# Patient Record
Sex: FEMALE | Race: WHITE | NOT HISPANIC OR LATINO | Employment: OTHER | URBAN - METROPOLITAN AREA
[De-identification: names, ages, dates, MRNs, and addresses within clinical notes are randomized per-mention and may not be internally consistent; named-entity substitution may affect disease eponyms.]

---

## 2017-01-06 ENCOUNTER — COMMUNICATION - HEALTHEAST (OUTPATIENT)
Dept: RESPIRATORY THERAPY | Facility: HOSPITAL | Age: 62
End: 2017-01-06

## 2017-01-27 ENCOUNTER — ONCOLOGY VISIT (OUTPATIENT)
Dept: TRANSPLANT | Facility: CLINIC | Age: 62
End: 2017-01-27
Attending: INTERNAL MEDICINE
Payer: MEDICARE

## 2017-01-27 ENCOUNTER — RECORDS - HEALTHEAST (OUTPATIENT)
Dept: ADMINISTRATIVE | Facility: OTHER | Age: 62
End: 2017-01-27

## 2017-01-27 ENCOUNTER — APPOINTMENT (OUTPATIENT)
Dept: LAB | Facility: CLINIC | Age: 62
End: 2017-01-27
Attending: INTERNAL MEDICINE
Payer: MEDICARE

## 2017-01-27 ENCOUNTER — SOCIAL WORK (OUTPATIENT)
Dept: TRANSPLANT | Facility: CLINIC | Age: 62
End: 2017-01-27

## 2017-01-27 VITALS
WEIGHT: 109.1 LBS | TEMPERATURE: 97.9 F | DIASTOLIC BLOOD PRESSURE: 75 MMHG | HEART RATE: 89 BPM | OXYGEN SATURATION: 99 % | BODY MASS INDEX: 16.1 KG/M2 | SYSTOLIC BLOOD PRESSURE: 128 MMHG

## 2017-01-27 DIAGNOSIS — C92.01 ACUTE MYELOID LEUKEMIA IN REMISSION (H): ICD-10-CM

## 2017-01-27 LAB
ALBUMIN SERPL-MCNC: 3.9 G/DL (ref 3.4–5)
ALP SERPL-CCNC: 84 U/L (ref 40–150)
ALT SERPL W P-5'-P-CCNC: 31 U/L (ref 0–50)
ANION GAP SERPL CALCULATED.3IONS-SCNC: 5 MMOL/L (ref 3–14)
AST SERPL W P-5'-P-CCNC: 26 U/L (ref 0–45)
BASOPHILS # BLD AUTO: 0.1 10E9/L (ref 0–0.2)
BASOPHILS NFR BLD AUTO: 0.8 %
BILIRUB SERPL-MCNC: 0.5 MG/DL (ref 0.2–1.3)
BUN SERPL-MCNC: 11 MG/DL (ref 7–30)
CALCIUM SERPL-MCNC: 8.6 MG/DL (ref 8.5–10.1)
CHLORIDE SERPL-SCNC: 100 MMOL/L (ref 94–109)
CO2 SERPL-SCNC: 31 MMOL/L (ref 20–32)
CREAT SERPL-MCNC: 0.75 MG/DL (ref 0.52–1.04)
DIFFERENTIAL METHOD BLD: NORMAL
EOSINOPHIL # BLD AUTO: 0.2 10E9/L (ref 0–0.7)
EOSINOPHIL NFR BLD AUTO: 1.9 %
ERYTHROCYTE [DISTWIDTH] IN BLOOD BY AUTOMATED COUNT: 13.1 % (ref 10–15)
GFR SERPL CREATININE-BSD FRML MDRD: 79 ML/MIN/1.7M2
GLUCOSE SERPL-MCNC: 141 MG/DL (ref 70–99)
HCT VFR BLD AUTO: 44.1 % (ref 35–47)
HGB BLD-MCNC: 14.7 G/DL (ref 11.7–15.7)
IGG SERPL-MCNC: 867 MG/DL (ref 695–1620)
IMM GRANULOCYTES # BLD: 0 10E9/L (ref 0–0.4)
IMM GRANULOCYTES NFR BLD: 0.4 %
LYMPHOCYTES # BLD AUTO: 3 10E9/L (ref 0.8–5.3)
LYMPHOCYTES NFR BLD AUTO: 35.6 %
MCH RBC QN AUTO: 30.8 PG (ref 26.5–33)
MCHC RBC AUTO-ENTMCNC: 33.3 G/DL (ref 31.5–36.5)
MCV RBC AUTO: 92 FL (ref 78–100)
MONOCYTES # BLD AUTO: 1.1 10E9/L (ref 0–1.3)
MONOCYTES NFR BLD AUTO: 13 %
NEUTROPHILS # BLD AUTO: 4 10E9/L (ref 1.6–8.3)
NEUTROPHILS NFR BLD AUTO: 48.3 %
NRBC # BLD AUTO: 0 10*3/UL
NRBC BLD AUTO-RTO: 0 /100
PLATELET # BLD AUTO: 243 10E9/L (ref 150–450)
POTASSIUM SERPL-SCNC: 4.2 MMOL/L (ref 3.4–5.3)
PROT SERPL-MCNC: 7.2 G/DL (ref 6.8–8.8)
RBC # BLD AUTO: 4.78 10E12/L (ref 3.8–5.2)
SODIUM SERPL-SCNC: 136 MMOL/L (ref 133–144)
WBC # BLD AUTO: 8.3 10E9/L (ref 4–11)

## 2017-01-27 PROCEDURE — 36415 COLL VENOUS BLD VENIPUNCTURE: CPT

## 2017-01-27 PROCEDURE — 85025 COMPLETE CBC W/AUTO DIFF WBC: CPT | Performed by: INTERNAL MEDICINE

## 2017-01-27 PROCEDURE — 99212 OFFICE O/P EST SF 10 MIN: CPT | Mod: ZF

## 2017-01-27 PROCEDURE — 82784 ASSAY IGA/IGD/IGG/IGM EACH: CPT | Performed by: INTERNAL MEDICINE

## 2017-01-27 PROCEDURE — 80053 COMPREHEN METABOLIC PANEL: CPT | Performed by: INTERNAL MEDICINE

## 2017-01-27 NOTE — NURSING NOTE
Chief Complaint   Patient presents with     Blood Draw     vs/labs by cma   See doc flowsheets for details.  BELL Rios, QUE

## 2017-01-27 NOTE — MR AVS SNAPSHOT
After Visit Summary   1/27/2017    Jenn Barclay    MRN: [id number]           Patient Information     Date Of Birth          1955        Visit Information        Provider Department      1/27/2017 11:30 AM Dmitriy Agee MD [facility] Blood and Marrow Transplant        Today's Diagnoses     Acute myeloid leukemia in remission ()               [facility] ([facility])  [address] [place] [address]  Phone: [phone]  Clinic Hours:   Monday-Friday:    8am to 4:30pm   Weekends and holidays:    8am to noon (in general)  If your fever is 100.5  or greater,   call the clinic.  After hours call the   hospital at [phone] or   [phone]. Ask for the BMT   fellow for pediatric or adult patients           Care Instructions    4/12/17 @ 11:30a        Follow-ups after your visit        Your next 10 appointments already scheduled     Apr 12, 2017 11:30 AM   [facility] with  [facility]   [facility] ([facility])    [address] [place] [id number] [phone]            Apr 12, 2017 12:00 PM   Return with Dmitriy Agee MD   [facility] Blood and Marrow Transplant ([facility])    [address] [place] [id number] [phone]              Future tests that were ordered for you today     Open Future Orders        Priority Expected Expires Ordered    Comprehensive metabolic panel Routine 4/12/2017 6/27/2017 1/27/2017    CBC with platelets differential Routine 4/12/2017 6/27/2017 1/27/2017            Who to contact     If you have questions or need follow up information about today's clinic visit or your schedule please contact [facility]ANSPL[facility] directly at [phone].  Normal or non-critical lab and imaging results will be communicated to you by MyChart, letter or phone within 4 business days after the  "clinic has received the results. If you do not hear from us within 7 days, please contact the clinic through [employer] or phone. If you have a critical or abnormal lab result, we will notify you by phone as soon as possible.  Submit refill requests through [employer] or call your pharmacy and they will forward the refill request to us. Please allow 3 business days for your refill to be completed.          Additional Information About Your Visit        [employer] Information     [employer] lets you send messages to your doctor, view your test results, renew your prescriptions, schedule appointments and more. To sign up, go to [email or web address] . Click on \"Log in\" on the left side of the screen, which will take you to the Welcome page. Then click on \"Sign up Now\" on the right side of the page.     You will be asked to enter the access code listed below, as well as some personal information. Please follow the directions to create your username and password.     Your access code is: [email or web address]  Expires: 2017  6:30 AM     Your access code will  in 90 days. If you need help or a new code, please call your [place] clinic or [phone].        Care EveryWhere ID     This is your Care EveryWhere ID. This could be used by other organizations to access your [place] medical records  [id number]        Your Vitals Were     Pulse Temperature Pulse Oximetry             89 97.9  F (36.6  C) (Oral) 99%          Blood Pressure from Last 3 Encounters:   17 128/75   16 114/74   16 132/75    Weight from Last 3 Encounters:   17 49.487 kg (109 lb 1.6 oz)   16 47.718 kg (105 lb 3.2 oz)   16 47.764 kg (105 lb 4.8 oz)              We Performed the Following     CBC with platelets differential     Comprehensive metabolic panel     IgG        Recent Review Flowsheet Data     BMT Recent Results Latest Ref Rng 2010 3/10/2011 6/3/2011 2011 2012 2016 2017    WBC 4.0 - 11.0 " 10e9/L 7.8 8.0 7.3 8.6 8.8 10.0 8.3    Hemoglobin 11.7 - 15.7 g/dL 13.3 14.4 14.0 14.1 14.0 14.1 14.7    Platelet Count 150 - 450 10e9/L 178 218 193 220 166 266 243    Neutrophils (Absolute) 1.6 - 8.3 10e9/L 4.1 3.5 3.5 3.7 4.5 3.7 4.0    Sodium 133 - 144 mmol/L 139 139 138 144 137 139 136    Potassium 3.4 - 5.3 mmol/L 3.9 4.2 3.9 4.0 3.9 3.6 4.2    Chloride 94 - 109 mmol/L 104 99 104 104 104 102 100    Glucose 70 - 99 mg/dL 81 112(H) 111(H) 101(H) 102(H) 93 141(H)    Urea Nitrogen 7 - 30 mg/dL 18 19 14 16 13 10 11    Creatinine 0.52 - 1.04 mg/dL 0.67 0.92 0.75 0.85 0.73 0.67 0.75    Calcium (Total) 8.5 - 10.1 mg/dL 8.7 9.8 9.0 9.3 9.3 8.5 8.6    Protein (Total) 6.8 - 8.8 g/dL - 7.3 7.0 7.1 6.9 7.3 7.2    Albumin 3.4 - 5.0 g/dL - 4.2 4.1 3.9 4.2 4.0 3.9    Alkaline Phosphatase 40 - 150 U/L - 88 68 69 72 78 84    AST 0 - 45 U/L - 35 41 41 36 29 26    ALT 0 - 50 U/L - 48 58(H) 46 37 46 31    MCV 78 - 100 fl 95 93 93 92 90 92 92               Primary Care Provider Office Phone # Fax #    Yovani Sherwood [phone]       [address] [address]        Thank you!     Thank you for choosing [facility]  for your care. Our goal is always to provide you with excellent care. Hearing back from our patients is one way we can continue to improve our services. Please take a few minutes to complete the written survey that you may receive in the mail after your visit with us. Thank you!             Your Updated Medication List - Protect others around you: Learn how to safely use, store and throw away your medicines at www.disposemymeds.org.          This list is accurate as of: 1/27/17 12:23 PM.  Always use your most recent med list.                   Brand Name Dispense Instructions for use    acyclovir 400 MG tablet    ZOVIRAX    28 tablet    Take 2 tablets (800 mg) by mouth 2 times daily for 7 days       ALEVE PO      Take by mouth daily        ascorbic acid 500 MG tablet    VITAMIN C     Take 500 mg by mouth daily.       cholecalciferol 1000 UNITS capsule    vitamin  -D     Take 1 capsule by mouth daily.       IMODIUM OR      Take by mouth 2 times daily       MULTIVITAL PO      Take  by mouth.       oxyCODONE-acetaminophen 5-325 MG per tablet    PERCOCET    20 tablet    Take 1 tablet by mouth every 4 hours as needed for pain.       VENTOLIN  (90 BASE) MCG/ACT Inhaler   Generic drug:  albuterol      Inhale  into the lungs.       VITAMIN B-2 PO      Take by mouth daily

## 2017-01-27 NOTE — PROGRESS NOTES
BMT Clinic Progress Note     CC: follow-up diarrhea    HPI: Today I had the pleasure to see and examine Ms. Jenn Barclay.  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  She has not yet had her diarrhea. Her prep for her colonoscopy planned for last month was inadequate and it was rescheduled for feb 2017. Still has diarrhea but she put on 4 lbs. We tried to contact her to help her get creon but were unable to reach her. We checked with her today and she states that her phones is working and we have correct number.  Still unable to afford creon. She has not had any significant change in her energy and has not had any other symptoms.  She is scheduled for her GI procedure on 12/19. Still smoking.    She was seen in the ED for evaluation of a swollen knee and was found to have an acute gout flare.  She was put on naproxen by her primary care physician for this and her symptoms have since resolved.    The remaining complete review of systems is otherwise negative.      PAST MEDICAL HISTORY:   1.  Significant for the acute myelogenous leukemia.   2.  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   3.  COPD.   4.  Hysterectomy and bilateral oophorectomy at the age of 20.     5.  She had a compression fracture of her thoracic spine that was treated with a vertebroplasty about 6 months ago.   6.  She has a history of smoking for many years.  She denies alcohol or drug abuse.  She is not on any thyroid replacement therapy, and denies thyroid disease.        PHYSICAL EXAMINATION:  Ms. Barclay is a very pleasant 61-year-old. /75 mmHg  Pulse 89  Temp(Src) 97.9  F (36.6  C) (Oral)  Wt 49.487 kg (109 lb 1.6 oz)  SpO2 99%.   General: frail, thin appearing woman in no distress  HEENT: perrl, eomi, no icterus, no oral lesions, no teeth  Resp: poor aeration, no wheezes  CV: rrr no m/r/g  Abd: s, nt, nd, thin appearing, normal BS  Ext: no edema      ASSESSMENT AND PLAN:  Ms. 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  She is here for continued evaluation of her ongoing GI symptoms.    1.  AML.  The patient seems to be in clinical remission.    2.  Nzqzn-xkhqhl-flgn disease.  We gave her form to obtain no for creon that she will start 24,000 unti with meals and snacks. It is possible the patient's symptoms are related to ylpex-irhsbr-xfrk disease. 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  It is possible that malabsorption is also related to that.  Pending upper and lower endoscopies this on FEB 2017. We will gather biopsy report information once available.    3.  GI.  The patient has significant GI complaints of diarrhea with what seems to be malabsorption. Start creon as above.   4.  Osteoporosis. Her vitamin D level was 31.  She should continue routine vitamin D supplementation.    5.  Chronic obstructive pulmonary disease.  The patient has significant COPD and continues to smoke.  Advised to stop smoking, again.     I will see the patient back in late April.  Sooner if needed.

## 2017-01-27 NOTE — PROGRESS NOTES
CECILIA met with the pt in clinic today to discuss concerns for the pt's medication coverage. The pt has Medicare insurance, but does not have Part D stating that it is too expensive

## 2017-01-27 NOTE — PROGRESS NOTES
CECILIA met with the pt in clinic today to discuss concerns for the pt's medication coverage. The pt has Medicare insurance, but does not have Part D stating that it is too expensive to select this benefit. The pt is on disability and states that she has a hard time making ends meet. She expressed that Dr. Agee wants her to start taking Creon, but she can not afford the medication. SW and the pt did start a no application from [employer] for Creon, application is complete, but will need the pt's proof of income. Pt agrees to fax this in so that her no can be complete. Pt expressed understanding of this and will call if she has any other questions.     MASOUD Ramirez, DYLONSW  Phone [phone]  Pager [phone]

## 2017-01-27 NOTE — Clinical Note
1/27/2017      RE: Jenn Barclay  [address] [place] [address]       BMT Clinic Progress Note     CC: follow-up diarrhea    HPI: Today I had the pleasure to see and examine Ms. Jenn Barclay.  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  She has not yet had her diarrhea. Her prep for her colonoscopy planned for last month was inadequate and it was rescheduled for feb 2017. Still has diarrhea but she put on 4 lbs. We tried to contact her to help her get creon but were unable to reach her. We checked with her today and she states that her phones is working and we have correct number.  Still unable to afford creon. She has not had any significant change in her energy and has not had any other symptoms.  She is scheduled for her GI procedure on 12/19. Still smoking.    She was seen in the ED for evaluation of a swollen knee and was found to have an acute gout flare.  She was put on naproxen by her primary care physician for this and her symptoms have since resolved.    The remaining complete review of systems is otherwise negative.      PAST MEDICAL HISTORY:   1.  Significant for the acute myelogenous leukemia.   2.  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   3.  COPD.   4.  Hysterectomy and bilateral oophorectomy at the age of 20.     5.  She had a compression fracture of her thoracic spine that was treated with a vertebroplasty about 6 months ago.   6.  She has a history of smoking for many years.  She denies alcohol or drug abuse.  She is not on any thyroid replacement therapy, and denies thyroid disease.        PHYSICAL EXAMINATION:  Ms. Barclay is a very pleasant 61-year-old. /75 mmHg  Pulse 89  Temp(Src) 97.9  F (36.6  C) (Oral)  Wt 49.487 kg (109 lb 1.6 oz)  SpO2 99%.   General: frail, thin appearing woman in no distress  HEENT: perrl, eomi, no icterus, no oral lesions, no teeth  Resp: poor aeration, no wheezes  CV: rrr no m/r/g  Abd: s, nt, nd, thin  appearing, normal BS  Ext: no edema      ASSESSMENT AND PLAN:  Ms. 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  She is here for continued evaluation of her ongoing GI symptoms.    1.  AML.  The patient seems to be in clinical remission.    2.  Orfjr-mevpvv-lmli disease.  We gave her form to obtain no for creon that she will start 24,000 unti with meals and snacks. It is possible the patient's symptoms are related to golfw-opoeku-ymoc disease. 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  It is possible that malabsorption is also related to that.  Pending upper and lower endoscopies this on FEB 2017. We will gather biopsy report information once available.    3.  GI.  The patient has significant GI complaints of diarrhea with what seems to be malabsorption. Start creon as above.   4.  Osteoporosis. Her vitamin D level was 31.  She should continue routine vitamin D supplementation.    5.  Chronic obstructive pulmonary disease.  The patient has significant COPD and continues to smoke.  Advised to stop smoking, again.     I will see the patient back in late April.  Sooner if needed.      Dmitriy Agee MD

## 2017-01-27 NOTE — NURSING NOTE
BMT Heme Malignancy Rooming Note    Jenn Barclay - 1/27/2017 11:36 AM     Chief Complaint   Patient presents with     Blood Draw     vs/labs by cma     RECHECK     Return: AML        /75 mmHg  Pulse 89  Temp(Src) 97.9  F (36.6  C) (Oral)  Wt 49.487 kg (109 lb 1.6 oz)  SpO2 99%     Medications reviewed: Yes    Labs drawn: No    Dressing changed: Not applicable     Medications given: No    Staff time:6    Additional information if applicable: n/a    CODY GARCIA CMA

## 2017-02-06 ENCOUNTER — COMMUNICATION - HEALTHEAST (OUTPATIENT)
Dept: RESPIRATORY THERAPY | Facility: HOSPITAL | Age: 62
End: 2017-02-06

## 2017-02-07 ENCOUNTER — TELEPHONE (OUTPATIENT)
Dept: TRANSPLANT | Facility: CLINIC | Age: 62
End: 2017-02-07

## 2017-02-08 NOTE — TELEPHONE ENCOUNTER
"Clinical   Blood and Marrow Transplant Service  Phone call made to patient to check on her financial information paperwork she was to send us for her application for assistance with her Creon medication from the pharmaceutical company. Jenn indicated that she had faxed the information to [phone] on 1.31.17. Writer indicated that it had not been received yet by writer. Investigated possible location of fax with admin staff but no one had seen the information. Writer explained to Jenn that we would need to have her send it again or writer could send her an envelope with the postage paid for her to send the paperwork to us via [employer]. Jenn became frustrated and angry - yelling into phone and not allowing writer to speak. Jenn stated that she has \"done everything you told me to do\" at a financial expense to herself. She told writer to tell Dr. Agee that she did everything she was asked to do and \"then I get blamed when the machine does not work\" and \"people say I am sabotaging myself.\" Writer explained that faxes sometimes do not come through - she replied \"I have the confirmation sheet\" from the hardware store where she faxed the information. Writer indicated that even with the confirmation sheet sometimes the fax is not received on the other end. She then proceeded to share her frustrations with the cost of medications and how expensive they are for her as a \"poor person.\" Writer reassured her that we are trying to help her with the medication and are not trying to make this more difficult. She was open to writer sending her a postage paid envelope for her to send the income verification to writer. She replied \"I am going to include the confirmation sheet so you know that I did it.\"    Envelope mailed to patient today for 1700  by [employer].    Updated Dr. Agee per patient's request.     Arabella Grande MSW LICSW  2/7/2017       "

## 2017-02-09 ENCOUNTER — RECORDS - HEALTHEAST (OUTPATIENT)
Dept: ADMINISTRATIVE | Facility: OTHER | Age: 62
End: 2017-02-09

## 2017-02-10 ENCOUNTER — TELEPHONE (OUTPATIENT)
Dept: TRANSPLANT | Facility: CLINIC | Age: 62
End: 2017-02-10

## 2017-02-10 ENCOUNTER — RECORDS - HEALTHEAST (OUTPATIENT)
Dept: ADMINISTRATIVE | Facility: OTHER | Age: 62
End: 2017-02-10

## 2017-02-10 NOTE — TELEPHONE ENCOUNTER
"Clinical   Blood and Marrow Transplant Service    Phone call to Jenn to see if she received the envelope to send back her financial information for the Creon application. She indicated she has not been able to get to the mailbox today. She will check today and send them in when she receives the envelope. Jenn talked about the expense of the transportation - writer asked if we should be looking into [employer] and Jenn indicated that she is enrolled and uses the service when she can afford it. She mentioned that her income is too high to receive [place] and then shared her observation that \"blacks and asians\" get more services from the state than she does. She shared an interaction at the food shelf where an immigrant was demanding more food, was driving a nice care and Jenn stated that was not fair. Writer indicated that we do not always know the situations that other people are facing. Jenn continued to share her concerns about her perception of lack of fairness. Writer concluded our conversation by encouraging her to send in the information so we are able to process her application.     Arabella MEREDITH LICSW  2/10/2017       "

## 2017-02-17 ENCOUNTER — TELEPHONE (OUTPATIENT)
Dept: TRANSPLANT | Facility: CLINIC | Age: 62
End: 2017-02-17

## 2017-02-17 DIAGNOSIS — Z94.81 S/P ALLOGENEIC BONE MARROW TRANSPLANT (H): Primary | ICD-10-CM

## 2017-02-17 DIAGNOSIS — Z94.81 S/P ALLOGENEIC BONE MARROW TRANSPLANT (H): ICD-10-CM

## 2017-02-17 DIAGNOSIS — C92.01 ACUTE MYELOID LEUKEMIA IN REMISSION (H): ICD-10-CM

## 2017-02-17 NOTE — TELEPHONE ENCOUNTER
"Clinical   Blood and Marrow Transplant Service    Spoke with Jenn by phone and indicated that we received the envelope with her financial information and writer has faxed the information to [employer] for her Creon. Jenn indicated that she had also included the confirmation sheet from her fax in the envelope to prove that she had followed through on what we asked her to do. Writer indicated that \"no one is blaming\" her for the fax not coming through. She noted that she is not responsible if the \"machine did not do what it was supposed to.\" Writer indicated that [facility].    Arabella MEREDITH LICSW  2/17/2017       "

## 2017-03-01 ENCOUNTER — MEDICAL CORRESPONDENCE (OUTPATIENT)
Dept: TRANSPLANT | Facility: CLINIC | Age: 62
End: 2017-03-01

## 2017-03-07 ENCOUNTER — COMMUNICATION - HEALTHEAST (OUTPATIENT)
Dept: RESPIRATORY THERAPY | Facility: HOSPITAL | Age: 62
End: 2017-03-07

## 2017-03-07 ENCOUNTER — TELEPHONE (OUTPATIENT)
Dept: TRANSPLANT | Facility: CLINIC | Age: 62
End: 2017-03-07

## 2017-03-07 NOTE — TELEPHONE ENCOUNTER
SW received call from pt (P: [phone]) checking on her application to the [employer] (Application for Creon). Pt's application was faxed in on 2/17/2017. SW called [employer] (P: [phone]) to check on application status. Per [employer], pt's application has been approved and pt should be receiving medication in 2-3 days at her home address; home address confirmed. SW attempted to call pt back at [phone] multiple times to provide update; however, pt did not answer and SW was not able to leave voice message as voicemail box has not been set up. SW will continue to try and call pt.     MASOUD Spivey, YUMIKO  Phone: [phone]  Pager: [phone]

## 2017-03-09 ENCOUNTER — AMBULATORY - HEALTHEAST (OUTPATIENT)
Dept: INTERNAL MEDICINE | Facility: CLINIC | Age: 62
End: 2017-03-09

## 2017-03-10 ENCOUNTER — OFFICE VISIT - HEALTHEAST (OUTPATIENT)
Dept: INTERNAL MEDICINE | Facility: CLINIC | Age: 62
End: 2017-03-10

## 2017-03-10 DIAGNOSIS — K52.9 CHRONIC DIARRHEA: ICD-10-CM

## 2017-03-10 DIAGNOSIS — C92.00: ICD-10-CM

## 2017-03-10 DIAGNOSIS — D89.813 GVHD (GRAFT VERSUS HOST DISEASE) (H): ICD-10-CM

## 2017-03-10 DIAGNOSIS — Z09 HOSPITAL DISCHARGE FOLLOW-UP: ICD-10-CM

## 2017-03-10 DIAGNOSIS — J44.9 COPD (CHRONIC OBSTRUCTIVE PULMONARY DISEASE) (H): ICD-10-CM

## 2017-03-10 DIAGNOSIS — Z72.0 TOBACCO ABUSE: ICD-10-CM

## 2017-04-06 ENCOUNTER — COMMUNICATION - HEALTHEAST (OUTPATIENT)
Dept: RESPIRATORY THERAPY | Facility: HOSPITAL | Age: 62
End: 2017-04-06

## 2017-04-12 ENCOUNTER — APPOINTMENT (OUTPATIENT)
Dept: LAB | Facility: CLINIC | Age: 62
End: 2017-04-12
Attending: INTERNAL MEDICINE
Payer: MEDICARE

## 2017-04-12 ENCOUNTER — ONCOLOGY VISIT (OUTPATIENT)
Dept: TRANSPLANT | Facility: CLINIC | Age: 62
End: 2017-04-12
Attending: INTERNAL MEDICINE
Payer: MEDICARE

## 2017-04-12 VITALS
SYSTOLIC BLOOD PRESSURE: 117 MMHG | BODY MASS INDEX: 16.52 KG/M2 | WEIGHT: 111.9 LBS | OXYGEN SATURATION: 100 % | RESPIRATION RATE: 16 BRPM | HEART RATE: 70 BPM | DIASTOLIC BLOOD PRESSURE: 70 MMHG | TEMPERATURE: 97.9 F

## 2017-04-12 DIAGNOSIS — Z94.81 S/P ALLOGENEIC BONE MARROW TRANSPLANT (H): ICD-10-CM

## 2017-04-12 DIAGNOSIS — C92.01 ACUTE MYELOID LEUKEMIA IN REMISSION (H): ICD-10-CM

## 2017-04-12 LAB
ALBUMIN SERPL-MCNC: 3.4 G/DL (ref 3.4–5)
ALP SERPL-CCNC: 71 U/L (ref 40–150)
ALT SERPL W P-5'-P-CCNC: 40 U/L (ref 0–50)
ANION GAP SERPL CALCULATED.3IONS-SCNC: 5 MMOL/L (ref 3–14)
AST SERPL W P-5'-P-CCNC: 27 U/L (ref 0–45)
BASOPHILS # BLD AUTO: 0.1 10E9/L (ref 0–0.2)
BASOPHILS NFR BLD AUTO: 1.6 %
BILIRUB SERPL-MCNC: 0.4 MG/DL (ref 0.2–1.3)
BUN SERPL-MCNC: 13 MG/DL (ref 7–30)
CALCIUM SERPL-MCNC: 8.2 MG/DL (ref 8.5–10.1)
CHLORIDE SERPL-SCNC: 106 MMOL/L (ref 94–109)
CO2 SERPL-SCNC: 29 MMOL/L (ref 20–32)
CREAT SERPL-MCNC: 0.71 MG/DL (ref 0.52–1.04)
DIFFERENTIAL METHOD BLD: NORMAL
EOSINOPHIL # BLD AUTO: 0.2 10E9/L (ref 0–0.7)
EOSINOPHIL NFR BLD AUTO: 2.4 %
ERYTHROCYTE [DISTWIDTH] IN BLOOD BY AUTOMATED COUNT: 12.8 % (ref 10–15)
GFR SERPL CREATININE-BSD FRML MDRD: 83 ML/MIN/1.7M2
GLUCOSE SERPL-MCNC: 146 MG/DL (ref 70–99)
HCT VFR BLD AUTO: 42.1 % (ref 35–47)
HGB BLD-MCNC: 13.6 G/DL (ref 11.7–15.7)
IMM GRANULOCYTES # BLD: 0 10E9/L (ref 0–0.4)
IMM GRANULOCYTES NFR BLD: 0.3 %
LYMPHOCYTES # BLD AUTO: 3.8 10E9/L (ref 0.8–5.3)
LYMPHOCYTES NFR BLD AUTO: 50.8 %
MCH RBC QN AUTO: 30.1 PG (ref 26.5–33)
MCHC RBC AUTO-ENTMCNC: 32.3 G/DL (ref 31.5–36.5)
MCV RBC AUTO: 93 FL (ref 78–100)
MONOCYTES # BLD AUTO: 0.8 10E9/L (ref 0–1.3)
MONOCYTES NFR BLD AUTO: 9.9 %
NEUTROPHILS # BLD AUTO: 2.7 10E9/L (ref 1.6–8.3)
NEUTROPHILS NFR BLD AUTO: 35 %
NRBC # BLD AUTO: 0 10*3/UL
NRBC BLD AUTO-RTO: 0 /100
PLATELET # BLD AUTO: 209 10E9/L (ref 150–450)
POTASSIUM SERPL-SCNC: 3.4 MMOL/L (ref 3.4–5.3)
PROT SERPL-MCNC: 6.5 G/DL (ref 6.8–8.8)
RBC # BLD AUTO: 4.52 10E12/L (ref 3.8–5.2)
SODIUM SERPL-SCNC: 140 MMOL/L (ref 133–144)
WBC # BLD AUTO: 7.6 10E9/L (ref 4–11)

## 2017-04-12 PROCEDURE — 36415 COLL VENOUS BLD VENIPUNCTURE: CPT | Performed by: INTERNAL MEDICINE

## 2017-04-12 PROCEDURE — 85025 COMPLETE CBC W/AUTO DIFF WBC: CPT | Performed by: INTERNAL MEDICINE

## 2017-04-12 PROCEDURE — 80053 COMPREHEN METABOLIC PANEL: CPT | Performed by: INTERNAL MEDICINE

## 2017-04-12 NOTE — MR AVS SNAPSHOT
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  After hours call the   hospital at [phone] or   [phone]. Ask for the BMT   fellow for pediatric or adult patients           Care Instructions    RTC Cyndie on 7/12/17 with labs        Follow-ups after your visit        Your next 10 appointments already scheduled     Jul 12, 2017 11:30 AM CDT   [facility] Lab Draw with  [facility] LAB DRAW   [facility] Mason[facility] Lab Draw ([facility])    [address] [place] [address] [id number] [phone]            Jul 12, 2017 12:00 PM CDT   Return with Dmitriy Agee MD   [facility] Blood and Marrow Transplant ([facility])    [address] [place] [id number] [phone]              Future tests that were ordered for you today     Open Future Orders        Priority Expected Expires Ordered    Comprehensive metabolic panel Routine 7/12/2017 7/12/2017 4/12/2017    CBC with platelets differential Routine 7/12/2017 7/12/2017 4/12/2017    Amylase Routine 7/12/2017 7/12/2017 4/12/2017    Lipase Routine 7/12/2017 7/12/2017 4/12/2017            Who to contact     If you have questions or need follow up information about today's clinic visit or your schedule please contact [facility] BLOOD AND MARROW  "TRANSPLANT directly at [phone].  Normal or non-critical lab and imaging results will be communicated to you by D[employer]hart, letter or phone within 4 business days after the clinic has received the results. If you do not hear from us within 7 days, please contact the clinic through D[employer]Approvedhart or phone. If you have a critical or abnormal lab result, we will notify you by phone as soon as possible.  Submit refill requests through [facility] or call your pharmacy and they will forward the refill request to us. Please allow 3 business days for your refill to be completed.          Additional Information About Your Visit        DermApprovedharaitainment Information     [facility] lets you send messages to your doctor, view your test results, renew your prescriptions, schedule appointments and more. To sign up, go to [email or web address] . Click on \"Log in\" on the left side of the screen, which will take you to the Welcome page. Then click on \"Sign up Now\" on the right side of the page.     You will be asked to enter the access code listed below, as well as some personal information. Please follow the directions to create your username and password.     Your access code is: [email or web address]  Expires: 2017  6:30 AM     Your access code will  in 90 days. If you need help or a new code, please call your [place] clinic or [phone].        Care EveryWhere ID     This is your Care EveryWhere ID. This could be used by other organizations to access your [place] medical records  [id number]        Your Vitals Were     Pulse Temperature Respirations Pulse Oximetry BMI (Body Mass Index)       70 97.9  F (36.6  C) (Oral) 16 100% 16.52 kg/m2        Blood Pressure from Last 3 Encounters:   17 117/70   17 128/75   16 114/74    Weight from Last 3 Encounters:   17 50.8 kg (111 lb 14.4 oz)   17 49.5 kg (109 lb 1.6 oz)   16 47.7 kg (105 lb 3.2 oz)              We Performed the Following     CBC with platelets " differential     Comprehensive metabolic panel          Today's Medication Changes          These changes are accurate as of: 4/12/17 12:39 PM.  If you have any questions, ask your nurse or doctor.               Start taking these medicines.        Dose/Directions    amylase-lipase-protease 74493 UNITS Cpep per EC capsule   Commonly known as:  CREON   Used for:  S/P allogeneic bone marrow transplant (H), Acute myeloid leukemia in remission (H)   Started by:  Dmitriy Agee MD        Dose:  2 capsule   Take 2 capsules (48,000 Units) by mouth 3 times daily (with meals) And snacks   Quantity:  180 capsule   Refills:  11         Stop taking these medicines if you haven't already. Please contact your care team if you have questions.     acyclovir 400 MG tablet   Commonly known as:  ZOVIRAX   Stopped by:  Dmitriy Agee MD           VITAMIN B-2 PO   Stopped by:  Dmitriy Agee MD                Where to get your medicines      These medications were sent to [facility] [employer] [address] [address]     Phone:  [phone]     amylase-lipase-protease 25782 UNITS Cpep per EC capsule                Recent Review Flowsheet Data     BMT Recent Results Latest Ref Rng & Units 3/10/2011 6/3/2011 12/1/2011 6/1/2012 11/16/2016 1/27/2017 4/12/2017    WBC 4.0 - 11.0 10e9/L 8.0 7.3 8.6 8.8 10.0 8.3 7.6    Hemoglobin 11.7 - 15.7 g/dL 14.4 14.0 14.1 14.0 14.1 14.7 13.6    Platelet Count 150 - 450 10e9/L 218 193 220 166 266 243 209    Neutrophils (Absolute) 1.6 - 8.3 10e9/L 3.5 3.5 3.7 4.5 3.7 4.0 2.7    INR 0.86 - 1.14 - - - - - - -    Sodium 133 - 144 mmol/L 139 138 144 137 139 136 140    Potassium 3.4 - 5.3 mmol/L 4.2 3.9 4.0 3.9 3.6 4.2 3.4    Chloride 94 - 109 mmol/L 99 104 104 104 102 100 106    Glucose 70 - 99 mg/dL 112(H) 111(H) 101(H) 102(H) 93 141(H) 146(H)    Urea Nitrogen 7 - 30 mg/dL 19 14 16 13 10 11 13     Creatinine 0.52 - 1.04 mg/dL 0.92 0.75 0.85 0.73 0.67 0.75 0.71    Calcium (Total) 8.5 - 10.1 mg/dL 9.8 9.0 9.3 9.3 8.5 8.6 8.2(L)    Protein (Total) 6.8 - 8.8 g/dL 7.3 7.0 7.1 6.9 7.3 7.2 6.5(L)    Albumin 3.4 - 5.0 g/dL 4.2 4.1 3.9 4.2 4.0 3.9 3.4    Alkaline Phosphatase 40 - 150 U/L 88 68 69 72 78 84 71    AST 0 - 45 U/L 35 41 41 36 29 26 27    ALT 0 - 50 U/L 48 58(H) 46 37 46 31 40    MCV 78 - 100 fl 93 93 92 90 92 92 93               Primary Care Provider Office Phone # Fax #    Yovani Sidney Sherwood [phone]       [facility] [address] [facility] [address]        Thank you!     Thank you for choosing [facility] BLOOD AND MARROW TRANSPLANT  for your care. Our goal is always to provide you with excellent care. Hearing back from our patients is one way we can continue to improve our services. Please take a few minutes to complete the written survey that you may receive in the mail after your visit with us. Thank you!             Your Updated Medication List - Protect others around you: Learn how to safely use, store and throw away your medicines at www.disposemymeds.org.          This list is accurate as of: 4/12/17 12:39 PM.  Always use your most recent med list.                   Brand Name Dispense Instructions for use    ALEVE PO      Take by mouth daily       amylase-lipase-protease 43495 UNITS Cpep per EC capsule    CREON    180 capsule    Take 2 capsules (48,000 Units) by mouth 3 times daily (with meals) And snacks       ascorbic acid 500 MG tablet    VITAMIN C     Take 500 mg by mouth daily.       cholecalciferol 1000 UNITS capsule    vitamin  -D     Take 1 capsule by mouth daily.       IMODIUM OR      Take by mouth 2 times daily       MULTIVITAL PO      Take  by mouth.       oxyCODONE-acetaminophen 5-325 MG per tablet    PERCOCET    20 tablet    Take 1 tablet by mouth every 4 hours as needed for pain.       VENTOLIN  (90 BASE) MCG/ACT Inhaler   Generic drug:   albuterol      Inhale  into the lungs.       VITAMIN B 12 PO

## 2017-04-12 NOTE — PROGRESS NOTES
BMT Clinic Progress Note     CC: follow-up diarrhea    HPI: Today I had the pleasure to see and examine Ms. Jenn Barclay.  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  Diarrhea uis much improed since she started creon 4weeks ago. Able to leave the house and less frequent and more formed stools. Getting creon from company. Had GI procedure found 3 polys that were benign. Still smoking.    The remaining complete review of systems is otherwise negative.      PAST MEDICAL HISTORY:   1.  Significant for the acute myelogenous leukemia.   2.  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   3.  COPD.   4.  Hysterectomy and bilateral oophorectomy at the age of 20.     5.  She had a compression fracture of her thoracic spine that was treated with a vertebroplasty about 6 months ago.   6.  She has a history of smoking for many years.  She denies alcohol or drug abuse.  She is not on any thyroid replacement therapy, and denies thyroid disease.        PHYSICAL EXAMINATION:  Ms. Barclay is a very pleasant 61-year-old. /70 (BP Location: Right arm, Patient Position: Chair, Cuff Size: Adult Regular)  Pulse 70  Temp 97.9  F (36.6  C) (Oral)  Resp 16  Wt 50.8 kg (111 lb 14.4 oz)  SpO2 100%  BMI 16.52 kg/m2.   General: frail, thin appearing woman in no distress  HEENT: perrl, eomi, no icterus, no oral lesions, no teeth  Resp: poor aeration, no wheezes  CV: rrr no m/r/g  Abd: s, nt, nd, thin appearing, normal BS  Ext: no edema      ASSESSMENT AND PLAN:  Ms. 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  She is here for continued evaluation of her ongoing GI symptoms.    1.  AML.  The patient in clinical remission.    2.  Hvddx-nrimua-sdjq disease.  We will increase Creon to 48,000 units with meals and snacks. 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  It is possible that  malabsorption is also related to that.  Pending upper and lower endoscopies this on FEB 2017. We will gather biopsy report information once available.    3.  GI.  Benign polyps removed. Repeat colonoscopy in spring on 2020. Improved diarrhea with creon as above.   4.  Osteoporosis. Her vitamin D level was 31.  She should continue routine vitamin D supplementation.    5.  Chronic obstructive pulmonary disease.  The patient has significant COPD and continues to smoke.  Advised to stop smoking, again.     I will see the patient back in July 2017.  Sooner if needed.

## 2017-04-12 NOTE — NURSING NOTE
Chief Complaint   Patient presents with     Blood Draw     right arm, vitals taken      Yarelis Coleman CMA

## 2017-05-05 ENCOUNTER — COMMUNICATION - HEALTHEAST (OUTPATIENT)
Dept: RESPIRATORY THERAPY | Facility: HOSPITAL | Age: 62
End: 2017-05-05

## 2017-05-10 ENCOUNTER — COMMUNICATION - HEALTHEAST (OUTPATIENT)
Dept: ADMINISTRATIVE | Facility: HOSPITAL | Age: 62
End: 2017-05-10

## 2017-05-11 ENCOUNTER — COMMUNICATION - HEALTHEAST (OUTPATIENT)
Dept: SCHEDULING | Facility: CLINIC | Age: 62
End: 2017-05-11

## 2017-05-12 ENCOUNTER — OFFICE VISIT - HEALTHEAST (OUTPATIENT)
Dept: ONCOLOGY | Facility: HOSPITAL | Age: 62
End: 2017-05-12

## 2017-05-12 ENCOUNTER — AMBULATORY - HEALTHEAST (OUTPATIENT)
Dept: INFUSION THERAPY | Facility: HOSPITAL | Age: 62
End: 2017-05-12

## 2017-05-12 DIAGNOSIS — C92.00: ICD-10-CM

## 2017-05-12 DIAGNOSIS — K52.9 CHRONIC DIARRHEA: ICD-10-CM

## 2017-05-12 DIAGNOSIS — R21 PAPULAR RASH, GENERALIZED: ICD-10-CM

## 2017-05-18 ENCOUNTER — RECORDS - HEALTHEAST (OUTPATIENT)
Dept: ADMINISTRATIVE | Facility: OTHER | Age: 62
End: 2017-05-18

## 2017-05-18 ENCOUNTER — COMMUNICATION - HEALTHEAST (OUTPATIENT)
Dept: INTERNAL MEDICINE | Facility: CLINIC | Age: 62
End: 2017-05-18

## 2017-05-19 ENCOUNTER — COMMUNICATION - HEALTHEAST (OUTPATIENT)
Dept: INTERNAL MEDICINE | Facility: CLINIC | Age: 62
End: 2017-05-19

## 2017-05-19 DIAGNOSIS — J44.9 COPD (CHRONIC OBSTRUCTIVE PULMONARY DISEASE) (H): ICD-10-CM

## 2017-05-27 ENCOUNTER — HEALTH MAINTENANCE LETTER (OUTPATIENT)
Age: 62
End: 2017-05-27

## 2017-06-05 ENCOUNTER — COMMUNICATION - HEALTHEAST (OUTPATIENT)
Dept: INTERNAL MEDICINE | Facility: CLINIC | Age: 62
End: 2017-06-05

## 2017-06-05 ENCOUNTER — RECORDS - HEALTHEAST (OUTPATIENT)
Dept: ADMINISTRATIVE | Facility: OTHER | Age: 62
End: 2017-06-05

## 2017-06-05 DIAGNOSIS — J44.9 COPD (CHRONIC OBSTRUCTIVE PULMONARY DISEASE) (H): ICD-10-CM

## 2017-06-06 ENCOUNTER — COMMUNICATION - HEALTHEAST (OUTPATIENT)
Dept: RESPIRATORY THERAPY | Facility: HOSPITAL | Age: 62
End: 2017-06-06

## 2017-06-15 ENCOUNTER — COMMUNICATION - HEALTHEAST (OUTPATIENT)
Dept: INTERNAL MEDICINE | Facility: CLINIC | Age: 62
End: 2017-06-15

## 2017-06-16 DIAGNOSIS — C92.01 ACUTE MYELOID LEUKEMIA IN REMISSION (H): ICD-10-CM

## 2017-06-16 DIAGNOSIS — Z94.81 S/P ALLOGENEIC BONE MARROW TRANSPLANT (H): ICD-10-CM

## 2017-06-18 ENCOUNTER — RECORDS - HEALTHEAST (OUTPATIENT)
Dept: ADMINISTRATIVE | Facility: OTHER | Age: 62
End: 2017-06-18

## 2017-06-21 ENCOUNTER — COMMUNICATION - HEALTHEAST (OUTPATIENT)
Dept: ADMINISTRATIVE | Facility: CLINIC | Age: 62
End: 2017-06-21

## 2017-06-22 DIAGNOSIS — Z94.81 S/P ALLOGENEIC BONE MARROW TRANSPLANT (H): ICD-10-CM

## 2017-06-22 DIAGNOSIS — C92.01 ACUTE MYELOID LEUKEMIA IN REMISSION (H): ICD-10-CM

## 2017-06-23 ENCOUNTER — TELEPHONE (OUTPATIENT)
Dept: TRANSPLANT | Facility: CLINIC | Age: 62
End: 2017-06-23

## 2017-06-23 DIAGNOSIS — Z94.81 S/P ALLOGENEIC BONE MARROW TRANSPLANT (H): ICD-10-CM

## 2017-06-23 DIAGNOSIS — C92.01 ACUTE MYELOID LEUKEMIA IN REMISSION (H): ICD-10-CM

## 2017-06-23 NOTE — TELEPHONE ENCOUNTER
Clinical   Blood and Marrow Transplant Service    Received call from Jenn stating that she had not heard back from us about her Creon dose. Spoke with NC and she had attempted to reach patient but Jenn did not answer her phone and NC was unable to leave a message - voicemail not set up. Writer spoke with [facility] and they will require an updated prescription with her new dose and then they can send out medication. Spoke with Jenn and told her we would fax in the information to [facility] and then medication would be sent to her. Also instructed Jenn to call [facility] every 3 weeks to order a refill. Provided the phone number for [facility] [phone] and Jenn wrote it down. We reviewed her need to call every 3 weeks for a refill - she verbalized her understanding of this plan. NC to fax in the prescription to [facility].    No other questions or concerns identified at this time. Will continue to follow for supportive counseling and assist with resources.     Arabella MEREDITH LICSW  6/23/2017

## 2017-06-28 ENCOUNTER — COMMUNICATION - HEALTHEAST (OUTPATIENT)
Dept: INTERNAL MEDICINE | Facility: CLINIC | Age: 62
End: 2017-06-28

## 2017-06-28 ENCOUNTER — TELEPHONE (OUTPATIENT)
Dept: TRANSPLANT | Facility: CLINIC | Age: 62
End: 2017-06-28

## 2017-06-28 DIAGNOSIS — J44.9 COPD (CHRONIC OBSTRUCTIVE PULMONARY DISEASE) (H): ICD-10-CM

## 2017-06-28 NOTE — TELEPHONE ENCOUNTER
"Clinical   Blood and Marrow Transplant Service      Received a call from pt regarding her medication, Creon.    Pt stated that she went to the [facility] pharmacy to  the medication and was told that with a 15% discount the cost for the medication would be $1700.    Pt said, \"I am on social security, I don't have $17 and sure don't have $1700!\"  Pt further stated that she thought her medication would be free and said, \"Arabella told me to call her if I had any problems and this is a big problem!\"  Writer contacted [facility] ([phone]) and spoke with Topaz.  Topaz stated that they received the prescription with pt's updated Creon dose on 6/23/17, the request was processed this morning and pt should receive her medications in 2 to 3 business days.    Called pt to relay the above, reviewed that she needs to call every three weeks to request a refill.  Pt confirmed her understanding and has the phone number to [facility].      No other questions or concerns identified at this time. Will review the above with pt's BMT , Arabella Grande.      Kathie Mooney MS LICSW  Pager) [phone]     "

## 2017-07-06 ENCOUNTER — COMMUNICATION - HEALTHEAST (OUTPATIENT)
Dept: RESPIRATORY THERAPY | Facility: HOSPITAL | Age: 62
End: 2017-07-06

## 2017-07-12 ENCOUNTER — RECORDS - HEALTHEAST (OUTPATIENT)
Dept: ADMINISTRATIVE | Facility: OTHER | Age: 62
End: 2017-07-12

## 2017-07-12 ENCOUNTER — APPOINTMENT (OUTPATIENT)
Dept: LAB | Facility: CLINIC | Age: 62
End: 2017-07-12
Attending: INTERNAL MEDICINE
Payer: MEDICARE

## 2017-07-12 ENCOUNTER — ONCOLOGY VISIT (OUTPATIENT)
Dept: TRANSPLANT | Facility: CLINIC | Age: 62
End: 2017-07-12
Attending: INTERNAL MEDICINE
Payer: MEDICARE

## 2017-07-12 VITALS
HEIGHT: 69 IN | DIASTOLIC BLOOD PRESSURE: 67 MMHG | HEART RATE: 69 BPM | SYSTOLIC BLOOD PRESSURE: 107 MMHG | BODY MASS INDEX: 16.4 KG/M2 | OXYGEN SATURATION: 98 % | RESPIRATION RATE: 16 BRPM | WEIGHT: 110.7 LBS | TEMPERATURE: 98.4 F

## 2017-07-12 DIAGNOSIS — Z94.81 S/P ALLOGENEIC BONE MARROW TRANSPLANT (H): ICD-10-CM

## 2017-07-12 DIAGNOSIS — Z13.29 SCREENING FOR THYROID DISORDER: ICD-10-CM

## 2017-07-12 DIAGNOSIS — E55.9 VITAMIN D DEFICIENCY: ICD-10-CM

## 2017-07-12 DIAGNOSIS — C92.01 ACUTE MYELOID LEUKEMIA IN REMISSION (H): Primary | ICD-10-CM

## 2017-07-12 LAB
ALBUMIN SERPL-MCNC: 3.8 G/DL (ref 3.4–5)
ALP SERPL-CCNC: 62 U/L (ref 40–150)
ALT SERPL W P-5'-P-CCNC: 44 U/L (ref 0–50)
AMYLASE SERPL-CCNC: 38 U/L (ref 30–110)
ANION GAP SERPL CALCULATED.3IONS-SCNC: 7 MMOL/L (ref 3–14)
AST SERPL W P-5'-P-CCNC: 29 U/L (ref 0–45)
BASOPHILS # BLD AUTO: 0.1 10E9/L (ref 0–0.2)
BASOPHILS NFR BLD AUTO: 1 %
BILIRUB SERPL-MCNC: 0.4 MG/DL (ref 0.2–1.3)
BUN SERPL-MCNC: 13 MG/DL (ref 7–30)
CALCIUM SERPL-MCNC: 8.5 MG/DL (ref 8.5–10.1)
CHLORIDE SERPL-SCNC: 105 MMOL/L (ref 94–109)
CO2 SERPL-SCNC: 28 MMOL/L (ref 20–32)
CREAT SERPL-MCNC: 0.67 MG/DL (ref 0.52–1.04)
DIFFERENTIAL METHOD BLD: NORMAL
EOSINOPHIL # BLD AUTO: 0.3 10E9/L (ref 0–0.7)
EOSINOPHIL NFR BLD AUTO: 3.2 %
ERYTHROCYTE [DISTWIDTH] IN BLOOD BY AUTOMATED COUNT: 13.2 % (ref 10–15)
GFR SERPL CREATININE-BSD FRML MDRD: 90 ML/MIN/1.7M2
GLUCOSE SERPL-MCNC: 67 MG/DL (ref 70–99)
HCT VFR BLD AUTO: 39.2 % (ref 35–47)
HGB BLD-MCNC: 13.2 G/DL (ref 11.7–15.7)
IMM GRANULOCYTES # BLD: 0 10E9/L (ref 0–0.4)
IMM GRANULOCYTES NFR BLD: 0.2 %
LIPASE SERPL-CCNC: 37 U/L (ref 73–393)
LYMPHOCYTES # BLD AUTO: 4.4 10E9/L (ref 0.8–5.3)
LYMPHOCYTES NFR BLD AUTO: 51.2 %
MCH RBC QN AUTO: 30.4 PG (ref 26.5–33)
MCHC RBC AUTO-ENTMCNC: 33.7 G/DL (ref 31.5–36.5)
MCV RBC AUTO: 90 FL (ref 78–100)
MONOCYTES # BLD AUTO: 0.8 10E9/L (ref 0–1.3)
MONOCYTES NFR BLD AUTO: 9.1 %
NEUTROPHILS # BLD AUTO: 3.1 10E9/L (ref 1.6–8.3)
NEUTROPHILS NFR BLD AUTO: 35.3 %
NRBC # BLD AUTO: 0 10*3/UL
NRBC BLD AUTO-RTO: 0 /100
PLATELET # BLD AUTO: 232 10E9/L (ref 150–450)
POTASSIUM SERPL-SCNC: 3.6 MMOL/L (ref 3.4–5.3)
PROT SERPL-MCNC: 6.3 G/DL (ref 6.8–8.8)
RBC # BLD AUTO: 4.34 10E12/L (ref 3.8–5.2)
SODIUM SERPL-SCNC: 140 MMOL/L (ref 133–144)
WBC # BLD AUTO: 8.7 10E9/L (ref 4–11)

## 2017-07-12 PROCEDURE — 83690 ASSAY OF LIPASE: CPT | Performed by: INTERNAL MEDICINE

## 2017-07-12 PROCEDURE — 36415 COLL VENOUS BLD VENIPUNCTURE: CPT | Performed by: INTERNAL MEDICINE

## 2017-07-12 PROCEDURE — 99212 OFFICE O/P EST SF 10 MIN: CPT

## 2017-07-12 PROCEDURE — 80053 COMPREHEN METABOLIC PANEL: CPT | Performed by: INTERNAL MEDICINE

## 2017-07-12 PROCEDURE — 85025 COMPLETE CBC W/AUTO DIFF WBC: CPT | Performed by: INTERNAL MEDICINE

## 2017-07-12 PROCEDURE — 82150 ASSAY OF AMYLASE: CPT | Performed by: INTERNAL MEDICINE

## 2017-07-12 PROCEDURE — 99211 OFF/OP EST MAY X REQ PHY/QHP: CPT | Mod: ZF

## 2017-07-12 RX ORDER — TRIAMCINOLONE ACETONIDE 1 MG/G
OINTMENT TOPICAL PRN
COMMUNITY
Start: 2016-11-07 | End: 2020-01-24

## 2017-07-12 ASSESSMENT — PAIN SCALES - GENERAL: PAINLEVEL: NO PAIN (0)

## 2017-07-12 NOTE — MR AVS SNAPSHOT
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  After hours call the   hospital at [phone] or   [phone]. Ask for the BMT   fellow on-call           Care Instructions    RTC Cyndie on 1/17/18 with labs  Must meet with Primary care between now and next visit  Continue Creon 3x/day  Call and come sooner if needed.          Follow-ups after your visit        Your next 10 appointments already scheduled     Jan 17, 2018 12:00 PM CST   [facility] Lab Draw with  [facility] LAB DRAW   [facility] Masonic Lab Draw ([facility])    [address] [place] [id number] [phone]            Jan 17, 2018 12:30 PM CST   Return with Dmitriy Agee MD   [facility] Blood and Marrow Transplant ([facility])    [address] [place] [id number] [phone]              Future tests that were ordered for you today     Open Future Orders        Priority Expected Expires Ordered    CBC with platelets differential Routine 1/17/2018 7/12/2018 7/12/2017    Comprehensive metabolic panel Routine 1/17/2018 7/12/2018 7/12/2017    TSH with free T4 reflex Routine 1/17/2018 7/12/2018 7/12/2017    25 OH Vit D therapy  "monitoring Routine 2018            Who to contact     If you have questions or need follow up information about today's clinic visit or your schedule please contact [facility] BLOOD AND MARROW TRANSPLANT directly at [phone].  Normal or non-critical lab and imaging results will be communicated to you by [employer] Ltdhart, letter or phone within 4 business days after the clinic has received the results. If you do not hear from us within 7 days, please contact the clinic through [employer] or phone. If you have a critical or abnormal lab result, we will notify you by phone as soon as possible.  Submit refill requests through [employer] or call your pharmacy and they will forward the refill request to us. Please allow 3 business days for your refill to be completed.          Additional Information About Your Visit        [employer] Ltdhart Information     [employer] lets you send messages to your doctor, view your test results, renew your prescriptions, schedule appointments and more. To sign up, go to [email or web address] . Click on \"Log in\" on the left side of the screen, which will take you to the Welcome page. Then click on \"Sign up Now\" on the right side of the page.     You will be asked to enter the access code listed below, as well as some personal information. Please follow the directions to create your username and password.     Your access code is: [email or web address]  Expires: 2017  6:30 AM     Your access code will  in 90 days. If you need help or a new code, please call your [place] clinic or [phone].        Care EveryWhere ID     This is your Care EveryWhere ID. This could be used by other organizations to access your [place] medical records  [id number]        Your Vitals Were     Pulse Temperature Respirations Height Pulse Oximetry BMI (Body Mass Index)    69 98.4  F (36.9  C) 16 1.753 m (5' 9.02\") 98% 16.34 kg/m2       Blood Pressure from Last 3 Encounters:   17 107/67   17 " 117/70   01/27/17 128/75    Weight from Last 3 Encounters:   07/12/17 50.2 kg (110 lb 11.2 oz)   04/12/17 50.8 kg (111 lb 14.4 oz)   01/27/17 49.5 kg (109 lb 1.6 oz)              We Performed the Following     Amylase     CBC with platelets differential     Comprehensive metabolic panel     Lipase        Recent Review Flowsheet Data     BMT Recent Results Latest Ref Rng & Units 6/3/2011 12/1/2011 6/1/2012 11/16/2016 1/27/2017 4/12/2017 7/12/2017    WBC 4.0 - 11.0 10e9/L 7.3 8.6 8.8 10.0 8.3 7.6 8.7    Hemoglobin 11.7 - 15.7 g/dL 14.0 14.1 14.0 14.1 14.7 13.6 13.2    Platelet Count 150 - 450 10e9/L 193 220 166 266 243 209 232    Neutrophils (Absolute) 1.6 - 8.3 10e9/L 3.5 3.7 4.5 3.7 4.0 2.7 3.1    INR 0.86 - 1.14 - - - - - - -    Sodium 133 - 144 mmol/L 138 144 137 139 136 140 140    Potassium 3.4 - 5.3 mmol/L 3.9 4.0 3.9 3.6 4.2 3.4 3.6    Chloride 94 - 109 mmol/L 104 104 104 102 100 106 105    Glucose 70 - 99 mg/dL 111(H) 101(H) 102(H) 93 141(H) 146(H) 67(L)    Urea Nitrogen 7 - 30 mg/dL 14 16 13 10 11 13 13    Creatinine 0.52 - 1.04 mg/dL 0.75 0.85 0.73 0.67 0.75 0.71 0.67    Calcium (Total) 8.5 - 10.1 mg/dL 9.0 9.3 9.3 8.5 8.6 8.2(L) 8.5    Protein (Total) 6.8 - 8.8 g/dL 7.0 7.1 6.9 7.3 7.2 6.5(L) 6.3(L)    Albumin 3.4 - 5.0 g/dL 4.1 3.9 4.2 4.0 3.9 3.4 3.8    Alkaline Phosphatase 40 - 150 U/L 68 69 72 78 84 71 62    AST 0 - 45 U/L 41 41 36 29 26 27 29    ALT 0 - 50 U/L 58(H) 46 37 46 31 40 44    MCV 78 - 100 fl 93 92 90 92 92 93 90               Primary Care Provider Office Phone # Fax #    Yovani Sidney Sherwood [phone]       [facility] [address] [facility] [address]        Equal Access to Services     [facility]: Hadii aad omaira flynn Soанна, waaxda luqadaha, qaybta kaalmada adevi, palomo cid . Formerly [facility] [phone].    ATENCIÓN: Si habla español, tiene a stark disposición servicios gratuitos de asistencia lingüística. Llame al  [phone].    We comply with applicable federal civil rights laws and [place] laws. We do not discriminate on the basis of race, color, national origin, age, disability sex, sexual orientation or gender identity.            Thank you!     Thank you for choosing [facility] BLOOD AND MARROW TRANSPLANT  for your care. Our goal is always to provide you with excellent care. Hearing back from our patients is one way we can continue to improve our services. Please take a few minutes to complete the written survey that you may receive in the mail after your visit with us. Thank you!             Your Updated Medication List - Protect others around you: Learn how to safely use, store and throw away your medicines at www.disposemymeds.org.          This list is accurate as of: 7/12/17  1:09 PM.  Always use your most recent med list.                   Brand Name Dispense Instructions for use Diagnosis    ALEVE PO      Take by mouth daily        amylase-lipase-protease 95732 UNITS Cpep per EC capsule    CREON    300 capsule    Take 2 capsules (48,000 Units) by mouth 3 times daily (with meals) And snacks    S/P allogeneic bone marrow transplant (H), Acute myeloid leukemia in remission (H)       ascorbic acid 500 MG tablet    VITAMIN C     Take 500 mg by mouth daily.        cholecalciferol 1000 UNITS capsule    vitamin  -D     Take 1 capsule by mouth daily.        IMODIUM OR      Take by mouth 2 times daily        MULTIVITAL PO      Take  by mouth.        oxyCODONE-acetaminophen 5-325 MG per tablet    PERCOCET    20 tablet    Take 1 tablet by mouth every 4 hours as needed for pain.    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        VITAMIN B 12 PO       Acute myeloid  leukemia in remission (H), S/P allogeneic bone marrow transplant (H)

## 2017-07-12 NOTE — PATIENT INSTRUCTIONS
ABREN Agee on 1/17/18 with labs  Must meet with Primary care between now and next visit  Continue Creon 3x/day  Call and come sooner if needed.

## 2017-07-12 NOTE — PROGRESS NOTES
"BMT Clinic Progress Note     CC: follow-up     HPI: Today I had the pleasure to see and examine Ms. Jenn Barclay.  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  Diarrhea is now mostly in AM. She is now able to leave the house for prolonged periods of time. Happy that her weight is up 1 kg today. Once got coverage for creon she has been taking it as recommended. Also on Vit D. Had GI procedure found 3 polys that were benign. Still smoking.    The remaining complete review of systems is otherwise negative.      PAST MEDICAL HISTORY:   1.  Significant for the acute myelogenous leukemia.   2.  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   3.  COPD.   4.  Hysterectomy and bilateral oophorectomy at the age of 20.     5.  She had a compression fracture of her thoracic spine that was treated with a vertebroplasty about 6 months ago.   6.  She has a history of smoking for many years.  She denies alcohol or drug abuse.  She is not on any thyroid replacement therapy, and denies thyroid disease.        PHYSICAL EXAMINATION:  Ms. Barclay is a very pleasant 61-year-old. /67  Pulse 69  Temp 98.4  F (36.9  C)  Resp 16  Ht 1.753 m (5' 9.02\")  Wt 50.2 kg (110 lb 11.2 oz)  SpO2 98%  BMI 16.34 kg/m2.   General: frail, thin appearing woman in no distress  HEENT: perrl, eomi, no icterus, no oral lesions, no teeth  Resp: poor aeration, no wheezes  CV: rrr no m/r/g  Abd: s, nt, nd, thin appearing, normal BS  Ext: no edema     Lab Results   Component Value Date    WBC 8.7 07/12/2017    ANEU 3.1 07/12/2017    HGB 13.2 07/12/2017    HCT 39.2 07/12/2017     07/12/2017     07/12/2017    POTASSIUM 3.6 07/12/2017    CHLORIDE 105 07/12/2017    CO2 28 07/12/2017    GLC 67 (L) 07/12/2017    BUN 13 07/12/2017    CR 0.67 07/12/2017    MAG 2.0 03/10/2011    INR 0.97 09/15/2010     Lab Results   Component Value Date    AST 29 07/12/2017     Lab Results   Component Value Date    ALT 44 " 07/12/2017     Lab Results   Component Value Date    BILICONJ 0.2 06/14/2010      Lab Results   Component Value Date    BILITOTAL 0.4 07/12/2017     Lab Results   Component Value Date    ALBUMIN 3.8 07/12/2017     Lab Results   Component Value Date    PROTTOTAL 6.3 07/12/2017      Lab Results   Component Value Date    ALKPHOS 62 07/12/2017        ASSESSMENT AND PLAN:  Ms. 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  She is here for continued evaluation of her ongoing GI symptoms.    1.  AML.  The patient in clinical remission.  2.  Vfssq-gjuqfe-vycj disease.  We will continue Creon to 48,000 units with meals and snacks. 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    3.  GI.  Benign polyps removed. Repeat colonoscopy in spring on 2020. Improved diarrhea with creon as above.   4.  Osteoporosis. Her vitamin D level was 31.  She should continue routine vitamin D supplementation.    5.  Chronic obstructive pulmonary disease.  The patient has significant COPD and continues to smoke.  Advised to stop smoking, again today.  6. Primary care: would recommend primary care to perform yearly physical with appropriate cancer screen, cholesterol, thyroid function, vitamin D level. Would also recommend screening for skin and mouth cancer. Again recommneded to stop smoking.     ARBEN Agee on 1/17/18 with labs  Must meet with Primary care between now and next visit  Continue Creon 3x/day  Call and come sooner if needed.

## 2017-07-12 NOTE — NURSING NOTE
"Oncology Rooming Note    July 12, 2017 12:48 PM   Jenn Barclay is a 61 year old female who presents for:    Chief Complaint   Patient presents with     Blood Draw     labs drawn     RECHECK     AML (acute myelogenous leukemia)      Initial Vitals: /67  Pulse 69  Temp 98.4  F (36.9  C)  Resp 16  Ht 1.753 m (5' 9.02\")  Wt 50.2 kg (110 lb 11.2 oz)  SpO2 98%  BMI 16.34 kg/m2 Estimated body mass index is 16.34 kg/(m^2) as calculated from the following:    Height as of this encounter: 1.753 m (5' 9.02\").    Weight as of this encounter: 50.2 kg (110 lb 11.2 oz). Body surface area is 1.56 meters squared.  No Pain (0) Comment: Data Unavailable   No LMP recorded.  Allergies reviewed: Yes  Medications reviewed: Yes    Medications: Medication refills not needed today.  Pharmacy name entered into [employer]:    [facility] [address] [place] [address] E  [facility] [employer] [facility] - [place], [place] - [address] [place] [address]    Clinical concerns: No new concerns  Provider was notified.    5 minutes for nursing intake (face to face time)     Henny Michaud MA              "

## 2017-07-12 NOTE — LETTER
"7/12/2017      RE: Jenn Barclay  [address] [place] [address]       BMT Clinic Progress Note     CC: follow-up     HPI: Today I had the pleasure to see and examine Ms. Jenn Barclay.  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  Diarrhea is now mostly in AM. She is now able to leave the house for prolonged periods of time. Happy that her weight is up 1 kg today. Once got coverage for creon she has been taking it as recommended. Also on Vit D. Had GI procedure found 3 polys that were benign. Still smoking.    The remaining complete review of systems is otherwise negative.      PAST MEDICAL HISTORY:   1.  Significant for the acute myelogenous leukemia.   2.  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   3.  COPD.   4.  Hysterectomy and bilateral oophorectomy at the age of 20.     5.  She had a compression fracture of her thoracic spine that was treated with a vertebroplasty about 6 months ago.   6.  She has a history of smoking for many years.  She denies alcohol or drug abuse.  She is not on any thyroid replacement therapy, and denies thyroid disease.        PHYSICAL EXAMINATION:  Ms. Barclay is a very pleasant 61-year-old. /67  Pulse 69  Temp 98.4  F (36.9  C)  Resp 16  Ht 1.753 m (5' 9.02\")  Wt 50.2 kg (110 lb 11.2 oz)  SpO2 98%  BMI 16.34 kg/m2.   General: frail, thin appearing woman in no distress  HEENT: perrl, eomi, no icterus, no oral lesions, no teeth  Resp: poor aeration, no wheezes  CV: rrr no m/r/g  Abd: s, nt, nd, thin appearing, normal BS  Ext: no edema     Lab Results   Component Value Date    WBC 8.7 07/12/2017    ANEU 3.1 07/12/2017    HGB 13.2 07/12/2017    HCT 39.2 07/12/2017     07/12/2017     07/12/2017    POTASSIUM 3.6 07/12/2017    CHLORIDE 105 07/12/2017    CO2 28 07/12/2017    GLC 67 (L) 07/12/2017    BUN 13 07/12/2017    CR 0.67 07/12/2017    MAG 2.0 03/10/2011    INR 0.97 09/15/2010     Lab Results   Component Value " Date    AST 29 07/12/2017     Lab Results   Component Value Date    ALT 44 07/12/2017     Lab Results   Component Value Date    BILICONJ 0.2 06/14/2010      Lab Results   Component Value Date    BILITOTAL 0.4 07/12/2017     Lab Results   Component Value Date    ALBUMIN 3.8 07/12/2017     Lab Results   Component Value Date    PROTTOTAL 6.3 07/12/2017      Lab Results   Component Value Date    ALKPHOS 62 07/12/2017        ASSESSMENT AND PLAN:  Ms. 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  She is here for continued evaluation of her ongoing GI symptoms.    1.  AML.  The patient in clinical remission.  2.  Gacvb-rsqbvc-knve disease.  We will continue Creon to 48,000 units with meals and snacks. 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    3.  GI.  Benign polyps removed. Repeat colonoscopy in spring on 2020. Improved diarrhea with creon as above.   4.  Osteoporosis. Her vitamin D level was 31.  She should continue routine vitamin D supplementation.    5.  Chronic obstructive pulmonary disease.  The patient has significant COPD and continues to smoke.  Advised to stop smoking, again today.  6. Primary care: would recommend primary care to perform yearly physical with appropriate cancer screen, cholesterol, thyroid function, vitamin D level. Would also recommend screening for skin and mouth cancer. Again recommneded to stop smoking.     ARBEN Agee on 1/17/18 with labs  Must meet with Primary care between now and next visit  Continue Creon 3x/day  Call and come sooner if needed.    Dmitriy Agee MD

## 2017-07-12 NOTE — NURSING NOTE
Chief Complaint   Patient presents with     Blood Draw     labs drawn     Labs drawn peripherally. Patient tolerated well.   KENNEDI Blood RN, BSN

## 2017-08-02 ENCOUNTER — RECORDS - HEALTHEAST (OUTPATIENT)
Dept: ADMINISTRATIVE | Facility: OTHER | Age: 62
End: 2017-08-02

## 2017-08-03 ENCOUNTER — OFFICE VISIT - HEALTHEAST (OUTPATIENT)
Dept: INTERNAL MEDICINE | Facility: CLINIC | Age: 62
End: 2017-08-03

## 2017-08-03 DIAGNOSIS — G89.29 CHRONIC BACK PAIN: ICD-10-CM

## 2017-08-03 DIAGNOSIS — Z72.0 TOBACCO ABUSE: ICD-10-CM

## 2017-08-03 DIAGNOSIS — D89.813 GVHD (GRAFT VERSUS HOST DISEASE) (H): ICD-10-CM

## 2017-08-03 DIAGNOSIS — J44.9 COPD (CHRONIC OBSTRUCTIVE PULMONARY DISEASE) (H): ICD-10-CM

## 2017-08-03 DIAGNOSIS — Z53.20 MAMMOGRAM DECLINED: ICD-10-CM

## 2017-08-03 DIAGNOSIS — M54.9 CHRONIC BACK PAIN: ICD-10-CM

## 2017-08-03 DIAGNOSIS — R53.83 FATIGUE: ICD-10-CM

## 2017-08-03 DIAGNOSIS — Z79.899 CONTROLLED SUBSTANCE AGREEMENT SIGNED: ICD-10-CM

## 2017-08-03 DIAGNOSIS — J44.1 COPD EXACERBATION (H): ICD-10-CM

## 2017-08-08 ENCOUNTER — COMMUNICATION - HEALTHEAST (OUTPATIENT)
Dept: RESPIRATORY THERAPY | Facility: HOSPITAL | Age: 62
End: 2017-08-08

## 2017-08-11 ENCOUNTER — TELEPHONE (OUTPATIENT)
Dept: TRANSPLANT | Facility: CLINIC | Age: 62
End: 2017-08-11

## 2017-08-11 NOTE — TELEPHONE ENCOUNTER
Clinical   Blood and Marrow Transplant Service    Phone call to patient to check on her supply of Creon free drug through [employer] - no answer and no ability to leave message.     Arabella MEREDITH LICSW  8/11/2017

## 2017-08-31 ENCOUNTER — COMMUNICATION - HEALTHEAST (OUTPATIENT)
Dept: INTERNAL MEDICINE | Facility: CLINIC | Age: 62
End: 2017-08-31

## 2017-09-06 ENCOUNTER — RECORDS - HEALTHEAST (OUTPATIENT)
Dept: ADMINISTRATIVE | Facility: OTHER | Age: 62
End: 2017-09-06

## 2017-09-07 ENCOUNTER — COMMUNICATION - HEALTHEAST (OUTPATIENT)
Dept: RESPIRATORY THERAPY | Facility: HOSPITAL | Age: 62
End: 2017-09-07

## 2017-09-08 ENCOUNTER — OFFICE VISIT - HEALTHEAST (OUTPATIENT)
Dept: INTERNAL MEDICINE | Facility: CLINIC | Age: 62
End: 2017-09-08

## 2017-09-08 DIAGNOSIS — Z78.9 FULL CODE STATUS: ICD-10-CM

## 2017-09-08 DIAGNOSIS — S22.060A TRAUMATIC COMPRESSION FRACTURE OF T8 THORACIC VERTEBRA, CLOSED, INITIAL ENCOUNTER (H): ICD-10-CM

## 2017-09-08 DIAGNOSIS — J44.9 COPD (CHRONIC OBSTRUCTIVE PULMONARY DISEASE) (H): ICD-10-CM

## 2017-09-08 DIAGNOSIS — G89.29 CHRONIC BACK PAIN: ICD-10-CM

## 2017-09-08 DIAGNOSIS — R60.0 BILATERAL LOWER EXTREMITY EDEMA: ICD-10-CM

## 2017-09-08 DIAGNOSIS — Z53.20 MAMMOGRAM DECLINED: ICD-10-CM

## 2017-09-08 DIAGNOSIS — M81.0 OP (OSTEOPOROSIS): ICD-10-CM

## 2017-09-08 DIAGNOSIS — M54.9 CHRONIC BACK PAIN: ICD-10-CM

## 2017-10-06 ENCOUNTER — COMMUNICATION - HEALTHEAST (OUTPATIENT)
Dept: RESPIRATORY THERAPY | Facility: HOSPITAL | Age: 62
End: 2017-10-06

## 2017-10-10 ENCOUNTER — RECORDS - HEALTHEAST (OUTPATIENT)
Dept: ADMINISTRATIVE | Facility: OTHER | Age: 62
End: 2017-10-10

## 2017-11-07 ENCOUNTER — RECORDS - HEALTHEAST (OUTPATIENT)
Dept: ADMINISTRATIVE | Facility: OTHER | Age: 62
End: 2017-11-07

## 2017-11-08 ENCOUNTER — COMMUNICATION - HEALTHEAST (OUTPATIENT)
Dept: INTERNAL MEDICINE | Facility: CLINIC | Age: 62
End: 2017-11-08

## 2017-11-08 DIAGNOSIS — M54.9 CHRONIC BACK PAIN: ICD-10-CM

## 2017-11-08 DIAGNOSIS — G89.29 CHRONIC BACK PAIN: ICD-10-CM

## 2017-11-08 DIAGNOSIS — J44.9 COPD (CHRONIC OBSTRUCTIVE PULMONARY DISEASE) (H): ICD-10-CM

## 2017-11-13 ENCOUNTER — COMMUNICATION - HEALTHEAST (OUTPATIENT)
Dept: INTERNAL MEDICINE | Facility: CLINIC | Age: 62
End: 2017-11-13

## 2017-11-29 ENCOUNTER — RECORDS - HEALTHEAST (OUTPATIENT)
Dept: ADMINISTRATIVE | Facility: OTHER | Age: 62
End: 2017-11-29

## 2017-11-30 ENCOUNTER — OFFICE VISIT - HEALTHEAST (OUTPATIENT)
Dept: INTERNAL MEDICINE | Facility: CLINIC | Age: 62
End: 2017-11-30

## 2017-11-30 DIAGNOSIS — M54.9 CHRONIC BACK PAIN: ICD-10-CM

## 2017-11-30 DIAGNOSIS — J44.9 COPD (CHRONIC OBSTRUCTIVE PULMONARY DISEASE) (H): ICD-10-CM

## 2017-11-30 DIAGNOSIS — R21 PAPULAR RASH, GENERALIZED: ICD-10-CM

## 2017-11-30 DIAGNOSIS — D89.813 GVHD (GRAFT VERSUS HOST DISEASE) (H): ICD-10-CM

## 2017-11-30 DIAGNOSIS — S92.309A METATARSAL FRACTURE: ICD-10-CM

## 2017-11-30 DIAGNOSIS — G89.29 CHRONIC BACK PAIN: ICD-10-CM

## 2017-11-30 DIAGNOSIS — R53.83 FATIGUE: ICD-10-CM

## 2017-11-30 DIAGNOSIS — Z02.89 ENCOUNTER FOR COMPLETION OF FORM WITH PATIENT: ICD-10-CM

## 2017-12-18 ENCOUNTER — RECORDS - HEALTHEAST (OUTPATIENT)
Dept: ADMINISTRATIVE | Facility: OTHER | Age: 62
End: 2017-12-18

## 2018-01-08 ENCOUNTER — COMMUNICATION - HEALTHEAST (OUTPATIENT)
Dept: INTERNAL MEDICINE | Facility: CLINIC | Age: 63
End: 2018-01-08

## 2018-01-08 DIAGNOSIS — G89.29 CHRONIC BACK PAIN: ICD-10-CM

## 2018-01-08 DIAGNOSIS — M54.9 CHRONIC BACK PAIN: ICD-10-CM

## 2018-01-08 DIAGNOSIS — J44.9 COPD (CHRONIC OBSTRUCTIVE PULMONARY DISEASE) (H): ICD-10-CM

## 2018-01-17 ENCOUNTER — APPOINTMENT (OUTPATIENT)
Dept: LAB | Facility: CLINIC | Age: 63
End: 2018-01-17
Attending: INTERNAL MEDICINE
Payer: MEDICARE

## 2018-01-17 ENCOUNTER — ONCOLOGY VISIT (OUTPATIENT)
Dept: TRANSPLANT | Facility: CLINIC | Age: 63
End: 2018-01-17
Attending: INTERNAL MEDICINE
Payer: MEDICARE

## 2018-01-17 VITALS
WEIGHT: 114.2 LBS | DIASTOLIC BLOOD PRESSURE: 82 MMHG | HEIGHT: 69 IN | TEMPERATURE: 98.1 F | SYSTOLIC BLOOD PRESSURE: 121 MMHG | OXYGEN SATURATION: 96 % | BODY MASS INDEX: 16.91 KG/M2 | HEART RATE: 77 BPM | RESPIRATION RATE: 16 BRPM

## 2018-01-17 DIAGNOSIS — Z94.81 S/P ALLOGENEIC BONE MARROW TRANSPLANT (H): ICD-10-CM

## 2018-01-17 DIAGNOSIS — Z13.29 SCREENING FOR THYROID DISORDER: ICD-10-CM

## 2018-01-17 DIAGNOSIS — C92.01 ACUTE MYELOID LEUKEMIA IN REMISSION (H): ICD-10-CM

## 2018-01-17 DIAGNOSIS — E55.9 VITAMIN D DEFICIENCY: ICD-10-CM

## 2018-01-17 LAB
BASOPHILS # BLD AUTO: 0.1 10E9/L (ref 0–0.2)
BASOPHILS NFR BLD AUTO: 1.4 %
DIFFERENTIAL METHOD BLD: NORMAL
EOSINOPHIL # BLD AUTO: 0.2 10E9/L (ref 0–0.7)
EOSINOPHIL NFR BLD AUTO: 2 %
ERYTHROCYTE [DISTWIDTH] IN BLOOD BY AUTOMATED COUNT: 13.1 % (ref 10–15)
HCT VFR BLD AUTO: 45.9 % (ref 35–47)
HGB BLD-MCNC: 14.6 G/DL (ref 11.7–15.7)
IMM GRANULOCYTES # BLD: 0 10E9/L (ref 0–0.4)
IMM GRANULOCYTES NFR BLD: 0.2 %
LYMPHOCYTES # BLD AUTO: 3.7 10E9/L (ref 0.8–5.3)
LYMPHOCYTES NFR BLD AUTO: 43.6 %
MCH RBC QN AUTO: 30.4 PG (ref 26.5–33)
MCHC RBC AUTO-ENTMCNC: 31.8 G/DL (ref 31.5–36.5)
MCV RBC AUTO: 95 FL (ref 78–100)
MONOCYTES # BLD AUTO: 0.6 10E9/L (ref 0–1.3)
MONOCYTES NFR BLD AUTO: 7.5 %
NEUTROPHILS # BLD AUTO: 3.8 10E9/L (ref 1.6–8.3)
NEUTROPHILS NFR BLD AUTO: 45.3 %
NRBC # BLD AUTO: 0 10*3/UL
NRBC BLD AUTO-RTO: 0 /100
PLATELET # BLD AUTO: 244 10E9/L (ref 150–450)
RBC # BLD AUTO: 4.81 10E12/L (ref 3.8–5.2)
TSH SERPL DL<=0.005 MIU/L-ACNC: 2.82 MU/L (ref 0.4–4)
WBC # BLD AUTO: 8.4 10E9/L (ref 4–11)

## 2018-01-17 PROCEDURE — 36415 COLL VENOUS BLD VENIPUNCTURE: CPT

## 2018-01-17 PROCEDURE — 82306 VITAMIN D 25 HYDROXY: CPT | Performed by: INTERNAL MEDICINE

## 2018-01-17 PROCEDURE — 85025 COMPLETE CBC W/AUTO DIFF WBC: CPT | Performed by: INTERNAL MEDICINE

## 2018-01-17 PROCEDURE — G0463 HOSPITAL OUTPT CLINIC VISIT: HCPCS | Mod: ZF

## 2018-01-17 PROCEDURE — 84443 ASSAY THYROID STIM HORMONE: CPT | Performed by: INTERNAL MEDICINE

## 2018-01-17 PROCEDURE — 80053 COMPREHEN METABOLIC PANEL: CPT | Performed by: INTERNAL MEDICINE

## 2018-01-17 RX ORDER — ACYCLOVIR 400 MG/1
800 TABLET ORAL 4 TIMES DAILY
Qty: 30 TABLET | Refills: 0 | Status: SHIPPED | OUTPATIENT
Start: 2018-01-17 | End: 2018-01-29

## 2018-01-17 ASSESSMENT — PAIN SCALES - GENERAL: PAINLEVEL: NO PAIN (0)

## 2018-01-17 NOTE — PATIENT INSTRUCTIONS
ARBEN Agee on 7/18/18 with labs  Must follow with Primary care between now and next visit  Continue Creon 3x/day and Vit amin D replacement  Start acyclovir for 5 days; avoid close contact with babies until all lesions healed/well crusted and should still have all covered well.   Stop smoking  Dermatology consult referral

## 2018-01-17 NOTE — MR AVS SNAPSHOT
After Visit Summary   1/17/2018    Jenn Barclay    MRN: [id number]           Patient Information     Date Of Birth          1955        Visit Information        Provider Department      1/17/2018 12:30 PM Dmitriy Agee MD [facility] Blood and Marrow Transplant        Today's Diagnoses     Acute myeloid leukemia in remission (H)        S/P allogeneic bone marrow transplant (H)        Screening for thyroid disorder        Vitamin D deficiency              [facility] ([facility])  [address] [place] [address]  Phone: [phone]  Clinic Hours:   Monday-Thursday:7am to 7pm   Friday: 7am to 5pm   Weekends and holidays:    8am to noon (in general)  If your fever is 100.5  or greater,   call the clinic.  After hours call the   hospital at [phone] or   [phone]. Ask for the BMT   fellow on-call           Care Instructions    RTC Cyndie on 7/18/18 with labs            Follow-ups after your visit        Additional Services     Dermatology Referral       Post BMT possible zoster and skin chronic GVHD                  Your next 10 appointments already scheduled     Jul 18, 2018 12:00 PM CDT   [facility] with  [facility]   [facility] ([facility])    [address] [place] [address]  [address]  [facility] [address] [phone]            Jul 18, 2018 12:30 PM CDT   Return with Dmitriy Agee MD   [facility] Blood and Marrow Transplant ([facility])    [address] [place] [address]  [address] [place] [id number] [phone]              Future tests that were ordered for you today     Open Future Orders        Priority Expected Expires Ordered    Comprehensive metabolic panel Routine 7/18/2018 8/17/2018 1/17/2018    CBC with platelets differential Routine 7/18/2018 8/17/2018 1/17/2018            Who to contact     If you have questions or need follow up information about today's  "clinic visit or your schedule please contact [facility] BLOOD AND MARROW TRANSPLANT directly at [phone].  Normal or non-critical lab and imaging results will be communicated to you by MyChart, letter or phone within 4 business days after the clinic has received the results. If you do not hear from us within 7 days, please contact the clinic through [employer] or phone. If you have a critical or abnormal lab result, we will notify you by phone as soon as possible.  Submit refill requests through [employer] or call your pharmacy and they will forward the refill request to us. Please allow 3 business days for your refill to be completed.          Additional Information About Your Visit        MyChart Information     [employer] lets you send messages to your doctor, view your test results, renew your prescriptions, schedule appointments and more. To sign up, go to [email or web address] . Click on \"Log in\" on the left side of the screen, which will take you to the Welcome page. Then click on \"Sign up Now\" on the right side of the page.     You will be asked to enter the access code listed below, as well as some personal information. Please follow the directions to create your username and password.     Your access code is: [email or web address]  Expires: 4/3/2018  6:30 AM     Your access code will  in 90 days. If you need help or a new code, please call your [facility] clinic or [phone].        Care EveryWhere ID     This is your Care EveryWhere ID. This could be used by other organizations to access your [facility] medical records  [id number]        Your Vitals Were     Pulse Temperature Respirations Height Pulse Oximetry BMI (Body Mass Index)    77 98.1  F (36.7  C) (Oral) 16 1.753 m (5' 9.02\") 96% 16.86 kg/m2       Blood Pressure from Last 3 Encounters:   18 121/82   17 107/67   17 117/70    Weight from Last 3 Encounters:   18 51.8 kg (114 lb 3.2 oz)   17 50.2 kg (110 lb 11.2 oz)   17 " 50.8 kg (111 lb 14.4 oz)              We Performed the Following     25 OH Vit D therapy monitoring     CBC with platelets differential     Comprehensive metabolic panel     Dermatology Referral     TSH with free T4 reflex          Today's Medication Changes          These changes are accurate as of: 1/17/18  1:03 PM.  If you have any questions, ask your nurse or doctor.               Start taking these medicines.        Dose/Directions    acyclovir 400 MG tablet   Commonly known as:  ZOVIRAX   Used for:  Acute myeloid leukemia in remission (H), S/P allogeneic bone marrow transplant (H)   Started by:  Dmitriy Agee MD        Dose:  800 mg   Take 2 tablets (800 mg) by mouth 4 times daily for 5 days   Quantity:  30 tablet   Refills:  0            Where to get your medicines      These medications were sent to [facility] [address] [place], [place] - [address]  [address] [facility] [address], [facility] [address]     Phone:  [phone]     acyclovir 400 MG tablet                Recent Review Flowsheet Data     BMT Recent Results Latest Ref Rng & Units 12/1/2011 6/1/2012 11/16/2016 1/27/2017 4/12/2017 7/12/2017 1/17/2018    WBC 4.0 - 11.0 10e9/L 8.6 8.8 10.0 8.3 7.6 8.7 8.4    Hemoglobin 11.7 - 15.7 g/dL 14.1 14.0 14.1 14.7 13.6 13.2 14.6    Platelet Count 150 - 450 10e9/L 220 166 266 243 209 232 244    Neutrophils (Absolute) 1.6 - 8.3 10e9/L 3.7 4.5 3.7 4.0 2.7 3.1 3.8    INR 0.86 - 1.14 - - - - - - -    Sodium 133 - 144 mmol/L 144 137 139 136 140 140 138    Potassium 3.4 - 5.3 mmol/L 4.0 3.9 3.6 4.2 3.4 3.6 3.9    Chloride 94 - 109 mmol/L 104 104 102 100 106 105 104    Glucose 70 - 99 mg/dL 101(H) 102(H) 93 141(H) 146(H) 67(L) 143(H)    Urea Nitrogen 7 - 30 mg/dL 16 13 10 11 13 13 15    Creatinine 0.52 - 1.04 mg/dL 0.85 0.73 0.67 0.75 0.71 0.67 0.74    Calcium (Total) 8.5 - 10.1 mg/dL 9.3 9.3 8.5 8.6 8.2(L) 8.5 8.6    Protein (Total) 6.8 - 8.8 g/dL 7.1 6.9 7.3 7.2 6.5(L) 6.3(L) 6.8    Albumin  3.4 - 5.0 g/dL 3.9 4.2 4.0 3.9 3.4 3.8 3.7    Alkaline Phosphatase 40 - 150 U/L 69 72 78 84 71 62 61    AST 0 - 45 U/L 41 36 29 26 27 29 28    ALT 0 - 50 U/L 46 37 46 31 40 44 41    MCV 78 - 100 fl 92 90 92 92 93 90 95               Primary Care Provider Office Phone # Fax #    Yovani Sidney Sherwood [phone]       [facility] [address] [facility] [address]        Equal Access to Services     : Hadii aad ku hadasho Soomaali, waaxda luqadaha, qaybta kaalmada adeegyada, waxjade becerril haytiton adenisa cid . So [facility] [phone].    ATENCIÓN: Si habla español, tiene a stark disposición servicios gratuitos de asistencia lingüística. [facility] [phone].    We comply with applicable federal civil rights laws and [place] laws. We do not discriminate on the basis of race, color, national origin, age, disability, sex, sexual orientation, or gender identity.            Thank you!     Thank you for choosing [facility] BLOOD AND MARROW TRANSPLANT  for your care. Our goal is always to provide you with excellent care. Hearing back from our patients is one way we can continue to improve our services. Please take a few minutes to complete the written survey that you may receive in the mail after your visit with us. Thank you!             Your Updated Medication List - Protect others around you: Learn how to safely use, store and throw away your medicines at www.disposemymeds.org.          This list is accurate as of: 1/17/18  1:03 PM.  Always use your most recent med list.                   Brand Name Dispense Instructions for use Diagnosis    acyclovir 400 MG tablet    ZOVIRAX    30 tablet    Take 2 tablets (800 mg) by mouth 4 times daily for 5 days    Acute myeloid leukemia in remission (H), S/P allogeneic bone marrow transplant (H)       ALEVE PO      Take by mouth daily        amylase-lipase-protease 98260-30434 UNITS Cpep per EC capsule    CREON    300 capsule    Take 2  capsules (48,000 Units) by mouth 3 times daily (with meals) And snacks    S/P allogeneic bone marrow transplant (H), Acute myeloid leukemia in remission (H)       ascorbic acid 500 MG tablet    VITAMIN C     Take 500 mg by mouth daily.        cholecalciferol 1000 UNITS capsule    vitamin  -D     Take 1 capsule by mouth daily.        IMODIUM OR      Take by mouth 2 times daily        MULTIVITAL PO      Take  by mouth.        oxyCODONE-acetaminophen 5-325 MG per tablet    PERCOCET    20 tablet    Take 1 tablet by mouth every 4 hours as needed for pain.    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

## 2018-01-17 NOTE — NURSING NOTE
Chief Complaint   Patient presents with     RECHECK     AML (acute myelogenous leukemia)      Blood Draw     VPT labs and vitals collected in lab by SEBASTIAN     Pt tolerated procedure well. See flowsheet.    Edith Diop LPN

## 2018-01-17 NOTE — NURSING NOTE
"Oncology Rooming Note    January 17, 2018 12:36 PM   Jenn Barclay is a 62 year old female who presents for:    Chief Complaint   Patient presents with     RECHECK     AML (acute myelogenous leukemia)      Blood Draw     VPT labs and vitals collected in lab by LPN     Initial Vitals: /82  Pulse 77  Temp 98.1  F (36.7  C) (Oral)  Resp 16  Ht 1.753 m (5' 9.02\")  Wt 51.8 kg (114 lb 3.2 oz)  SpO2 96%  BMI 16.86 kg/m2 Estimated body mass index is 16.86 kg/(m^2) as calculated from the following:    Height as of this encounter: 1.753 m (5' 9.02\").    Weight as of this encounter: 51.8 kg (114 lb 3.2 oz). Body surface area is 1.59 meters squared.  No Pain (0) Comment: rash on forearms, back, butt, legs, and face    No LMP recorded.  Allergies reviewed: Yes  Medications reviewed: Yes    Medications: Medication refills not needed today.  Pharmacy name entered into [facility]:    [facility] [address] [place] [address] RD E  [facility] [employer] [place] - [address]    Clinical concerns:  No new concerns  Provider was notified.    5 minutes for nursing intake (face to face time)     Henny Michaud MA              "

## 2018-01-17 NOTE — PROGRESS NOTES
"BMT Clinic Progress Note     CC: follow-up     HPI: Today I had the pleasure to see and examine Ms. Jenn Barclay.  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  Diarrhea is now most improved. She broke her foot 12 weeks ago but already healed. New rash on R buttock 1 week ago.   Small areas of dry skin on arms and legs. Taking Vit D abd creon as directed. Still smoking.    The remaining complete review of systems is otherwise negative.      PAST MEDICAL HISTORY:   1.  Significant for the acute myelogenous leukemia.   2.  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   3.  COPD.   4.  Hysterectomy and bilateral oophorectomy at the age of 20.     5.  She had a compression fracture of her thoracic spine that was treated with a vertebroplasty about 6 months ago.   6.  She has a history of smoking for many years.  She denies alcohol or drug abuse.  She is not on any thyroid replacement therapy, and denies thyroid disease.   7. R Foot fracture Nov 2017  8. Zoster Jan 2018       PHYSICAL EXAMINATION:  Ms. Barclay is a very pleasant 61-year-old. /82  Pulse 77  Temp 98.1  F (36.7  C) (Oral)  Resp 16  Ht 1.753 m (5' 9.02\")  Wt 51.8 kg (114 lb 3.2 oz)  SpO2 96%  BMI 16.86 kg/m2.   General: frail, thin appearing woman in no distress  HEENT: perrl, eomi, no icterus, no oral lesions, no teeth  Resp: poor aeration, no wheezes  CV: rrr no m/r/g  Abd: s, nt, nd, thin appearing, normal BS  Ext: no edema   Skin. Several small dry patches on skin on arms  And legs. Acne on face more erythema. On R buttock macular rash with central crusts on nerve root distribution (zoster)      Lab Results   Component Value Date    WBC 8.4 01/17/2018    ANEU 3.8 01/17/2018    HGB 14.6 01/17/2018    HCT 45.9 01/17/2018     01/17/2018     01/17/2018    POTASSIUM 3.9 01/17/2018    CHLORIDE 104 01/17/2018    CO2 26 01/17/2018     (H) 01/17/2018    BUN 15 01/17/2018    CR 0.74 01/17/2018 "    MAG 2.0 03/10/2011    INR 0.97 09/15/2010    BILITOTAL 1.1 01/17/2018    AST 28 01/17/2018    ALT 41 01/17/2018    ALKPHOS 61 01/17/2018    PROTTOTAL 6.8 01/17/2018    ALBUMIN 3.7 01/17/2018            ASSESSMENT AND PLAN:  Ms. 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  She is here for continued evaluation of her ongoing GI symptoms.    1.  AML.  The patient in clinical remission.  2.  Lttsb-gwijbn-ppng disease.  We will continue Creon to 48,000 units with meals and snacks. 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    3.  GI.  Benign polyps removed. Repeat colonoscopy in spring on 2020. Improved diarrhea with creon as above.   4.  Osteoporosis. She should continue routine vitamin D supplementation.    5.  Chronic obstructive pulmonary disease.  The patient has significant COPD and continues to smoke.  Advised to stop smoking, again today.  6. Primary care: would recommend primary care to perform yearly physical with appropriate cancer screen, cholesterol, thyroid function, vitamin D level. Would also recommend screening for skin and mouth cancer. Again recommneded to stop smoking.   7. ID: zoster R buttock. Acyclovir 800 mg 4x/day for 5 days  8. Skin: some of her rash resemble limited mild chronic GVHD. Face seems like acne flare. Referred to Dermatology.  9. Orthopedic: healed foot fracture.    ARBEN Agee on 7/18/18 with labs  Must follow with Primary care between now and next visit  Continue Creon 3x/day and Vit amin D replacement  Start acyclovir for 5 days; avoid close contact with babies until all lesions healed/well crusted and should still have all covered well.   Stop smoking  Dermatology consult referral

## 2018-01-19 LAB
DEPRECATED CALCIDIOL+CALCIFEROL SERPL-MC: <36 UG/L (ref 20–75)
VITAMIN D2 SERPL-MCNC: <5 UG/L
VITAMIN D3 SERPL-MCNC: 31 UG/L

## 2018-01-23 LAB
ALBUMIN SERPL-MCNC: 3.7 G/DL (ref 3.4–5)
ALP SERPL-CCNC: 61 U/L (ref 40–150)
ALT SERPL W P-5'-P-CCNC: 41 U/L (ref 0–50)
ANION GAP SERPL CALCULATED.3IONS-SCNC: 8 MMOL/L (ref 3–14)
AST SERPL W P-5'-P-CCNC: 28 U/L (ref 0–45)
BILIRUB SERPL-MCNC: 0.3 MG/DL (ref 0.2–1.3)
BUN SERPL-MCNC: 15 MG/DL (ref 7–30)
CALCIUM SERPL-MCNC: 8.6 MG/DL (ref 8.5–10.1)
CHLORIDE SERPL-SCNC: 104 MMOL/L (ref 94–109)
CO2 SERPL-SCNC: 26 MMOL/L (ref 20–32)
CREAT SERPL-MCNC: 0.74 MG/DL (ref 0.52–1.04)
GFR SERPL CREATININE-BSD FRML MDRD: 79 ML/MIN/1.7M2
GLUCOSE SERPL-MCNC: 143 MG/DL (ref 70–99)
POTASSIUM SERPL-SCNC: 3.9 MMOL/L (ref 3.4–5.3)
PROT SERPL-MCNC: 6.8 G/DL (ref 6.8–8.8)
SODIUM SERPL-SCNC: 138 MMOL/L (ref 133–144)

## 2018-01-29 ENCOUNTER — TELEPHONE (OUTPATIENT)
Dept: TRANSPLANT | Facility: CLINIC | Age: 63
End: 2018-01-29

## 2018-01-29 ENCOUNTER — RECORDS - HEALTHEAST (OUTPATIENT)
Dept: ADMINISTRATIVE | Facility: OTHER | Age: 63
End: 2018-01-29

## 2018-01-29 ENCOUNTER — ONCOLOGY VISIT (OUTPATIENT)
Dept: TRANSPLANT | Facility: CLINIC | Age: 63
End: 2018-01-29
Attending: STUDENT IN AN ORGANIZED HEALTH CARE EDUCATION/TRAINING PROGRAM
Payer: MEDICARE

## 2018-01-29 VITALS
RESPIRATION RATE: 16 BRPM | BODY MASS INDEX: 17.12 KG/M2 | HEART RATE: 69 BPM | OXYGEN SATURATION: 96 % | HEIGHT: 69 IN | TEMPERATURE: 97.5 F | DIASTOLIC BLOOD PRESSURE: 70 MMHG | WEIGHT: 115.6 LBS | SYSTOLIC BLOOD PRESSURE: 137 MMHG

## 2018-01-29 DIAGNOSIS — B02.9 VZV (VARICELLA-ZOSTER VIRUS) INFECTION: ICD-10-CM

## 2018-01-29 DIAGNOSIS — C92.01 ACUTE MYELOID LEUKEMIA IN REMISSION (H): Primary | ICD-10-CM

## 2018-01-29 DIAGNOSIS — Z94.81 S/P ALLOGENEIC BONE MARROW TRANSPLANT (H): ICD-10-CM

## 2018-01-29 LAB
ALBUMIN SERPL-MCNC: 3.8 G/DL (ref 3.4–5)
ALP SERPL-CCNC: 55 U/L (ref 40–150)
ALT SERPL W P-5'-P-CCNC: 36 U/L (ref 0–50)
ANION GAP SERPL CALCULATED.3IONS-SCNC: 5 MMOL/L (ref 3–14)
AST SERPL W P-5'-P-CCNC: 25 U/L (ref 0–45)
BASOPHILS # BLD AUTO: 0.1 10E9/L (ref 0–0.2)
BASOPHILS NFR BLD AUTO: 1.2 %
BILIRUB SERPL-MCNC: 0.2 MG/DL (ref 0.2–1.3)
BUN SERPL-MCNC: 16 MG/DL (ref 7–30)
CALCIUM SERPL-MCNC: 8.6 MG/DL (ref 8.5–10.1)
CHLORIDE SERPL-SCNC: 106 MMOL/L (ref 94–109)
CO2 SERPL-SCNC: 27 MMOL/L (ref 20–32)
CREAT SERPL-MCNC: 0.69 MG/DL (ref 0.52–1.04)
DIFFERENTIAL METHOD BLD: NORMAL
EOSINOPHIL # BLD AUTO: 0.3 10E9/L (ref 0–0.7)
EOSINOPHIL NFR BLD AUTO: 3.7 %
ERYTHROCYTE [DISTWIDTH] IN BLOOD BY AUTOMATED COUNT: 13.5 % (ref 10–15)
GFR SERPL CREATININE-BSD FRML MDRD: 85 ML/MIN/1.7M2
GLUCOSE SERPL-MCNC: 114 MG/DL (ref 70–99)
HCT VFR BLD AUTO: 42.4 % (ref 35–47)
HGB BLD-MCNC: 13.9 G/DL (ref 11.7–15.7)
IMM GRANULOCYTES # BLD: 0 10E9/L (ref 0–0.4)
IMM GRANULOCYTES NFR BLD: 0.3 %
LYMPHOCYTES # BLD AUTO: 3.8 10E9/L (ref 0.8–5.3)
LYMPHOCYTES NFR BLD AUTO: 50.7 %
MCH RBC QN AUTO: 29.8 PG (ref 26.5–33)
MCHC RBC AUTO-ENTMCNC: 32.8 G/DL (ref 31.5–36.5)
MCV RBC AUTO: 91 FL (ref 78–100)
MONOCYTES # BLD AUTO: 0.8 10E9/L (ref 0–1.3)
MONOCYTES NFR BLD AUTO: 10.3 %
NEUTROPHILS # BLD AUTO: 2.6 10E9/L (ref 1.6–8.3)
NEUTROPHILS NFR BLD AUTO: 33.8 %
NRBC # BLD AUTO: 0 10*3/UL
NRBC BLD AUTO-RTO: 0 /100
PLATELET # BLD AUTO: 238 10E9/L (ref 150–450)
POTASSIUM SERPL-SCNC: 3.6 MMOL/L (ref 3.4–5.3)
PROT SERPL-MCNC: 6.8 G/DL (ref 6.8–8.8)
RBC # BLD AUTO: 4.67 10E12/L (ref 3.8–5.2)
SODIUM SERPL-SCNC: 138 MMOL/L (ref 133–144)
WBC # BLD AUTO: 7.6 10E9/L (ref 4–11)

## 2018-01-29 PROCEDURE — 80053 COMPREHEN METABOLIC PANEL: CPT | Performed by: INTERNAL MEDICINE

## 2018-01-29 PROCEDURE — 85025 COMPLETE CBC W/AUTO DIFF WBC: CPT | Performed by: INTERNAL MEDICINE

## 2018-01-29 PROCEDURE — 36415 COLL VENOUS BLD VENIPUNCTURE: CPT

## 2018-01-29 PROCEDURE — G0463 HOSPITAL OUTPT CLINIC VISIT: HCPCS | Mod: ZF

## 2018-01-29 RX ORDER — GABAPENTIN 300 MG/1
300 CAPSULE ORAL 3 TIMES DAILY
Qty: 90 CAPSULE | Refills: 0 | Status: SHIPPED | OUTPATIENT
Start: 2018-01-29 | End: 2018-07-18

## 2018-01-29 RX ORDER — ACYCLOVIR 400 MG/1
800 TABLET ORAL
Qty: 100 TABLET | Refills: 0 | Status: SHIPPED | OUTPATIENT
Start: 2018-01-29 | End: 2019-01-11

## 2018-01-29 NOTE — NURSING NOTE
"Oncology Rooming Note    January 29, 2018 3:43 PM   Jenn Barclay is a 62 year old female who presents for:    Chief Complaint   Patient presents with     RECHECK     AML (acute myelogenous leukemia)      Initial Vitals: /70 (BP Location: Right arm, Patient Position: Chair, Cuff Size: Adult Regular)  Pulse 69  Temp 97.5  F (36.4  C) (Oral)  Resp 16  Ht 1.753 m (5' 9.02\")  Wt 52.4 kg (115 lb 9.6 oz)  SpO2 96%  BMI 17.06 kg/m2 Estimated body mass index is 17.06 kg/(m^2) as calculated from the following:    Height as of this encounter: 1.753 m (5' 9.02\").    Weight as of this encounter: 52.4 kg (115 lb 9.6 oz). Body surface area is 1.6 meters squared.  Data Unavailable Comment: rash - moving upwards   No LMP recorded.  Allergies reviewed: Yes  Medications reviewed: Yes    Medications: Medication refills not needed today.  Pharmacy name entered into [facility]:    [facility] - [address]  [facility] - [place], [place] - [address] [place] [address]    Clinical concerns:  No new concerns  Provider was notified.    5 minutes for nursing intake (face to face time)     Henny Michaud MA                  "

## 2018-01-29 NOTE — PROGRESS NOTES
"BMT Clinic Progress Note     CC: follow-up complaining of having more lesions and itching/pain in affected area    HPI: Ms. 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 Agee 1 week ago with herpes zoster infection on her R buttock area. She was given ACV to take for 5 days and she believes while taking the medication she still had new lesions some also going above the line or prior involvement of the rash and some on the left gluteal area. No fevers or chills, no headaches and no visual changes. She has pain and itching in affected area.     The remaining 12 point complete review of systems is otherwise negative.      PAST MEDICAL HISTORY:   1.  Significant for the acute myelogenous leukemia.   2.  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   3.  COPD.   4.  Hysterectomy and bilateral oophorectomy at the age of 20.     5.  She had a compression fracture of her thoracic spine that was treated with a vertebroplasty about 6 months ago.   6.  She has a history of smoking for many years.  She denies alcohol or drug abuse.  She is not on any thyroid replacement therapy, and denies thyroid disease.   7. R Foot fracture Nov 2017  8. Zoster Jan 2018       PHYSICAL EXAMINATION:  Ms. Barclay is a very pleasant 61-year-old. /70 (BP Location: Right arm, Patient Position: Chair, Cuff Size: Adult Regular)  Pulse 69  Temp 97.5  F (36.4  C) (Oral)  Resp 16  Ht 1.753 m (5' 9.02\")  Wt 52.4 kg (115 lb 9.6 oz)  SpO2 96%  BMI 17.06 kg/m2.   General: frail, thin appearing woman in no distress  HEENT: perrl, eomi, no icterus, no oral lesions, no teeth  Resp: poor aeration, no wheezes  CV: rrr no m/r/g  Abd: s, nt, nd, thin appearing, normal BS  Ext: no edema   Skin. Several small dry patches on skin on arms and legs are stable. Acne on face with some erythema.   R buttock crused rash, but also in a same dermatom distribution she has few crusted " lesions on the left side as well. No active vesicular rash and none outside of the same dermatom.      Lab Results   Component Value Date    WBC 7.6 01/29/2018    ANEU 2.6 01/29/2018    HGB 13.9 01/29/2018    HCT 42.4 01/29/2018     01/29/2018     01/17/2018    POTASSIUM 3.9 01/17/2018    CHLORIDE 104 01/17/2018    CO2 26 01/17/2018     (H) 01/17/2018    BUN 15 01/17/2018    CR 0.74 01/17/2018    MAG 2.0 03/10/2011    INR 0.97 09/15/2010    BILITOTAL 0.3 01/17/2018    AST 28 01/17/2018    ALT 41 01/17/2018    ALKPHOS 61 01/17/2018    PROTTOTAL 6.8 01/17/2018    ALBUMIN 3.7 01/17/2018         ASSESSMENT AND PLAN:  Ms. 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     1.  AML.  The patient in clinical remission.  2.  Known chronic Drcel-vzflun-nzss disease. Continue Creon to 48,000 units with meals and snacks. 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  Also drinking 2 glasses of milk daily and I recommended to stop for few days to a week an see if some symptoms are related to lactose intolerance or not.   3.  GI.  Benign polyps removed. Repeat colonoscopy in spring on 2020. Improved diarrhea with creon as above.   4.  Osteoporosis. She should continue routine vitamin D supplementation.    5.  Chronic obstructive pulmonary disease.  The patient has significant COPD and continues to smoke. She is trying to stop smoking.   6. Primary care: Last week Dr. Agee recommend primary care to perform yearly physical with appropriate cancer screen, cholesterol, thyroid function, vitamin D level. Also recommend screening for skin and mouth cancer and advised to stop smoking.   7. ID: Herpes zoster infection in gluteal area. Given full treatment dose at this time to take Acyclovir 800 mg 5x/day for 10 days. Also Rxed Gabapentin 300 mg TID for post-herpetic  neuralgia symptoms.   She also has referral to Dermatology for facial rash evaluation and concerns for chronic GVHD per Dr. Agee.      RTC Cyndie on 7/18/18 with labs or earlier as needed

## 2018-01-29 NOTE — LETTER
"  1/29/2018      RE: Jenn Barclay  [address] [place] [address]     Dear Colleague,    Thank you for referring your patient, Jenn Barclay, to the [facility] BLOOD [facility] MARROW TRANSPLANT. Please see a copy of my visit note below.    BMT Clinic Progress Note     CC: follow-up complaining of having more lesions and itching/pain in affected area    HPI: Ms. 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 Agee 1 week ago with herpes zoster infection on her R buttock area. She was given ACV to take for 5 days and she believes while taking the medication she still had new lesions some also going above the line or prior involvement of the rash and some on the left gluteal area. No fevers or chills, no headaches and no visual changes. She has pain and itching in affected area.     The remaining 12 point complete review of systems is otherwise negative.      PAST MEDICAL HISTORY:   1.  Significant for the acute myelogenous leukemia.   2.  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   3.  COPD.   4.  Hysterectomy and bilateral oophorectomy at the age of 20.     5.  She had a compression fracture of her thoracic spine that was treated with a vertebroplasty about 6 months ago.   6.  She has a history of smoking for many years.  She denies alcohol or drug abuse.  She is not on any thyroid replacement therapy, and denies thyroid disease.   7. R Foot fracture Nov 2017  8. Zoster Jan 2018       PHYSICAL EXAMINATION:  Ms. Barclay is a very pleasant 61-year-old. /70 (BP Location: Right arm, Patient Position: Chair, Cuff Size: Adult Regular)  Pulse 69  Temp 97.5  F (36.4  C) (Oral)  Resp 16  Ht 1.753 m (5' 9.02\")  Wt 52.4 kg (115 lb 9.6 oz)  SpO2 96%  BMI 17.06 kg/m2.   General: frail, thin appearing woman in no distress  HEENT: perrl, eomi, no icterus, no oral lesions, no teeth  Resp: poor aeration, no wheezes  CV: rrr no m/r/g  Abd: " s, nt, nd, thin appearing, normal BS  Ext: no edema   Skin. Several small dry patches on skin on arms and legs are stable. Acne on face with some erythema.   R buttock crused rash, but also in a same dermatom distribution she has few crusted lesions on the left side as well. No active vesicular rash and none outside of the same dermatom.      Lab Results   Component Value Date    WBC 7.6 01/29/2018    ANEU 2.6 01/29/2018    HGB 13.9 01/29/2018    HCT 42.4 01/29/2018     01/29/2018     01/17/2018    POTASSIUM 3.9 01/17/2018    CHLORIDE 104 01/17/2018    CO2 26 01/17/2018     (H) 01/17/2018    BUN 15 01/17/2018    CR 0.74 01/17/2018    MAG 2.0 03/10/2011    INR 0.97 09/15/2010    BILITOTAL 0.3 01/17/2018    AST 28 01/17/2018    ALT 41 01/17/2018    ALKPHOS 61 01/17/2018    PROTTOTAL 6.8 01/17/2018    ALBUMIN 3.7 01/17/2018         ASSESSMENT AND PLAN:  Ms. 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     1.  AML.  The patient in clinical remission.  2.  Known chronic Zhxce-sapffv-mzjs disease. Continue Creon to 48,000 units with meals and snacks. 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  Also drinking 2 glasses of milk daily and I recommended to stop for few days to a week an see if some symptoms are related to lactose intolerance or not.   3.  GI.  Benign polyps removed. Repeat colonoscopy in spring on 2020. Improved diarrhea with creon as above.   4.  Osteoporosis. She should continue routine vitamin D supplementation.    5.  Chronic obstructive pulmonary disease.  The patient has significant COPD and continues to smoke. She is trying to stop smoking.   6. Primary care: Last week Dr. Agee recommend primary care to perform yearly physical with appropriate cancer screen, cholesterol, thyroid function, vitamin D level. Also recommend screening for  skin and mouth cancer and advised to stop smoking.   7. ID: Herpes zoster infection in gluteal area. Given full treatment dose at this time to take Acyclovir 800 mg 5x/day for 10 days. Also Rxed Gabapentin 300 mg TID for post-herpetic neuralgia symptoms.   She also has referral to Dermatology for facial rash evaluation and concerns for chronic GVHD per Dr. Agee.      RTC Cyndie on 7/18/18 with labs or earlier as needed      Again, thank you for allowing me to participate in the care of your patient.      Sincerely,    BMT DOM

## 2018-01-29 NOTE — MR AVS SNAPSHOT
After Visit Summary   1/29/2018    Jenn Barclay    MRN: [id number]           Patient Information     Date Of Birth          1955        Visit Information        Provider Department      1/29/2018 3:30 PM  BMT DOM [facility] Blood and Marrow Transplant        Today's Diagnoses     Acute myeloid leukemia in remission (H)    -  1    S/P allogeneic bone marrow transplant (H)        VZV (varicella-zoster virus) infection              [facility] ([facility]  [address] [place] [address]  Phone: [phone]  Clinic Hours:   Monday-Thursday:7am to 7pm   Friday: 7am to 5pm   Weekends and holidays:    8am to noon (in general)  If your fever is 100.5  or greater,   call the clinic.  After hours call the   hospital at [phone] or   [phone]. Ask for the BMT   fellow on-call            Follow-ups after your visit        Your next 10 appointments already scheduled     Jul 18, 2018 12:00 PM CDT   [facility] with  [facility]   [facility] ([facility])    [address] [place] [address]  [address] [place] [id number] [phone]            Jul 18, 2018 12:30 PM CDT   Return with Dmitriy Agee MD   [facility] Blood and Marrow Transplant ([facility])    [address] [place] [address]  [address] [place] [id number] [phone]              Who to contact     If you have questions or need follow up information about today's clinic visit or your schedule please contact [facility] BLOOD AND MARROW TRANSPL[facility] directly at [phone].  Normal or non-critical lab and imaging results will be communicated to you by MyChart, letter or phone within 4 business days after the clinic has received the results. If you do not hear from us within 7 days, please contact the clinic through MyChart or phone. If you have a critical or abnormal lab result, we will notify you by phone as soon as possible.  Submit  "refill requests through [employer] or call your pharmacy and they will forward the refill request to us. Please allow 3 business days for your refill to be completed.          Additional Information About Your Visit        [employer] Information     [employer] lets you send messages to your doctor, view your test results, renew your prescriptions, schedule appointments and more. To sign up, go to [email or web address] . Click on \"Log in\" on the left side of the screen, which will take you to the Welcome page. Then click on \"Sign up Now\" on the right side of the page.     You will be asked to enter the access code listed below, as well as some personal information. Please follow the directions to create your username and password.     Your access code is: [email or web address]  Expires: 4/3/2018  6:30 AM     Your access code will  in 90 days. If you need help or a new code, please call your [place] clinic or [phone].        Care EveryWhere ID     This is your Care EveryWhere ID. This could be used by other organizations to access your [place] medical records  [id number]        Your Vitals Were     Pulse Temperature Respirations Height Pulse Oximetry BMI (Body Mass Index)    69 97.5  F (36.4  C) (Oral) 16 1.753 m (5' 9.02\") 96% 17.06 kg/m2       Blood Pressure from Last 3 Encounters:   18 137/70   18 121/82   17 107/67    Weight from Last 3 Encounters:   18 52.4 kg (115 lb 9.6 oz)   18 51.8 kg (114 lb 3.2 oz)   17 50.2 kg (110 lb 11.2 oz)              We Performed the Following     CBC with platelets differential     Comprehensive metabolic panel          Today's Medication Changes          These changes are accurate as of 18  4:13 PM.  If you have any questions, ask your nurse or doctor.               Start taking these medicines.        Dose/Directions    gabapentin 300 MG capsule   Commonly known as:  NEURONTIN   Used for:  VZV (varicella-zoster virus) infection        Dose:  " 300 mg   Take 1 capsule (300 mg) by mouth 3 times daily   Quantity:  90 capsule   Refills:  0         These medicines have changed or have updated prescriptions.        Dose/Directions    acyclovir 400 MG tablet   Commonly known as:  ZOVIRAX   This may have changed:  when to take this   Used for:  Acute myeloid leukemia in remission (H), S/P allogeneic bone marrow transplant (H)        Dose:  800 mg   Take 2 tablets (800 mg) by mouth 5 times daily   Quantity:  100 tablet   Refills:  0            Where to get your medicines      These medications were sent to [id number] - [place], [place] - [address]  [address], [facility] [address]     Phone:  [phone]     acyclovir 400 MG tablet    gabapentin 300 MG capsule                Recent Review Flowsheet Data     BMT Recent Results Latest Ref Rng & Units 6/1/2012 11/16/2016 1/27/2017 4/12/2017 7/12/2017 1/17/2018 1/29/2018    WBC 4.0 - 11.0 10e9/L 8.8 10.0 8.3 7.6 8.7 8.4 7.6    Hemoglobin 11.7 - 15.7 g/dL 14.0 14.1 14.7 13.6 13.2 14.6 13.9    Platelet Count 150 - 450 10e9/L 166 266 243 209 232 244 238    Neutrophils (Absolute) 1.6 - 8.3 10e9/L 4.5 3.7 4.0 2.7 3.1 3.8 2.6    INR 0.86 - 1.14 - - - - - - -    Sodium 133 - 144 mmol/L 137 139 136 140 140 138 -    Potassium 3.4 - 5.3 mmol/L 3.9 3.6 4.2 3.4 3.6 3.9 -    Chloride 94 - 109 mmol/L 104 102 100 106 105 104 -    Glucose 70 - 99 mg/dL 102(H) 93 141(H) 146(H) 67(L) 143(H) -    Urea Nitrogen 7 - 30 mg/dL 13 10 11 13 13 15 -    Creatinine 0.52 - 1.04 mg/dL 0.73 0.67 0.75 0.71 0.67 0.74 -    Calcium (Total) 8.5 - 10.1 mg/dL 9.3 8.5 8.6 8.2(L) 8.5 8.6 -    Protein (Total) 6.8 - 8.8 g/dL 6.9 7.3 7.2 6.5(L) 6.3(L) 6.8 -    Albumin 3.4 - 5.0 g/dL 4.2 4.0 3.9 3.4 3.8 3.7 -    Alkaline Phosphatase 40 - 150 U/L 72 78 84 71 62 61 -    AST 0 - 45 U/L 36 29 26 27 29 28 -    ALT 0 - 50 U/L 37 46 31 40 44 41 -    MCV 78 - 100 fl 90 92 92 93 90 95 91               Primary Care Provider Office  Phone # Fax #    Dmitriy Pradeep Agee -[phone]       [address] [place] [address]        Equal Access to Services     LATONYA URRUTIA : Hadii aad ku hadfaizasavita Stringer, isaíascastillo currykerenha, renea karosy janiyavi, palomo stephanin hayaajob densonnisa morenokyragrayson montenegro. So [facility] [phone].    ATENCIÓN: Si habla español, tiene a stark disposición servicios gratuitos de asistencia lingüística. Llame al [phone].    We comply with applicable federal civil rights laws and [place] laws. We do not discriminate on the basis of race, color, national origin, age, disability, sex, sexual orientation, or gender identity.            Thank you!     Thank you for choosing [facility] BLOOD AND MARROW TRANSPLANT  for your care. Our goal is always to provide you with excellent care. Hearing back from our patients is one way we can continue to improve our services. Please take a few minutes to complete the written survey that you may receive in the mail after your visit with us. Thank you!             Your Updated Medication List - Protect others around you: Learn how to safely use, store and throw away your medicines at www.disposemymeds.org.          This list is accurate as of 1/29/18  4:13 PM.  Always use your most recent med list.                   Brand Name Dispense Instructions for use Diagnosis    acyclovir 400 MG tablet    ZOVIRAX    100 tablet    Take 2 tablets (800 mg) by mouth 5 times daily    Acute myeloid leukemia in remission (H), S/P allogeneic bone marrow transplant (H)       ALEVE PO      Take by mouth daily        amylase-lipase-protease [id number] UNITS Cpep per EC capsule    CREON    300 capsule    Take 2 capsules (48,000 Units) by mouth 3 times daily (with meals) And snacks    S/P allogeneic bone marrow transplant (H), Acute myeloid leukemia in remission (H)       ascorbic acid 500 MG tablet    VITAMIN C     Take 500 mg by mouth daily.        cholecalciferol 1000 UNITS capsule     vitamin  -D     Take 1 capsule by mouth daily.        gabapentin 300 MG capsule    NEURONTIN    90 capsule    Take 1 capsule (300 mg) by mouth 3 times daily    VZV (varicella-zoster virus) infection       IMODIUM OR      Take by mouth 2 times daily        MULTIVITAL PO      Take  by mouth.        oxyCODONE-acetaminophen 5-325 MG per tablet    PERCOCET    20 tablet    Take 1 tablet by mouth every 4 hours as needed for pain.    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

## 2018-01-29 NOTE — TELEPHONE ENCOUNTER
BMT CLINICAL SOCIAL WORK NOTE:    Focus: Trasportation    Data: Pt is a 62 year old female currently 7 years out from her allo BMT     Interventions: Clinical  (CSW) spoke with the pt and informed her that shew as scheduled for a clinic visit today 1/29/18 at 15:30 to follow-up on her concern regarding her rash. Pt shared that she has no ride so taxi ride was arranged for the pt via [employer], [phone]. Pick-up at 14:30 and will call ride arranged for her return home. Pt confirmed plan and she was given the phone number to call the Taxi when she is ready to leave the clinic.    Assessment: Pt presented as friendly, but frustrated that her rash is not getting better.    Plan: CSW will continue to work with Pt and family to provide supportive counseling and assist with resources as needed. CSW will continue to collaborate with multidisciplinary team regarding Pt's plan of care.     MASOUD Ramirez, [facility]  Pager: [phone]  Phone: [phone]

## 2018-02-15 ENCOUNTER — COMMUNICATION - HEALTHEAST (OUTPATIENT)
Dept: INTERNAL MEDICINE | Facility: CLINIC | Age: 63
End: 2018-02-15

## 2018-02-15 DIAGNOSIS — J44.9 COPD (CHRONIC OBSTRUCTIVE PULMONARY DISEASE) (H): ICD-10-CM

## 2018-03-21 ENCOUNTER — COMMUNICATION - HEALTHEAST (OUTPATIENT)
Dept: INTERNAL MEDICINE | Facility: CLINIC | Age: 63
End: 2018-03-21

## 2018-03-21 DIAGNOSIS — J44.9 COPD (CHRONIC OBSTRUCTIVE PULMONARY DISEASE) (H): ICD-10-CM

## 2018-04-30 ENCOUNTER — COMMUNICATION - HEALTHEAST (OUTPATIENT)
Dept: INTERNAL MEDICINE | Facility: CLINIC | Age: 63
End: 2018-04-30

## 2018-04-30 DIAGNOSIS — J44.9 COPD (CHRONIC OBSTRUCTIVE PULMONARY DISEASE) (H): ICD-10-CM

## 2018-05-08 ENCOUNTER — TELEPHONE (OUTPATIENT)
Dept: TRANSPLANT | Facility: CLINIC | Age: 63
End: 2018-05-08

## 2018-05-08 NOTE — TELEPHONE ENCOUNTER
Clinical   Blood and Marrow Transplant Service    Reason for Intervention: Per JAZMYN Tapia, pt has a clinic appointment tomorrow and needs to renew her [employer] Patient Assistance Form for her Creon medication. JAZMYN Tapia has the form and is completing the medical portion and will print a new script for Creon.     Data: Jenn is a 62 year old female 7 years post allogeneic transplant for AML.     Intervention: SW spoke with pt via phone (P: [phone]). SW explained that pt need to bring her insurance card and bank statement which states the amount of SSDI deposited into her account each month in order to send in the [employer] Patient Assistance Form. Pt does not have her SSDI award letter as she applied 7 years ago. Pt said she will bring both documents tomorrow to her clinic appointment. Pt has already called [employer] (P: [phone]) and set up a ride for tomorrow. Pt said she will be at clinic at 12:30pm.    Education Provided: [employer] Patient Assistance Program (P: [phone] F: [phone])    Follow-up Required: CECILIA and JAZMYN Tapia will follow-up with pt on 5/9/2018 in clinic.    Encouraged patient/family to reach out as questions or concerns arise.     MASOUD Wolfe, [facility]UM[facility]KO  Phone: [phone]  Pager: [phone]

## 2018-05-09 ENCOUNTER — ONCOLOGY VISIT (OUTPATIENT)
Dept: TRANSPLANT | Facility: CLINIC | Age: 63
End: 2018-05-09
Attending: INTERNAL MEDICINE
Payer: MEDICARE

## 2018-05-09 ENCOUNTER — OFFICE VISIT (OUTPATIENT)
Dept: DERMATOLOGY | Facility: CLINIC | Age: 63
End: 2018-05-09
Payer: MEDICARE

## 2018-05-09 ENCOUNTER — RECORDS - HEALTHEAST (OUTPATIENT)
Dept: ADMINISTRATIVE | Facility: OTHER | Age: 63
End: 2018-05-09

## 2018-05-09 VITALS
OXYGEN SATURATION: 96 % | HEIGHT: 69 IN | BODY MASS INDEX: 16.68 KG/M2 | RESPIRATION RATE: 18 BRPM | WEIGHT: 112.6 LBS | TEMPERATURE: 97.1 F | SYSTOLIC BLOOD PRESSURE: 139 MMHG | DIASTOLIC BLOOD PRESSURE: 91 MMHG | HEART RATE: 75 BPM

## 2018-05-09 DIAGNOSIS — D22.9 MULTIPLE BENIGN NEVI: ICD-10-CM

## 2018-05-09 DIAGNOSIS — K86.81 EXOCRINE PANCREATIC INSUFFICIENCY: Primary | ICD-10-CM

## 2018-05-09 DIAGNOSIS — L70.8 OTHER ACNE: Primary | ICD-10-CM

## 2018-05-09 DIAGNOSIS — Z94.81 S/P ALLOGENEIC BONE MARROW TRANSPLANT (H): ICD-10-CM

## 2018-05-09 DIAGNOSIS — Z94.89 TRANSPLANT RECIPIENT: ICD-10-CM

## 2018-05-09 DIAGNOSIS — C92.01 ACUTE MYELOID LEUKEMIA IN REMISSION (H): ICD-10-CM

## 2018-05-09 DIAGNOSIS — L72.0 KERATIN CYST: ICD-10-CM

## 2018-05-09 LAB
ALBUMIN SERPL-MCNC: 4.1 G/DL (ref 3.4–5)
ALP SERPL-CCNC: 59 U/L (ref 40–150)
ALT SERPL W P-5'-P-CCNC: 42 U/L (ref 0–50)
ANION GAP SERPL CALCULATED.3IONS-SCNC: 9 MMOL/L (ref 3–14)
AST SERPL W P-5'-P-CCNC: 31 U/L (ref 0–45)
BASOPHILS # BLD AUTO: 0.1 10E9/L (ref 0–0.2)
BASOPHILS NFR BLD AUTO: 1.2 %
BILIRUB SERPL-MCNC: 0.3 MG/DL (ref 0.2–1.3)
BUN SERPL-MCNC: 18 MG/DL (ref 7–30)
CALCIUM SERPL-MCNC: 9.3 MG/DL (ref 8.5–10.1)
CHLORIDE SERPL-SCNC: 105 MMOL/L (ref 94–109)
CO2 SERPL-SCNC: 30 MMOL/L (ref 20–32)
CREAT SERPL-MCNC: 0.81 MG/DL (ref 0.52–1.04)
DIFFERENTIAL METHOD BLD: NORMAL
EOSINOPHIL # BLD AUTO: 0.2 10E9/L (ref 0–0.7)
EOSINOPHIL NFR BLD AUTO: 2.8 %
ERYTHROCYTE [DISTWIDTH] IN BLOOD BY AUTOMATED COUNT: 12.6 % (ref 10–15)
GFR SERPL CREATININE-BSD FRML MDRD: 71 ML/MIN/1.7M2
GLUCOSE SERPL-MCNC: 103 MG/DL (ref 70–99)
HCT VFR BLD AUTO: 45 % (ref 35–47)
HGB BLD-MCNC: 14.8 G/DL (ref 11.7–15.7)
IMM GRANULOCYTES # BLD: 0 10E9/L (ref 0–0.4)
IMM GRANULOCYTES NFR BLD: 0.1 %
LYMPHOCYTES # BLD AUTO: 4.1 10E9/L (ref 0.8–5.3)
LYMPHOCYTES NFR BLD AUTO: 50 %
MCH RBC QN AUTO: 31.1 PG (ref 26.5–33)
MCHC RBC AUTO-ENTMCNC: 32.9 G/DL (ref 31.5–36.5)
MCV RBC AUTO: 95 FL (ref 78–100)
MONOCYTES # BLD AUTO: 0.6 10E9/L (ref 0–1.3)
MONOCYTES NFR BLD AUTO: 7.7 %
NEUTROPHILS # BLD AUTO: 3.2 10E9/L (ref 1.6–8.3)
NEUTROPHILS NFR BLD AUTO: 38.2 %
NRBC # BLD AUTO: 0 10*3/UL
NRBC BLD AUTO-RTO: 0 /100
PLATELET # BLD AUTO: 251 10E9/L (ref 150–450)
POTASSIUM SERPL-SCNC: 4.5 MMOL/L (ref 3.4–5.3)
PROT SERPL-MCNC: 7.3 G/DL (ref 6.8–8.8)
RBC # BLD AUTO: 4.76 10E12/L (ref 3.8–5.2)
SODIUM SERPL-SCNC: 143 MMOL/L (ref 133–144)
WBC # BLD AUTO: 8.3 10E9/L (ref 4–11)

## 2018-05-09 PROCEDURE — G0463 HOSPITAL OUTPT CLINIC VISIT: HCPCS

## 2018-05-09 PROCEDURE — 36415 COLL VENOUS BLD VENIPUNCTURE: CPT

## 2018-05-09 PROCEDURE — 85025 COMPLETE CBC W/AUTO DIFF WBC: CPT | Performed by: INTERNAL MEDICINE

## 2018-05-09 PROCEDURE — 80053 COMPREHEN METABOLIC PANEL: CPT | Performed by: INTERNAL MEDICINE

## 2018-05-09 RX ORDER — CLINDAMYCIN PHOSPHATE 11.9 MG/ML
SOLUTION TOPICAL
Qty: 60 ML | Refills: 1 | Status: SHIPPED | OUTPATIENT
Start: 2018-05-09 | End: 2019-01-11

## 2018-05-09 ASSESSMENT — PAIN SCALES - GENERAL
PAINLEVEL: NO PAIN (0)
PAINLEVEL: NO PAIN (0)

## 2018-05-09 NOTE — NURSING NOTE
"Oncology Rooming Note    May 9, 2018 1:34 PM   Jenn Barclay is a 62 year old female who presents for:    Chief Complaint   Patient presents with     RECHECK     Return: AML (acute myelogenous leukemia)      Initial Vitals: BP (!) 139/91  Pulse 75  Temp 97.1  F (36.2  C) (Oral)  Resp 18  Ht 1.753 m (5' 9.02\")  Wt 51.1 kg (112 lb 9.6 oz)  SpO2 96%  BMI 16.62 kg/m2 Estimated body mass index is 16.62 kg/(m^2) as calculated from the following:    Height as of this encounter: 1.753 m (5' 9.02\").    Weight as of this encounter: 51.1 kg (112 lb 9.6 oz). Body surface area is 1.58 meters squared.  No Pain (0) Comment: Data Unavailable   No LMP recorded.  Allergies reviewed: Yes  Medications reviewed: Yes    Medications: Medication refills not needed today.  Pharmacy name entered into [facility]:    [facility] [address] [place] [address] E  [facility] - [address]    Clinical concerns: N/A     5 minutes for nursing intake (face to face time)     Josie Cervantes CMA              "

## 2018-05-09 NOTE — NURSING NOTE
Dermatology Rooming Note    Jenn Barclay's goals for this visit include:   Chief Complaint   Patient presents with     Skin Check     Skin check, Jenn notes a rash and is concerned about GVHD.     Ghazala Arzola LPN

## 2018-05-09 NOTE — LETTER
"5/9/2018       RE: Jenn Barclay  [address] [place] [address]     Dear Colleague,    Thank you for referring your patient, Jenn Barclay, to the [facility] DERMATOLOGY at [facility]. Please see a copy of my visit note below.    SUBJECTIVE:  Jenn is a bone marrow transplant recipient ( for leukemia) who comes in today for a skin check.  She has had no recent problems with her skin except that she does have many facial lesions that she considers cystic acne.  She has seen dermatologists for this, and some have been lanced, but they have not been resolved, and that is a frustration for her.  She has not noticed anything bleeding, crusting or of any concern on the body.        OBJECTIVE:  A thin but healthy lady, very pleasant personality, in no distress today.  We checked her scalp, face, neck, chest, back, arms, breasts, buttocks, all but genitals.  She showed several large papular intradermal nevi on her cheeks.  On her cheeks  were numerous true keratinaceous cysts that have formed likely following years of cystic acne as a young adult.  She has a few active papules of acne, but mostly these hard, firm lesions that she feels are \"cystic acne\", are  actually  formed keratin cysts.        ASSESSMENT:  No worrisome lesions from the standpoint of skin cancer or precancer.  Scattered nevi and a facial condition represented by a few lesions of active acne and mostly several dozen truly keratinaceous cysts.     PLAN:  I advised that the facial lesions that she does not like can only be treated by incision/expression or excision.  Nonetheless, she does have some mild acne in my opinion today, so I recommended a trial of clindamycin topical morning and night to the face to see if it could reduce some of the redness of her face.      I did not feel that oral antibiotics would be appropriate for her facial condition.      I reassured her that I found no worrisome lesions on her " skin examination today, and that we would be most happy to see her again in the future.  I again told her that, unfortunately, there is no medical therapy for the formed cysts on her face, but that she may someday consider a few sessions of incision and expression of the lesions.      Return in 1-2 years for a skin check.      MEDICATIONS:  Reviewed.        ALLERGIES:  Reviewed.           Again, thank you for allowing me to participate in the care of your patient.      Sincerely,    MICHAEL Norton MD

## 2018-05-09 NOTE — PATIENT INSTRUCTIONS
Preventive Care:    Breast Cancer Screening: During our visit today, we discussed that it is recommended you receive breast cancer screening. Please call or make an appointment with your primary care provider to discuss this with them. You may also call the  [facility] scheduling line ([phone]) to set up a mammography appointment at the [facility] within the [facility].  Preventive Care:    Colorectal Cancer Screening: During our visit today, we discussed that it is recommended you receive colorectal cancer screening. Please call or make an appointment with your primary care provider to discuss this. You may also call the  [facility] scheduling line ([phone]) to set up a colonoscopy appointment.

## 2018-05-09 NOTE — PROGRESS NOTES
"BMT Clinic Progress Note     HPI: Ms. 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 Since running out of Creon, she has had recurrence of steatorrhea/diarrhea (despite 8 tabs of imodium daily) as well as significant flautus that comes without warning. No blood in her stool. Did have a few episodes of vomiting yesterday that resolved by evening and was able to eat 2 slices of pizza. No nausea or vomiting today. Endorses very good oral hydration with diarrhea. No fevers, chills, or infectious symptoms. No bleeding or rashes.     The remaining 12 point complete review of systems is otherwise negative.      PAST MEDICAL HISTORY:   1.  Significant for the acute myelogenous leukemia.   2.  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   3.  COPD.   4.  Hysterectomy and bilateral oophorectomy at the age of 20.     5.  She had a compression fracture of her thoracic spine that was treated with a vertebroplasty about 6 months ago.   6.  She has a history of smoking for many years.  She denies alcohol or drug abuse.  She is not on any thyroid replacement therapy, and denies thyroid disease.   7. R Foot fracture Nov 2017  8. Zoster Jan 2018       PHYSICAL EXAMINATION:  Ms. Barclay is a very pleasant 61-year-old. BP (!) 139/91  Pulse 75  Temp 97.1  F (36.2  C) (Oral)  Resp 18  Ht 1.753 m (5' 9.02\")  Wt 51.1 kg (112 lb 9.6 oz)  SpO2 96%  BMI 16.62 kg/m2.   General: frail, thin appearing woman in no distress  HEENT: perrl, eomi, no icterus, no oral lesions, no teeth  Resp: CTA without crackles or wheezes  CV: rrr no m/r/g  Abd: s, nt, nd, thin appearing, normal BS  Ext: no edema   Skin. Several small dry patches on skin on arms and legs are stable. Acne on face with some erythema.     Lab Results   Component Value Date    WBC 8.3 " 05/09/2018    ANEU 3.2 05/09/2018    HGB 14.8 05/09/2018    HCT 45.0 05/09/2018     05/09/2018     05/09/2018    POTASSIUM 4.5 05/09/2018    CHLORIDE 105 05/09/2018    CO2 30 05/09/2018     (H) 05/09/2018    BUN 18 05/09/2018    CR 0.81 05/09/2018    MAG 2.0 03/10/2011    INR 0.97 09/15/2010    BILITOTAL 0.3 05/09/2018    AST 31 05/09/2018    ALT 42 05/09/2018    ALKPHOS 59 05/09/2018    PROTTOTAL 7.3 05/09/2018    ALBUMIN 4.1 05/09/2018         ASSESSMENT AND PLAN:  Ms. 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     1.  AML/HEME.  The patient in clinical remission. Counts stable.     2. Chronic Cjlpg-dywgzv-iple disease.   - Ran out of creon since 03/2018. Paperwork completed to have renewed 5/9. Gets from [facility].   - Recurrence of diarrhea/steatorrhea and flautus likely secondary to pancreatic insufficiency as the timing is consistent with coming off creon. Due to timing of symptoms along with lack of abdominal pain or fevers and being off immunosuppression, do not favor infectious etiology. Await replacement of Creon, if symptoms do not improve within one week of restarting or develops fevers or hematochezia, pt to call BMT clinic.     3.  GI.  Benign polyps removed. Repeat colonoscopy in spring on 2020.   - Pancreatic insufficiency as outlined above.  - Nausea/vomiting: few episodes 5/8, now resolved 5/9- offered anti-emetics but patient declined.     4.  Osteoporosis. She should continue routine vitamin D supplementation.      5.  Chronic obstructive pulmonary disease.  The patient has significant COPD and continues to smoke. She is trying to stop smoking.     6. ID: Hx of herpes zoster infection in gluteal area.  - Continue Acyclovir 800 mg BID prophy (pt reported as not currently taking 5/9)    7. Renal:   Electrolytes WNL despite diarrhea.   Creatinine slightly elevated from  baseline: Discussed pushing oral hydration with diarrhea.     Plan: Paperwork completed for refills of Creon through [employer] with the help of social work.     ARBEN Agee on 7/18/18 with labs or earlier as needed    Martin Garg PA-C  [phone]

## 2018-05-09 NOTE — MR AVS SNAPSHOT
After Visit Summary   5/9/2018    Jenn Barclay    MRN: [id number]           Patient Information     Date Of Birth          1955        Visit Information        Provider Department      5/9/2018 2:00 PM  BMT KEVIN #4 [facility] Blood and Marrow Transplant        Today's Diagnoses     Exocrine pancreatic insufficiency    -  1          [facility] ([facility])  [address] [place] [address]  Phone: [phone]  Clinic Hours:   Monday-Thursday:7am to 7pm   Friday: 7am to 5pm   Weekends and holidays:    8am to noon (in general)  If your fever is 100.5  or greater,   call the clinic.  After hours call the   hospital at [phone] or   [phone]. Ask for the BMT   fellow on-call            Follow-ups after your visit        Your next 10 appointments already scheduled     Jul 18, 2018 12:00 PM CDT   [facility] with  [facility]   [facility] ([facility])    [address] [place] [address]  [address] [place] [id number] [phone]            Jul 18, 2018 12:30 PM CDT   Return with Dmitriy Agee MD   [facility] Blood and Marrow Transplant ([facility])    [address] [place] [address]  [address] [place] [id number] [phone]              Who to contact     If you have questions or need follow up information about today's clinic visit or your schedule please contact [facility] BLOOD AND MARROW TRANSPLANT directly at [phone].  Normal or non-critical lab and imaging results will be communicated to you by [employer], letter or phone within 4 business days after the clinic has received the results. If you do not hear from us within 7 days, please contact the clinic through [employer] or phone. If you have a critical or abnormal lab result, we will notify you by phone as soon as possible.  Submit refill requests through [employer] or call your pharmacy and they will forward the refill request to us.  "Please allow 3 business days for your refill to be completed.          Additional Information About Your Visit        [employer] Information     [employer] lets you send messages to your doctor, view your test results, renew your prescriptions, schedule appointments and more. To sign up, go to [email or web address] . Click on \"Log in\" on the left side of the screen, which will take you to the Welcome page. Then click on \"Sign up Now\" on the right side of the page.     You will be asked to enter the access code listed below, as well as some personal information. Please follow the directions to create your username and password.     Your access code is: [email or web address]  Expires: 2018  6:30 AM     Your access code will  in 90 days. If you need help or a new code, please call your [facility] clinic or [phone].        Care EveryWhere ID     This is your Care EveryWhere ID. This could be used by other organizations to access your [facility] medical records  [id number]        Your Vitals Were     Pulse Temperature Respirations Height Pulse Oximetry BMI (Body Mass Index)    75 97.1  F (36.2  C) (Oral) 18 1.753 m (5' 9.02\") 96% 16.62 kg/m2       Blood Pressure from Last 3 Encounters:   18 (!) 139/91   18 137/70   18 121/82    Weight from Last 3 Encounters:   18 51.1 kg (112 lb 9.6 oz)   18 52.4 kg (115 lb 9.6 oz)   18 51.8 kg (114 lb 3.2 oz)              Today, you had the following     No orders found for display         Today's Medication Changes          These changes are accurate as of 18  4:36 PM.  If you have any questions, ask your nurse or doctor.               Start taking these medicines.        Dose/Directions    clindamycin 1 % solution   Commonly known as:  CLEOCIN T   Used for:  Other acne   Started by:  MICHAEL Norton MD        Apply to the face h.s. And a.m.   Quantity:  60 mL   Refills:  1            Where to get your medicines      These medications were " sent to [id number] - [facility], [place] - [address]  [address], [facility] [address]     Phone:  [phone]     clindamycin 1 % solution         Some of these will need a paper prescription and others can be bought over the counter.  Ask your nurse if you have questions.     Bring a paper prescription for each of these medications     amylase-lipase-protease 15969-83297 units Cpep per EC capsule                Recent Review Flowsheet Data     BMT Recent Results Latest Ref Rng & Units 11/16/2016 1/27/2017 4/12/2017 7/12/2017 1/17/2018 1/29/2018 5/9/2018    WBC 4.0 - 11.0 10e9/L 10.0 8.3 7.6 8.7 8.4 7.6 8.3    Hemoglobin 11.7 - 15.7 g/dL 14.1 14.7 13.6 13.2 14.6 13.9 14.8    Platelet Count 150 - 450 10e9/L 266 243 209 232 244 238 251    Neutrophils (Absolute) 1.6 - 8.3 10e9/L 3.7 4.0 2.7 3.1 3.8 2.6 3.2    INR 0.86 - 1.14 - - - - - - -    Sodium 133 - 144 mmol/L 139 136 140 140 138 138 143    Potassium 3.4 - 5.3 mmol/L 3.6 4.2 3.4 3.6 3.9 3.6 4.5    Chloride 94 - 109 mmol/L 102 100 106 105 104 106 105    Glucose 70 - 99 mg/dL 93 141(H) 146(H) 67(L) 143(H) 114(H) 103(H)    Urea Nitrogen 7 - 30 mg/dL 10 11 13 13 15 16 18    Creatinine 0.52 - 1.04 mg/dL 0.67 0.75 0.71 0.67 0.74 0.69 0.81    Calcium (Total) 8.5 - 10.1 mg/dL 8.5 8.6 8.2(L) 8.5 8.6 8.6 9.3    Protein (Total) 6.8 - 8.8 g/dL 7.3 7.2 6.5(L) 6.3(L) 6.8 6.8 7.3    Albumin 3.4 - 5.0 g/dL 4.0 3.9 3.4 3.8 3.7 3.8 4.1    Alkaline Phosphatase 40 - 150 U/L 78 84 71 62 61 55 59    AST 0 - 45 U/L 29 26 27 29 28 25 31    ALT 0 - 50 U/L 46 31 40 44 41 36 42    MCV 78 - 100 fl 92 92 93 90 95 91 95               Primary Care Provider Office Phone # Fax #    Dmitriy Pradeep Agee -[phone]       [address] [place] [address]        Equal Access to Services     JASMYN URRUTIA AH: Lawson Stringer, charisse knowles, palomo goyal. So  [facility] [phone].    ATENCIÓN: Si carl chavira, tiene a stark disposición servicios gratuitos de asistencia lingüística. Jolanta junior [phone].    We comply with applicable federal civil rights laws and [place] laws. We do not discriminate on the basis of race, color, national origin, age, disability, sex, sexual orientation, or gender identity.            Thank you!     Thank you for choosing [facility] BLOOD AND MARROW TRANSPLANT  for your care. Our goal is always to provide you with excellent care. Hearing back from our patients is one way we can continue to improve our services. Please take a few minutes to complete the written survey that you may receive in the mail after your visit with us. Thank you!             Your Updated Medication List - Protect others around you: Learn how to safely use, store and throw away your medicines at www.disposemymeds.org.          This list is accurate as of 5/9/18  4:36 PM.  Always use your most recent med list.                   Brand Name Dispense Instructions for use Diagnosis    acyclovir 400 MG tablet    ZOVIRAX    100 tablet    Take 2 tablets (800 mg) by mouth 5 times daily    Acute myeloid leukemia in remission (H), S/P allogeneic bone marrow transplant (H)       ALEVE PO      Take by mouth daily        amylase-lipase-protease 53461-82316 units Cpep per EC capsule    CREON    300 capsule    Take 2 capsules (48,000 Units) by mouth 3 times daily (with meals) And snacks    S/P allogeneic bone marrow transplant (H), Acute myeloid leukemia in remission (H), Exocrine pancreatic insufficiency       ascorbic acid 500 MG tablet    VITAMIN C     Take 500 mg by mouth daily.        cholecalciferol 1000 units capsule    vitamin  -D     Take 1 capsule by mouth daily.        clindamycin 1 % solution    CLEOCIN T    60 mL    Apply to the face h.s. And a.m.    Other acne       gabapentin 300 MG capsule    NEURONTIN    90 capsule    Take 1 capsule (300 mg) by mouth 3 times daily    VZV  (varicella-zoster virus) infection       IMODIUM OR      Take by mouth 2 times daily        MULTIVITAL PO      Take  by mouth.        oxyCODONE-acetaminophen 5-325 MG per tablet    PERCOCET    20 tablet    Take 1 tablet by mouth every 4 hours as needed for pain.    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

## 2018-05-09 NOTE — PROGRESS NOTES
"SUBJECTIVE:  Jenn is a bone marrow transplant recipient ( for leukemia) who comes in today for a skin check.  She has had no recent problems with her skin except that she does have many facial lesions that she considers cystic acne.  She has seen dermatologists for this, and some have been lanced, but they have not been resolved, and that is a frustration for her.  She has not noticed anything bleeding, crusting or of any concern on the body.        OBJECTIVE:  A thin but healthy lady, very pleasant personality, in no distress today.  We checked her scalp, face, neck, chest, back, arms, breasts, buttocks, all but genitals.  She showed several large papular intradermal nevi on her cheeks.  On her cheeks  were numerous true keratinaceous cysts that have formed likely following years of cystic acne as a young adult.  She has a few active papules of acne, but mostly these hard, firm lesions that she feels are \"cystic acne\", are  actually  formed keratin cysts.        ASSESSMENT:  No worrisome lesions from the standpoint of skin cancer or precancer.  Scattered nevi and a facial condition represented by a few lesions of active acne and mostly several dozen truly keratinaceous cysts.     PLAN:  I advised that the facial lesions that she does not like can only be treated by incision/expression or excision.  Nonetheless, she does have some mild acne in my opinion today, so I recommended a trial of clindamycin topical morning and night to the face to see if it could reduce some of the redness of her face.      I did not feel that oral antibiotics would be appropriate for her facial condition.      I reassured her that I found no worrisome lesions on her skin examination today, and that we would be most happy to see her again in the future.  I again told her that, unfortunately, there is no medical therapy for the formed cysts on her face, but that she may someday consider a few sessions of incision and expression of the " lesions.      Return in 1-2 years for a skin check.      MEDICATIONS:  Reviewed.        ALLERGIES:  Reviewed.

## 2018-05-09 NOTE — NURSING NOTE
Chief Complaint   Patient presents with     Blood Draw     labs only     Vitals done, labs drawn by venipuncture.  See doc flow sheets for details.  Rosa Espinal CMA

## 2018-05-10 ENCOUNTER — COMMUNICATION - HEALTHEAST (OUTPATIENT)
Dept: ADMINISTRATIVE | Facility: HOSPITAL | Age: 63
End: 2018-05-10

## 2018-05-14 ENCOUNTER — OFFICE VISIT - HEALTHEAST (OUTPATIENT)
Dept: ONCOLOGY | Facility: HOSPITAL | Age: 63
End: 2018-05-14

## 2018-05-14 ENCOUNTER — AMBULATORY - HEALTHEAST (OUTPATIENT)
Dept: INFUSION THERAPY | Facility: HOSPITAL | Age: 63
End: 2018-05-14

## 2018-05-14 ENCOUNTER — AMBULATORY - HEALTHEAST (OUTPATIENT)
Dept: ONCOLOGY | Facility: HOSPITAL | Age: 63
End: 2018-05-14

## 2018-05-14 DIAGNOSIS — C92.00: ICD-10-CM

## 2018-05-14 DIAGNOSIS — Z94.81 STATUS POST ALLOGENEIC BONE MARROW TRANSPLANT (H): ICD-10-CM

## 2018-05-14 LAB
BASOPHILS # BLD AUTO: 0.1 THOU/UL (ref 0–0.2)
BASOPHILS NFR BLD AUTO: 1 % (ref 0–2)
EOSINOPHIL # BLD AUTO: 0.1 THOU/UL (ref 0–0.4)
EOSINOPHIL NFR BLD AUTO: 1 % (ref 0–6)
ERYTHROCYTE [DISTWIDTH] IN BLOOD BY AUTOMATED COUNT: 12.8 % (ref 11–14.5)
HCT VFR BLD AUTO: 39 % (ref 35–47)
HGB BLD-MCNC: 13.1 G/DL (ref 12–16)
LYMPHOCYTES # BLD AUTO: 3.3 THOU/UL (ref 0.8–4.4)
LYMPHOCYTES NFR BLD AUTO: 36 % (ref 20–40)
MCH RBC QN AUTO: 30.7 PG (ref 27–34)
MCHC RBC AUTO-ENTMCNC: 33.6 G/DL (ref 32–36)
MCV RBC AUTO: 91 FL (ref 80–100)
MONOCYTES # BLD AUTO: 0.6 THOU/UL (ref 0–0.9)
MONOCYTES NFR BLD AUTO: 7 % (ref 2–10)
NEUTROPHILS # BLD AUTO: 4.9 THOU/UL (ref 2–7.7)
NEUTROPHILS NFR BLD AUTO: 54 % (ref 50–70)
PLATELET # BLD AUTO: 222 THOU/UL (ref 140–440)
PMV BLD AUTO: 8.3 FL (ref 8.5–12.5)
RBC # BLD AUTO: 4.27 MILL/UL (ref 3.8–5.4)
WBC: 9 THOU/UL (ref 4–11)

## 2018-06-01 ENCOUNTER — COMMUNICATION - HEALTHEAST (OUTPATIENT)
Dept: INTERNAL MEDICINE | Facility: CLINIC | Age: 63
End: 2018-06-01

## 2018-06-01 DIAGNOSIS — J44.9 COPD (CHRONIC OBSTRUCTIVE PULMONARY DISEASE) (H): ICD-10-CM

## 2018-06-18 ENCOUNTER — OFFICE VISIT - HEALTHEAST (OUTPATIENT)
Dept: INTERNAL MEDICINE | Facility: CLINIC | Age: 63
End: 2018-06-18

## 2018-06-18 ENCOUNTER — COMMUNICATION - HEALTHEAST (OUTPATIENT)
Dept: TELEHEALTH | Facility: CLINIC | Age: 63
End: 2018-06-18

## 2018-06-18 ENCOUNTER — RECORDS - HEALTHEAST (OUTPATIENT)
Dept: ADMINISTRATIVE | Facility: OTHER | Age: 63
End: 2018-06-18

## 2018-06-18 DIAGNOSIS — Z59.9 FINANCIAL DIFFICULTIES: ICD-10-CM

## 2018-06-18 DIAGNOSIS — Z53.20 MAMMOGRAM DECLINED: ICD-10-CM

## 2018-06-18 DIAGNOSIS — R19.7 DIARRHEA: ICD-10-CM

## 2018-06-18 DIAGNOSIS — G89.29 CHRONIC BACK PAIN: ICD-10-CM

## 2018-06-18 DIAGNOSIS — J44.9 COPD (CHRONIC OBSTRUCTIVE PULMONARY DISEASE) (H): ICD-10-CM

## 2018-06-18 DIAGNOSIS — M19.042 OSTEOARTHRITIS OF FINGER OF LEFT HAND: ICD-10-CM

## 2018-06-18 DIAGNOSIS — Z79.899 CONTROLLED SUBSTANCE AGREEMENT SIGNED: ICD-10-CM

## 2018-06-18 DIAGNOSIS — M81.0 OP (OSTEOPOROSIS): ICD-10-CM

## 2018-06-18 DIAGNOSIS — M54.9 CHRONIC BACK PAIN: ICD-10-CM

## 2018-06-18 SDOH — ECONOMIC STABILITY - INCOME SECURITY: PROBLEM RELATED TO HOUSING AND ECONOMIC CIRCUMSTANCES, UNSPECIFIED: Z59.9

## 2018-07-16 ENCOUNTER — TELEPHONE (OUTPATIENT)
Dept: ONCOLOGY | Facility: CLINIC | Age: 63
End: 2018-07-16

## 2018-07-16 NOTE — TELEPHONE ENCOUNTER
Called and left message for Ms. Barclay on 7/16/2018 about the tobacco cessation program.We will call patient at another time to discuss tobacco cessation program.

## 2018-07-18 ENCOUNTER — APPOINTMENT (OUTPATIENT)
Dept: LAB | Facility: CLINIC | Age: 63
End: 2018-07-18
Attending: INTERNAL MEDICINE
Payer: MEDICARE

## 2018-07-18 ENCOUNTER — ONCOLOGY VISIT (OUTPATIENT)
Dept: TRANSPLANT | Facility: CLINIC | Age: 63
End: 2018-07-18
Attending: INTERNAL MEDICINE
Payer: MEDICARE

## 2018-07-18 VITALS
SYSTOLIC BLOOD PRESSURE: 113 MMHG | BODY MASS INDEX: 16.6 KG/M2 | HEART RATE: 60 BPM | DIASTOLIC BLOOD PRESSURE: 64 MMHG | RESPIRATION RATE: 16 BRPM | HEIGHT: 69 IN | WEIGHT: 112.1 LBS | TEMPERATURE: 98.2 F | OXYGEN SATURATION: 96 %

## 2018-07-18 DIAGNOSIS — Z94.81 S/P ALLOGENEIC BONE MARROW TRANSPLANT (H): ICD-10-CM

## 2018-07-18 DIAGNOSIS — C92.01 ACUTE MYELOID LEUKEMIA IN REMISSION (H): Primary | ICD-10-CM

## 2018-07-18 DIAGNOSIS — K86.81 EXOCRINE PANCREATIC INSUFFICIENCY: ICD-10-CM

## 2018-07-18 LAB
ALBUMIN SERPL-MCNC: 3.7 G/DL (ref 3.4–5)
ALP SERPL-CCNC: 53 U/L (ref 40–150)
ALT SERPL W P-5'-P-CCNC: 33 U/L (ref 0–50)
ANION GAP SERPL CALCULATED.3IONS-SCNC: 6 MMOL/L (ref 3–14)
AST SERPL W P-5'-P-CCNC: 25 U/L (ref 0–45)
BASOPHILS # BLD AUTO: 0.1 10E9/L (ref 0–0.2)
BASOPHILS NFR BLD AUTO: 1.3 %
BILIRUB SERPL-MCNC: 0.4 MG/DL (ref 0.2–1.3)
BUN SERPL-MCNC: 14 MG/DL (ref 7–30)
CALCIUM SERPL-MCNC: 8.4 MG/DL (ref 8.5–10.1)
CHLORIDE SERPL-SCNC: 104 MMOL/L (ref 94–109)
CO2 SERPL-SCNC: 28 MMOL/L (ref 20–32)
CREAT SERPL-MCNC: 0.76 MG/DL (ref 0.52–1.04)
DIFFERENTIAL METHOD BLD: NORMAL
EOSINOPHIL # BLD AUTO: 0.2 10E9/L (ref 0–0.7)
EOSINOPHIL NFR BLD AUTO: 2.7 %
ERYTHROCYTE [DISTWIDTH] IN BLOOD BY AUTOMATED COUNT: 12.9 % (ref 10–15)
GFR SERPL CREATININE-BSD FRML MDRD: 77 ML/MIN/1.7M2
GLUCOSE SERPL-MCNC: 102 MG/DL (ref 70–99)
HCT VFR BLD AUTO: 40.7 % (ref 35–47)
HGB BLD-MCNC: 13.3 G/DL (ref 11.7–15.7)
IMM GRANULOCYTES # BLD: 0 10E9/L (ref 0–0.4)
IMM GRANULOCYTES NFR BLD: 0.3 %
LYMPHOCYTES # BLD AUTO: 4 10E9/L (ref 0.8–5.3)
LYMPHOCYTES NFR BLD AUTO: 51.5 %
MCH RBC QN AUTO: 30.2 PG (ref 26.5–33)
MCHC RBC AUTO-ENTMCNC: 32.7 G/DL (ref 31.5–36.5)
MCV RBC AUTO: 93 FL (ref 78–100)
MONOCYTES # BLD AUTO: 0.6 10E9/L (ref 0–1.3)
MONOCYTES NFR BLD AUTO: 7.7 %
NEUTROPHILS # BLD AUTO: 2.9 10E9/L (ref 1.6–8.3)
NEUTROPHILS NFR BLD AUTO: 36.5 %
NRBC # BLD AUTO: 0 10*3/UL
NRBC BLD AUTO-RTO: 0 /100
PLATELET # BLD AUTO: 246 10E9/L (ref 150–450)
POTASSIUM SERPL-SCNC: 3.7 MMOL/L (ref 3.4–5.3)
PROT SERPL-MCNC: 6.6 G/DL (ref 6.8–8.8)
RBC # BLD AUTO: 4.4 10E12/L (ref 3.8–5.2)
SODIUM SERPL-SCNC: 138 MMOL/L (ref 133–144)
WBC # BLD AUTO: 7.8 10E9/L (ref 4–11)

## 2018-07-18 PROCEDURE — G0463 HOSPITAL OUTPT CLINIC VISIT: HCPCS

## 2018-07-18 PROCEDURE — 85025 COMPLETE CBC W/AUTO DIFF WBC: CPT | Performed by: INTERNAL MEDICINE

## 2018-07-18 PROCEDURE — 36415 COLL VENOUS BLD VENIPUNCTURE: CPT

## 2018-07-18 PROCEDURE — 80053 COMPREHEN METABOLIC PANEL: CPT | Performed by: INTERNAL MEDICINE

## 2018-07-18 ASSESSMENT — PAIN SCALES - GENERAL: PAINLEVEL: NO PAIN (0)

## 2018-07-18 NOTE — NURSING NOTE
"Oncology Rooming Note    July 18, 2018 12:19 PM   Jenn Barclay is a 62 year old female who presents for:    Chief Complaint   Patient presents with     Blood Draw     venipuncture     RECHECK     Return: AML (acute myelogenous leukemia)     Initial Vitals: /64  Pulse 60  Temp 98.2  F (36.8  C) (Oral)  Resp 16  Ht 1.753 m (5' 9.02\")  Wt 50.8 kg (112 lb 1.6 oz)  SpO2 96%  BMI 16.54 kg/m2 Estimated body mass index is 16.54 kg/(m^2) as calculated from the following:    Height as of this encounter: 1.753 m (5' 9.02\").    Weight as of this encounter: 50.8 kg (112 lb 1.6 oz). Body surface area is 1.57 meters squared.  No Pain (0) Comment: Data Unavailable   No LMP recorded.  Allergies reviewed: Yes  Medications reviewed: Yes    Medications: Medication refills not needed today.  Pharmacy name entered into [employer]:    [facility] - [address]  [facility] - [place], [place] - [address] [place] [address]    Clinical concerns: Pt complains of hole in left leg.  Dr. Agee  was notified.    5 minutes for nursing intake (face to face time)     Josie Cervantes CMA              "

## 2018-07-18 NOTE — MR AVS SNAPSHOT
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  After hours call the   hospital at [phone] or   [phone]. Ask for the BMT   fellow on-call           Care Instructions    RTC Cyndie on 1/9/19 with labs or earlier as needed            Follow-ups after your visit        Your next 10 appointments already scheduled     Jan 09, 2019 10:00 AM CST   Masonic Lab Draw with  MASONIC LAB DRAW   [facility] Masonic Lab Draw ([facility])    [address] [place] [address]  [address] [place] [address] [phone]            Jan 09, 2019 10:30 AM CST   Return with Dmitriy Agee MD   [facility] Blood and Marrow Transplant ([facility])    [address] [place] [id number] [phone]              Future tests that were ordered for you today     Open Future Orders        Priority Expected Expires Ordered    Comprehensive metabolic panel Routine 1/9/2019 7/18/2019 7/18/2018    CBC with platelets differential Routine 1/9/2019 7/18/2019 7/18/2018            Who to contact     If you have questions or need follow up information about today's clinic visit or your schedule please contact [facility] BLOOD AND MARROW TRANSPLANT directly at [phone].  Normal or non-critical lab and imaging results  "will be communicated to you by [employer], letter or phone within 4 business days after the clinic has received the results. If you do not hear from us within 7 days, please contact the clinic through Red-rabbit or phone. If you have a critical or abnormal lab result, we will notify you by phone as soon as possible.  Submit refill requests through Red-rabbit or call your pharmacy and they will forward the refill request to us. Please allow 3 business days for your refill to be completed.          Additional Information About Your Visit        Red-rabbit Information     Red-rabbit lets you send messages to your doctor, view your test results, renew your prescriptions, schedule appointments and more. To sign up, go to [email or web address] . Click on \"Log in\" on the left side of the screen, which will take you to the Welcome page. Then click on \"Sign up Now\" on the right side of the page.     You will be asked to enter the access code listed below, as well as some personal information. Please follow the directions to create your username and password.     Your access code is: [email or web address]  Expires: 2018  6:30 AM     Your access code will  in 90 days. If you need help or a new code, please call your [facility] clinic or [phone].        Care EveryWhere ID     This is your Care EveryWhere ID. This could be used by other organizations to access your [facility] medical records  [id number]        Your Vitals Were     Pulse Temperature Respirations Height Pulse Oximetry BMI (Body Mass Index)    60 98.2  F (36.8  C) (Oral) 16 1.753 m (5' 9.02\") 96% 16.54 kg/m2       Blood Pressure from Last 3 Encounters:   18 113/64   18 (!) 139/91   18 137/70    Weight from Last 3 Encounters:   18 50.8 kg (112 lb 1.6 oz)   18 51.1 kg (112 lb 9.6 oz)   18 52.4 kg (115 lb 9.6 oz)              We Performed the Following     CBC with platelets differential     Comprehensive metabolic panel          Today's " Medication Changes          These changes are accurate as of 7/18/18 12:55 PM.  If you have any questions, ask your nurse or doctor.               Stop taking these medicines if you haven't already. Please contact your care team if you have questions.     gabapentin 300 MG capsule   Commonly known as:  NEURONTIN   Stopped by:  Dmitriy Agee MD                    Recent Review Flowsheet Data     BMT Recent Results Latest Ref Rng & Units 1/27/2017 4/12/2017 7/12/2017 1/17/2018 1/29/2018 5/9/2018 7/18/2018    WBC 4.0 - 11.0 10e9/L 8.3 7.6 8.7 8.4 7.6 8.3 7.8    Hemoglobin 11.7 - 15.7 g/dL 14.7 13.6 13.2 14.6 13.9 14.8 13.3    Platelet Count 150 - 450 10e9/L 243 209 232 244 238 251 246    Neutrophils (Absolute) 1.6 - 8.3 10e9/L 4.0 2.7 3.1 3.8 2.6 3.2 2.9    INR 0.86 - 1.14 - - - - - - -    Sodium 133 - 144 mmol/L 136 140 140 138 138 143 138    Potassium 3.4 - 5.3 mmol/L 4.2 3.4 3.6 3.9 3.6 4.5 3.7    Chloride 94 - 109 mmol/L 100 106 105 104 106 105 104    Glucose 70 - 99 mg/dL 141(H) 146(H) 67(L) 143(H) 114(H) 103(H) 102(H)    Urea Nitrogen 7 - 30 mg/dL 11 13 13 15 16 18 14    Creatinine 0.52 - 1.04 mg/dL 0.75 0.71 0.67 0.74 0.69 0.81 0.76    Calcium (Total) 8.5 - 10.1 mg/dL 8.6 8.2(L) 8.5 8.6 8.6 9.3 8.4(L)    Protein (Total) 6.8 - 8.8 g/dL 7.2 6.5(L) 6.3(L) 6.8 6.8 7.3 6.6(L)    Albumin 3.4 - 5.0 g/dL 3.9 3.4 3.8 3.7 3.8 4.1 3.7    Alkaline Phosphatase 40 - 150 U/L 84 71 62 61 55 59 53    AST 0 - 45 U/L 26 27 29 28 25 31 25    ALT 0 - 50 U/L 31 40 44 41 36 42 33    MCV 78 - 100 fl 92 93 90 95 91 95 93               Primary Care Provider Office Phone # Fax #    Dmitriy Pradeep Agee -[phone]       [address] [address]        Equal Access to Services     [facility] GHADA : Lawson Stringer, charisse knowles, palomo goyal. [facility] [phone].    ATENCIÓN: Si kraig harry stark  disposición servicios gratuitos de asistencia lingüística. Jolanta junior [phone].    We comply with applicable federal civil rights laws and [place] laws. We do not discriminate on the basis of race, color, national origin, age, disability, sex, sexual orientation, or gender identity.            Thank you!     Thank you for choosing [facility] BLOOD AND MARROW TRANSPLANT  for your care. Our goal is always to provide you with excellent care. Hearing back from our patients is one way we can continue to improve our services. Please take a few minutes to complete the written survey that you may receive in the mail after your visit with us. Thank you!             Your Updated Medication List - Protect others around you: Learn how to safely use, store and throw away your medicines at www.disposemymeds.org.          This list is accurate as of 7/18/18 12:55 PM.  Always use your most recent med list.                   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        cholecalciferol 1000 units capsule    vitamin  -D     Take 1 capsule by mouth daily.        clindamycin 1 % solution    CLEOCIN T    60 mL    Apply to the face h.s. And a.m.    Other acne       IMODIUM OR      Take by mouth 2 times daily        MULTIVITAL PO      Take  by mouth.        oxyCODONE-acetaminophen 5-325 MG per tablet    PERCOCET    20 tablet    Take 1  tablet by mouth every 4 hours as needed for pain.    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

## 2018-07-18 NOTE — PROGRESS NOTES
"BMT Clinic Progress Note     HPI: Ms. Jenn Barclay is a 61 yo patient who had an allogeneic sibling transplant with us 7 years ago in CR, off immunosuppression    Interval Hx: She ran out of all her medications and has not received her Creon.  She does not remember seeing any forms and nobody called her.  She acknowledges that she is a little passive about it.  In addition she continues to smoke a third to half of a pack of cigarettes every day.  Her appetite is unchanged.  Her diarrhea is back.  Her cystic acne is persistent and she is treating it occasionally.  She has a little bump on her shin that is been slow to heal.  She is keeping it covered.  She has had no fevers.  There are no other rashes.  Her breathing is fine.  The remaining 12 point complete review of systems is otherwise negative.      PAST MEDICAL HISTORY:   1.  Significant for the acute myelogenous leukemia.   2.  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   3.  COPD.   4.  Hysterectomy and bilateral oophorectomy at the age of 20.     5.  She had a compression fracture of her thoracic spine that was treated with a vertebroplasty about 6 months ago.   6.  She has a history of smoking for many years.  She denies alcohol or drug abuse.  She is not on any thyroid replacement therapy, and denies thyroid disease.   7. R Foot fracture Nov 2017  8. Zoster Jan 2018       PHYSICAL EXAMINATION:  Ms. Barclay is a very pleasant 61-year-old. /64  Pulse 60  Temp 98.2  F (36.8  C) (Oral)  Resp 16  Ht 1.753 m (5' 9.02\")  Wt 50.8 kg (112 lb 1.6 oz)  SpO2 96%  BMI 16.54 kg/m2.   General: frail, thin appearing woman in no distress  HEENT: perrl, eomi, no icterus, no oral lesions, no teeth  Resp: Distant breathing sounds without crackles or wheezes  CV: rrr no m/r/g  Abd: s, nt, nd, thin appearing, normal BS  Ext: no edema   Skin. Several small dry patches on skin on arms and legs are stable. Acne on face with some erythema.     Lab Results "   Component Value Date    WBC 7.8 07/18/2018    ANEU 2.9 07/18/2018    HGB 13.3 07/18/2018    HCT 40.7 07/18/2018     07/18/2018     07/18/2018    POTASSIUM 3.7 07/18/2018    CHLORIDE 104 07/18/2018    CO2 28 07/18/2018     (H) 07/18/2018    BUN 14 07/18/2018    CR 0.76 07/18/2018    MAG 2.0 03/10/2011    INR 0.97 09/15/2010    BILITOTAL 0.4 07/18/2018    AST 25 07/18/2018    ALT 33 07/18/2018    ALKPHOS 53 07/18/2018    PROTTOTAL 6.6 (L) 07/18/2018    ALBUMIN 3.7 07/18/2018         ASSESSMENT AND PLAN:  Ms. 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     1.  AML/HEME.  The patient in clinical remission. Counts stable.     2. Chronic Tocuw-uqhxse-nzas disease. Put her incointact with SW to try to get creon again  - Recurrence of diarrhea/steatorrhea and flautus likely secondary to pancreatic insufficiency as the timing is consistent with coming off creon. Due to timing of symptoms along with lack of abdominal pain or fevers and being off immunosuppression, do not favor infectious etiology. Await replacement of Creon, if symptoms do not improve within one week of restarting or develops fevers or hematochezia, pt to call BMT clinic.     3.  GI.  Benign polyps removed. Repeat colonoscopy in spring on 2020.   - Pancreatic insufficiency as outlined above.  - Nausea/vomiting: few episodes 5/8, now resolved 5/9- offered anti-emetics but patient declined.     4.  Osteoporosis.  Encouraged to restart her vitamin D     5.  Chronic obstructive pulmonary disease.  Again encouraged to stop smoking.      6. ID: Hx of herpes zoster infection in gluteal area.  - Continue Acyclovir 800 mg BID prophy (pt reported as not currently taking 5/9)    7. Renal:   Electrolytes WNL despite diarrhea.   Creatinine slightly elevated from baseline:     8. Dermatology: cysts on face skin.  If lesion on the shin gets  worse she should call and come in.      Plan: Paperwork completed for refills of Creon through [employer] with the help of social work.   ARBEN Agee on 1/9/19 with labs or earlier as needed

## 2018-07-18 NOTE — NURSING NOTE
Chief Complaint   Patient presents with     Blood Draw     venipuncture     Vitals done and labs drawn, see flow sheets.  CODY GARCIA, CMA

## 2018-07-27 ENCOUNTER — VIRTUAL VISIT (OUTPATIENT)
Dept: TRANSPLANT | Facility: CLINIC | Age: 63
End: 2018-07-27
Payer: MEDICARE

## 2018-07-27 DIAGNOSIS — Z00.6 CLINICAL TRIAL PARTICIPANT: Primary | ICD-10-CM

## 2018-07-27 NOTE — PROGRESS NOTES
[id number]: Informed Consent Note   IRB Study Number: [id number]  The patient was provided with a copy of the IRB-approved consent form. The form was reviewed, and all questions were answered over the telephone. A copy of the signed form was provided to the patient. A second consent form will be signed by the patient when the patient provides the clinical sample from their home, and will be sent in with the sample. No procedures specific to this study were performed prior to the patient signing the consent form.    Consent Version Date: 07/27/18  Consent obtained by: Lori Bhakta MD, PhD    Date: 07/27/18  HIPAA authorization signed?: Will be signed by patient in their home and returned with clinical sample collected by the patient   HIPAA authorization version date: 11/30/2017    Lori Bhakta    Form 503.03.01 (Version 1)     Effective date: 0

## 2018-07-27 NOTE — MR AVS SNAPSHOT
After Visit Summary   7/27/2018    Jenn Barclay    MRN: [id number]           Patient Information     Date Of Birth          1955        Visit Information        Provider Department      7/27/2018 2:05 PM Lori Bhakta MD [facility] Blood and Marrow Transplant        Today's Diagnoses     Clinical trial participant    -  1          [facility]  [address] [place] [address]  Phone: [phone]  Clinic Hours:   Monday-Thursday:7am to 7pm   Friday: 7am to 5pm   Weekends and holidays:    8am to noon (in general)  If your fever is 100.5  or greater,   call the clinic.  After hours call the   hospital at [phone] or   [phone]. Ask for the BMT   fellow on-call            Follow-ups after your visit        Your next 10 appointments already scheduled     Jan 09, 2019 10:00 AM CST   Masonic Lab Draw with  MASONIC LAB DRAW   [facility] Masonic Lab Draw ([facility])    [address] [place] [address]  [address] [place] [id number] [phone]            Jan 09, 2019 10:30 AM CST   Return with Dmitriy Agee MD   [facility] Blood and Marrow Transplant ([facility])    [address] [place] [address] [phone]              Who to contact     If you have questions or need follow up information about today's clinic visit or your schedule please contact [facility] BLOOD AND MARROW TRANSPLANT directly at [phone].  Normal or non-critical lab and imaging results will be communicated to you by MyChart, letter or phone within 4 business days after the clinic has received the results. If you do not hear from us within 7 days, please contact the clinic through Dick or Bro[place] or phone. If you have a critical or abnormal lab result, we will notify you by phone as soon as possible.  Submit refill requests through [place] or call your pharmacy and they will forward the refill request  "to us. Please allow 3 business days for your refill to be completed.          Additional Information About Your Visit        MyChart Information     [email or web address] lets you send messages to your doctor, view your test results, renew your prescriptions, schedule appointments and more. To sign up, go to [email or web address] . Click on \"Log in\" on the left side of the screen, which will take you to the Welcome page. Then click on \"Sign up Now\" on the right side of the page.     You will be asked to enter the access code listed below, as well as some personal information. Please follow the directions to create your username and password.     Your access code is: [email or web address]  Expires: 2018  7:29 PM     Your access code will  in 90 days. If you need help or a new code, please call your [facility] clinic or [phone].        Care EveryWhere ID     This is your Care EveryWhere ID. This could be used by other organizations to access your [facility] medical records  [id number]         Blood Pressure from Last 3 Encounters:   18 113/64   18 (!) 139/91   18 137/70    Weight from Last 3 Encounters:   18 50.8 kg (112 lb 1.6 oz)   18 51.1 kg (112 lb 9.6 oz)   18 52.4 kg (115 lb 9.6 oz)              Today, you had the following     No orders found for display       Recent Review Flowsheet Data     BMT Recent Results Latest Ref Rng & Units 2017    WBC 4.0 - 11.0 10e9/L 8.3 7.6 8.7 8.4 7.6 8.3 7.8    Hemoglobin 11.7 - 15.7 g/dL 14.7 13.6 13.2 14.6 13.9 14.8 13.3    Platelet Count 150 - 450 10e9/L 243 209 232 244 238 251 246    Neutrophils (Absolute) 1.6 - 8.3 10e9/L 4.0 2.7 3.1 3.8 2.6 3.2 2.9    INR 0.86 - 1.14 - - - - - - -    Sodium 133 - 144 mmol/L 136 140 140 138 138 143 138    Potassium 3.4 - 5.3 mmol/L 4.2 3.4 3.6 3.9 3.6 4.5 3.7    Chloride 94 - 109 mmol/L 100 106 105 104 106 105 104    Glucose 70 - 99 mg/dL 141(H) " 146(H) 67(L) 143(H) 114(H) 103(H) 102(H)    Urea Nitrogen 7 - 30 mg/dL 11 13 13 15 16 18 14    Creatinine 0.52 - 1.04 mg/dL 0.75 0.71 0.67 0.74 0.69 0.81 0.76    Calcium (Total) 8.5 - 10.1 mg/dL 8.6 8.2(L) 8.5 8.6 8.6 9.3 8.4(L)    Protein (Total) 6.8 - 8.8 g/dL 7.2 6.5(L) 6.3(L) 6.8 6.8 7.3 6.6(L)    Albumin 3.4 - 5.0 g/dL 3.9 3.4 3.8 3.7 3.8 4.1 3.7    Alkaline Phosphatase 40 - 150 U/L 84 71 62 61 55 59 53    AST 0 - 45 U/L 26 27 29 28 25 31 25    ALT 0 - 50 U/L 31 40 44 41 36 42 33    MCV 78 - 100 fl 92 93 90 95 91 95 93               Primary Care Provider Office Phone # Fax #    Dmitriy Agee -[phone]       [address] [place] [address]        Equal Access to Services     LATONYA URRUTIA AH: Hadii bogdan flemingo Soанна, waaxda luqadaha, qaybta kaalmada adeegyada, palomo montenegro. So [facility] [phone].    ATENCIÓN: Si habla español, tiene a stark disposición servicios gratuitos de asistencia lingüística. Llame al [phone].    We comply with applicable federal civil rights laws and [place] laws. We do not discriminate on the basis of race, color, national origin, age, disability, sex, sexual orientation, or gender identity.            Thank you!     Thank you for choosing [facility] BLOOD AND MARROW TRANSPLANT  for your care. Our goal is always to provide you with excellent care. Hearing back from our patients is one way we can continue to improve our services. Please take a few minutes to complete the written survey that you may receive in the mail after your visit with us. Thank you!             Your Updated Medication List - Protect others around you: Learn how to safely use, store and throw away your medicines at www.disposemymeds.org.          This list is accurate as of 7/27/18 11:59 PM.  Always use your most recent med list.                   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        cholecalciferol 1000 units capsule    vitamin  -D     Take 1 capsule by mouth daily.        clindamycin 1 % solution    CLEOCIN T    60 mL    Apply to the face h.s. And a.m.    Other acne       IMODIUM OR      Take by mouth 2 times daily        MULTIVITAL PO      Take  by mouth.        oxyCODONE-acetaminophen 5-325 MG per tablet    PERCOCET    20 tablet    Take 1 tablet by mouth every 4 hours as needed for pain.    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

## 2018-08-15 ENCOUNTER — TELEPHONE (OUTPATIENT)
Dept: ONCOLOGY | Facility: CLINIC | Age: 63
End: 2018-08-15

## 2018-08-15 NOTE — TELEPHONE ENCOUNTER
TOBACCO TREATMENT INITIAL CONSULT    Subjective:        Patient is a 62 year old White female and was contacted to participate in Tobacco Treatment Program for Nicotine Dependence 8/15/2018 by the Tobacco Treatment Team. Patient  reports that she has been smoking Cigarettes.  She has a 45.00 pack-year smoking history. She has never used smokeless tobacco.     Patient reports not having prescription coverage and has to pay for all medications out of pocket. Would like Chantix and has checked and she would have to pay $300+. Is currently rolling her own cigarettes to reduce cost. Has gone from 20 to 15CPD. Would like to breathe better and live longer to play with grandkids. Currently is raising her 6 year old granddaughter.        Information:      Agreed to Participate in Program - Yes    Proactive Outreach- Yes    Year of cancer diagnosis: 2011    Smoking Status:  Every Day Smoker    Has attempted to quit in the last 30 days:  No    Previous Cessation Medication Used - Chantix (Varenicline)    Nicotine Product Used - Cigarettes    Amount of Use (CPD) - 15-19    Time to First Use -  < 30mins    Time of Last Cigarette: Smoked cigarette today (at least one puff)    Readiness (0-10) - 7    Barriers to quit- cost of cessation medication       Summary of visit:      Jenn Barclay is still smoking/using tobacco: Yes  Discussed habit change regarding smoking  These MI interventions were used: Open-ended questions, Reflections: simple and complex and Change talk (evoked)  We discussed: Benefits of quitting  Patient is ready to quit smoking or continue to stay 100% smoke-free.        Plan:      Patient plans to continue to reduce tobacco by tapering down. TTS will follow up every 2wks to assess progress.    MTM Consult Referral: declined

## 2018-08-29 ENCOUNTER — ONCOLOGY VISIT (OUTPATIENT)
Dept: ONCOLOGY | Facility: CLINIC | Age: 63
End: 2018-08-29

## 2018-08-29 DIAGNOSIS — Z72.0 TOBACCO ABUSE DISORDER: Primary | ICD-10-CM

## 2018-08-29 NOTE — MR AVS SNAPSHOT
"              After Visit Summary   8/29/2018    Jenn Barclay    MRN: [id number]           Patient Information     Date Of Birth          1955        Visit Information        Provider Department      8/29/2018 4:16 PM Specialist, Ed[facility] Tobacco Treatment [facility]        Today's Diagnoses     Tobacco abuse disorder    -  1       Follow-ups after your visit        Your next 10 appointments already scheduled     Jan 09, 2019 10:00 AM CST   [facility] Draw with [facility] DRAW   [facility] Lab Draw ([place] [facility])    [address] [place] [address]  [address]  [facility] [address] [phone]            Jan 09, 2019 10:30 AM CST   Return with Dmitriy Agee MD   [facility] Blood and Marrow Transplant ([place] [facility])    [address] [place] [address]  [address]  [facility] [address] [phone]              Who to contact     If you have questions or need follow up information about today's clinic visit or your schedule please contact [facility] directly at [phone].  Normal or non-critical lab and imaging results will be communicated to you by [email or web address], letter or phone within 4 business days after the clinic has received the results. If you do not hear from us within 7 days, please contact the clinic through Geeksphonet or phone. If you have a critical or abnormal lab result, we will notify you by phone as soon as possible.  Submit refill requests through [employer] or call your pharmacy and they will forward the refill request to us. Please allow 3 business days for your refill to be completed.          Additional Information About Your Visit        [email or web address] Information     [employer] lets you send messages to your doctor, view your test results, renew your prescriptions, schedule appointments and more. To sign up, go to [email or web address] . Click on \"Log in\" on the left side of the screen, which will take you to the " "Welcome page. Then click on \"Sign up Now\" on the right side of the page.     You will be asked to enter the access code listed below, as well as some personal information. Please follow the directions to create your username and password.     Your access code is: [email or web address]  Expires: 2018  7:29 PM     Your access code will  in 90 days. If you need help or a new code, please call your [place] clinic or [phone].        Care EveryWhere ID     This is your Care EveryWhere ID. This could be used by other organizations to access your [place] medical records  [id number]         Blood Pressure from Last 3 Encounters:   18 113/64   18 (!) 139/91   18 137/70    Weight from Last 3 Encounters:   18 50.8 kg (112 lb 1.6 oz)   18 51.1 kg (112 lb 9.6 oz)   18 52.4 kg (115 lb 9.6 oz)              Today, you had the following     No orders found for display       Primary Care Provider Office Phone # Fax #    Dmitriy Agee [phone]       [address] [place] [address]        Equal Access to Services     LATONYA URRUTIA : Hadii bogdan flemingo Somarcelinaali, waaxda luqadaha, qaybta kaalmada adeegyada, palomo montenegro. So [facility] [phone].    ATENCIÓN: Si habla español, tiene a stark disposición servicios gratuitos de asistencia lingüística. Llame al [phone].    We comply with applicable federal civil rights laws and [place] laws. We do not discriminate on the basis of race, color, national origin, age, disability, sex, sexual orientation, or gender identity.            Thank you!     Thank you for choosing [facility]  for your care. Our goal is always to provide you with excellent care. Hearing back from our patients is one way we can continue to improve our services. Please take a few minutes to complete the written survey that you may receive in the mail after your visit with us. Thank " you!             Your Updated Medication List - Protect others around you: Learn how to safely use, store and throw away your medicines at www.disposemymeds.org.          This list is accurate as of 8/29/18  4:27 PM.  Always use your most recent med list.                   Brand Name Dispense Instructions for use Diagnosis    acyclovir 400 MG tablet    ZOVIRAX    100 tablet    Take 2 tablets (800 mg) by mouth 5 times daily    Acute myeloid leukemia in remission (H), S/P allogeneic bone marrow transplant (H)       ALEVE PO      Take by mouth daily        amylase-lipase-protease 17180-81105 units Cpep per EC capsule    CREON    300 capsule    Take 2 capsules (48,000 Units) by mouth 3 times daily (with meals) And snacks    S/P allogeneic bone marrow transplant (H), Acute myeloid leukemia in remission (H), Exocrine pancreatic insufficiency       ascorbic acid 500 MG tablet    VITAMIN C     Take 500 mg by mouth daily.        cholecalciferol 1000 units capsule    vitamin  -D     Take 1 capsule by mouth daily.        clindamycin 1 % solution    CLEOCIN T    60 mL    Apply to the face h.s. And a.m.    Other acne       IMODIUM OR      Take by mouth 2 times daily        MULTIVITAL PO      Take  by mouth.        oxyCODONE-acetaminophen 5-325 MG per tablet    PERCOCET    20 tablet    Take 1 tablet by mouth every 4 hours as needed for pain.    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

## 2018-08-29 NOTE — PROGRESS NOTES
TOBACCO TREATMENT FOLLOW UP CONSULT    Subjective:      Patient is a 62 year old White female and was contacted to participate in Tobacco Treatment Program for Nicotine Dependence 8/29/2018 by the Tobacco Treatment Team   reports that she has been smoking Cigarettes.  She has a 45.00 pack-year smoking history. She has never used smokeless tobacco.    Patient reports reducing her tobacco use by staying busy crocheting and keeping her hands busy. Did increase her use today when she found out her cable bill was due sooner than she thought. Plans to taper since she can't afford to buy Chantix. Gave a stranger 8 cigarettes she had on her one day.        Information:      Agreed to Participate in Program - Yes    Session Number: 2            Proactive Outreach- Yes    Smoking Status:  Every Day Smoker    Has attempted to quit in the last 30 days:  Yes    Current Cessation Medication Being Used - Declined Medication    Withdrawal Symptoms: Desire or craving to smoke    Nicotine Product Used - Cigarettes    Amount of Use (CPD) - 15    Time to First Use -  > 30mins    Time of Last Cigarette: Smoked cigarette today (at least one puff)    Readiness (0-10) - 7    Barriers to quit- limited stress coping tools and cost of cessation medication       Summary of visit:      Jenn Barclay is still smoking/using tobacco: Yes  These MI interventions were used: Expressed Empathy/Understanding, Open-ended questions, Reflections: simple and complex and Change talk (evoked)  We discussed: Ways to cope with cravings and manage triggers  Ways to reduce stress to prevent relapse  Patient is ready to quit smoking or continue to stay 100% smoke-free.        Plan:      Patient will continue to taper down by delaying smoking, keeping her hands busy with crafting and using hard candies.     MTM Consult Completed: No, declined.

## 2018-09-07 ENCOUNTER — COMMUNICATION - HEALTHEAST (OUTPATIENT)
Dept: INTERNAL MEDICINE | Facility: CLINIC | Age: 63
End: 2018-09-07

## 2018-09-07 DIAGNOSIS — G89.29 CHRONIC BACK PAIN: ICD-10-CM

## 2018-09-07 DIAGNOSIS — M54.9 CHRONIC BACK PAIN: ICD-10-CM

## 2018-09-07 DIAGNOSIS — J44.9 COPD (CHRONIC OBSTRUCTIVE PULMONARY DISEASE) (H): ICD-10-CM

## 2018-09-12 NOTE — TELEPHONE ENCOUNTER
Tobacco Treatment Team at the [facility] attempted to reach Ms. Barclay on 9/12/2018 to follow up with the tobacco cessation program. We will attempt to reach Ms. Barclay another time.

## 2018-10-09 ENCOUNTER — VIRTUAL VISIT (OUTPATIENT)
Dept: ONCOLOGY | Facility: CLINIC | Age: 63
End: 2018-10-09

## 2018-10-09 DIAGNOSIS — Z72.0 TOBACCO ABUSE DISORDER: Primary | ICD-10-CM

## 2018-10-09 NOTE — MR AVS SNAPSHOT
"              After Visit Summary   10/9/2018    Jenn Barclay    MRN: [id number]           Patient Information     Date Of Birth          1955        Visit Information        Provider Department      10/9/2018 9:06 AM Specialist, Edwige Tobacco Treatment [facility]        Today's Diagnoses     Tobacco abuse disorder    -  1       Follow-ups after your visit        Your next 10 appointments already scheduled     Jan 09, 2019 10:00 AM CST   [facility] Lab Draw with [facility] DRAW   [facility] Lab Draw ([facility])    [address] [place] [address]  [address]  [facility] [address] [phone]            Jan 09, 2019 10:30 AM CST   Return with Dmitriy Agee MD   [facility] Blood and Marrow Transplant ([facility])    [address] [place] [address]  [address]  [facility] [address] [phone]              Who to contact     If you have questions or need follow up information about today's clinic visit or your schedule please contact [facility] directly at [phone].  Normal or non-critical lab and imaging results will be communicated to you by Solidmationhart, letter or phone within 4 business days after the clinic has received the results. If you do not hear from us within 7 days, please contact the clinic through Luminatet or phone. If you have a critical or abnormal lab result, we will notify you by phone as soon as possible.  Submit refill requests through [employer] or call your pharmacy and they will forward the refill request to us. Please allow 3 business days for your refill to be completed.          Additional Information About Your Visit        Solidmationhart Information     [employer] lets you send messages to your doctor, view your test results, renew your prescriptions, schedule appointments and more. To sign up, go to [email or web address] . Click on \"Log in\" on the left side of the screen, which will take you to the " "Welcome page. Then click on \"Sign up Now\" on the right side of the page.     You will be asked to enter the access code listed below, as well as some personal information. Please follow the directions to create your username and password.     Your access code is: [email or web address]  Expires: 2018  7:29 PM     Your access code will  in 90 days. If you need help or a new code, please call your [place] clinic or [phone].        Care EveryWhere ID     This is your Care EveryWhere ID. This could be used by other organizations to access your [place] medical records  [id number]         Blood Pressure from Last 3 Encounters:   18 113/64   18 (!) 139/91   18 137/70    Weight from Last 3 Encounters:   18 50.8 kg (112 lb 1.6 oz)   18 51.1 kg (112 lb 9.6 oz)   18 52.4 kg (115 lb 9.6 oz)              Today, you had the following     No orders found for display       Primary Care Provider Office Phone # Fax #    Dmitriy Agee [phone]       [address] [place] [address]        Equal Access to Services     LATONYA URRUTIA : Hadii bogdan flemingo Somarcelinaali, waaxda luqadaha, qaybta kaalmada adeegyada, palomo montenegro. So [facility] [phone].    ATENCIÓN: Si habla español, tiene a stark disposición servicios gratuitos de asistencia lingüística. Llame al [phone].    We comply with applicable federal civil rights laws and [place] laws. We do not discriminate on the basis of race, color, national origin, age, disability, sex, sexual orientation, or gender identity.            Thank you!     Thank you for choosing [facility]  for your care. Our goal is always to provide you with excellent care. Hearing back from our patients is one way we can continue to improve our services. Please take a few minutes to complete the written survey that you may receive in the mail after your visit with us. Thank " you!             Your Updated Medication List - Protect others around you: Learn how to safely use, store and throw away your medicines at www.disposemymeds.org.          This list is accurate as of 10/9/18  9:11 AM.  Always use your most recent med list.                   Brand Name Dispense Instructions for use Diagnosis    acyclovir 400 MG tablet    ZOVIRAX    100 tablet    Take 2 tablets (800 mg) by mouth 5 times daily    Acute myeloid leukemia in remission (H), S/P allogeneic bone marrow transplant (H)       ALEVE PO      Take by mouth daily        amylase-lipase-protease 88460-62455 units Cpep per EC capsule    CREON    300 capsule    Take 2 capsules (48,000 Units) by mouth 3 times daily (with meals) And snacks    S/P allogeneic bone marrow transplant (H), Acute myeloid leukemia in remission (H), Exocrine pancreatic insufficiency       ascorbic acid 500 MG tablet    VITAMIN C     Take 500 mg by mouth daily.        cholecalciferol 1000 units capsule    vitamin  -D     Take 1 capsule by mouth daily.        clindamycin 1 % solution    CLEOCIN T    60 mL    Apply to the face h.s. And a.m.    Other acne       IMODIUM OR      Take by mouth 2 times daily        MULTIVITAL PO      Take  by mouth.        oxyCODONE-acetaminophen 5-325 MG per tablet    PERCOCET    20 tablet    Take 1 tablet by mouth every 4 hours as needed for pain.    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

## 2018-10-09 NOTE — PROGRESS NOTES
TOBACCO TREATMENT FOLLOW UP VISIT    Subjective:      Patient is a 63 year old White female and was contacted to participate in Tobacco Treatment Program for Nicotine Dependence 10/9/2018 by the Tobacco Treatment Team. Patient  reports that she has been smoking Cigarettes.  She has a 45.00 pack-year smoking history. She has never used smokeless tobacco.     Patient has increased smoking due to the rainy weather. Finds herself without things to do keep herself distracted. Reports really trying to stay busy even inside the house. Plans to buy more yarn when she gets paid next to keep her hands busy by crocheting. No barriers beyond weather. Still committed to quitting.        Information:      Agreed to Participate in Program - Yes    Session Number: 3            Proactive Outreach- Yes    Smoking Status:  Every Day Smoker    Has attempted to quit in the last 30 days:  Yes    Current Cessation Medication Being Used - Declined Medication    Withdrawal Symptoms: No withdrawal symptoms reported    Nicotine Product Used - Cigarettes    Amount of Use (CPD) - 15    Time to First Use -  < 30mins    Time of Last Cigarette: Smoked cigarette today (at least one puff)    Readiness (0-10) - 7    Barriers to quit- Boredom       Summary of visit:      Jenn AVA Celesteer is still smoking/using tobacco: Yes  These MI interventions were used: Open-ended questions, Reflections: simple and complex and Change talk (evoked)  We discussed: Ways to prepare for a quit date  Patient is ready to quit smoking or continue to stay 100% smoke-free.        Plan:      Patient will continue to taper use and focus on the 4 D's of quitting smoking (delay, distract, drink water, discuss).     MTM Consult Completed: Accepted- patient is interested in cost savings opportunities. Reports not being able to afford OTC NRT. Is currently tapering use to quit. Would try NRT if available for her.

## 2018-10-11 ENCOUNTER — TELEPHONE (OUTPATIENT)
Dept: PHARMACY | Facility: OTHER | Age: 63
End: 2018-10-11

## 2018-10-11 NOTE — TELEPHONE ENCOUNTER
MTM referral from: [facility] visit (referral by provider)    MTM referral outreach attempt #2 on October 11, 2018 at 2:02 PM      Outcome: Patient not reachable after several attempts, will route to MTM Pharmacist/Provider as an FYI. Thank you for the referral. *Pt does not have MTM coverage*    Perla Morillo, MTM Coordinator

## 2018-10-15 ENCOUNTER — COMMUNICATION - HEALTHEAST (OUTPATIENT)
Dept: INTERNAL MEDICINE | Facility: CLINIC | Age: 63
End: 2018-10-15

## 2018-10-15 DIAGNOSIS — J44.1 COPD EXACERBATION (H): ICD-10-CM

## 2018-10-23 ENCOUNTER — AMBULATORY - HEALTHEAST (OUTPATIENT)
Dept: NURSING | Facility: CLINIC | Age: 63
End: 2018-10-23

## 2018-10-23 NOTE — TELEPHONE ENCOUNTER
Tobacco Treatment Team at the [facility] attempted to reach Ms. Barclay on 10/23/2018 for scheduled tobacco cessation visit to help Ms. Barclay to quit smoking. We will attempt to reach Ms. Barclay another time.

## 2018-11-09 ENCOUNTER — COMMUNICATION - HEALTHEAST (OUTPATIENT)
Dept: INTERNAL MEDICINE | Facility: CLINIC | Age: 63
End: 2018-11-09

## 2018-11-09 DIAGNOSIS — M54.9 CHRONIC BACK PAIN: ICD-10-CM

## 2018-11-09 DIAGNOSIS — G89.29 CHRONIC BACK PAIN: ICD-10-CM

## 2018-11-09 DIAGNOSIS — J44.9 COPD (CHRONIC OBSTRUCTIVE PULMONARY DISEASE) (H): ICD-10-CM

## 2018-11-15 ENCOUNTER — TELEPHONE (OUTPATIENT)
Dept: ONCOLOGY | Facility: CLINIC | Age: 63
End: 2018-11-15

## 2018-11-15 NOTE — TELEPHONE ENCOUNTER
Tobacco Treatment Team at the [facility] attempted to reach Ms. Barclay on 11/15/2018 regarding the tobacco cessation program to help Ms. Barclay to quit smoking. We will attempt to reach Ms. Barclay another time.

## 2018-12-18 ENCOUNTER — OFFICE VISIT - HEALTHEAST (OUTPATIENT)
Dept: INTERNAL MEDICINE | Facility: CLINIC | Age: 63
End: 2018-12-18

## 2018-12-18 DIAGNOSIS — M54.6 CHRONIC MIDLINE THORACIC BACK PAIN: ICD-10-CM

## 2018-12-18 DIAGNOSIS — M54.9 CHRONIC BACK PAIN: ICD-10-CM

## 2018-12-18 DIAGNOSIS — Z51.81 ENCOUNTER FOR THERAPEUTIC DRUG MONITORING: ICD-10-CM

## 2018-12-18 DIAGNOSIS — J44.1 COPD EXACERBATION (H): ICD-10-CM

## 2018-12-18 DIAGNOSIS — S22.060A TRAUMATIC COMPRESSION FRACTURE OF T8 THORACIC VERTEBRA, CLOSED, INITIAL ENCOUNTER (H): ICD-10-CM

## 2018-12-18 DIAGNOSIS — J44.9 COPD (CHRONIC OBSTRUCTIVE PULMONARY DISEASE) (H): ICD-10-CM

## 2018-12-18 DIAGNOSIS — Z79.899 CONTROLLED SUBSTANCE AGREEMENT SIGNED: ICD-10-CM

## 2018-12-18 DIAGNOSIS — G89.29 CHRONIC BACK PAIN: ICD-10-CM

## 2018-12-18 DIAGNOSIS — G89.29 CHRONIC MIDLINE THORACIC BACK PAIN: ICD-10-CM

## 2018-12-18 DIAGNOSIS — D89.813 GVHD (GRAFT VERSUS HOST DISEASE) (H): ICD-10-CM

## 2018-12-18 DIAGNOSIS — M81.0 OSTEOPOROSIS WITHOUT CURRENT PATHOLOGICAL FRACTURE, UNSPECIFIED OSTEOPOROSIS TYPE: ICD-10-CM

## 2018-12-18 DIAGNOSIS — C92.00: ICD-10-CM

## 2018-12-18 LAB
AMPHETAMINES UR QL SCN: ABNORMAL
BARBITURATES UR QL: ABNORMAL
BENZODIAZ UR QL: ABNORMAL
CANNABINOIDS UR QL SCN: ABNORMAL
COCAINE UR QL: ABNORMAL
CREAT UR-MCNC: 105.6 MG/DL
METHADONE UR QL SCN: ABNORMAL
OPIATES UR QL SCN: ABNORMAL
OXYCODONE UR QL: ABNORMAL
PCP UR QL SCN: ABNORMAL

## 2019-01-11 ENCOUNTER — APPOINTMENT (OUTPATIENT)
Dept: LAB | Facility: CLINIC | Age: 64
End: 2019-01-11
Attending: INTERNAL MEDICINE
Payer: MEDICARE

## 2019-01-11 ENCOUNTER — ONCOLOGY VISIT (OUTPATIENT)
Dept: TRANSPLANT | Facility: CLINIC | Age: 64
End: 2019-01-11
Attending: INTERNAL MEDICINE
Payer: MEDICARE

## 2019-01-11 VITALS
HEART RATE: 87 BPM | BODY MASS INDEX: 16.99 KG/M2 | TEMPERATURE: 98 F | WEIGHT: 115.1 LBS | SYSTOLIC BLOOD PRESSURE: 116 MMHG | OXYGEN SATURATION: 98 % | RESPIRATION RATE: 16 BRPM | DIASTOLIC BLOOD PRESSURE: 73 MMHG

## 2019-01-11 DIAGNOSIS — R21 MACULAR RASH: Primary | ICD-10-CM

## 2019-01-11 DIAGNOSIS — Z94.81 S/P ALLOGENEIC BONE MARROW TRANSPLANT (H): ICD-10-CM

## 2019-01-11 DIAGNOSIS — C92.01 ACUTE MYELOID LEUKEMIA IN REMISSION (H): ICD-10-CM

## 2019-01-11 DIAGNOSIS — K86.81 EXOCRINE PANCREATIC INSUFFICIENCY: ICD-10-CM

## 2019-01-11 DIAGNOSIS — Z13.29 SCREENING FOR THYROID DISORDER: ICD-10-CM

## 2019-01-11 LAB
ALBUMIN SERPL-MCNC: 3.5 G/DL (ref 3.4–5)
ALP SERPL-CCNC: 70 U/L (ref 40–150)
ALT SERPL W P-5'-P-CCNC: 57 U/L (ref 0–50)
ANION GAP SERPL CALCULATED.3IONS-SCNC: 5 MMOL/L (ref 3–14)
AST SERPL W P-5'-P-CCNC: 39 U/L (ref 0–45)
BASOPHILS # BLD AUTO: 0.1 10E9/L (ref 0–0.2)
BASOPHILS NFR BLD AUTO: 1.6 %
BILIRUB SERPL-MCNC: 0.3 MG/DL (ref 0.2–1.3)
BUN SERPL-MCNC: 16 MG/DL (ref 7–30)
CALCIUM SERPL-MCNC: 8.5 MG/DL (ref 8.5–10.1)
CHLORIDE SERPL-SCNC: 102 MMOL/L (ref 94–109)
CO2 SERPL-SCNC: 30 MMOL/L (ref 20–32)
CREAT SERPL-MCNC: 0.72 MG/DL (ref 0.52–1.04)
DIFFERENTIAL METHOD BLD: NORMAL
EOSINOPHIL # BLD AUTO: 0.3 10E9/L (ref 0–0.7)
EOSINOPHIL NFR BLD AUTO: 3.8 %
ERYTHROCYTE [DISTWIDTH] IN BLOOD BY AUTOMATED COUNT: 13.1 % (ref 10–15)
GFR SERPL CREATININE-BSD FRML MDRD: 89 ML/MIN/{1.73_M2}
GLUCOSE SERPL-MCNC: 72 MG/DL (ref 70–99)
HCT VFR BLD AUTO: 43.2 % (ref 35–47)
HGB BLD-MCNC: 13.7 G/DL (ref 11.7–15.7)
IMM GRANULOCYTES # BLD: 0 10E9/L (ref 0–0.4)
IMM GRANULOCYTES NFR BLD: 0.3 %
LYMPHOCYTES # BLD AUTO: 4.1 10E9/L (ref 0.8–5.3)
LYMPHOCYTES NFR BLD AUTO: 48.2 %
MCH RBC QN AUTO: 29.1 PG (ref 26.5–33)
MCHC RBC AUTO-ENTMCNC: 31.7 G/DL (ref 31.5–36.5)
MCV RBC AUTO: 92 FL (ref 78–100)
MONOCYTES # BLD AUTO: 0.8 10E9/L (ref 0–1.3)
MONOCYTES NFR BLD AUTO: 9.8 %
NEUTROPHILS # BLD AUTO: 3.1 10E9/L (ref 1.6–8.3)
NEUTROPHILS NFR BLD AUTO: 36.3 %
NRBC # BLD AUTO: 0 10*3/UL
NRBC BLD AUTO-RTO: 0 /100
PLATELET # BLD AUTO: 277 10E9/L (ref 150–450)
POTASSIUM SERPL-SCNC: 4 MMOL/L (ref 3.4–5.3)
PROT SERPL-MCNC: 6.7 G/DL (ref 6.8–8.8)
RBC # BLD AUTO: 4.7 10E12/L (ref 3.8–5.2)
SODIUM SERPL-SCNC: 136 MMOL/L (ref 133–144)
WBC # BLD AUTO: 8.6 10E9/L (ref 4–11)

## 2019-01-11 PROCEDURE — 36415 COLL VENOUS BLD VENIPUNCTURE: CPT

## 2019-01-11 PROCEDURE — 85025 COMPLETE CBC W/AUTO DIFF WBC: CPT | Performed by: INTERNAL MEDICINE

## 2019-01-11 PROCEDURE — 80053 COMPREHEN METABOLIC PANEL: CPT | Performed by: INTERNAL MEDICINE

## 2019-01-11 PROCEDURE — G0463 HOSPITAL OUTPT CLINIC VISIT: HCPCS | Mod: ZF

## 2019-01-11 NOTE — PROGRESS NOTES
BMT Clinic Progress Note     HPI: Ms. Jenn Barclay is a 63 yo patient who had an allogeneic sibling transplant with us 7 years ago in CR, off immunosuppression    Interval Hx: She has been doing very well.  She has no new complaints other than her rash on her legs is back.  She is noticed pruriginous lesions on her buttocks legs and some on her forearms.  Some of them are crusted.  Otherwise her appetite is stable and so is her weight.  She has been taking Creon and has not noticed fatty stools anymore.  She continues to smoke.  She is very engaged in physically active.     The remaining 12 point complete review of systems is otherwise negative.      PAST MEDICAL HISTORY:   1.  Significant for the acute myelogenous leukemia.   2.  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   3.  COPD.   4.  Hysterectomy and bilateral oophorectomy at the age of 20.     5.  She had a compression fracture of her thoracic spine that was treated with a vertebroplasty about 6 months ago.   6.  She has a history of smoking for many years.  She denies alcohol or drug abuse.  She is not on any thyroid replacement therapy, and denies thyroid disease.   7. R Foot fracture Nov 2017  8. Zoster Jan 2018       PHYSICAL EXAMINATION:  Ms. Barclay is a very pleasant 61-year-old. /73   Pulse 87   Temp 98  F (36.7  C)   Resp 16   Wt 52.2 kg (115 lb 1.6 oz)   SpO2 98%   BMI 16.99 kg/m  .   General: frail, thin appearing woman in no distress  HEENT: perrl, eomi, no icterus, no oral lesions, no teeth  Resp: Distant breathing sounds without crackles or wheezes  CV: rrr no m/r/g  Abd: s, nt, nd, thin appearing, normal BS  Ext: no edema   Skin. Several small dry patches on skin on arms and legs are stable. Acne on face with some erythema.  Scattered 3-4 mm crusted lesions on her buttocks legs and forearms.  There are some early stages of those lesions on her legs that almost look like a telangectasia.    Lab Results   Component Value Date     WBC 8.6 01/11/2019    ANEU 3.1 01/11/2019    HGB 13.7 01/11/2019    HCT 43.2 01/11/2019     01/11/2019     01/11/2019    POTASSIUM 4.0 01/11/2019    CHLORIDE 102 01/11/2019    CO2 30 01/11/2019    GLC 72 01/11/2019    BUN 16 01/11/2019    CR 0.72 01/11/2019    MAG 2.0 03/10/2011    INR 0.97 09/15/2010    BILITOTAL 0.3 01/11/2019    AST 39 01/11/2019    ALT 57 (H) 01/11/2019    ALKPHOS 70 01/11/2019    PROTTOTAL 6.7 (L) 01/11/2019    ALBUMIN 3.5 01/11/2019         ASSESSMENT AND PLAN:  Ms. 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     1.  AML/HEME.  The patient in clinical remission. Counts stable.     2. Chronic Chdbm-sqrhbr-jjrk disease.  She is on Creon and doing much better from her GI symptoms.  I encouraged her to not run out of medication and to call us at least with 2 or 3 months prior her prescription runs out to get it refilled.      3.  GI.  Benign polyps removed. Repeat colonoscopy in spring on 2020.   - Pancreatic insufficiency as outlined above.  - Nausea/vomiting: few episodes 5/8, now resolved 5/9- offered anti-emetics but patient declined.     4.  Osteoporosis.  Encouraged to restart her vitamin D     5.  Chronic obstructive pulmonary disease.  Again encouraged to stop smoking.      6. ID: no active infection  - Continue Acyclovir 800 mg BID prophy (pt reported as not currently taking 5/9)    7. Renal:   Electrolytes WNL despite diarrhea.   Creatinine slightly elevated from baseline:     8. Dermatology: She has this new rash that is not consistent with herpes or GVHD.  I am referring her back to dermatology for reassessment.  The cyst on her face are improved.        Plan:   Continue Creon through [employer] with the help of social work.   RTC Cyndie on 7/19/19 with labs or earlier as needed

## 2019-01-11 NOTE — NURSING NOTE
"Oncology Rooming Note    January 11, 2019 11:10 AM   Jenn Barclay is a 63 year old female who presents for:    Chief Complaint   Patient presents with     Blood Draw     Pt is here for labs and vitals for her provider appt.      RECHECK     Pt here for routine visit with MD and Labs. Pt is s/p BMT for AML.      Initial Vitals: /73   Pulse 87   Temp 98  F (36.7  C)   Resp 16   Wt 52.2 kg (115 lb 1.6 oz)   SpO2 98%   BMI 16.99 kg/m   Estimated body mass index is 16.99 kg/m  as calculated from the following:    Height as of 7/18/18: 1.753 m (5' 9.02\").    Weight as of this encounter: 52.2 kg (115 lb 1.6 oz). Body surface area is 1.59 meters squared.  Data Unavailable Comment: Data Unavailable   No LMP recorded.  Allergies reviewed: Yes  Medications reviewed: Yes    Medications: Medication refills not needed today.  Pharmacy name entered into [employer]:    [facility] [address] [place] [address] E  PHARMACY [facility] [place] [facility] - [address]    Clinical concerns: Pt states her Rash is back. Pt states the Rash is very itchy. Using Triamcinolone Cream as prescribed without any improvement.  Dr. Agee was NOT notified. Pt will discuss with MD.     16 minutes for nursing intake (face to face time)     Madison Dietz RN              "

## 2019-01-14 ENCOUNTER — PATIENT OUTREACH (OUTPATIENT)
Dept: CARE COORDINATION | Facility: CLINIC | Age: 64
End: 2019-01-14

## 2019-01-22 ENCOUNTER — DOCUMENTATION ONLY (OUTPATIENT)
Dept: CARE COORDINATION | Facility: CLINIC | Age: 64
End: 2019-01-22

## 2019-01-25 ENCOUNTER — TELEPHONE (OUTPATIENT)
Dept: ONCOLOGY | Facility: CLINIC | Age: 64
End: 2019-01-25

## 2019-02-14 ENCOUNTER — OFFICE VISIT (OUTPATIENT)
Dept: DERMATOLOGY | Facility: CLINIC | Age: 64
End: 2019-02-14
Payer: MEDICARE

## 2019-02-14 DIAGNOSIS — Z94.89 TRANSPLANT RECIPIENT: ICD-10-CM

## 2019-02-14 DIAGNOSIS — L30.9 DERMATITIS: Primary | ICD-10-CM

## 2019-02-14 DIAGNOSIS — D22.9 MULTIPLE BENIGN NEVI: ICD-10-CM

## 2019-02-14 RX ORDER — TRIAMCINOLONE ACETONIDE 1 MG/G
CREAM TOPICAL
Qty: 80 G | Refills: 3 | Status: SHIPPED | OUTPATIENT
Start: 2019-02-14 | End: 2021-12-13

## 2019-02-14 ASSESSMENT — PAIN SCALES - GENERAL: PAINLEVEL: MODERATE PAIN (5)

## 2019-02-14 NOTE — NURSING NOTE
Dermatology Rooming Note    Jenn Barclay's goals for this visit include:   Chief Complaint   Patient presents with     Rash     Jenn is here today for a rash that she has had for about 2-3 years      JORGE Mora

## 2019-02-14 NOTE — PATIENT INSTRUCTIONS
1. We'll prescribe a steroid cream to use on spots that are itchy or painful (can use on face)    2. Look for Sarna lotion (over the counter) which will also help the itch.    3. You can also take a Claritin which can help calm down the itching.

## 2019-02-14 NOTE — PROGRESS NOTES
[facility] Dermatology Note      Dermatology Problem List:  1. Hx of AML s/p BMT in 2010    Encounter Date: Feb 14, 2019    CC:  Chief Complaint   Patient presents with     Rash     Jenn is here today for a rash that she has had for about 2-3 years          History of Present Illness:  Ms. Jenn Barclay is a 63 year old female who presents for evaluation of a rash. She has had this rash for 2-3 years but notes that it comes and goes. Her most recent flare started 1 month ago. She reports getting small red bumps that can show up anywhere but most often occur on her legs, hips and buttocks. The bumps are really itchy and painful and when she rubs them, she reports they break open and leave a sore. She denies any drainage, oozing, or bleeding from the spots. She has tried an antibiotic ointment and a steroid ointment which have mildly improved the symptoms but the rash never fully goes away. She has tried different soaps, shampoos, detergents, clothes, and sheets with no resolution of the rash. She denies any mucosal involvement.    Past Medical History:   Patient Active Problem List   Diagnosis     AML (acute myelogenous leukemia) (H)     Encounter for long-term current use of medication     S/P allogeneic bone marrow transplant (H)     Exocrine pancreatic insufficiency     No past medical history on file.  No past surgical history on file.    Social History:  Patient reports that she has been smoking cigarettes.  She has a 45.00 pack-year smoking history. she has never used smokeless tobacco.    Family History:  No family history on file.    Medications:  Current Outpatient Medications   Medication Sig Dispense Refill     Albuterol Sulfate (VENTOLIN HFA) 108 (90 BASE) MCG/ACT AERS Inhale  into the lungs.       amylase-lipase-protease (CREON) 03509-30331 units CPEP per EC capsule Take 2 capsules (48,000 Units) by mouth 3 times daily (with meals) And snacks 300 capsule 11     ascorbic acid  (VITAMIN C) 500 MG tablet Take 500 mg by mouth daily.       cholecalciferol (VITAMIN D3) 1000 UNIT capsule Take 1 capsule by mouth daily.       Loperamide HCl (IMODIUM OR) Take by mouth 2 times daily       Multiple Vitamins-Minerals (MULTIVITAL PO) Take  by mouth.       Naproxen Sodium (ALEVE PO) Take by mouth daily       oxyCODONE-acetaminophen (PERCOCET) 5-325 MG per tablet Take 1 tablet by mouth every 4 hours as needed for pain. 20 tablet 0     triamcinolone (KENALOG) 0.1 % external cream Apply tid to itchy or tender skin lesions 80 g 3     triamcinolone (KENALOG) 0.1 % ointment Apply topically as needed       Allergies   Allergen Reactions     Mirtazapine      Other reaction(s): Other (See Comments)  Hallucination      Tape [Adhesive Tape]      Allergy Hx     Liquid Adhesive Rash     Other reaction(s): Other (See Comments)  Allergy Hx - Rash from paper tape         Review of Systems:  -Constitutional: Otherwise feeling well today, in usual state of health.  -HEENT: Patient denies nonhealing oral sores.  -Skin: As above in HPI. No additional skin concerns.    Physical exam:  Vitals: There were no vitals taken for this visit.  GEN: This is a well developed, well-nourished female in no acute distress, in a pleasant mood.    SKIN: Focused examination of the legs, buttocks, and hips was performed.  -Scattered erythematous scabbed over sores throughout legs, buttocks, and hips, mildly tender to palpation.  -No other lesions of concern on areas examined.     Impression/Plan:  1. Dermatitis. Patient has tried multiple over the counter and prescribed products to relieve itching but nothing has worked. She has no new lesions so unclear what the primary lesion looks like. Recommend she come back to clinic within 24 hours of a new lesion.    Triamcinolone 0.1% cream, apply topically as needed     Return to clinic within 24 hours of new lesion appearance    Follow-up prn for new or changing lesions.    DAVONTE Fernández  MD Cyndie  [address] [facility] [address]  [place] [address] on close of this encounter.      Staff Involved:  I, Amanda Torres MS4, saw and examined the patient in the presence of Dr. Norton.

## 2019-02-14 NOTE — LETTER
2/14/2019       RE: Jenn Barclay  [address] [place] [address]     Dear Colleague,    Thank you for referring your patient, Jenn Barclay, to the [facility] DERMATOLOGY at [facility]. Please see a copy of my visit note below.    [facility] Dermatology Note      Dermatology Problem List:  1. Hx of AML s/p BMT in 2010    Encounter Date: Feb 14, 2019    CC:  Chief Complaint   Patient presents with     Rash     Jenn is here today for a rash that she has had for about 2-3 years          History of Present Illness:  Ms. Jenn Barclay is a 63 year old female who presents for evaluation of a rash. She has had this rash for 2-3 years but notes that it comes and goes. Her most recent flare started 1 month ago. She reports getting small red bumps that can show up anywhere but most often occur on her legs, hips and buttocks. The bumps are really itchy and painful and when she rubs them, she reports they break open and leave a sore. She denies any drainage, oozing, or bleeding from the spots. She has tried an antibiotic ointment and a steroid ointment which have mildly improved the symptoms but the rash never fully goes away. She has tried different soaps, shampoos, detergents, clothes, and sheets with no resolution of the rash. She denies any mucosal involvement.    Past Medical History:   Patient Active Problem List   Diagnosis     AML (acute myelogenous leukemia) (H)     Encounter for long-term current use of medication     S/P allogeneic bone marrow transplant (H)     Exocrine pancreatic insufficiency     No past medical history on file.  No past surgical history on file.    Social History:  Patient reports that she has been smoking cigarettes.  She has a 45.00 pack-year smoking history. she has never used smokeless tobacco.    Family History:  No family history on file.    Medications:  Current Outpatient Medications   Medication Sig Dispense  Refill     Albuterol Sulfate (VENTOLIN HFA) 108 (90 BASE) MCG/ACT AERS Inhale  into the lungs.       amylase-lipase-protease (CREON) 56038-63153 units CPEP per EC capsule Take 2 capsules (48,000 Units) by mouth 3 times daily (with meals) And snacks 300 capsule 11     ascorbic acid (VITAMIN C) 500 MG tablet Take 500 mg by mouth daily.       cholecalciferol (VITAMIN D3) 1000 UNIT capsule Take 1 capsule by mouth daily.       Loperamide HCl (IMODIUM OR) Take by mouth 2 times daily       Multiple Vitamins-Minerals (MULTIVITAL PO) Take  by mouth.       Naproxen Sodium (ALEVE PO) Take by mouth daily       oxyCODONE-acetaminophen (PERCOCET) 5-325 MG per tablet Take 1 tablet by mouth every 4 hours as needed for pain. 20 tablet 0     triamcinolone (KENALOG) 0.1 % external cream Apply tid to itchy or tender skin lesions 80 g 3     triamcinolone (KENALOG) 0.1 % ointment Apply topically as needed       Allergies   Allergen Reactions     Mirtazapine      Other reaction(s): Other (See Comments)  Hallucination      Tape [Adhesive Tape]      Allergy Hx     Liquid Adhesive Rash     Other reaction(s): Other (See Comments)  Allergy Hx - Rash from paper tape         Review of Systems:  -Constitutional: Otherwise feeling well today, in usual state of health.  -HEENT: Patient denies nonhealing oral sores.  -Skin: As above in HPI. No additional skin concerns.    Physical exam:  Vitals: There were no vitals taken for this visit.  GEN: This is a well developed, well-nourished female in no acute distress, in a pleasant mood.    SKIN: Focused examination of the legs, buttocks, and hips was performed.  -Scattered erythematous scabbed over sores throughout legs, buttocks, and hips, mildly tender to palpation.  -No other lesions of concern on areas examined.     Impression/Plan:  1. Dermatitis. Patient has tried multiple over the counter and prescribed products to relieve itching but nothing has worked. She has no new lesions so unclear what the  primary lesion looks like. Recommend she come back to clinic within 24 hours of a new lesion.    Triamcinolone 0.1% cream, apply topically as needed     Return to clinic within 24 hours of new lesion appearance    Follow-up prn for new or changing lesions.    CC Dmitriy Agee MD  [address] [facility] [address]  [place] [address] on close of this encounter.      Staff Involved:  I, Amanad Torres MS4, saw and examined the patient in the presence of Dr. Norton.           Jenn Barclay was seen and evaluated by myself with the medical student recording the encounter acting as scribe.  I have reviewed the scribed note for completeness and accuracy and made appropriate corrections and amendments.    Suzie BENITEZ

## 2019-02-14 NOTE — PROGRESS NOTES
Jenn Barclay was seen and evaluated by myself with the medical student recording the encounter acting as scribe.  I have reviewed the scribed note for completeness and accuracy and made appropriate corrections and amendments.    Suzie BENITEZ

## 2019-03-11 ENCOUNTER — VIRTUAL VISIT (OUTPATIENT)
Dept: ONCOLOGY | Facility: CLINIC | Age: 64
End: 2019-03-11

## 2019-03-11 DIAGNOSIS — Z72.0 TOBACCO ABUSE DISORDER: Primary | ICD-10-CM

## 2019-03-11 NOTE — PROGRESS NOTES
TOBACCO TREATMENT FOLLOW UP VISIT    Subjective:      Patient is a 63 year old White female and was contacted to assess progress in Tobacco Treatment Program for Nicotine Dependence 3/11/2019 by the Tobacco Treatment Team. Patient  reports that she has been smoking cigarettes.  She has a 45.00 pack-year smoking history. she has never used smokeless tobacco.     Patient has reduced since last visit with TTS. She is currently smoking 10 CPD and continues to work on tapering down her tobacco use by keeping her hands busy. She has finished knitting blankets. She continues to use crafts to distract herself from smoking.     Her biggest barrier remains limited coping skills. She tries to walk when stressed, plays with her dogs and will find things to make herself laugh. Reports daughter is still homeless and this has been a worry for her. She is very committed to quitting smoking        Information:      Agreed to Participate in Program - Yes  Session Number: 4  Proactive Outreach- Yes  Smoking Status:  Every Day Smoker  Has attempted to quit in the last 30 days:  Yes  Current Cessation Medication Being Used - Not currently using cessation medication  Withdrawal Symptoms: Anger, Irritability, Frustration and Desire or craving to smoke  Nicotine Product Used - Cigarettes  Amount of Use (CPD) - 10  Time to First Use -  < 30mins  Time of Last Cigarette: Smoked cigarette today (at least one puff)  Readiness (0-10) - 8  Barriers to quit- limited stress coping tools, cost of cessation medication and lack of support       Summary of visit:      Jenn AVA Barclay is still smoking/using tobacco: Yes  These MI interventions were used: Expressed Empathy/Understanding, Supported Autonomy, Collaboration, Evocation, Open-ended questions, Reflections: simple and complex and Change talk (evoked)  We discussed: Ways to cope with cravings and manage triggers  Hints for managing nicotine withdrawal  Motivation Level Ask questions and  Cooperative  Patient is ready to quit smoking or continue to stay 100% smoke-free.  Advice to quit and impact of smoking provided to patient:   Stress management skills building, reframing thoughts.         Plan:      Patient will continue with using crafts to avoid smoking. Will start walking more with nicer weather to manage stress.     MTM Consult Completed: No    Follow up arranged: yes    Amount of time spent counselin minutes

## 2019-03-13 ENCOUNTER — COMMUNICATION - HEALTHEAST (OUTPATIENT)
Dept: INTERNAL MEDICINE | Facility: CLINIC | Age: 64
End: 2019-03-13

## 2019-03-13 DIAGNOSIS — M54.6 CHRONIC MIDLINE THORACIC BACK PAIN: ICD-10-CM

## 2019-03-13 DIAGNOSIS — G89.29 CHRONIC MIDLINE THORACIC BACK PAIN: ICD-10-CM

## 2019-04-01 NOTE — TELEPHONE ENCOUNTER
Tobacco Treatment Team at the [facility] attempted to reach Ms. Barclay on 4/1/2019 for scheduled tobacco cessation visit to help Ms. Barclay to quit smoking. We will attempt to reach Ms. Barclay another time.

## 2019-04-14 ENCOUNTER — COMMUNICATION - HEALTHEAST (OUTPATIENT)
Dept: INTERNAL MEDICINE | Facility: CLINIC | Age: 64
End: 2019-04-14

## 2019-04-14 DIAGNOSIS — M54.6 CHRONIC MIDLINE THORACIC BACK PAIN: ICD-10-CM

## 2019-04-14 DIAGNOSIS — G89.29 CHRONIC MIDLINE THORACIC BACK PAIN: ICD-10-CM

## 2019-04-22 NOTE — TELEPHONE ENCOUNTER
Tobacco Treatment Team at the [facility] attempted to reach Ms. Barclay on 4/22/2019 regarding the tobacco cessation program to help Ms. Barclay to quit smoking. We will attempt to reach Ms. Barclay another time.

## 2019-05-14 ENCOUNTER — COMMUNICATION - HEALTHEAST (OUTPATIENT)
Dept: INTERNAL MEDICINE | Facility: CLINIC | Age: 64
End: 2019-05-14

## 2019-05-14 DIAGNOSIS — G89.29 CHRONIC MIDLINE THORACIC BACK PAIN: ICD-10-CM

## 2019-05-14 DIAGNOSIS — M54.6 CHRONIC MIDLINE THORACIC BACK PAIN: ICD-10-CM

## 2019-06-17 ENCOUNTER — TELEPHONE (OUTPATIENT)
Dept: TRANSPLANT | Facility: CLINIC | Age: 64
End: 2019-06-17

## 2019-06-17 NOTE — TELEPHONE ENCOUNTER
Clinical   Blood and Marrow Transplant Service    Received fax from [place] stating that patient's free drug program with [employer] for Creon will  sometime in the next 60 days. They have mailed the renewal application to patient and BMT MD will need to complete a portion of the document as well.    Phone call to patient today to discuss further. Will await return call.     Her next appointment at BMT Clinic is on 19.    Will continue to follow for supportive counseling and assist with resources.     Arabella MEREDITH LICSW  2019

## 2019-06-18 ENCOUNTER — OFFICE VISIT - HEALTHEAST (OUTPATIENT)
Dept: INTERNAL MEDICINE | Facility: CLINIC | Age: 64
End: 2019-06-18

## 2019-06-18 DIAGNOSIS — K52.9 CHRONIC DIARRHEA: ICD-10-CM

## 2019-06-18 DIAGNOSIS — Z72.0 TOBACCO ABUSE: ICD-10-CM

## 2019-06-18 DIAGNOSIS — C92.00: ICD-10-CM

## 2019-06-18 DIAGNOSIS — M54.6 CHRONIC MIDLINE THORACIC BACK PAIN: ICD-10-CM

## 2019-06-18 DIAGNOSIS — R21 RASH AND NONSPECIFIC SKIN ERUPTION: ICD-10-CM

## 2019-06-18 DIAGNOSIS — J44.1 COPD EXACERBATION (H): ICD-10-CM

## 2019-06-18 DIAGNOSIS — Z53.20 MAMMOGRAM DECLINED: ICD-10-CM

## 2019-06-18 DIAGNOSIS — K86.81 EXOCRINE PANCREATIC INSUFFICIENCY: ICD-10-CM

## 2019-06-18 DIAGNOSIS — J44.9 CHRONIC OBSTRUCTIVE PULMONARY DISEASE, UNSPECIFIED COPD TYPE (H): ICD-10-CM

## 2019-06-18 DIAGNOSIS — D89.813 GVHD (GRAFT VERSUS HOST DISEASE) (H): ICD-10-CM

## 2019-06-18 DIAGNOSIS — G89.29 CHRONIC MIDLINE THORACIC BACK PAIN: ICD-10-CM

## 2019-06-18 ASSESSMENT — MIFFLIN-ST. JEOR: SCORE: 1143.7

## 2019-06-19 ENCOUNTER — COMMUNICATION - HEALTHEAST (OUTPATIENT)
Dept: INTERNAL MEDICINE | Facility: CLINIC | Age: 64
End: 2019-06-19

## 2019-06-19 DIAGNOSIS — G89.29 CHRONIC MIDLINE THORACIC BACK PAIN: ICD-10-CM

## 2019-06-19 DIAGNOSIS — M54.6 CHRONIC MIDLINE THORACIC BACK PAIN: ICD-10-CM

## 2019-07-15 ENCOUNTER — COMMUNICATION - HEALTHEAST (OUTPATIENT)
Dept: INTERNAL MEDICINE | Facility: CLINIC | Age: 64
End: 2019-07-15

## 2019-07-15 DIAGNOSIS — G89.29 CHRONIC MIDLINE THORACIC BACK PAIN: ICD-10-CM

## 2019-07-15 DIAGNOSIS — M54.6 CHRONIC MIDLINE THORACIC BACK PAIN: ICD-10-CM

## 2019-07-19 ENCOUNTER — APPOINTMENT (OUTPATIENT)
Dept: LAB | Facility: CLINIC | Age: 64
End: 2019-07-19
Attending: INTERNAL MEDICINE
Payer: MEDICARE

## 2019-07-19 ENCOUNTER — ONCOLOGY VISIT (OUTPATIENT)
Dept: TRANSPLANT | Facility: CLINIC | Age: 64
End: 2019-07-19
Attending: INTERNAL MEDICINE
Payer: MEDICARE

## 2019-07-19 VITALS
DIASTOLIC BLOOD PRESSURE: 68 MMHG | SYSTOLIC BLOOD PRESSURE: 105 MMHG | TEMPERATURE: 97.9 F | OXYGEN SATURATION: 97 % | WEIGHT: 115.6 LBS | RESPIRATION RATE: 18 BRPM | BODY MASS INDEX: 17.06 KG/M2 | HEART RATE: 68 BPM

## 2019-07-19 DIAGNOSIS — C92.01 ACUTE MYELOID LEUKEMIA IN REMISSION (H): ICD-10-CM

## 2019-07-19 DIAGNOSIS — Z13.29 SCREENING FOR THYROID DISORDER: ICD-10-CM

## 2019-07-19 DIAGNOSIS — Z94.81 S/P ALLOGENEIC BONE MARROW TRANSPLANT (H): Primary | ICD-10-CM

## 2019-07-19 DIAGNOSIS — K86.81 EXOCRINE PANCREATIC INSUFFICIENCY: ICD-10-CM

## 2019-07-19 LAB
ALBUMIN SERPL-MCNC: 3.6 G/DL (ref 3.4–5)
ALP SERPL-CCNC: 53 U/L (ref 40–150)
ALT SERPL W P-5'-P-CCNC: 30 U/L (ref 0–50)
ANION GAP SERPL CALCULATED.3IONS-SCNC: 7 MMOL/L (ref 3–14)
AST SERPL W P-5'-P-CCNC: 27 U/L (ref 0–45)
BASOPHILS # BLD AUTO: 0.1 10E9/L (ref 0–0.2)
BASOPHILS NFR BLD AUTO: 1.5 %
BILIRUB SERPL-MCNC: 0.3 MG/DL (ref 0.2–1.3)
BUN SERPL-MCNC: 13 MG/DL (ref 7–30)
CALCIUM SERPL-MCNC: 8.4 MG/DL (ref 8.5–10.1)
CHLORIDE SERPL-SCNC: 106 MMOL/L (ref 94–109)
CO2 SERPL-SCNC: 27 MMOL/L (ref 20–32)
CREAT SERPL-MCNC: 0.75 MG/DL (ref 0.52–1.04)
DIFFERENTIAL METHOD BLD: NORMAL
EOSINOPHIL # BLD AUTO: 0.2 10E9/L (ref 0–0.7)
EOSINOPHIL NFR BLD AUTO: 2.4 %
ERYTHROCYTE [DISTWIDTH] IN BLOOD BY AUTOMATED COUNT: 13.1 % (ref 10–15)
GFR SERPL CREATININE-BSD FRML MDRD: 84 ML/MIN/{1.73_M2}
GLUCOSE SERPL-MCNC: 111 MG/DL (ref 70–99)
HCT VFR BLD AUTO: 40.6 % (ref 35–47)
HGB BLD-MCNC: 13.2 G/DL (ref 11.7–15.7)
IMM GRANULOCYTES # BLD: 0 10E9/L (ref 0–0.4)
IMM GRANULOCYTES NFR BLD: 0.1 %
LYMPHOCYTES # BLD AUTO: 3.4 10E9/L (ref 0.8–5.3)
LYMPHOCYTES NFR BLD AUTO: 48 %
MCH RBC QN AUTO: 30.3 PG (ref 26.5–33)
MCHC RBC AUTO-ENTMCNC: 32.5 G/DL (ref 31.5–36.5)
MCV RBC AUTO: 93 FL (ref 78–100)
MONOCYTES # BLD AUTO: 0.6 10E9/L (ref 0–1.3)
MONOCYTES NFR BLD AUTO: 8.7 %
NEUTROPHILS # BLD AUTO: 2.8 10E9/L (ref 1.6–8.3)
NEUTROPHILS NFR BLD AUTO: 39.3 %
NRBC # BLD AUTO: 0 10*3/UL
NRBC BLD AUTO-RTO: 0 /100
PLATELET # BLD AUTO: 239 10E9/L (ref 150–450)
POTASSIUM SERPL-SCNC: 3.9 MMOL/L (ref 3.4–5.3)
PROT SERPL-MCNC: 6.6 G/DL (ref 6.8–8.8)
RBC # BLD AUTO: 4.36 10E12/L (ref 3.8–5.2)
SODIUM SERPL-SCNC: 139 MMOL/L (ref 133–144)
TSH SERPL DL<=0.005 MIU/L-ACNC: 1.79 MU/L (ref 0.4–4)
WBC # BLD AUTO: 7.1 10E9/L (ref 4–11)

## 2019-07-19 PROCEDURE — 85025 COMPLETE CBC W/AUTO DIFF WBC: CPT | Performed by: INTERNAL MEDICINE

## 2019-07-19 PROCEDURE — 36415 COLL VENOUS BLD VENIPUNCTURE: CPT

## 2019-07-19 PROCEDURE — 84443 ASSAY THYROID STIM HORMONE: CPT | Performed by: INTERNAL MEDICINE

## 2019-07-19 PROCEDURE — G0463 HOSPITAL OUTPT CLINIC VISIT: HCPCS | Mod: ZF

## 2019-07-19 PROCEDURE — 80053 COMPREHEN METABOLIC PANEL: CPT | Performed by: INTERNAL MEDICINE

## 2019-07-19 ASSESSMENT — PAIN SCALES - GENERAL: PAINLEVEL: NO PAIN (0)

## 2019-07-19 NOTE — NURSING NOTE
Chief Complaint   Patient presents with     Blood Draw     Labs drawn via  in lab by RN.  TOMI purple tube sent. VS taken.     Obdulia Chavez RN

## 2019-07-19 NOTE — NURSING NOTE
"Oncology Rooming Note    July 19, 2019 12:16 PM   Jenn Barclay is a 63 year old female who presents for:    Chief Complaint   Patient presents with     Blood Draw     Labs drawn via  in lab by BRANDEN KRISHNA purple tube sent. VS taken.     RECHECK     RTN- AML     Initial Vitals: /68 (BP Location: Right arm, Patient Position: Sitting, Cuff Size: Adult Regular)   Pulse 68   Temp 97.9  F (36.6  C) (Oral)   Resp 18   Wt 52.4 kg (115 lb 9.6 oz)   SpO2 97%   BMI 17.06 kg/m   Estimated body mass index is 17.06 kg/m  as calculated from the following:    Height as of 7/18/18: 1.753 m (5' 9.02\").    Weight as of this encounter: 52.4 kg (115 lb 9.6 oz). Body surface area is 1.6 meters squared.  No Pain (0) Comment: Data Unavailable   No LMP recorded.  Allergies reviewed: Yes  Medications reviewed: Yes    Medications: MEDICATION REFILLS NEEDED TODAY. Provider was notified.  Pharmacy name entered into [facility]:    [facility] - [address]  [facility] - [place], [place] - [address] [place] [address]    Clinical concerns: Needs a refill on Michele Cowan CMA              "

## 2019-07-19 NOTE — PATIENT INSTRUCTIONS
Continue Creon through [facility] with the help of social work.   ARBEN Agee on 1/22/19 with labs   Came back sooner as needed

## 2019-07-19 NOTE — PROGRESS NOTES
BMT Clinic Progress Note     HPI: Ms. Jenn Barclay is a 63 yo patient who had an allogeneic sibling transplant with us 9 years ago in CR, off immunosuppression    Interval Hx: She has been doing very well.  The patient has no new complaints.  In fact, she has been doing pretty good.  She put on a little weight.  She has been going out.  Unfortunately, she continues to smoke.  Continues to take care of her granddaughter on and off.  Has been getting the Creon pills regularly.  She thinks her prescription is about to  because it is about a year old.  Really trying to stop smoking but it is very hard.  She has no new gastrointestinal or genitourinary complaints.  She is not short of breath.  She has had no fevers.  There is no bruising or bleeding.      The remaining 12 point complete review of systems is otherwise negative.      PAST MEDICAL HISTORY:   1.  Significant for the acute myelogenous leukemia.   2.  Allogeneic sibling transplant in on 2010.   3.  COPD.   4.  Hysterectomy and bilateral oophorectomy at the age of 20.     5.  She had a compression fracture of her thoracic spine that was treated with a vertebroplasty about 6 months ago.   6.  She has a history of smoking for many years.  She denies alcohol or drug abuse.  She is not on any thyroid replacement therapy, and denies thyroid disease.   7. R Foot fracture 2017  8. Zoster 2018       PHYSICAL EXAMINATION:  Ms. Barclay is a very pleasant 61-year-old. /68 (BP Location: Right arm, Patient Position: Sitting, Cuff Size: Adult Regular)   Pulse 68   Temp 97.9  F (36.6  C) (Oral)   Resp 18   Wt 52.4 kg (115 lb 9.6 oz)   SpO2 97%   BMI 17.06 kg/m  .   General: frail, thin appearing woman in no distress  HEENT: perrl, eomi, no icterus, no oral lesions, no teeth  Resp: Distant breathing sounds without crackles or wheezes  CV: rrr no m/r/g  Abd: s, nt, nd, thin appearing, normal BS  Ext: no edema   Skin. Some small dry patches on skin  on arms and legs are stable. Acne on face improved.       Lab Results   Component Value Date    WBC 7.1 07/19/2019    ANEU 2.8 07/19/2019    HGB 13.2 07/19/2019    HCT 40.6 07/19/2019     07/19/2019     07/19/2019    POTASSIUM 3.9 07/19/2019    CHLORIDE 106 07/19/2019    CO2 27 07/19/2019     (H) 07/19/2019    BUN 13 07/19/2019    CR 0.75 07/19/2019    MAG 2.0 03/10/2011    INR 0.97 09/15/2010    BILITOTAL 0.3 07/19/2019    AST 27 07/19/2019    ALT 30 07/19/2019    ALKPHOS 53 07/19/2019    PROTTOTAL 6.6 (L) 07/19/2019    ALBUMIN 3.6 07/19/2019     TSH: 1.79      ASSESSMENT AND PLAN:  Ms. 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     1.  AML/HEME.  The patient in clinical remission. Counts stable.     2. Chronic Vmrwh-euenif-ltjj disease.  She has significant what I thought is on Creon and doing much better from her GI symptoms.  I encouraged her to not run out of medication and to call us at least with 2 or 3 months prior her prescription runs out to get it refilled.      3.  GI.  Benign polyps removed. Repeat colonoscopy in spring on 2020.   - Pancreatic insufficiency as outlined above.  - Nausea/vomiting: few episodes 5/8, now resolved 5/9- offered anti-emetics but patient declined.     4.  Osteoporosis.  Encouraged to restart her vitamin D     5.  Chronic obstructive pulmonary disease.  Again encouraged to stop smoking.      6. ID: no active infection  - Continue Acyclovir 800 mg BID prophy (pt reported as not currently taking 5/9)    7. Renal:   Electrolytes WNL despite diarrhea.   Creatinine slightly elevated from baseline:     8. Dermatology: She has this new rash that is not consistent with herpes or GVHD.  I am referring her back to dermatology for reassessment.  The cyst on her face are improved.        Plan:   Continue Creon through [facility] with the help of social work.   RTC  Cyndie on 1/22/19 with labs   Came back sooner as needed

## 2019-07-26 ENCOUNTER — TELEPHONE (OUTPATIENT)
Dept: TRANSPLANT | Facility: CLINIC | Age: 64
End: 2019-07-26

## 2019-07-26 NOTE — TELEPHONE ENCOUNTER
Clinical   Blood and Marrow Transplant Service    Reason for Intervention: Creon Kristopher Application Renewal    Data: Pt is a 63 year old female 9 years s/p allo transplant for AML.    Intervention: SW received a vm from pt stating her Creon kristopher application needs to be renewed. Pt only has a few days left of the medication. SW spoke with [employer] (P: [phone]) to see if a renewal can be done over the phone and it cannot; pt needs to re-sign the patient consent every year. CECILIA and RN CC completed the clinics portion of Creon kristopher application. SW spoke with pt and explained that she needs to complete the highlighted sections of the application and sign/date on page 3.     Also pt needs to include the following:    Copy of your medical insurance card(s)    Copy of your bank statement which states your Social Security income.    Once pt has completed the highlighted areas of the application and signed, SW asked pt to mail the application, along with a copy of insurance card(s) and bank statement back to BMT Social Work. SW will enclose an envelope to return to [facility] Social Work.     Pt stated she can go to her bank and get a bank statement that states her direct deposit of her social security. Pt also said she will make a copy of her insurance cards at the hardware store. Pt said she will complete the application and send all the information back. SW also provided a letter along with the application explaining what needs to be sent. Pt did not have any other questions or concerns. Encouraged patient/family to reach out as questions or concerns arise.     Education Provided: Creon Kristopher Application    Follow-up Required: SW will need to send in Creon Kristopher Application once pt returns it.    MASOUD Wolfe, [facility]  Phone: [phone]  Pager: [phone]

## 2019-08-13 ENCOUNTER — COMMUNICATION - HEALTHEAST (OUTPATIENT)
Dept: INTERNAL MEDICINE | Facility: CLINIC | Age: 64
End: 2019-08-13

## 2019-08-13 ENCOUNTER — RECORDS - HEALTHEAST (OUTPATIENT)
Dept: ADMINISTRATIVE | Facility: OTHER | Age: 64
End: 2019-08-13

## 2019-08-13 DIAGNOSIS — G89.29 CHRONIC MIDLINE THORACIC BACK PAIN: ICD-10-CM

## 2019-08-13 DIAGNOSIS — M54.6 CHRONIC MIDLINE THORACIC BACK PAIN: ICD-10-CM

## 2019-08-15 ENCOUNTER — TELEPHONE (OUTPATIENT)
Dept: TRANSPLANT | Facility: CLINIC | Age: 64
End: 2019-08-15

## 2019-08-15 NOTE — TELEPHONE ENCOUNTER
"Clinical   Blood and Marrow Transplant Service     Reason for Intervention: Creon Received      Data: Pt is a 63 year old female 9 years s/p allo transplant for AML.     Intervention: SW received a call from Clinic RN stating that pt received her Creon medication in the mail and pt wanted a call back from SW to confirm SW knew she received it. SW spoke with Jenn today and pt stated she received 3 bottles of Creon in the mail. Pt said \"I have been taking it religiously\". Pt said she just wanted to let everyone know that she did receive the Creon and will continue to take it daily. Pt did not have any other questions or concerns. Encouraged patient/family to reach out as questions or concerns arise.      Education Provided: Support Provided     Follow-up Required: None     MASOUD Wolfe, [facility]  Phone: [phone]  Pager: [phone]  "

## 2019-08-29 ENCOUNTER — COMMUNICATION - HEALTHEAST (OUTPATIENT)
Dept: INTERNAL MEDICINE | Facility: CLINIC | Age: 64
End: 2019-08-29

## 2019-08-29 DIAGNOSIS — R21 RASH: ICD-10-CM

## 2019-09-16 ENCOUNTER — COMMUNICATION - HEALTHEAST (OUTPATIENT)
Dept: INTERNAL MEDICINE | Facility: CLINIC | Age: 64
End: 2019-09-16

## 2019-09-16 DIAGNOSIS — G89.29 CHRONIC MIDLINE THORACIC BACK PAIN: ICD-10-CM

## 2019-09-16 DIAGNOSIS — M54.6 CHRONIC MIDLINE THORACIC BACK PAIN: ICD-10-CM

## 2019-10-01 ENCOUNTER — TRANSFERRED RECORDS (OUTPATIENT)
Dept: HEALTH INFORMATION MANAGEMENT | Facility: CLINIC | Age: 64
End: 2019-10-01

## 2019-10-01 ENCOUNTER — RECORDS - HEALTHEAST (OUTPATIENT)
Dept: ADMINISTRATIVE | Facility: OTHER | Age: 64
End: 2019-10-01

## 2019-10-02 ENCOUNTER — RECORDS - HEALTHEAST (OUTPATIENT)
Dept: ADMINISTRATIVE | Facility: OTHER | Age: 64
End: 2019-10-02

## 2019-10-14 ENCOUNTER — TRANSFERRED RECORDS (OUTPATIENT)
Dept: HEALTH INFORMATION MANAGEMENT | Facility: CLINIC | Age: 64
End: 2019-10-14

## 2019-10-22 ENCOUNTER — COMMUNICATION - HEALTHEAST (OUTPATIENT)
Dept: INTERNAL MEDICINE | Facility: CLINIC | Age: 64
End: 2019-10-22

## 2019-10-22 DIAGNOSIS — M54.6 CHRONIC MIDLINE THORACIC BACK PAIN: ICD-10-CM

## 2019-10-22 DIAGNOSIS — G89.29 CHRONIC MIDLINE THORACIC BACK PAIN: ICD-10-CM

## 2019-10-22 DIAGNOSIS — R10.13 ABDOMINAL PAIN, EPIGASTRIC: ICD-10-CM

## 2019-10-28 ENCOUNTER — TRANSFERRED RECORDS (OUTPATIENT)
Dept: HEALTH INFORMATION MANAGEMENT | Facility: CLINIC | Age: 64
End: 2019-10-28

## 2019-10-29 ENCOUNTER — COMMUNICATION - HEALTHEAST (OUTPATIENT)
Dept: ADMINISTRATIVE | Facility: HOSPITAL | Age: 64
End: 2019-10-29

## 2019-10-30 ENCOUNTER — COMMUNICATION - HEALTHEAST (OUTPATIENT)
Dept: ADMINISTRATIVE | Facility: HOSPITAL | Age: 64
End: 2019-10-30

## 2019-11-04 ENCOUNTER — MEDICAL CORRESPONDENCE (OUTPATIENT)
Dept: HEALTH INFORMATION MANAGEMENT | Facility: CLINIC | Age: 64
End: 2019-11-04

## 2019-11-05 ENCOUNTER — DOCUMENTATION ONLY (OUTPATIENT)
Dept: CARE COORDINATION | Facility: CLINIC | Age: 64
End: 2019-11-05

## 2019-11-22 ENCOUNTER — COMMUNICATION - HEALTHEAST (OUTPATIENT)
Dept: INTERNAL MEDICINE | Facility: CLINIC | Age: 64
End: 2019-11-22

## 2019-11-22 DIAGNOSIS — M54.6 CHRONIC MIDLINE THORACIC BACK PAIN: ICD-10-CM

## 2019-11-22 DIAGNOSIS — R10.13 ABDOMINAL PAIN, EPIGASTRIC: ICD-10-CM

## 2019-11-22 DIAGNOSIS — G89.29 CHRONIC MIDLINE THORACIC BACK PAIN: ICD-10-CM

## 2019-12-17 ENCOUNTER — OFFICE VISIT - HEALTHEAST (OUTPATIENT)
Dept: INTERNAL MEDICINE | Facility: CLINIC | Age: 64
End: 2019-12-17

## 2019-12-17 DIAGNOSIS — S22.060A TRAUMATIC COMPRESSION FRACTURE OF T8 THORACIC VERTEBRA, CLOSED, INITIAL ENCOUNTER (H): ICD-10-CM

## 2019-12-17 DIAGNOSIS — M54.6 CHRONIC MIDLINE THORACIC BACK PAIN: ICD-10-CM

## 2019-12-17 DIAGNOSIS — K86.81 EXOCRINE PANCREATIC INSUFFICIENCY: ICD-10-CM

## 2019-12-17 DIAGNOSIS — R91.8 PULMONARY NODULES: ICD-10-CM

## 2019-12-17 DIAGNOSIS — J44.9 CHRONIC OBSTRUCTIVE PULMONARY DISEASE, UNSPECIFIED COPD TYPE (H): ICD-10-CM

## 2019-12-17 DIAGNOSIS — Z79.899 CONTROLLED SUBSTANCE AGREEMENT SIGNED: ICD-10-CM

## 2019-12-17 DIAGNOSIS — D89.813 GVHD (GRAFT VERSUS HOST DISEASE) (H): ICD-10-CM

## 2019-12-17 DIAGNOSIS — Z78.0 ASYMPTOMATIC POSTMENOPAUSAL STATE: ICD-10-CM

## 2019-12-17 DIAGNOSIS — Z72.0 TOBACCO ABUSE: ICD-10-CM

## 2019-12-17 DIAGNOSIS — G89.29 CHRONIC MIDLINE THORACIC BACK PAIN: ICD-10-CM

## 2019-12-17 DIAGNOSIS — D69.2 SENILE PURPURA (H): ICD-10-CM

## 2019-12-17 DIAGNOSIS — Z51.81 ENCOUNTER FOR THERAPEUTIC DRUG MONITORING: ICD-10-CM

## 2019-12-17 DIAGNOSIS — Z53.20 MAMMOGRAM DECLINED: ICD-10-CM

## 2019-12-17 LAB
AMPHETAMINES UR QL SCN: ABNORMAL
BARBITURATES UR QL: ABNORMAL
BENZODIAZ UR QL: ABNORMAL
CANNABINOIDS UR QL SCN: ABNORMAL
COCAINE UR QL: ABNORMAL
CREAT UR-MCNC: 208.9 MG/DL
METHADONE UR QL SCN: ABNORMAL
OPIATES UR QL SCN: ABNORMAL
OXYCODONE UR QL: ABNORMAL
PCP UR QL SCN: ABNORMAL

## 2020-01-04 ENCOUNTER — HOSPITAL ENCOUNTER (OUTPATIENT)
Dept: CT IMAGING | Facility: HOSPITAL | Age: 65
Discharge: HOME | End: 2020-01-04
Attending: INTERNAL MEDICINE

## 2020-01-04 DIAGNOSIS — Z72.0 TOBACCO ABUSE: ICD-10-CM

## 2020-01-04 DIAGNOSIS — R91.8 PULMONARY NODULES: ICD-10-CM

## 2020-01-05 ENCOUNTER — COMMUNICATION - HEALTHEAST (OUTPATIENT)
Dept: INTERNAL MEDICINE | Facility: CLINIC | Age: 65
End: 2020-01-05

## 2020-01-14 ENCOUNTER — COMMUNICATION - HEALTHEAST (OUTPATIENT)
Dept: INTERNAL MEDICINE | Facility: CLINIC | Age: 65
End: 2020-01-14

## 2020-01-14 DIAGNOSIS — M81.0 AGE-RELATED OSTEOPOROSIS WITHOUT CURRENT PATHOLOGICAL FRACTURE: ICD-10-CM

## 2020-01-23 ENCOUNTER — COMMUNICATION - HEALTHEAST (OUTPATIENT)
Dept: INTERNAL MEDICINE | Facility: CLINIC | Age: 65
End: 2020-01-23

## 2020-01-23 DIAGNOSIS — G89.29 CHRONIC MIDLINE THORACIC BACK PAIN: ICD-10-CM

## 2020-01-23 DIAGNOSIS — M54.6 CHRONIC MIDLINE THORACIC BACK PAIN: ICD-10-CM

## 2020-01-24 ENCOUNTER — APPOINTMENT (OUTPATIENT)
Dept: LAB | Facility: CLINIC | Age: 65
End: 2020-01-24
Attending: INTERNAL MEDICINE
Payer: COMMERCIAL

## 2020-01-24 ENCOUNTER — ONCOLOGY VISIT (OUTPATIENT)
Dept: TRANSPLANT | Facility: CLINIC | Age: 65
End: 2020-01-24
Attending: INTERNAL MEDICINE
Payer: COMMERCIAL

## 2020-01-24 ENCOUNTER — TELEPHONE (OUTPATIENT)
Dept: GASTROENTEROLOGY | Facility: CLINIC | Age: 65
End: 2020-01-24

## 2020-01-24 ENCOUNTER — TELEPHONE (OUTPATIENT)
Dept: ENDOCRINOLOGY | Facility: CLINIC | Age: 65
End: 2020-01-24

## 2020-01-24 VITALS
HEART RATE: 72 BPM | DIASTOLIC BLOOD PRESSURE: 65 MMHG | TEMPERATURE: 98 F | WEIGHT: 112.43 LBS | RESPIRATION RATE: 16 BRPM | SYSTOLIC BLOOD PRESSURE: 121 MMHG | BODY MASS INDEX: 16.59 KG/M2 | OXYGEN SATURATION: 94 %

## 2020-01-24 DIAGNOSIS — Z12.11 SPECIAL SCREENING FOR MALIGNANT NEOPLASMS, COLON: Primary | ICD-10-CM

## 2020-01-24 DIAGNOSIS — Z94.81 S/P ALLOGENEIC BONE MARROW TRANSPLANT (H): ICD-10-CM

## 2020-01-24 DIAGNOSIS — Z13.29 SCREENING FOR THYROID DISORDER: ICD-10-CM

## 2020-01-24 DIAGNOSIS — E03.9 ACQUIRED HYPOTHYROIDISM: ICD-10-CM

## 2020-01-24 LAB
ALBUMIN SERPL-MCNC: 3.7 G/DL (ref 3.4–5)
ALP SERPL-CCNC: 57 U/L (ref 40–150)
ALT SERPL W P-5'-P-CCNC: 34 U/L (ref 0–50)
ANION GAP SERPL CALCULATED.3IONS-SCNC: 4 MMOL/L (ref 3–14)
AST SERPL W P-5'-P-CCNC: 27 U/L (ref 0–45)
BASOPHILS # BLD AUTO: 0.1 10E9/L (ref 0–0.2)
BASOPHILS NFR BLD AUTO: 1.8 %
BILIRUB SERPL-MCNC: 0.3 MG/DL (ref 0.2–1.3)
BUN SERPL-MCNC: 15 MG/DL (ref 7–30)
CALCIUM SERPL-MCNC: 8.6 MG/DL (ref 8.5–10.1)
CHLORIDE SERPL-SCNC: 105 MMOL/L (ref 94–109)
CO2 SERPL-SCNC: 30 MMOL/L (ref 20–32)
CREAT SERPL-MCNC: 0.65 MG/DL (ref 0.52–1.04)
DIFFERENTIAL METHOD BLD: NORMAL
EOSINOPHIL # BLD AUTO: 0.2 10E9/L (ref 0–0.7)
EOSINOPHIL NFR BLD AUTO: 2.6 %
ERYTHROCYTE [DISTWIDTH] IN BLOOD BY AUTOMATED COUNT: 13.8 % (ref 10–15)
GFR SERPL CREATININE-BSD FRML MDRD: >90 ML/MIN/{1.73_M2}
GLUCOSE SERPL-MCNC: 118 MG/DL (ref 70–99)
HCT VFR BLD AUTO: 41.3 % (ref 35–47)
HGB BLD-MCNC: 13.3 G/DL (ref 11.7–15.7)
IMM GRANULOCYTES # BLD: 0 10E9/L (ref 0–0.4)
IMM GRANULOCYTES NFR BLD: 0.3 %
LYMPHOCYTES # BLD AUTO: 2.7 10E9/L (ref 0.8–5.3)
LYMPHOCYTES NFR BLD AUTO: 36.1 %
MCH RBC QN AUTO: 29.4 PG (ref 26.5–33)
MCHC RBC AUTO-ENTMCNC: 32.2 G/DL (ref 31.5–36.5)
MCV RBC AUTO: 91 FL (ref 78–100)
MONOCYTES # BLD AUTO: 0.8 10E9/L (ref 0–1.3)
MONOCYTES NFR BLD AUTO: 10.8 %
NEUTROPHILS # BLD AUTO: 3.7 10E9/L (ref 1.6–8.3)
NEUTROPHILS NFR BLD AUTO: 48.4 %
NRBC # BLD AUTO: 0 10*3/UL
NRBC BLD AUTO-RTO: 0 /100
PLATELET # BLD AUTO: 221 10E9/L (ref 150–450)
POTASSIUM SERPL-SCNC: 4.2 MMOL/L (ref 3.4–5.3)
PROT SERPL-MCNC: 6.6 G/DL (ref 6.8–8.8)
RBC # BLD AUTO: 4.52 10E12/L (ref 3.8–5.2)
SODIUM SERPL-SCNC: 139 MMOL/L (ref 133–144)
T4 FREE SERPL-MCNC: 1.07 NG/DL (ref 0.76–1.46)
TSH SERPL DL<=0.005 MIU/L-ACNC: 11.91 MU/L (ref 0.4–4)
WBC # BLD AUTO: 7.6 10E9/L (ref 4–11)

## 2020-01-24 PROCEDURE — 80053 COMPREHEN METABOLIC PANEL: CPT | Performed by: INTERNAL MEDICINE

## 2020-01-24 PROCEDURE — 85025 COMPLETE CBC W/AUTO DIFF WBC: CPT | Performed by: INTERNAL MEDICINE

## 2020-01-24 PROCEDURE — 84443 ASSAY THYROID STIM HORMONE: CPT | Performed by: INTERNAL MEDICINE

## 2020-01-24 PROCEDURE — G0463 HOSPITAL OUTPT CLINIC VISIT: HCPCS

## 2020-01-24 PROCEDURE — 84439 ASSAY OF FREE THYROXINE: CPT | Performed by: INTERNAL MEDICINE

## 2020-01-24 PROCEDURE — 36415 COLL VENOUS BLD VENIPUNCTURE: CPT

## 2020-01-24 RX ORDER — ALENDRONATE SODIUM 70 MG/1
70 TABLET ORAL
COMMUNITY
End: 2020-06-24

## 2020-01-24 ASSESSMENT — PAIN SCALES - GENERAL: PAINLEVEL: NO PAIN (0)
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
## 2020-01-24 NOTE — NURSING NOTE
"Oncology Rooming Note    January 24, 2020 11:10 AM   Jenn Barclay is a 64 year old female who presents for:    Chief Complaint   Patient presents with     Blood Draw     Labs drawn by RN in Lab via Right Arm VPT.      RECHECK     Return: AML     Initial Vitals: /65   Pulse 72   Temp 98  F (36.7  C) (Oral)   Resp 16   Wt 51 kg (112 lb 7 oz)   SpO2 94%   BMI 16.59 kg/m   Estimated body mass index is 16.59 kg/m  as calculated from the following:    Height as of 7/18/18: 1.753 m (5' 9.02\").    Weight as of this encounter: 51 kg (112 lb 7 oz). Body surface area is 1.58 meters squared.  No Pain (0) Comment: Data Unavailable   No LMP recorded.  Allergies reviewed: Yes  Medications reviewed: Yes    Medications: Medication refills not needed today.  Pharmacy name entered into [employer]:    [facility] [address] [place] [address] E  [facility] [employer] - [address]    Clinical concerns: None       Yarelis Cowan CMA              "

## 2020-01-24 NOTE — NURSING NOTE
Chief Complaint   Patient presents with     Blood Draw     Labs drawn by RN in Lab via Right Arm VPT.      Madison Dietz RN

## 2020-01-24 NOTE — TELEPHONE ENCOUNTER
To schedulers: Please schedule  for lst available endocrine.  Preferred provider Quinton Ross Tro[place] A Burmeister, MD  Endocrine triage

## 2020-01-24 NOTE — PATIENT INSTRUCTIONS
ARBEN Agee on 7/17/2020 with labs   Ordered colonoscopy in [facility]  Dermatology referral in [facility] for possible skin cancer on her face  Continue Creon through [facility] with the help of social work.   Came back sooner as needed  Must stop smoking; she will work with primary care on that

## 2020-01-27 ENCOUNTER — TELEPHONE (OUTPATIENT)
Dept: GASTROENTEROLOGY | Facility: CLINIC | Age: 65
End: 2020-01-27

## 2020-01-29 ENCOUNTER — HOSPITAL ENCOUNTER (OUTPATIENT)
Facility: AMBULATORY SURGERY CENTER | Age: 65
End: 2020-01-29
Attending: INTERNAL MEDICINE
Payer: COMMERCIAL

## 2020-01-29 NOTE — TELEPHONE ENCOUNTER
RECORDS RECEIVED FROM: internal/CE   DATE RECEIVED: 2/8/20   NOTES (FOR ALL VISITS) STATUS DETAILS   OFFICE NOTES from referring provider Internal 1.24.20  Dmitriy Agee   OFFICE NOTES from other specialist N/A    ED NOTES N/A    OPERATIVE REPORT  (thyroid, pituitary, adrenal, parathyroid) N/A    MEDICATION LIST Internal    IMAGING      DEXASCAN Care Everywhere 1/14/20 [facility]   MRI (BRAIN) N/A    XR (Chest) Care Everywhere 12/25/19, 11/17/19, 11/6/18, 10/26/18, [facility]   CT (HEAD/NECK/CHEST/ABDOMEN) Care Everywhere Chest- 1/4/20, 4/21/19 [facility]  abd- 10/17/19, 4/21/19 [facility]   NUCLEAR  N/A    ULTRASOUND (HEAD/NECK) N/A    LABS     DIABETES: HBGA1C, CREATININE, FASTING LIPIDS, MICROALBUMIN URINE, POTASSIUM, TSH, T4    THYROID: TSH, T4, CBC, THYRODLONULIN, TOTAL T3, FREE T4, CALCITONIN, CEA Internal   TSH- 1/24/20  T4-1/24/20   CBC 1/24/20          Action 1.29.20 sv   Action Taken Records request sent to [facility] for   CXR 12/25/19, 11/17/19, 11/6/18, 10/26/18  CT chest/abd- 1/4/20, 4/21/19, 10/17/19, 4/21/19  DEXA- 1/14/20        Action 2.5.20 sv   Action Taken 2nd request sent        Action 2.7.20 sv    Action Taken 3rd request sent to [facility]  Received images from [facility]

## 2020-02-03 ENCOUNTER — TELEPHONE (OUTPATIENT)
Dept: ENDOCRINOLOGY | Facility: CLINIC | Age: 65
End: 2020-02-03

## 2020-02-03 NOTE — TELEPHONE ENCOUNTER
[facility] Call Center    Phone Message    May a detailed message be left on voicemail: yes    Reason for Call: Other: Pt requests a call back to answer multiple questions regarding her appt.  Pt wants to know what will be done and how long it will take so she can schedule her rides.  She also wants to know if any prep on her part is required.  Please call     Action Taken: Message routed to:  [facility] endocrine

## 2020-02-04 NOTE — TELEPHONE ENCOUNTER
[facility] Call Center    Phone Message    May a detailed message be left on voicemail: yes     Reason for Call: Other: Pt called to get an update on message sent over yesterday regarding how long her appt will last on 2/8 with Dr. Ross. Pt needing to schedule her metro mobility ride today     Action Taken: Message routed to:  [facility]): endo    Travel Screening: Not Applicable

## 2020-02-07 NOTE — PROGRESS NOTES
Endocrine Consult note    Attending Assessment/Plan :     Osteoporosis with vertebral compression fratures. She recently started on alendronate.  This might be OK but she has a lot of GI symptoms.   Labs to include phos, PTH, iCa.      High TSH on recent TFTS. Labs had been normal very recently in July.  She is clinically euthyroid.  Repeat with antiobodies  Results following the appt are more normal than 1/24/2020, with negative antibodies.     I see Dr Agee has TSH reflex ordered for July 2020.  I agree with this plan.  No treatment for now.      Kidney stones seen on CT 1/4/2020. ;She has never passed a kidney stone by history. This history may be relevant to # 1.     Post menopausal; history of surgical menopause in her 20s  This is a risk factor for the bone.      Nocturia x 4/night - longstaniding. Labs to include HgbA1c.      Underweight, on creon. She is edentulous which might add to the difficulty in getting kcal.      GI symptoms - not addressed.  This is also likely contributing to the weight loss issue.  As pre #1 I don't know if we can really get oral bisphosphonate in her and keep it in her.     Sarah Ross MD      Chief complaint:  Jenn is a 64 year old female seen in consultation at the request of Dr Dmitriy Agee for hypothyroidism .  I have reviewed Care Everywhere including [facility] lab reports, imaging reports and provider notes as indicated.      HISTORY OF PRESENT ILLNESS    I have reviewed all available TFTS on the system. They show that Jenn has had slightly high TSH a few times in 2010, 2011 AND 2012 and then again on  1/24/2020.  Interim TSH levels were repeatedly normal in 2015 , 2016, most recently 7/19/19 TSH 1.79.  On 1/24/2020 she had TSH 11.9 , free T4 1.07.    She has a once/week pill x 3 months- alendronate 70 mg. Dr Peña gave it to her about one month ago. She as been taking on Thursday and tolerating it  D3 1000 units/ on this longstanding.    Calcium - she doesn't know the dose -she takes it once/ay  MVI- on this longstanding  She has been drinking Ensure premium to help her gain weight.     She has not been sick lately.     5/11/10: TSH 6.61, free T4 0.86  6/3/11 TSH 5.56, free T4 1.13  5/11/12 TSH 3.02  6/1/12 TSH 5.32, free T4 1.10  5/7/15 TSH 2.72  1/29/16 TSH 2.48,   7/15/16 TSH 2.13  10/16/19: Ca 10.1  11/16/16 TSH 2.88,   1/17/18: TSH 2.82  7/19/19 TSH 1.79,   12/24/19 Ca 8.9  1/24/2020 TSH 11.91, free T4 1.07    Weights   Today 112  1/11/19: 115  1/29/18 115  1/27/17 109    7/9/2010 chest CT [facility] - as reviewed by me: thyroid appears normal  4/26/16 CT T and L spine: acute or subacute fx T8; chronic appearing Fx T5, T6, T7 ; exaggerated thoracic kyphosis  6/9/16: MRI L spine: known chronic superior endplate compression fracture L4 and L5  9/26/17: foot xray: minimally displaced fracture neck of 2nd metatarsal  4/21/19 CT abdomen: [facility]: right renal calculus  1/4/2020 chest CT [facility]: right renal calculus; thyroid appears normal per my review of the images.   1/14/2020 DXA [facility]: lowest T-score -3.6 at the left total hip.      REVIEW OF SYSTEMS  Lost teeth-- can't find anybody that can make teeth that don't hurt after 2 hours.   Sleep at night- constantly waking up to go to the bathroom  Leukemia is dormant   ? GVH - gets rash on hips/ legs and back     Answers for HPI/ROS submitted by the patient on 2/8/2020   General Symptoms: Yes  Skin Symptoms: Yes  HENT Symptoms: Yes-- tinnitus is killing me - constant x days, last night very loudp; present x years.   EYE SYMPTOMS: Yes  HEART SYMPTOMS: No  LUNG SYMPTOMS: Yes  INTESTINAL SYMPTOMS: Yes-- feels like food stops in epigastrium whenever I eat, causes pian in abdomen; sometimes vomits.   URINARY SYMPTOMS: Yes  GYNECOLOGIC SYMPTOMS: No  BREAST SYMPTOMS: No  SKELETAL SYMPTOMS: Yes  BLOOD SYMPTOMS: No  NERVOUS SYSTEM SYMPTOMS: No  MENTAL HEALTH SYMPTOMS: No  Fever: No  Loss of  appetite: No  Weight loss: Yes-- was 147 before the AML.  Now can't get to > 112.  Favorite hobby is eating. Eats everything.  Eating all day long.  Snacks could be toast, chips and salsa, she likes crispy, greasy. She used to be size 3-4 and is now size 1 on pants.  She notes she was able to eat steak with no teeth - gummed it to death    Weight gain: No  Fatigue: Yes  Night sweats: No  Chills: No  Increased stress: Yes  Excessive hunger: No  Excessive thirst: No  Feeling hot or cold when others believe the temperature is normal: No  Loss of height: No  Post-operative complications: No  Surgical site pain: No  Hallucinations: No  Change in or Loss of Energy: No  Hyperactivity: Yes  Confusion: No  Changes in hair: No  Changes in moles/birth marks: No  Itching: No  Rashes: Yes  Changes in nails: No  Ear pain: No  Ear discharge: No  Hearing loss: No  Tinnitus: Yes  Nosebleeds: No  Tooth pain: No  Gum tenderness: No  Bleeding gums: No  Change in taste: No  Change in sense of smell: No  Dry mouth: No  Hearing aid used: No  Neck lump: No  Eye pain: Yes  Vision loss: No  Dry eyes: No  Watery eyes: No  Eye bulging: No  Double vision: No  Flashing of lights: No  Spots: Yes  Floaters: Yes  Redness: No  Crossed eyes: No  Tunnel Vision: No  Yellowing of eyes: No  Eye irritation: No  Cough: Yes  Sputum or phlegm: Yes  Coughing up blood: No  Difficulty breating or shortness of breath: Yes- attributed to asthma.  Currently in a good place.  Hurts to breathe walking outside when it is so cold out  Snoring: No  Wheezing: Yes  Difficulty breathing on exertion: No  Nighttime Cough: No  Difficulty breathing when lying flat: Yes  Heart burn or indigestion: Yes-- lately this has been in waves. She treats with tums.  She doesn't take PPI due to the fac tthis si a new symptoms in the last week.    Nausea: Yes  Vomiting: Yes-- lately 4 times since 1/31/2020; vomited Mon and Thursday - it has awakened her from sleep.  The emesis does not  look like the food she has eaten.    Abdominal pain: No  Bloating: No  Constipation: No  Diarrhea: No  Blood in stool: No  Black stools: No  Rectal or Anal pain: No  Fecal incontinence: No  Yellowing of skin or eyes: No  Vomit with blood: No  Change in stools: No  Trouble holding urine or incontinence: No  Pain or burning: No  Trouble starting or stopping: No  Increased frequency of urination: Yes  Blood in urine: No  Decreased frequency of urination: No  Frequent nighttime urination: Yes  Flank pain: No  Difficulty emptying bladder: No  Back pain: Yes  Muscle aches: No  Neck pain: No  Swollen joints: No  Joint pain: No  Bone pain: No  Muscle cramps: Yes  Muscle weakness: No  Joint stiffness: No  Bone fracture: No    10 system ROS otherwise as per the HPI or negative    Past Medical History  Past Medical History:   Diagnosis Date     AML (acute myeloid leukemia) (H) 12/2009     COPD (chronic obstructive pulmonary disease) (H)      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       alendronate (FOSAMAX) 70 MG tablet Take 70 mg by mouth every 7 days       amylase-lipase-protease (CREON) 18608-61211 units CPEP per EC capsule Take 2 capsules (48,000 Units) by mouth 3 times daily (with meals) And snacks 300 capsule 11     ascorbic acid (VITAMIN C) 500 MG tablet Take 500 mg by mouth daily.       cholecalciferol (VITAMIN D3) 1000 UNIT capsule Take 1 capsule by mouth daily.       Loperamide HCl (IMODIUM OR) Take by mouth 2 times daily       Multiple Vitamins-Minerals (MULTIVITAL PO)  Take  by mouth.       Naproxen Sodium (ALEVE PO) Take by mouth daily       triamcinolone (KENALOG) 0.1 % external cream Apply tid to itchy or tender skin lesions 80 g 3       Allergies  Allergies   Allergen Reactions     Mirtazapine      Other reaction(s): Other (See Comments)  Hallucination      Tape [Adhesive Tape]      Allergy Hx     Liquid Adhesive Rash     Other reaction(s): Other (See Comments)  Allergy Hx - Rash from paper tape     Family History  family history includes Diabetes in her maternal grandmother, maternal uncle, and mother; Pancreatic Cancer in her father.    Social History  Social History     Tobacco Use     Smoking status: Current Every Day Smoker     Packs/day: 1.00     Years: 45.00     Pack years: 45.00     Types: Cigarettes     Smokeless tobacco: Never Used   Substance Use Topics     Alcohol use: None     Drug use: None     ; lives with grandson and 2 dogs including Rottwieler puppy that belongs to Grandson's girlfriend' 18 year old favorite dog  recently- photo still wallpaper on her phone.    Walks 5000 steps to the store and back daily  when the weather isn't so cold    Physical Exam  /85   Pulse 72   Wt 50.8 kg (112 lb)   BMI 16.53 kg/m    Body mass index is 16.53 kg/m .  GENERAL :  Pleasant thin woman In no apparent distress  SKIN: Normal color, normal temperature, texture.  No hirsutism, alopecia or purple striae.     EYES: PERRL, EOMI, No scleral icterus,  No proptosis, conjunctival redness, stare, retraction  MOUTH: Moist, pink; pharynx clear; edentulous  NECK: No visible masses. No palpable adenopathy, or masses. No carotid bruits.   THYROID:  Palpable, not enlarged.    RESP: Lungs clear to auscultation bilaterally  CARDIAC: Regular rate and rhythm, normal S1 S2, without murmurs, rubs or gallops    ABDOMEN: Normal bowel sounds; soft, nontender, no HSM or masses       NEURO: awake, alert, responds appropriately to questions.  Cranial nerves intact.  Moves all  extremities; Gait normal.  No tremor of the outstretched hand.  DTRs  1 /4 ,   EXTREMITIES: No clubbing, cyanosis or edema.    DATA REVIEW     Ref. Range 2/8/2020 10:36   Parathyroid Hormone Intact Latest Ref Range: 18 - 80 pg/mL 72   Thyroid Peroxidase Antibody Latest Ref Range: <35 IU/mL 22      Ref. Range 1/24/2020 11:05 2/8/2020 10:36   Sodium Latest Ref Range: 133 - 144 mmol/L 139    Potassium Latest Ref Range: 3.4 - 5.3 mmol/L 4.2    Chloride Latest Ref Range: 94 - 109 mmol/L 105    Carbon Dioxide Latest Ref Range: 20 - 32 mmol/L 30    Urea Nitrogen Latest Ref Range: 7 - 30 mg/dL 15    Creatinine Latest Ref Range: 0.52 - 1.04 mg/dL 0.65    GFR Estimate Latest Ref Range: >60 mL/min/1.73_m2 >90    GFR Estimate If Black Latest Ref Range: >60 mL/min/1.73_m2 >90    Calcium Latest Ref Range: 8.5 - 10.1 mg/dL 8.6    Anion Gap Latest Ref Range: 3 - 14 mmol/L 4    Phosphorus Latest Ref Range: 2.5 - 4.5 mg/dL  2.8   Albumin Latest Ref Range: 3.4 - 5.0 g/dL 3.7    Protein Total Latest Ref Range: 6.8 - 8.8 g/dL 6.6 (L)    Bilirubin Total Latest Ref Range: 0.2 - 1.3 mg/dL 0.3    Alkaline Phosphatase Latest Ref Range: 40 - 150 U/L 57    ALT Latest Ref Range: 0 - 50 U/L 34    AST Latest Ref Range: 0 - 45 U/L 27    Hemoglobin A1C Latest Ref Range: 0 - 5.6 %  5.6   Calcium Ionized Latest Ref Range: 4.4 - 5.2 mg/dL  4.7   T4 Free Latest Ref Range: 0.76 - 1.46 ng/dL 1.07 1.00   Thyroglobulin Antibody Latest Ref Range: <40 IU/mL  <20   TSH Latest Ref Range: 0.40 - 4.00 mU/L 11.91 (H) 6.42 (H)   Glucose Latest Ref Range: 70 - 99 mg/dL 118 (H)        1/10/2020  RE: Jenn Barclay  YOB: 1955  Dear Jorge Dunn,  Patient Profile:  64 y.o. female, postmenopausal, is here for the first bone density test.   History of fractures - Yes;  Thoracic vertebrae and Hip. Family history of   osteoporosis - None.  Family history of hip fracture: None. Smoking   history - Current. Osteoporosis treatment past -  Yes;  HRT.  Osteoporosis   treatment current - No.  Chronic medical problems - Chronic low back   problems, Hysterectomy, Oophorectomy, Radiation treatment and Spine   surgery. High risk medications -  Chemotherapy;  Yes, in the Past.  Assessment:  1. The spine bone density L1-L4 with T-score -3.0.  2. Femoral bone density shows left total hip T- score -3.6.  3. Trabecular bone score indicates poor trabecular bone architecture.    64 y.o. female with SEVERE OSTEOPOROSIS and HIGH fracture risk, based on   both the bone density and history of thoracic vertebral compression   fracture, along with hip fracture.   .  Recommendations:  Appropriate evaluation and treatment recommended with follow up bone   density scan in 1 to 2 years.      Bone densitometry was performed on your patient using our katena   densitometer. The results are summarized and a copy of the actual scans   are included for your review. In conformity with the International Society   of Clinical Densitometry's most recent position statement for DXA   interpretation (2015), the diagnosis will be made on the lowest measured   T-score of the lumbar spine, femoral neck, total proximal femur or 33%   radius. Note the change in terminology for diagnostic classification from   OSTEOPENIA to LOW BONE MASS. All trending for sequential exams will be   done using multiple vertebrae or the total proximal femur. Fracture risk   is based on the WHO Fracture Risk Assessment Tool (FRAX). If additional   information is needed or if you would like to discuss the results, please   do not hesitate to call me.     CT Chest Without Contrast1/4/2020  [facility]  Result Impression     1.  Resolution of prior left lower lobe 7 mm nodule, most consistent with a focus of atelectasis or infection / inflammation.    2.  Few other stable tiny pulmonary nodules.    3.  Stable emphysema. Borderline airway thickening and minimal retained airway secretions. Minimal  bronchiectasis.    4.  Nonobstructing right renal calculus.    5.  Other findings in the report.   Result Narrative   EXAM: CT CHEST WO CONTRAST  LOCATION: [facility]  DATE/TIME: 1/4/2020 10:48 AM    INDICATION: Tobacco abuse, follow-up lung nodules.  COMPARISON: 12/24/2019 radiograph. Chest CT 04/21/2019.  TECHNIQUE: CT chest without IV contrast. Multiplanar reformats were obtained. Dose reduction techniques were used.  CONTRAST: None.    FINDINGS:   LUNGS AND PLEURA: Stable scarring and emphysema. Prior left lower lobe superior segment subpleural nodular density has resolved. Few other stable small pulmonary nodules, including 2-3 mm right lower lobe (series 4, image 157). Few small incidental   calcified granulomas. Borderline airway thickening. Minimal bronchiectasis. No pleural effusion.    MEDIASTINUM/AXILLAE: No adenopathy. Remote granulomatous audelia calcifications. Trace pericardial effusion inferiorly. Atherosclerosis.    UPPER ABDOMEN: 3 x 5 mm nonobstructing right calculus.    MUSCULOSKELETAL: Bony demineralization and degenerative changes. No significant change of T5 and T8 compression fractures postvertebral plasty. No significant change of T6, T7 and T10 compression fractures.

## 2020-02-08 ENCOUNTER — OFFICE VISIT (OUTPATIENT)
Dept: ENDOCRINOLOGY | Facility: CLINIC | Age: 65
End: 2020-02-08
Payer: COMMERCIAL

## 2020-02-08 ENCOUNTER — COMMUNICATION - HEALTHEAST (OUTPATIENT)
Dept: INTERNAL MEDICINE | Facility: CLINIC | Age: 65
End: 2020-02-08

## 2020-02-08 ENCOUNTER — PRE VISIT (OUTPATIENT)
Dept: ENDOCRINOLOGY | Facility: CLINIC | Age: 65
End: 2020-02-08

## 2020-02-08 VITALS
DIASTOLIC BLOOD PRESSURE: 85 MMHG | HEART RATE: 72 BPM | BODY MASS INDEX: 16.53 KG/M2 | SYSTOLIC BLOOD PRESSURE: 128 MMHG | WEIGHT: 112 LBS

## 2020-02-08 DIAGNOSIS — R35.1 NOCTURIA: ICD-10-CM

## 2020-02-08 DIAGNOSIS — M80.00XD AGE-RELATED OSTEOPOROSIS WITH CURRENT PATHOLOGICAL FRACTURE WITH ROUTINE HEALING, SUBSEQUENT ENCOUNTER: ICD-10-CM

## 2020-02-08 DIAGNOSIS — Z78.0 POST-MENOPAUSAL: ICD-10-CM

## 2020-02-08 DIAGNOSIS — R79.89 HIGH SERUM THYROID STIMULATING HORMONE (TSH): ICD-10-CM

## 2020-02-08 DIAGNOSIS — R79.89 HIGH SERUM THYROID STIMULATING HORMONE (TSH): Primary | ICD-10-CM

## 2020-02-08 DIAGNOSIS — N20.0 NEPHROLITHIASIS: ICD-10-CM

## 2020-02-08 DIAGNOSIS — J44.1 COPD EXACERBATION (H): ICD-10-CM

## 2020-02-08 DIAGNOSIS — R63.6 UNDERWEIGHT: ICD-10-CM

## 2020-02-08 LAB
CA-I SERPL ISE-MCNC: 4.7 MG/DL (ref 4.4–5.2)
HBA1C MFR BLD: 5.6 % (ref 0–5.6)
PHOSPHATE SERPL-MCNC: 2.8 MG/DL (ref 2.5–4.5)
PTH-INTACT SERPL-MCNC: 72 PG/ML (ref 18–80)
T4 FREE SERPL-MCNC: 1 NG/DL (ref 0.76–1.46)
TSH SERPL DL<=0.005 MIU/L-ACNC: 6.42 MU/L (ref 0.4–4)

## 2020-02-08 ASSESSMENT — ENCOUNTER SYMPTOMS
WEIGHT LOSS: 1
HALLUCINATIONS: 0
FEVER: 0
HEMOPTYSIS: 0
CONSTIPATION: 0
HEARTBURN: 1
ARTHRALGIAS: 0
SPUTUM PRODUCTION: 1
TASTE DISTURBANCE: 0
WHEEZING: 1
SNORES LOUDLY: 0
EYE IRRITATION: 0
COUGH: 1
SKIN CHANGES: 0
DOUBLE VISION: 0
HEMATURIA: 0
COUGH DISTURBING SLEEP: 0
BLOATING: 0
POLYDIPSIA: 0
ABDOMINAL PAIN: 0
FLANK PAIN: 0
EYE PAIN: 1
POLYPHAGIA: 0
EYE REDNESS: 0
BACK PAIN: 1
INCREASED ENERGY: 0
STIFFNESS: 0
EYE WATERING: 0
RECTAL PAIN: 0
ALTERED TEMPERATURE REGULATION: 0
MUSCLE WEAKNESS: 0
MUSCLE CRAMPS: 1
POSTURAL DYSPNEA: 1
FATIGUE: 1
BOWEL INCONTINENCE: 0
DYSPNEA ON EXERTION: 0
JAUNDICE: 0
NECK PAIN: 0
NAIL CHANGES: 0
SHORTNESS OF BREATH: 1
VOMITING: 1
JOINT SWELLING: 0
BLOOD IN STOOL: 0
CHILLS: 0
DIARRHEA: 0
DYSURIA: 0
MYALGIAS: 0
SMELL DISTURBANCE: 0
NECK MASS: 0
NAUSEA: 1
DECREASED APPETITE: 0
WEIGHT GAIN: 0
NIGHT SWEATS: 0
DIFFICULTY URINATING: 0

## 2020-02-08 ASSESSMENT — PAIN SCALES - GENERAL: PAINLEVEL: NO PAIN (0)

## 2020-02-08 NOTE — LETTER
2/8/2020       RE: Jenn Barclay  [address] [place] [address]     Dear Colleague,    Thank you for referring your patient, Jenn Barclay, to the [facility] ENDOCRINOLOGY at [facility]. Please see a copy of my visit note below.    Endocrine Consult note    Attending Assessment/Plan :     Osteoporosis with vertebral compression fratures. She recently started on alendronate.  This might be OK but she has a lot of GI symptoms.   Labs to include phos, PTH, iCa.      High TSH on recent TFTS. Labs had been normal very recently in July.  She is clinically euthyroid.  Repeat with antiobodies    Kidney stones seen on CT 1/4/2020. ;She has never passed a kidney stone by history. This history may be relevant to # 1.     Post menopausal; history of surgical menopause in her 20s  This is a risk factor for the bone.      Nocturia x 4/night - longstaniding. Labs to include HgbA1c.      Underweight, on creon. She is edentulous which might add to the difficulty in getting kcal.      GI symptoms - not addressed.  This is also likely contributing to the weight loss issue.  As pre #1 I don't know if we can really get oral bisphosphonate in her and keep it in her.     Sarah Ross MD      Chief complaint:  Jenn is a 64 year old female seen in consultation at the request of Dr Dmitriy Agee for hypothyroidism .  I have reviewed Care Everywhere including [facility] lab reports, imaging reports and provider notes as indicated.      HISTORY OF PRESENT ILLNESS    I have reviewed all available TFTS on the system. They show that Jenn has had slightly high TSH a few times in 2010, 2011 AND 2012 and then again on  1/24/2020.  Inbterim TSH levels were repeatedly normal in 2015 , 2016, most recently 7/19/19 TSH 1.79.  On 1/24/2020 she had TSH 11.9 , free T4 1.07.    She has a once/week pill x 3 months- alendronate 70 mg. Dr Peña gave it to her about one month ago. She as  been taking on Thursday and tolerating it  D3 1000 units/ on this longstanding.   Calcium - she doesn't know the dose -she takes it once/ay  MVI- on this longstanding  She has been drinking Ensure premium to help her gain weight.     She has not been sick lately.     5/11/10: TSH 6.61, free T4 0.86  6/3/11 TSH 5.56, free T4 1.13  5/11/12 TSH 3.02  6/1/12 TSH 5.32, free T4 1.10  5/7/15 TSH 2.72  1/29/16 TSH 2.48,   7/15/16 TSH 2.13  10/16/19: Ca 10.1  11/16/16 TSH 2.88,   1/17/18: TSH 2.82  7/19/19 TSH 1.79,   12/24/19 Ca 8.9  1/24/2020 TSH 11.91, free T4 1.07    Weights   Today 112  1/11/19: 115  1/29/18 115  1/27/17 109    7/9/2010 chest CT [facility] - as reviewed by me: thyroid appears normal  4/26/16 CT T and L spine: acute or subacute fx T8; chronic appearing Fx T5, T6, T7 ; exaggerated thoracic kyphosis  6/9/16: MRI L spine: known chronic superior endplate compression fracture L4 and L5  9/26/17: foot xray: minimally displaced fracture neck of 2nd metatarsal  4/21/19 CT abdomen: [facility]: right renal calculus  1/4/2020 chest CT [facility]: right renal calculus; thyroid appears normal per my review of the images.   1/14/2020 DXA [facility]: lowest T-score -3.6 at the left total hip.      REVIEW OF SYSTEMS  Lost teeth-- can't find anybody that can make teeth that don't hurt after 2 hours.   Sleep at night- constantly waking up to go to the bathroom  Leukemia is dormant   ? GVH - gets rash on hips/ legs and back     Answers for HPI/ROS submitted by the patient on 2/8/2020   General Symptoms: Yes  Skin Symptoms: Yes  HENT Symptoms: Yes-- tinnitus is killing me - constant x days, last night very loudp; present x years.   EYE SYMPTOMS: Yes  HEART SYMPTOMS: No  LUNG SYMPTOMS: Yes  INTESTINAL SYMPTOMS: Yes-- feels like food stuops in epigastrium whenever I eat, causes pian in abdomen; sometimes vomits.   URINARY SYMPTOMS: Yes  GYNECOLOGIC SYMPTOMS: No  BREAST SYMPTOMS: No  SKELETAL SYMPTOMS: Yes  BLOOD SYMPTOMS:  No  NERVOUS SYSTEM SYMPTOMS: No  MENTAL HEALTH SYMPTOMS: No  Fever: No  Loss of appetite: No  Weight loss: Yes-- was 147 before the AML.  Now can't get to > 112.  Favorite hobby is eating. Eats everything.  Eating all day long.  Snacks could be toast, chips and salsa, she likes crispy, greasy. She used to be size 3-4 and is now size 1 on pants.  She notes she was able to eat steak with no teeth - gummed it to death    Weight gain: No  Fatigue: Yes  Night sweats: No  Chills: No  Increased stress: Yes  Excessive hunger: No  Excessive thirst: No  Feeling hot or cold when others believe the temperature is normal: No  Loss of height: No  Post-operative complications: No  Surgical site pain: No  Hallucinations: No  Change in or Loss of Energy: No  Hyperactivity: Yes  Confusion: No  Changes in hair: No  Changes in moles/birth marks: No  Itching: No  Rashes: Yes  Changes in nails: No  Ear pain: No  Ear discharge: No  Hearing loss: No  Tinnitus: Yes  Nosebleeds: No  Tooth pain: No  Gum tenderness: No  Bleeding gums: No  Change in taste: No  Change in sense of smell: No  Dry mouth: No  Hearing aid used: No  Neck lump: No  Eye pain: Yes  Vision loss: No  Dry eyes: No  Watery eyes: No  Eye bulging: No  Double vision: No  Flashing of lights: No  Spots: Yes  Floaters: Yes  Redness: No  Crossed eyes: No  Tunnel Vision: No  Yellowing of eyes: No  Eye irritation: No  Cough: Yes  Sputum or phlegm: Yes  Coughing up blood: No  Difficulty breating or shortness of breath: Yes- attributed to asthma.  Currently in a good place.  Hurts to breathe walking outside when it is so cold out  Snoring: No  Wheezing: Yes  Difficulty breathing on exertion: No  Nighttime Cough: No  Difficulty breathing when lying flat: Yes  Heart burn or indigestion: Yes-- lately this has been in waves. She treats with tums.  She doesn't take PPI due to the fac tthis si a new symptoms in the last week.    Nausea: Yes  Vomiting: Yes-- lately 4 times since 1/31/2020;  vomited Mon and Thursday - it has awakened her from sleep.  The emesis does not look like the food she has eaten.    Abdominal pain: No  Bloating: No  Constipation: No  Diarrhea: No  Blood in stool: No  Black stools: No  Rectal or Anal pain: No  Fecal incontinence: No  Yellowing of skin or eyes: No  Vomit with blood: No  Change in stools: No  Trouble holding urine or incontinence: No  Pain or burning: No  Trouble starting or stopping: No  Increased frequency of urination: Yes  Blood in urine: No  Decreased frequency of urination: No  Frequent nighttime urination: Yes  Flank pain: No  Difficulty emptying bladder: No  Back pain: Yes  Muscle aches: No  Neck pain: No  Swollen joints: No  Joint pain: No  Bone pain: No  Muscle cramps: Yes  Muscle weakness: No  Joint stiffness: No  Bone fracture: No    10 system ROS otherwise as per the HPI or negative    Past Medical History  Past Medical History:   Diagnosis Date     AML (acute myeloid leukemia) (H) 12/2009     COPD (chronic obstructive pulmonary disease) (H)      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       alendronate (FOSAMAX) 70 MG tablet Take 70 mg by mouth every 7 days       amylase-lipase-protease (CREON) 60032-28591 units CPEP per EC capsule Take 2 capsules (48,000 Units) by mouth 3 times daily (with meals) And snacks 300 capsule 11     ascorbic acid (VITAMIN C) 500 MG tablet Take 500 mg by mouth daily.       cholecalciferol (VITAMIN D3) 1000 UNIT capsule Take 1 capsule by mouth daily.       Loperamide HCl (IMODIUM OR)  Take by mouth 2 times daily       Multiple Vitamins-Minerals (MULTIVITAL PO) Take  by mouth.       Naproxen Sodium (ALEVE PO) Take by mouth daily       triamcinolone (KENALOG) 0.1 % external cream Apply tid to itchy or tender skin lesions 80 g 3       Allergies  Allergies   Allergen Reactions     Mirtazapine      Other reaction(s): Other (See Comments)  Hallucination      Tape [Adhesive Tape]      Allergy Hx     Liquid Adhesive Rash     Other reaction(s): Other (See Comments)  Allergy Hx - Rash from paper tape     Family History  family history includes Diabetes in her maternal grandmother, maternal uncle, and mother; Pancreatic Cancer in her father.    Social History  Social History     Tobacco Use     Smoking status: Current Every Day Smoker     Packs/day: 1.00     Years: 45.00     Pack years: 45.00     Types: Cigarettes     Smokeless tobacco: Never Used   Substance Use Topics     Alcohol use: None     Drug use: None     ; lives with grandson and 2 dogs including Rottwieler puppy that belongs to Grandson's girlfriend' 18 year old favorite dog  recently- photo still wallpaper on her phone.    Walks 5000 steps to the store and back daily  when the weather isn't so cold    Physical Exam  /85   Pulse 72   Wt 50.8 kg (112 lb)   BMI 16.53 kg/m     Body mass index is 16.53 kg/m .  GENERAL :  Pleasant thin woman In no apparent distress  SKIN: Normal color, normal temperature, texture.  No hirsutism, alopecia or purple striae.     EYES: PERRL, EOMI, No scleral icterus,  No proptosis, conjunctival redness, stare, retraction  MOUTH: Moist, pink; pharynx clear; edentulous  NECK: No visible masses. No palpable adenopathy, or masses. No carotid bruits.   THYROID:  Palpable, not enlarged.    RESP: Lungs clear to auscultation bilaterally  CARDIAC: Regular rate and rhythm, normal S1 S2, without murmurs, rubs or gallops    ABDOMEN: Normal bowel sounds; soft, nontender, no HSM or masses       NEURO: awake, alert,  responds appropriately to questions.  Cranial nerves intact.  Moves all extremities; Gait normal.  No tremor of the outstretched hand.  DTRs  1 /4 ,   EXTREMITIES: No clubbing, cyanosis or edema.    DATA REVIEW      1/10/2020  RE: Jenn Barclay  YOB: 1955  Dear Jorge Dunn,  Patient Profile:  64 y.o. female, postmenopausal, is here for the first bone density test.   History of fractures - Yes;  Thoracic vertebrae and Hip. Family history of   osteoporosis - None.  Family history of hip fracture: None. Smoking   history - Current. Osteoporosis treatment past -  Yes;  HRT. Osteoporosis   treatment current - No.  Chronic medical problems - Chronic low back   problems, Hysterectomy, Oophorectomy, Radiation treatment and Spine   surgery. High risk medications -  Chemotherapy;  Yes, in the Past.  Assessment:  1. The spine bone density L1-L4 with T-score -3.0.  2. Femoral bone density shows left total hip T- score -3.6.  3. Trabecular bone score indicates poor trabecular bone architecture.    64 y.o. female with SEVERE OSTEOPOROSIS and HIGH fracture risk, based on   both the bone density and history of thoracic vertebral compression   fracture, along with hip fracture.   .  Recommendations:  Appropriate evaluation and treatment recommended with follow up bone   density scan in 1 to 2 years.      Bone densitometry was performed on your patient using our [employer]   densitometer. The results are summarized and a copy of the actual scans   are included for your review. In conformity with the International Society   of Clinical Densitometry's most recent position statement for DXA   interpretation (2015), the diagnosis will be made on the lowest measured   T-score of the lumbar spine, femoral neck, total proximal femur or 33%   radius. Note the change in terminology for diagnostic classification from   OSTEOPENIA to LOW BONE MASS. All trending for sequential exams will be   done using multiple vertebrae  or the total proximal femur. Fracture risk   is based on the WHO Fracture Risk Assessment Tool (FRAX). If additional   information is needed or if you would like to discuss the results, please   do not hesitate to call me.     CT Chest Without Contrast1/4/2020  [facility] [place]  Result Impression     1.  Resolution of prior left lower lobe 7 mm nodule, most consistent with a focus of atelectasis or infection / inflammation.    2.  Few other stable tiny pulmonary nodules.    3.  Stable emphysema. Borderline airway thickening and minimal retained airway secretions. Minimal bronchiectasis.    4.  Nonobstructing right renal calculus.    5.  Other findings in the report.   Result Narrative   EXAM: CT CHEST WO CONTRAST  LOCATION: [facility]  DATE/TIME: 1/4/2020 10:48 AM    INDICATION: Tobacco abuse, follow-up lung nodules.  COMPARISON: 12/24/2019 radiograph. Chest CT 04/21/2019.  TECHNIQUE: CT chest without IV contrast. Multiplanar reformats were obtained. Dose reduction techniques were used.  CONTRAST: None.    FINDINGS:   LUNGS AND PLEURA: Stable scarring and emphysema. Prior left lower lobe superior segment subpleural nodular density has resolved. Few other stable small pulmonary nodules, including 2-3 mm right lower lobe (series 4, image 157). Few small incidental   calcified granulomas. Borderline airway thickening. Minimal bronchiectasis. No pleural effusion.    MEDIASTINUM/AXILLAE: No adenopathy. Remote granulomatous audelia calcifications. Trace pericardial effusion inferiorly. Atherosclerosis.    UPPER ABDOMEN: 3 x 5 mm nonobstructing right calculus.    MUSCULOSKELETAL: Bony demineralization and degenerative changes. No significant change of T5 and T8 compression fractures postvertebral plasty. No significant change of T6, T7 and T10 compression fractures.       Sarah Ross MD

## 2020-02-10 ENCOUNTER — OFFICE VISIT (OUTPATIENT)
Dept: DERMATOLOGY | Facility: CLINIC | Age: 65
End: 2020-02-10
Attending: INTERNAL MEDICINE
Payer: COMMERCIAL

## 2020-02-10 VITALS — DIASTOLIC BLOOD PRESSURE: 86 MMHG | SYSTOLIC BLOOD PRESSURE: 136 MMHG | HEART RATE: 79 BPM

## 2020-02-10 DIAGNOSIS — L30.9 DERMATITIS: Primary | ICD-10-CM

## 2020-02-10 LAB
THYROGLOB AB SERPL IA-ACNC: <20 IU/ML (ref 0–40)
THYROPEROXIDASE AB SERPL-ACNC: 22 IU/ML

## 2020-02-10 PROCEDURE — 88312 SPECIAL STAINS GROUP 1: CPT | Mod: TC | Performed by: DERMATOLOGY

## 2020-02-10 PROCEDURE — 88305 TISSUE EXAM BY PATHOLOGIST: CPT | Mod: TC | Performed by: DERMATOLOGY

## 2020-02-10 NOTE — PROGRESS NOTES
"[facility] Dermatology Note      Dermatology Problem List:  1. Granulomatous appearing eruption, left cheek, biopsy 02/10/2020  2. Hx of AML s/p BMT in 2010, not on immunosuppression    Encounter Date: Feb 10, 2020    CC:  Chief Complaint   Patient presents with     Derm Problem     Jenn is here today because she is concerned about a spot on her left cheek       History of Present Illness:  Ms. Jenn Barclay is a 64 year old female who presents for a skin check. The patient was last seen on 2/14/19 by Dr. Norton when she started on triamcinolone 0.1% cream for dermatitis. Today, the patient reports that she has two lesions of concern on her left cheek that have been draining recently with associated pain. She notes that she has had these evaluated by her oncologist who was concerned for skin cancer and advised that she have them evaluated by dermatology. She states that the lesions tend to drain pus or clear fluid, and they have been present for about 4-5 months. She sleeps on her right side, and she denies any prolonged sun exposure. She does not drive, but she typically walks to a store near her home every day. In addition to these lesions, she also complains of a persistent red rash on her buttocks and hips. It is currently \"scabbed over\". She had a biopsy taken in November of 2019 which showed superficial perivascular lymphocytic dermatosis with numerous neutrophils. After the biopsy was taken, the rash flared with associated itching and burning to the point that she could not sit down. She was given topical triamcinolone which she states helped clear up the rash for the most part, but it has been recently coming back with associated itching. She wash applying hydrogen peroxide and rubbing alcohol to the area on her buttocks, but she has since stopped doing this. Of late, she has also been applying a previously prescribed triamcinolone to the lesion on her face without much of any relief. " She denies any recent fevers, illness, weight loss, cough, and she is not taking any immunosuppressive medications.      Past Medical History:   Patient Active Problem List   Diagnosis     AML (acute myelogenous leukemia) (H)     Encounter for long-term current use of medication     S/P allogeneic bone marrow transplant (H)     Exocrine pancreatic insufficiency     Underweight     High serum thyroid stimulating hormone (TSH)     Nocturia     Post-menopausal     Age-related osteoporosis with current pathological fracture with routine healing, subsequent encounter     Nephrolithiasis     Past Medical History:   Diagnosis Date     AML (acute myeloid leukemia) (H) 12/2009     COPD (chronic obstructive pulmonary disease) (H)      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 She has a 45.00 pack-year smoking history. She has never used smokeless tobacco.    Family History:  Family History   Problem Relation Age of Onset     Diabetes Mother         borderline     Pancreatic Cancer Father      Diabetes Maternal Grandmother      Diabetes Maternal Uncle      Thyroid Disease No family hx of      Melanoma No family hx of      Skin Cancer No family hx of        Medications:  Current Outpatient Medications   Medication Sig Dispense Refill     Albuterol Sulfate (VENTOLIN HFA) 108 (90 BASE) MCG/ACT AERS Inhale  into the lungs.       alendronate (FOSAMAX) 70 MG tablet Take 70 mg by mouth every 7 days       amylase-lipase-protease (CREON) 48465-80928 units CPEP per EC capsule Take 2 capsules (48,000 Units) by mouth 3 times daily (with meals) And snacks 300 capsule 11     ascorbic acid  (VITAMIN C) 500 MG tablet Take 500 mg by mouth daily.       cholecalciferol (VITAMIN D3) 1000 UNIT capsule Take 1 capsule by mouth daily.       Loperamide HCl (IMODIUM OR) Take by mouth 2 times daily       Multiple Vitamins-Minerals (MULTIVITAL PO) Take  by mouth.       Naproxen Sodium (ALEVE PO) Take by mouth daily       triamcinolone (KENALOG) 0.1 % external cream Apply tid to itchy or tender skin lesions 80 g 3       Allergies   Allergen Reactions     Mirtazapine      Other reaction(s): Other (See Comments)  Hallucination      Tape [Adhesive Tape]      Allergy Hx     Liquid Adhesive Rash     Other reaction(s): Other (See Comments)  Allergy Hx - Rash from paper tape       Review of Systems:  -Constitutional: Otherwise feeling well today, in usual state of health.  -HEENT: Patient denies nonhealing oral sores.  -Skin: As above in HPI. No additional skin concerns.    Physical exam:  Vitals: /86 (BP Location: Right arm, Patient Position: Sitting, Cuff Size: Adult Regular)   Pulse 79   GEN: This is a well developed, well-nourished female in no acute distress, in a pleasant mood.    SKIN: Focused examination of the face, neck, buttocks, hips, and thighs was performed.  -Brooks skin type: II  -There are linear excoriated patches on the trunk without any evidence of primary dermatosis  -pt supplied photos demonstrate multiple monomorphic excoriated papules scattered on the buttocks  -there are hyperpigmented macules at sites of prior nodules on the trunk   -Pink papules and nodules with scale on the left cheek  -No other lesions of concern on areas examined.       Impression/Plan:  1. Rash on the face. Ddx PNGD vs other neutrophilic dermatosis vs granulomatous rosacea vs cystic acne/epidermoid cysts. S/p bx of the buttock in 11/2019 showing superficial perivascular infiltrate with many neutrophils, although that eruption is essentially resolved today. Persistent eruption on the face may or may not be related  to prior buttock eruption. This appears to be granulomatous clinically.  - We discussed the risks and benefits of a biopsy for diagnostic purposes. The patient is agreeable to this plan  - Punch biopsy:  After discussion of benefits and risks including but not limited to bleeding/bruising, pain/swelling, infection, scar, incomplete removal, nerve damage/numbness, recurrence, and non-diagnostic biopsy, written consent, verbal consent and photographs were obtained. Time-out was performed. The area was cleaned with isopropyl alcohol. 0.5mL of 1% lidocaine with 1:100,000 epinephrine was injected to obtain adequate anesthesia of the lesion on the left cheek.  A 4 mm punch biopsy was performed. 4-0 prolene sutures were utilized to approximate the epidermal edges. White petroleum jelly/Vaseline and a bandage was applied to the wound. Explicit verbal and written wound care instructions were provided. The patient left the Dermatology Clinic in good condition. The patient was counseled to follow up for suture removal in approximately 12-14 days.   -recommended the use of gentle soaps in the mean time pending the results of the biopsy, and we will plan to pursue a treatment plan when we are more confident about the diagnosis    2. Post-inflammatory changes on the buttocks: no active disease on exam  - We discussed the potential etiologies as well as the risks, benefits, and efficacy of continued treatment with triamcinolone. The patient is agreeable to this plan.   - continue triamcinolone 0.1% cream as previously prescribed    CC Dmitriy Agee MD  [address] [facility] [address]  [place] [address] on close of this encounter.  Follow-up pending biopsy results.    Staff Involved:  Scribe/Staff/Resident    Luis Hillman MD  Dermatology Resident    Scribe Disclosure  I, Dominic Najjar, catina serving as a scribe to document services personally performed by Dr. Jhon Herrmann MD, based on data collection and the provider's  statements to me.    Staff Physician Comments:   I saw and evaluated the patient with the resident and I edited the assessment and plan as documented in the note. I was present for the key portions of the above major procedure and examination.    Provider Disclosure:   The documentation recorded by the scribe accurately reflects the services I personally performed and the decisions made by me.    Jhon Herrmann MD   of Dermatology  Department of Dermatology  [facility]

## 2020-02-10 NOTE — LETTER
"2/10/2020       RE: Jenn Barclay  [address] [place] [address]     Dear Colleague,    Thank you for referring your patient, Jenn Barclay, to the [facility] DERMATOLOGY at [facility]. Please see a copy of my visit note below.    [facility] Dermatology Note      Dermatology Problem List:  1. Granulomatous appearing eruption, left cheek, biopsy 02/10/2020  2. Hx of AML s/p BMT in 2010, not on immunosuppression    Encounter Date: Feb 10, 2020    CC:  Chief Complaint   Patient presents with     Derm Problem     Jenn is here today because she is concerned about a spot on her left cheek       History of Present Illness:  Ms. Jenn Barclay is a 64 year old female who presents for a skin check. The patient was last seen on 2/14/19 by Dr. Norton when she started on triamcinolone 0.1% cream for dermatitis. Today, the patient reports that she has two lesions of concern on her left cheek that have been draining recently with associated pain. She notes that she has had these evaluated by her oncologist who was concerned for skin cancer and advised that she have them evaluated by dermatology. She states that the lesions tend to drain pus or clear fluid, and they have been present for about 4-5 months. She sleeps on her right side, and she denies any prolonged sun exposure. She does not drive, but she typically walks to a store near her home every day. In addition to these lesions, she also complains of a persistent red rash on her buttocks and hips. It is currently \"scabbed over\". She had a biopsy taken in November of 2019 which showed superficial perivascular lymphocytic dermatosis with numerous neutrophils. After the biopsy was taken, the rash flared with associated itching and burning to the point that she could not sit down. She was given topical triamcinolone which she states helped clear up the rash for the most part, but it has been recently " coming back with associated itching. She wash applying hydrogen peroxide and rubbing alcohol to the area on her buttocks, but she has since stopped doing this. Of late, she has also been applying a previously prescribed triamcinolone to the lesion on her face without much of any relief. She denies any recent fevers, illness, weight loss, cough, and she is not taking any immunosuppressive medications.      Past Medical History:   Patient Active Problem List   Diagnosis     AML (acute myelogenous leukemia) (H)     Encounter for long-term current use of medication     S/P allogeneic bone marrow transplant (H)     Exocrine pancreatic insufficiency     Underweight     High serum thyroid stimulating hormone (TSH)     Nocturia     Post-menopausal     Age-related osteoporosis with current pathological fracture with routine healing, subsequent encounter     Nephrolithiasis     Past Medical History:   Diagnosis Date     AML (acute myeloid leukemia) (H) 12/2009     COPD (chronic obstructive pulmonary disease) (H)      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 She has a 45.00 pack-year smoking history. She has never used smokeless tobacco.    Family History:  Family History   Problem Relation Age of Onset     Diabetes Mother         borderline     Pancreatic Cancer Father      Diabetes Maternal Grandmother      Diabetes Maternal Uncle      Thyroid Disease No family hx of      Melanoma No family hx of      Skin Cancer No family hx of        Medications:  Current Outpatient Medications   Medication Sig Dispense Refill     Albuterol Sulfate (VENTOLIN HFA) 108 (90 BASE) MCG/ACT AERS  Inhale  into the lungs.       alendronate (FOSAMAX) 70 MG tablet Take 70 mg by mouth every 7 days       amylase-lipase-protease (CREON) 08450-70550 units CPEP per EC capsule Take 2 capsules (48,000 Units) by mouth 3 times daily (with meals) And snacks 300 capsule 11     ascorbic acid (VITAMIN C) 500 MG tablet Take 500 mg by mouth daily.       cholecalciferol (VITAMIN D3) 1000 UNIT capsule Take 1 capsule by mouth daily.       Loperamide HCl (IMODIUM OR) Take by mouth 2 times daily       Multiple Vitamins-Minerals (MULTIVITAL PO) Take  by mouth.       Naproxen Sodium (ALEVE PO) Take by mouth daily       triamcinolone (KENALOG) 0.1 % external cream Apply tid to itchy or tender skin lesions 80 g 3       Allergies   Allergen Reactions     Mirtazapine      Other reaction(s): Other (See Comments)  Hallucination      Tape [Adhesive Tape]      Allergy Hx     Liquid Adhesive Rash     Other reaction(s): Other (See Comments)  Allergy Hx - Rash from paper tape       Review of Systems:  -Constitutional: Otherwise feeling well today, in usual state of health.  -HEENT: Patient denies nonhealing oral sores.  -Skin: As above in HPI. No additional skin concerns.    Physical exam:  Vitals: /86 (BP Location: Right arm, Patient Position: Sitting, Cuff Size: Adult Regular)   Pulse 79   GEN: This is a well developed, well-nourished female in no acute distress, in a pleasant mood.    SKIN: Focused examination of the face, neck, buttocks, hips, and thighs was performed.  -Brooks skin type: II  -There are linear excoriated patches on the trunk without any evidence of primary dermatosis  -pt supplied photos demonstrate multiple monomorphic excoriated papules scattered on the buttocks  -there are hyperpigmented macules at sites of prior nodules on the trunk   -Pink papules and nodules with scale on the left cheek  -No other lesions of concern on areas examined.       Impression/Plan:  1. Rash on the face. Ddx PNGD vs other  neutrophilic dermatosis vs granulomatous rosacea vs cystic acne/epidermoid cysts. S/p bx of the buttock in 11/2019 showing superficial perivascular infiltrate with many neutrophils, although that eruption is essentially resolved today. Persistent eruption on the face may or may not be related to prior buttock eruption. This appears to be granulomatous clinically.  - We discussed the risks and benefits of a biopsy for diagnostic purposes. The patient is agreeable to this plan  - Punch biopsy:  After discussion of benefits and risks including but not limited to bleeding/bruising, pain/swelling, infection, scar, incomplete removal, nerve damage/numbness, recurrence, and non-diagnostic biopsy, written consent, verbal consent and photographs were obtained. Time-out was performed. The area was cleaned with isopropyl alcohol. 0.5mL of 1% lidocaine with 1:100,000 epinephrine was injected to obtain adequate anesthesia of the lesion on the left cheek.  A 4 mm punch biopsy was performed. 4-0 prolene sutures were utilized to approximate the epidermal edges. White petroleum jelly/Vaseline and a bandage was applied to the wound. Explicit verbal and written wound care instructions were provided. The patient left the Dermatology Clinic in good condition. The patient was counseled to follow up for suture removal in approximately 12-14 days.   -recommended the use of gentle soaps in the mean time pending the results of the biopsy, and we will plan to pursue a treatment plan when we are more confident about the diagnosis    2. Post-inflammatory changes on the buttocks: no active disease on exam  - We discussed the potential etiologies as well as the risks, benefits, and efficacy of continued treatment with triamcinolone. The patient is agreeable to this plan.   - continue triamcinolone 0.1% cream as previously prescribed    CC Dmitriy Agee MD  [address]  [place] [address] on close of this  encounter.  Follow-up pending biopsy results.    Staff Involved:  Scribe/Staff/Resident    Luis Hillman MD  Dermatology Resident    Scribe Disclosure  I, Dominic Najjar, am serving as a scribe to document services personally performed by Dr. Jhon Herrmann MD, based on data collection and the provider's statements to me.    Staff Physician Comments:   I saw and evaluated the patient with the resident and I edited the assessment and plan as documented in the note. I was present for the key portions of the above major procedure and examination.    Provider Disclosure:   The documentation recorded by the scribe accurately reflects the services I personally performed and the decisions made by me.    Jhon Herrmann MD   of Dermatology  Department of Dermatology  [facility]

## 2020-02-10 NOTE — NURSING NOTE
Dermatology Rooming Note    Jenn Barclay's goals for this visit include:   Chief Complaint   Patient presents with     Derm Problem     Jenn is here today because she is concerned about a spot on her left cheek     Sean Moeller, CMA

## 2020-02-10 NOTE — PATIENT INSTRUCTIONS

## 2020-02-14 ENCOUNTER — TELEPHONE (OUTPATIENT)
Dept: NURSING | Facility: CLINIC | Age: 65
End: 2020-02-14

## 2020-02-14 DIAGNOSIS — L08.9 LOCAL INFECTION OF SKIN AND SUBCUTANEOUS TISSUE: Primary | ICD-10-CM

## 2020-02-14 RX ORDER — MUPIROCIN 20 MG/G
OINTMENT TOPICAL
Qty: 22 G | Refills: 0 | Status: SHIPPED | OUTPATIENT
Start: 2020-02-14 | End: 2024-02-04

## 2020-02-14 RX ORDER — DOXYCYCLINE 100 MG/1
100 CAPSULE ORAL 2 TIMES DAILY
Qty: 14 CAPSULE | Refills: 0 | Status: SHIPPED | OUTPATIENT
Start: 2020-02-14 | End: 2020-06-24

## 2020-02-14 NOTE — TELEPHONE ENCOUNTER
Informed patient that the medication will be sent to her pharmacy and scheduled her Monday 02/17 with Dr. Herrmann.    Sean Moeller CMA

## 2020-02-14 NOTE — TELEPHONE ENCOUNTER
I called Ms. Barclay who states that after her biopsy on Monday she did not change her bandage until Wednesday.  When she pulled the bandage off there was an orange appearing drainage.  It was not particularly symptomatic at that time.  Due to the patient's concern for infection she self initiated generic Neosporin which she denies ever having reactions to.  She has been applying Neosporin since Wednesday.  Last night she started to have increased pain to the point where it was difficult for her to sleep on her cheek.  She notes increased swelling of the area increased drainage and pain which she rates as 7 out of 10.     I asked the patient to consider uploading a photo to my chart however the patient states that she does not have a computer and does not have my chart abilities on her phone and so was not able to submit a photo in a HIPAA secure way.    She was next offered a nursing appointment or an overbook in Dr. Herrmann's clinic today for more urgent evaluation.  The patient states that she uses [employer] and the service must be requested several days in advance.  She does not drive or have a 's license or cannot drive herself.  She does not have anyone that can drive her until after 530 tonight.  She states that the cost of a cab roughly $20 would be too much.  She states that she is not willing to come into clinic today.    I staffed the above conversation with Dr. Herrmann who recommended that she be empirically started on doxycycline 100 mg twice daily x7 days and mupirocin ointment twice daily.  We will plan to have her scheduled with nursing assistants on Monday 2/17.  Patient was called and informed of the above plan.     Thalia Umana MD - PGY-4  Dermatology Resident on Call  [facility]at[facility]

## 2020-02-14 NOTE — TELEPHONE ENCOUNTER
Jhon Herrmann MD  You; Thalia Umana MD Just now (12:08 PM)      Recommend doxycycline 100 mg BID x7 days, mupirocin ointment application, and overbook in my Monday clinic on 2/17/2020.     Thanks!  Juliocesar    Routing comment        Thalia Umana MD Pearson, David R, MD 52 minutes ago (11:16 AM)      Would like help with recs. Pt cannot sent photo as no MyChart set up.No appts available today. Have called pt. Bx on Monday. No bandage change unitl weds. Purulent drainage at that time. Applied neosporin starting weds. Washing with water only. Increased swelling, pain 7/10 last night. Neutrophilic d/o considered in ddx.     Thanks,     Thalia          Routing comment        Bernarda Zelaya [facility] routed conversation to Thalia Umana MD 3 hours ago (9:01 AM)       Lawanda Corado RN  [facility] Dermatology Adult [facility] 3 hours ago (8:54 AM)      Jenn Barclay is a 64 year old female s/p left cheek, biopsy 02/10/2020. Patient reports having increased pain, swelling, redness and drainage since yesterday. Small to medium amount of purulent drainage noted.   Washing site and applying antibacterial cream 1x day and covering with bandage.   This nurse reviewed instructions to clean site 2x/daily and apply vaseline and cover with bandage.   Patient reported that she is caring for several people and a dog and kept area covered with band aid.   Patient stated that she has not been able to lie on her left side due to increased intermittent pain, rates 7/10. Afebrile 97.3.   Routed to Dermatology as High Priority to review and follow up with call.

## 2020-02-14 NOTE — TELEPHONE ENCOUNTER
[facility]: Nurse Triage Note  SITUATION/BACKGROUND                                                      Jenn Barclay is a 64 year old female s/p left cheek, biopsy 02/10/2020. Patient reports having increased pain, swelling, redness and drainage since yesterday. Small to medium amount of purulent drainage noted.   Washing site and applying antibacterial cream 1x day and covering with bandage.   This nurse reviewed instructions to clean site 2x/daily and apply vaseline and cover with bandage.   Patient reported that she is caring for several people and a dog and kept area covered with band aid.   Patient stated that she has not been able to lie on her left side due to increased intermittent pain, rates 7/10. Afebrile 97.3.  Routed to Dermatology as High Priority to review and follow up with call.

## 2020-02-17 ENCOUNTER — OFFICE VISIT (OUTPATIENT)
Dept: DERMATOLOGY | Facility: CLINIC | Age: 65
End: 2020-02-17
Payer: COMMERCIAL

## 2020-02-17 VITALS — SYSTOLIC BLOOD PRESSURE: 111 MMHG | HEART RATE: 64 BPM | DIASTOLIC BLOOD PRESSURE: 73 MMHG

## 2020-02-17 DIAGNOSIS — T81.41XA INFECTION OF SUPERFICIAL INCISIONAL SURGICAL SITE AFTER PROCEDURE, INITIAL ENCOUNTER: Primary | ICD-10-CM

## 2020-02-17 PROCEDURE — 87186 SC STD MICRODIL/AGAR DIL: CPT | Performed by: DERMATOLOGY

## 2020-02-17 PROCEDURE — 87070 CULTURE OTHR SPECIMN AEROBIC: CPT | Performed by: DERMATOLOGY

## 2020-02-17 PROCEDURE — 87077 CULTURE AEROBIC IDENTIFY: CPT | Performed by: DERMATOLOGY

## 2020-02-17 ASSESSMENT — PAIN SCALES - GENERAL: PAINLEVEL: MODERATE PAIN (5)

## 2020-02-17 NOTE — PROGRESS NOTES
[facility] Dermatology Note      Dermatology Problem List:  1. Granulomatous appearing eruption, left cheek, biopsy 02/10/2020\   - biopsy c/b possible wound infection s/p doxycycline x7 days and mupirocin ointment  2. Hx of AML s/p BMT in 2010, not on immunosuppression    Encounter Date: Feb 17, 2020    CC:  Chief Complaint   Patient presents with     Derm Problem     Jenn is here today due to a concern about an infection on her biopsy site of the left cheek         History of Present Illness:  Ms. Jenn Barclay is a 64 year old female who presents as a follow-up for evaluation of a biopsy site. The patient was last seen 2/10/19 when a biopsy of a granulomatous appearing eruption was taken from her left cheek. At this point, the biopsy results are still pending. Today, the patient reports that she began developing orange-colored and bloody drainage from the biopsy site. She has been taking doxycycline since 2/14, and she has also been applying topical mupirocin to the site under occlusion of a bandage. Today, she reports that the site has been improving, though there is some residual tenderness and redness. The patient voices no other concerns.    Past Medical History:   Patient Active Problem List   Diagnosis     AML (acute myelogenous leukemia) (H)     Encounter for long-term current use of medication     S/P allogeneic bone marrow transplant (H)     Exocrine pancreatic insufficiency     Underweight     High serum thyroid stimulating hormone (TSH)     Nocturia     Post-menopausal     Age-related osteoporosis with current pathological fracture with routine healing, subsequent encounter     Nephrolithiasis     Past Medical History:   Diagnosis Date     AML (acute myeloid leukemia) (H) 12/2009     COPD (chronic obstructive pulmonary disease) (H)      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     Metatarsal fracture 2017    2nd      Osteoporosis 2020    DXA     Surgical menopause 1976     oophorectomy     Vertebral compression fracture (H) 2014     Zoster 2018     Past Surgical History:   Procedure Laterality Date     hysterectomy and oophorectomy  1976       Social History:  Patient reports that she has been smoking cigarettes. She has a 45.00 pack-year smoking history. She has never used smokeless tobacco.    Family History:  Family History   Problem Relation Age of Onset     Diabetes Mother         borderline     Pancreatic Cancer Father      Diabetes Maternal Grandmother      Diabetes Maternal Uncle      Thyroid Disease No family hx of      Melanoma No family hx of      Skin Cancer No family hx of        Medications:  Current Outpatient Medications   Medication Sig Dispense Refill     Albuterol Sulfate (VENTOLIN HFA) 108 (90 BASE) MCG/ACT AERS Inhale  into the lungs.       alendronate (FOSAMAX) 70 MG tablet Take 70 mg by mouth every 7 days       amylase-lipase-protease (CREON) 09646-82147 units CPEP per EC capsule Take 2 capsules (48,000 Units) by mouth 3 times daily (with meals) And snacks 300 capsule 11     ascorbic acid (VITAMIN C) 500 MG tablet Take 500 mg by mouth daily.       cholecalciferol (VITAMIN D3) 1000 UNIT capsule Take 1 capsule by mouth daily.       doxycycline monohydrate (MONODOX) 100 MG capsule Take 1 capsule (100 mg) by mouth 2 times daily 14 capsule 0     Loperamide HCl (IMODIUM OR) Take by mouth 2 times daily       Multiple Vitamins-Minerals (MULTIVITAL PO) Take  by mouth.       mupirocin (BACTROBAN) 2 % external ointment Use 2 times a day to affected area on cheek 22 g 0     Naproxen Sodium (ALEVE PO) Take by mouth daily       triamcinolone (KENALOG) 0.1 % external cream Apply tid to itchy or tender skin lesions 80 g 3       Allergies   Allergen Reactions     Mirtazapine      Other reaction(s): Other (See Comments)  Hallucination      Tape [Adhesive Tape]      Allergy Hx     Liquid Adhesive Rash     Other reaction(s): Other (See Comments)  Allergy Hx - Rash from paper  tape       Review of Systems:  -Constitutional: Otherwise feeling well today, in usual state of health.  -HEENT: Patient denies nonhealing oral sores.  -Skin: As above in HPI. No additional skin concerns.    Physical exam:  Vitals: /73 (BP Location: Left arm, Patient Position: Sitting, Cuff Size: Adult Regular)   Pulse 64   GEN: This is a well developed, well-nourished female in no acute distress, in a pleasant mood.    SKIN: Focused examination of the left cheek was performed.  -Brooks skin type: II  -L cheek bx site with faint circumferential erythema and scant keratinaceous/purulent expression   -No other lesions of concern on areas examined.       Impression/Plan:  1. Biopsy site on the left cheek with signs of infection vs inflammatory reaction from ruptured cyst on exam today. Pt started doxycycline and mupirocin on 2/14  -swab collected today for bacterial culture  - continue doxycycline 100 mg BID x 7 days as previously prescribed (initiated on 2/14/2020)  - Suture removal and drainage: The area was cleaned with isopropyl alcohol. 0.5ml of 1% lidocaine with 1:100,000 epinephrine was injected to obtain adequate anesthesia. The sutures were removed, and the site expressed small amounts of purulent/keratinaceous debris. The patient tolerated the procedure and no complications were noted.   -continue: mupirocin 2% ointment BID to the site  -she will follow up in 1 week for a wound check        Follow-up in 7 days, earlier for new or changing lesions.       Staff Involved:  Scribe/Staff    Scribe Disclosure  I, Dominic Najjar, am serving as a scribe to document services personally performed by Dr. Jhon Herrmann MD, based on data collection and the provider's statements to me.    Provider Disclosure:   The documentation recorded by the scribe accurately reflects the services I personally performed and the decisions made by me.    Jhon Herrmann MD   of Dermatology  Department of  Dermatology  [facility] [employer]

## 2020-02-17 NOTE — LETTER
2/17/2020       RE: Jenn Barclay  [address] [place] [address]     Dear Colleague,    Thank you for referring your patient, Jenn Barclay, to the [facility] DERMATOLOGY at [facility]. Please see a copy of my visit note below.    [facility] Dermatology Note      Dermatology Problem List:  1. Granulomatous appearing eruption, left cheek, biopsy 02/10/2020\   - biopsy c/b possible wound infection s/p doxycycline x7 days and mupirocin ointment  2. Hx of AML s/p BMT in 2010, not on immunosuppression    Encounter Date: Feb 17, 2020    CC:  Chief Complaint   Patient presents with     Derm Problem     Jenn is here today due to a concern about an infection on her biopsy site of the left cheek         History of Present Illness:  Ms. Jenn Barclay is a 64 year old female who presents as a follow-up for evaluation of a biopsy site. The patient was last seen 2/10/19 when a biopsy of a granulomatous appearing eruption was taken from her left cheek. At this point, the biopsy results are still pending. Today, the patient reports that she began developing orange-colored and bloody drainage from the biopsy site. She has been taking doxycycline since 2/14, and she has also been applying topical mupirocin to the site under occlusion of a bandage. Today, she reports that the site has been improving, though there is some residual tenderness and redness. The patient voices no other concerns.    Past Medical History:   Patient Active Problem List   Diagnosis     AML (acute myelogenous leukemia) (H)     Encounter for long-term current use of medication     S/P allogeneic bone marrow transplant (H)     Exocrine pancreatic insufficiency     Underweight     High serum thyroid stimulating hormone (TSH)     Nocturia     Post-menopausal     Age-related osteoporosis with current pathological fracture with routine healing, subsequent encounter     Nephrolithiasis     Past  Medical History:   Diagnosis Date     AML (acute myeloid leukemia) (H) 12/2009     COPD (chronic obstructive pulmonary disease) (H)      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 She has a 45.00 pack-year smoking history. She has never used smokeless tobacco.    Family History:  Family History   Problem Relation Age of Onset     Diabetes Mother         borderline     Pancreatic Cancer Father      Diabetes Maternal Grandmother      Diabetes Maternal Uncle      Thyroid Disease No family hx of      Melanoma No family hx of      Skin Cancer No family hx of        Medications:  Current Outpatient Medications   Medication Sig Dispense Refill     Albuterol Sulfate (VENTOLIN HFA) 108 (90 BASE) MCG/ACT AERS Inhale  into the lungs.       alendronate (FOSAMAX) 70 MG tablet Take 70 mg by mouth every 7 days       amylase-lipase-protease (CREON) 28790-27289 units CPEP per EC capsule Take 2 capsules (48,000 Units) by mouth 3 times daily (with meals) And snacks 300 capsule 11     ascorbic acid (VITAMIN C) 500 MG tablet Take 500 mg by mouth daily.       cholecalciferol (VITAMIN D3) 1000 UNIT capsule Take 1 capsule by mouth daily.       doxycycline monohydrate (MONODOX) 100 MG capsule Take 1 capsule (100 mg) by mouth 2 times daily 14 capsule 0     Loperamide HCl (IMODIUM OR) Take by mouth 2 times daily       Multiple Vitamins-Minerals (MULTIVITAL PO) Take  by mouth.       mupirocin (BACTROBAN) 2 % external ointment Use 2 times a day to affected area on cheek 22 g 0     Naproxen Sodium (ALEVE PO) Take by mouth daily       triamcinolone (KENALOG) 0.1 % external cream Apply tid to itchy or  tender skin lesions 80 g 3       Allergies   Allergen Reactions     Mirtazapine      Other reaction(s): Other (See Comments)  Hallucination      Tape [Adhesive Tape]      Allergy Hx     Liquid Adhesive Rash     Other reaction(s): Other (See Comments)  Allergy Hx - Rash from paper tape       Review of Systems:  -Constitutional: Otherwise feeling well today, in usual state of health.  -HEENT: Patient denies nonhealing oral sores.  -Skin: As above in HPI. No additional skin concerns.    Physical exam:  Vitals: /73 (BP Location: Left arm, Patient Position: Sitting, Cuff Size: Adult Regular)   Pulse 64   GEN: This is a well developed, well-nourished female in no acute distress, in a pleasant mood.    SKIN: Focused examination of the left cheek was performed.  -Brooks skin type: II  -L cheek bx site with faint circumferential erythema and scant keratinaceous/purulent expression   -No other lesions of concern on areas examined.       Impression/Plan:  1. Biopsy site on the left cheek with signs of infection vs inflammatory reaction from ruptured cyst on exam today. Pt started doxycycline and mupirocin on 2/14  -swab collected today for bacterial culture  - continue doxycycline 100 mg BID x 7 days as previously prescribed (initiated on 2/14/2020)  - Suture removal and drainage: The area was cleaned with isopropyl alcohol. 0.5ml of 1% lidocaine with 1:100,000 epinephrine was injected to obtain adequate anesthesia. The sutures were removed, and the site expressed small amounts of purulent/keratinaceous debris. The patient tolerated the procedure and no complications were noted.   -continue: mupirocin 2% ointment BID to the site  -she will follow up in 1 week for a wound check        Follow-up in 7 days, earlier for new or changing lesions.       Staff Involved:  Scribe/Staff    Scribe Disclosure  I, Dominic Najjar, am serving as a scribe to document services personally performed by Dr. Jhon Herrmann MD, based on  data collection and the provider's statements to me.    Provider Disclosure:   The documentation recorded by the scribe accurately reflects the services I personally performed and the decisions made by me.    Jhon Herrmann MD   of Dermatology  Department of Dermatology  [facility]

## 2020-02-17 NOTE — NURSING NOTE
Dermatology Rooming Note    Jenn Barclay's goals for this visit include:   Chief Complaint   Patient presents with     Derm Problem     Jenn is here today due to a concern about an infection on her biopsy site of the left cheek     Sean Moeller, QUE

## 2020-02-19 DIAGNOSIS — T81.49XA POSTOPERATIVE WOUND INFECTION: Primary | ICD-10-CM

## 2020-02-19 LAB
BACTERIA SPEC CULT: ABNORMAL
Lab: ABNORMAL
SPECIMEN SOURCE: ABNORMAL

## 2020-02-19 RX ORDER — GENTAMICIN SULFATE 1 MG/G
OINTMENT TOPICAL 2 TIMES DAILY
Qty: 30 G | Refills: 3 | Status: SHIPPED | OUTPATIENT
Start: 2020-02-19 | End: 2024-02-04

## 2020-02-20 LAB — COPATH REPORT: NORMAL

## 2020-02-21 DIAGNOSIS — L70.0 ACNE VULGARIS: ICD-10-CM

## 2020-02-21 DIAGNOSIS — L73.9 FOLLICULITIS: Primary | ICD-10-CM

## 2020-02-21 RX ORDER — CLINDAMYCIN PHOSPHATE 10 UG/ML
LOTION TOPICAL 2 TIMES DAILY
Qty: 60 ML | Refills: 3 | Status: SHIPPED | OUTPATIENT
Start: 2020-02-21 | End: 2024-02-04

## 2020-02-24 ENCOUNTER — TELEPHONE (OUTPATIENT)
Dept: GASTROENTEROLOGY | Facility: CLINIC | Age: 65
End: 2020-02-24

## 2020-02-24 ENCOUNTER — COMMUNICATION - HEALTHEAST (OUTPATIENT)
Dept: INTERNAL MEDICINE | Facility: CLINIC | Age: 65
End: 2020-02-24

## 2020-02-24 DIAGNOSIS — M54.6 CHRONIC MIDLINE THORACIC BACK PAIN: ICD-10-CM

## 2020-02-24 DIAGNOSIS — G89.29 CHRONIC MIDLINE THORACIC BACK PAIN: ICD-10-CM

## 2020-02-24 NOTE — TELEPHONE ENCOUNTER
Patient Name: Jenn Barclay   : 1955  MRN: [id number]       : N/A    Isac Pending    VM with information needed to complete pre-assessment call.  Request pt contact Endoscopy Pre-assessment RN to complete upcoming procedure information.  Telephone call-back number provided.    Jelena Harley RN  [facility] Endoscopy    Additional Information regarding appointment:  _____________________________________________________      Patient scheduled for:  Colonoscopy    Indication for procedure. Screening    Sedation Type: MAC    Procedure Provider:  Amilcar      Referring Provider. Cyndie    Arrival time verified: Mon / 3.2.2020 / 1130    Facility location verified:   [facility]  [address] [place] [id number]      History & Physical: N/A    MVI  __________________________________________________      Prep Type:   [x]Golytely  Pharmacy : (not sent)      Patient taking any blood thinners ? No      Electronic implanted medical devices ? No

## 2020-02-26 NOTE — TELEPHONE ENCOUNTER
"Patient Name: Jenn Barclay   : 1955  MRN: [id number]        : N/A    Isac Pending     See note below regarding instruction for procedure.      Additional Information regarding appointment:  _____________________________________________________       Patient scheduled for:  Colonoscopy    Indication for procedure. Screening    Sedation Type: MAC    Procedure Provider:  Amilcar                            Referring Provider. Cyndie    Arrival time verified: Mon / 3.2.2020    Facility location verified:   [facility] [place] [address] [facility]  [address] [place] [address]       History & Physical: N/A    MVI    AML S/P allogenetic BMT (2010)  __________________________________________________       Prep Type:   [x]?Golytely  Pharmacy : (not sent)       Patient taking any blood thinners ? Unable to verify with pt       Electronic implanted medical devices ? Unable to verify with pt      GI / Hepatology History: Yes: Exocrine pancreatic insuff      Heart disease ? Unable to verify with pt      Lung disease ? Unable to verify with pt      Sleep apnea ? Unable to verify with pt      Diabetic ? Unable to verify with pt      Kidney disease ? Unable to verify with pt      Instructions given: Unknown - At the beginning of the conversation patient stated that she \"have been taking Dulcolax every evening and have been taking MiraLax every evening for my prep.  I looked it up on [employer] and your paperwork says I can use Dulcolax and I talked to a pharmacist and now (began to scream) you're calling me, disrespecting me, when I'm doing what your paperwork says and it says that I can use Dulcolax as prep and that's what I'm using.  I talked with a pharmacist and Googled it.\"     I tried asking what the title of the instructions she received from us said for clarification.  Her screaming became more intense \"you are calling me a child? I can read you know. I " "know how to read.  I'm not [age] years old.  I'm only doing what the papers you sent me say that I can do and I talked to a pharmacist who told me that it was OK to use Dulcolax and now you call me and call me a child, and disrespect me and talk down to me.\"  For this entire time the only thing I ask of the patient, in a calm voice was a request that she stop screaming at me and allow me to ask her one question. She continued to scream and said \"I won't, not until you all start treating people better than you do.\"        I ended the call because she refused to speak with me regarding her appointment, would not stop screaming and would not answer any questions regarding her appointment.         Pre procedure teaching completed: [] No      IV Sedation: [] Cosme       : Transportation from procedure & responsible adult to be with patient following procedure for a minimum of Overnight (MAC): [x] Ukn - confirmed will have post-procedure companionship as required      Pt completed assessment via:   [x] Return/Follow-up telephone call    _______________________________________________    Jelena Harley RN  [facility] Endoscopy   "

## 2020-03-23 ENCOUNTER — COMMUNICATION - HEALTHEAST (OUTPATIENT)
Dept: INTERNAL MEDICINE | Facility: CLINIC | Age: 65
End: 2020-03-23

## 2020-03-23 DIAGNOSIS — G89.29 CHRONIC MIDLINE THORACIC BACK PAIN: ICD-10-CM

## 2020-03-23 DIAGNOSIS — M54.6 CHRONIC MIDLINE THORACIC BACK PAIN: ICD-10-CM

## 2020-04-22 ENCOUNTER — COMMUNICATION - HEALTHEAST (OUTPATIENT)
Dept: INTERNAL MEDICINE | Facility: CLINIC | Age: 65
End: 2020-04-22

## 2020-04-22 DIAGNOSIS — G89.29 CHRONIC MIDLINE THORACIC BACK PAIN: ICD-10-CM

## 2020-04-22 DIAGNOSIS — M54.6 CHRONIC MIDLINE THORACIC BACK PAIN: ICD-10-CM

## 2020-05-05 ENCOUNTER — COMMUNICATION - HEALTHEAST (OUTPATIENT)
Dept: INTERNAL MEDICINE | Facility: CLINIC | Age: 65
End: 2020-05-05

## 2020-05-05 DIAGNOSIS — J44.9 CHRONIC OBSTRUCTIVE PULMONARY DISEASE, UNSPECIFIED COPD TYPE (H): ICD-10-CM

## 2020-05-20 ENCOUNTER — COMMUNICATION - HEALTHEAST (OUTPATIENT)
Dept: SCHEDULING | Facility: CLINIC | Age: 65
End: 2020-05-20

## 2020-05-20 DIAGNOSIS — G89.29 CHRONIC MIDLINE THORACIC BACK PAIN: ICD-10-CM

## 2020-05-20 DIAGNOSIS — M54.6 CHRONIC MIDLINE THORACIC BACK PAIN: ICD-10-CM

## 2020-06-24 ENCOUNTER — APPOINTMENT (OUTPATIENT)
Dept: LAB | Facility: CLINIC | Age: 65
End: 2020-06-24
Attending: INTERNAL MEDICINE
Payer: COMMERCIAL

## 2020-06-24 ENCOUNTER — ONCOLOGY VISIT (OUTPATIENT)
Dept: TRANSPLANT | Facility: CLINIC | Age: 65
End: 2020-06-24
Attending: INTERNAL MEDICINE
Payer: COMMERCIAL

## 2020-06-24 ENCOUNTER — COMMUNICATION - HEALTHEAST (OUTPATIENT)
Dept: INTERNAL MEDICINE | Facility: CLINIC | Age: 65
End: 2020-06-24

## 2020-06-24 VITALS
TEMPERATURE: 98.4 F | DIASTOLIC BLOOD PRESSURE: 56 MMHG | RESPIRATION RATE: 16 BRPM | OXYGEN SATURATION: 96 % | SYSTOLIC BLOOD PRESSURE: 110 MMHG | HEART RATE: 72 BPM | WEIGHT: 112.3 LBS | BODY MASS INDEX: 16.57 KG/M2

## 2020-06-24 DIAGNOSIS — Z94.81 S/P ALLOGENEIC BONE MARROW TRANSPLANT (H): ICD-10-CM

## 2020-06-24 DIAGNOSIS — M54.6 CHRONIC MIDLINE THORACIC BACK PAIN: ICD-10-CM

## 2020-06-24 DIAGNOSIS — G89.29 CHRONIC MIDLINE THORACIC BACK PAIN: ICD-10-CM

## 2020-06-24 DIAGNOSIS — Z13.29 SCREENING FOR THYROID DISORDER: ICD-10-CM

## 2020-06-24 LAB
ALBUMIN SERPL-MCNC: 3.6 G/DL (ref 3.4–5)
ALP SERPL-CCNC: 49 U/L (ref 40–150)
ALT SERPL W P-5'-P-CCNC: 38 U/L (ref 0–50)
ANION GAP SERPL CALCULATED.3IONS-SCNC: 2 MMOL/L (ref 3–14)
AST SERPL W P-5'-P-CCNC: 29 U/L (ref 0–45)
BASOPHILS # BLD AUTO: 0.1 10E9/L (ref 0–0.2)
BASOPHILS NFR BLD AUTO: 1 %
BILIRUB SERPL-MCNC: 0.3 MG/DL (ref 0.2–1.3)
BUN SERPL-MCNC: 13 MG/DL (ref 7–30)
CALCIUM SERPL-MCNC: 8.4 MG/DL (ref 8.5–10.1)
CHLORIDE SERPL-SCNC: 105 MMOL/L (ref 94–109)
CO2 SERPL-SCNC: 32 MMOL/L (ref 20–32)
CREAT SERPL-MCNC: 0.61 MG/DL (ref 0.52–1.04)
DIFFERENTIAL METHOD BLD: NORMAL
EOSINOPHIL # BLD AUTO: 0.1 10E9/L (ref 0–0.7)
EOSINOPHIL NFR BLD AUTO: 1.6 %
ERYTHROCYTE [DISTWIDTH] IN BLOOD BY AUTOMATED COUNT: 13.2 % (ref 10–15)
GFR SERPL CREATININE-BSD FRML MDRD: >90 ML/MIN/{1.73_M2}
GLUCOSE SERPL-MCNC: 118 MG/DL (ref 70–99)
HCT VFR BLD AUTO: 38.6 % (ref 35–47)
HGB BLD-MCNC: 12.5 G/DL (ref 11.7–15.7)
IMM GRANULOCYTES # BLD: 0 10E9/L (ref 0–0.4)
IMM GRANULOCYTES NFR BLD: 0.4 %
LYMPHOCYTES # BLD AUTO: 3.3 10E9/L (ref 0.8–5.3)
LYMPHOCYTES NFR BLD AUTO: 36.6 %
MCH RBC QN AUTO: 30 PG (ref 26.5–33)
MCHC RBC AUTO-ENTMCNC: 32.4 G/DL (ref 31.5–36.5)
MCV RBC AUTO: 93 FL (ref 78–100)
MONOCYTES # BLD AUTO: 0.7 10E9/L (ref 0–1.3)
MONOCYTES NFR BLD AUTO: 8.2 %
NEUTROPHILS # BLD AUTO: 4.7 10E9/L (ref 1.6–8.3)
NEUTROPHILS NFR BLD AUTO: 52.2 %
NRBC # BLD AUTO: 0 10*3/UL
NRBC BLD AUTO-RTO: 0 /100
PLATELET # BLD AUTO: 227 10E9/L (ref 150–450)
POTASSIUM SERPL-SCNC: 3.9 MMOL/L (ref 3.4–5.3)
PROT SERPL-MCNC: 6.6 G/DL (ref 6.8–8.8)
RBC # BLD AUTO: 4.16 10E12/L (ref 3.8–5.2)
SODIUM SERPL-SCNC: 140 MMOL/L (ref 133–144)
TSH SERPL DL<=0.005 MIU/L-ACNC: 3.62 MU/L (ref 0.4–4)
WBC # BLD AUTO: 8.9 10E9/L (ref 4–11)

## 2020-06-24 PROCEDURE — G0463 HOSPITAL OUTPT CLINIC VISIT: HCPCS

## 2020-06-24 PROCEDURE — 82784 ASSAY IGA/IGD/IGG/IGM EACH: CPT | Performed by: INTERNAL MEDICINE

## 2020-06-24 PROCEDURE — 85025 COMPLETE CBC W/AUTO DIFF WBC: CPT | Performed by: INTERNAL MEDICINE

## 2020-06-24 PROCEDURE — 84443 ASSAY THYROID STIM HORMONE: CPT | Performed by: INTERNAL MEDICINE

## 2020-06-24 PROCEDURE — 80053 COMPREHEN METABOLIC PANEL: CPT | Performed by: INTERNAL MEDICINE

## 2020-06-24 PROCEDURE — 36415 COLL VENOUS BLD VENIPUNCTURE: CPT

## 2020-06-24 RX ORDER — ALENDRONATE SODIUM 70 MG/1
70 TABLET ORAL
Qty: 4 TABLET | Refills: 11 | Status: SHIPPED | OUTPATIENT
Start: 2020-06-24 | End: 2021-07-13

## 2020-06-24 ASSESSMENT — PAIN SCALES - GENERAL: PAINLEVEL: NO PAIN (0)

## 2020-06-24 NOTE — PROGRESS NOTES
BMT Clinic Progress Note     HPI: Ms. Jenn Barclay is a 63 yo patient who had an allogeneic sibling transplant with us 9 years and 7 months in the past, in CR, off immunosuppression    Interval Hx: She has been doing okay. Lesion on her face was cystic acne and was drained, but got infected. New lesion growing on her chin to the L. Intermittent diarrhea, not watery. Eating well. And taking creon and ensure as recommended. Still smoking. Cannot get dentures. No new mouth sores. No urinary issues. Seeing Primary Care  MD regularly. Stopped alendronate because ran out of it. Still taking vitamin D and calcium.     The remaining 12 point complete review of systems is otherwise negative.      PAST MEDICAL HISTORY:   1.  Significant for the acute myelogenous leukemia.   2.  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   3.  COPD.   4.  Hysterectomy and bilateral oophorectomy at the age of 20.     5.  She had a compression fracture of her thoracic spine that was treated with a vertebroplasty about 6 months ago.   6.  She has a history of smoking for many years.  She denies alcohol or drug abuse.  She is not on any thyroid replacement therapy, and denies thyroid disease.   7. R Foot fracture Nov 2017  8. Zoster Jan 2018       PHYSICAL EXAMINATION:  Ms. Barclay is a very pleasant 61-year-old. /56   Pulse 72   Temp 98.4  F (36.9  C) (Oral)   Resp 16   Wt 50.9 kg (112 lb 4.8 oz)   SpO2 96%   BMI 16.57 kg/m  .   General: frail, thin appearing woman in no distress  HEENT: perrl, eomi, no icterus, no oral lesions, no teeth  Resp: Distant breathing sounds without crackles or wheezes  CV: rrr no m/r/g  Abd: s, nt, nd, thin appearing, normal BS  Ext: no edema   Skin. Cystic lesion on the L chin.       Lab Results   Component Value Date    WBC 8.9 06/24/2020    ANEU 4.7 06/24/2020    HGB 12.5 06/24/2020    HCT 38.6 06/24/2020     06/24/2020     06/24/2020    POTASSIUM 3.9 06/24/2020    CHLORIDE 105  06/24/2020    CO2 32 06/24/2020     (H) 06/24/2020    BUN 13 06/24/2020    CR 0.61 06/24/2020    MAG 2.0 03/10/2011    INR 0.97 09/15/2010    BILITOTAL 0.3 06/24/2020    AST 29 06/24/2020    ALT 38 06/24/2020    ALKPHOS 49 06/24/2020    PROTTOTAL 6.6 (L) 06/24/2020    ALBUMIN 3.6 06/24/2020     TSH 6/24/20: 3.62 (normal)    Results for WESLEY BARCLAY (MRN [id number]) as of 6/24/2020 11:46   Ref. Range 1/17/2018 12:14 7/19/2019 12:08 1/24/2020 11:05 2/8/2020 10:36 6/24/2020 10:46   TSH Latest Ref Range: 0.40 - 4.00 mU/L 2.82 1.79 11.91 (H) 6.42 (H) 3.62     ASSESSMENT AND PLAN:  Ms. 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     1.  AML/HEME.  The patient in clinical remission. Counts stable.     2. Chronic Qfjpb-foxebr-dund disease.  She has no evidence of active chronic GVHD.  Continues on pancreatic enzyme replacement.  I encouraged her to not run out of medication and to call us at least with 2 or 3 months prior her prescription runs out to get it refilled.      3.  GI.  intermittent diarrhea. Needs repeat colonoscopy. I put new order again today.   - Pancreatic insufficiency as outlined above.  - Nausea/vomiting: no complain today    4.  Osteoporosis.  Had dexa scan that showed osteoporosis; I refilled her alendronate, must continue vit D3 and vitamins with Calcium.  I encouraged her to take these medications regularly.    5.  Chronic obstructive pulmonary disease.  Once again encouraged to stop smoking.      6. ID: no active infection, but lung nodule on CT had antibiotics; most recent imaging reportedly resolved;     7. Renal: Normal kidney function.  Electrolytes WNL  Creatinine slightly elevated from baseline:     8. Dermatology: New lesions on her chin. Needs to go back to Dermatology for possible surgical drainage of cystic acne.     9. General: history of colon polyps in 2017; referred  for follow-up colonoscopy in Epic.  She needs to continue working with her primary care to stop smoking.  She needs regular health care and screening with primary care physician.    10.  Endocrine: now has normal TSH. We will recheck once yearly.     Plan:   RTC Cyndie in 6 months with labs   Ordered colonoscopy in Epic; needs to be schedule  Needs to return to Dermatology for cystic acne; please schedule     Continue Creon through [facility] with the help of social work.   Must stop smoking; she will work with primary care on that  Came back sooner as needed.

## 2020-06-24 NOTE — LETTER
6/24/2020         RE: Jenn Barclay  [address] [place] [address]        Dear Colleague,    Thank you for referring your patient, Jenn Barclay, to the [facility] BLOOD AND MARROW TRANSPLANT. Please see a copy of my visit note below.    BMT Clinic Progress Note     HPI: Ms. Jenn Barclay is a 63 yo patient who had an allogeneic sibling transplant with us 9 years and 7 months in the past, in CR, off immunosuppression    Interval Hx: She has been doing okay. Lesion on her face was cystic acne and was drained, but got infected. New lesion growing on her chin to the L. Intermittent diarrhea, not watery. Eating well. And taking creon and ensure as recommended. Still smoking. Cannot get dentures. No new mouth sores. No urinary issues. Seeing Primary Care  MD regularly. Stopped alendronate because ran out of it. Still taking vitamin D and calcium.     The remaining 12 point complete review of systems is otherwise negative.      PAST MEDICAL HISTORY:   1.  Significant for the acute myelogenous leukemia.   2.  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   3.  COPD.   4.  Hysterectomy and bilateral oophorectomy at the age of 20.     5.  She had a compression fracture of her thoracic spine that was treated with a vertebroplasty about 6 months ago.   6.  She has a history of smoking for many years.  She denies alcohol or drug abuse.  She is not on any thyroid replacement therapy, and denies thyroid disease.   7. R Foot fracture Nov 2017  8. Zoster Jan 2018       PHYSICAL EXAMINATION:  Ms. Barclay is a very pleasant 61-year-old. /56   Pulse 72   Temp 98.4  F (36.9  C) (Oral)   Resp 16   Wt 50.9 kg (112 lb 4.8 oz)   SpO2 96%   BMI 16.57 kg/m  .   General: frail, thin appearing woman in no distress  HEENT: perrl, eomi, no icterus, no oral lesions, no teeth  Resp: Distant breathing sounds without crackles or wheezes  CV: rrr no m/r/g  Abd: s, nt, nd, thin appearing, normal BS  Ext: no edema    Skin. Cystic lesion on the L chin.       Lab Results   Component Value Date    WBC 8.9 06/24/2020    ANEU 4.7 06/24/2020    HGB 12.5 06/24/2020    HCT 38.6 06/24/2020     06/24/2020     06/24/2020    POTASSIUM 3.9 06/24/2020    CHLORIDE 105 06/24/2020    CO2 32 06/24/2020     (H) 06/24/2020    BUN 13 06/24/2020    CR 0.61 06/24/2020    MAG 2.0 03/10/2011    INR 0.97 09/15/2010    BILITOTAL 0.3 06/24/2020    AST 29 06/24/2020    ALT 38 06/24/2020    ALKPHOS 49 06/24/2020    PROTTOTAL 6.6 (L) 06/24/2020    ALBUMIN 3.6 06/24/2020     TSH 6/24/20: 3.62 (normal)    Results for WESLEY BARCLAY (MRN [id number]) as of 6/24/2020 11:46   Ref. Range 1/17/2018 12:14 7/19/2019 12:08 1/24/2020 11:05 2/8/2020 10:36 6/24/2020 10:46   TSH Latest Ref Range: 0.40 - 4.00 mU/L 2.82 1.79 11.91 (H) 6.42 (H) 3.62     ASSESSMENT AND PLAN:  Ms. 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     1.  AML/HEME.  The patient in clinical remission. Counts stable.     2. Chronic Chdxl-xqmyjx-fuen disease.  She has no evidence of active chronic GVHD.  Continues on pancreatic enzyme replacement.  I encouraged her to not run out of medication and to call us at least with 2 or 3 months prior her prescription runs out to get it refilled.      3.  GI.  intermittent diarrhea. Needs repeat colonoscopy. I put new order again today.   - Pancreatic insufficiency as outlined above.  - Nausea/vomiting: no complain today    4.  Osteoporosis.  Had dexa scan that showed osteoporosis; I refilled her alendronate, must continue vit D3 and vitamins with Calcium.  I encouraged her to take these medications regularly.    5.  Chronic obstructive pulmonary disease.  Once again encouraged to stop smoking.      6. ID: no active infection, but lung nodule on CT had antibiotics; most recent imaging reportedly resolved;     7. Renal: Normal  kidney function.  Electrolytes WNL  Creatinine slightly elevated from baseline:     8. Dermatology: New lesions on her chin. Needs to go back to Dermatology for possible surgical drainage of cystic acne.     9. General: history of colon polyps in 2017; referred for follow-up colonoscopy in Epic.  She needs to continue working with her primary care to stop smoking.  She needs regular health care and screening with primary care physician.    10.  Endocrine: now has normal TSH. We will recheck once yearly.     Plan:   RTC Cyndie in 6 months with labs   Ordered colonoscopy in Epic; needs to be schedule  Needs to return to Dermatology for cystic acne; please schedule     Continue Creon through [employer] with the help of social work.   Must stop smoking; she will work with primary care on that  Came back sooner as needed.        Again, thank you for allowing me to participate in the care of your patient.        Sincerely,        Dmitriy Agee MD

## 2020-06-24 NOTE — NURSING NOTE
"Oncology Rooming Note    June 24, 2020 10:54 AM   Jenn Barclay is a 64 year old female who presents for:    Chief Complaint   Patient presents with     Lab Only     labs drawn via , vitals completed     RECHECK     S/P allogeneic bone marrow transplant (H)     Initial Vitals: /56   Pulse 72   Temp 98.4  F (36.9  C) (Oral)   Resp 16   Wt 50.9 kg (112 lb 4.8 oz)   SpO2 96%   BMI 16.57 kg/m   Estimated body mass index is 16.57 kg/m  as calculated from the following:    Height as of 7/18/18: 1.753 m (5' 9.02\").    Weight as of this encounter: 50.9 kg (112 lb 4.8 oz). Body surface area is 1.57 meters squared.  No Pain (0) Comment: Data Unavailable   No LMP recorded.  Allergies reviewed: Yes  Medications reviewed: Yes    Medications: Medication refills not needed today.  Pharmacy name entered into [employer]:    [facility] [address] [place] [address] E  [facility] [place] [facility] [place] - [address]    Clinical concerns:  Pt reports that she has been belching and horrible gas that smells like sulfur.    Mireille Naranjo, QUE          "

## 2020-06-25 LAB — IGG SERPL-MCNC: 700 MG/DL (ref 610–1616)

## 2020-06-26 ENCOUNTER — HOSPITAL ENCOUNTER (OUTPATIENT)
Facility: AMBULATORY SURGERY CENTER | Age: 65
End: 2020-06-26
Attending: INTERNAL MEDICINE
Payer: COMMERCIAL

## 2020-06-26 DIAGNOSIS — Z11.59 ENCOUNTER FOR SCREENING FOR OTHER VIRAL DISEASES: Primary | ICD-10-CM

## 2020-07-14 ENCOUNTER — NURSE TRIAGE (OUTPATIENT)
Dept: NURSING | Facility: CLINIC | Age: 65
End: 2020-07-14

## 2020-07-14 DIAGNOSIS — Z94.81 S/P ALLOGENEIC BONE MARROW TRANSPLANT (H): Primary | ICD-10-CM

## 2020-07-14 RX ORDER — BISACODYL 5 MG/1
10 TABLET, DELAYED RELEASE ORAL DAILY
Qty: 4 TABLET | Refills: 0 | Status: SHIPPED | OUTPATIENT
Start: 2020-07-14 | End: 2020-07-16

## 2020-07-14 NOTE — TELEPHONE ENCOUNTER
"Patient calling regarding her upcoming colonoscopy on 7/17. I have no order for a covid test yet. I do not have any direction s on the prep and I got a call from my pharmacy saying they wouldn't even have the stuff I needed to drink until Thursday.\" Advised caller to call GI clinic in am 7/15 to discuss.  No triage was needed.  Vi Delacruz RN [facility]    COVID 19 Nurse Triage Plan/Patient Instructions    Please be aware that novel coronavirus (COVID-19) may be circulating in the community. If you develop symptoms such as fever, cough, or SOB or if you have concerns about the presence of another infection including coronavirus (COVID-19), please contact your health care provider or visit [email or web address].     Disposition/Instructions    Additional COVID19 information to add for patients.   How can I protect others?  If you have symptoms (fever, cough, body aches or trouble breathing): Stay home and away from others (self-isolate) until:    At least 10 days have passed since your symptoms started. And     You ve had no fever--and no medicine that reduces fever--for 3 full days (72 hours). And      Your other symptoms have resolved (gotten better).     If you don t have symptoms, but a test showed that you have COVID-19 (you tested positive):    Stay home and away from others (self-isolate) until at least 10 days have passed since the date of your first positive COVID-19 test.    During this time:    Stay in your own room, even for meals. Use your own bathroom if you can.     Stay away from others in your home. No hugging, kissing or shaking hands. No visitors.    Don t go to work, school or anywhere else.     Clean  high touch  surfaces often (doorknobs, counters, handles, etc.). Use a household cleaning spray or wipes. You ll find a full list on the EPA website:  www.epa.gov/pesticide-registration/list-n-disinfectants-use-against-sars-cov-2.    Cover your mouth and nose with a mask, tissue or " washcloth to avoid spreading germs.    Wash your hands and face often. Use soap and water.    Caregivers in these groups are at risk for severe illness due to COVID-19:  o People 65 years and older  o People who live in a nursing home or long-term care facility  o People with chronic disease (lung, heart, cancer, diabetes, kidney, liver, immunologic)  o People who have a weakened immune system, including those who:  - Are in cancer treatment  - Take medicine that weakens the immune system, such as corticosteroids  - Had a bone marrow or organ transplant  - Have an immune deficiency  - Have poorly controlled HIV or AIDS  - Are obese (body mass index of 40 or higher)  - Smoke regularly    Caregivers should wear gloves while washing dishes, handling laundry and cleaning bedrooms and bathrooms.    Use caution when washing and drying laundry: Don t shake dirty laundry, and use the warmest water setting that you can.    For more tips, go to www.cdc.gov/coronavirus/2019-ncov/downloads/10Things.pdf.    How can I take care of myself?  1. Get lots of rest. Drink extra fluids (unless a doctor has told you not to).     2. Take Tylenol (acetaminophen) for fever or pain. If you have liver or kidney problems, ask your family doctor if it s okay to take Tylenol.     Adults can take either:     650 mg (two 325 mg pills) every 4 to 6 hours, or     1,000 mg (two 500 mg pills) every 8 hours as needed.     Note: Don t take more than 3,000 mg in one day.   Acetaminophen is found in many medicines (both prescribed and over-the-counter medicines). Read all labels to be sure you don t take too much.     For children, check the Tylenol bottle for the right dose. The dose is based on the child s age or weight.    3. If you have other health problems (like cancer, heart failure, an organ transplant or severe kidney disease): Call your specialty clinic if you don t feel better in the next 2 days.    4. Know when to call 911: Emergency warning  signs include:    Trouble breathing or shortness of breath    Pain or pressure in the chest that doesn t go away    Feeling confused like you haven t felt before, or not being able to wake up    Bluish-colored lips or face    What are the symptoms of COVID-19?     The most common symptoms are cough, fever and trouble breathing.     Less common symptoms include body aches, chills, diarrhea (loose, watery poops), fatigue (feeling very tired), headache, runny nose, sore throat and loss of smell.    COVID-19 can cause severe coughing (bronchitis) and lung infection (pneumonia).    How does it spread?     The virus may spread when a person coughs or sneezes into the air. The virus can travel about 6 feet this way, and it can live on surfaces.      Common  (household disinfectants) will kill the virus.    Who is at risk?  Anyone can catch COVID-19 if they re around someone who has the virus.    How can others protect themselves?     Stay away from people who have COVID-19 (or symptoms of COVID-19).    Wash hands often with soap and water. Or, use hand  with at least 60% alcohol.    Avoid touching the eyes, nose or mouth.     Wear a face mask when you go out in public, when sick or when caring for a sick person.    Where can I get more information?    [facility] [place]: About COVID-19: [email or web address]    CDC: What to Do If You re Sick: www.cdc.gov/coronavirus/2019-ncov/about/steps-when-sick.html    CDC: Ending Home Isolation: www.cdc.gov/coronavirus/2019-ncov/hcp/disposition-in-home-patients.html     CDC: Caring for Someone: www.cdc.gov/coronavirus/2019-ncov/if-you-are-sick/care-for-someone.html     [facility]: Interim Guidance for Hospital Discharge to Home: www.health.[facility].mn.us/diseases/coronavirus/hcp/hospdischarge.pdf    [facility] clinical trials (COVID-19 research studies): clinicalaffairs.[facility]/umn-clinical-trials     Below are the COVID-19 hotlines at [facility]  Replaced by [facility] ([facility]). Interpreters are available.   o For health questions: Call 607-097-2217 or 1-708.322.7080 (7 a.m. to 7 p.m.)  o For questions about schools and childcare: Call 449-939-4224 or 1-454.840.5852 (7 a.m. to 7 p.m.)          Thank you for taking steps to prevent the spread of this virus.  o Limit your contact with others.  o Wear a simple mask to cover your cough.  o Wash your hands well and often.    Resources    [facility]: About COVID-19: [email or web address]    CDC: What to Do If You're Sick: www.cdc.gov/coronavirus/2019-ncov/about/steps-when-sick.html    CDC: Ending Home Isolation: www.cdc.gov/coronavirus/2019-ncov/hcp/disposition-in-home-patients.html     CDC: Caring for Someone: www.cdc.gov/coronavirus/2019-ncov/if-you-are-sick/care-for-someone.html     [facility]: Interim Guidance for Hospital Discharge to Home: www.health.[facility].mn./diseases/coronavirus/hcp/hospdischarge.pdf    [facility] clinical trials (COVID-19 research studies): clinicalaffairs.[facility]-clinical-trials     Below are the COVID-19 hotlines at the [place] [facility] ([facility]). Interpreters are available.   o For health questions: Call 940-501-6145 or 1-862.137.2512 (7 a.m. to 7 p.m.)  o For questions about schools and childcare: Call 345-727-0070 or 1-776.690.4863 (7 a.m. to 7 p.m.)                     Reason for Disposition    [1] Caller requesting NON-URGENT health information AND [2] PCP's office is the best resource    Protocols used: INFORMATION ONLY CALL-A-AH

## 2020-07-14 NOTE — NURSING NOTE
eRx Golytely et Dulcolax: [id number] - [place], [place] - [address] [place] [address] [address]     Jelena Harley, RN  [facility]

## 2020-07-16 ENCOUNTER — OFFICE VISIT - HEALTHEAST (OUTPATIENT)
Dept: INTERNAL MEDICINE | Facility: CLINIC | Age: 65
End: 2020-07-16

## 2020-07-16 DIAGNOSIS — D12.6 SERRATED ADENOMA OF COLON: ICD-10-CM

## 2020-07-16 DIAGNOSIS — D12.6 TUBULAR ADENOMA OF COLON: ICD-10-CM

## 2020-07-16 DIAGNOSIS — M54.6 CHRONIC MIDLINE THORACIC BACK PAIN: ICD-10-CM

## 2020-07-16 DIAGNOSIS — S22.060A TRAUMATIC COMPRESSION FRACTURE OF T8 THORACIC VERTEBRA, CLOSED, INITIAL ENCOUNTER (H): ICD-10-CM

## 2020-07-16 DIAGNOSIS — Z11.59 SCREENING FOR VIRAL DISEASE: ICD-10-CM

## 2020-07-16 DIAGNOSIS — Z72.0 TOBACCO ABUSE: ICD-10-CM

## 2020-07-16 DIAGNOSIS — R21 RASH: ICD-10-CM

## 2020-07-16 DIAGNOSIS — L98.9 MULTIPLE LESIONS OF FACE: ICD-10-CM

## 2020-07-16 DIAGNOSIS — L72.3 SEBACEOUS CYST: ICD-10-CM

## 2020-07-16 DIAGNOSIS — D89.813 GVHD (GRAFT VERSUS HOST DISEASE) (H): ICD-10-CM

## 2020-07-16 DIAGNOSIS — D69.2 SENILE PURPURA (H): ICD-10-CM

## 2020-07-16 DIAGNOSIS — L70.0 CYSTIC ACNE: ICD-10-CM

## 2020-07-16 DIAGNOSIS — C92.00: ICD-10-CM

## 2020-07-16 DIAGNOSIS — G89.29 CHRONIC MIDLINE THORACIC BACK PAIN: ICD-10-CM

## 2020-07-16 DIAGNOSIS — M79.675 GREAT TOE PAIN, LEFT: ICD-10-CM

## 2020-07-16 DIAGNOSIS — J44.9 CHRONIC OBSTRUCTIVE PULMONARY DISEASE, UNSPECIFIED COPD TYPE (H): ICD-10-CM

## 2020-07-16 DIAGNOSIS — K86.81 EXOCRINE PANCREATIC INSUFFICIENCY: ICD-10-CM

## 2020-07-24 ENCOUNTER — COMMUNICATION - HEALTHEAST (OUTPATIENT)
Dept: INTERNAL MEDICINE | Facility: CLINIC | Age: 65
End: 2020-07-24

## 2020-07-24 DIAGNOSIS — G89.29 CHRONIC MIDLINE THORACIC BACK PAIN: ICD-10-CM

## 2020-07-24 DIAGNOSIS — M54.6 CHRONIC MIDLINE THORACIC BACK PAIN: ICD-10-CM

## 2020-07-25 ENCOUNTER — AMBULATORY - HEALTHEAST (OUTPATIENT)
Dept: FAMILY MEDICINE | Facility: CLINIC | Age: 65
End: 2020-07-25

## 2020-07-25 DIAGNOSIS — Z11.59 SCREENING FOR VIRAL DISEASE: ICD-10-CM

## 2020-07-27 ENCOUNTER — COMMUNICATION - HEALTHEAST (OUTPATIENT)
Dept: INTERNAL MEDICINE | Facility: CLINIC | Age: 65
End: 2020-07-27

## 2020-07-28 ENCOUNTER — ANESTHESIA (OUTPATIENT)
Dept: SURGERY | Facility: AMBULATORY SURGERY CENTER | Age: 65
End: 2020-07-28

## 2020-07-28 ENCOUNTER — HOSPITAL ENCOUNTER (OUTPATIENT)
Facility: AMBULATORY SURGERY CENTER | Age: 65
End: 2020-07-28
Attending: INTERNAL MEDICINE
Payer: COMMERCIAL

## 2020-07-28 ENCOUNTER — ANESTHESIA EVENT (OUTPATIENT)
Dept: SURGERY | Facility: AMBULATORY SURGERY CENTER | Age: 65
End: 2020-07-28

## 2020-07-28 VITALS — HEART RATE: 59 BPM

## 2020-07-28 VITALS
TEMPERATURE: 98.5 F | HEART RATE: 61 BPM | DIASTOLIC BLOOD PRESSURE: 70 MMHG | OXYGEN SATURATION: 99 % | RESPIRATION RATE: 16 BRPM | SYSTOLIC BLOOD PRESSURE: 107 MMHG

## 2020-07-28 LAB — COLONOSCOPY: NORMAL

## 2020-07-28 PROCEDURE — 88305 TISSUE EXAM BY PATHOLOGIST: CPT | Performed by: INTERNAL MEDICINE

## 2020-07-28 RX ORDER — LIDOCAINE 40 MG/G
CREAM TOPICAL
Status: DISCONTINUED | OUTPATIENT
Start: 2020-07-28 | End: 2020-07-29 | Stop reason: HOSPADM

## 2020-07-28 RX ORDER — DOXYCYCLINE HYCLATE 100 MG
100 TABLET ORAL
COMMUNITY
Start: 2020-07-16 | End: 2021-07-13

## 2020-07-28 RX ORDER — OXYCODONE HYDROCHLORIDE 5 MG/1
5 TABLET ORAL EVERY 6 HOURS PRN
COMMUNITY
End: 2021-12-31

## 2020-07-28 RX ORDER — PROPOFOL 10 MG/ML
INJECTION, EMULSION INTRAVENOUS PRN
Status: DISCONTINUED | OUTPATIENT
Start: 2020-07-28 | End: 2020-07-28

## 2020-07-28 RX ORDER — PROPOFOL 10 MG/ML
INJECTION, EMULSION INTRAVENOUS CONTINUOUS PRN
Status: DISCONTINUED | OUTPATIENT
Start: 2020-07-28 | End: 2020-07-28

## 2020-07-28 RX ORDER — ONDANSETRON 2 MG/ML
4 INJECTION INTRAMUSCULAR; INTRAVENOUS
Status: DISCONTINUED | OUTPATIENT
Start: 2020-07-28 | End: 2020-07-29 | Stop reason: HOSPADM

## 2020-07-28 RX ORDER — SODIUM CHLORIDE, SODIUM LACTATE, POTASSIUM CHLORIDE, CALCIUM CHLORIDE 600; 310; 30; 20 MG/100ML; MG/100ML; MG/100ML; MG/100ML
500 INJECTION, SOLUTION INTRAVENOUS CONTINUOUS
Status: DISCONTINUED | OUTPATIENT
Start: 2020-07-28 | End: 2020-07-29 | Stop reason: HOSPADM

## 2020-07-28 RX ORDER — LIDOCAINE HYDROCHLORIDE 20 MG/ML
INJECTION, SOLUTION INFILTRATION; PERINEURAL PRN
Status: DISCONTINUED | OUTPATIENT
Start: 2020-07-28 | End: 2020-07-28

## 2020-07-28 RX ADMIN — LIDOCAINE HYDROCHLORIDE 40 MG: 20 INJECTION, SOLUTION INFILTRATION; PERINEURAL at 12:44

## 2020-07-28 RX ADMIN — PROPOFOL 50 MG: 10 INJECTION, EMULSION INTRAVENOUS at 12:45

## 2020-07-28 RX ADMIN — PROPOFOL 150 MCG/KG/MIN: 10 INJECTION, EMULSION INTRAVENOUS at 12:45

## 2020-07-28 RX ADMIN — SODIUM CHLORIDE, SODIUM LACTATE, POTASSIUM CHLORIDE, CALCIUM CHLORIDE 500 ML: 600; 310; 30; 20 INJECTION, SOLUTION INTRAVENOUS at 12:29

## 2020-07-28 RX ADMIN — PROPOFOL 20 MG: 10 INJECTION, EMULSION INTRAVENOUS at 12:47

## 2020-07-28 ASSESSMENT — LIFESTYLE VARIABLES: TOBACCO_USE: 1

## 2020-07-28 ASSESSMENT — COPD QUESTIONNAIRES
CAT_SEVERITY: MILD
COPD: 1

## 2020-07-28 NOTE — ANESTHESIA PREPROCEDURE EVALUATION
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.81]   Procedure(s):  COLONOSCOPY     LABS:  CBC:   Lab Results   Component Value Date    WBC 8.9 2020    WBC 7.6 2020    HGB 12.5 2020    HGB 13.3 2020    HCT 38.6 2020    HCT 41.3 2020     2020     2020     BMP:   Lab Results   Component Value Date     2020     2020    POTASSIUM 3.9 2020    POTASSIUM 4.2 2020    CHLORIDE 105 2020    CHLORIDE 105 2020    CO2 32 2020    CO2 30 2020    BUN 13 2020    BUN 15 2020    CR 0.61 2020    CR 0.65 2020     (H) 2020     (H) 2020     COAGS:   Lab Results   Component Value Date    PTT 33 2010    INR 0.97 09/15/2010     POC:   Lab Results   Component Value Date    HCGS Negative 2010     OTHER:   Lab Results   Component Value Date    PH 7.41 2010    A1C 5.6 2020    WILMER 8.4 (L) 2020    PHOS 2.8 2020    MAG 2.0 03/10/2011    ALBUMIN 3.6 2020    PROTTOTAL 6.6 (L) 2020    ALT 38 2020    AST 29 2020    ALKPHOS 49 2020    BILITOTAL 0.3 2020    LIPASE 37 (L) 2017    AMYLASE 38 2017    TSH 3.62 2020    T4 1.00 2020        Preop Vitals    BP Readings from Last 3 Encounters:   20 127/77   20 110/56   20 111/73    Pulse Readings from Last 3 Encounters:   20 72   20 64   02/10/20 79      Resp Readings from Last 3 Encounters:   20 18   20 16   20 16    SpO2 Readings from Last 3 Encounters:   20 97%   20 96%   20 94%      Temp Readings from Last 1 Encounters:   20 36.8  C (98.3  F) (Oral)    Ht Readings from Last 1 Encounters:   18 1.753  "m (5' 9.02\")      Wt Readings from Last 1 Encounters:   06/24/20 50.9 kg (112 lb 4.8 oz)    Estimated body mass index is 16.57 kg/m  as calculated from the following:    Height as of 7/18/18: 1.753 m (5' 9.02\").    Weight as of 6/24/20: 50.9 kg (112 lb 4.8 oz).     LDA:  Peripheral IV 07/28/20 Right Hand (Active)   Site Assessment WDL 07/28/20 1200   Line Status Infusing 07/28/20 1200   Phlebitis Scale 0-->no symptoms 07/28/20 1200   Infiltration Scale 0 07/28/20 1200   Number of days: 0        Past Medical History:   Diagnosis Date     AML (acute myeloid leukemia) (H) 12/2009     COPD (chronic obstructive pulmonary disease) (H)      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     Metatarsal fracture 2017    2nd      Osteoporosis 2020    DXA     Surgical menopause 1976    oophorectomy     Vertebral compression fracture (H) 2014     Zoster 2018      Past Surgical History:   Procedure Laterality Date     hysterectomy and oophorectomy  1976      Allergies   Allergen Reactions     Mirtazapine      Other reaction(s): Other (See Comments)  Hallucination      Tape [Adhesive Tape]      Allergy Hx     Liquid Adhesive Rash     Other reaction(s): Other (See Comments)  Allergy Hx - Rash from paper tape        Anesthesia Evaluation     . Pt has had prior anesthetic. Type: General    No history of anesthetic complications          ROS/MED HX    ENT/Pulmonary:     (+)tobacco use, Current use mild COPD, , . .    Neurologic:  - neg neurologic ROS     Cardiovascular:  - neg cardiovascular ROS       METS/Exercise Tolerance:  >4 METS   Hematologic:  - neg hematologic  ROS       Musculoskeletal:  - neg musculoskeletal ROS       GI/Hepatic:  - neg GI/hepatic ROS       Renal/Genitourinary:  - ROS Renal section negative       Endo:  - neg endo ROS       Psychiatric:  - neg psychiatric ROS       Infectious Disease:  - neg infectious disease ROS       Malignancy:   (+) Malignancy           Other:                         PHYSICAL " EXAM:   Mental Status/Neuro: A/A/O   Airway: Facies: Feasible  Mallampati: I  Mouth/Opening: Full  TM distance: > 6 cm  Neck ROM: Full   Respiratory: Auscultation: CTAB     Resp. Rate: Normal     Resp. Effort: Normal      CV: Rhythm: Regular  Rate: Age appropriate  Heart: Normal Sounds  Edema: None   Comments:      Dental: Normal Dentition                Assessment:   ASA SCORE: 3    H&P: History and physical reviewed and following examination; no interval change.   Smoking Status:  Non-Smoker/Unknown   NPO Status: NPO Appropriate     Plan:   Anes. Type:  MAC   Pre-Medication: None   Induction:  N/a   Airway: Native Airway   Access/Monitoring: PIV   Maintenance: N/a     Postop Plan:   Postop Pain: None  Postop Sedation/Airway: Not planned  Disposition: Outpatient     PONV Management:   Adult Risk Factors: Female, Non-Smoker   Prevention: Ondansetron, Dexamethasone     CONSENT: Direct conversation   Plan and risks discussed with: Patient                      Ryan Sears MD, MD

## 2020-07-28 NOTE — LETTER
August 9, 2020      Wesley Barclay  [address] [place] [facility] [address]        Dear ,    We are writing to inform you of your test results.    I am happy to report that the random biopsies of your colon all returned normal.   We found no inflammation or reason for not gaining weight.    I did remove a polyp.  The polyp was examined and found  to be a tubular adenoma by our pathologists.  This is a benign (not cancerous) growth that can be easily removed at the time of colonoscopy.  There is a small risk of adenomas progressing to cancer over years if they are left in the colon unattended.  This polyp was removed completely.  This is very small and progression risk is mostly determined by size.  You adenoma was 4 mm.      Given these results I recommend you have another colonoscopy in 5 years.      Kasey Fox MD  Associate Professor of Medicine  Division of Gastroenterology, Hepatology, and Nutrition  [facility]          Resulted Orders   Surgical pathology exam   Result Value Ref Range    Copath Report       Patient Name: WESLEY BARCLAY  MR#: [id number]  Specimen #: [id number]  Collected: 7/28/2020  Received: 7/28/2020  Reported: 7/30/2020 10:58  Ordering Phy(s): KASEY FOX    For improved result formatting, select 'View Enhanced Report Format' under   Linked Documents section.    SPECIMEN(S):  A: Colon biopsy, random  B: Colon biopsy, ascending    FINAL DIAGNOSIS:  A. Colon, Random Biopsy:  Multiple fragments of colonic mucosa without pathological abnormality; no   evidence of  vczjs-zhnygs-xyjv-disease, or microscopic (collagenous or lymphocytic)   colitis    B. Ascending Colon, Polyp:  Sessile serrated adenoma; negative for cytologic dysplasia    I have personally reviewed all specimens and/or slides, including the   listed special stains, and used them  with my medical judgement to determine or confirm the final diagnosis.    Electronically signed out by:    Moises Davis  "M.D., [facility], [facility]    CLINICAL HISTORY:  Fat malabsorption, weight loss. r/o microscopic colitis.    GROSS :  A:  The specimen is received in formalin with proper patient   identification, labeled \"random colon\".  The  specimen consists of three irregular tan pieces of soft tissue averaging   0.2 cm in greatest dimension which  are entirely submitted in cassette A1.    B:  The specimen is received in formalin with proper patient   identification, labeled \"ascending colon polyp\".  The specimen consists of three irregular tan pieces of soft tissue ranging   from 0.1-0.2 cm in greatest  dimension which are entirely submitted in cassette B1. (Dictated by: Austin Bush 7/28/2020 03:45 PM)    MICROSCOPIC:  Microscopic examination was performed.    The technical component of this testing was completed at the [facility], with the professional component performed   at the [facility], [address] [place] [phone] ([phone])    CPT Codes:  A: 48600-TQ8  B: 03864-A M5    [facility]  [facility]  [facility]    Resident  [id number]         If you have any questions or concerns, please call the clinic at the number listed above.       Sincerely,        No name on file.                "

## 2020-07-28 NOTE — ANESTHESIA CARE TRANSFER NOTE
Patient: Jenn Barclay    Procedure(s):  COLONOSCOPY, WITH POLYPECTOMY AND BIOPSY    Diagnosis: S/P allogeneic bone marrow transplant (H) [Z94.81]  Diagnosis Additional Information: No value filed.    Anesthesia Type:   MAC     Note:  Airway :Room Air  Patient transferred to:Phase II  Comments: Candelario Report: Identifed the Patient, Identified the Reponsible Provider, Reviewed the pertinent medical history, Discussed the surgical course, Reviewed Intra-OP anesthesia mangement and issues during anesthesia, Set expectations for post-procedure period and Allowed opportunity for questions and acknowledgement of understanding      Vitals: (Last set prior to Anesthesia Care Transfer)    CRNA VITALS  7/28/2020 1240 - 7/28/2020 1310      7/28/2020             Pulse:  61    SpO2:  99 %                Electronically Signed By: LYSSA Kern CRNA  July 28, 2020  1:10 PM

## 2020-07-28 NOTE — ANESTHESIA POSTPROCEDURE EVALUATION
Anesthesia POST Procedure Evaluation    Patient: Jenn Barclay   MRN:     [id number] Gender:   female   Age:    64 year old :      1955        Preoperative Diagnosis: S/P allogeneic bone marrow transplant (H) [Z94.81]   Procedure(s):  COLONOSCOPY, WITH POLYPECTOMY AND BIOPSY   Postop Comments: No value filed.     Anesthesia Type: MAC       Disposition: Outpatient   Postop Pain Control: Uneventful            Sign Out: Well controlled pain   PONV: No   Neuro/Psych: Uneventful            Sign Out: Acceptable/Baseline neuro status   Airway/Respiratory: Uneventful            Sign Out: Acceptable/Baseline resp. status   CV/Hemodynamics: Uneventful            Sign Out: Acceptable CV status   Other NRE: NONE   DID A NON-ROUTINE EVENT OCCUR? No         Last Anesthesia Record Vitals:  CRNA VITALS  2020 1240 - 2020 1340      2020             Pulse:  61    SpO2:  99 %          Last PACU Vitals:  Vitals Value Taken Time   /67 2020  1:15 PM   Temp 36.9  C (98.5  F) 2020  1:15 PM   Pulse 62 2020  1:15 PM   Resp 16 2020  1:15 PM   SpO2 99 % 2020  1:15 PM   Temp src Available 2020  1:00 PM   NIBP 92/60 2020  1:05 PM   Pulse 61 2020  1:08 PM   SpO2 99 % 2020  1:08 PM   Resp 25 2020  1:07 PM   Temp     Ht Rate 59 2020  1:07 PM   Temp 2           Electronically Signed By: Diamante Bean MD, 2020, 2:13 PM

## 2020-07-29 NOTE — H&P
Jenn Barclay  [id number]  female  64 year old      Reason for procedure/surgery: Colonoscopy    Patient Active Problem List   Diagnosis     AML (acute myelogenous leukemia) (H)     Encounter for long-term current use of medication     S/P allogeneic bone marrow transplant (H)     Exocrine pancreatic insufficiency     Underweight     High serum thyroid stimulating hormone (TSH)     Nocturia     Post-menopausal     Age-related osteoporosis with current pathological fracture with routine healing, subsequent encounter     Nephrolithiasis       Past Surgical History:    Past Surgical History:   Procedure Laterality Date     COLONOSCOPY N/A 7/28/2020    Procedure: COLONOSCOPY, WITH POLYPECTOMY AND BIOPSY;  Surgeon: Gianni Valerio MD;  [facility]     hysterectomy and oophorectomy  1976       Past Medical History:   Past Medical History:   Diagnosis Date     AML (acute myeloid leukemia) (H) 12/2009     COPD (chronic obstructive pulmonary disease) (H)      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     Metatarsal fracture 2017    2nd      Osteoporosis 2020    DXA     Surgical menopause 1976    oophorectomy     Vertebral compression fracture (H) 2014     Zoster 2018       Social History:   Social History     Tobacco Use     Smoking status: Current Every Day Smoker     Packs/day: 1.00     Years: 45.00     Pack years: 45.00     Types: Cigarettes     Smokeless tobacco: Never Used   Substance Use Topics     Alcohol use: Not on file       Family History:   Family History   Problem Relation Age of Onset     Diabetes Mother         borderline     Pancreatic Cancer Father      Diabetes Maternal Grandmother      Diabetes Maternal Uncle      Thyroid Disease No family hx of      Melanoma No family hx of      Skin Cancer No family hx of        Allergies:   Allergies   Allergen Reactions     Mirtazapine      Other reaction(s): Other (See Comments)  Hallucination      Tape [Adhesive Tape]      Allergy Hx     Liquid  Adhesive Rash     Other reaction(s): Other (See Comments)  Allergy Hx - Rash from paper tape       Active Medications:   Current Outpatient Medications   Medication Sig Dispense Refill     Albuterol Sulfate (VENTOLIN HFA) 108 (90 BASE) MCG/ACT AERS Inhale  into the lungs.       ascorbic acid (VITAMIN C) 500 MG tablet Take 500 mg by mouth daily.       cholecalciferol (VITAMIN D3) 1000 UNIT capsule Take 1 capsule by mouth daily.       doxycycline hyclate (VIBRA-TABS) 100 MG tablet Take 100 mg by mouth       Loperamide HCl (IMODIUM OR) Take by mouth 2 times daily       Multiple Vitamins-Minerals (MULTIVITAL PO) Take  by mouth.       oxyCODONE (ROXICODONE) 5 MG tablet Take 5 mg by mouth every 6 hours as needed for severe pain       alendronate (FOSAMAX) 70 MG tablet Take 1 tablet (70 mg) by mouth every 7 days 4 tablet 11     amylase-lipase-protease (CREON) 78750-79888 units CPEP per EC capsule Take 2 capsules (48,000 Units) by mouth 3 times daily (with meals) And snacks 300 capsule 11     clindamycin 1 % EX external lotion Apply topically 2 times daily 60 mL 3     gentamicin 0.1 % EX external ointment Apply topically 2 times daily 30 g 3     mupirocin (BACTROBAN) 2 % external ointment Use 2 times a day to affected area on cheek 22 g 0     Naproxen Sodium (ALEVE PO) Take by mouth daily       triamcinolone (KENALOG) 0.1 % external cream Apply tid to itchy or tender skin lesions 80 g 3       Systemic Review:   CONSTITUTIONAL: NEGATIVE for fever, chills, change in weight  ENT/MOUTH: NEGATIVE for ear, mouth and throat problems  RESP: NEGATIVE for significant cough or SOB  CV: NEGATIVE for chest pain, palpitations or peripheral edema    Physical Examination:   Vital Signs: /70   Pulse 61   Temp 98.5  F (36.9  C) (Temporal)   Resp 16   SpO2 99%   GENERAL: healthy, alert and no distress  NECK: no adenopathy, no asymmetry, masses, or scars  RESP: lungs clear to auscultation - no rales, rhonchi or wheezes  CV: regular  rate and rhythm, normal S1 S2, no S3 or S4, no murmur, click or rub, no peripheral edema and peripheral pulses strong  ABDOMEN: soft, nontender, no hepatosplenomegaly, no masses and bowel sounds normal  MS: no gross musculoskeletal defects noted, no edema    Plan: Appropriate to proceed as scheduled.      Gianni Valerio MD  7/29/2020    PCP:  Dmitriy Agee

## 2020-07-30 LAB — COPATH REPORT: NORMAL

## 2020-08-03 ENCOUNTER — COMMUNICATION - HEALTHEAST (OUTPATIENT)
Dept: INTERNAL MEDICINE | Facility: CLINIC | Age: 65
End: 2020-08-03

## 2020-08-03 DIAGNOSIS — H93.13 TINNITUS, BILATERAL: ICD-10-CM

## 2020-08-07 ENCOUNTER — OFFICE VISIT - HEALTHEAST (OUTPATIENT)
Dept: OTOLARYNGOLOGY | Facility: CLINIC | Age: 65
End: 2020-08-07

## 2020-08-07 ENCOUNTER — OFFICE VISIT - HEALTHEAST (OUTPATIENT)
Dept: AUDIOLOGY | Facility: CLINIC | Age: 65
End: 2020-08-07

## 2020-08-07 DIAGNOSIS — H93.13 TINNITUS OF BOTH EARS: ICD-10-CM

## 2020-08-07 DIAGNOSIS — H90.3 SENSORINEURAL HEARING LOSS, BILATERAL: ICD-10-CM

## 2020-08-07 DIAGNOSIS — H90.3 SENSORINEURAL HEARING LOSS (SNHL) OF BOTH EARS: ICD-10-CM

## 2020-08-21 ENCOUNTER — COMMUNICATION - HEALTHEAST (OUTPATIENT)
Dept: INTERNAL MEDICINE | Facility: CLINIC | Age: 65
End: 2020-08-21

## 2020-08-21 DIAGNOSIS — M54.6 CHRONIC MIDLINE THORACIC BACK PAIN: ICD-10-CM

## 2020-08-21 DIAGNOSIS — G89.29 CHRONIC MIDLINE THORACIC BACK PAIN: ICD-10-CM

## 2020-08-26 ENCOUNTER — RECORDS - HEALTHEAST (OUTPATIENT)
Dept: ADMINISTRATIVE | Facility: OTHER | Age: 65
End: 2020-08-26

## 2020-09-22 ENCOUNTER — COMMUNICATION - HEALTHEAST (OUTPATIENT)
Dept: INTERNAL MEDICINE | Facility: CLINIC | Age: 65
End: 2020-09-22

## 2020-09-22 DIAGNOSIS — G89.29 CHRONIC MIDLINE THORACIC BACK PAIN: ICD-10-CM

## 2020-09-22 DIAGNOSIS — M54.6 CHRONIC MIDLINE THORACIC BACK PAIN: ICD-10-CM

## 2020-10-21 ENCOUNTER — COMMUNICATION - HEALTHEAST (OUTPATIENT)
Dept: INTERNAL MEDICINE | Facility: CLINIC | Age: 65
End: 2020-10-21

## 2020-10-21 DIAGNOSIS — G89.29 CHRONIC MIDLINE THORACIC BACK PAIN: ICD-10-CM

## 2020-10-21 DIAGNOSIS — M54.6 CHRONIC MIDLINE THORACIC BACK PAIN: ICD-10-CM

## 2020-11-05 ENCOUNTER — COMMUNICATION - HEALTHEAST (OUTPATIENT)
Dept: INTERNAL MEDICINE | Facility: CLINIC | Age: 65
End: 2020-11-05

## 2020-11-05 DIAGNOSIS — J44.1 COPD EXACERBATION (H): ICD-10-CM

## 2020-11-20 ENCOUNTER — COMMUNICATION - HEALTHEAST (OUTPATIENT)
Dept: INTERNAL MEDICINE | Facility: CLINIC | Age: 65
End: 2020-11-20

## 2020-11-20 DIAGNOSIS — M54.6 CHRONIC MIDLINE THORACIC BACK PAIN: ICD-10-CM

## 2020-11-20 DIAGNOSIS — G89.29 CHRONIC MIDLINE THORACIC BACK PAIN: ICD-10-CM

## 2020-12-04 ENCOUNTER — RECORDS - HEALTHEAST (OUTPATIENT)
Dept: ADMINISTRATIVE | Facility: OTHER | Age: 65
End: 2020-12-04

## 2020-12-16 ENCOUNTER — ONCOLOGY VISIT (OUTPATIENT)
Dept: TRANSPLANT | Facility: CLINIC | Age: 65
End: 2020-12-16
Attending: INTERNAL MEDICINE
Payer: COMMERCIAL

## 2020-12-16 VITALS
OXYGEN SATURATION: 96 % | RESPIRATION RATE: 18 BRPM | BODY MASS INDEX: 16.12 KG/M2 | TEMPERATURE: 98 F | SYSTOLIC BLOOD PRESSURE: 126 MMHG | HEART RATE: 74 BPM | WEIGHT: 109.2 LBS | DIASTOLIC BLOOD PRESSURE: 69 MMHG

## 2020-12-16 DIAGNOSIS — Z13.29 SCREENING FOR THYROID DISORDER: ICD-10-CM

## 2020-12-16 DIAGNOSIS — Z94.81 S/P ALLOGENEIC BONE MARROW TRANSPLANT (H): ICD-10-CM

## 2020-12-16 DIAGNOSIS — Z94.81 S/P ALLOGENEIC BONE MARROW TRANSPLANT (H): Primary | ICD-10-CM

## 2020-12-16 LAB
ALBUMIN SERPL-MCNC: 3.6 G/DL (ref 3.4–5)
ALP SERPL-CCNC: 52 U/L (ref 40–150)
ALT SERPL W P-5'-P-CCNC: 29 U/L (ref 0–50)
ANION GAP SERPL CALCULATED.3IONS-SCNC: 5 MMOL/L (ref 3–14)
AST SERPL W P-5'-P-CCNC: 23 U/L (ref 0–45)
BASOPHILS # BLD AUTO: 0.1 10E9/L (ref 0–0.2)
BASOPHILS NFR BLD AUTO: 1.5 %
BILIRUB SERPL-MCNC: 0.2 MG/DL (ref 0.2–1.3)
BUN SERPL-MCNC: 11 MG/DL (ref 7–30)
CALCIUM SERPL-MCNC: 8.8 MG/DL (ref 8.5–10.1)
CHLORIDE SERPL-SCNC: 107 MMOL/L (ref 94–109)
CO2 SERPL-SCNC: 29 MMOL/L (ref 20–32)
CREAT SERPL-MCNC: 0.6 MG/DL (ref 0.52–1.04)
DIFFERENTIAL METHOD BLD: NORMAL
EOSINOPHIL # BLD AUTO: 0.2 10E9/L (ref 0–0.7)
EOSINOPHIL NFR BLD AUTO: 2.8 %
ERYTHROCYTE [DISTWIDTH] IN BLOOD BY AUTOMATED COUNT: 12.6 % (ref 10–15)
GFR SERPL CREATININE-BSD FRML MDRD: >90 ML/MIN/{1.73_M2}
GLUCOSE SERPL-MCNC: 107 MG/DL (ref 70–99)
HCT VFR BLD AUTO: 41.1 % (ref 35–47)
HGB BLD-MCNC: 13.2 G/DL (ref 11.7–15.7)
IMM GRANULOCYTES # BLD: 0 10E9/L (ref 0–0.4)
IMM GRANULOCYTES NFR BLD: 0.3 %
LYMPHOCYTES # BLD AUTO: 2.7 10E9/L (ref 0.8–5.3)
LYMPHOCYTES NFR BLD AUTO: 38.2 %
MCH RBC QN AUTO: 30 PG (ref 26.5–33)
MCHC RBC AUTO-ENTMCNC: 32.1 G/DL (ref 31.5–36.5)
MCV RBC AUTO: 93 FL (ref 78–100)
MONOCYTES # BLD AUTO: 0.5 10E9/L (ref 0–1.3)
MONOCYTES NFR BLD AUTO: 7.4 %
NEUTROPHILS # BLD AUTO: 3.6 10E9/L (ref 1.6–8.3)
NEUTROPHILS NFR BLD AUTO: 49.8 %
NRBC # BLD AUTO: 0 10*3/UL
NRBC BLD AUTO-RTO: 0 /100
PLATELET # BLD AUTO: 255 10E9/L (ref 150–450)
POTASSIUM SERPL-SCNC: 3.9 MMOL/L (ref 3.4–5.3)
PROT SERPL-MCNC: 6.6 G/DL (ref 6.8–8.8)
RBC # BLD AUTO: 4.4 10E12/L (ref 3.8–5.2)
SODIUM SERPL-SCNC: 141 MMOL/L (ref 133–144)
TSH SERPL DL<=0.005 MIU/L-ACNC: 3.73 MU/L (ref 0.4–4)
WBC # BLD AUTO: 7.2 10E9/L (ref 4–11)

## 2020-12-16 PROCEDURE — 80053 COMPREHEN METABOLIC PANEL: CPT | Performed by: INTERNAL MEDICINE

## 2020-12-16 PROCEDURE — 99214 OFFICE O/P EST MOD 30 MIN: CPT | Performed by: INTERNAL MEDICINE

## 2020-12-16 PROCEDURE — 85025 COMPLETE CBC W/AUTO DIFF WBC: CPT | Performed by: INTERNAL MEDICINE

## 2020-12-16 PROCEDURE — 36415 COLL VENOUS BLD VENIPUNCTURE: CPT

## 2020-12-16 PROCEDURE — G0463 HOSPITAL OUTPT CLINIC VISIT: HCPCS

## 2020-12-16 PROCEDURE — 84443 ASSAY THYROID STIM HORMONE: CPT | Performed by: INTERNAL MEDICINE

## 2020-12-16 ASSESSMENT — PAIN SCALES - GENERAL: PAINLEVEL: NO PAIN (0)

## 2020-12-16 NOTE — NURSING NOTE
"Oncology Rooming Note    December 16, 2020 10:58 AM   Jenn Barclay is a 65 year old female who presents for:    Chief Complaint   Patient presents with     Blood Draw     Vitals, blood drawn via VPT by LPN.       RECHECK     Pt is here for a rtn for AML     Initial Vitals: Blood Pressure 126/69   Pulse 74   Temperature 98  F (36.7  C) (Oral)   Respiration 18   Weight 49.5 kg (109 lb 3.2 oz)   Oxygen Saturation 96%   Body Mass Index 16.12 kg/m   Estimated body mass index is 16.12 kg/m  as calculated from the following:    Height as of 7/18/18: 1.753 m (5' 9.02\").    Weight as of this encounter: 49.5 kg (109 lb 3.2 oz). Body surface area is 1.55 meters squared.  No Pain (0) Comment: Data Unavailable   No LMP recorded.  Allergies reviewed: Yes  Medications reviewed: Yes    Medications: Medication refills not needed today.  Pharmacy name entered into [employer]:    [facility] [address] [place] [address] E  PHARMACY [facility] - [place], [place] - [address] [place] [address]    Clinical concerns: none       Eileen Ross MA            "

## 2020-12-16 NOTE — PROGRESS NOTES
BMT Clinic Progress Note     HPI: Ms. 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  There has been some kind of miscommunication between her, pharmacy, the company and the .  Since she has not been taking Creon she has been having more loose stools and had some weight loss.  Continues to smoke.  Has been seen recently by dermatology.  Completed the colonoscopy that had one polyp back in July 2020.  She has no bleeding, cough, shortness of breath.  She has no urinary issues.  She has been taking vitamin D but he stopped taking the calcium.  She now has a primary care that seen her twice a year.    The remaining 10 point complete review of systems is otherwise negative.      PAST MEDICAL HISTORY:   1.  Significant for the acute myelogenous leukemia.   2.  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   3.  COPD.   4.  Hysterectomy and bilateral oophorectomy at the age of 20.     5.  She had a compression fracture of her thoracic spine that was treated with a vertebroplasty about 6 months ago.   6.  She has a history of smoking for many years.  She denies alcohol or drug abuse.  She is not on any thyroid replacement therapy, and denies thyroid disease.   7. R Foot fracture Nov 2017  8. Zoster Jan 2018  9.  Colonoscopy July 2020 with a single polyp that was benign       PHYSICAL EXAMINATION:  Ms. Barclay is a very pleasant 61-year-old. /69   Pulse 74   Temp 98  F (36.7  C) (Oral)   Resp 18   Wt 49.5 kg (109 lb 3.2 oz)   SpO2 96%   BMI 16.12 kg/m  .   General: frail, thin appearing woman in no distress  HEENT: perrl, eomi, no icterus, no oral lesions, no teeth  Resp: Distant breathing sounds without crackles or wheezes  CV: rrr no m/r/g  Abd: s, nt, nd, thin appearing, normal BS  Ext: no  edema   Skin. Cystic lesion on the L chin.       Lab Results   Component Value Date    WBC 7.2 12/16/2020    ANEU 3.6 12/16/2020    HGB 13.2 12/16/2020    HCT 41.1 12/16/2020     12/16/2020     12/16/2020    POTASSIUM 3.9 12/16/2020    CHLORIDE 107 12/16/2020    CO2 29 12/16/2020     (H) 12/16/2020    BUN 11 12/16/2020    CR 0.60 12/16/2020    MAG 2.0 03/10/2011    INR 0.97 09/15/2010    BILITOTAL 0.2 12/16/2020    AST 23 12/16/2020    ALT 29 12/16/2020    ALKPHOS 52 12/16/2020    PROTTOTAL 6.6 (L) 12/16/2020    ALBUMIN 3.6 12/16/2020     TSH 6/24/20: 3.73 (normal)      ASSESSMENT AND PLAN:  Ms. 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     1.  AML/HEME.  The patient in clinical remission. Counts stable.     2. Chronic Wpenb-rgcxri-fmjc disease.  She now has recurrent diarrhea.  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      3.  GI.  Frequent diarrhea.  Colonoscopy was done in July 2020 and had a single polyp.  She will need a repeat colonoscopy in 3 to 5 years.  We will try to reconnect her with the  to see if we can restart the Creon and help her with the pancreatic insufficiency and the pancreatic enzyme replacement..    - Pancreatic insufficiency as outlined above.  - Nausea/vomiting: no complain today    4.  Osteoporosis.  Had dexa scan that showed osteoporosis; I refilled her alendronate, must continue vit D3 and vitamins with Calcium.  I encouraged her to take these medications regularly.    5.  Chronic obstructive pulmonary disease.  I once again recommended that she needs to stop smoking     6. ID: no active infection    7. Renal: Normal kidney function.  Electrolytes WNL  Creatinine slightly elevated from baseline:     8. Dermatology: No new acne lesions.  She has this 1 open lesion in the  left lower chin.  She is following with dermatology.  She says she ran out of some ointment that they had given her.  It is unclear which medication that was and I encouraged her to reach out to them for advice.      9. General: colonoscopy 7/2020, repeat in 3-5 years; there was 1 polyp. She needs to continue working with her primary care to stop smoking.  She needs regular health care and screening with primary care physician.    10.  Endocrine: repeat TSH 12/16/20 was normal    Plan:   RTC Cyndie in 6 months with labs   Colonoscopy 7/2020; repeat in 3-5 years  Following with Dermatology outside for cystic acne  Need to work to restart Creon through [employer] with the help of social work.   Must stop smoking; she will work with primary care on that  Had Flu shot in the fall 2020  Would recommend COVID vaccine when available  Restart calcium with vitamin D  Came back sooner as needed.

## 2020-12-16 NOTE — PATIENT INSTRUCTIONS
RTC Cyndie in 6 months with labs   Colonoscopy 7/2020; repeat in 3-5 years  Following with Dermatology outside for cystic acne  Need to work to restart Creon through [employer] with the help of social work.   Must stop smoking; she will work with primary care on that  Had Flu shot in the fall 2020  Would recommend COVID vaccine when available  Restart calcium with vitamin D  Came back sooner as needed.

## 2020-12-16 NOTE — LETTER
12/16/2020         RE: Jenn Barclay  [address] [place] [address]        Dear Colleague,    Thank you for referring your patient, Jenn Barclay, to the [facility] BLOOD AND MARROW TRANSPLANT PROGRAM [place]. Please see a copy of my visit note below.    Chief Complaint   Patient presents with     Blood Draw     Vitals, blood drawn via VPT by LPN.       REBEKAH Bee LPN    BMT Clinic Progress Note     HPI: Ms. 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  There has been some kind of miscommunication between her, pharmacy, the company and the .  Since she has not been taking Creon she has been having more loose stools and had some weight loss.  Continues to smoke.  Has been seen recently by dermatology.  Completed the colonoscopy that had one polyp back in July 2020.  She has no bleeding, cough, shortness of breath.  She has no urinary issues.  She has been taking vitamin D but he stopped taking the calcium.  She now has a primary care that seen her twice a year.    The remaining 10 point complete review of systems is otherwise negative.      PAST MEDICAL HISTORY:   1.  Significant for the acute myelogenous leukemia.   2.  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   3.  COPD.   4.  Hysterectomy and bilateral oophorectomy at the age of 20.     5.  She had a compression fracture of her thoracic spine that was treated with a vertebroplasty about 6 months ago.   6.  She has a history of smoking for many years.  She denies alcohol or drug abuse.  She is not on any thyroid replacement therapy, and denies thyroid disease.   7. R Foot fracture Nov 2017  8. Zoster Jan 2018  9.  Colonoscopy July 2020 with a single polyp that was benign       PHYSICAL EXAMINATION:  Ms.  Deny is a very pleasant 61-year-old. /69   Pulse 74   Temp 98  F (36.7  C) (Oral)   Resp 18   Wt 49.5 kg (109 lb 3.2 oz)   SpO2 96%   BMI 16.12 kg/m  .   General: frail, thin appearing woman in no distress  HEENT: perrl, eomi, no icterus, no oral lesions, no teeth  Resp: Distant breathing sounds without crackles or wheezes  CV: rrr no m/r/g  Abd: s, nt, nd, thin appearing, normal BS  Ext: no edema   Skin. Cystic lesion on the L chin.       Lab Results   Component Value Date    WBC 7.2 12/16/2020    ANEU 3.6 12/16/2020    HGB 13.2 12/16/2020    HCT 41.1 12/16/2020     12/16/2020     12/16/2020    POTASSIUM 3.9 12/16/2020    CHLORIDE 107 12/16/2020    CO2 29 12/16/2020     (H) 12/16/2020    BUN 11 12/16/2020    CR 0.60 12/16/2020    MAG 2.0 03/10/2011    INR 0.97 09/15/2010    BILITOTAL 0.2 12/16/2020    AST 23 12/16/2020    ALT 29 12/16/2020    ALKPHOS 52 12/16/2020    PROTTOTAL 6.6 (L) 12/16/2020    ALBUMIN 3.6 12/16/2020     TSH 6/24/20: 3.73 (normal)      ASSESSMENT AND PLAN:  Ms. 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     1.  AML/HEME.  The patient in clinical remission. Counts stable.     2. Chronic Buwty-awbbzn-zvmz disease.  She now has recurrent diarrhea.  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      3.  GI.  Frequent diarrhea.  Colonoscopy was done in July 2020 and had a single polyp.  She will need a repeat colonoscopy in 3 to 5 years.  We will try to reconnect her with the  to see if we can restart the Creon and help her with the pancreatic insufficiency and the pancreatic enzyme replacement..    - Pancreatic insufficiency as outlined above.  - Nausea/vomiting: no complain today    4.  Osteoporosis.  Had dexa scan that showed osteoporosis; I refilled her alendronate, must  continue vit D3 and vitamins with Calcium.  I encouraged her to take these medications regularly.    5.  Chronic obstructive pulmonary disease.  I once again recommended that she needs to stop smoking     6. ID: no active infection    7. Renal: Normal kidney function.  Electrolytes WNL  Creatinine slightly elevated from baseline:     8. Dermatology: No new acne lesions.  She has this 1 open lesion in the left lower chin.  She is following with dermatology.  She says she ran out of some ointment that they had given her.  It is unclear which medication that was and I encouraged her to reach out to them for advice.      9. General: colonoscopy 7/2020, repeat in 3-5 years; there was 1 polyp. She needs to continue working with her primary care to stop smoking.  She needs regular health care and screening with primary care physician.    10.  Endocrine: repeat TSH 12/16/20 was normal    Plan:   RTMELINA Agee in 6 months with labs   Colonoscopy 7/2020; repeat in 3-5 years  Following with Dermatology outside for cystic acne  Need to work to restart Creon through [employer] with the help of social work.   Must stop smoking; she will work with primary care on that  Had Flu shot in the fall 2020  Would recommend COVID vaccine when available  Restart calcium with vitamin D  Came back sooner as needed.          Again, thank you for allowing me to participate in the care of your patient.        Sincerely,        Dmitriy Agee MD

## 2020-12-16 NOTE — PROGRESS NOTES
Chief Complaint   Patient presents with     Blood Draw     Vitals, blood drawn via VPT by SEBASTIAN.       REBEKAH Bee LPN

## 2020-12-17 ENCOUNTER — COMMUNICATION - HEALTHEAST (OUTPATIENT)
Dept: ADMINISTRATIVE | Facility: CLINIC | Age: 65
End: 2020-12-17

## 2020-12-17 ENCOUNTER — TELEPHONE (OUTPATIENT)
Dept: TRANSPLANT | Facility: CLINIC | Age: 65
End: 2020-12-17

## 2020-12-17 DIAGNOSIS — G89.29 CHRONIC MIDLINE THORACIC BACK PAIN: ICD-10-CM

## 2020-12-17 DIAGNOSIS — M54.6 CHRONIC MIDLINE THORACIC BACK PAIN: ICD-10-CM

## 2020-12-17 NOTE — TELEPHONE ENCOUNTER
"Clinical   Blood and Marrow Transplant Service    Reason for Intervention: patient was receiving free drug from [employer] for her Creon and has not been successful in securing another year of coverage. She has been out of the medication since 6.2020.    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 [employer] stated both times that they would mail out the form but she never received either of them. Writer spoke with [employer] today - [phone] and they emailed the form to writer. Completed sections for MD and emailed/provided paper copy.     Spoke with Jnen and let her know that writer would be mailing her section of the form to her for completion. She plans to send back to writer when complete and writer will fax in.     Provided assessment of coping, supportive counseling, validation of concerns, encouragement and resources.    Education Provided: Plan for completion and to be watching for envelope from writer.     Follow-up Required: Will await MD and patient signing/completing the forms and then send in to [employer].     Encouraged patient to reach out as questions or concerns arise.     Arabelal Grande [facility] MSW  Pager: [phone]  12/17/2020      "

## 2020-12-24 ENCOUNTER — TELEPHONE (OUTPATIENT)
Dept: TRANSPLANT | Facility: CLINIC | Age: 65
End: 2020-12-24

## 2020-12-24 NOTE — TELEPHONE ENCOUNTER
Clinical   Blood and Marrow Transplant Service    Phone call to patient to check if she received the CREON [employer] application for her to complete and add her accompanying financial documents. Message left.     Will continue to follow for supportive counseling and assist with resources.     Arabella MEREDITH [facility]    Clinical   12/24/2020  [facility]  Adult Blood and Marrow Transplant Program  [address] [place] [id number]  [email or web address]  [email or web address]  Office: [phone]   Pager: [phone]

## 2020-12-31 ENCOUNTER — TELEPHONE (OUTPATIENT)
Dept: TRANSPLANT | Facility: CLINIC | Age: 65
End: 2020-12-31

## 2020-12-31 NOTE — TELEPHONE ENCOUNTER
"Clinical   Blood and Marrow Transplant Service    Reason for Intervention: Received return mail from patient with her portion of the application for CREON but there is data missing - called patient to get the data.    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 Also explained that we would need to include copies of the front/back of her insurance cards.     Education Provided: Reason for call    Follow-up Required: Will await return call from patient.      Arabella Grande [facility] MSW  Pager: [phone]  12/31/2020        "

## 2021-01-14 ENCOUNTER — OFFICE VISIT - HEALTHEAST (OUTPATIENT)
Dept: INTERNAL MEDICINE | Facility: CLINIC | Age: 66
End: 2021-01-14

## 2021-01-14 DIAGNOSIS — Z72.0 TOBACCO ABUSE: ICD-10-CM

## 2021-01-14 DIAGNOSIS — G89.29 CHRONIC MIDLINE THORACIC BACK PAIN: ICD-10-CM

## 2021-01-14 DIAGNOSIS — Z51.81 ENCOUNTER FOR THERAPEUTIC DRUG LEVEL MONITORING: ICD-10-CM

## 2021-01-14 DIAGNOSIS — D69.2 SENILE PURPURA (H): ICD-10-CM

## 2021-01-14 DIAGNOSIS — K86.81 EXOCRINE PANCREATIC INSUFFICIENCY: ICD-10-CM

## 2021-01-14 DIAGNOSIS — Z79.899 CONTROLLED SUBSTANCE AGREEMENT SIGNED: ICD-10-CM

## 2021-01-14 DIAGNOSIS — C92.00: ICD-10-CM

## 2021-01-14 DIAGNOSIS — J44.9 CHRONIC OBSTRUCTIVE PULMONARY DISEASE, UNSPECIFIED COPD TYPE (H): ICD-10-CM

## 2021-01-14 DIAGNOSIS — D89.813 GVHD (GRAFT VERSUS HOST DISEASE) (H): ICD-10-CM

## 2021-01-14 DIAGNOSIS — M54.6 CHRONIC MIDLINE THORACIC BACK PAIN: ICD-10-CM

## 2021-01-17 LAB
6MAM UR QL: NOT DETECTED
7AMINOCLONAZEPAM UR QL: NOT DETECTED
A-OH ALPRAZ UR QL: NOT DETECTED
ALPRAZ UR QL: NOT DETECTED
AMPHET UR QL SCN: NOT DETECTED
BARBITURATES UR QL: NOT DETECTED
BUPRENORPHINE UR QL: NOT DETECTED
BZE UR QL: NOT DETECTED
CARBOXYTHC UR QL: NOT DETECTED
CARISOPRODOL UR QL: NOT DETECTED
CLONAZEPAM UR QL: NOT DETECTED
CODEINE UR QL: NOT DETECTED
CREAT UR-MCNC: 92.6 MG/DL (ref 20–400)
DIAZEPAM UR QL: NOT DETECTED
ETHYL GLUCURONIDE UR QL: NOT DETECTED
FENTANYL UR QL: NOT DETECTED
HYDROCODONE UR QL: NOT DETECTED
HYDROMORPHONE UR QL: NOT DETECTED
LORAZEPAM UR QL: NOT DETECTED
MDA UR QL: NOT DETECTED
MDEA UR QL: NOT DETECTED
MDMA UR QL: NOT DETECTED
ME-PHENIDATE UR QL: NOT DETECTED
MEPERIDINE UR QL: NOT DETECTED
METHADONE UR QL: NOT DETECTED
METHAMPHET UR QL: NOT DETECTED
MIDAZOLAM UR QL SCN: NOT DETECTED
MORPHINE UR QL: NOT DETECTED
NORBUPRENORPHINE UR QL CFM: NOT DETECTED
NORDIAZEPAM UR QL: NOT DETECTED
NORFENTANYL UR QL: NOT DETECTED
NORHYDROCODONE UR QL CFM: NOT DETECTED
NOROXYCODONE UR QL CFM: PRESENT
NOROXYMORPHONE UR QL SCN: PRESENT
OXAZEPAM UR QL: NOT DETECTED
OXYCODONE UR QL: PRESENT
OXYMORPHONE UR QL: PRESENT
PATHOLOGY STUDY: NORMAL
PCP UR QL: NOT DETECTED
PHENTERMINE UR QL: NOT DETECTED
PROPOXYPH UR QL: NOT DETECTED
SERVICE CMNT-IMP: NORMAL
TAPENTADOL UR QL SCN: NOT DETECTED
TAPENTADOL UR QL SCN: NOT DETECTED
TEMAZEPAM UR QL: NOT DETECTED
TRAMADOL UR QL: NOT DETECTED
ZOLPIDEM UR QL: NOT DETECTED

## 2021-01-20 ENCOUNTER — COMMUNICATION - HEALTHEAST (OUTPATIENT)
Dept: ADMINISTRATIVE | Facility: CLINIC | Age: 66
End: 2021-01-20

## 2021-01-20 DIAGNOSIS — M54.6 CHRONIC MIDLINE THORACIC BACK PAIN: ICD-10-CM

## 2021-01-20 DIAGNOSIS — G89.29 CHRONIC MIDLINE THORACIC BACK PAIN: ICD-10-CM

## 2021-02-17 ENCOUNTER — COMMUNICATION - HEALTHEAST (OUTPATIENT)
Dept: ADMINISTRATIVE | Facility: CLINIC | Age: 66
End: 2021-02-17

## 2021-02-17 DIAGNOSIS — G89.29 CHRONIC MIDLINE THORACIC BACK PAIN: ICD-10-CM

## 2021-02-17 DIAGNOSIS — M54.6 CHRONIC MIDLINE THORACIC BACK PAIN: ICD-10-CM

## 2021-03-18 ENCOUNTER — COMMUNICATION - HEALTHEAST (OUTPATIENT)
Dept: INTERNAL MEDICINE | Facility: CLINIC | Age: 66
End: 2021-03-18

## 2021-03-18 DIAGNOSIS — M54.6 CHRONIC MIDLINE THORACIC BACK PAIN: ICD-10-CM

## 2021-03-18 DIAGNOSIS — G89.29 CHRONIC MIDLINE THORACIC BACK PAIN: ICD-10-CM

## 2021-04-19 ENCOUNTER — COMMUNICATION - HEALTHEAST (OUTPATIENT)
Dept: ADMINISTRATIVE | Facility: CLINIC | Age: 66
End: 2021-04-19

## 2021-04-19 DIAGNOSIS — G89.29 CHRONIC MIDLINE THORACIC BACK PAIN: ICD-10-CM

## 2021-04-19 DIAGNOSIS — M54.6 CHRONIC MIDLINE THORACIC BACK PAIN: ICD-10-CM

## 2021-05-12 ENCOUNTER — HOSPITAL ENCOUNTER (EMERGENCY)
Dept: EMERGENCY MEDICINE | Facility: HOSPITAL | Age: 66
Discharge: HOME | End: 2021-05-12
Attending: EMERGENCY MEDICINE
Payer: COMMERCIAL

## 2021-05-12 DIAGNOSIS — S90.121A CONTUSION OF FIFTH TOE OF RIGHT FOOT, INITIAL ENCOUNTER: ICD-10-CM

## 2021-05-12 ASSESSMENT — MIFFLIN-ST. JEOR: SCORE: 1112.41

## 2021-05-18 ENCOUNTER — COMMUNICATION - HEALTHEAST (OUTPATIENT)
Dept: INTERNAL MEDICINE | Facility: CLINIC | Age: 66
End: 2021-05-18

## 2021-05-18 DIAGNOSIS — G89.29 CHRONIC MIDLINE THORACIC BACK PAIN: ICD-10-CM

## 2021-05-18 DIAGNOSIS — M54.6 CHRONIC MIDLINE THORACIC BACK PAIN: ICD-10-CM

## 2021-05-25 ENCOUNTER — RECORDS - HEALTHEAST (OUTPATIENT)
Dept: ADMINISTRATIVE | Facility: CLINIC | Age: 66
End: 2021-05-25

## 2021-05-27 ENCOUNTER — RECORDS - HEALTHEAST (OUTPATIENT)
Dept: ADMINISTRATIVE | Facility: CLINIC | Age: 66
End: 2021-05-27

## 2021-05-27 VITALS — WEIGHT: 112 LBS | HEIGHT: 69 IN | BODY MASS INDEX: 16.59 KG/M2

## 2021-05-27 NOTE — TELEPHONE ENCOUNTER
Controlled Substance Refill Request  Medication Name:   Requested Prescriptions     Pending Prescriptions Disp Refills     oxyCODONE (ROXICODONE) 10 mg immediate release tablet 120 tablet 0     Sig: Take 1 tablet (10 mg total) by mouth 4 (four) times a day as needed for pain. For use from 4/13/19-5/12/19     Date Last Fill: 3/13/2019  Pharmacy: [facility]      Submit electronically to pharmacy  Controlled Substance Agreement Date Scanned:   Encounter-Level CSA Scan Date - 08/03/2017:    Scan on 8/8/2017  3:22 PM: [facility] (below)         Last office visit with prescriber/PCP: 12/18/2018 Jorge Dunn MD OR same dept: 12/18/2018 Jorge Dunn MD OR same specialty: 12/18/2018 Jorge Dunn MD  Last physical: Visit date not found Last MTM visit: Visit date not found

## 2021-05-28 ENCOUNTER — RECORDS - HEALTHEAST (OUTPATIENT)
Dept: ADMINISTRATIVE | Facility: CLINIC | Age: 66
End: 2021-05-28

## 2021-05-28 NOTE — TELEPHONE ENCOUNTER
Patient states she did not get instructions that controls need a three day in advance request.  She is now out of medication.  Please review.     Controlled Substance Refill Request  Medication Name:   Requested Prescriptions     Pending Prescriptions Disp Refills     oxyCODONE (ROXICODONE) 10 mg immediate release tablet 120 tablet 0     Sig: Take 1 tablet (10 mg total) by mouth 4 (four) times a day as needed for pain. For use from 4/15/19-5/15/19     Date Last Fill: 4/15/19  Pharmacy: [facility]      Submit electronically to pharmacy  Controlled Substance Agreement Date Scanned:   Encounter-Level CSA Scan Date - 08/03/2017:    Scan on 8/8/2017  3:22 PM: [facility] (below)         Last office visit with prescriber/PCP: 12/18/2018 Jorge Dunn MD OR same dept: 12/18/2018 Jorge Dunn MD OR same specialty: 12/18/2018 Jorge Dunn MD  Last physical: Visit date not found Last MTM visit: Visit date not found

## 2021-05-29 NOTE — PROGRESS NOTES
______________________________________________________________________    Review of HCC items was performed at this visit.    Encounter Diagnoses   Name Primary?     Acute myeloid leukemia (AML), M2 (H) In remission     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) Stable at this time and follows with [facility] ([facility])      COPD exacerbation (H) Currently good but at risk for worsening        ______________________________________________________________________

## 2021-05-29 NOTE — PATIENT INSTRUCTIONS - HE
To schedule an appointment with a dermatologist, I recommend calling [facility] at  [phone].    They have multiple offices in the following locations:    [address], [facility] [address]  [place] [address]    [place]  [address] [place] [address], [address]  [place] [address]    [place] [address] [place] [address]    [place]  [address], [address]  [place] [address]    Please have them send me copies of your reports from your visit.

## 2021-05-29 NOTE — TELEPHONE ENCOUNTER
Medication Question or Clarification  Who is calling: Patient  What medication are you calling about? (include dose and sig) oxycodone  Who prescribed the medication?: PCP  What is your question/concern?: Writer shared there is an order in place to fill on 6/15/19..  Thank you  call and call again next month.    [facility]: [facility]  Okay to leave a detailed message?: Yes [phone]  Site CMT - Please call the pharmacy to obtain any additional needed information.

## 2021-05-30 VITALS — WEIGHT: 112.8 LBS | BODY MASS INDEX: 16.19 KG/M2

## 2021-05-30 VITALS — WEIGHT: 108.9 LBS | BODY MASS INDEX: 15.63 KG/M2

## 2021-05-30 NOTE — TELEPHONE ENCOUNTER
Called to pt and pt states that she does not have Rx coverage on her Health insurance.  She says that [facility] is the cheapest place to get this medication .  Pt is wondering why only 47 was sent.  Would like to  #120 tomorrow.

## 2021-05-30 NOTE — TELEPHONE ENCOUNTER
The [facility] to [facility].  This was inadvertently sent as it was done last Month for this amount when [facility] did not have enough.    I did renew the prescription for the 120.  Please coordinate with Steven and patient.    Jorge Dunn MD  [facility]  7/16/2019, 5:16 PM

## 2021-05-30 NOTE — TELEPHONE ENCOUNTER
Left message RX was sent to the pharmacy.    Informed pharmasist of Dr. Dunn's message.  He states an understanding and thanked for the call.

## 2021-05-30 NOTE — TELEPHONE ENCOUNTER
Down to one tab.    Controlled Substance Refill Request  Medication Name:   Requested Prescriptions     Pending Prescriptions Disp Refills     oxyCODONE (ROXICODONE) 10 mg immediate release tablet 47 tablet 0     Sig: Take 1 tablet (10 mg total) by mouth 4 (four) times a day as needed for pain. For use from 6/15/19-7/15/19.  Rx 2/2     Date Last Fill: 6/20/19  [facility]acy: [facility]      Submit electronically to pharmacy  Controlled Substance Agreement Date Scanned:   Encounter-Level CSA Scan Date - 08/03/2017:    Scan on 8/8/2017  3:22 PM: [facility] (below)         Last office visit with prescriber/PCP: 6/18/2019 Jorge Dunn MD OR same dept: 6/18/2019 Jorge Dunn MD OR same specialty: 6/18/2019 Jorge Dunn MD  Last physical: Visit date not found Last MTM visit: Visit date not found

## 2021-05-30 NOTE — TELEPHONE ENCOUNTER
Who is calling:  The patient   Reason for Call:  The patient called to state that the script should be for 120 pills not 47 pills. Last month the patient reports that [facility] did not have enough pills so she only picked up a partial prescription. The patient states that she can not afford to  the pills at two different times this month and needs it all at once due to having limited funds.   Date of last appointment with primary care: 6/18/2019  Okay to leave a detailed message: Yes

## 2021-05-31 ENCOUNTER — RECORDS - HEALTHEAST (OUTPATIENT)
Dept: ADMINISTRATIVE | Facility: CLINIC | Age: 66
End: 2021-05-31

## 2021-05-31 VITALS — WEIGHT: 113.8 LBS | BODY MASS INDEX: 16.81 KG/M2

## 2021-05-31 VITALS — BODY MASS INDEX: 16.21 KG/M2 | WEIGHT: 113 LBS

## 2021-05-31 VITALS — WEIGHT: 111.2 LBS | BODY MASS INDEX: 16.41 KG/M2

## 2021-05-31 NOTE — TELEPHONE ENCOUNTER
Patient has enough medication for three days.          Controlled Substance Refill Request  Medication Name:   Requested Prescriptions     Pending Prescriptions Disp Refills     oxyCODONE (ROXICODONE) 10 mg immediate release tablet 120 tablet 0     Sig: Take 1 tablet (10 mg total) by mouth 4 (four) times a day as needed for pain. For use 7/15/19-8/14/19.  Please fill this instead.     Date Last Fill: 7/16/19  Ph[facility]acy: [id number]      Submit electronically to pharmacy  Controlled Substance Agreement Date Scanned:   Encounter-Level CSA Scan Date - 08/03/2017:    Scan on 8/8/2017  3:22 PM: [facility] (below)         Last office visit with prescriber/PCP: 6/18/2019 Jorge Dunn MD OR same dept: 6/18/2019 Jorge Dunn MD OR same specialty: 6/18/2019 Jorge Dunn MD  Last physical: Visit date not found Last MTM visit: Visit date not found

## 2021-05-31 NOTE — TELEPHONE ENCOUNTER
Medication Request  Medication name:   triamcinolone (KENALOG) 0.1 % ointment (Discontinued) 80 g 0 4/11/2016 11/7/2016 No   Sig - Route: Apply topically 2 (two) times a day. Rub into itchy/scratched areas twice daily - Topical   Patient taking differently: Apply 1 application topically 2 (two) times a day as needed. Rub into itchy/scratched areas            Pharmacy Name and Location: [facility] [address] [place] [address] E  Reason for request: for my chronic rash with unknown cause. I used the very last of my tube today.  When did you use medication last?:  today  Patient offered appointment:  patient declined  Okay to leave a detailed message: yes

## 2021-06-01 ENCOUNTER — RECORDS - HEALTHEAST (OUTPATIENT)
Dept: ADMINISTRATIVE | Facility: CLINIC | Age: 66
End: 2021-06-01

## 2021-06-01 VITALS — WEIGHT: 115.2 LBS | BODY MASS INDEX: 17 KG/M2

## 2021-06-01 VITALS — WEIGHT: 111.5 LBS | BODY MASS INDEX: 16.46 KG/M2

## 2021-06-01 NOTE — TELEPHONE ENCOUNTER
Controlled Substance Refill Request  Medication Name:   Requested Prescriptions     Pending Prescriptions Disp Refills     oxyCODONE (ROXICODONE) 10 mg immediate release tablet 120 tablet 0     Sig: Take 1 tablet (10 mg total) by mouth 4 (four) times a day as needed for pain. For use 8/14/19-9/13/19.     Date Last Fill: 8/13/2019  Pharmacy: [facility]      Submit electronically to pharmacy  Controlled Substance Agreement Date Scanned:   Encounter-Level CSA Scan Date - 08/03/2017:    Scan on 8/8/2017  3:22 PM: [facility] (below)         Last office visit with prescriber/PCP: 6/18/2019 Jorge Dunn MD OR same dept: 6/18/2019 Jorge Dunn MD OR same specialty: 6/18/2019 Jorge Dunn MD  Last physical: Visit date not found Last MTM visit: Visit date not found

## 2021-06-02 ENCOUNTER — RECORDS - HEALTHEAST (OUTPATIENT)
Dept: ADMINISTRATIVE | Facility: CLINIC | Age: 66
End: 2021-06-02

## 2021-06-02 VITALS — BODY MASS INDEX: 16.53 KG/M2 | WEIGHT: 112 LBS

## 2021-06-02 NOTE — TELEPHONE ENCOUNTER
A refill was sent.    Jorge Dunn MD  General Internal Medicine  [facility]  10/22/2019, 9:18 AM

## 2021-06-02 NOTE — TELEPHONE ENCOUNTER
Last Office Visit  6/18/2019 Jorge Dunn MD  Notes:  Comes in for follow up and multiple issues.     Mainly concerned about the rash on her buttocks and legs.  This has been there for quite awhile.  I believe it to be due to GVHD issues.  She has seen a dermatologist at the [facility] ([facility]) and he has been unable to biopsy a lesion which is active and she cannot get in to see him when there is one.  Would like the name of a local dermatologist that she could see for this.  It does itch a lot.     Her chronic diarrhea and pancreatic insufficiency is doing well on the CREON.     Her chronic back pain continues to bother her but she is considering tapering the medication in the future.  It is mainly in her thoracic back.     Continues to smoke.     I have recommended that this patient have a mammogram but she declines at this time. I have discussed the risks and benefits of this procedure with her. The patient verbalizes understanding.      Chronic obstructive pulmonary disease (COPD) is stable and did have an emergency room (ER) visit in the recent past in relationship to this.     We reviewed her other issues noted in the assessment but not specifically addressed in the HPI above.      Past medical, family and social history reviewed today.      Comprehensive review of systems was performed today with no major problems noted except as above.           Plan:     1. Refilled pain medications.  Consider reducing frequency or potency in the future.  2. Blood work next visit.  3. Follow up [facility] ([facility]  4. Referral to dermatology locally.  5. Consider having medications on hand for exacerbation but patient does not want this at this time.  6. Follow up 6 months.  ______________________________________________________________________    Last UDS -  12/18/18     Last Filled:   oxyCODONE (ROXICODONE) 5 MG immediate release tablet 12 tablet 0 10/17/2019  --   Sig - Route: Take  1 tablet (5 mg total) by mouth every 6 (six) hours as needed for pain. - Oral   Class: Print   Earliest Fill Date: 10/17/2019   oxyCODONE (ROXICODONE) 5 MG immediate release tablet [[id number]]     Electronically signed by: Jovani Cam MD on 10/17/19 0017       Next OV:  12/17/2019 Jorge Dunn MD        Medication teed up for provider signature

## 2021-06-02 NOTE — TELEPHONE ENCOUNTER
Medication Request  Medication name: oxycodone 10 mg - take 1 tab by mouth four times a day  Pharmacy Name and Location: [facility]  Reason for request: Patient stated this is a refill and that Jorge Dunn MD given this to her before.  When did you use medication last?:  Per chart, this was last filled on 7/15/19  Patient offered appointment:  n/a  Okay to leave a detailed message: no

## 2021-06-03 VITALS
HEART RATE: 74 BPM | DIASTOLIC BLOOD PRESSURE: 68 MMHG | SYSTOLIC BLOOD PRESSURE: 118 MMHG | BODY MASS INDEX: 16.21 KG/M2 | OXYGEN SATURATION: 95 % | WEIGHT: 113 LBS

## 2021-06-03 VITALS — HEIGHT: 70 IN | BODY MASS INDEX: 16.52 KG/M2 | WEIGHT: 115.4 LBS

## 2021-06-03 NOTE — TELEPHONE ENCOUNTER
Controlled Substance Refill Request  Medication Name:   Requested Prescriptions     Pending Prescriptions Disp Refills     oxyCODONE (ROXICODONE) 5 MG immediate release tablet 12 tablet 0     Sig: Take 1 tablet (5 mg total) by mouth every 6 (six) hours as needed for pain.   Date Last Fill: 10/1719  [id number]      Submit electronically to pharmacy  Controlled Substance Agreement Date Scanned:   Encounter-Level CSA Scan Date - 08/03/2017:    Scan on 8/8/2017  3:22 PM: [facility]       Last office visit with prescriber/PCP: 6/18/2019 Jorge Dunn MD OR same dept: 6/18/2019 Jorge Dunn MD OR same specialty: 6/18/2019 Jorge Dunn MD  Last physical: Visit date not found Last MTM visit: Visit date not found

## 2021-06-03 NOTE — TELEPHONE ENCOUNTER
Last Office Visit  6/18/2019 Jorge Dunn MD  Notes:  Chief Complaint   Patient presents with     Follow Up       rash on bottom- no improvement- spreading up back and down legs       Controlled substance agreement signed - 12/18 - Oxycodone - UDS 12/18      Chronic back pain, thoracic         Last Filled: 10/17/2019  Next OV:  12/17/2019 Jorge Dunn MD        Medication teed up for provider signature

## 2021-06-04 VITALS
SYSTOLIC BLOOD PRESSURE: 118 MMHG | OXYGEN SATURATION: 96 % | WEIGHT: 108 LBS | HEART RATE: 72 BPM | BODY MASS INDEX: 15.5 KG/M2 | DIASTOLIC BLOOD PRESSURE: 56 MMHG

## 2021-06-04 NOTE — TELEPHONE ENCOUNTER
Informed Jenn Barclay of Dr. Dunn's message.    She states an understanding and thanked for the call.

## 2021-06-04 NOTE — PROGRESS NOTES
Urine drug screen correlates well with reported and known medications taken.    Jorge Dunn MD  [facility]  12/17/2019, 10:08 PM

## 2021-06-04 NOTE — TELEPHONE ENCOUNTER
Please call patient -    ______________________________________________________________________     Home Phone:  [phone] (home)     Cell phone:   Telephone Information:   Mobile [phone]     ______________________________________________________________________     The CT scan was excellent - the nodule that was seen in the past CT scan has resolved and there are no signs of cancer.    This nodule could have shown up in the past due to infection.    Her chronic obstructive pulmonary disease (COPD) is confirmed on the CT scan as well.    There is no changes for the worse on the fractures she has had in the past in her back.    Jorge Dunn MD  [facility]  1/5/2020, 9:23 AM   ______________________________________________________________________    Pertinent radiology for this visit includes the following:    CT Chest Without Contrast  Narrative: EXAM: CT CHEST WO CONTRAST  LOCATION: [facility]  DATE/TIME: 1/4/2020 10:48 AM    INDICATION: Tobacco abuse, follow-up lung nodules.  COMPARISON: 12/24/2019 radiograph. Chest CT 04/21/2019.  TECHNIQUE: CT chest without IV contrast. Multiplanar reformats were obtained. Dose reduction techniques were used.  CONTRAST: None.    FINDINGS:   LUNGS AND PLEURA: Stable scarring and emphysema. Prior left lower lobe superior segment subpleural nodular density has resolved. Few other stable small pulmonary nodules, including 2-3 mm right lower lobe (series 4, image 157). Few small incidental   calcified granulomas. Borderline airway thickening. Minimal bronchiectasis. No pleural effusion.    MEDIASTINUM/AXILLAE: No adenopathy. Remote granulomatous audelia calcifications. Trace pericardial effusion inferiorly. Atherosclerosis.    UPPER ABDOMEN: 3 x 5 mm nonobstructing right calculus.    MUSCULOSKELETAL: Bony demineralization and degenerative changes. No significant change of T5 and T8 compression fractures postvertebral plasty. No significant change  of T6, T7 and T10 compression fractures.  Impression: 1.  Resolution of prior left lower lobe 7 mm nodule, most consistent with a focus of atelectasis or infection / inflammation.    2.  Few other stable tiny pulmonary nodules.    3.  Stable emphysema. Borderline airway thickening and minimal retained airway secretions. Minimal bronchiectasis.    4.  Nonobstructing right renal calculus.    5.  Other findings in the report.      ______________________________________________________________________

## 2021-06-05 VITALS — BODY MASS INDEX: 16.54 KG/M2 | OXYGEN SATURATION: 94 % | WEIGHT: 112 LBS

## 2021-06-05 NOTE — TELEPHONE ENCOUNTER
Her bone density does confirm osteoporosis (OP).    She is at a 31% risk for any fracture in the next 10 years and 17% for a hip fracture.    I would recommend starting alendronate (Fosamax) 70 mg once a week.  This would reduce her risk of fracture by about 1/3.    If she agrees, then we can send this into her pharmacy.    Please let me know.    Jorge Dunn MD  General Internal Medicine  [facility]  1/14/2020, 1:13 PM

## 2021-06-05 NOTE — TELEPHONE ENCOUNTER
Patient Returning Call  Reason for call:  Patient returning call to the clinic.  Information relayed to patient:  Yes as instructed and patient agrees with plan to try the fosamax as instructed. Please send to the [id number]  Patient has additional questions:  No  If YES, what are your questions/concerns:  none  Okay to leave a detailed message?: Yes

## 2021-06-05 NOTE — TELEPHONE ENCOUNTER
Controlled Substance Refill Request  Medication Name:   Requested Prescriptions     Pending Prescriptions Disp Refills     oxyCODONE (ROXICODONE) 10 mg immediate release tablet 120 tablet 0     Sig: Take 1 tablet (10 mg total) by mouth 4 (four) times a day as needed for pain. For use 12/22/19-1/21/2020.     Date Last Fill: 12/22/19  Is patient out of medication?:  Yes  Patient notified refills processed within 3 business days:  Yes  Requested Pharmacy: [id number]  Submit electronically to pharmacy  Controlled Substance Agreement on file:   Encounter-Level CSA Scan Date - 12/18/2018:    Scan on 12/27/2018 10:52 AM           Encounter-Level CSA Scan Date - 08/03/2017:    Scan on 8/8/2017  3:22 PM: [facility]        Last office visit:  12/17/19

## 2021-06-06 NOTE — TELEPHONE ENCOUNTER
Last Office Visit  12/17/2019 Jorge Dunn MD    Notes:  We reviewed her thoracic back pain.  We talked about possibly tapering her medications.  She is going to try to go towards this at some point.She denies any new back pain.  She does need urine drug screen.       Last Filled: 01/23/2020    Lab Results   Component Value Date    AMPHET Screen Negative 12/17/2019    HEBENZODIAZ Screen Negative 12/17/2019    OPIATES (!) 12/17/2019     Screen Positive (Confirmation available on request)    PCP Screen Negative 12/17/2019    THC Screen Negative 12/17/2019    BARBIT Screen Negative 12/17/2019    COCAINEMETAB Screen Negative 12/17/2019    METHADNE Screen Negative 12/17/2019    OXYCODONE (!) 12/17/2019     Screen Positive (Confirmation available on request)    CREAUR 208.9 12/17/2019         Next OV:  7/16/2020 Jorge Dunn MD      Encounter-Level CSA Scan Date - 12/18/2018:    Scan on 12/27/2018 10:52 AM           Encounter-Level CSA Scan Date - 08/03/2017:    Scan on 8/8/2017  3:22 PM: [facility]          reviewed and results are as follows:    On  01/23/2020 by PCP - No inappropriate prescriptions noted.    Medication teed up for provider signature - please adjust as appropriate.

## 2021-06-06 NOTE — TELEPHONE ENCOUNTER
Controlled Substance Refill Request  Medication Name:   Requested Prescriptions     Pending Prescriptions Disp Refills     oxyCODONE (ROXICODONE) 10 mg immediate release tablet 120 tablet 0     Sig: Take 1 tablet (10 mg total) by mouth 4 (four) times a day as needed for pain. For use 1/23/2020-2/22/2020.     Date Last Fill: 01/23/2020  Is patient out of medication?: No  Patient notified refills processed within 3 business days:  Yes  [facility]ed [facility]: [facility]  Submit electronically to pharmacy  Controlled Substance Agreement on file:   Encounter-Level CSA Scan Date - 12/18/2018:    Scan on 12/27/2018 10:52 AM           Encounter-Level CSA Scan Date - 08/03/2017:    Scan on 8/8/2017  3:22 PM: [facility]        Last office visit:  12/17/2019

## 2021-06-07 NOTE — TELEPHONE ENCOUNTER
Controlled Substance Refill Request  Medication Name:   Requested Prescriptions     Pending Prescriptions Disp Refills     oxyCODONE (ROXICODONE) 10 mg immediate release tablet 120 tablet 0     Sig: Take 1 tablet (10 mg total) by mouth 4 (four) times a day as needed for pain. For use 2/24/2020-3/25/2020.     Date Last Fill: 2/24/2020  Requested Pharmacy: [id number]  Submit electronically to pharmacy  Controlled Substance Agreement on file:   Encounter-Level CSA Scan Date - 12/18/2018:    Scan on 12/27/2018 10:52 AM           Encounter-Level CSA Scan Date - 08/03/2017:    Scan on 8/8/2017  3:22 PM: [facility]        Last office visit:  12/17/19    Patient has 2 tablets left of above medication.

## 2021-06-07 NOTE — TELEPHONE ENCOUNTER
Medication Request  Medication name: Albuterol inhaler  Requested Pharmacy: [facility]  Reason for request: Patient has a history of COPD.  She could not afford albuterol inhaler before but she now has insurance to cover it and would like Dr. Dunn to order it for her.  When did you use medication last?:  It has been over 5 years ago.   Patient offered appointment:  patient declined due to pandemic  Okay to leave a detailed message: yes

## 2021-06-07 NOTE — TELEPHONE ENCOUNTER
Last Office Visit  12/17/2019 Jorge Dunn MD  Notes:  Patient comes in today for follow-up of a number of issues.     We reviewed her vwmkf-fflhcs-aged disease.  She still has some issues with her bowels.  She has had a recent exacerbation of the rash on her back and buttocks.  She was given a cream by dermatology and will be seeing a dermatologist at the [facility] as well.  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     We reviewed her thoracic back pain.  We talked about possibly tapering her medications.  She is going to try to go towards this at some point.She denies any new back pain.  She does need urine drug screen.     We reviewed her COPD.  She has had recent issues with this again.  She is had no recent chest pain or chest pressure.  She had CT scan in April which was abnormal and we're going to follow-up on this.     She will be on new insurance in 2020 and her insurance cover her medications much better.      Last Filled:  albuterol (PROVENTIL HFA;VENTOLIN HFA) 90 mcg/actuation inhaler (Discontinued)  1 Inhaler  0  10/6/2016  10/13/2016  No    Sig - Route: Inhale 2 puffs every 4 (four) hours as needed for wheezing or shortness of breath. - Inhalation    E-Prescribing Status: Receipt confirmed by pharmacy (10/6/2016 10:44 AM CDT)        Next OV:  7/16/2020 Jorge Dunn MD      Patient has a history of COPD.  She could not afford albuterol inhaler before but she now has insurance to cover it and would like Dr. Dunn to order it for her    Medication teed up for provider signature

## 2021-06-07 NOTE — TELEPHONE ENCOUNTER
Controlled Substance Refill Request  Medication Name:   Requested Prescriptions     Pending Prescriptions Disp Refills     oxyCODONE (ROXICODONE) 10 mg immediate release tablet 120 tablet 0     Sig: Take 1 tablet (10 mg total) by mouth 4 (four) times a day as needed for pain. For use 3/25/2020-4/24/2020.     Date Last Fill: 3/25/2020   Is patient out of medication?: No, 1 day  days left  Patient notified refills processed within 3 business days:  Yes  [facility]ed [facility]: [facility] [place]   Submit electronically to pharmacy  Controlled Substance Agreement on file:   Encounter-Level CSA Scan Date - 12/18/2018:    Scan on 12/27/2018 10:52 AM           Encounter-Level CSA Scan Date - 08/03/2017:    Scan on 8/8/2017  3:22 PM: [facility]        Last office visit:  02/17/2020

## 2021-06-07 NOTE — TELEPHONE ENCOUNTER
This was done.    Jorge Dunn MD  General Internal Medicine  [facility]  5/5/2020, 1:23 PM

## 2021-06-08 NOTE — TELEPHONE ENCOUNTER
Controlled Substance Refill Request  Medication Name:   Requested Prescriptions     Pending Prescriptions Disp Refills     oxyCODONE (ROXICODONE) 10 mg immediate release tablet 120 tablet 0     Sig: Take 1 tablet (10 mg total) by mouth 4 (four) times a day as needed for pain. For use 4/24/2020-5/24/2020.     Date Last Fill: 4/24/20  Requested [facility]acy: [id number]  Submit electronically to pharmacy  Controlled Substance Agreement on file:   Encounter-Level CSA Scan Date - 12/18/2018:    Scan on 12/27/2018 10:52 AM           Encounter-Level CSA Scan Date - 08/03/2017:    Scan on 8/8/2017  3:22 PM: [facility]        Last office visit:  12/17/19

## 2021-06-08 NOTE — TELEPHONE ENCOUNTER
Last Office Visit  12/17/2019 Dr. Dunn     Notes:  Patient comes in today for follow-up of a number of issues.     We reviewed her mnabx-pkiogp-idwt disease.  She still has some issues with her bowels.  She has had a recent exacerbation of the rash on her back and buttocks.  She was given a cream by dermatology and will be seeing a dermatologist at the [facility] as well.  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     We reviewed her thoracic back pain.  We talked about possibly tapering her medications.  She is going to try to go towards this at some point.She denies any new back pain.  She does need urine drug screen.     We reviewed her COPD.  She has had recent issues with this again.  She is had no recent chest pain or chest pressure.  She had CT scan in April which was abnormal and we're going to follow-up on this.     She will be on new insurance in 2020 and her insurance cover her medications much better.         Last Filled:  oxyCODONE (ROXICODONE) 10 mg immediate release tablet  120 tablet  0  4/24/2020 5/24/2020  No    Sig - Route: Take 1 tablet (10 mg total) by mouth 4 (four) times a day as needed for pain. For use 4/24/2020-5/24/2020. - Oral    Sent to pharmacy as: oxyCODONE 10 mg tablet (ROXICODONE)    Earliest Fill Date: 4/24/2020    Notes to Pharmacy: Please put the dates of use or prescription limits on the patient's bottle to ensure appropriate use. Inform patient about new [place] law related to controlled substances.   reviewed 1/23/2020.    E-Prescribing Status: Receipt confirmed by pharmacy (4/22/2020 10:37 AM CDT)          Lab Results   Component Value Date    AMPHET Screen Negative 12/17/2019    HEBENZODIAZ Screen Negative 12/17/2019    OPIATES (!) 12/17/2019     Screen Positive (Confirmation available on request)    PCP Screen Negative 12/17/2019    THC Screen Negative 12/17/2019    BARBIT Screen Negative 12/17/2019    COCAINEMETAB Screen Negative 12/17/2019     METHADNE Screen Negative 12/17/2019    OXYCODONE (!) 12/17/2019     Screen Positive (Confirmation available on request)    CREAUR 208.9 12/17/2019         Next OV:  Visit date not found      Encounter-Level CSA Scan Date - 12/18/2018:    Scan on 12/27/2018 10:52 AM           Encounter-Level CSA Scan Date - 08/03/2017:    Scan on 8/8/2017  3:22 PM: [facility]  12/18/2019        reviewed and results are as follows:     reviewed 1/23/2020    Medication teed up for provider signature - please adjust as appropriate.

## 2021-06-09 NOTE — TELEPHONE ENCOUNTER
Controlled Substance Refill Request  Medication Name:   Requested Prescriptions     Pending Prescriptions Disp Refills     oxyCODONE (ROXICODONE) 10 mg immediate release tablet 120 tablet 0     Sig: Take 1 tablet (10 mg total) by mouth 4 (four) times a day as needed for pain. For use 5/24/2020-6/23/2020.     Date Last Fill: 5/20/20  [facility]ed [facility]: [facility]  Submit electronically to pharmacy  Controlled Substance Agreement on file:   Encounter-Level CSA Scan Date - 12/18/2018:    Scan on 12/27/2018 10:52 AM           Encounter-Level CSA Scan Date - 08/03/2017:    Scan on 8/8/2017  3:22 PM: [facility]        Last office visit:  12/17/19

## 2021-06-09 NOTE — TELEPHONE ENCOUNTER
Controlled Substance Refill Request  Medication Name:   Requested Prescriptions     Pending Prescriptions Disp Refills     oxyCODONE (ROXICODONE) 10 mg immediate release tablet 120 tablet 0     Sig: Take 1 tablet (10 mg total) by mouth 4 (four) times a day as needed for pain. For use 6/25/2020-7/25/2020.     Date Last Fill: 06/25/20  [facility]ed [facility]: [facility]  Submit electronically to pharmacy  Controlled Substance Agreement on file:   Encounter-Level CSA Scan Date - 12/18/2018:    Scan on 12/27/2018 10:52 AM           Encounter-Level CSA Scan Date - 08/03/2017:    [facility] 8[facility]8/2017  [facility]        Last office visit:

## 2021-06-09 NOTE — PATIENT INSTRUCTIONS - HE
DOXYCYCLINE 100 mg a day for acne    See the dermatologist for the chin    To schedule an appointment with a dermatologist, I recommend calling [facility] at  [phone].    [place] [address], [address]  [place] [address]

## 2021-06-10 NOTE — PROGRESS NOTES
HPI: This patient is a 65yo F who presents for evaluation of hearing at the request of Dr. Dunn. There has been  bilateral, non-pulsatile tinnitus, most noticeable when it is quiet over the past 5 months. Denies otalgia, otorrhea, vertigo, and other major medical issues. Has been around moderate noise and has no relevant family history. There is no concern about hearing loss. She does have a history of AML and a BMT in the past.    Past medical history, surgical history, social history, family history, medications, and allergies have been reviewed with the patient and are documented above.    Review of Systems: a 10-system review was performed. Pertinent positives are noted in the HPI and on a separate scanned document in the chart.    PHYSICAL EXAMINATION:  GEN: no acute distress, normocephalic. Very thin, strong cigarette odor.  EYES: extraocular movements are intact, pupils are equal and round. Sclera clear.   EARS: auricles are normally formed. The external auditory canals are clear with minimal to no cerumen. Tympanic membranes are intact bilaterally with no signs of infection, effusion, retractions, or perforations.  NOSE: anterior nares are patent.   OC/OP: edentulous. Clear. Moist mucus membranes, no masses or lesions. Tongue and palate fully mobile and symmetric.  NECK: supple.   NEURO: CN VII and XII symmetric. alert and oriented. No spontaneous nystagmus. Gait is normal.  PULM: breathing comfortably on room air, normal chest expansion with respiration  CARDS: no cyanosis or clubbing, normal carotid pulses    AUDIOGRAM: mild, symmetric HFSNHL. Type A tymps, 100% WRS.    MEDICAL DECISION-MAKING: This patient is a 65yo F with sensorineural hearing loss and resultant tinnitus. Discussed hearing protection, stress management, and masking techniques.

## 2021-06-10 NOTE — TELEPHONE ENCOUNTER
It seems she should see an otolaryngologist (ENT) and possibly an audiologist for this.    I would recommend she see Dr. Praveen Atkins or Dr. Moncada.    I placed a referral.    Please call [phone] to schedule your otolaryngology (ENT) appointment.    Jorge Dunn MD  General Internal Medicine  [facility]  8/3/2020, 12:38 PM

## 2021-06-10 NOTE — TELEPHONE ENCOUNTER
Please call patient -    ______________________________________________________________________     Home phone:  [phone] (home)     Cell phone:   Telephone Information:   Mobile [phone]       Other contacts:  Name Home Phone Work Phone Mobile Phone Relationship Lgl GrFRANCISCO JAVIER Munoz   [phone] Significant*    JEAN JUARES [phone]   Grandchild      ______________________________________________________________________     May leave message.    Coronavirus test negative.      Jorge Dunn MD  [facility]  7/27/2020, 10:41 PM     ______________________________________________________________________    Recent Results (from the past 240 hour(s))   COVID-19 Virus PCR MRF    Specimen: Respiratory   Result Value Ref Range    COVID-19 VIRUS SPECIMEN SOURCE Nasopharyngeal     2019-nCOV Not Detected        ______________________________________________________________________

## 2021-06-10 NOTE — TELEPHONE ENCOUNTER
"Symptom  Describe your symptoms: The patient states both of her ears have been ringing for 5 months and progressively worsening.  Any pain: no  New/Ongoing: Ongoing  How long have you been having symptoms: 5  month(s)  Have you been seen for this:  No  Appointment offered?:  Yes The patient declined  Triage offered?: Yes, declined  Home remedies tried: The patient states \"rinsed out my ears.\"  [facility]ed [facility]: [facility]  Okay to leave a detailed message? Yes      "

## 2021-06-10 NOTE — TELEPHONE ENCOUNTER
Controlled Substance Refill Request  Medication Name:   Requested Prescriptions     Pending Prescriptions Disp Refills     oxyCODONE (ROXICODONE) 10 mg immediate release tablet 120 tablet 0     Sig: Take 1 tablet (10 mg total) by mouth 4 (four) times a day as needed for pain. For use 7/25/2020-8/24/2020.     Date Last Fill: 07/25/2020  [facility]  Submit electronically to pharmacy  Controlled Substance Agreement on file:   Encounter-Level CSA Scan Date - 12/18/2018:    Scan on 12/27/2018 10:52 AM           Encounter-Level CSA Scan Date - 08/03/2017:    Scan on 8/8/2017  3:22 PM: [facility]        Last office visit:  07/16/2020

## 2021-06-10 NOTE — PROGRESS NOTES
[facility] Hematology and Oncology Progress Note    Patient: Jenn Barclay  MRN: [id number]  Date of Service:         Reason for Visit    Chief Complaint   Patient presents with     HE Cancer       Assessment and Plan      .AML, M2 subtype, status post allogenic transplant, June 2010  Significant smoking  Rash and diarrhea  Osteoporosis with compression fracture      CBC is normal and there is no other evidence of relapse of leukemia.  Patient is reassured.  She did have workup for diarrhea with colonoscopy which was negative.  Diarrhea symptoms have improved with Creon therapy.  Rash may be related to uitvl-clxths-sphq disease.  She has reestablished with physicians at the [facility] for her bone marrow transplant.  She does have follow-up there in July again.  She may be considered for treatment for dbdnx-ehuvoa-npfh disease depending on her rash and other symptoms.  I will defer follow-up here to 1 year.    Asked her to continue on Creon and calcium and vitamin D supplementation.    I discussed her smoking.  She is trying to cut back but is not interested in any other measures at this time.    Plan: As above  Follow-up in 1 year with repeat CBC  Smoking cessation discussed for 5 minutes      MEASURABLE DISEASE: CBC        CURRENT THERAPY: Observation        TREATMENT HISTORY: Patient received 2 cycles of induction, 7+3  She then received 2 cycles of high-dose araC  She then had a matched sibling donor transplant in June 2010       ECOG Performance   ECOG Performance Status: 1    Distress Assessment  Distress Assessment Score: No distress    Pain  Pain Score (Initial OR Reassessment): No/Denies Pain      Problem List    1. Acute myeloid leukemia (AML), M2  HM1(CBC and Differential)   2. Chronic diarrhea     3. Papular rash, generalized          CC: Jorge Dunn MD    ______________________________________________________________________________    History of Present Illness    Ms. Jenn Barclay  is here for follow-up.  She is reestablished with physicians of the [facility] who did her bone marrow transplant.  She was started on Creon with improvement in her diarrhea.  She did have colonoscopy which showed no other explanation for the diarrhea.  She is eating a lot but not able to gain any weight.  She does have intermittent rash.  Denies any other new symptoms or problems.    She does continue to smoke and is trying to cut back.  Not interested in any other intervention at this time for her smoking.  Understands risks for second malignancy.    Pain Status  Currently in Pain: No/denies    Review of Systems    Constitutional  Constitutional (WDL): Exceptions to WDL  Fatigue: Fatigue relieved by rest (Insomnia)  Weight Loss: to <10% from baseline, intervention not indicated (down 4 lbs since 3/10)  Neurosensory  Neurosensory (WDL): Exceptions to WDL (headache)  Ataxia: Asymptomatic, clinical or diagnostic observations only, intervention not indicated  Cardiovascular  Cardiovascular (WDL): All cardiovascular elements are within defined limits  Pulmonary  Respiratory (WDL): Exceptions to WDL  Cough: Mild symptoms, nonprescription intervention indicated  Dyspnea: Shortness of breath with moderate exertion  Gastrointestinal  Gastrointestinal (WDL): Exceptions to WDL  Diarrhea: Increase of <4 stools per day over baseline, mild increase in ostomy output compared to baseline (BM 3-4 times per day)  Genitourinary  Genitourinary (WDL): All genitourinary elements are within defined limits  Integumentary  Integumentary (WDL): Exceptions to WDL  Rash Maculo-Papular: Macules/papules covering <10% BSA with or without symptoms (e.g., pruritus, burning, tightness) (lower back and bottom)  Pruritus: Mild or localized, topical intervention indicated  Patient Coping  Patient Coping: Accepting  Distress Assessment  Distress Assessment Score: No distress  Accompanied by  Accompanied by: Alone    Past History  Past  Medical History:   Diagnosis Date     Acute myeloid leukemia (AML), M2      Baker's cyst      Chronic diarrhea      Compression fx, lumbar spine      COPD (chronic obstructive pulmonary disease)      Gastric ulcer     pt states she has not had any ulcers     GVHD (graft versus host disease)      History of PID      Migraine headache      Osteoporosis 5/3/2016     Papular rash, generalized      Pseudogout - crystal proven 2016 - knee 11/30/2016     Status post allogeneic bone marrow transplant - 2010 - [facility]      Tobacco abuse      Traumatic compression fracture of T8 thoracic vertebra, closed, initial encounter 4/26/2016         Past Surgical History:   Procedure Laterality Date     APPENDECTOMY       BONE MARROW TRANSPLANT  2010     ND EGD TRANSORAL BIOPSY SINGLE/MULTIPLE N/A 6/12/2014    Procedure: ESOPHAGOGASTRODUODENOSCOPY BIOPSY;  Surgeon: Eder Alvarado MD;  Location: [facility];  Service: Gastroenterology     TOTAL ABDOMINAL HYSTERECTOMY W/ BILATERAL SALPINGOOPHORECTOMY       VERTEBROPLASTY  2016    T5 and T8 vertebrae.       Physical Exam    Recent Vitals 5/12/2017   Weight 108 lbs 14 oz   /74   Pulse 66   Temp 98.3   Temp src 1   SpO2 97       GENERAL: Alert and oriented. Seated comfortably. In no distress.    HEAD: Atraumatic and normocephalic.  Has a full head of hair.    EYES: KEARA, EOMI.  No pallor.  No icterus.    Oral cavity: no mucosal lesion or tonsillar enlargement.    NECK: supple. JVP normal.  No thyroid enlargement.    LYMPH NODES: No palpable, cervical, axillary or inguinal lymphadenopathy.    CHEST: clear to auscultation bilaterally but with decreased breath sounds  Resonant to percussion throughout bilaterally.  Symmetrical breath movements bilaterally.    CVS: S1 and S2 are heard. Regular rate and rhythm.  No murmur or gallop or rub heard.    ABDOMEN: Soft. Not tender. Not distended.  No palpable hepatomegaly or splenomegaly.  No other mass palpable.  Bowel sounds  heard.    EXTREMITIES: Warm.  No peripheral edema.    SKIN: Arms and buttock areas reveal erythematous rash; no bruising or purpura.        Lab Results    Recent Results (from the past 168 hour(s))   HM1 (CBC with Diff)   Result Value Ref Range    WBC 10.8 4.0 - 11.0 thou/uL    RBC 4.66 3.80 - 5.40 mill/uL    Hemoglobin 14.2 12.0 - 16.0 g/dL    Hematocrit 42.0 35.0 - 47.0 %    MCV 90 80 - 100 fL    MCH 30.5 27.0 - 34.0 pg    MCHC 33.8 32.0 - 36.0 g/dL    RDW 13.0 11.0 - 14.5 %    Platelets 328 140 - 440 thou/uL    MPV 8.2 (L) 8.5 - 12.5 fL    Neutrophils % 45 (L) 50 - 70 %    Lymphocytes % 45 (H) 20 - 40 %    Monocytes % 8 2 - 10 %    Eosinophils % 2 0 - 6 %    Basophils % 1 0 - 2 %    Neutrophils Absolute 4.8 2.0 - 7.7 thou/uL    Lymphocytes Absolute 4.9 (H) 0.8 - 4.4 thou/uL    Monocytes Absolute 0.8 0.0 - 0.9 thou/uL    Eosinophils Absolute 0.2 0.0 - 0.4 thou/uL    Basophils Absolute 0.1 0.0 - 0.2 thou/uL       Imaging    No results found.      Signed by: Mohit Montano MD

## 2021-06-11 NOTE — TELEPHONE ENCOUNTER
Controlled Substance Refill Request  Medication Name:   Requested Prescriptions     Pending Prescriptions Disp Refills     oxyCODONE (ROXICODONE) 10 mg immediate release tablet 120 tablet 0     Sig: Take 1 tablet (10 mg total) by mouth 4 (four) times a day as needed for pain. For use 8/24/2020-9/23/2020.     Date Last Fill: 8/23/2020  Requested Pharmacy: [facility]  Submit electronically to pharmacy  Controlled Substance Agreement on file:   Encounter-Level CSA Scan Date - 12/18/2018:    Scan on 12/27/2018 10:52 AM           Encounter-Level CSA Scan Date - 08/03/2017:    Scan on 8/8/2017  3:22 PM: [facility]        Last office visit:  7/16/2020

## 2021-06-12 ENCOUNTER — HOSPITAL ENCOUNTER (EMERGENCY)
Dept: EMERGENCY MEDICINE | Facility: HOSPITAL | Age: 66
Discharge: HOME | End: 2021-06-12
Attending: EMERGENCY MEDICINE
Payer: COMMERCIAL

## 2021-06-12 DIAGNOSIS — S80.01XA CONTUSION OF RIGHT KNEE, INITIAL ENCOUNTER: ICD-10-CM

## 2021-06-12 DIAGNOSIS — M70.51 SUPRAPATELLAR BURSITIS OF RIGHT KNEE: ICD-10-CM

## 2021-06-12 ASSESSMENT — MIFFLIN-ST. JEOR: SCORE: 1112.41

## 2021-06-12 NOTE — TELEPHONE ENCOUNTER
Controlled Substance Refill Request  Medication Name:   Requested Prescriptions     Pending Prescriptions Disp Refills     oxyCODONE (ROXICODONE) 10 mg immediate release tablet 120 tablet 0     Sig: Take 1 tablet (10 mg total) by mouth 4 (four) times a day as needed for pain. For use 9/23/2020-10/23/2020.     Date Last Fill: 9/22/2020  Requested Pharmacy: [facility]  Submit electronically to pharmacy  Controlled Substance Agreement on file:   Encounter-Level CSA Scan Date - 12/18/2018:    Scan on 12/27/2018 10:52 AM           Encounter-Level CSA Scan Date - 08/03/2017:    Scan on 8/8/2017  3:22 PM: [facility]        Last office visit:  7/16/2020      Patient has one tablet left of above medication.

## 2021-06-12 NOTE — TELEPHONE ENCOUNTER
Last Office Visit  7/16/2020 Jorge Dunn MD    Notes:  Comes in today for multiple issues.     Chronic thoracic back pain is about the same.  No worse.  No new pains.     Reviewed the patient's tobacco abuse, but she is currently not interested in stopping.      Reviewed chronic obstructive lung disease and there are no new issues with this.  No recent exacerbation.     Will be getting follow up colonoscopy at [facility] ([facility]  Needs coronavirus testing locally if able.     Left great toe is paining her quite a bit.  On the bottom where there is a callus.  Wearing a bandaid can help.  No numbness in the toe or burning pain but pain can be sharp when on feet.     Has acne and a skin lesion on her chin that are bothering her.  It is way too long for her to see dermatology at the [facility] ([facility]) and would like to see someone locally for the cystic lesion on her face.     We reviewed her other issues noted in the assessment but not specifically addressed in the HPI above.      Past medical, family and social history reviewed today.     Last Filled:  oxyCODONE (ROXICODONE) 10 mg immediate release tablet 120 tablet 0 9/23/2020 10/23/2020 No   Sig - Route: Take 1 tablet (10 mg total) by mouth 4 (four) times a day as needed for pain. For use 9/23/2020-10/23/2020. - Oral   Sent to pharmacy as: oxyCODONE 10 mg tablet (ROXICODONE)   Earliest Fill Date: 9/23/2020   Notes to Pharmacy:  reviewed 9/22/2020.   E-Prescribing Status: Receipt confirmed by pharmacy (9/22/2020  9:01 AM CDT)       Lab Results   Component Value Date    AMPHET Screen Negative 12/17/2019    HEBENZODIAZ Screen Negative 12/17/2019    OPIATES (!) 12/17/2019     Screen Positive (Confirmation available on request)    PCP Screen Negative 12/17/2019    THC Screen Negative 12/17/2019    BARBIT Screen Negative 12/17/2019    COCAINEMETAB Screen Negative 12/17/2019    METHADNE Screen Negative 12/17/2019    OXYCODONE (!)  12/17/2019     Screen Positive (Confirmation available on request)    NAOMIER 208.9 12/17/2019         Next OV:  1/14/2021 Jorge Dunn MD      Encounter-Level CSA Scan Date - 12/18/2018:    Scan on 12/27/2018 10:52 AM     Scan 12/18/2019      Encounter-Level CSA Scan Date - 08/03/2017:    Scan on 8/8/2017  3:22 PM: [facility]          reviewed and results are as follows:    Unable to pull      Medication teed up for provider signature - please adjust as appropriate.

## 2021-06-12 NOTE — PROGRESS NOTES
Wt Readings from Last 20 Encounters:   09/08/17 113 lb 12.8 oz (51.6 kg)   08/11/17 110 lb (49.9 kg)   08/03/17 111 lb 3.2 oz (50.4 kg)   05/12/17 108 lb 14.4 oz (49.4 kg)   03/10/17 112 lb 12.8 oz (51.2 kg)   03/01/17 109 lb (49.4 kg)   11/29/16 105 lb (47.6 kg)   11/07/16 104 lb (47.2 kg)   11/01/16 104 lb (47.2 kg)   10/28/16 104 lb 6.4 oz (47.4 kg)   10/13/16 103 lb 9.6 oz (47 kg)   10/04/16 103 lb 3.2 oz (46.8 kg)   07/22/16 102 lb 9.6 oz (46.5 kg)   07/15/16 102 lb 9.6 oz (46.5 kg)   06/10/16 107 lb 9.6 oz (48.8 kg)   05/05/16 119 lb 12.8 oz (54.3 kg)   05/03/16 121 lb (54.9 kg)   04/27/16 118 lb 4.8 oz (53.7 kg)   04/26/16 116 lb (52.6 kg)   04/24/16 117 lb (53.1 kg)

## 2021-06-13 NOTE — TELEPHONE ENCOUNTER
Last Office Visit  7/16/2020 Jorge Dunn MD    Notes:  Comes in today for multiple issues.     Chronic thoracic back pain is about the same.  No worse.  No new pains.     Reviewed the patient's tobacco abuse, but she is currently not interested in stopping.      Reviewed chronic obstructive lung disease and there are no new issues with this.  No recent exacerbation.     Will be getting follow up colonoscopy at [facility] ([facility]).  Needs coronavirus testing locally if able.     Left great toe is paining her quite a bit.  On the bottom where there is a callus.  Wearing a bandaid can help.  No numbness in the toe or burning pain but pain can be sharp when on feet.     Has acne and a skin lesion on her chin that are bothering her.  It is way too long for her to see dermatology at the [facility] ([facility]) and would like to see someone locally for the cystic lesion on her face.     We reviewed her other issues noted in the assessment but not specifically addressed in the HPI above.      Past medical, family and social history reviewed today.     Last Filled:  oxyCODONE (ROXICODONE) 10 mg immediate release tablet 120 tablet 0 10/23/2020 11/22/2020 No   Sig - Route: Take 1 tablet (10 mg total) by mouth 4 (four) times a day as needed for pain. For use 10/23/2020-11/22/2020. - Oral   Sent to pharmacy as: oxyCODONE 10 mg tablet (ROXICODONE)   Earliest Fill Date: 10/23/2020   Notes to Pharmacy:  reviewed 9/22/2020.   E-Prescribing Status: Receipt confirmed by pharmacy (10/22/2020  8:11 AM CDT)         Lab Results   Component Value Date    AMPHET Screen Negative 12/17/2019    HEBENZODIAZ Screen Negative 12/17/2019    OPIATES (!) 12/17/2019     Screen Positive (Confirmation available on request)    PCP Screen Negative 12/17/2019    THC Screen Negative 12/17/2019    BARBIT Screen Negative 12/17/2019    COCAINEMETAB Screen Negative 12/17/2019    METHADNE Screen Negative 12/17/2019     OXYCODONE (!) 12/17/2019     Screen Positive (Confirmation available on request)    CREALEJOR 208.9 12/17/2019         Next OV:  1/14/2021 Jorge Dunn MD      Encounter-Level CSA Scan Date - 12/18/2018:    Scan on 12/27/2018 10:52 AM     Scan 12/18/2019      Encounter-Level CSA Scan Date - 08/03/2017:    Scan on 8/8/2017  3:22 PM: [facility]          reviewed and results are as follows:     reviewed 9/22/2020    Medication teed up for provider signature - please adjust as appropriate.

## 2021-06-13 NOTE — TELEPHONE ENCOUNTER
"Reason for Call:  Medication or medication refill:    Do you use a [facility]?  Name of the pharmacy and phone number for the current request: [facility] [place]    Name of the medication requested: Oxycodone    Other request: Patient states Dr Dunn wanted her on a \"bone medication\" as well.  She can't remember the name at this time, but she would like a refill on that as well.    Can we leave a detailed message on this number? Yes    Phone number patient can be reached at: Home number on file [phone] (home)    Best Time: Any time    Call taken on 12/17/2020 at 9:39 AM by Tremayne Moreno  "

## 2021-06-13 NOTE — TELEPHONE ENCOUNTER
Refill of oxycodone done.    Dr. Agee at the [facility] ([facility]) already did a refill of the alendronate (Fosamax).    Jorge Dunn MD  General Internal Medicine  [facility]  12/17/2020, 10:47 PM

## 2021-06-13 NOTE — TELEPHONE ENCOUNTER
Controlled Substance Refill Request  Medication Name:   Requested Prescriptions     Pending Prescriptions Disp Refills     oxyCODONE (ROXICODONE) 10 mg immediate release tablet 120 tablet 0     Sig: Take 1 tablet (10 mg total) by mouth 4 (four) times a day as needed for pain. For use 10/23/2020-11/22/2020.     Date Last Fill: 10/22/20  Requested Pharmacy: [facility]  Submit electronically to pharmacy  Controlled Substance Agreement on file:   Encounter-Level CSA Scan Date - 12/18/2018:    Scan on 12/27/2018 10:52 AM           Encounter-Level CSA Scan Date - 08/03/2017:    Scan on 8/8/2017  3:22 PM: [facility]        Last office visit:

## 2021-06-14 NOTE — PROGRESS NOTES
Urine drug screen correlates well with reported and known medications taken.    Jorge Dunn MD  [facility]  1/18/2021, 5:13 PM

## 2021-06-14 NOTE — TELEPHONE ENCOUNTER
Refill Request  Did you contact pharmacy: No - controlled  Medication name: ocyCODONE  Who prescribed the medication: PCP  Requested Pharmacy: [facility]  Is patient out of medication: Yes  Patient notified refills processed in 3 business days:  Yes  Okay to leave a detailed message: yes

## 2021-06-15 ENCOUNTER — COMMUNICATION - HEALTHEAST (OUTPATIENT)
Dept: INTERNAL MEDICINE | Facility: CLINIC | Age: 66
End: 2021-06-15

## 2021-06-15 DIAGNOSIS — M54.6 CHRONIC MIDLINE THORACIC BACK PAIN: ICD-10-CM

## 2021-06-15 DIAGNOSIS — G89.29 CHRONIC MIDLINE THORACIC BACK PAIN: ICD-10-CM

## 2021-06-15 NOTE — TELEPHONE ENCOUNTER
Last Office Visit  1/14/2021 Dr Jorge Dunn      Notes:     1. Acute myeloid leukemia (AML), M2 (H)  Remains in remission.  Follow at the [facility] ([facility]).     2. 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)  No worsening symptoms.     3. Chronic obstructive pulmonary disease, unspecified COPD type (H)  No recent exacerbations.     4. Senile purpura (H)  No worsening.     5. Encounter for therapeutic drug level monitoring     - Pain Management Drug Panel, Urine     6. Chronic midline thoracic back pain  Continue current pain management.     7. Controlled substance agreement signed - 1/21 - Oxycodone - UDS 1/21  Renewed urine drug screen (UDS) and controlled substance agreement (CSA) today.     8. Tobacco abuse  Work on stopping smoking.     9. Exocrine pancreatic insufficiency  Resume pancreatic enzymes when able.       Last Filled:  oxyCODONE (ROXICODONE) 10 mg immediate release tablet 120 tablet 0 1/21/2021 2/20/2021 No   Sig - Route: Take 1 tablet (10 mg total) by mouth 4 (four) times a day as needed for pain. For use 1/21/2021-2/20/2021 - Oral   Sent to pharmacy as: oxyCODONE 10 mg tablet (ROXICODONE)   Earliest Fill Date: 1/21/2021   Notes to Pharmacy:  reviewed 1/20/2021   E-Prescribing Status: Receipt confirmed by pharmacy (1/20/2021  2:07 PM CST)         Lab Results   Component Value Date    AMPHET Screen Negative 12/17/2019    HEBENZODIAZ Screen Negative 12/17/2019    OPIATES (!) 12/17/2019     Screen Positive (Confirmation available on request)    PCP Screen Negative 12/17/2019    THC Screen Negative 12/17/2019    BARBIT Screen Negative 12/17/2019    COCAINEMETAB Screen Negative 12/17/2019    METHADNE Screen Negative 12/17/2019    OXYCODONE (!) 12/17/2019     Screen Positive (Confirmation available on request)    CREALEJOR 208.9 12/17/2019         Next OV:  Visit date not found      Encounter-Level CSA Scan Date - 12/18/2018:    Scan on 12/27/2018 10:52 AM     12/18/2019      Encounter-Level  CSA Scan Date - 08/03/2017:    Scan on 8/8/2017  3:22 PM: [facility]          reviewed and results are as follows:     reviewed 1/20/2021    Medication teed up for provider signature - please adjust as appropriate.

## 2021-06-15 NOTE — TELEPHONE ENCOUNTER
Reason for Call:  Medication or medication refill:    Do you use a [facility]?  Name of the pharmacy and phone number for the current request: [facility]    Name of the medication requested:     Oxycodone 10 mg    Other request: Patient reports she has about 2 days left of medication.    Can we leave a detailed message on this number? Yes    Phone number patient can be reached at: Home number on file [phone] (home)    Best Time: Any time    Call taken on 2/17/2021 at 8:33 AM by Tremayne Moreno

## 2021-06-16 NOTE — TELEPHONE ENCOUNTER
Telephone Encounter by Jorge Dunn MD at 1/23/2020  9:22 AM     Author: Jorge Dunn MD Service: -- Author Type: Physician    Filed: 1/23/2020  9:23 AM Encounter Date: 1/23/2020 Status: Signed    : Jorge Dunn MD (Physician)       ______________________________________________________________________      reviewed and results are as follows:        No inappropriate prescriptions noted.    ______________________________________________________________________

## 2021-06-16 NOTE — TELEPHONE ENCOUNTER
Done.    Jorge Dunn MD  General Internal Medicine  [facility]  4/19/2021, 4:14 PM

## 2021-06-16 NOTE — TELEPHONE ENCOUNTER
Refill Request  Did you contact pharmacy: No  Medication name:   Oxycodone    Who prescribed the medication: PCP  Requested Pharmacy: [facility] - on file  Is patient out of medication: No.  2 days left  Patient notified refills processed in 3 business days:  yes  Okay to leave a detailed message: yes

## 2021-06-16 NOTE — TELEPHONE ENCOUNTER
Last OV 01/14/21    Reviewed her chronic pain.  She continues on her pain medications.  She feels this is managing it well at this time.  No side effects from the medication.    oxyCODONE (ROXICODONE) 10 mg immediate release tablet 120 tablet 0 3/22/2021 4/21/2021 No   Sig - Route: Take 1 tablet (10 mg total) by mouth 4 (four) times a day as needed for pain. For use 3/22/2021-4/21/2021 - Oral

## 2021-06-16 NOTE — TELEPHONE ENCOUNTER
Telephone Encounter by Jorge Dunn MD at 9/16/2019  1:32 PM     Author: Jorge Dunn MD Service: -- Author Type: Physician    Filed: 9/16/2019  1:33 PM Encounter Date: 9/16/2019 Status: Signed    : Jorge Dunn MD (Physician)       ______________________________________________________________________      reviewed and results are as follows:        No inappropriate prescriptions noted.    ______________________________________________________________________

## 2021-06-16 NOTE — TELEPHONE ENCOUNTER
Telephone Encounter by Jorge Dunn MD at 5/15/2019  8:01 PM     Author: Jorge Dunn MD Service: -- Author Type: Physician    Filed: 5/15/2019  8:04 PM Encounter Date: 5/14/2019 Status: Signed    : Jorge Dunn MD (Physician)       [facility] reviewed today and no inappropriate prescriptions identified.

## 2021-06-16 NOTE — TELEPHONE ENCOUNTER
Refill Request  Did you contact pharmacy: No  Medication name:   Requested Prescriptions     Pending Prescriptions Disp Refills     oxyCODONE (ROXICODONE) 10 mg immediate release tablet 120 tablet 0     Sig: Take 1 tablet (10 mg total) by mouth 4 (four) times a day as needed for pain. For use 2/20/2021-3/22/2021     Who prescribed the medication: Dr. Dunn  Requested Pharmacy: [facility]  Is patient out of medication: No.  1 tablet left days left  Patient notified refills processed in 3 business days:  yes  Okay to leave a detailed message: yes    Last fill was 2/20/21.

## 2021-06-17 NOTE — TELEPHONE ENCOUNTER
Last Office Visit  1/14/2021 Jorge Dunn MD    Notes:  1. Acute myeloid leukemia (AML), M2 (H)  Remains in remission.  Follow at the [facility] ([facility]).     2. 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)  No worsening symptoms.     3. Chronic obstructive pulmonary disease, unspecified COPD type (H)  No recent exacerbations.     4. Senile purpura (H)  No worsening.     5. Encounter for therapeutic drug level monitoring     - Pain Management Drug Panel, Urine     6. Chronic midline thoracic back pain  Continue current pain management.     7. Controlled substance agreement signed - 1/21 - Oxycodone - UDS 1/21  Renewed urine drug screen (UDS) and controlled substance agreement (CSA) today.     8. Tobacco abuse  Work on stopping smoking.     9. Exocrine pancreatic insufficiency  Resume pancreatic enzymes when able.       Last Filled:  oxyCODONE (ROXICODONE) 10 mg immediate release tablet 120 tablet 0 4/21/2021 5/21/2021 No   Sig - Route: Take 1 tablet (10 mg total) by mouth 4 (four) times a day as needed for pain. For use 4/21/2021-5/21/2021 - Oral   Sent to pharmacy as: oxyCODONE 10 mg tablet (ROXICODONE)   Earliest Fill Date: 4/21/2021   Notes to Pharmacy:  reviewed 4/19/2021   E-Prescribing Status: Receipt confirmed by pharmacy (4/19/2021  4:15 PM CDT)         Lab Results   Component Value Date    AMPHET Screen Negative 12/17/2019    HEBENZODIAZ Screen Negative 12/17/2019    OPIATES (!) 12/17/2019     Screen Positive (Confirmation available on request)    PCP Screen Negative 12/17/2019    THC Screen Negative 12/17/2019    BARBIT Screen Negative 12/17/2019    COCAINEMETAB Screen Negative 12/17/2019    METHADNE Screen Negative 12/17/2019    OXYCODONE (!) 12/17/2019     Screen Positive (Confirmation available on request)    CREAUR 208.9 12/17/2019         Next OV:  7/13/2021 Jorge Dunn MD      Encounter-Level CSA Scan Date - 12/18/2018:    Scan on 12/27/2018 10:52 AM     12/18/2019       Encounter-Level CSA Scan Date - 08/03/2017:    Scan on 8/8/2017  3:22 PM: [facility]          reviewed and results are as follows:     reviewed 4/19/2021    Medication teed up for provider signature - please adjust as appropriate.

## 2021-06-17 NOTE — TELEPHONE ENCOUNTER
Refill Request  Did you contact pharmacy: No  Medication name:   Oxycodone    Who prescribed the medication: PCP  Requested Pharmacy: [facility]  Is patient out of medication: No.  2 days left  Patient notified refills processed in 3 business days:  yes  Okay to leave a detailed message: yes    Last Office Visit with PCP = 01/14/2021  Next Office Visit with PCP Scheduled for 07/13/2021

## 2021-06-17 NOTE — ED TRIAGE NOTES
4 days ago, pt states a large board fell onto her right fifth toe. Yesterday, a dog jumped on her right foot. Pt states the pain has increased today, it throbbing, and is radiating down and across her right foot. Pt is able to ambulate and bear weight.

## 2021-06-17 NOTE — ED PROVIDER NOTES
EMERGENCY DEPARTMENT ENCOUNTER      NAME: Jenn Barclay  AGE: 65 y.o. female  YOB: 1955  MRN: [id number]  EVALUATION DATE & TIME: 2021 10:35 PM    PCP: Jorge Dunn MD    ED PROVIDER: Shara Alfonso M.D.      Chief Complaint   Patient presents with     Toe Pain         FINAL IMPRESSION:  1. Contusion of fifth toe of right foot, initial encounter          ED COURSE & MEDICAL DECISION MAKIN:18 PM I performed my initial history, physical exam, and discussed ED plan and course. I saw the patient wearing eye protection, N95, and gloves.    11:31 PM Patient to be discharged home    Pertinent Labs & Imaging studies reviewed. (See chart for details)  65 y.o. female presents to the Emergency Department for evaluation of injury to her fifth toe of the right foot.  She is ambulating.  Vital signs are normal.  Exam shows tenderness but no obvious deformity.  She will get medications for pain to be sent for x-ray.    X-rays negative for fracture or dislocation.  She was buddy taped and will plan to use her postop shoe for comfort until she is able to ambulate without difficulty.  At the conclusion of the encounter I discussed the results of all of the tests and the disposition. The questions were answered. The patient or family acknowledged understanding and was agreeable with the care plan.  She will follow-up with [facility] if not getting better as expected.    MEDICATIONS GIVEN IN THE EMERGENCY:  Medications   naproxen tablet 500 mg (NAPROSYN) (500 mg Oral Given 21 2300)       NEW PRESCRIPTIONS STARTED AT TODAY'S ER VISIT  Current Discharge Medication List      CONTINUE these medications which have NOT CHANGED    Details   albuterol (PROAIR HFA;PROVENTIL HFA;VENTOLIN HFA) 90 mcg/actuation inhaler Inhale 2 puffs every 6 (six) hours as needed for wheezing.  Qty: 1 each, Refills: 3    Comments: May substitute the equivalent medication per insurance preference.  Associated  Diagnoses: Chronic obstructive pulmonary disease, unspecified COPD type (H)      albuterol (PROVENTIL) 2.5 mg /3 mL (0.083 %) nebulizer solution USE 1 VIAL IN NEBULIZER EVERY 6 HOURS AS NEEDED FOR WHEEZING  Qty: 75 mL, Refills: 5    Associated Diagnoses: COPD exacerbation (H)      calcium citrate-vitamin D3 (CITRACAL + D MAXIMUM) 315-250 mg-unit per tablet Take 1 tablet by mouth daily. Generic Equate from [employer].       doxycycline (VIBRA-TABS) 100 MG tablet Take 1 tablet (100 mg total) by mouth daily. May substitute doxycyline monohydrate or capsules if cheaper.  Qty: 90 tablet, Refills: 3    Associated Diagnoses: Cystic acne      ibuprofen (ADVIL,MOTRIN) 800 MG tablet Take 1 tablet (800 mg total) by mouth 3 (three) times a day.  Qty: 21 tablet, Refills: 0      ipratropium-albuterol (DUO-NEB) 0.5-2.5 mg/3 mL nebulizer Take 3 mL by nebulization every 6 (six) hours for 7 days.  Qty: 84 mL, Refills: 0      lipase-protease-amylase (CREON) 24,000-76,000 -120,000 unit CpDR capsule Take 24,000 Units by mouth 3 (three) times a day before meals.      multivitamin (ONE A DAY) per tablet Take 1 tablet by mouth daily.      oxyCODONE (ROXICODONE) 10 mg immediate release tablet Take 1 tablet (10 mg total) by mouth 4 (four) times a day as needed for pain. For use 4/21/2021-5/21/2021  Qty: 120 tablet, Refills: 0    Comments:  reviewed 4/19/2021  Associated Diagnoses: Chronic midline thoracic back pain      pseudoephedrine-guaifenesin (MUCINEX D)  mg per tablet Take 1 tablet by mouth every 12 (twelve) hours.      triamcinolone (KENALOG) 0.1 % ointment Apply topically 2 (two) times a day. To rash.  Qty: 453.6 g, Refills: 11    Associated Diagnoses: Rash                =================================================================    HPI    Patient information was obtained from: Patient     Use of Intrepreter: N/A       Jenn ESCALANTE Deny is a 65 y.o. female with a pertinent history of AML, pseudogout who presents to the ED  via walk in for evaluation of toe pain.    Patient states a board fell onto her right foot 4 days ago, causing mild pain to the foot. Last night, an ~80 pound dog jumped on her right foot, causing significant increase in pain. Her pain is mostly located in at the 5th toe but radiates across her foot. She has been lisa taping her 4th and 5th toes as well as taking aspirin for pain. Patient notes history of multiple previous toe fractures, but not to her 5th toe. She has a surgical shoe at home. She does not take any daily prescription medications. Patient denies any other complaints at this time.     REVIEW OF SYSTEMS   Review of Systems   Musculoskeletal:        Positive for right 5th toe pain        PAST MEDICAL HISTORY:  Past Medical History:   Diagnosis Date     Acute myeloid leukemia (AML), M2 (H)      Baker's cyst      Chronic back pain, thoracic      Chronic diarrhea      Compression fx, lumbar spine (H)      Controlled substance agreement signed - 8/17 - Oxycodone 8/3/2017     COPD (chronic obstructive pulmonary disease) (H)      Full code status 9/10/2017     Gastric ulcer     pt states she has not had any ulcers     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)      History of PID      Migraine headache      Osteoporosis 5/3/2016     Papular rash, generalized      Pseudogout - crystal proven 2016 - knee 11/30/2016     Serrated adenoma of colon - 16-20 cm - 2017 7/17/2020     Status post allogeneic bone marrow transplant - 2010 - [facility]      Tobacco abuse      Traumatic compression fracture of T8 thoracic vertebra, closed, initial encounter (H) 4/26/2016     Tubular adenoma of colon - 2 diminuitive in 2017 7/17/2020       PAST SURGICAL HISTORY:  Past Surgical History:   Procedure Laterality Date     APPENDECTOMY       BONE MARROW TRANSPLANT  2010     [place] EGD TRANSORAL BIOPSY SINGLE/MULTIPLE N/A 6/12/2014    Procedure: ESOPHAGOGASTRODUODENOSCOPY BIOPSY;  Surgeon: Eder Alvarado MD;  Location: [facility];   Service: Gastroenterology     TOTAL ABDOMINAL HYSTERECTOMY W/ BILATERAL SALPINGOOPHORECTOMY       VERTEBROPLASTY  2016    T5 and T8 vertebrae.           CURRENT MEDICATIONS:    No current facility-administered medications on file prior to encounter.      Current Outpatient Medications on File Prior to Encounter   Medication Sig     albuterol (PROAIR HFA;PROVENTIL HFA;VENTOLIN HFA) 90 mcg/actuation inhaler Inhale 2 puffs every 6 (six) hours as needed for wheezing.     albuterol (PROVENTIL) 2.5 mg /3 mL (0.083 %) nebulizer solution USE 1 VIAL IN NEBULIZER EVERY 6 HOURS AS NEEDED FOR WHEEZING     calcium citrate-vitamin D3 (CITRACAL + D MAXIMUM) 315-250 mg-unit per tablet Take 1 tablet by mouth daily. Generic Equate from [employer].      doxycycline (VIBRA-TABS) 100 MG tablet Take 1 tablet (100 mg total) by mouth daily. May substitute doxycyline monohydrate or capsules if cheaper.     ibuprofen (ADVIL,MOTRIN) 800 MG tablet Take 1 tablet (800 mg total) by mouth 3 (three) times a day.     ipratropium-albuterol (DUO-NEB) 0.5-2.5 mg/3 mL nebulizer Take 3 mL by nebulization every 6 (six) hours for 7 days.     lipase-protease-amylase (CREON) 24,000-76,000 -120,000 unit CpDR capsule Take 24,000 Units by mouth 3 (three) times a day before meals.     multivitamin (ONE A DAY) per tablet Take 1 tablet by mouth daily.     oxyCODONE (ROXICODONE) 10 mg immediate release tablet Take 1 tablet (10 mg total) by mouth 4 (four) times a day as needed for pain. For use 4/21/2021-5/21/2021     pseudoephedrine-guaifenesin (MUCINEX D)  mg per tablet Take 1 tablet by mouth every 12 (twelve) hours.     triamcinolone (KENALOG) 0.1 % ointment Apply topically 2 (two) times a day. To rash.       ALLERGIES:  Allergies   Allergen Reactions     Other Environmental Allergy Other (See Comments)     Painful skin irritation from paper tape     Adhesive Other (See Comments)     Allergy Hx - Rash from paper tape     Mirtazapine Other (See Comments)      "Hallucination        FAMILY HISTORY:  Family History   Problem Relation Age of Onset     Lung cancer Mother      Pancreatic cancer Father        SOCIAL HISTORY:   Social History     Socioeconomic History     Marital status:      Spouse name: None     Number of children: None     Years of education: None     Highest education level: None   Occupational History     None   Social Needs     Financial resource strain: None     Food insecurity     Worry: None     Inability: None     Transportation needs     Medical: None     Non-medical: None   Tobacco Use     Smoking status: Current Every Day Smoker     Packs/day: 0.50     Years: 49.00     Pack years: 24.50     Types: Cigarettes     Smokeless tobacco: Former User     Tobacco comment: has used E-cigarettes in the past, info given   Substance and Sexual Activity     Alcohol use: No     Drug use: No     Sexual activity: Never   Lifestyle     Physical activity     Days per week: None     Minutes per session: None     Stress: None   Relationships     Social connections     Talks on phone: None     Gets together: None     Attends Baptism service: None     Active member of club or organization: None     Attends meetings of clubs or organizations: None     Relationship status: None     Intimate partner violence     Fear of current or ex partner: None     Emotionally abused: None     Physically abused: None     Forced sexual activity: None   Other Topics Concern     None   Social History Narrative    Patient of Dr. Dunn since 2016.        Single, lives in a trailer home.        VITALS:  Patient Vitals for the past 24 hrs:   BP Temp Temp src Pulse Resp SpO2 Height Weight   05/12/21 2230 158/74 97.9  F (36.6  C) Temporal 75 18 96 % 5' 9\" (1.753 m) 112 lb (50.8 kg)       PHYSICAL EXAM    Constitutional:  Well developed, well nourished, no acute distress   EYES: Conjunctivae clear  HENT:  Atraumatic, normocephalic Bilateral external ears normal, nose normal, oropharynx " moist. Neck- supple   Respiratory:  No respiratory distress, normal breath sounds, no rales, no wheezing, no cough, no stridor   Cardiovascular:  Normal rate, normal rhythm, no murmurs, capillary refill normal.  No chest wall tenderness  GI:  No apparent tenderness    Musculoskeletal:  Tenderness over the right pinky toe.  No obvious deformity.  Sensation intact.  Moving all other extremities normally.   Neurologic:  No focal deficits  Psych: Affect normal, Mood normal.     RADIOLOGY:  Reviewed all pertinent imaging. Please see official radiology report.  Xr Foot Right 3 Or More Vws    Result Date: 5/12/2021  EXAM: XR FOOT RIGHT 3 OR MORE VWS LOCATION: [facility] DATE/TIME: 5/12/2021 11:15 PM INDICATION: Trauma. Toe pain. COMPARISON: None.     No acute fracture or dislocation.        I, Marifer Sinclair, am serving as a scribe to document services personally performed by Shara Alfonso MD based on my observation and the provider's statements to me. I, Shara Alfonso MD attest that Marifer Sinclair is acting in a scribe capacity, has observed my performance of the services and has documented them in accordance with my direction.    Shara Alfonso M.D.  Emergency Medicine  [facility] EMERGENCY DEPARTMENT  [address] [facility] [place] [address]  Dept: [phone]  Loc: [phone]          Shara Alfonso MD  05/13/21 0035

## 2021-06-17 NOTE — TELEPHONE ENCOUNTER
Telephone Encounter by Agnieszka Peñaloza LPN at 6/24/2020  3:04 PM     Author: Agnieszka Peñaloza LPN Service: -- Author Type: Certified Medical Assistant    Filed: 6/24/2020  3:07 PM Encounter Date: 6/24/2020 Status: Signed    : Agnieszka Peñaloza LPN (Licensed Nurse)         Last Office Visit  12/17/2019 Jorge Dunn MD    Notes:  Patient comes in today for follow-up of a number of issues.     We reviewed her qolvg-xmhvfg-kqoe disease.  She still has some issues with her bowels.  She has had a recent exacerbation of the rash on her back and buttocks.  She was given a cream by dermatology and will be seeing a dermatologist at the [facility] as well.  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     We reviewed her thoracic back pain.  We talked about possibly tapering her medications.  She is going to try to go towards this at some point.She denies any new back pain.  She does need urine drug screen.     We reviewed her COPD.  She has had recent issues with this again.  She is had no recent chest pain or chest pressure.  She had CT scan in April which was abnormal and we're going to follow-up on this.     She will be on new insurance in 2020 and her insurance cover her medications much better.         Last Filled:  oxyCODONE (ROXICODONE) 10 mg immediate release tablet  120 tablet  0  5/24/2020 6/23/2020  No    Sig - Route: Take 1 tablet (10 mg total) by mouth 4 (four) times a day as needed for pain. For use 5/24/2020-6/23/2020. - Oral    Sent to pharmacy as: oxyCODONE 10 mg tablet (ROXICODONE)    Earliest Fill Date: 5/24/2020    Notes to Pharmacy: Please put the dates of use or prescription limits on the patient's bottle to ensure appropriate use. Inform patient about new [place] law related to controlled substances.   reviewed 1/23/2020.    E-Prescribing Status: Receipt confirmed by pharmacy (5/20/2020 10:51 PM CDT)          Lab Results   Component Value Date    AMPHET Screen  Negative 12/17/2019    HEBENZODIAZ Screen Negative 12/17/2019    OPIATES (!) 12/17/2019     Screen Positive (Confirmation available on request)    PCP Screen Negative 12/17/2019    THC Screen Negative 12/17/2019    BARBIT Screen Negative 12/17/2019    COCAINEMETAB Screen Negative 12/17/2019    METHADNE Screen Negative 12/17/2019    OXYCODONE (!) 12/17/2019     Screen Positive (Confirmation available on request)    CREAUR 208.9 12/17/2019         Next OV:  7/16/2020 Jorge Dunn MD      Encounter-Level CSA Scan Date - 12/18/2018:    Scan on 12/27/2018 10:52 AM     Scan on 12/18/2019      Encounter-Level CSA Scan Date - 08/03/2017:    Scan on 8/8/2017  3:22 PM: [facility]          reviewed and results are as follows:        Medication teed up for provider signature - please adjust as appropriate.

## 2021-06-17 NOTE — TELEPHONE ENCOUNTER
Telephone Encounter by Hussein Jonas CMA at 8/21/2020  4:16 PM     Author: Hussein Jonas CMA Service: -- Author Type: Certified Medical Assistant    Filed: 8/21/2020  4:17 PM Encounter Date: 8/21/2020 Status: Signed    : Hussein Jonas CMA (Certified Medical Assistant)       Pt last visit 7/16/20  Next appointment 1/14/21    Plan:      1. Continue current medications.  2. Colonoscopy at [facility] ([facility]) to follow up colon lesions.  3. Coronavirus screening her before procedure.  4. Doxycycline for acne.  5. Dermatology referral for facial sebaceous cyst   6. Stop tobacco.  7. Continue current pain management.  8. Consider update of CSA (controlled substance agreement) at the next visit.   9. Review UDS/DOA screen at next visit if appropriate.   10. Chronic obstructive pulmonary disease (COPD) currently doing well.  11. Try padding for great toe issue.  12. Follow up sooner if issues.           Jorge Dunn MD  General Internal Medicine  [facility]    Personal office fax - [phone]   Voice mail - [phone]  E-mail - [email or web address]      Return in about 6 months (around 1/16/2021), or if symptoms worsen or fail to improve, for visit and blood work.

## 2021-06-18 NOTE — PROGRESS NOTES
[facility] Hematology and Oncology Progress Note    Patient: Jenn Barclay  MRN: [id number]  Date of Service:         Reason for Visit    Chief Complaint   Patient presents with     Acute myeloid leukemia       Assessment and Plan      .AML, M2 subtype, status post allogenic transplant, June 2010  Significant smoking  Rash and diarrhea      CBC is normal and there is no evidence of relapse of the mL.  She is in remission.  Will continue observation.  We will see her again in 1 year.    Discussed smoking cessation and she is making attempts to try to cut back and is doing so very slowly.  Rash and diarrhea are under control.    Patient was found to have a tick embedded in the posterior aspect of the right neck.  This appears to be a dog tick.  It was not significantly engorged.  Therefore no indication for any prophylactic antibiotic at this time.  Asked patient to keep an eye on this area and if she develops any signs of inflammation she will need to follow-up with her primary doctor for possible antibiotic therapy.  She understands and is agreeable to the plan.    Plan: Follow-up in 1 year with CBC  Watch for any skin changes around the site of the tick bite  Smoking cessation discussed      MEASURABLE DISEASE: CBC        CURRENT THERAPY: Observation        TREATMENT HISTORY: Patient received 2 cycles of induction, 7+3  She then received 2 cycles of high-dose araC  She then had a matched sibling donor transplant in June 2010       ECOG Performance   ECOG Performance Status: 1    Distress Assessment  Distress Assessment Score: No distress    Pain         Problem List    1. Status post allogeneic bone marrow transplant - 2010 - [facility]  HM1(CBC and Differential)   2. Acute myeloid leukemia (AML), M2          CC: Jorge Dunn MD    ______________________________________________________________________________    History of Present Illness    Ms. Jenn Barclay is here for follow-up.  She was seen a year  ago.  She is following up at the [facility] in regards to her transplant and concern for fohzk-pyxmgs-tojh disease.  Overall she has been doing fairly well.  She reported a mole behind the right neck which has been painful and sore.  On inspection she was noted to have an embedded tick which was removed.  This appears to be a dog tick.  It was not significantly engorged.  She thinks it may have been there over the past day or so.  Mild erythema at the site.    She has been doing well otherwise.  Appetite and weight have improved.  Diarrhea is controlled with the use of Creon.  No significant rash.  ECOG status 0.    She continues to smoke but is cutting back.  I did discuss smoking cessation with the patient      Pain Status  Currently in Pain: No/denies    Review of Systems    Constitutional  Constitutional (WDL): Exceptions to WDL  Fatigue: Fatigue relieved by rest (Reports insomnia.)  Weight Gain: 5 - <10% from baseline (7 lbs up since last year. )  Neurosensory  Neurosensory (WDL): Exceptions to WDL  Peripheral Sensory Neuropathy: Asymptomatic, loss of deep tendon reflexes or paresthesia (Occassional to Rt arm.)  Cardiovascular  Cardiovascular (WDL): All cardiovascular elements are within defined limits  Pulmonary  Respiratory (WDL): Exceptions to WDL (Smoker.)  Cough: Mild symptoms, nonprescription intervention indicated  Dyspnea: Shortness of breath with moderate exertion  Gastrointestinal  Gastrointestinal (WDL): Exceptions to WDL  Diarrhea: Increase of <4 stools per day over baseline, mild increase in ostomy output compared to baseline  Genitourinary  Genitourinary (WDL): All genitourinary elements are within defined limits  Integumentary  Integumentary (WDL): All integumentary elements are within defined limits  Patient Coping  Patient Coping: Accepting  Distress Assessment  Distress Assessment Score: No distress  Accompanied by  Accompanied by: Alone    Past History  Past Medical History:    Diagnosis Date     Acute myeloid leukemia (AML), M2      Baker's cyst      Chronic back pain, thoracic      Chronic diarrhea      Compression fx, lumbar spine      Controlled substance agreement signed - 8/17 - Oxycodone 8/3/2017     COPD (chronic obstructive pulmonary disease)      Full code status 9/10/2017     Gastric ulcer     pt states she has not had any ulcers     GVHD (graft versus host disease)      History of PID      Migraine headache      Osteoporosis 5/3/2016     Papular rash, generalized      Pseudogout - crystal proven 2016 - knee 11/30/2016     Status post allogeneic bone marrow transplant - 2010 - [facility]      Tobacco abuse      Traumatic compression fracture of T8 thoracic vertebra, closed, initial encounter 4/26/2016         Past Surgical History:   Procedure Laterality Date     APPENDECTOMY       BONE MARROW TRANSPLANT  2010     [place] EGD TRANSORAL BIOPSY SINGLE/MULTIPLE N/A 6/12/2014    Procedure: ESOPHAGOGASTRODUODENOSCOPY BIOPSY;  Surgeon: Eder Alvarado MD;  Location: [facility];  Service: Gastroenterology     TOTAL ABDOMINAL HYSTERECTOMY W/ BILATERAL SALPINGOOPHORECTOMY       VERTEBROPLASTY  2016    T5 and T8 vertebrae.       Physical Exam    Recent Vitals 5/14/2018   Height -   Weight 115 lbs 3 oz   BSA (m2) -   /77   Pulse 84   Temp 98.6   Temp src 1   SpO2 96   Some recent data might be hidden       GENERAL: Alert and oriented. Seated comfortably. In no distress.    HEAD: Atraumatic and normocephalic.  Has a full head of hair.    EYES: KEARA, EOMI.  No pallor.  No icterus.    Oral cavity: no mucosal lesion or tonsillar enlargement.    NECK: supple. JVP normal.  No thyroid enlargement.    LYMPH NODES: No palpable, cervical, axillary or inguinal lymphadenopathy.    CHEST: clear to auscultation bilaterally but with decreased breath sounds  Resonant to percussion throughout bilaterally.  Symmetrical breath movements bilaterally.    CVS: S1 and S2 are heard. Regular rate and  rhythm.  No murmur or gallop or rub heard.    ABDOMEN: Soft. Not tender. Not distended.  No palpable hepatomegaly or splenomegaly.  No other mass palpable.  Bowel sounds heard.    EXTREMITIES: Warm.  No peripheral edema.    SKIN: Arms and buttock areas reveal erythematous rash; no bruising or purpura.        Lab Results    Recent Results (from the past 168 hour(s))   HM1 (CBC with Diff)   Result Value Ref Range    WBC 9.0 4.0 - 11.0 thou/uL    RBC 4.27 3.80 - 5.40 mill/uL    Hemoglobin 13.1 12.0 - 16.0 g/dL    Hematocrit 39.0 35.0 - 47.0 %    MCV 91 80 - 100 fL    MCH 30.7 27.0 - 34.0 pg    MCHC 33.6 32.0 - 36.0 g/dL    RDW 12.8 11.0 - 14.5 %    Platelets 222 140 - 440 thou/uL    MPV 8.3 (L) 8.5 - 12.5 fL    Neutrophils % 54 50 - 70 %    Lymphocytes % 36 20 - 40 %    Monocytes % 7 2 - 10 %    Eosinophils % 1 0 - 6 %    Basophils % 1 0 - 2 %    Neutrophils Absolute 4.9 2.0 - 7.7 thou/uL    Lymphocytes Absolute 3.3 0.8 - 4.4 thou/uL    Monocytes Absolute 0.6 0.0 - 0.9 thou/uL    Eosinophils Absolute 0.1 0.0 - 0.4 thou/uL    Basophils Absolute 0.1 0.0 - 0.2 thou/uL       Imaging    No results found.      Signed by: Mohit Montano MD

## 2021-06-19 NOTE — LETTER
Letter by Mohit Montano MD at      Author: Mohit Montano MD Service: -- Author Type: --    Filed:  Encounter Date: 10/29/2019 Status: Signed                   Office Hours: Monday - Friday 8:00 - 4:30PM    Jenn Barclay  [address] [place] [address]           October 29, 2019      Dear Jenn:    Our clinic records indicate that you are due for follow up with Dr Montano. Please phone our clinic to schedule, [phone].       Sincerely,      [facility]

## 2021-06-20 NOTE — LETTER
Letter by Jorge Dunn MD at      Author: Jorge Dunn MD Service: -- Author Type: --    Filed:  Encounter Date: 12/17/2019 Status: Signed         [facility]  12/17/19    Patient: Jenn Barclay  YOB: 1955  Medical Record Number: [id number]                                                                MEDICATION: OXYCODONE           Opioid / Opioid Plus Controlled Substance Agreement    I understand that my care provider has prescribed an opioid (narcotic) controlled substance to help manage my condition(s). I am taking this medicine to help me function or work. I know this is strong medicine, and that it can cause serious side effects. Opioid medicine can be sedating, addicting and may cause a dependency on the drug. They can affect my ability to drive or think, and cause depression. They need to be taken exactly as prescribed. Combining opioids with certain medicines or chemicals (such as cocaine, sedatives and tranquilizers, sleeping pills, meth) can be dangerous or even fatal. Also, if I stop opioids suddenly, I may have severe withdrawal symptoms. Last, I understand that opioids do not work for all types of pain nor for all patients. If not helpful, I may be asked to stop them.        The risks, benefits, and side effects of these medicine(s) were explained to me. I agree that:    1. I will take part in other treatments as advised by my care team. This may be psychiatry or counseling, physical therapy, behavioral therapy, group treatment or a referral to a pain clinic. I will reduce or stop my medicine when my care team tells me to do so.  2. I will take my medicines as prescribed. I will not change the dose or schedule unless my care team tells me to. There will be no refills if I run out early.  I may be contactedwithout warning and asked to complete a urine drug test or pill count at any time.   3. I will keep all my appointments, and understand this is part of the  monitoring of opioids. My care team may require an office visit for EVERY opioid/controlled substance refill. If I miss appointments or dont follow instructions, my care team may stop my medicine.  4. I will not ask other providers to prescribe controlled substances, and I will not accept controlled substances from other people. If I need another prescribed controlled substance for a new reason, I will tell my care team within 1 business day.  5. I will use one pharmacy to fill all of my controlled substance prescriptions, and it is up to me to make sure that I do not run out of my medicines on weekends or holidays. If my care team is willing to refill my opioid prescription without a visit, I must request refills only during office hours, refills may take up to 3 days to process, and it may take up to 5 to 7 days for my medicine to be mailed and ready at my pharmacy. Prescriptions will not be mailed anywhere except my pharmacy.        [id number]  Rev 12/18         Registration to scan to EHR                             Page 1 of 2               Controlled Substance Agreement Opioid        [place]  12/17/19  Patient: Jenn Barclay  YOB: 1955  Medical Record Number: [id number]                                                                  6. I am responsible for my prescriptions. If the medicine/prescription is lost or stolen, it will not be replaced. I also agree not to share controlled substance medicines with anyone.  7. I agree to not use ANY illegal or recreational drugs. This includes marijuana, cocaine, bath salts or other drugs. I agree not to use alcohol unless my care team says I may.          I agree to give urine samples whenever asked. If I dont give a urine sample, the care team may stop my medicine.    8. If I enroll in the [place] Medical Marijuana program, I will tell my care team. I will also sign an agreement to share my medical records with my care team.   9. I will  bring in my list of medicines (or my medicine bottles) each time I come to the clinic.   10. I will tell my care team right away if I become pregnant or have a new medical problem treated outside of my regular clinic.  11. I understand that this medicine can affect my thinking and judgment. It may be unsafe for me to drive, use machinery and do dangerous tasks. I will not do any of these things until I know how the medicine affects me. If my dose changes, I will wait to see how it affects me. I will contact my care team if I have concerns about medicine side effects.    I understand that if I do not follow any of the conditions above, my prescriptions or treatment may be stopped.      I agree that my provider, clinic care team, and pharmacy may work with any city, state or federal law enforcement agency that investigates the misuse, sale, or other diversion of my controlled medicine. I will allow my provider to discuss my care with or share a copy of this agreement with any other treating provider, pharmacy or emergency room where I receive care. I agree to give up (waive) any right of privacy or confidentiality with respect to these consents.     I have read this agreement and have asked questions about anything I did not understand.      ________________________________________________________________________  Patient signature - Date/Time -  Jenn Barclay                                      ________________________________________________________________________  Witness signature                                                            ________________________________________________________________________  Provider signature - Jorge Dunn MD      [id number]  Rev 12/18         Registration to scan to EHR                         Page 2 of 2                   Controlled Substance Agreement Opioid           Page 1 of 2  Opioid Pain Medicines (also known as Narcotics)  What You Need to Know    What are  opioids?   Opioids are pain medicines that must be prescribed by a doctor.  They are also known as narcotics.    Examples are:     morphine (MS Contin, Vonnie)    oxycodone (Oxycontin)    oxycodone and acetaminophen (Percocet)    hydrocodone and acetaminophen (Vicodin, Norco)     fentanyl patch (Duragesic)     hydromorphone (Dilaudid)     methadone     What do opioids do well?   Opioids are best for short-term pain after a surgery or injury. They also work well for cancer pain. Unlike other pain medicines, they do not cause liver or kidney failure or ulcers. They may help some people with long-lasting (chronic) pain.     What do opioids NOT do well?   Opioids never get rid of pain entirely, and they do not work well for most patients with chronic pain. Opioids do not reduce swelling, one of the causes of pain. They also dont work well for nerve pain.                           For informational purposes only.  Not to replace the advice of your care provider.  Copyright [facility]. All right reserved. [employer] 959187-Rka 02/18.      Page 2 of 2    Risks and side effects   Talk to your doctor before you start or decide to keep taking one of these medicines. Side effects include:    Lowering your breathing rate enough to cause death    Overdose, including death, especially if taking higher than prescribed doses    Long-term opioid use    Worse depression symptoms; less pleasure in things you usually enjoy    Feeling tired or sluggish    Slower thoughts or cloudy thinking    Being more sensitive to pain over time; pain is harder to control    Trouble sleeping or restless sleep    Changes in hormone levels (for example, less testosterone)    Changes in sex drive or ability to have sex    Constipation    Unsafe driving    Itching and sweating    Feeling dizzy    Nausea, vomiting and dry mouth    What else should I know about opioids?  When someone takes opioids for too long or too often, they become  dependent. This means that if you stop or reduce the medicine too quickly, you will have withdrawal symptoms.    Dependence is not the same as addiction. Addiction is when people keep using a substance that harms their body, their mind or their relations with others. If you have a history of drug or alcohol abuse, taking opioids can cause a relapse.    Over time, opioids dont work as well. Most people will need higher and higher doses. The higher the dose, the more serious the side effects. We dont know the long-term effects of opioids.      Prescribed opioids aren't the best way to manage chronic pain    Other ways to manage pain include:      Ibuprofen or acetaminophen.  You should always try this first.      Treat health problems that may be causing pain.      acupuncture or massage, deep breathing, meditation, visual imagery, aromatherapy.      Use heat or ice at the pain site      Physical therapy and exercise      Stop smoking      See a counselor or therapist                                                  People who have used opioids for a long time may have a lower quality of life, worse depression, higher levels of pain and more visits to doctors.    Never share your opioids with others. Be sure to store opioids in a secure place, locked if possible.Young children can easily swallow them and overdose.     You can overdose on opioids.  Signs of overdose include decrease or loss of consciousness, slowed breathing, trouble waking and blue lips.  If someone is worried about overdose, they should call 911.    If you are at risk for overdose, you may get naloxone (Narcan, a medicine that reverses the effects of opioids.  If you overdose, a friend or family member can give you Narcan while waiting for the ambulance.  They need to know the signs of overdose and how to give Narcan.    While you're taking opioids:    Don't use alcohol or street drugs. Taking them together can cause death.    Don't take any of these  medicines unless your doctor says its okay.  Taking these with opioids can cause death.    Benzodiazepines (such as lorazepam         or diazepam)    Muscle relaxers (such as cyclobenzaprine)    sleeping pills    other opioids    Safe disposal of opioids  Find your area drug take-back program, your pharmacy mail-back program, buy a special disposal bag (such as Deterra) from your pharmacy or flush them down the toilet.  Use the guidelines at:  www.fda.gov/drugs/resourcesforyou

## 2021-06-21 NOTE — LETTER
Letter by Jorge Dunn MD at      Author: Jorge Dunn MD Service: -- Author Type: --    Filed:  Encounter Date: 1/14/2021 Status: (Other)         [facility]  01/14/21    Patient: Jenn Barclay  YOB: 1955  Medical Record Number: [id number]                                                                MEDICATION: OXYCODONE           Opioid / Opioid Plus Controlled Substance Agreement    I understand that my care provider has prescribed an opioid (narcotic) controlled substance to help manage my condition(s). I am taking this medicine to help me function or work. I know this is strong medicine, and that it can cause serious side effects. Opioid medicine can be sedating, addicting and may cause a dependency on the drug. They can affect my ability to drive or think, and cause depression. They need to be taken exactly as prescribed. Combining opioids with certain medicines or chemicals (such as cocaine, sedatives and tranquilizers, sleeping pills, meth) can be dangerous or even fatal. Also, if I stop opioids suddenly, I may have severe withdrawal symptoms. Last, I understand that opioids do not work for all types of pain nor for all patients. If not helpful, I may be asked to stop them.        The risks, benefits, and side effects of these medicine(s) were explained to me. I agree that:    1. I will take part in other treatments as advised by my care team. This may be psychiatry or counseling, physical therapy, behavioral therapy, group treatment or a referral to a pain clinic. I will reduce or stop my medicine when my care team tells me to do so.  2. I will take my medicines as prescribed. I will not change the dose or schedule unless my care team tells me to. There will be no refills if I run out early.  I may be contactedwithout warning and asked to complete a urine drug test or pill count at any time.   3. I will keep all my appointments, and understand this is part of  the monitoring of opioids. My care team may require an office visit for EVERY opioid/controlled substance refill. If I miss appointments or dont follow instructions, my care team may stop my medicine.  4. I will not ask other providers to prescribe controlled substances, and I will not accept controlled substances from other people. If I need another prescribed controlled substance for a new reason, I will tell my care team within 1 business day.  5. I will use one pharmacy to fill all of my controlled substance prescriptions, and it is up to me to make sure that I do not run out of my medicines on weekends or holidays. If my care team is willing to refill my opioid prescription without a visit, I must request refills only during office hours, refills may take up to 3 days to process, and it may take up to 5 to 7 days for my medicine to be mailed and ready at my pharmacy. Prescriptions will not be mailed anywhere except my pharmacy.        [id number]  Rev 12/18         Registration to scan to EHR                             Page 1 of 2               Controlled Substance Agreement [facility]  01/14/21  Patient: Jenn Barclay  YOB: 1955  Medical Record Number: [id number]                                                                  6. I am responsible for my prescriptions. If the medicine/prescription is lost or stolen, it will not be replaced. I also agree not to share controlled substance medicines with anyone.  7. I agree to not use ANY illegal or recreational drugs. This includes marijuana, cocaine, bath salts or other drugs. I agree not to use alcohol unless my care team says I may.          I agree to give urine samples whenever asked. If I dont give a urine sample, the care team may stop my medicine.    8. If I enroll in the [place] Medical Marijuana program, I will tell my care team. I will also sign an agreement to share my medical records with my care team.    9. I will bring in my list of medicines (or my medicine bottles) each time I come to the clinic.   10. I will tell my care team right away if I become pregnant or have a new medical problem treated outside of my regular clinic.  11. I understand that this medicine can affect my thinking and judgment. It may be unsafe for me to drive, use machinery and do dangerous tasks. I will not do any of these things until I know how the medicine affects me. If my dose changes, I will wait to see how it affects me. I will contact my care team if I have concerns about medicine side effects.    I understand that if I do not follow any of the conditions above, my prescriptions or treatment may be stopped.      I agree that my provider, clinic care team, and pharmacy may work with any city, state or federal law enforcement agency that investigates the misuse, sale, or other diversion of my controlled medicine. I will allow my provider to discuss my care with or share a copy of this agreement with any other treating provider, pharmacy or emergency room where I receive care. I agree to give up (waive) any right of privacy or confidentiality with respect to these consents.     I have read this agreement and have asked questions about anything I did not understand.      ________________________________________________________________________  Patient signature - Date/Time -  Jenn Barclay                                      ________________________________________________________________________  Witness signature                                                            ________________________________________________________________________  Provider signature - Jorge Dunn MD      [id number]  Rev 12/18         Registration to scan to EHR                         Page 2 of 2                   Controlled Substance Agreement Opioid           Page 1 of 2  Opioid Pain Medicines (also known as Narcotics)  What You Need to Know    What  are opioids?   Opioids are pain medicines that must be prescribed by a doctor.  They are also known as narcotics.    Examples are:     morphine (MS Contin, Vonnie)    oxycodone (Oxycontin)    oxycodone and acetaminophen (Percocet)    hydrocodone and acetaminophen (Vicodin, Norco)     fentanyl patch (Duragesic)     hydromorphone (Dilaudid)     methadone     What do opioids do well?   Opioids are best for short-term pain after a surgery or injury. They also work well for cancer pain. Unlike other pain medicines, they do not cause liver or kidney failure or ulcers. They may help some people with long-lasting (chronic) pain.     What do opioids NOT do well?   Opioids never get rid of pain entirely, and they do not work well for most patients with chronic pain. Opioids do not reduce swelling, one of the causes of pain. They also dont work well for nerve pain.                           For informational purposes only.  Not to replace the advice of your care provider.  Copyright 201 [facility]. All right reserved. [employer] 183026-Jzj 02/18.      Page 2 of 2    Risks and side effects   Talk to your doctor before you start or decide to keep taking one of these medicines. Side effects include:    Lowering your breathing rate enough to cause death    Overdose, including death, especially if taking higher than prescribed doses    Long-term opioid use    Worse depression symptoms; less pleasure in things you usually enjoy    Feeling tired or sluggish    Slower thoughts or cloudy thinking    Being more sensitive to pain over time; pain is harder to control    Trouble sleeping or restless sleep    Changes in hormone levels (for example, less testosterone)    Changes in sex drive or ability to have sex    Constipation    Unsafe driving    Itching and sweating    Feeling dizzy    Nausea, vomiting and dry mouth    What else should I know about opioids?  When someone takes opioids for too long or too often, they become  dependent. This means that if you stop or reduce the medicine too quickly, you will have withdrawal symptoms.    Dependence is not the same as addiction. Addiction is when people keep using a substance that harms their body, their mind or their relations with others. If you have a history of drug or alcohol abuse, taking opioids can cause a relapse.    Over time, opioids dont work as well. Most people will need higher and higher doses. The higher the dose, the more serious the side effects. We dont know the long-term effects of opioids.      Prescribed opioids aren't the best way to manage chronic pain    Other ways to manage pain include:      Ibuprofen or acetaminophen.  You should always try this first.      Treat health problems that may be causing pain.      acupuncture or massage, deep breathing, meditation, visual imagery, aromatherapy.      Use heat or ice at the pain site      Physical therapy and exercise      Stop smoking      See a counselor or therapist                                                  People who have used opioids for a long time may have a lower quality of life, worse depression, higher levels of pain and more visits to doctors.    Never share your opioids with others. Be sure to store opioids in a secure place, locked if possible.Young children can easily swallow them and overdose.     You can overdose on opioids.  Signs of overdose include decrease or loss of consciousness, slowed breathing, trouble waking and blue lips.  If someone is worried about overdose, they should call 911.    If you are at risk for overdose, you may get naloxone (Narcan, a medicine that reverses the effects of opioids.  If you overdose, a friend or family member can give you Narcan while waiting for the ambulance.  They need to know the signs of overdose and how to give Narcan.    While you're taking opioids:    Don't use alcohol or street drugs. Taking them together can cause death.    Don't take any of these  medicines unless your doctor says its okay.  Taking these with opioids can cause death.    Benzodiazepines (such as lorazepam         or diazepam)    Muscle relaxers (such as cyclobenzaprine)    sleeping pills    other opioids    Safe disposal of opioids  Find your area drug take-back program, your pharmacy mail-back program, buy a special disposal bag (such as Deterra) from your pharmacy or flush them down the toilet.  Use the guidelines at:  www.fda.gov/drugs/resourcesforyou

## 2021-06-21 NOTE — LETTER
Letter by Jorge Dunn MD at      Author: Jorge Dunn MD Service: -- Author Type: --    Filed:  Encounter Date: 11/5/2020 Status: (Other)             Jenn Barclay   [address] [place] [address]         Dear Jenn,    I am sending you this letter to remind you that you are due for a follow up visit in January to review your medical conditions and your current treatment.    Please schedule an appointment at your nearest convenience as our schedule fills up quickly and it may take up to 3-4 weeks to get in.    If you have any questions regarding the above, feel free to contact me at [phone].     I look forward to seeing you.        Sincerely,    Jorge Dunn MD  General Internal Medicine  [facility]

## 2021-06-23 ENCOUNTER — ONCOLOGY VISIT (OUTPATIENT)
Dept: TRANSPLANT | Facility: CLINIC | Age: 66
End: 2021-06-23
Attending: INTERNAL MEDICINE
Payer: COMMERCIAL

## 2021-06-23 ENCOUNTER — TELEPHONE (OUTPATIENT)
Dept: TRANSPLANT | Facility: CLINIC | Age: 66
End: 2021-06-23

## 2021-06-23 ENCOUNTER — RECORDS - HEALTHEAST (OUTPATIENT)
Dept: ADMINISTRATIVE | Facility: OTHER | Age: 66
End: 2021-06-23

## 2021-06-23 VITALS
OXYGEN SATURATION: 98 % | HEART RATE: 60 BPM | WEIGHT: 111.2 LBS | DIASTOLIC BLOOD PRESSURE: 68 MMHG | BODY MASS INDEX: 16.42 KG/M2 | TEMPERATURE: 98.7 F | RESPIRATION RATE: 18 BRPM | SYSTOLIC BLOOD PRESSURE: 125 MMHG

## 2021-06-23 DIAGNOSIS — Z94.81 S/P ALLOGENEIC BONE MARROW TRANSPLANT (H): ICD-10-CM

## 2021-06-23 DIAGNOSIS — Z94.81 S/P ALLOGENEIC BONE MARROW TRANSPLANT (H): Primary | ICD-10-CM

## 2021-06-23 LAB
ALBUMIN SERPL-MCNC: 3.6 G/DL (ref 3.4–5)
ALP SERPL-CCNC: 52 U/L (ref 40–150)
ALT SERPL W P-5'-P-CCNC: 24 U/L (ref 0–50)
ANION GAP SERPL CALCULATED.3IONS-SCNC: 5 MMOL/L (ref 3–14)
AST SERPL W P-5'-P-CCNC: 23 U/L (ref 0–45)
BASOPHILS # BLD AUTO: 0.1 10E9/L (ref 0–0.2)
BASOPHILS NFR BLD AUTO: 1.1 %
BILIRUB SERPL-MCNC: 0.3 MG/DL (ref 0.2–1.3)
BUN SERPL-MCNC: 10 MG/DL (ref 7–30)
CALCIUM SERPL-MCNC: 8.4 MG/DL (ref 8.5–10.1)
CHLORIDE SERPL-SCNC: 102 MMOL/L (ref 94–109)
CO2 SERPL-SCNC: 33 MMOL/L (ref 20–32)
CREAT SERPL-MCNC: 0.71 MG/DL (ref 0.52–1.04)
DIFFERENTIAL METHOD BLD: NORMAL
EOSINOPHIL # BLD AUTO: 0.2 10E9/L (ref 0–0.7)
EOSINOPHIL NFR BLD AUTO: 2.6 %
ERYTHROCYTE [DISTWIDTH] IN BLOOD BY AUTOMATED COUNT: 13 % (ref 10–15)
GFR SERPL CREATININE-BSD FRML MDRD: 89 ML/MIN/{1.73_M2}
GLUCOSE SERPL-MCNC: 82 MG/DL (ref 70–99)
HCT VFR BLD AUTO: 37.8 % (ref 35–47)
HGB BLD-MCNC: 12.5 G/DL (ref 11.7–15.7)
IMM GRANULOCYTES # BLD: 0 10E9/L (ref 0–0.4)
IMM GRANULOCYTES NFR BLD: 0.1 %
LYMPHOCYTES # BLD AUTO: 4.2 10E9/L (ref 0.8–5.3)
LYMPHOCYTES NFR BLD AUTO: 52.3 %
MCH RBC QN AUTO: 29.8 PG (ref 26.5–33)
MCHC RBC AUTO-ENTMCNC: 33.1 G/DL (ref 31.5–36.5)
MCV RBC AUTO: 90 FL (ref 78–100)
MONOCYTES # BLD AUTO: 0.7 10E9/L (ref 0–1.3)
MONOCYTES NFR BLD AUTO: 8.5 %
NEUTROPHILS # BLD AUTO: 2.9 10E9/L (ref 1.6–8.3)
NEUTROPHILS NFR BLD AUTO: 35.4 %
NRBC # BLD AUTO: 0 10*3/UL
NRBC BLD AUTO-RTO: 0 /100
PLATELET # BLD AUTO: 240 10E9/L (ref 150–450)
POTASSIUM SERPL-SCNC: 3 MMOL/L (ref 3.4–5.3)
PROT SERPL-MCNC: 6.1 G/DL (ref 6.8–8.8)
RBC # BLD AUTO: 4.19 10E12/L (ref 3.8–5.2)
SODIUM SERPL-SCNC: 140 MMOL/L (ref 133–144)
WBC # BLD AUTO: 8.1 10E9/L (ref 4–11)

## 2021-06-23 PROCEDURE — 36415 COLL VENOUS BLD VENIPUNCTURE: CPT

## 2021-06-23 PROCEDURE — G0463 HOSPITAL OUTPT CLINIC VISIT: HCPCS

## 2021-06-23 PROCEDURE — 80053 COMPREHEN METABOLIC PANEL: CPT | Performed by: INTERNAL MEDICINE

## 2021-06-23 PROCEDURE — 99215 OFFICE O/P EST HI 40 MIN: CPT | Performed by: INTERNAL MEDICINE

## 2021-06-23 PROCEDURE — 85025 COMPLETE CBC W/AUTO DIFF WBC: CPT | Performed by: INTERNAL MEDICINE

## 2021-06-23 ASSESSMENT — PAIN SCALES - GENERAL: PAINLEVEL: NO PAIN (0)

## 2021-06-23 NOTE — LETTER
6/23/2021         RE: Jenn Barclay  [address] [place] [address]        Dear Colleague,    Thank you for referring your patient, Jenn Barclay, to the [facility] [place]. Please see a copy of my visit note below.    BMT Clinic Progress Note     HPI: Ms. 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  In the last month she had a little more gas and intermittent diarrhea.  Other than that her weight is relatively stable.  She had a little incident at home and went to the [facility] ED for some x-rays.  They were fine.  She has no shortness of breath.  Her mouth is not dry.  She has no eye problems.  She has not been able to restart her Creon since our last visit.  She reports having done all the paperwork but never heard back from the company or I pharmacy with the medication.  She is seeing her primary care twice year that is following her closely.      The remaining 10 point complete review of systems is otherwise negative.      PAST MEDICAL HISTORY:   1.  Significant for the acute myelogenous leukemia.   2.  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   3.  COPD.   4.  Hysterectomy and bilateral oophorectomy at the age of 20.     5.  She had a compression fracture of her thoracic spine that was treated with a vertebroplasty about 6 months ago.   6.  She has a history of smoking for many years.  She denies alcohol or drug abuse.  She is not on any thyroid replacement therapy, and denies thyroid disease.   7. R Foot fracture Nov 2017  8. Zoster Jan 2018  9.  Colonoscopy July 2020 with a single polyp that was benign       PHYSICAL EXAMINATION:  Ms. Barclay is a very pleasant 61-year-old. /68   Pulse 60   Temp 98.7  F (37.1  C) (Oral)   Resp 18   Wt 50.4 kg (111 lb 3.2 oz)   SpO2 98%   BMI 16.42 kg/m  .    General: frail, thin appearing woman in no distress  HEENT: perrl, eomi, no icterus, no oral lesions, no teeth  Resp: Distant breathing sounds without crackles or wheezes  CV: rrr no m/r/g  Abd: s, nt, nd, thin appearing, normal BS  Ext: no edema   Skin. Cystic lesion on the L chin.     Lab Results   Component Value Date    WBC 8.1 06/23/2021    ANEU 2.9 06/23/2021    HGB 12.5 06/23/2021    HCT 37.8 06/23/2021     06/23/2021     12/16/2020    POTASSIUM 3.9 12/16/2020    CHLORIDE 107 12/16/2020    CO2 29 12/16/2020     (H) 12/16/2020    BUN 11 12/16/2020    CR 0.60 12/16/2020    MAG 2.0 03/10/2011    INR 0.97 09/15/2010    BILITOTAL 0.2 12/16/2020    AST 23 12/16/2020    ALT 29 12/16/2020    ALKPHOS 52 12/16/2020    PROTTOTAL 6.6 (L) 12/16/2020    ALBUMIN 3.6 12/16/2020     TSH 6/24/20: 3.73 (normal)      ASSESSMENT AND PLAN:  Ms. 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     1.  AML/HEME.  The patient in clinical continued complete remission.  Excellent, stable counts,   transfusion independent.      2. Chronic Pnxvs-caahal-grdb disease.  She now has recurrent diarrhea.  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      3.  GI.  Patient continues to have intermittent diarrhea and flatulence.  She does have exocrine pancreatic insufficiency.  She was much improved when she was taking pancreatic enzymes supplements.  That has fallen off because of her supply being provided by the company.  I reached out again to our  did work with her 6 months ago to see if there is anything we can do.  My recommendation is that this patient stays on pancreatic enzymes long-term as the best way to control her GI symptoms of malabsorption, diarrhea and flatulence.      4.  Osteoporosis.  Had dexa scan that showed  osteoporosis; I would recommend she stays on a bisphosphonate on vitamin D and calcium.     5.  Chronic obstructive pulmonary disease.  I once again I strongly recommended that she needs to stop smoking     6. ID: no active infection    7. Renal: Normal kidney function. Electrolytes WNL    8. Dermatology: No new acne lesions.  She needs lifelong follow-up with dermatology at least once a year for skin cancer    9. General: colonoscopy 7/2020, repeat in 3-5 years; there was 1 polyp. She needs to continue working with her primary care to stop smoking.  She needs regular health care and screening with primary care physician.    10.  Endocrine: repeat TSH 12/16/20 was normal    Note to primary care: I had a discussion with the patient about whether or not she needed to continue to follow in the BMT clinic.  She now has been off transplant for 11 years, she has no obvious active chronic GVHD for several years, and a lot of what we are doing is healthcare maintenance.  Since she is now following with primary care twice a year we agreed that would be in the patient's best interest to continue to follow only with primary care and that would be available in the case of need.  She has also been followed by [facility] oncology department that could also be available for a follow-up in the future if not necessarily related to a BMT issue.    Plan:   ARBEN Agee as needed in the future.  Continue to follow with primary care for regular screening and healthcare  Colonoscopy 7/2020; repeat in 3-5 years  Following with Dermatology outside for cystic acne  Would strongly recommend to stay on Creon through [employer]  Must stop smoking; she will work with primary care on that  Recommend Flu shot in the fall 2020  Completed COVID vaccination   Came back sooner as needed.      Dmitriy Agee MD on 6/23/2021 at 11:48 AM    40 minutes spent on the date of the encounter doing chart review, history and exam,  documentation and further activities per the note          Again, thank you for allowing me to participate in the care of your patient.        Sincerely,        Dmitriy Agee MD

## 2021-06-23 NOTE — NURSING NOTE
"Oncology Rooming Note    June 23, 2021 11:13 AM   Jenn Barclay is a 65 year old female who presents for:    Chief Complaint   Patient presents with     Oncology Clinic Visit     AML     Blood Draw     Vitals, blood drawn via VPT by LPN. Pt checked into appt.      Initial Vitals: /68   Pulse 60   Temp 98.7  F (37.1  C) (Oral)   Resp 18   Wt 50.4 kg (111 lb 3.2 oz)   SpO2 98%   BMI 16.42 kg/m   Estimated body mass index is 16.42 kg/m  as calculated from the following:    Height as of 5/12/21: 1.753 m (5' 9\").    Weight as of this encounter: 50.4 kg (111 lb 3.2 oz). Body surface area is 1.57 meters squared.  No Pain (0) Comment: Data Unavailable   No LMP recorded.  Allergies reviewed: Yes  Medications reviewed: Yes    Medications: Medication refills not needed today.  Pharmacy name entered into [employer]:    [facility] [address] [place] [address] E  [facility] [place] [employer] [place] - [address]    Clinical concerns: Flatus. No other concerns.        Tejal Kendrick LPN               "

## 2021-06-23 NOTE — PATIENT INSTRUCTIONS
ARBEN Agee as needed in the future.  Continue to follow with primary care for regular screening and healthcare  Colonoscopy 7/2020; repeat in 3-5 years  Following with Dermatology outside for cystic acne  Would strongly recommend to stay on Creon through [employer]  Must stop smoking; she will work with primary care on that  Recommend Flu shot in the fall 2020  Completed COVID vaccination   Came back sooner as needed.

## 2021-06-23 NOTE — PROGRESS NOTES
BMT Clinic Progress Note     HPI: Ms. 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  In the last month she had a little more gas and intermittent diarrhea.  Other than that her weight is relatively stable.  She had a little incident at home and went to the [facility] ED for some x-rays.  They were fine.  She has no shortness of breath.  Her mouth is not dry.  She has no eye problems.  She has not been able to restart her Creon since our last visit.  She reports having done all the paperwork but never heard back from the company or I pharmacy with the medication.  She is seeing her primary care twice year that is following her closely.      The remaining 10 point complete review of systems is otherwise negative.      PAST MEDICAL HISTORY:   1.  Significant for the acute myelogenous leukemia.   2.  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   3.  COPD.   4.  Hysterectomy and bilateral oophorectomy at the age of 20.     5.  She had a compression fracture of her thoracic spine that was treated with a vertebroplasty about 6 months ago.   6.  She has a history of smoking for many years.  She denies alcohol or drug abuse.  She is not on any thyroid replacement therapy, and denies thyroid disease.   7. R Foot fracture Nov 2017  8. Zoster Jan 2018  9.  Colonoscopy July 2020 with a single polyp that was benign       PHYSICAL EXAMINATION:  Ms. Barclay is a very pleasant 61-year-old. /68   Pulse 60   Temp 98.7  F (37.1  C) (Oral)   Resp 18   Wt 50.4 kg (111 lb 3.2 oz)   SpO2 98%   BMI 16.42 kg/m  .   General: frail, thin appearing woman in no distress  HEENT: perrl, eomi, no icterus, no oral lesions, no teeth  Resp: Distant breathing sounds without crackles or wheezes  CV: rrr no m/r/g  Abd: s, nt, nd, thin appearing, normal BS  Ext: no edema   Skin. Cystic lesion on the L  chin.     Lab Results   Component Value Date    WBC 8.1 06/23/2021    ANEU 2.9 06/23/2021    HGB 12.5 06/23/2021    HCT 37.8 06/23/2021     06/23/2021     12/16/2020    POTASSIUM 3.9 12/16/2020    CHLORIDE 107 12/16/2020    CO2 29 12/16/2020     (H) 12/16/2020    BUN 11 12/16/2020    CR 0.60 12/16/2020    MAG 2.0 03/10/2011    INR 0.97 09/15/2010    BILITOTAL 0.2 12/16/2020    AST 23 12/16/2020    ALT 29 12/16/2020    ALKPHOS 52 12/16/2020    PROTTOTAL 6.6 (L) 12/16/2020    ALBUMIN 3.6 12/16/2020     TSH 6/24/20: 3.73 (normal)      ASSESSMENT AND PLAN:  Ms. 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     1.  AML/HEME.  The patient in clinical continued complete remission.  Excellent, stable counts,   transfusion independent.      2. Chronic Awhpd-syteev-soen disease.  She now has recurrent diarrhea.  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      3.  GI.  Patient continues to have intermittent diarrhea and flatulence.  She does have exocrine pancreatic insufficiency.  She was much improved when she was taking pancreatic enzymes supplements.  That has fallen off because of her supply being provided by the company.  I reached out again to our  did work with her 6 months ago to see if there is anything we can do.  My recommendation is that this patient stays on pancreatic enzymes long-term as the best way to control her GI symptoms of malabsorption, diarrhea and flatulence.      4.  Osteoporosis.  Had dexa scan that showed osteoporosis; I would recommend she stays on a bisphosphonate on vitamin D and calcium.     5.  Chronic obstructive pulmonary disease.  I once again I strongly recommended that she needs to stop smoking     6. ID: no active infection    7. Renal: Normal kidney function. Electrolytes  WNL    8. Dermatology: No new acne lesions.  She needs lifelong follow-up with dermatology at least once a year for skin cancer    9. General: colonoscopy 7/2020, repeat in 3-5 years; there was 1 polyp. She needs to continue working with her primary care to stop smoking.  She needs regular health care and screening with primary care physician.    10.  Endocrine: repeat TSH 12/16/20 was normal    Note to primary care: I had a discussion with the patient about whether or not she needed to continue to follow in the BMT clinic.  She now has been off transplant for 11 years, she has no obvious active chronic GVHD for several years, and a lot of what we are doing is healthcare maintenance.  Since she is now following with primary care twice a year we agreed that would be in the patient's best interest to continue to follow only with primary care and that would be available in the case of need.  She has also been followed by [facility] oncology department that could also be available for a follow-up in the future if not necessarily related to a BMT issue.    Plan:   ARBEN Agee as needed in the future.  Continue to follow with primary care for regular screening and healthcare  Colonoscopy 7/2020; repeat in 3-5 years  Following with Dermatology outside for cystic acne  Would strongly recommend to stay on Creon through [employer]  Must stop smoking; she will work with primary care on that  Recommend Flu shot in the fall 2020  Completed COVID vaccination   Came back sooner as needed.      Dmitriy Agee MD on 6/23/2021 at 11:48 AM    40 minutes spent on the date of the encounter doing chart review, history and exam, documentation and further activities per the note

## 2021-06-23 NOTE — NURSING NOTE
Chief Complaint   Patient presents with     Oncology Clinic Visit     AML     Blood Draw     Vitals, blood drawn via VPT by LPN. Pt checked into appt.      REBEKAH Bee LPN

## 2021-06-23 NOTE — TELEPHONE ENCOUNTER
Clinical   Blood and Marrow Transplant Service    Received in basket from BMT MD indicating that patient never received the free drug (Creon) that we sent in the application for in early 2021. Spoke with [employer] [facility] [employer] @ [phone] and they indicated that their notes state that the prescription quantities did not match - but they reviewed the prescription on file while we were on the phone and the quantities did match. They also indicated that they never received proof of income. They communicated these findings to Urszula via [facility] mail - writer asked if they could also update us and they indicated they only update the patient.     Writer left message for patient asking for proof of income and writer will send message to MD asking for prescription.     Will await patient's return call. Will continue to follow for supportive counseling and assist with resources.     Arabella MEREDITH [facility]    Clinical   6/23/2021  [facility]  Adult Blood and Marrow Transplant Program  [address] [place] [id number]  [email or web address]  [email or web address]  Office: [phone]   Pager: [phone]

## 2021-06-24 DIAGNOSIS — Z94.81 S/P ALLOGENEIC BONE MARROW TRANSPLANT (H): ICD-10-CM

## 2021-06-24 DIAGNOSIS — C92.01 ACUTE MYELOID LEUKEMIA IN REMISSION (H): ICD-10-CM

## 2021-06-24 DIAGNOSIS — K86.81 EXOCRINE PANCREATIC INSUFFICIENCY: ICD-10-CM

## 2021-06-24 NOTE — TELEPHONE ENCOUNTER
Last OV 12/18/19    Reviewed her chronic thoracic back pain and she continues on oxycodone for this.  She is at the higher end that I can provide for her.  I would not feel comfortable with increasing this further with out for her going to a pain clinic.    Plan:     1. Controlled substance agreement renewed today.  2. Urine drug screen (UDS) done today.  3. Refilled pain medications.  4. Continue to follow with the [facility].  5. Follow-up sooner if issues.  Return in about 6 months (around 6/18/2019) for follow up visit.      appt scheduled for 06/18/19

## 2021-06-24 NOTE — TELEPHONE ENCOUNTER
Controlled Substance Refill Request  Medication Name:   Requested Prescriptions     Pending Prescriptions Disp Refills     oxyCODONE (ROXICODONE) 10 mg immediate release tablet 120 tablet 0     Sig: Take 1 tablet (10 mg total) by mouth 4 (four) times a day as needed for pain. For use from 2/11/2019 to 3/13/2019.  Rx 2 of 2.     Date Last Fill: 2/11/2019  Pharmacy: [id number]      Submit electronically to pharmacy  Controlled Substance Agreement Date Scanned:   Encounter-Level CSA Scan Date - 08/03/2017:    Scan on 8/8/2017  3:22 PM: [facility] (below)         Last office visit with prescriber/PCP: 12/18/2018 Jorge Dunn MD OR same dept: 12/18/2018 Joreg Dunn MD OR same specialty: 12/18/2018 Jorge Dunn MD  Last physical: Visit date not found Last MTM visit: Visit date not found

## 2021-06-25 ENCOUNTER — TELEPHONE (OUTPATIENT)
Dept: TRANSPLANT | Facility: CLINIC | Age: 66
End: 2021-06-25

## 2021-06-25 NOTE — ED NOTES
Patient seen and discharged by provider prior to RN assessment.  Please see ED provider's notes for assessment.

## 2021-06-25 NOTE — TELEPHONE ENCOUNTER
"Clinical   Blood and Marrow Transplant Service    Reason for Intervention: Creon prescription    Data: We had applied for the free drug program through [employer] for patient's prescription of Creon back in December 2020. Apparently [employer] never received the patient's income information - they stated they sent a letter to patient about this but patient stated she never received it.    Intervention: Spoke with Ashley and shared that we need to get her income information to [employer] - she will fax her SSDI award letter to writer this weekend. Writer is also waiting on the signed prescription from Dr. Agee. Ashley shared that Dr. Agee \"broke up with me\" this week - he told her that she only needs to come to the BMT Clinic 1x/year. She talked about how thankful she was for the 10 years of care that he has provided and that she will miss coming to the BMT Clinic.     Provided assessment of coping, supportive counseling, validation of concerns, encouragement and resources.    Education Provided: See above    Follow-up Required: Will await signed script and income information - will fax to [employer] and then follow-up with a phone call.     Encouraged patient to reach out as questions or concerns arise.     Arabella [facility] MSW  Pager: [phone]  6/25/2021      "

## 2021-06-25 NOTE — PROGRESS NOTES
Progress Notes by Jorge Dunn MD at 11/30/2017  1:40 PM     Author: Jorge Dunn MD Service: -- Author Type: Physician    Filed: 11/30/2017 10:06 PM Encounter Date: 11/30/2017 Status: Signed    : Jorge Dunn MD (Physician)              [facility]    Comprehensive and complex medical care - Chronic disease management - Shared decision making - Care coordination - Compassionate care    Patient advocacy - Rational deprescribing - Minimally disruptive medicine - Ethical focus - Customized care    ______________________________________________________________________     Date of Service: 11/30/2017  Primary Provider: Jorge Dunn MD    Patient Care Team:  Jorge Dunn MD as PCP - General (Internal Medicine)  Mohit Montano MD as Physician (Hematology and Oncology)  Dmitriy Agee MD as Physician (Internal Medicine)     ______________________________________________________________________     Patient's Pharmacy:    [id number] - [facility], [address]  [address]  [facility] [address]  Phone: [phone] Fax: [phone]     Patient's Contacts:  Name Home Phone Work Phone Mobile Phone Relationship Lg Joey   JUARESJEAN [phone]   Grandchild      Patient's Insurance:    Payor: MEDICARE / Plan: MEDICARE A AND B / Product Type: Medicare /     ______________________________________________________________________     Jenn AVA Barclay is 62 y.o. female who comes in today for:    Chief Complaint   Patient presents with   ? Form     HANDICAP, FOR ASTHMA, COPD   ? Rash     ON BUTT       Patient Active Problem List   Diagnosis   ? Migraine headache   ? Gastric ulcer   ? Acute myeloid leukemia (AML), M2   ? Chronic diarrhea   ? Papular rash, generalized   ? Tobacco abuse   ? Status post allogeneic bone marrow transplant - 2010 - [facility]   ? COPD (chronic obstructive  pulmonary disease)   ? GVHD (graft versus host disease)   ? Anxiety   ? Traumatic compression fracture of T8 thoracic vertebra, closed, initial encounter   ? OP (osteoporosis) - Started alendronate in 9/17.   ? Financial difficulties - limiting ability to afford medications   ? Pseudogout - crystal proven 2016 - knee   ? Chronic back pain, thoracic   ? Controlled substance agreement signed - 8/17 - Oxycodone - UDS 8/17   ? Full code status     Current Outpatient Prescriptions   Medication Sig Note   ? albuterol (PROVENTIL HFA) 90 mcg/actuation inhaler Inhale 2 puffs every 6 (six) hours as needed for wheezing or shortness of breath.    ? albuterol (PROVENTIL) 2.5 mg /3 mL (0.083 %) nebulizer solution Take 3 mL (2.5 mg total) by nebulization every 6 (six) hours as needed for wheezing.    ? alendronate (FOSAMAX) 70 MG tablet Take 1 tablet (70 mg total) by mouth every 7 days. Take in the morning on an empty stomach with a full glass of water 30 minutes before food    ? ascorbic acid, vitamin C, (VITAMIN C) 500 MG tablet Take 500 mg by mouth daily.    ? azithromycin (ZITHROMAX Z-ANITRA) 250 MG tablet 2 tablets today and then 1 pill a day for 4 days.    ? calcium citrate-vitamin D3 (CITRACAL + D MAXIMUM) 315-250 mg-unit per tablet Take 1 tablet by mouth daily. Generic Equate from [employer].     ? ibuprofen (ADVIL,MOTRIN) 800 MG tablet Take 1 tablet (800 mg total) by mouth 3 (three) times a day.    ? lipase-protease-amylase (CREON) 24,000-76,000 -120,000 unit CpDR capsule Take 24,000 Units by mouth 3 (three) times a day before meals. 3/9/2017: Received from: [facility] Received Sig: Take 1 capsule (24,000 Units) by mouth 3 times daily (with meals) And snacks   ? multivitamin (ONE A DAY) per tablet Take 1 tablet by mouth daily.    ? oxyCODONE (ROXICODONE) 10 mg immediate release tablet Take 1 tablet (10 mg total) by mouth 4 (four) times a day as needed for pain. For use from 11/8/17 to 12/8/17.  Rx 1 of 2.    ? [START ON  12/8/2017] oxyCODONE (ROXICODONE) 10 mg immediate release tablet Take 1 tablet (10 mg total) by mouth 4 (four) times a day as needed for pain. For use from 12/8/17 to 1/7/2018.  RX 2 of 2.    ? predniSONE (DELTASONE) 20 MG tablet Take 2 pills a day for 5 days and then 1 pill a day for 5 days    ? pseudoephedrine-guaifenesin (MUCINEX D)  mg per tablet Take 1 tablet by mouth every 12 (twelve) hours.    ? riboflavin, vitamin B2, (VITAMIN B-2) 25 mg Tab Take 1 tablet by mouth daily. 3/9/2017: Received from: [facility] Received Sig: Take by mouth daily   ? triamcinolone (KENALOG) 0.1 % ointment APPLY TOPICALLY  RUB INTO ITCHY SCRATCHED AREAS TWICE DAILY (Patient taking differently: APPLY TOPICALLY  RUB INTO ITCHY SCRATCHED AREAS TWICE DAILY AS NEEDED)      Social History     Social History Narrative    Patient of Dr. Dunn since 2016.        Single, lives in a trailer home.      ______________________________________________________________________     History of present illness:    Patient comes in today for follow-up of a number of issues.    She first wants a form filled out for her disability parking.  She has not had this previously.  This is mainly for her COPD.  She recently had a right second metatarsal fracture which is also affecting her mobility.  We did fill this out today.  She still drives.    Reviewed her COPD and she was in the hospital for exacerbation.  Her breathing at this time is doing well.    Reviewed the metatarsal fracture.  She has been following with [facility].  This has been doing well for her.  She has no major concerns related to that.    Reviewed her rash.  This is been thought due to jzmec-dfnlsa-cvgk disease.  She is doing better in relationship to this but she has sometimes where it flares up on her.  It can burn.  This was reviewed with her.    We reviewed her bzwot-hjydiq-dmwr disease and she continues to follow with the [facility] in relationship to this.   Next    She does feel fatigued more in the last 2-4 weeks and just feels wiped out overall.  She has had no major changes and denies any new symptoms.    On review of systems, the patient denies any chest pain or new shortness of breath.    ______________________________________________________________________    Exam:    Wt Readings from Last 3 Encounters:   11/30/17 113 lb (51.3 kg)   09/25/17 103 lb (46.7 kg)   09/23/17 103 lb (46.7 kg)     BP Readings from Last 3 Encounters:   11/30/17 110/68   09/25/17 151/84   09/23/17 95/57     /68  Pulse 85  Wt 113 lb (51.3 kg)  SpO2 97%  BMI 16.21 kg/m2   The patient is comfortable, no acute distress.  Mood good.  Insight good.  Eyes are nonicteric.  Neck is supple without mass.  No cervical adenopathy.  No thyromegaly. Heart regular rate and rhythm.  Lungs clear to auscultation bilaterally.  Respiratory effort is good.  Abdomen soft and nontender.  No hepatosplenomegaly.  Extremities no edema.  She has a boot for her right foot.  Her rash is papular along her flanks and buttocks.      ______________________________________________________________________    Diagnostics:    Results for orders placed or performed during the hospital encounter of 08/11/17   Comprehensive Metabolic Panel   Result Value Ref Range    Sodium 145 136 - 145 mmol/L    Potassium 3.2 (L) 3.5 - 5.0 mmol/L    Chloride 103 98 - 107 mmol/L    CO2 33 (H) 22 - 31 mmol/L    Anion Gap, Calculation 9 5 - 18 mmol/L    Glucose 108 70 - 125 mg/dL    BUN 13 8 - 22 mg/dL    Creatinine 0.73 0.60 - 1.10 mg/dL    GFR MDRD Af Amer >60 >60 mL/min/1.73m2    GFR MDRD Non Af Amer >60 >60 mL/min/1.73m2    Bilirubin, Total 0.3 0.0 - 1.0 mg/dL    Calcium 8.9 8.5 - 10.5 mg/dL    Protein, Total 6.1 6.0 - 8.0 g/dL    Albumin 3.5 3.5 - 5.0 g/dL    Alkaline Phosphatase 49 45 - 120 U/L    AST 26 0 - 40 U/L    ALT 39 0 - 45 U/L   HM1 (CBC with Diff)   Result Value Ref Range    WBC 14.2 (H) 4.0 - 11.0 thou/uL    RBC 4.24  3.80 - 5.40 mill/uL    Hemoglobin 13.2 12.0 - 16.0 g/dL    Hematocrit 38.8 35.0 - 47.0 %    MCV 92 80 - 100 fL    MCH 31.1 27.0 - 34.0 pg    MCHC 34.0 32.0 - 36.0 g/dL    RDW 14.1 11.0 - 14.5 %    Platelets 305 140 - 440 thou/uL    MPV 8.6 8.5 - 12.5 fL    Neutrophils % 46 (L) 50 - 70 %    Lymphocytes % 44 (H) 20 - 40 %    Monocytes % 8 2 - 10 %    Eosinophils % 2 0 - 6 %    Basophils % 0 0 - 2 %    Neutrophils Absolute 6.5 2.0 - 7.7 thou/uL    Lymphocytes Absolute 6.2 (H) 0.8 - 4.4 thou/uL    Monocytes Absolute 1.1 (H) 0.0 - 0.9 thou/uL    Eosinophils Absolute 0.3 0.0 - 0.4 thou/uL    Basophils Absolute 0.0 0.0 - 0.2 thou/uL      ______________________________________________________________________    Assessment:    1. COPD (chronic obstructive pulmonary disease)    2. GVHD (graft versus host disease)    3. Metatarsal fracture    4. Papular rash, generalized    5. Fatigue    6. Chronic back pain    7. Encounter for completion of form with patient       ______________________________________________________________________      PHQ-2 Total Score: 0 (8/3/2017  9:00 PM)  No Data Recorded  ______________________________________________________________________    Plan:    1. Filled out paperwork for disability parking.  2. She is looking into insurance options for the future for her medications.  3. Follow-up with [facility] related to her metatarsal.  4. Follow-up with the [facility] related to her GVHD.  5. Consider tacrolimus ointment in the future for rash if she has insurance.  6. No obvious cause of fatigue and we could consider blood work in the future if this persists or worsens.  This is likely due to the preponderance of her diseases.    Jorge Dunn MD  General Internal Medicine  [facility]     Return in about 2 months (around 1/30/2018) for follow up visit.     Future Appointments  Date Time Provider Department Center   2/8/2018 1:15 PM Jorge Dunn MD [facility]  [facility]   5/14/2018 12:30 PM JN CHEMO LAB DRAW 1 JN ONC INF [facility]   5/14/2018 12:45 PM [facility]

## 2021-06-25 NOTE — PROGRESS NOTES
Progress Notes by Jorge Dunn MD at 8/3/2017 12:50 PM     Author: Jorge Dunn MD Service: -- Author Type: Physician    Filed: 8/3/2017  9:33 PM Encounter Date: 8/3/2017 Status: Signed    : Jorge Dunn MD (Physician)              [facility]    Comprehensive and complex medical care - Chronic disease management - Shared decision making - Care coordination - Compassionate care    Patient advocacy - Rational deprescribing - Minimally disruptive medicine - Ethical focus - Customized care    Date of Service: 8/3/2017  Primary Provider: Jorge Dunn MD    Patient Care Team:  Jorge Dunn MD as PCP - General (Internal Medicine)  Mohit Montano MD as Physician (Hematology and Oncology)  Dmitriy Agee MD as Physician (Internal Medicine)     ______________________________________________________________________     Patient[facility] Pharmacy:    [id number] - [place], [place] - [address]  [address] [address]  Phone: [phone] Fax: [phone]     Patient's Insurance:    Payor: MEDICARE / Plan: MEDICARE A AND B / Product Type: Medicare /     ______________________________________________________________________     Jenn Barclay is 61 y.o. female who comes in today for:     Chief Complaint   Patient presents with   ? Follow-up     med check and UDS       Patient Active Problem List   Diagnosis   ? Migraine headache   ? Gastric ulcer   ? Acute myeloid leukemia (AML), M2   ? Chronic diarrhea   ? Papular rash, generalized   ? Tobacco abuse   ? Status post allogeneic bone marrow transplant - 2010 - [facility]   ? COPD (chronic obstructive pulmonary disease)   ? GVHD (graft versus host disease)   ? Anxiety   ? Traumatic compression fracture of T8 thoracic vertebra, closed, initial encounter   ? Osteoporosis   ? Financial difficulties - limiting ability to afford medications   ?  Pseudogout - crystal proven 2016 - knee   ? Chronic back pain, thoracic   ? Controlled substance agreement signed - 8/17 - Oxycodone     Past Medical History:   Diagnosis Date   ? Acute myeloid leukemia (AML), M2    ? Baker's cyst    ? Chronic back pain, thoracic    ? Chronic diarrhea    ? Compression fx, lumbar spine    ? Controlled substance agreement signed - 8/17 - Oxycodone 8/3/2017   ? COPD (chronic obstructive pulmonary disease)    ? Gastric ulcer     pt states she has not had any ulcers   ? GVHD (graft versus host disease)    ? History of PID    ? Migraine headache    ? Osteoporosis 5/3/2016   ? Papular rash, generalized    ? Pseudogout - crystal proven 2016 - knee 11/30/2016   ? Status post allogeneic bone marrow transplant - 2010 - [facility]    ? Tobacco abuse    ? Traumatic compression fracture of T8 thoracic vertebra, closed, initial encounter 4/26/2016      Current Outpatient Prescriptions   Medication Sig Note   ? ascorbic acid, vitamin C, (VITAMIN C) 500 MG tablet Take 500 mg by mouth daily.    ? azithromycin (ZITHROMAX Z-ANITRA) 250 MG tablet 2 tablets today and then 1 pill a day for 4 days.    ? calcium citrate-vitamin D3 (CITRACAL + D MAXIMUM) 315-250 mg-unit per tablet Take 1 tablet by mouth daily. Generic Equate from [employer].     ? lipase-protease-amylase (CREON) 24,000-76,000 -120,000 unit CpDR capsule Take 24,000 Units by mouth 3 (three) times a day before meals. 3/9/2017: Received from: [facility] Received Sig: Take 1 capsule (24,000 Units) by mouth 3 times daily (with meals) And snacks   ? multivitamin (ONE A DAY) per tablet Take 1 tablet by mouth daily.    ? [START ON 8/4/2017] oxyCODONE (ROXICODONE) 10 mg immediate release tablet Take 1 tablet (10 mg total) by mouth 4 (four) times a day as needed for pain. For use from 8/4/17 to 9/2/17.    ? riboflavin, vitamin B2, (VITAMIN B-2) 25 mg Tab Take 1 tablet by mouth daily. 3/9/2017: Received from: [facility] Received Sig: Take by mouth daily   ?  triamcinolone (KENALOG) 0.1 % ointment APPLY TOPICALLY  RUB INTO ITCHY SCRATCHED AREAS TWICE DAILY (Patient taking differently: APPLY TOPICALLY  RUB INTO ITCHY SCRATCHED AREAS TWICE DAILY AS NEEDED)    ? albuterol (PROVENTIL HFA) 90 mcg/actuation inhaler Inhale 2 puffs every 6 (six) hours as needed for wheezing or shortness of breath.    ? albuterol (PROVENTIL) 2.5 mg /3 mL (0.083 %) nebulizer solution Take 3 mL (2.5 mg total) by nebulization every 6 (six) hours as needed for wheezing.    ? doxycycline (MONODOX) 100 MG capsule Take 1 capsule (100 mg total) by mouth 2 (two) times a day for 10 days.    ? predniSONE (DELTASONE) 20 MG tablet Take 2 pills a day for 5 days and then 1 pill a day for 5 days      Social History     Social History   ? Marital status:      Spouse name: N/A   ? Number of children: N/A   ? Years of education: N/A     Social History Main Topics   ? Smoking status: Current Every Day Smoker     Packs/day: 0.50     Years: 49.00     Types: Cigarettes   ? Smokeless tobacco: Former User      Comment: has used E-cigarettes in the past, info given   ? Alcohol use No   ? Drug use: No   ? Sexual activity: No     Other Topics Concern   ? None     Social History Narrative    Patient of Dr. Dunn since 2016.        Single, lives in a trailer home.      Family History   Problem Relation Age of Onset   ? Lung cancer Mother    ? Pancreatic cancer Father       Family history is otherwise noncontributory.    ______________________________________________________________________     History of present illness:    The patient comes in today with her 5-year-old granddaughter.    We reviewed her COPD first.  This is recently worse in the last week.  She had a sore throat last Wednesday.  She is been coughing quite a bit and wheezing.  She has had shortness of breath with this.  She has used up her Zithromax and prednisone and it still has not helped her.  She feels like if she gets worse she will end up going  to the emergency room.  Her oxygen level is 90% today.  She denies any fevers or chills or chest pain at this point.  She does have a card which appears to reduce her co-pays for prescriptions and she wants to try to get a regular albuterol inhaler.  She's not on any chronic steroid medication due to cost.  We reviewed this with her today.    We reviewed her fatigue.  She's been feeling more tired as of late.  This is mainly related to her illness currently.  She just feels like she is dragging more.    We reviewed her graft versus host disease.  She is following up with the [facility] in relationship to this.  She has no concerns about it.  Her diarrhea is doing relatively well and she's had no recent issues with skin rash.    We reviewed her chronic pain.  She's had some chronic pain related to thoracic compression fracture.  She is on oxycodone 10 mg 3 times a day.  She's recently had worsening of her symptoms and wonders if she can go up to 4 times a day.  We talked about that I would not feel comfortable with prescribing more than this.  She denies any sudden injury.  She thinks is because of her cough and wheezing.  We did a controlled substance agreement with her today and she will do a urine drug screen.  She does not use illicit drugs.    Reviewed mammogram with her and she declines mammogram at this time.    We reviewed her other issues noted in the assessment but not specifically addressed in the HPI above.     The 14-system review of systems form for [facility] was completed by patient, reviewed by me, and pertinent problems are reviewed above.  ______________________________________________________________________     Exam:    Wt Readings from Last 3 Encounters:   08/03/17 111 lb 3.2 oz (50.4 kg)   05/12/17 108 lb 14.4 oz (49.4 kg)   03/10/17 112 lb 12.8 oz (51.2 kg)     BP Readings from Last 3 Encounters:   08/03/17 106/58   05/12/17 118/74   03/10/17  102/64      /58  Pulse 61  Temp 97.4  F (36.3  C) (Oral)   Wt 111 lb 3.2 oz (50.4 kg)  SpO2 90%  BMI 15.96 kg/m2   The patient is comfortable, no acute distress.  Mood good.  Insight is good.  No skin lesions or nodules of concern.  Ears clear.  Eyes are nonicteric. Nose does not show any rhinitis.  Ears are clear. Pupils equal and reactive.  Throat is clear.  Neck is supple without mass, no thyromegaly.  No cervical or epitrochlear adenopathy.  Heart regular rate and rhythm.  Lungs show wheezing on auscultation bilaterally.  Respiratory effort good.  Abdomen soft and nontender.  No hepatosplenomegaly.  Extremities show trace edema.      ______________________________________________________________________    Diagnostics:    Results for orders placed or performed in visit on 05/12/17   [facility] (CBC with Diff)   Result Value Ref Range    WBC 10.8 4.0 - 11.0 thou/uL    RBC 4.66 3.80 - 5.40 mill/uL    Hemoglobin 14.2 12.0 - 16.0 g/dL    Hematocrit 42.0 35.0 - 47.0 %    MCV 90 80 - 100 fL    MCH 30.5 27.0 - 34.0 pg    MCHC 33.8 32.0 - 36.0 g/dL    RDW 13.0 11.0 - 14.5 %    Platelets 328 140 - 440 thou/uL    MPV 8.2 (L) 8.5 - 12.5 fL    Neutrophils % 45 (L) 50 - 70 %    Lymphocytes % 45 (H) 20 - 40 %    Monocytes % 8 2 - 10 %    Eosinophils % 2 0 - 6 %    Basophils % 1 0 - 2 %    Neutrophils Absolute 4.8 2.0 - 7.7 thou/uL    Lymphocytes Absolute 4.9 (H) 0.8 - 4.4 thou/uL    Monocytes Absolute 0.8 0.0 - 0.9 thou/uL    Eosinophils Absolute 0.2 0.0 - 0.4 thou/uL    Basophils Absolute 0.1 0.0 - 0.2 thou/uL      ______________________________________________________________________     Assessment:    1. Chronic back pain    2. COPD exacerbation    3. COPD (chronic obstructive pulmonary disease)    4. Controlled substance agreement signed - 8/17 - Oxycodone    5. GVHD (graft versus host disease)    6. Fatigue    7. Mammogram declined    8. Tobacco abuse        ______________________________________________________________________     Lab Results   Component Value Date    LDLCALC 144.0 (H) 05/11/2012       PHQ-2 Total Score: 0 (8/3/2017  9:00 PM)  Depression Follow-up Plan: patient follow-up to return when and if necessary (10/14/2016  2:00 PM)  No Data Recorded      Plan:    1. Declines mammogram.  2. Doxycycline monohydrate 100 mg twice a day for 10 days.  Refill given.  Longer prednisone taper 40 mg a day for 5 days and then 20 mg a day for 5 days given.  3. Follow-up in September as scheduled.  4. Review code status with the patient in the future.   5. Reviewed the patient's care everywhere records today including pertinent notes, laboratory, radiology and other medical tests.   6. Follow up sooner or in the emergency room if things worsen.  7. Controlled substance agreement and urine drug screen done today.  8. Increase to oxycodone to 4 times a day at this time.  Likely will stay at this level.    Jorge Dunn MD  General Internal Medicine  [facility]    Return in about 6 weeks (around 9/14/2017) for follow up visit.

## 2021-06-25 NOTE — ED TRIAGE NOTES
Pt reports that she stepped on the back of an  pierced earring and as she lifted her foot up to look at it she fell over hitting R knee on her BR vanity. C/o pain R knee with pain radiating up to R thigh.

## 2021-06-25 NOTE — PROGRESS NOTES
Progress Notes by Jorge Dunn MD at 9/8/2017  1:00 PM     Author: Jorge Dunn MD Service: -- Author Type: Physician    Filed: 9/10/2017  5:34 PM Encounter Date: 9/8/2017 Status: Signed    : Jorge Dunn MD (Physician)              [facility]    Comprehensive and complex medical care - Chronic disease management - Shared decision making - Care coordination - Compassionate care    Patient advocacy - Rational deprescribing - Minimally disruptive medicine - Ethical focus - Customized care    Date of Service: 9/8/2017  Primary Provider: Jorge Dunn MD    Patient Care Team:  Jorge Dunn MD as PCP - General (Internal Medicine)  Mohit Montano MD as Physician (Hematology and Oncology)  Dmitriy Agee MD as Physician (Internal Medicine)     ______________________________________________________________________     Patient[facility]:    [facility] - [place], [place] - [address]  [address] [address]  Phone: [phone] Fax: [phone]     Patient's Insurance:    Payor: MEDICARE / Plan: MEDICARE A AND B / Product Type: Medicare /     ______________________________________________________________________     Jenn Barclay is 61 y.o. female who comes in today for:    Chief Complaint   Patient presents with   ? Follow-up     back pain       Patient Active Problem List   Diagnosis   ? Migraine headache   ? Gastric ulcer   ? Acute myeloid leukemia (AML), M2   ? Chronic diarrhea   ? Papular rash, generalized   ? Tobacco abuse   ? Status post allogeneic bone marrow transplant - 2010 - [facility]   ? COPD (chronic obstructive pulmonary disease)   ? GVHD (graft versus host disease)   ? Anxiety   ? Traumatic compression fracture of T8 thoracic vertebra, closed, initial encounter   ? OP (osteoporosis) - Started alendronate in 9/17.   ? Financial difficulties - limiting ability  to afford medications   ? Pseudogout - crystal proven 2016 - knee   ? Chronic back pain, thoracic   ? Controlled substance agreement signed - 8/17 - Oxycodone   ? Full code status     Current Outpatient Prescriptions   Medication Sig Note   ? albuterol (PROVENTIL HFA) 90 mcg/actuation inhaler Inhale 2 puffs every 6 (six) hours as needed for wheezing or shortness of breath.    ? albuterol (PROVENTIL) 2.5 mg /3 mL (0.083 %) nebulizer solution Take 3 mL (2.5 mg total) by nebulization every 6 (six) hours as needed for wheezing.    ? ascorbic acid, vitamin C, (VITAMIN C) 500 MG tablet Take 500 mg by mouth daily.    ? azithromycin (ZITHROMAX Z-ANITRA) 250 MG tablet 2 tablets today and then 1 pill a day for 4 days.    ? calcium citrate-vitamin D3 (CITRACAL + D MAXIMUM) 315-250 mg-unit per tablet Take 1 tablet by mouth daily. Generic Equate from [employer].     ? ibuprofen (ADVIL,MOTRIN) 800 MG tablet Take 1 tablet (800 mg total) by mouth 3 (three) times a day.    ? lipase-protease-amylase (CREON) 24,000-76,000 -120,000 unit CpDR capsule Take 24,000 Units by mouth 3 (three) times a day before meals. 3/9/2017: Received from: [facility] Received Sig: Take 1 capsule (24,000 Units) by mouth 3 times daily (with meals) And snacks   ? multivitamin (ONE A DAY) per tablet Take 1 tablet by mouth daily.    ? predniSONE (DELTASONE) 20 MG tablet Take 2 pills a day for 5 days and then 1 pill a day for 5 days    ? riboflavin, vitamin B2, (VITAMIN B-2) 25 mg Tab Take 1 tablet by mouth daily. 3/9/2017: Received from: [facility] Received Sig: Take by mouth daily   ? triamcinolone (KENALOG) 0.1 % ointment APPLY TOPICALLY  RUB INTO ITCHY SCRATCHED AREAS TWICE DAILY (Patient taking differently: APPLY TOPICALLY  RUB INTO ITCHY SCRATCHED AREAS TWICE DAILY AS NEEDED)    ? alendronate (FOSAMAX) 70 MG tablet Take 1 tablet (70 mg total) by mouth every 7 days. Take in the morning on an empty stomach with a full glass of water 30 minutes before food    ?  oxyCODONE (ROXICODONE) 10 mg immediate release tablet Take 1 tablet (10 mg total) by mouth 4 (four) times a day as needed for pain. For use from 9/8/17 to 10/8/17.      Social History     Social History Narrative    Patient of Dr. Dunn since 2016.        Single, lives in a trailer home.      ______________________________________________________________________     History of present illness:    Patient comes in today to review a number of issues.    We reviewed her emergency room visit.  She went in there for increased swellling in both ankles.  She had no SOB with this and no chest pain.  No pain with the swelling, just tightness.  She did not increase her salt intake.  There were no changes in her medications, lately.  Her breathing has been okay.    We reviewed her chronic back pain.  She continues to have thoracic back pain related to her compression fractures.  She uses 3-4 oxycodone a day.  This seems to help her.  She occasionally gets some pain in her right hip.  She does need refills of her medication.    We reviewed her osteoporosis.  We have not given her anything to date related to this.  She is interested in trying alendronate for this.    Reviewed chronic obstructive lung disease and there are no new issues with this.      Reviewed her CODE STATUS and she wishes to be full CODE STATUS at this time.    On review of systems, the patient denies any chest pain or shortness of breath.    ______________________________________________________________________    Exam:    Wt Readings from Last 3 Encounters:   09/08/17 113 lb 12.8 oz (51.6 kg)   08/11/17 110 lb (49.9 kg)   08/03/17 111 lb 3.2 oz (50.4 kg)     BP Readings from Last 3 Encounters:   09/08/17 110/68   08/12/17 134/80   08/03/17 106/58     /68  Pulse 68  Wt 113 lb 12.8 oz (51.6 kg)  SpO2 96%  BMI 16.81 kg/m2   The patient is comfortable, no acute distress.  Mood good.  Insight good.  Eyes are nonicteric.  Neck is supple without mass.   No cervical adenopathy.  No thyromegaly. Heart regular rate and rhythm.  Lungs clear to auscultation bilaterally.  Respiratory effort is good.  Abdomen soft and nontender.  No hepatosplenomegaly.  Extremities no edema at this time and no JVD.      ______________________________________________________________________    Diagnostics:    Results for orders placed or performed during the hospital encounter of 08/11/17   Comprehensive Metabolic Panel   Result Value Ref Range    Sodium 145 136 - 145 mmol/L    Potassium 3.2 (L) 3.5 - 5.0 mmol/L    Chloride 103 98 - 107 mmol/L    CO2 33 (H) 22 - 31 mmol/L    Anion Gap, Calculation 9 5 - 18 mmol/L    Glucose 108 70 - 125 mg/dL    BUN 13 8 - 22 mg/dL    Creatinine 0.73 0.60 - 1.10 mg/dL    GFR MDRD Af Amer >60 >60 mL/min/1.73m2    GFR MDRD Non Af Amer >60 >60 mL/min/1.73m2    Bilirubin, Total 0.3 0.0 - 1.0 mg/dL    Calcium 8.9 8.5 - 10.5 mg/dL    Protein, Total 6.1 6.0 - 8.0 g/dL    Albumin 3.5 3.5 - 5.0 g/dL    Alkaline Phosphatase 49 45 - 120 U/L    AST 26 0 - 40 U/L    ALT 39 0 - 45 U/L   [facility] (CBC with Diff)   Result Value Ref Range    WBC 14.2 (H) 4.0 - 11.0 thou/uL    RBC 4.24 3.80 - 5.40 mill/uL    Hemoglobin 13.2 12.0 - 16.0 g/dL    Hematocrit 38.8 35.0 - 47.0 %    MCV 92 80 - 100 fL    MCH 31.1 27.0 - 34.0 pg    MCHC 34.0 32.0 - 36.0 g/dL    RDW 14.1 11.0 - 14.5 %    Platelets 305 140 - 440 thou/uL    MPV 8.6 8.5 - 12.5 fL    Neutrophils % 46 (L) 50 - 70 %    Lymphocytes % 44 (H) 20 - 40 %    Monocytes % 8 2 - 10 %    Eosinophils % 2 0 - 6 %    Basophils % 0 0 - 2 %    Neutrophils Absolute 6.5 2.0 - 7.7 thou/uL    Lymphocytes Absolute 6.2 (H) 0.8 - 4.4 thou/uL    Monocytes Absolute 1.1 (H) 0.0 - 0.9 thou/uL    Eosinophils Absolute 0.3 0.0 - 0.4 thou/uL    Basophils Absolute 0.0 0.0 - 0.2 thou/uL      ______________________________________________________________________    Assessment:    1. Bilateral lower extremity edema    2. COPD (chronic obstructive pulmonary  disease)    3. Chronic back pain    4. OP (osteoporosis)    5. Full code status    6. Traumatic compression fracture of T8 thoracic vertebra, closed, initial encounter    7. Mammogram declined       ______________________________________________________________________      PHQ-2 Total Score: 0 (8/3/2017  9:00 PM)  Depression Follow-up Plan: patient follow-up to return when and if necessary (10/14/2016  2:00 PM)  No Data Recorded    Plan:    1. FULL CODE STATUS.   2. Declines mammo.  3. Start alendronate 70 mg p.o. weekly.  4. Refill her pain medication for 2 months at this time.  5. Follow-up sooner if issues.    Jorge Dunn MD  General Internal Medicine  [facility]     Return in about 6 months (around 3/8/2018).

## 2021-06-25 NOTE — TELEPHONE ENCOUNTER
Refill Request  Did you contact pharmacy: No  Medication name:   Requested Prescriptions     Pending Prescriptions Disp Refills     oxyCODONE (ROXICODONE) 10 mg immediate release tablet 120 tablet 0     Sig: Take 1 tablet (10 mg total) by mouth 4 (four) times a day as needed for pain. For use 5/21/2021-6/20/2021     Who prescribed the medication: Dr. Dunn  Requested Pharmacy: [facility]  Is patient out of medication: No.  3 days left  Patient notified refills processed in 3 business days:  yes  Okay to leave a detailed message: yes

## 2021-06-25 NOTE — PROGRESS NOTES
Progress Notes by Jorge Dunn MD at 3/10/2017 11:20 AM     Author: Jorge Dunn MD Service: -- Author Type: Physician    Filed: 3/13/2017  4:42 PM Encounter Date: 3/10/2017 Status: Signed    : Jorge Dunn MD (Physician)              [facility]    Comprehensive and complex medical care - Chronic disease management - Shared decision making - Care coordination - Compassionate care    Patient advocacy - Rational deprescribing - Minimally disruptive medicine - Ethical focus - Customized care    Date of Service: 3/10/2017  Primary Provider: Jorge Dunn MD    Patient Care Team:  Jorge Dunn MD as PCP - General (Internal Medicine)  Mohit Montano MD as Physician (Hematology and Oncology)  Dmitriy Agee MD as Physician (Internal Medicine)     ______________________________________________________________________     [facility]    [id number] - [place], [place] - [address]  [address] [address]  Phone: [phone] Fax: [phone]     Patient's Insurance:    Payor: MEDICARE / Plan: MEDICARE A AND B / Product Type: Medicare /     ______________________________________________________________________     Jenn Barclay is 61 y.o. female who comes in today for:    Chief Complaint   Patient presents with   ? Hospital Visit Follow Up       Patient Active Problem List   Diagnosis   ? Migraine headache   ? Gastric ulcer   ? Acute myeloid leukemia (AML), M2   ? Chronic diarrhea   ? Papular rash, generalized   ? Tobacco abuse   ? Status post allogeneic bone marrow transplant - 2010 - [facility]   ? COPD (chronic obstructive pulmonary disease)   ? GVHD (graft versus host disease)   ? Anxiety   ? Traumatic compression fracture of T8 thoracic vertebra, closed, initial encounter   ? Osteoporosis   ? Financial difficulties - limiting ability to afford medications   ?  Pseudogout - crystal proven 2016 - knee     Current Outpatient Prescriptions   Medication Sig Note   ? albuterol (PROVENTIL) 2.5 mg /3 mL (0.083 %) nebulizer solution Take 3 mL (2.5 mg total) by nebulization every 6 (six) hours as needed for wheezing.    ? ascorbic acid, vitamin C, (VITAMIN C) 500 MG tablet Take 500 mg by mouth daily.    ? calcium citrate-vitamin D3 (CITRACAL + D MAXIMUM) 315-250 mg-unit per tablet Take 1 tablet by mouth daily. Generic Equate from [employer].     ? lipase-protease-amylase (CREON) 24,000-76,000 -120,000 unit CpDR capsule Take 24,000 Units by mouth 3 (three) times a day before meals. 3/9/2017: Received from: [facility] Received Sig: Take 1 capsule (24,000 Units) by mouth 3 times daily (with meals) And snacks   ? multivitamin (ONE A DAY) per tablet Take 1 tablet by mouth daily.    ? riboflavin, vitamin B2, (VITAMIN B-2) 25 mg Tab Take 1 tablet by mouth daily. 3/9/2017: Received from: [facility] Received Sig: Take by mouth daily   ? triamcinolone (KENALOG) 0.1 % ointment APPLY TOPICALLY  RUB INTO ITCHY SCRATCHED AREAS TWICE DAILY (Patient taking differently: APPLY TOPICALLY  RUB INTO ITCHY SCRATCHED AREAS TWICE DAILY AS NEEDED)    ? azithromycin (ZITHROMAX Z-ANITRA) 250 MG tablet 2 tablets today and then 1 pill a day for 4 days.    ? Oxycodone HCl 10 mg Tab Take 10 mg by mouth 3 (three) times a day as needed.    ? predniSONE (DELTASONE) 20 MG tablet Take 2 tablets (40 mg total) by mouth daily for 5 days.      Social History     Social History Narrative    Patient of Dr. Dunn since 2016.        Single, lives in a trailer home.      ______________________________________________________________________     History of present illness:    The patient comes in today for follow-up of ER visit.  She was seen there for a chronic obstructive pulmonary disease (COPD) exacerbation.  Next    Previously we had given her some prednisone and the Zithromax to use as needed.  She did not have any additional  prescriptions available.  She would like these refilled if possible.  She continues to smoke.  He has recommended that she stop smoking, but she has not done so to this point.    We reviewed her chronic diarrhea.  She has continued to have issues with this.  Biopsy through [place] [facility] showed no major issues.  She did have polyps, and it was recommended to follow up in one year.  She would rather have this done again in 2-3 years.    We reviewed vsiee-eqpeto-aigh disease.  She has skin rash and diarrhea.  We reviewed this in relationship to her AML and treatment for this.  [facility]    She does need a refill on her oxycodone.  She uses this for back pain related to previous back fractures.    On review of systems, the patient denies any chest pain or new shortness of breath.    ______________________________________________________________________    Exam:    Wt Readings from Last 3 Encounters:   03/10/17 112 lb 12.8 oz (51.2 kg)   03/01/17 109 lb (49.4 kg)   11/29/16 105 lb (47.6 kg)     BP Readings from Last 3 Encounters:   03/10/17 102/64   03/02/17 92/55   11/30/16 112/69     Visit Vitals   ? /64 (Patient Site: Left Arm, Patient Position: Sitting, Cuff Size: Adult Regular)   ? Pulse 68   ? Wt 112 lb 12.8 oz (51.2 kg)   ? SpO2 95%   ? BMI 16.19 kg/m2      The patient is comfortable, no acute distress.  Mood good.  Insight good.  Eyes are nonicteric.  Neck is supple without mass.  No cervical adenopathy.  No thyromegaly. Heart regular rate and rhythm.  Lungs clear to auscultation bilaterally.  Respiratory effort is good.  Abdomen soft and nontender.  No hepatosplenomegaly.  Extremities no edema.      ______________________________________________________________________    Diagnostics:    Results for orders placed or performed during the hospital encounter of 03/01/17   Basic Metabolic Panel   Result Value Ref Range    Sodium 142 136 - 145 mmol/L    Potassium  3.7 3.5 - 5.0 mmol/L    Chloride 107 98 - 107 mmol/L    CO2 28 22 - 31 mmol/L    Anion Gap, Calculation 7 5 - 18 mmol/L    Glucose 102 70 - 125 mg/dL    Calcium 8.8 8.5 - 10.5 mg/dL    BUN 15 8 - 22 mg/dL    Creatinine 0.68 0.60 - 1.10 mg/dL    GFR MDRD Af Amer >60 >60 mL/min/1.73m2    GFR MDRD Non Af Amer >60 >60 mL/min/1.73m2   HM1 (CBC with Diff)   Result Value Ref Range    WBC 9.4 4.0 - 11.0 thou/uL    RBC 4.57 3.80 - 5.40 mill/uL    Hemoglobin 13.6 12.0 - 16.0 g/dL    Hematocrit 41.3 35.0 - 47.0 %    MCV 90 80 - 100 fL    MCH 29.7 27.0 - 34.0 pg    MCHC 32.9 32.0 - 36.0 g/dL    RDW 13.8 11.0 - 14.5 %    Platelets 223 140 - 440 thou/uL    MPV 7.6 7.0 - 10.0 fL    Neutrophils % 39 (L) 50 - 70 %    Lymphocytes % 49 (H) 20 - 40 %    Monocytes % 9 2 - 10 %    Eosinophils % 2 0 - 6 %    Basophils % 0 0 - 2 %    Neutrophils Absolute 3.7 2.0 - 7.7 thou/uL    Lymphocytes Absolute 4.6 (H) 0.8 - 4.4 thou/uL    Monocytes Absolute 0.9 0.0 - 0.9 thou/uL    Eosinophils Absolute 0.2 0.0 - 0.4 thou/uL    Basophils Absolute 0.0 0.0 - 0.2 thou/uL      ______________________________________________________________________    Pertinent radiology for this visit includes the following:    Xr Chest Pa And Lateral    Result Date: 3/1/2017  XR CHEST PA AND LATERAL 3/1/2017 11:29 PM INDICATION: cough COMPARISON: 5/9/2016 FINDINGS: Stable cardiomediastinal silhouette and pulmonary vasculature. No focal consolidation or pleural effusion. Stable sclerosis right humeral neck presumably and chondroma. Multiple thoracic compression deformities and prior vertebroplasty. COPD.      ______________________________________________________________________    ______________________________________________________________________    Assessment:    1. COPD (chronic obstructive pulmonary disease)    2. Hospital discharge follow-up    3. Chronic diarrhea    4. GVHD (graft versus host disease)    5. Acute myeloid leukemia (AML), M2    6. Tobacco abuse        ______________________________________________________________________      PHQ-2 Total Score: 6 (7/15/2016  2:00 PM)  Depression Follow-up Plan: patient follow-up to return when and if necessary (10/14/2016  2:00 PM)  PHQ-9 Total Score: 15 (7/15/2016  2:00 PM)    Plan:    1. Given prescription for prednisone and Zithromax to have on hand.  2. Follow-up with the [facility] and with hematologist.  3. Refill oxycodone.  4. She will continue to try the Creon for her diarrhea.  She just started this.  5. Consider colestipol for diarrhea in the future if needed.  6. Patient declines Zostavax.    Jorge Dunn MD  General Internal Medicine  [facility]     Return if symptoms worsen or fail to improve.

## 2021-06-26 NOTE — ED PROVIDER NOTES
EMERGENCY DEPARTMENT ENCOUNTER    NAME: Jenn Barclay  AGE: 65 y.o. female  YOB: 1955  MRN: [id number]  EVALUATION DATE & TIME: 2021  9:01 PM    PCP: Jorge Dunn MD Christopher Wall, M.D.    Chief Complaint   Patient presents with     Knee Injury     FINAL IMPRESSION:  1. Contusion of right knee, initial encounter    2. Suprapatellar bursitis of right knee      ED COURSE & MEDICAL DECISION MAKIN:12 PM I met with the patient, obtained history, performed an initial exam, and discussed options and plan for diagnostics and treatment here in the ED.   PPE worn during patient evaluation:  N 95 Mask, head cover, eye protection, and gloves.   9:23 PM We discussed the plan for discharge and the patient is agreeable. Reviewed supportive cares, symptomatic treatment, outpatient follow up, and reasons to return to the Emergency Department. Patient to be discharged by ED RN.      Pertinent Labs & Imaging studies reviewed. (See chart for details)  Jenn Barclay is a 65 y.o. female who presents to the ER for knee pain. Initial vitals reviewed. Exam notable for tenderness over the patella with small abrasion, tender over the suprapatellar bursa.       Differential includes but is not limited to knee contusion, patella fracture, suprapatellar bursitis, patellar tendinitis.  Patient with trauma to her right knee and struck the kneecap on a corner of a bureau.  Patient ambulated in however complaining of pain mainly at the patella and the area just above it.  Slight swelling and ecchymosis developing but no erythema, no crepitus felt.  X-ray was negative for any significant fracture or effusion.  Patient likely with contusion to the patella area and possibly traumatic suprapatellar bursitis.  Patient I discussed options for treatment and rather knee immobilizer will place Ace wrap around for compression with recommendations for rest, ice, elevation and will prescribe pain medication to go home on.   Patient comfortable with plan and will be discharged.  Prior to disposition, all patient concerns were addressed and opportunity to ask additional questions was given.  Patient was comfortable with this treatment plan and expressed understanding of all instructions.  At the conclusion of the encounter I discussed the results of all of the tests and the disposition. The questions were answered. The patient or family acknowledged understanding and was agreeable with the care plan.     0 minutes of critical care time excluding procedures.     MEDICATIONS GIVEN IN THE EMERGENCY:  Medications   HYDROcodone-acetaminophen 5-325 mg per tablet 2 tablet (2 tablets Oral Given 6/12/21 2142)       NEW PRESCRIPTIONS STARTED AT TODAY'S ER VISIT  Discharge Medication List as of 6/12/2021  9:48 PM      START taking these medications    Details   HYDROcodone-acetaminophen 5-325 mg per tablet Take 1 tablet by mouth every 4 (four) hours as needed for pain., Starting Sat 6/12/2021, Print         CONTINUE these medications which have NOT CHANGED    Details   albuterol (PROAIR HFA;PROVENTIL HFA;VENTOLIN HFA) 90 mcg/actuation inhaler Inhale 2 puffs every 6 (six) hours as needed for wheezing., Starting Tue 5/5/2020, Normal      albuterol (PROVENTIL) 2.5 mg /3 mL (0.083 %) nebulizer solution USE 1 VIAL IN NEBULIZER EVERY 6 HOURS AS NEEDED FOR WHEEZING, Normal      calcium citrate-vitamin D3 (CITRACAL + D MAXIMUM) 315-250 mg-unit per tablet Take 1 tablet by mouth daily. Generic Equate from [employer]. , Until Discontinued, Historical Med      doxycycline (VIBRA-TABS) 100 MG tablet Take 1 tablet (100 mg total) by mouth daily. May substitute doxycyline monohydrate or capsules if cheaper., Starting Thu 7/16/2020, Until Fri 7/16/2021, Normal      ibuprofen (ADVIL,MOTRIN) 800 MG tablet Take 1 tablet (800 mg total) by mouth 3 (three) times a day., Starting 8/12/2017, Until Discontinued, Print      ipratropium-albuterol (DUO-NEB) 0.5-2.5 mg/3 mL  nebulizer Take 3 mL by nebulization every 6 (six) hours for 7 days., Starting 10/26/2018, Until Fri 11/2/18, Print      lipase-protease-amylase (CREON) 24,000-76,000 -120,000 unit CpDR capsule Take 24,000 Units by mouth 3 (three) times a day before meals., Starting 2/17/2017, Until Discontinued, Historical Med      multivitamin (ONE A DAY) per tablet Take 1 tablet by mouth daily., Until Discontinued, Historical Med      oxyCODONE (ROXICODONE) 10 mg immediate release tablet Take 1 tablet (10 mg total) by mouth 4 (four) times a day as needed for pain. For use 5/21/2021-6/20/2021, Starting Fri 5/21/2021, Until Sun 6/20/2021, Normal      pseudoephedrine-guaifenesin (MUCINEX D)  mg per tablet Take 1 tablet by mouth every 12 (twelve) hours., Until Discontinued, Historical Med      triamcinolone (KENALOG) 0.1 % ointment Apply topically 2 (two) times a day. To rash., Starting Thu 7/16/2020, Normal              =================================================================    HPI    Patient information was obtained from: Patient     Use of Intrepreter: N/A     Jenn Barclay is a 65 y.o. female with a history of leukemia and COPD, who presents for evaluation of a knee injury.      The patient reports she accidentally banged her right knee on her wooden vanity earlier today.  She reports sudden onset of right knee pain radiating into her right thigh as well as right knee swelling.  She reports it has been painful to do any activity or ambulate.  She reports taking an Aleve earlier today without any relief.  No other complaints at this time.  No left leg injury.     REVIEW OF SYSTEMS   Review of Systems   Musculoskeletal:        Positive for pain to right knee radiating to the right thigh  Positive for right knee swelling  Negative for left leg injury    All other systems reviewed and are negative.     PAST MEDICAL HISTORY:  Past Medical History:   Diagnosis Date     Acute myeloid leukemia (AML), M2 (H)      Baker's  cyst      Chronic back pain, thoracic      Chronic diarrhea      Compression fx, lumbar spine (H)      Controlled substance agreement signed - 8/17 - Oxycodone 8/3/2017     COPD (chronic obstructive pulmonary disease) (H)      Full code status 9/10/2017     Gastric ulcer     pt states she has not had any ulcers     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)      History of PID      Migraine headache      Osteoporosis 5/3/2016     Papular rash, generalized      Pseudogout - crystal proven 2016 - knee 11/30/2016     Serrated adenoma of colon - 16-20 cm - 2017 7/17/2020     Status post allogeneic bone marrow transplant - 2010 - [facility]      Tobacco abuse      Traumatic compression fracture of T8 thoracic vertebra, closed, initial encounter (H) 4/26/2016     Tubular adenoma of colon - 2 diminuitive in 2017 7/17/2020       PAST SURGICAL HISTORY:  Past Surgical History:   Procedure Laterality Date     APPENDECTOMY       BONE MARROW TRANSPLANT  2010     ME EGD TRANSORAL BIOPSY SINGLE/MULTIPLE N/A 6/12/2014    Procedure: ESOPHAGOGASTRODUODENOSCOPY BIOPSY;  Surgeon: Eder Alvarado MD;  Location: [facility];  Service: Gastroenterology     TOTAL ABDOMINAL HYSTERECTOMY W/ BILATERAL SALPINGOOPHORECTOMY       VERTEBROPLASTY  2016    T5 and T8 vertebrae.       CURRENT MEDICATIONS:    No current facility-administered medications on file prior to encounter.      Current Outpatient Medications on File Prior to Encounter   Medication Sig     albuterol (PROAIR HFA;PROVENTIL HFA;VENTOLIN HFA) 90 mcg/actuation inhaler Inhale 2 puffs every 6 (six) hours as needed for wheezing.     albuterol (PROVENTIL) 2.5 mg /3 mL (0.083 %) nebulizer solution USE 1 VIAL IN NEBULIZER EVERY 6 HOURS AS NEEDED FOR WHEEZING     calcium citrate-vitamin D3 (CITRACAL + D MAXIMUM) 315-250 mg-unit per tablet Take 1 tablet by mouth daily. Generic Equate from [employer].      doxycycline (VIBRA-TABS) 100 MG tablet Take 1 tablet (100 mg total) by mouth daily. May  substitute doxycyline monohydrate or capsules if cheaper.     ibuprofen (ADVIL,MOTRIN) 800 MG tablet Take 1 tablet (800 mg total) by mouth 3 (three) times a day.     ipratropium-albuterol (DUO-NEB) 0.5-2.5 mg/3 mL nebulizer Take 3 mL by nebulization every 6 (six) hours for 7 days.     lipase-protease-amylase (CREON) 24,000-76,000 -120,000 unit CpDR capsule Take 24,000 Units by mouth 3 (three) times a day before meals.     multivitamin (ONE A DAY) per tablet Take 1 tablet by mouth daily.     oxyCODONE (ROXICODONE) 10 mg immediate release tablet Take 1 tablet (10 mg total) by mouth 4 (four) times a day as needed for pain. For use 5/21/2021-6/20/2021     pseudoephedrine-guaifenesin (MUCINEX D)  mg per tablet Take 1 tablet by mouth every 12 (twelve) hours.     triamcinolone (KENALOG) 0.1 % ointment Apply topically 2 (two) times a day. To rash.       ALLERGIES:  Allergies   Allergen Reactions     Other Environmental Allergy Other (See Comments)     Painful skin irritation from paper tape     Adhesive Other (See Comments)     Allergy Hx - Rash from paper tape     Mirtazapine Other (See Comments)     Hallucination        FAMILY HISTORY:  Family History   Problem Relation Age of Onset     Lung cancer Mother      Pancreatic cancer Father        SOCIAL HISTORY:   Social History     Socioeconomic History     Marital status:      Spouse name: Not on file     Number of children: Not on file     Years of education: Not on file     Highest education level: Not on file   Occupational History     Not on file   Social Needs     Financial resource strain: Not on file     Food insecurity     Worry: Not on file     Inability: Not on file     Transportation needs     Medical: Not on file     Non-medical: Not on file   Tobacco Use     Smoking status: Current Every Day Smoker     Packs/day: 0.50     Years: 49.00     Pack years: 24.50     Types: Cigarettes     Smokeless tobacco: Former User     Tobacco comment: has used  "E-cigarettes in the past, info given   Substance and Sexual Activity     Alcohol use: No     Drug use: No     Sexual activity: Never   Lifestyle     Physical activity     Days per week: Not on file     Minutes per session: Not on file     Stress: Not on file   Relationships     Social connections     Talks on phone: Not on file     Gets together: Not on file     Attends Yarsanism service: Not on file     Active member of club or organization: Not on file     Attends meetings of clubs or organizations: Not on file     Relationship status: Not on file     Intimate partner violence     Fear of current or ex partner: Not on file     Emotionally abused: Not on file     Physically abused: Not on file     Forced sexual activity: Not on file   Other Topics Concern     Not on file   Social History Narrative    Patient of Dr. Dunn since 2016.        Single, lives in a trailer home.        VITALS:  Patient Vitals for the past 24 hrs:   BP Temp Pulse Resp SpO2 Height Weight   06/12/21 2200 126/78 -- -- -- -- -- --   06/12/21 2003 -- 98.5  F (36.9  C) 66 16 95 % 5' 9\" (1.753 m) 112 lb (50.8 kg)       PHYSICAL EXAM    Physical Exam   Constitutional: She appears well-developed and well-nourished. No distress.   HENT:   Head: Normocephalic and atraumatic.   Cardiovascular: Intact distal pulses.   Pulmonary/Chest: No respiratory distress.   Musculoskeletal:         General: Tenderness present. No deformity or edema.      Right knee: She exhibits swelling and ecchymosis. She exhibits no effusion, no deformity, no erythema, normal alignment, no LCL laxity, normal patellar mobility, no bony tenderness and no MCL laxity. Tenderness found. Patellar tendon (Over the suprapatellar bursa.) tenderness noted.   Neurological: She is alert. She exhibits normal muscle tone.   Skin: Skin is warm and dry. No rash noted. She is not diaphoretic. No erythema. No pallor.   Psychiatric: Her behavior is normal.   Nursing note and vitals reviewed.     "   LAB:  All pertinent labs reviewed and interpreted.  No results found for this visit on 06/12/21.    RADIOLOGY:  Reviewed all pertinent imaging. Please see official radiology report.  Xr Knee Right Plus Sunrise Vw    Result Date: 6/12/2021  EXAM: XR KNEE RIGHT PLUS SUNRISE VW [facility] DATE/TIME: 6/12/2021 8:31 PM INDICATION: fall COMPARISON: None.     Anterior soft tissue swelling. No fracture or dislocation. No definite joint effusion. Mild joint space narrowing and chondrocalcinosis noted.    I, Tremayne Ojeda, am serving as a scribe to document services personally performed by Dr. Wally Nagel MD based on my observation and the provider's statements to me. I, Wally Nagel MD attest that Tremayne Ojeda is acting in a scribe capacity, has observed my performance of the services and has documented them in accordance with my direction.    Wally Nagel M.D.  Emergency Medicine  [facility] EMERGENCY DEPARTMENT  [address] [place] [address]  Dept: [phone]  Loc: [phone]       Wally Nagel MD  06/14/21 0206

## 2021-06-26 NOTE — PROGRESS NOTES
Progress Notes by Jorge Dunn MD at 6/18/2018 11:20 AM     Author: Jorge Dunn MD Service: -- Author Type: Physician    Filed: 6/20/2018 10:08 PM Encounter Date: 6/18/2018 Status: Signed    : Jorge Dunn MD (Physician)              [facility]    Comprehensive and complex medical care - Chronic disease management - Shared decision making - Care coordination - Compassionate care    Patient advocacy - Rational deprescribing - Minimally disruptive medicine - Ethical focus - Customized care    ______________________________________________________________________     Date of Service: 6/18/2018  Primary Provider: Jorge Dunn MD    Patient Care Team:  Jorge Dunn MD as PCP - General (Internal Medicine)  Mohit Montano MD as Physician (Hematology and Oncology)  Dmitriy Agee MD as Physician (Internal Medicine)     ______________________________________________________________________     Patient's Pharmacy:    [id number] - [address]  [address]  [facility] [address]  Phone: [phone] Fax: [phone]     Patient's Contacts:  Name Home Phone Work Phone Mobile Phone Relationship Lg EJAN Hernández [phone]   Grandchild      Patient's Insurance:    Payor: MEDICARE / Plan: MEDICARE A AND B / Product Type: Medicare /     ______________________________________________________________________     Jenn Barclay is 62 y.o. female who comes in today for:    Chief Complaint   Patient presents with   ? Follow-up   ? Hand Pain     finger pain in the left pointer finger       Patient Active Problem List   Diagnosis   ? Migraine headache   ? Gastric ulcer   ? Acute myeloid leukemia (AML), M2 (H)   ? Chronic diarrhea   ? Papular rash, generalized   ? Tobacco abuse   ? Status post allogeneic bone marrow transplant - 2010 - [facility]   ? COPD (chronic obstructive  pulmonary disease) (H)   ? 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)   ? Anxiety   ? Traumatic compression fracture of T8 thoracic vertebra, closed, initial encounter (H)   ? OP (osteoporosis) - Started alendronate in 9/17.   ? Financial difficulties - limiting ability to afford medications   ? Pseudogout - crystal proven 2016 - knee   ? Chronic back pain, thoracic   ? Controlled substance agreement signed - 8/17 - Oxycodone - UDS 8/17   ? Full code status   ? Exocrine pancreatic insufficiency      Current Outpatient Prescriptions   Medication Sig Note   ? albuterol (PROVENTIL HFA) 90 mcg/actuation inhaler Inhale 2 puffs every 6 (six) hours as needed for wheezing or shortness of breath.    ? albuterol (PROVENTIL) 2.5 mg /3 mL (0.083 %) nebulizer solution Take 3 mL (2.5 mg total) by nebulization every 6 (six) hours as needed for wheezing.    ? ascorbic acid, vitamin C, (VITAMIN C) 500 MG tablet Take 500 mg by mouth daily.    ? calcium citrate-vitamin D3 (CITRACAL + D MAXIMUM) 315-250 mg-unit per tablet Take 1 tablet by mouth daily. Generic Equate from [employer].     ? ibuprofen (ADVIL,MOTRIN) 800 MG tablet Take 1 tablet (800 mg total) by mouth 3 (three) times a day.    ? lipase-protease-amylase (CREON) 24,000-76,000 -120,000 unit CpDR capsule Take 24,000 Units by mouth 3 (three) times a day before meals. 3/9/2017: Received from: [facility] El[facility] Sig: Take 1 capsule (24,000 Units) by mouth 3 times daily (with meals) And snacks   ? multivitamin (ONE A DAY) per tablet Take 1 tablet by mouth daily.    ? [START ON 8/3/2018] oxyCODONE (ROXICODONE) 10 mg immediate release tablet Take 1 tablet (10 mg total) by mouth 4 (four) times a day as needed for pain. For use from 8/3/2018 to 9/2/2018.  Rx 2/2.    ? pseudoephedrine-guaifenesin (MUCINEX D)  mg per tablet Take 1 tablet by mouth every 12 (twelve) hours.    ? riboflavin, vitamin B2, (VITAMIN B-2) 25 mg Tab Take 1 tablet by mouth daily. 3/9/2017: Received from: Lorenzo  Received Sig: Take by mouth daily   ? triamcinolone (KENALOG) 0.1 % ointment APPLY TOPICALLY  RUB INTO ITCHY SCRATCHED AREAS TWICE DAILY (Patient taking differently: APPLY TOPICALLY  RUB INTO ITCHY SCRATCHED AREAS TWICE DAILY AS NEEDED)    ? alendronate (FOSAMAX) 70 MG tablet Take 1 tablet (70 mg total) by mouth every 7 days. Take in the morning on an empty stomach with a full glass of water 30 minutes before food    ? azithromycin (ZITHROMAX Z-ANITRA) 250 MG tablet 2 tablets today and then 1 pill a day for 4 days.    ? meloxicam (MOBIC) 15 MG tablet Take 1 tablet (15 mg total) by mouth daily. For arthritis as needed.    ? [START ON 7/4/2018] oxyCODONE (ROXICODONE) 10 mg immediate release tablet Take 1 tablet (10 mg total) by mouth 4 (four) times a day as needed for pain. For use from 7/4/2018 to 8/3/2018.  Rx 1 of 2.    ? predniSONE (DELTASONE) 20 MG tablet Take 2 pills a day for 5 days and then 1 pill a day for 5 days      Social History     Social History Narrative    Patient of Dr. Dunn since 2016.        Single, lives in a trailer home.      ______________________________________________________________________     History of present illness:    Patient comes in today for follow-up of a number of issues.    We first reviewed her chronic back pain.  This was due to compression fractures.  She does need to continue with pain medications as is at this time.  They are not improving.  They are not worsening.  The current pain is about the same.  She is reluctant to reduce them further as her pain is under good control with this medication.    We reviewed her index finger pain.  This is on the left index finger.  She has had swelling at this DIP joint.  This has been more acute for her and sensitive over the last couple weeks.  It is read as well for her.    We reviewed her chronic diarrhea thought in part due to exocrine pancreatic insufficiency.  She has been having more diarrhea as she cannot afford the Creon.  Her  specialist at the [facility] is looking into this further.    Reviewed her ppxad-jyamcb-jjij disease symptoms and this is stable at this point.    We reviewed her COPD and this continues to give her issues.  Breathing is particularly difficult with humid weather for her.    We reviewed her other medical issues.  She cannot take the alendronate at this time due to cost issues.    On review of systems, the patient denies any chest pain or shortness of breath.    ______________________________________________________________________    Exam:    Wt Readings from Last 3 Encounters:   06/18/18 111 lb 8 oz (50.6 kg)   05/14/18 115 lb 3.2 oz (52.3 kg)   11/30/17 113 lb (51.3 kg)     BP Readings from Last 3 Encounters:   06/18/18 112/60   05/14/18 118/77   11/30/17 110/68     /60  Pulse 74  Wt 111 lb 8 oz (50.6 kg)  SpO2 96%  BMI 16 kg/m2    The patient is comfortable, no acute distress.  Mood good.  Insight good.  Eyes are nonicteric.  Neck is supple without mass.  No cervical adenopathy.  No thyromegaly. Heart regular rate and rhythm.  Lungs show poor airway movement on auscultation bilaterally.  Respiratory effort is good.  Abdomen soft and nontender.  No hepatosplenomegaly.  Extremities no edema.  She has swelling of the first DIP joint on the left.  There is tenderness associated with this.    ______________________________________________________________________    Diagnostics:    Results for orders placed or performed in visit on 05/14/18   HM1 (CBC with Diff)   Result Value Ref Range    WBC 9.0 4.0 - 11.0 thou/uL    RBC 4.27 3.80 - 5.40 mill/uL    Hemoglobin 13.1 12.0 - 16.0 g/dL    Hematocrit 39.0 35.0 - 47.0 %    MCV 91 80 - 100 fL    MCH 30.7 27.0 - 34.0 pg    MCHC 33.6 32.0 - 36.0 g/dL    RDW 12.8 11.0 - 14.5 %    Platelets 222 140 - 440 thou/uL    MPV 8.3 (L) 8.5 - 12.5 fL    Neutrophils % 54 50 - 70 %    Lymphocytes % 36 20 - 40 %    Monocytes % 7 2 - 10 %    Eosinophils % 1 0 - 6 %     Basophils % 1 0 - 2 %    Neutrophils Absolute 4.9 2.0 - 7.7 thou/uL    Lymphocytes Absolute 3.3 0.8 - 4.4 thou/uL    Monocytes Absolute 0.6 0.0 - 0.9 thou/uL    Eosinophils Absolute 0.1 0.0 - 0.4 thou/uL    Basophils Absolute 0.1 0.0 - 0.2 thou/uL      ______________________________________________________________________    Assessment:    1. Controlled substance agreement signed - 8/17 - Oxycodone - UDS 8/17    2. Osteoarthritis of finger of left hand    3. Diarrhea    4. Financial difficulties - limiting ability to afford medications    5. OP (osteoporosis)    6. Chronic back pain    7. COPD (chronic obstructive pulmonary disease) (H)    8. Mammogram declined       ______________________________________________________________________     PHQ-2 Total Score: 0 (6/18/2018 11:51 AM)  No Data Recorded  ______________________________________________________________________       Plan:    1. Look into drug assistance in the future in relationship to her medications.  She could look into getting assistance for the Creon and her alendronate.  Her specialist at the [facility] is looking into the Creon at this point.  2. Refilled oxycodone.  Continue at this time.  3. Try meloxicam for the DIP arthritis and see if this helps.  Consider x-ray if not improving.  4. Continue to work on COPD as needed.  Consider drug assistance for inhalers as well.  5. We reviewed mammogram with her again for breast cancer screening and she continues to decline this at this time.  She has not noticed any breast symptoms.       Jorge Dunn MD  General Internal Medicine  [facility]     Personal office fax - [phone]   Voice mail - [phone]  E-mail - [email or web address]      Return in about 6 months (around 12/18/2018) for follow up visit.     Future Appointments  Date Time Provider Department Center   12/18/2018 11:20 AM [facility]         ______________________________________________________________________    Relevant ICD-10 codes and order associations:      ICD-10-CM    1. Controlled substance agreement signed - 8/17 - Oxycodone - UDS 8/17 Z79.899    2. Osteoarthritis of finger of left hand M19.042 meloxicam (MOBIC) 15 MG tablet   3. Diarrhea R19.7    4. Financial difficulties - limiting ability to afford medications Z59.8    5. OP (osteoporosis) M81.0    6. Chronic back pain M54.9 oxyCODONE (ROXICODONE) 10 mg immediate release tablet    G89.29    7. COPD (chronic obstructive pulmonary disease) (H) J44.9 oxyCODONE (ROXICODONE) 10 mg immediate release tablet   8. Mammogram declined Z53.20

## 2021-06-27 NOTE — PROGRESS NOTES
Progress Notes by Jorge Dunn MD at 6/18/2019 11:20 AM     Author: Jorge Dunn MD Service: -- Author Type: Physician    Filed: 6/23/2019 10:38 PM Encounter Date: 6/18/2019 Status: Signed    : Jorge Dunn MD (Physician)       [facility] reviewed today and no inappropriate prescriptions identified.

## 2021-06-27 NOTE — PROGRESS NOTES
Progress Notes by Jorge Dunn MD at 6/18/2019 11:20 AM     Author: Jorge Dunn MD Service: -- Author Type: Physician    Filed: 6/23/2019 10:38 PM Encounter Date: 6/18/2019 Status: Signed    : Jorge Dunn MD (Physician)              [facility]    Comprehensive and complex medical care - Chronic disease management - Shared decision making - Care coordination - Compassionate care    Patient advocacy - Rational deprescribing - Minimally disruptive medicine - Ethical focus - Customized care    ______________________________________________________________________     Date of Service: 6/18/2019  Primary Provider: Jorge Dunn MD    Patient Care Team:  Jorge Dunn MD as PCP - General (Internal Medicine)  Mohit Montano MD as Physician (Hematology and Oncology)  Dmitriy Agee MD as Physician (Internal Medicine)     ______________________________________________________________________     Patient's Pharmacy:    [facility] - [place], [place] - [address]  [address]  [facility] [address]  Phone: [phone] Fax: [phone]     Patient's Contacts:  Name Home Phone Work Phone Mobile Phone Relationship Lgl Joey   JEAN JUARES [phone]   Grandchild    NONE, PER PT    Declined      Patient's Insurance:    Payor: MEDICARE / Plan: MEDICARE A AND B / Product Type: Medicare /   ______________________________________________________________________   ______________________________________________________________________     Jenn Barclay is 63 y.o. female who comes in today for:    Chief Complaint   Patient presents with   ? Follow Up     rash on bottom- no improvement- spreading up back and down legs        Active Problem List:  Problem List as of 6/18/2019 Reviewed: 6/18/2019 11:13 PM by Jorge Dunn MD       High    Full code status    Tobacco abuse    Acute myeloid leukemia  (AML), M2 (H)    COPD (chronic obstructive pulmonary disease) (H)    Status post allogeneic bone marrow transplant - 2010 - [facility]       Medium    Controlled substance agreement signed - 12/18 - Oxycodone - UDS 12/18    Chronic back pain, thoracic    Chronic diarrhea    Exocrine pancreatic insufficiency    Financial difficulties - limiting ability to afford medications    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)    Pseudogout - crystal proven 2016 - knee    Traumatic compression fracture of T8 thoracic vertebra, closed, initial encounter (H)       Low    Anxiety    Gastric ulcer    Migraine headache    OP (osteoporosis) - Started alendronate in 9/17.    Papular rash, generalized       Unprioritized    COPD exacerbation (H)           Current Outpatient Medications   Medication Sig Note   ? albuterol (PROVENTIL) 2.5 mg /3 mL (0.083 %) nebulizer solution USE ONE VIAL IN NEBULIZER EVERY 6 HOURS AS NEEDED FOR WHEEZING.    ? calcium citrate-vitamin D3 (CITRACAL + D MAXIMUM) 315-250 mg-unit per tablet Take 1 tablet by mouth daily. Generic Equate from [employer].     ? ibuprofen (ADVIL,MOTRIN) 800 MG tablet Take 1 tablet (800 mg total) by mouth 3 (three) times a day.    ? lipase-protease-amylase (CREON) 24,000-76,000 -120,000 unit CpDR capsule Take 24,000 Units by mouth 3 (three) times a day before meals. 3/9/2017: Received from: [facility] El[facility] Sig: Take 1 capsule (24,000 Units) by mouth 3 times daily (with meals) And snacks   ? multivitamin (ONE A DAY) per tablet Take 1 tablet by mouth daily.    ? pseudoephedrine-guaifenesin (MUCINEX D)  mg per tablet Take 1 tablet by mouth every 12 (twelve) hours.    ? triamcinolone (KENALOG) 0.1 % ointment APPLY TOPICALLY  RUB INTO ITCHY SCRATCHED AREAS TWICE DAILY (Patient taking differently: APPLY TOPICALLY  RUB INTO ITCHY SCRATCHED AREAS TWICE DAILY AS NEEDED)    ? albuterol (PROVENTIL HFA) 90 mcg/actuation inhaler Inhale 2 puffs every 6 (six) hours as needed for wheezing  or shortness of breath.    ? ipratropium-albuterol (DUO-NEB) 0.5-2.5 mg/3 mL nebulizer Take 3 mL by nebulization every 6 (six) hours for 7 days.    ? oxyCODONE (ROXICODONE) 10 mg immediate release tablet Take 1 tablet (10 mg total) by mouth 4 (four) times a day as needed for pain. For use from 5/16/2019 to 6/15/2019.  Rx 1/2.    ? oxyCODONE (ROXICODONE) 10 mg immediate release tablet Take 1 tablet (10 mg total) by mouth 4 (four) times a day as needed for pain. For use from 6/15/19-7/15/19.  Rx 2/2      Social History     Social History Narrative    Patient of Dr. Dunn since 2016.        Single, lives in a trailer home.      ______________________________________________________________________     History of present illness:    Roomed by: Cal        Comes in for follow up and multiple issues.    Mainly concerned about the rash on her buttocks and legs.  This has been there for quite awhile.  I believe it to be due to GVHD issues.  She has seen a dermatologist at the [facility] ([facility]) and he has been unable to biopsy a lesion which is active and she cannot get in to see him when there is one.  Would like the name of a local dermatologist that she could see for this.  It does itch a lot.    Her chronic diarrhea and pancreatic insufficiency is doing well on the CREON.    Her chronic back pain continues to bother her but she is considering tapering the medication in the future.  It is mainly in her thoracic back.    Continues to smoke.    I have recommended that this patient have a mammogram but she declines at this time. I have discussed the risks and benefits of this procedure with her. The patient verbalizes understanding.     Chronic obstructive pulmonary disease (COPD) is stable and did have an emergency room (ER) visit in the recent past in relationship to this.    We reviewed her other issues noted in the assessment but not specifically addressed in the HPI above.     Past medical, family  "and social history reviewed today.     Comprehensive review of systems was performed today with no major problems noted except as above.     ______________________________________________________________________    Exam:    Wt Readings from Last 3 Encounters:   06/18/19 115 lb 6.4 oz (52.3 kg)   04/21/19 112 lb (50.8 kg)   12/18/18 112 lb (50.8 kg)     BP Readings from Last 3 Encounters:   06/18/19 104/62   04/21/19 91/50   12/18/18 112/58     /62 (Patient Site: Right Arm, Patient Position: Sitting, Cuff Size: Adult Small)   Pulse 62   Temp 98.3  F (36.8  C) (Oral)   Ht 5' 10\" (1.778 m)   Wt 115 lb 6.4 oz (52.3 kg)   SpO2 96%   BMI 16.56 kg/m      The patient is comfortable, no acute distress.  Mood good.  Insight good.  Eyes are nonicteric.  Neck is supple without mass.  No cervical adenopathy.  No thyromegaly. Heart regular rate and rhythm.  Lungs clear to auscultation bilaterally.  Respiratory effort is good.  Abdomen soft and nontender.  No hepatosplenomegaly.  Extremities no edema.      ______________________________________________________________________    Diagnostics:    Results for orders placed or performed during the hospital encounter of 04/21/19   Basic Metabolic Panel   Result Value Ref Range    Sodium 137 136 - 145 mmol/L    Potassium 3.8 3.5 - 5.0 mmol/L    Chloride 102 98 - 107 mmol/L    CO2 27 22 - 31 mmol/L    Anion Gap, Calculation 8 5 - 18 mmol/L    Glucose 121 70 - 125 mg/dL    Calcium 9.1 8.5 - 10.5 mg/dL    BUN 12 8 - 22 mg/dL    Creatinine 0.81 0.60 - 1.10 mg/dL    GFR MDRD Af Amer >60 >60 mL/min/1.73m2    GFR MDRD Non Af Amer >60 >60 mL/min/1.73m2   Hepatic Profile   Result Value Ref Range    Bilirubin, Total 0.2 0.0 - 1.0 mg/dL    Bilirubin, Direct <0.1 <=0.5 mg/dL    Protein, Total 6.5 6.0 - 8.0 g/dL    Albumin 3.8 3.5 - 5.0 g/dL    Alkaline Phosphatase 78 45 - 120 U/L    AST 33 0 - 40 U/L    ALT 29 0 - 45 U/L   Lipase   Result Value Ref Range    Lipase <9 0 - 52 U/L   Lactic " Acid   Result Value Ref Range    Lactic Acid 1.0 0.5 - 2.2 mmol/L   Urinalysis-UC if Indicated   Result Value Ref Range    Color, UA Yellow Colorless, Yellow, Straw, Light Yellow    Clarity, UA Clear Clear    Glucose, UA Negative Negative    Bilirubin, UA Negative Negative    Ketones, UA Negative Negative, 60 mg/dL    Specific Gravity, UA 1.017 1.001 - 1.030    Blood, UA Small (!) Negative    pH, UA 5.0 4.5 - 8.0    Protein, UA Negative Negative mg/dL    Urobilinogen, UA <2.0 E.U./dL <2.0 E.U./dL, 2.0 E.U./dL    Nitrite, UA Negative Negative    Leukocytes, UA Negative Negative    Bacteria, UA None Seen None Seen hpf    RBC, UA 0-2 None Seen, 0-2 hpf    WBC, UA 0-5 None Seen, 0-5 hpf    Squam Epithel, UA 0-5 None Seen, 0-5 lpf    Mucus, UA Few (!) None Seen lpf   HM1 (CBC with Diff)   Result Value Ref Range    WBC 8.7 4.0 - 11.0 thou/uL    RBC 4.44 3.80 - 5.40 mill/uL    Hemoglobin 13.4 12.0 - 16.0 g/dL    Hematocrit 39.4 35.0 - 47.0 %    MCV 89 80 - 100 fL    MCH 30.2 27.0 - 34.0 pg    MCHC 34.0 32.0 - 36.0 g/dL    RDW 13.2 11.0 - 14.5 %    Platelets 254 140 - 440 thou/uL    MPV 8.3 (L) 8.5 - 12.5 fL    Neutrophils % 64 50 - 70 %    Lymphocytes % 20 20 - 40 %    Monocytes % 13 (H) 2 - 10 %    Eosinophils % 1 0 - 6 %    Basophils % 2 0 - 2 %    Neutrophils Absolute 5.5 2.0 - 7.7 thou/uL    Lymphocytes Absolute 1.8 0.8 - 4.4 thou/uL    Monocytes Absolute 1.1 (H) 0.0 - 0.9 thou/uL    Eosinophils Absolute 0.1 0.0 - 0.4 thou/uL    Basophils Absolute 0.2 0.0 - 0.2 thou/uL     ______________________________________________________________________    Assessment:    1. Chronic obstructive pulmonary disease, unspecified COPD type (H)    2. Acute myeloid leukemia (AML), M2 (H)    3. 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)    4. COPD exacerbation (H)    5. Mammogram declined    6. Tobacco abuse    7. Chronic midline thoracic back pain    8. Chronic diarrhea    9. Exocrine pancreatic insufficiency    10. Rash and nonspecific skin  eruption       ______________________________________________________________________     PHQ-2 Total Score: 0 (6/18/2019 12:00 PM)  Depression Follow-up Plan: patient follow-up to return when and if necessary (6/18/2019 12:00 PM)    PHQ-9 Total Score: 4 (6/18/2019 12:00 PM)    ______________________________________________________________________     BMI Readings from Last 1 Encounters:   06/18/19 16.56 kg/m      ______________________________________________________________________       Plan:    1. Refilled pain medications.  Consider reducing frequency or potency in the future.  2. Blood work next visit.  3. Follow up [facility]  4. Referral to dermatology locally.  5. Consider having medications on hand for exacerbation but patient does not want this at this time.  6. Follow up 6 months.         Jorge Dunn MD  General Internal Medicine  [facility]     Personal office fax - [phone]   Voice mail - [phone]  E-mail - [email or web address]      Return in about 6 months (around 12/18/2019) for follow up visit.     Future Appointments   Date Time Provider Department Center   12/17/2019  1:00 PM Jorge Dunn MD [facility]        ______________________________________________________________________    Relevant ICD-10 codes and order associations:      ICD-10-CM    1. Chronic obstructive pulmonary disease, unspecified COPD type (H) J44.9    2. Acute myeloid leukemia (AML), M2 (H) C92.00 Ambulatory referral to Dermatology   3. 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) D89.813 Ambulatory referral to Dermatology   4. COPD exacerbation (H) J44.1    5. Mammogram declined Z53.20    6. Tobacco abuse Z72.0    7. Chronic midline thoracic back pain M54.6     G89.29    8. Chronic diarrhea K52.9    9. Exocrine pancreatic insufficiency K86.81    10. Rash and nonspecific skin eruption R21 Ambulatory referral to Dermatology

## 2021-06-27 NOTE — PROGRESS NOTES
Progress Notes by Jorge Dunn MD at 12/18/2018 11:20 AM     Author: Jorge Dunn MD Service: -- Author Type: Physician    Filed: 12/18/2018 11:36 PM Encounter Date: 12/18/2018 Status: Signed    : Jorge Dunn MD (Physician)              [facility]    Comprehensive and complex medical care - Chronic disease management - Shared decision making - Care coordination - Compassionate care    Patient advocacy - Rational deprescribing - Minimally disruptive medicine - Ethical focus - Customized care    ______________________________________________________________________     Date of Service: 12/18/2018  Primary Provider: Jorge Dunn MD    Patient Care Team:  Jorge Dunn MD as PCP - General (Internal Medicine)  Mohit Montano MD as Physician (Hematology and Oncology)  Dmitriy Agee MD as Physician (Internal Medicine)     ______________________________________________________________________     Patient's Pharmacy:    [id number] - [place], [place] - [address]  [address]  [place] [address]  Phone: [phone] Fax: [phone]     Patient's Contacts:  Name Home Phone Work Phone Mobile Phone Relationship Lgl JEAN Hernández [phone]   Grandchild      Patient's Insurance:    Payor: MEDICARE / Plan: MEDICARE A AND B / Product Type: Medicare /     ______________________________________________________________________     Jenn Barclay is 63 y.o. female who comes in today for:    Chief Complaint   Patient presents with   ? Follow-up     BACK PAIN IS GETTING WORSE, LEFT INDEX FINGER PAIN IS A LOT BETTER, CHRINIC DIARRHEA       Active Problem List:  Problem List as of 12/18/2018 Reviewed: 12/18/2018 11:30 PM by Jorge Dunn MD       High    Full code status    Tobacco abuse    Acute myeloid leukemia (AML), M2 (H)    COPD (chronic obstructive pulmonary disease) (H)    Status  post allogeneic bone marrow transplant - 2010 - [facility]       Medium    Controlled substance agreement signed - 12/18 - Oxycodone - UDS 12/18    Chronic back pain, thoracic    Chronic diarrhea    Exocrine pancreatic insufficiency    Financial difficulties - limiting ability to afford medications    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)    Pseudogout - crystal proven 2016 - knee    Traumatic compression fracture of T8 thoracic vertebra, closed, initial encounter (H)       Low    Anxiety    Gastric ulcer    Migraine headache    OP (osteoporosis) - Started alendronate in 9/17.    Papular rash, generalized       Unprioritized    COPD exacerbation (H)           Current Outpatient Medications   Medication Sig Note   ? albuterol (PROVENTIL) 2.5 mg /3 mL (0.083 %) nebulizer solution USE ONE VIAL IN NEBULIZER EVERY 6 HOURS AS NEEDED FOR WHEEZING.    ? alendronate (FOSAMAX) 70 MG tablet Take 1 tablet (70 mg total) by mouth every 7 days. Take in the morning on an empty stomach with a full glass of water 30 minutes before food    ? ascorbic acid, vitamin C, (VITAMIN C) 500 MG tablet Take 500 mg by mouth daily.    ? azithromycin (ZITHROMAX Z-ANITRA) 250 MG tablet 2 tablets today and then 1 pill a day for 4 days.    ? calcium citrate-vitamin D3 (CITRACAL + D MAXIMUM) 315-250 mg-unit per tablet Take 1 tablet by mouth daily. Generic Equate from [employer].     ? ibuprofen (ADVIL,MOTRIN) 800 MG tablet Take 1 tablet (800 mg total) by mouth 3 (three) times a day.    ? lipase-protease-amylase (CREON) 24,000-76,000 -120,000 unit CpDR capsule Take 24,000 Units by mouth 3 (three) times a day before meals. 3/9/2017: Received from: [facility] Received Sig: Take 1 capsule (24,000 Units) by mouth 3 times daily (with meals) And snacks   ? meloxicam (MOBIC) 15 MG tablet Take 1 tablet (15 mg total) by mouth daily. For arthritis as needed.    ? multivitamin (ONE A DAY) per tablet Take 1 tablet by mouth daily.    ? oxyCODONE (ROXICODONE) 10 mg  immediate release tablet Take 1 tablet (10 mg total) by mouth 4 (four) times a day as needed for pain For use from 12/13/2018 to 1/12/2019.  Rx 2 of 2..    ? predniSONE (DELTASONE) 10 mg tablet Take 1 tablet (10 mg total) by mouth daily.    ? pseudoephedrine-guaifenesin (MUCINEX D)  mg per tablet Take 1 tablet by mouth every 12 (twelve) hours.    ? riboflavin, vitamin B2, (VITAMIN B-2) 25 mg Tab Take 1 tablet by mouth daily. 3/9/2017: Received from: [facility] Received Sig: Take by mouth daily   ? triamcinolone (KENALOG) 0.1 % ointment APPLY TOPICALLY  RUB INTO ITCHY SCRATCHED AREAS TWICE DAILY (Patient taking differently: APPLY TOPICALLY  RUB INTO ITCHY SCRATCHED AREAS TWICE DAILY AS NEEDED)    ? albuterol (PROVENTIL HFA) 90 mcg/actuation inhaler Inhale 2 puffs every 6 (six) hours as needed for wheezing or shortness of breath.    ? ipratropium-albuterol (DUO-NEB) 0.5-2.5 mg/3 mL nebulizer Take 3 mL by nebulization every 6 (six) hours for 7 days.    ? oxyCODONE (ROXICODONE) 10 mg immediate release tablet Take 1 tablet (10 mg total) by mouth 4 (four) times a day as needed for pain For use from 11/13/2018 to 12/13/2018.  Rx 1/2..      Social History     Social History Narrative    Patient of Dr. Dunn since 2016.        Single, lives in a trailer home.      ______________________________________________________________________     History of present illness:    Patient comes in today for follow-up.  She comes alone.    Reviewed her COPD.  She was in the hospital/ER in November for an exacerbation.  This took some time to get better.  Her x-ray was reviewed with her today.  She did get better with antibiotics and steroids.  She feels like she is back to baseline at this point.    Reviewed her thoracic compression fractures.  She does feel a little bit of lower thoracic back pain may be worse.  This still was not like what it was in the past.  She had previous vertebroplasty at [facility].  This is not severe  at this time and she really does not want to do more with it.    We reviewed her tdeht-lwfvnu-hodn disease.  She continues to follow with the [facility] related to this.  She still has problems with diarrhea and skin rash related to this.    Reviewed her chronic thoracic back pain and she continues on oxycodone for this.  She is at the higher end that I can provide for her.  I would not feel comfortable with increasing this further with out for her going to a pain clinic.    Reviewed her AML and there is really been no signs of this since her treatment quite a while ago now.    We reviewed her other issues noted in the assessment but not specifically addressed in the HPI above.     On review of systems, the patient denies any chest pain or shortness of breath.    ______________________________________________________________________    Exam:    Wt Readings from Last 3 Encounters:   12/18/18 112 lb (50.8 kg)   10/26/18 112 lb (50.8 kg)   06/18/18 111 lb 8 oz (50.6 kg)     BP Readings from Last 3 Encounters:   12/18/18 112/58   11/06/18 151/83   10/26/18 111/67     /58 (Patient Site: Right Arm, Patient Position: Sitting, Cuff Size: Adult Regular)   Pulse (!) 102   Wt 112 lb (50.8 kg)   SpO2 98%   BMI 16.07 kg/m     The patient is comfortable, no acute distress.  Mood good.  Insight good.  Eyes are nonicteric.  Neck is supple without mass.  No cervical adenopathy.  No thyromegaly. Heart regular rate and rhythm.  Lungs clear to auscultation bilaterally.  Respiratory effort is good.  Abdomen soft and nontender.  No hepatosplenomegaly.  Extremities no edema.  She is kyphotic.  There is no focal severe thoracic back pain at this time.    ______________________________________________________________________    Diagnostics:    Results for orders placed or performed in visit on 12/18/18   Drug Abuse 1+, Urine   Result Value Ref Range    Amphetamines Screen Negative Screen Negative    Benzodiazepines  Screen Negative Screen Negative    Opiates Screen Negative Screen Negative    Phencyclidine Screen Negative Screen Negative    THC Screen Negative Screen Negative    Barbiturates Screen Negative Screen Negative    Cocaine Metabolite Screen Negative Screen Negative    Methadone Screen Negative Screen Negative    Oxycodone (!) Screen Negative     Screen Positive (Confirmation available on request)    Creatinine, Urine 105.6 mg/dL     ______________________________________________________________________    Assessment:    1. COPD exacerbation (H)    2. Encounter for therapeutic drug monitoring    3. 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)    4. Osteoporosis without current pathological fracture, unspecified osteoporosis type    5. Controlled substance agreement signed - 12/18 - Oxycodone - UDS 12/18    6. Chronic midline thoracic back pain    7. Traumatic compression fracture of T8 thoracic vertebra, closed, initial encounter (H)    8. Acute myeloid leukemia (AML), M2 (H)      ______________________________________________________________________     PHQ-2 Total Score: 0 (12/18/2018 11:00 AM)    No Data Recorded  ______________________________________________________________________    Plan:    1. Controlled substance agreement renewed today.  2. Urine drug screen (UDS) done today.  3. Refilled pain medications.  4. Continue to follow with the [facility].  5. Follow-up sooner if issues.    Jorge Dunn MD  General Internal Medicine  [facility]     Return in about 6 months (around 6/18/2019) for follow up visit.     Future Appointments   Date Time Provider Department Center   6/18/2019 11:20 AM Jorge Dunn MD [facility]         ______________________________________________________________________     Relevant ICD-10 codes and order associations:      ICD-10-CM    1. COPD exacerbation (H) J44.1 albuterol (PROVENTIL) 2.5 mg /3 mL (0.083 %) nebulizer solution   2.  Encounter for therapeutic drug monitoring Z51.81 Drug Abuse 1+, Urine     Drug Abuse 1+, Urine   3. 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) D89.813    4. Osteoporosis without current pathological fracture, unspecified osteoporosis type M81.0    5. Controlled substance agreement signed - 12/18 - Oxycodone - UDS 12/18 Z79.899    6. Chronic midline thoracic back pain M54.6     G89.29    7. Traumatic compression fracture of T8 thoracic vertebra, closed, initial encounter (H) S22.060A    8. Acute myeloid leukemia (AML), M2 (H) C92.00

## 2021-06-28 NOTE — PROGRESS NOTES
Progress Notes by Jorge Dunn MD at 12/17/2019  1:00 PM     Author: Jorge Dunn MD Service: -- Author Type: Physician    Filed: 12/19/2019  7:22 AM Encounter Date: 12/17/2019 Status: Signed    : Jorge Dunn MD (Physician)              [facility]    Comprehensive and complex medical care - Chronic disease management - Shared decision making - Care coordination - Compassionate care    Patient advocacy - Rational deprescribing - Minimally disruptive medicine - Ethical focus - Customized care          Date of Service: 12/17/2019  Primary Provider: Jorge Dunn MD    Patient Care Team:  Jorge Dunn MD as PCP - General (Internal Medicine)  Mohit Montano MD as Physician (Hematology and Oncology)  Dmitriy Agee MD as Physician (Internal Medicine)  Jorge Dunn MD as Assigned PCP  Joellen Pierre MD     ______________________________________________________________________     Patient's Pharmacy:    [facility] - [address]  [address]  [place] [address]  Phone: [phone] Fax: [phone]     Patient's Contacts:  Name Home Phone Work Phone Mobile Phone Relationship Lgl Joey   FRANCISCO JAVIER KESSLER   [phone] Significant*    JEAN JUARES [phone]   Grandchild      Patient's Insurance:    Payor: MEDICARE / Plan: MEDICARE A AND B / Product Type: Medicare /           Jenn Barclay is a 64 y.o. female who comes in today for:    Chief Complaint   Patient presents with   ? Follow-up     DIARRHEA IS GONE, CHRONIC BACK PAIN AND COPD   ? Chest Pain     UNDER LEFT BREAST AROUND SIDE X 2 MONTHS   ? Medication Refill     PAIN       Active Problem List:  Problem List as of 12/17/2019 Reviewed: 11/17/2019  8:13 PM by Jurchisin, Irais J, PA-C       High    Acute myeloid leukemia (AML), M2 (H)    COPD (chronic obstructive pulmonary disease) (H)    Full code status     Status post allogeneic bone marrow transplant - 2010 - [facility]    Tobacco abuse       Medium    Chronic back pain, thoracic    Chronic diarrhea    Controlled substance agreement signed - 12/18 - Oxycodone - UDS 12/18    Exocrine pancreatic insufficiency    Financial difficulties - limiting ability to afford medications    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)    Pseudogout - crystal proven 2016 - knee    Traumatic compression fracture of T8 thoracic vertebra, closed, initial encounter (H)       Low    Anxiety    Gastric ulcer    Migraine headache    OP (osteoporosis) - Started alendronate in 9/17.    Papular rash, generalized       Unprioritized    COPD exacerbation (H)           Current Outpatient Medications   Medication Sig Note   ? albuterol (PROVENTIL) 2.5 mg /3 mL (0.083 %) nebulizer solution USE ONE VIAL IN NEBULIZER EVERY 6 HOURS AS NEEDED FOR WHEEZING.    ? calcium citrate-vitamin D3 (CITRACAL + D MAXIMUM) 315-250 mg-unit per tablet Take 1 tablet by mouth daily. Generic Equate from [employer].     ? ibuprofen (ADVIL,MOTRIN) 800 MG tablet Take 1 tablet (800 mg total) by mouth 3 (three) times a day.    ? lipase-protease-amylase (CREON) 24,000-76,000 -120,000 unit CpDR capsule Take 24,000 Units by mouth 3 (three) times a day before meals. 3/9/2017: Received from: [facility] El[facility] Sig: Take 1 capsule (24,000 Units) by mouth 3 times daily (with meals) And snacks   ? multivitamin (ONE A DAY) per tablet Take 1 tablet by mouth daily.    ? [START ON 12/22/2019] oxyCODONE (ROXICODONE) 10 mg immediate release tablet Take 1 tablet (10 mg total) by mouth 4 (four) times a day as needed for pain. For use 12/22/19-1/21/2020.    ? pseudoephedrine-guaifenesin (MUCINEX D)  mg per tablet Take 1 tablet by mouth every 12 (twelve) hours.    ? triamcinolone (KENALOG) 0.1 % ointment Apply topically 2 (two) times a day. To rash.    ? ipratropium-albuterol (DUO-NEB) 0.5-2.5 mg/3 mL nebulizer Take 3 mL by nebulization  every 6 (six) hours for 7 days.      Social History     Social History Narrative    Patient of Dr. Dunn since 2016.        Single, lives in a trailer home.        Subjective:     Patient comes in today for follow-up of a number of issues.    We reviewed her bhhiy-byfbch-hnif disease.  She still has some issues with her bowels.  She has had a recent exacerbation of the rash on her back and buttocks.  She was given a cream by dermatology and will be seeing a dermatologist at the [facility] as well.  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    We reviewed her thoracic back pain.  We talked about possibly tapering her medications.  She is going to try to go towards this at some point.She denies any new back pain.  She does need urine drug screen.    We reviewed her COPD.  She has had recent issues with this again.  She is had no recent chest pain or chest pressure.  She had CT scan in April which was abnormal and we're going to follow-up on this.    She will be on new insurance in 2020 and her insurance cover her medications much better.      We  reviewed her other issues noted in the assessment but not specifically addressed in the HPI above.     On review of systems, the patient denies any chest pain or shortness of breath.    Objective:     Wt Readings from Last 3 Encounters:   12/17/19 113 lb (51.3 kg)   11/17/19 115 lb (52.2 kg)   10/16/19 115 lb (52.2 kg)     BP Readings from Last 3 Encounters:   12/17/19 118/68   11/17/19 120/67   10/17/19 113/57     /68   Pulse 74   Wt 113 lb (51.3 kg)   SpO2 95%   BMI 16.21 kg/m     The patient is comfortable, no acute distress.  Mood good.  Insight good.  Eyes are nonicteric.  Neck is supple without mass.  No cervical adenopathy.  No thyromegaly. Heart regular rate and rhythm.  Lungs clear to auscultation bilaterally.  Respiratory effort is good.  Abdomen soft and nontender.  No hepatosplenomegaly.  Extremities no  edema.      Diagnostics:     Results for orders placed or performed in visit on 12/17/19   Drug Abuse 1+, Urine   Result Value Ref Range    Amphetamines Screen Negative Screen Negative    Benzodiazepines Screen Negative Screen Negative    Opiates (!) Screen Negative     Screen Positive (Confirmation available on request)    Phencyclidine Screen Negative Screen Negative    THC Screen Negative Screen Negative    Barbiturates Screen Negative Screen Negative    Cocaine Metabolite Screen Negative Screen Negative    Methadone Screen Negative Screen Negative    Oxycodone (!) Screen Negative     Screen Positive (Confirmation available on request)    Creatinine, Urine 208.9 mg/dL       Assessment:     1. Chronic obstructive pulmonary disease, unspecified COPD type (H)    2. Encounter for therapeutic drug monitoring    3. 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)    4. Tobacco abuse    5. Pulmonary nodules    6. Asymptomatic postmenopausal state    7. Controlled substance agreement signed - 12/19 - Oxycodone - UDS 12/19    8. Exocrine pancreatic insufficiency    9. Traumatic compression fracture of T8 thoracic vertebra, closed, initial encounter (H)    10. Senile purpura (H) - discussed lack on management options in relationship to this.   11. Mammogram declined    12. Chronic midline thoracic back pain        Quality review:     PHQ-2 Total Score: 0 (12/17/2019  1:00 PM)  Depression Follow-up Plan: patient follow-up to return when and if necessary (6/18/2019 12:00 PM)    PHQ-9 Total Score: 4 (6/18/2019 12:00 PM)    ______________________________________________________________________     BMI Readings from Last 1 Encounters:   12/17/19 16.21 kg/m        Plan:     1. Patient will continue current medications.  2. We refilled her pain medication.  3. We did a urine drug screen today.  4. DEXA scan was ordered for her bones.  5. A CT scan was ordered to follow-up on her lung nodules noted and April.  6. She declines  mammogram.  7. She has flu and Pneumovax given today.         Jorge Dunn MD  General Internal Medicine  [facility]    Personal office fax - [phone]   Voice mail - [phone]  E-mail - [email or web address]     Return in about 6 months (around 6/17/2020) for visit and blood work.     No future appointments.      ______________________________________________________________________     Relevant ICD-10 codes and order associations:      ICD-10-CM    1. Chronic obstructive pulmonary disease, unspecified COPD type (H) J44.9 Pneumococcal polysaccharide vaccine 23-valent greater than or equal to 1yo subcutaneous/IM   2. Encounter for therapeutic drug monitoring Z51.81 Drug Abuse 1+, Urine     Drug Abuse 1+, Urine   3. 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) D89.813    4. Tobacco abuse Z72.0 CT Chest Without Contrast   5. Pulmonary nodules R91.8 CT Chest Without Contrast   6. Asymptomatic postmenopausal state Z78.0 DXA Bone Density Scan     DXA Bone Density Scan   7. Controlled substance agreement signed - 12/19 - Oxycodone - UDS 12/19 Z79.899    8. Exocrine pancreatic insufficiency K86.81    9. Traumatic compression fracture of T8 thoracic vertebra, closed, initial encounter (H) S22.060A    10. Senile purpura (H) D69.2    11. Mammogram declined Z53.20    12. Chronic midline thoracic back pain M54.6 oxyCODONE (ROXICODONE) 10 mg immediate release tablet    G89.29

## 2021-06-29 NOTE — PROGRESS NOTES
"Progress Notes by Alejandrina Harman AuD at 8/7/2020  9:00 AM     Author: Alejandrina Harman AuD Service: -- Author Type: Audiologist    Filed: 8/7/2020  9:19 AM Encounter Date: 8/7/2020 Status: Signed    : Alejandrina Harman AuD (Audiologist)       Audiology Report    Summary: Audiology visit completed. Please see audiogram below or in \"media\" tab for case history and results.     Plan:  The patient is returned to ENT for follow up. Return for retesting with ENT recommendation.     Deondre Cannon, [facility]  Clinical Audiologist  [place] #[id number]           "

## 2021-06-29 NOTE — PROGRESS NOTES
Progress Notes by Jorge Dunn MD at 2020  1:15 PM     Author: Jorge Dunn MD Service: -- Author Type: Physician    Filed: 2020 10:53 PM Encounter Date: 2020 Status: Signed    : Jorge Dunn MD (Physician)              [facility]    Comprehensive and complex medical care - Chronic disease management - Shared decision making - Care coordination - Compassionate care    Patient advocacy - Rational deprescribing - Minimally disruptive medicine - Ethical focus - Customized care          Date of Service: 2020  Primary Provider: Jorge Dunn MD    Patient Care Team:  Jorge Dunn MD as PCP - General (Internal Medicine)  Mohit Montano MD as Physician (Hematology and Oncology)  Dmitriy Agee MD as Physician (Internal Medicine)  Jorge Dunn MD as Assigned PCP  Joellen Pierre MD     ______________________________________________________________________     Patient's Pharmacy:      [facility]  - [place], [place] - [address] [place] [address] [address]  [address] [facility] [address]  [facility] [address]  Phone: [phone] Fax: [phone]     Patient's Contacts:  Name Home Phone Work Phone Mobile Phone Relationship Lgl Grnabeel KESSLERFRANCISCO JAVIER   [phone] Significant*    MORJEAN [phone]   Grandchild        Patient's Insurance:    Payor/Plan Subscr  Sex Relation Sub. Ins. ID Effective Group Num   1. MEDICARE - ME* TRINIDADWESLEY 1955 Female  [id number]                                    [place], [address]   2. [employer] - ABIEL* WESLEY BARCLAY 1955 Female Self [id number] 20 [id number]                                   [address] [address]           Wesley AVA Barclay is a 64 y.o. female who comes in today for:    Chief Complaint   Patient presents with   ? Toe Pain     left big toe   ? Labs Only     covid before colonoscopy   ? loose stool   ? Follow-up     still has thoracic back pain, copd        Active Problem List:  Problem List as of 7/16/2020 Reviewed: 12/18/2019 11:09 PM by Jorge Dunn MD       High    Full code status    Tobacco abuse    Acute myeloid leukemia (AML), M2 (H)    COPD (chronic obstructive pulmonary disease) (H)    Status post allogeneic bone marrow transplant - 2010 - [facility]       Medium    Controlled substance agreement signed - 12/19 - Oxycodone - UDS 12/19    Chronic back pain, thoracic    Chronic diarrhea    Exocrine pancreatic insufficiency    Financial difficulties - limiting ability to afford medications    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)    Pseudogout - crystal proven 2016 - knee    Traumatic compression fracture of T8 thoracic vertebra, closed, initial encounter (H)       Low    Anxiety    Gastric ulcer    Migraine headache    OP (osteoporosis) - Started alendronate in 9/17.    Papular rash, generalized       Unprioritized    COPD exacerbation (H)    Senile purpura (H)           Current Outpatient Medications   Medication Sig Note   ? albuterol (PROAIR HFA;PROVENTIL HFA;VENTOLIN HFA) 90 mcg/actuation inhaler Inhale 2 puffs every 6 (six) hours as needed for wheezing.    ? albuterol (PROVENTIL) 2.5 mg /3 mL (0.083 %) nebulizer solution USE 1 VIAL IN NEBULIZER EVERY 6 HOURS AS NEEDED FOR WHEEZING    ? calcium citrate-vitamin D3 (CITRACAL + D MAXIMUM) 315-250 mg-unit per tablet Take 1 tablet by mouth daily. Generic Equate from [employer].     ? ibuprofen (ADVIL,MOTRIN) 800 MG tablet Take 1 tablet (800 mg total) by mouth 3 (three) times a day.    ? lipase-protease-amylase (CREON) 24,000-76,000 -120,000 unit CpDR capsule Take 24,000 Units by mouth 3 (three) times a day before meals. 3/9/2017: Received from: [facility] Received Sig: Take 1 capsule (24,000 Units) by mouth 3 times daily (with meals) And snacks   ? multivitamin (ONE A DAY) per tablet Take 1 tablet by mouth daily.    ? oxyCODONE (ROXICODONE) 10 mg immediate release tablet Take 1 tablet (10 mg total) by  mouth 4 (four) times a day as needed for pain. For use 6/25/2020-7/25/2020.    ? pseudoephedrine-guaifenesin (MUCINEX D)  mg per tablet Take 1 tablet by mouth every 12 (twelve) hours.    ? triamcinolone (KENALOG) 0.1 % ointment Apply topically 2 (two) times a day. To rash.    ? alendronate (FOSAMAX) 70 MG tablet Take 1 tablet (70 mg total) by mouth every 7 days. Take in the morning on an empty stomach with a full glass of water 30 minutes before food    ? doxycycline (VIBRA-TABS) 100 MG tablet Take 1 tablet (100 mg total) by mouth daily. May substitute doxycyline monohydrate or capsules if cheaper.    ? ipratropium-albuterol (DUO-NEB) 0.5-2.5 mg/3 mL nebulizer Take 3 mL by nebulization every 6 (six) hours for 7 days.      Social History     Social History Narrative    Patient of Dr. Dunn since 2016.        Single, lives in a trailer home.        Subjective:     Roomed by: kristopher beard    Accompanied by Daughter    Refills needed? No    Do you have any forms that need to be filled out? No       Comes in today for multiple issues.    Chronic thoracic back pain is about the same.  No worse.  No new pains.    Reviewed the patient's tobacco abuse, but she is currently not interested in stopping.     Reviewed chronic obstructive lung disease and there are no new issues with this.  No recent exacerbation.    Will be getting follow up colonoscopy at [facility]).  Needs coronavirus testing locally if able.    Left great toe is paining her quite a bit.  On the bottom where there is a callus.  Wearing a bandaid can help.  No numbness in the toe or burning pain but pain can be sharp when on feet.    Has acne and a skin lesion on her chin that are bothering her.  It is way too long for her to see dermatology at the [facility]) and would like to see someone locally for the cystic lesion on her face.    We reviewed her other issues noted in the assessment but not specifically  addressed in the HPI above.     Past medical, family and social history reviewed today.     Comprehensive review of systems was performed today with no major problems noted except as above.     Objective:     Wt Readings from Last 3 Encounters:   07/16/20 108 lb (49 kg)   12/24/19 113 lb (51.3 kg)   12/17/19 113 lb (51.3 kg)     BP Readings from Last 3 Encounters:   07/16/20 118/56   12/25/19 110/61   12/17/19 118/68     /56   Pulse 72   Wt 108 lb (49 kg)   SpO2 96%   BMI 15.50 kg/m     The patient is comfortable, no acute distress.  Mood good.  Insight good.  Eyes are nonicteric.  Neck is supple without mass.  No cervical adenopathy.  No thyromegaly. Heart regular rate and rhythm.  Lungs clear to auscultation bilaterally.  Respiratory effort is good.  Abdomen soft and nontender.  No hepatosplenomegaly.  Extremities no edema.  Her skin reveals acne on the face especially the left.  There is a 1 cm sebaceous cyst of her left chin which is mildly sore.  The left great toe shows a callus where the pain is without signs of infection.    Diagnostics:     Results for orders placed or performed during the hospital encounter of 12/24/19   Influenza A/B Rapid Test    Specimen: Nasopharyngeal Swab; Nasopharyngeal (Inpt/ED) or Nasal Mucosa (Outpt)   Result Value Ref Range    Influenza  A, Rapid Antigen No Influenza A antigen detected No Influenza A antigen detected    Influenza B, Rapid Antigen No Influenza B antigen detected No Influenza B antigen detected   Basic Metabolic Panel   Result Value Ref Range    Sodium 139 136 - 145 mmol/L    Potassium 3.6 3.5 - 5.0 mmol/L    Chloride 103 98 - 107 mmol/L    CO2 27 22 - 31 mmol/L    Anion Gap, Calculation 9 5 - 18 mmol/L    Glucose 112 70 - 125 mg/dL    Calcium 8.9 8.5 - 10.5 mg/dL    BUN 13 8 - 22 mg/dL    Creatinine 0.71 0.60 - 1.10 mg/dL    GFR MDRD Af Amer >60 >60 mL/min/1.73m2    GFR MDRD Non Af Amer >60 >60 mL/min/1.73m2   CK   Result Value Ref Range    CK, Total 44  30 - 190 U/L   HM1 (CBC with Diff)   Result Value Ref Range    WBC 10.6 4.0 - 11.0 thou/uL    RBC 4.19 3.80 - 5.40 mill/uL    Hemoglobin 12.4 12.0 - 16.0 g/dL    Hematocrit 37.7 35.0 - 47.0 %    MCV 90 80 - 100 fL    MCH 29.6 27.0 - 34.0 pg    MCHC 32.9 32.0 - 36.0 g/dL    RDW 13.5 11.0 - 14.5 %    Platelets 291 140 - 440 thou/uL    MPV 8.6 8.5 - 12.5 fL    Neutrophils % 64 50 - 70 %    Lymphocytes % 23 20 - 40 %    Monocytes % 11 (H) 2 - 10 %    Eosinophils % 1 0 - 6 %    Basophils % 1 0 - 2 %    Neutrophils Absolute 6.8 2.0 - 7.7 thou/uL    Lymphocytes Absolute 2.4 0.8 - 4.4 thou/uL    Monocytes Absolute 1.2 (H) 0.0 - 0.9 thou/uL    Eosinophils Absolute 0.1 0.0 - 0.4 thou/uL    Basophils Absolute 0.1 0.0 - 0.2 thou/uL       Assessment:     1. Chronic midline thoracic back pain    2. Rash    3. Acute myeloid leukemia (AML), M2 (H) - No signs of recurrent disease.   4. 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) - Follows at [facility] ([facility]).   5. Chronic obstructive pulmonary disease, unspecified COPD type (H) - Stable at this time.   6. Senile purpura (H) - Not worsening.   7. Screening for viral disease    8. Sebaceous cyst    9. Multiple lesions of face    10. Cystic acne    11. Tubular adenoma of colon - 2 diminuitive in 2017    12. Serrated adenoma of colon - 16-20 cm - 2017    13. Traumatic compression fracture of T8 thoracic vertebra, closed, initial encounter (H) - on pain medications still for this.   14. Exocrine pancreatic insufficiency    15. Tobacco abuse    16. Great toe pain, left        Quality review:     PHQ-2 Total Score: 0 (7/16/2020  1:00 PM)      No data recorded  ______________________________________________________________________     BMI Readings from Last 1 Encounters:   07/16/20 15.50 kg/m        Plan:     1. Continue current medications.  2. Colonoscopy at [facility] ([facility]) to follow up colon lesions.  3. Coronavirus screening her before  procedure.  4. Doxycycline for acne.  5. Dermatology referral for facial sebaceous cyst   6. Stop tobacco.  7. Continue current pain management.  8. Consider update of CSA (controlled substance agreement) at the next visit.   9. Review UDS/DOA screen at next visit if appropriate.   10. Chronic obstructive pulmonary disease (COPD) currently doing well.  11. Try padding for great toe issue.  12. Follow up sooner if issues.         Jorge Dunn MD  General Internal Medicine  [facility]    Personal office fax - [phone]   Voice mail - [phone]  E-mail - [email or web address]     Return in about 6 months (around 1/16/2021), or if symptoms worsen or fail to improve, for visit and blood work.     Future Appointments   Date Time Provider Department Center   1/14/2021  1:15 PM Jorge Dunn MD [facility]         ______________________________________________________________________     Relevant ICD-10 codes and order associations:      ICD-10-CM    1. Chronic midline thoracic back pain  M54.6     G89.29    2. Rash  R21 triamcinolone (KENALOG) 0.1 % ointment   3. Acute myeloid leukemia (AML), M2 (H)  C92.00    4. 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)  D89.813    5. Chronic obstructive pulmonary disease, unspecified COPD type (H)  J44.9    6. Senile purpura (H)  D69.2    7. Screening for viral disease  Z11.59 Asymptomatic COVID-19 Virus (CORONAVIRUS) PCR   8. Sebaceous cyst  L72.3 Ambulatory referral to Dermatology   9. Multiple lesions of face  L98.9 Ambulatory referral to Dermatology   10. Cystic acne  L70.0 doxycycline (VIBRA-TABS) 100 MG tablet   11. Tubular adenoma of colon - 2 diminuitive in 2017  D12.6    12. Serrated adenoma of colon - 16-20 cm - 2017  D12.6    13. Traumatic compression fracture of T8 thoracic vertebra, closed, initial encounter (H)  S22.060A    14. Exocrine pancreatic insufficiency  K86.81    15. Tobacco abuse  Z72.0    16. Great toe pain, left   M79.676

## 2021-06-30 NOTE — PROGRESS NOTES
Progress Notes by Jorge Dunn MD at 2021  1:15 PM     Author: Jorge Dunn MD Service: -- Author Type: Physician    Filed: 2021 10:03 PM Encounter Date: 2021 Status: Signed    : Jorge Dunn MD (Physician)              [facility]    Comprehensive and complex medical care - Chronic disease management - Shared decision making - Care coordination - Compassionate care    Patient advocacy - Rational deprescribing - Minimally disruptive medicine - Ethical focus - Customized care          Date of Service: 2021  Primary Provider: Jorge Dunn MD    Patient Care Team:  Jorge Dunn MD as PCP - General (Internal Medicine)  Mohit Montano MD as Physician (Hematology and Oncology)  Dmitriy Agee MD as Physician (Internal Medicine)  Jorge Dunn MD as Assigned PCP  Joellen Pierre MD Naylor, Mackenzie Galo MD as Assigned Surgical Provider     ______________________________________________________________________     Patient's Pharmacy:    [facility]  - [place], [place] - [address]  [address]  [facility] [address]  Phone: [phone] Fax: [phone]     Patient's Contacts:  Name Home Phone Work Phone Mobile Phone Relationship Lgl GrFRANCISCO JAVIER Munoz   [phone] Significant*    JEAN JUARES [phone]   Grandchild      Patient's Insurance:    Payor/Plan Subscr  Sex Relation Sub. Ins. ID Effective Group Num   1. MEDICARE - ME* WESLEY BARCLAY 1955 Female Self [id number]                                    [place], [address]   2. [employer] - ABIEL* WESLEY BARCLAY 1955 Female Self [id number] 20 [id number]                                   [address] [address]           Wesley Barclay is a 65 y.o. female who comes in today for:    Chief Complaint   Patient presents with   ? Follow-up     chronic thoracic back pain, chronic obstuctive lung disease    ? Labs Only       Active Problem List:  Problem List as of 1/14/2021 Reviewed: 7/25/2020 12:52 PM by Her Ramila Wiggins CMA       High    Full code status    Tobacco abuse    Acute myeloid leukemia (AML), M2 (H)    COPD (chronic obstructive pulmonary disease) (H)    Status post allogeneic bone marrow transplant - 2010 - [facility]       Medium    Controlled substance agreement signed - 12/19 - Oxycodone - UDS 12/19    Chronic back pain, thoracic    Chronic diarrhea    Exocrine pancreatic insufficiency    Financial difficulties - limiting ability to afford medications    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)    Pseudogout - crystal proven 2016 - knee    Traumatic compression fracture of T8 thoracic vertebra, closed, initial encounter (H)       Low    Anxiety    Gastric ulcer    Migraine headache    OP (osteoporosis) - Started alendronate in 9/17.    Papular rash, generalized       Unprioritized    Senile purpura (H)    Serrated adenoma of colon - 16-20 cm - 2017    Tubular adenoma of colon - 2 diminuitive in 2017           Current Outpatient Medications   Medication Sig Note   ? albuterol (PROAIR HFA;PROVENTIL HFA;VENTOLIN HFA) 90 mcg/actuation inhaler Inhale 2 puffs every 6 (six) hours as needed for wheezing.    ? albuterol (PROVENTIL) 2.5 mg /3 mL (0.083 %) nebulizer solution USE 1 VIAL IN NEBULIZER EVERY 6 HOURS AS NEEDED FOR WHEEZING    ? calcium citrate-vitamin D3 (CITRACAL + D MAXIMUM) 315-250 mg-unit per tablet Take 1 tablet by mouth daily. Generic Equate from [employer].     ? doxycycline (VIBRA-TABS) 100 MG tablet Take 1 tablet (100 mg total) by mouth daily. May substitute doxycyline monohydrate or capsules if cheaper.    ? ibuprofen (ADVIL,MOTRIN) 800 MG tablet Take 1 tablet (800 mg total) by mouth 3 (three) times a day.    ? multivitamin (ONE A DAY) per tablet Take 1 tablet by mouth daily.    ? oxyCODONE (ROXICODONE) 10 mg immediate release tablet Take 1 tablet (10 mg total) by mouth 4 (four) times a  day as needed for pain. For use 12/22/2020-1/21/2021.  Follow up in January 2021.    ? pseudoephedrine-guaifenesin (MUCINEX D)  mg per tablet Take 1 tablet by mouth every 12 (twelve) hours.    ? triamcinolone (KENALOG) 0.1 % ointment Apply topically 2 (two) times a day. To rash.    ? ipratropium-albuterol (DUO-NEB) 0.5-2.5 mg/3 mL nebulizer Take 3 mL by nebulization every 6 (six) hours for 7 days.    ? lipase-protease-amylase (CREON) 24,000-76,000 -120,000 unit CpDR capsule Take 24,000 Units by mouth 3 (three) times a day before meals. 3/9/2017: Received from: [facility] Received Sig: Take 1 capsule (24,000 Units) by mouth 3 times daily (with meals) And snacks       Social History     Social History Narrative    Patient of Dr. Dunn since 2016.        Single, lives in a trailer home.        Subjective:     Comes in today for follow up.    Used [employer] today.    Reviewed her chronic pain.  She continues on her pain medications.  She feels this is managing it well at this time.  No side effects from the medication.    Still has diarrhea and is getting pancreatic enzymes through [facility]    No recent issues with her lungs.    We reviewed her other issues noted in the assessment but not specifically addressed in the HPI above.     Objective:     Wt Readings from Last 3 Encounters:   01/14/21 112 lb (50.8 kg)   08/15/20 108 lb (49 kg)   07/16/20 108 lb (49 kg)     BP Readings from Last 3 Encounters:   08/15/20 137/75   07/16/20 118/56   12/25/19 110/61     Wt 112 lb (50.8 kg)   SpO2 94%   BMI 16.54 kg/m     The patient is comfortable, no acute distress.  Mood good.  Insight good.  Eyes are nonicteric.  Neck is supple without mass.  No cervical adenopathy.  No thyromegaly. Heart regular rate and rhythm.  Lungs clear to auscultation bilaterally.  Respiratory effort is good.  Abdomen soft and nontender.  No hepatosplenomegaly.  Extremities no edema.      Diagnostics:     Results for  orders placed or performed in visit on 07/25/20   COVID-19 Virus PCR MRF    Specimen: Respiratory   Result Value Ref Range    COVID-19 VIRUS SPECIMEN SOURCE Nasopharyngeal     2019-nCOV Not Detected        Quality review:     PHQ-2 Total Score: 0 (1/14/2021  1:00 PM)    No data recorded  ______________________________________________________________________     BMI Readings from Last 1 Encounters:   01/14/21 16.54 kg/m        Assessment and Plan:     1. Acute myeloid leukemia (AML), M2 (H)  Remains in remission.  Follow at the [facility] ([facility]    2. 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)  No worsening symptoms.    3. Chronic obstructive pulmonary disease, unspecified COPD type (H)  No recent exacerbations.    4. Senile purpura (H)  No worsening.    5. Encounter for therapeutic drug level monitoring    - Pain Management Drug Panel, Urine    6. Chronic midline thoracic back pain  Continue current pain management.    7. Controlled substance agreement signed - 1/21 - Oxycodone - UDS 1/21  Renewed urine drug screen (UDS) and controlled substance agreement (CSA) today.    8. Tobacco abuse  Work on stopping smoking.    9. Exocrine pancreatic insufficiency  Resume pancreatic enzymes when able.    40 minutes or greater was spent today on the patient's care on the day of service.      This includes time for chart preparation, reviewing medical tests done before or during the visit, talking with the patient, review of quality indicators, required documentation, and other elements of care.           Jorge Dunn MD  General Internal Medicine  [facility]      Return in about 6 months (around 7/14/2021), or if symptoms worsen or fail to improve, for visit and blood work.     Future Appointments   Date Time Provider Department Center   7/13/2021  1:00 PM Jorge Dunn MD [facility]         HCC issues resolved at this visit.

## 2021-07-02 ENCOUNTER — TELEPHONE (OUTPATIENT)
Dept: TRANSPLANT | Facility: CLINIC | Age: 66
End: 2021-07-02

## 2021-07-02 NOTE — TELEPHONE ENCOUNTER
Clinical   Blood and Marrow Transplant Service    After multiple (4+) attempts to have patient fax information about income to writer we finally had success. Writer received information from patient and then faxed that information along with the prescription from Dr. Agee to [employer] today at 1315. Will follow-up with company on Monday 7.5 or Tuesday 7.6 to check on progress of sending free drug to patient.    No other questions or concerns identified at this time. Will continue to follow for supportive counseling and assist with resources.     Arabella MEREDITH [facility]    Clinical   7/2/2021  [facility]  Adult Blood and Marrow Transplant Program  [address] [place] [address]  [email or web address]  [email or web address]  Office: [phone]   Pager: [phone]

## 2021-07-03 NOTE — ADDENDUM NOTE
Addendum Note by Kaye George MD at 6/4/2018  4:58 PM     Author: Kaye George MD Service: -- Author Type: Physician    Filed: 6/4/2018  4:58 PM Encounter Date: 6/1/2018 Status: Signed    : Kaye George MD (Physician)    Addended by: KAYE GEORGE on: 6/4/2018 04:58 PM        Modules accepted: Orders

## 2021-07-06 VITALS — BODY MASS INDEX: 16.59 KG/M2 | HEIGHT: 69 IN | WEIGHT: 112 LBS

## 2021-07-07 ENCOUNTER — TELEPHONE (OUTPATIENT)
Dept: TRANSPLANT | Facility: CLINIC | Age: 66
End: 2021-07-07

## 2021-07-07 NOTE — TELEPHONE ENCOUNTER
Clinical   Blood and Marrow Transplant Service    Faxed information to [employer] - follow-up phone call today to see is they received it and when patient can expect her first shipment. Spent a total of 20 minutes - 2 separate times on hold with no one answering phone. Will try again later.     Will continue to follow for supportive counseling and assist with resources.     Arabella MEREDITH [facility]    Clinical   7/7/2021  [facility]  Adult Blood and Marrow Transplant Program  [address] [place] [id number]  [email or web address]  [email or web address]  Office: [phone]   Pager: [phone]

## 2021-07-12 ENCOUNTER — TELEPHONE (OUTPATIENT)
Dept: TRANSPLANT | Facility: CLINIC | Age: 66
End: 2021-07-12

## 2021-07-12 DIAGNOSIS — Z71.9 VISIT FOR COUNSELING: Primary | ICD-10-CM

## 2021-07-12 NOTE — TELEPHONE ENCOUNTER
Clinical   Blood and Marrow Transplant Service    Another phone call to [employer] [phone] to ensure they received all of the paperwork for Ashley to have her CREON no renewed. 2 attempts to speak with [employer] were unsuccessful last week after being on hold for 20 minutes each time. Spoke with patient and she said that she has not heard anything from [employer] either.     On hold for [employer] for 15 minutes and they indicated that there was a discrepancy between the income listed on the application and what is shown on the SSDI form. Writer conferenced in Ashley on the call - there was a conversation about the income forms they have received. They also stated that they told writer in June that they required a new application - this was never shared with writer.     Another application generated - copy sent via email to patient for her review and to give her permission for the application to be mailed in on her behalf. MD portion sent to him as well for signature. [employer] is stating that the ONLY piece of documentation they require at this time is the application - nothing else related to insurance cards, income or a prescription - they have all of these in their possession from the original application sent in January 2021 and fax on 7.2.2021.     No other questions or concerns identified at this time. Will continue to follow for supportive counseling and assist with resources.     Arabella MEREDITH [facility]    Clinical   7/12/2021  [facility]  Adult Blood and Marrow Transplant Program  [address] [place] [address]  [email or web address]  [email or web address]  Office: [phone]   Pager: [phone]

## 2021-07-13 ENCOUNTER — OFFICE VISIT (OUTPATIENT)
Dept: INTERNAL MEDICINE | Facility: CLINIC | Age: 66
End: 2021-07-13

## 2021-07-13 ENCOUNTER — CARE COORDINATION (OUTPATIENT)
Dept: TRANSPLANT | Facility: CLINIC | Age: 66
End: 2021-07-13

## 2021-07-13 VITALS
DIASTOLIC BLOOD PRESSURE: 80 MMHG | OXYGEN SATURATION: 98 % | WEIGHT: 106 LBS | SYSTOLIC BLOOD PRESSURE: 132 MMHG | BODY MASS INDEX: 15.65 KG/M2 | HEART RATE: 67 BPM

## 2021-07-13 DIAGNOSIS — J44.9 CHRONIC OBSTRUCTIVE PULMONARY DISEASE, UNSPECIFIED COPD TYPE (H): ICD-10-CM

## 2021-07-13 DIAGNOSIS — Z72.0 TOBACCO ABUSE: Chronic | ICD-10-CM

## 2021-07-13 DIAGNOSIS — Z94.81 STATUS POST ALLOGENEIC BONE MARROW TRANSPLANT (H): ICD-10-CM

## 2021-07-13 DIAGNOSIS — M54.50 CHRONIC BILATERAL LOW BACK PAIN, UNSPECIFIED WHETHER SCIATICA PRESENT: ICD-10-CM

## 2021-07-13 DIAGNOSIS — G89.4 PAIN SYNDROME, CHRONIC: ICD-10-CM

## 2021-07-13 DIAGNOSIS — E87.6 HYPOKALEMIA: Primary | ICD-10-CM

## 2021-07-13 DIAGNOSIS — C92.00: ICD-10-CM

## 2021-07-13 DIAGNOSIS — G89.29 CHRONIC BILATERAL LOW BACK PAIN, UNSPECIFIED WHETHER SCIATICA PRESENT: ICD-10-CM

## 2021-07-13 DIAGNOSIS — S22.060A TRAUMATIC COMPRESSION FRACTURE OF T8 THORACIC VERTEBRA, CLOSED, INITIAL ENCOUNTER (H): ICD-10-CM

## 2021-07-13 DIAGNOSIS — Z94.81 S/P ALLOGENEIC BONE MARROW TRANSPLANT (H): ICD-10-CM

## 2021-07-13 DIAGNOSIS — Z53.20 MAMMOGRAM DECLINED: ICD-10-CM

## 2021-07-13 DIAGNOSIS — Z79.899 CONTROLLED SUBSTANCE AGREEMENT SIGNED: Chronic | ICD-10-CM

## 2021-07-13 DIAGNOSIS — Z13.29 SCREENING FOR THYROID DISORDER: ICD-10-CM

## 2021-07-13 DIAGNOSIS — M77.01 MEDIAL EPICONDYLITIS OF ELBOW, RIGHT: ICD-10-CM

## 2021-07-13 PROBLEM — D12.6 SERRATED ADENOMA OF COLON: Status: ACTIVE | Noted: 2020-07-17

## 2021-07-13 PROBLEM — Z78.9 FULL CODE STATUS: Status: ACTIVE | Noted: 2017-09-10

## 2021-07-13 PROBLEM — G43.909 MIGRAINE HEADACHE: Status: ACTIVE | Noted: 2021-07-13

## 2021-07-13 PROBLEM — D69.2 SENILE PURPURA (H): Status: ACTIVE | Noted: 2019-12-18

## 2021-07-13 PROCEDURE — 99214 OFFICE O/P EST MOD 30 MIN: CPT | Performed by: INTERNAL MEDICINE

## 2021-07-13 RX ORDER — OXYCODONE HYDROCHLORIDE 10 MG/1
10 TABLET ORAL 4 TIMES DAILY
Qty: 120 TABLET | Refills: 0 | Status: SHIPPED | OUTPATIENT
Start: 2021-07-21 | End: 2021-08-18

## 2021-07-13 RX ORDER — POTASSIUM CHLORIDE 750 MG/1
10 CAPSULE, EXTENDED RELEASE ORAL 2 TIMES DAILY
Qty: 30 CAPSULE | Refills: 0 | Status: SHIPPED | OUTPATIENT
Start: 2021-07-13 | End: 2021-08-07

## 2021-07-13 RX ORDER — ALENDRONATE SODIUM 70 MG/1
70 TABLET ORAL
Qty: 4 TABLET | Refills: 11 | Status: SHIPPED | OUTPATIENT
Start: 2021-07-13 | End: 2022-07-14

## 2021-07-13 NOTE — PROGRESS NOTES
BMT CLINICAL SOCIAL WORK NOTE:    Focus: Drug no    Data: Pt is a 66 y/o female 11 years post BMT. Pt has been working on resubmitting her [employer] assistance.    Interventions: pt completed the application requirements and SW faxed the completed forms to [employer] [phone]. Call was placed to [employer] to confirm acceptance, they noted it can take 24-48 hrs to process the application.      Plan: CSW will continue to work with Pt and family to provide supportive counseling and assist with resources as needed. CSW will continue to collaborate with multidisciplinary team regarding Pt's plan of care.     MASOUD Ramirez, [facility]  Pager: [phone]  Phone: [phone]

## 2021-07-13 NOTE — PATIENT INSTRUCTIONS
Please follow up if you have any further issues.    You may contact me by phone or MyChart if you are worsening or if things are not improving.    ______________________________________________________________________     Please remember that you can call [phone] to schedule an appointment.     You can schedule appointments 24 hours a day, 7 days a week.  Sometimes the best time to schedule an appointment is after clinic hours when less people are calling in.  Weekends are another option for calling in to schedule appointments.

## 2021-07-14 PROBLEM — J44.1 COPD EXACERBATION (H): Status: RESOLVED | Noted: 2018-11-06 | Resolved: 2020-07-17

## 2021-07-15 NOTE — PROGRESS NOTES
[facility]    Comprehensive and complex medical care - Chronic disease management - Shared decision making - Care coordination - Compassionate care    Patient advocacy - Rational deprescribing - Minimally disruptive medicine - Ethical focus - Customized care         Date of Service: 7/13/2021  Primary Provider: Jorge Dunn    Patient Care Team:  Jorge Dunn MD as PCP - General (Internal Medicine)  Mohit Montano MD as MD (Hematology & Oncology)  Arabella Grande LICSW as   Evelyn Resendiz RN as BMT Nurse Coordinator  Sarah Ross MD as Assigned Endocrinology Provider  Jhon Herrmann MD as Assigned Surgical Provider  Jorge Dunn MD as Assigned PCP  Joellen Nova MD as Resident (Dermatology)          Patient's Pharmacy:    [id number] - [place], [place] - [address]  [address]  [facility] [address]  Phone: [phone] Fax: [phone]    [facility] [employer] [facility] - [place], [place] - [address] [address]  Phone: [phone] Fax: [phone]     Patient's Contacts:  Name Home Phone Work Phone Mobile Phone Relationship Lgl JEAN Hernández [phone] GrandFRANCISCO JAVIER Jackson   [phone] Significant*      Patient's Insurance:    Payor: [employer] / Plan: [employer] MEDICARE ADVANTAGE / Product Type: Medicare /            Active Problem List:  Problem List as of 7/13/2021 Reviewed: 2/17/2020 12:04 PM by Jhon Herrmann MD       Medium    AML (acute myelogenous leukemia) (H)    Encounter for long-term current use of medication    Anxiety    Chronic back pain    Controlled substance agreement signed    COPD (chronic obstructive pulmonary disease) (H)    Financial difficulties    Full code status    Gastric ulcer    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)    Migraine headache    Papular rash, generalized     Pseudogout    Senile purpura (H)    Tobacco abuse    Traumatic compression fracture of T8 thoracic vertebra, closed, initial encounter (H)    Serrated adenoma of colon    Tubular adenoma of colon    OP (osteoporosis)    Acute myeloid leukemia (AML), M2 (H)    Status post allogeneic bone marrow transplant (H)       Other    S/P allogeneic bone marrow transplant (H)    Exocrine pancreatic insufficiency    Underweight    High serum thyroid stimulating hormone (TSH)    Nocturia    Post-menopausal    Age-related osteoporosis with current pathological fracture with routine healing, subsequent encounter    Nephrolithiasis           Current Outpatient Medications   Medication Instructions     Albuterol Sulfate (VENTOLIN HFA) 108 (90 BASE) MCG/ACT AERS Inhale  into the lungs.     alendronate (FOSAMAX) 70 mg, Oral, EVERY 7 DAYS     amylase-lipase-protease (CREON) 13688-06871 units CPEP per EC capsule 2 capsules, Oral, 3 TIMES DAILY WITH MEALS, And snacks     cholecalciferol (VITAMIN D3) 1000 UNIT capsule 1 capsule, DAILY     clindamycin 1 % EX external lotion Topical, 2 TIMES DAILY     gentamicin 0.1 % EX external ointment Topical, 2 TIMES DAILY     Loperamide HCl (IMODIUM OR) Oral, 2 TIMES DAILY     Multiple Vitamins-Minerals (MULTIVITAL PO) Take  by mouth.     mupirocin (BACTROBAN) 2 % external ointment Use 2 times a day to affected area on cheek     oxyCODONE (ROXICODONE) 5 mg, Oral, EVERY 6 HOURS PRN     [START ON 7/21/2021] oxyCODONE IR (ROXICODONE) 10 mg, Oral, 4 TIMES DAILY, For use from 7/21/21 to 8/20/21.     potassium chloride ER (MICRO-K) 10 MEQ CR capsule 10 mEq, Oral, 2 TIMES DAILY     triamcinolone (KENALOG) 0.1 % external cream Apply tid to itchy or tender skin lesions     vitamin C (ASCORBIC ACID) 500 mg, DAILY     Social History     Social History Narrative    , on disability.        Has family in the area.        Patient of Dr. Dunn since 2015.        Subjective:     Jenn Barclay is a 65 year old  female who comes in today for:    Chief Complaint   Patient presents with     RECHECK     chronic back pain, chronic obstructive lung disease     Weight Loss     is not trying to lose weight        Patient comes in today for follow-up of a number of issues.    We first reviewed her COPD.  This has been doing better.  She has not had any recent issues.    We reviewed her recent visit with hypokalemia.  She felt weak at that time.  We reviewed potassium replacement.    Reviewed her chronic back pain in the thoracic area.  She is still on her chronic pain medications.  This has been stable.  She does not see ability to reduce the dose at this time.    Reviewed mammography and she declines this at this time.    She does continue to smoke.    Reviewed her exocrine pancreatic insufficiency.  She is still having diarrhea.  She is having issues getting her pancreas supplement.    We reviewed her weight loss and this has been continuing.    She has had some medial elbow pain as well.    We reviewed her other issues noted in the assessment but not specifically addressed in the HPI above.     Objective:     Wt Readings from Last 3 Encounters:   07/13/21 48.1 kg (106 lb)   06/23/21 50.4 kg (111 lb 3.2 oz)   01/14/21 50.8 kg (112 lb)     BP Readings from Last 3 Encounters:   07/13/21 132/80   06/23/21 125/68   12/16/20 126/69     /80   Pulse 67   Wt 48.1 kg (106 lb)   SpO2 98%   BMI 15.65 kg/m     The patient is comfortable, no acute distress.  Mood good.  Insight good.  Eyes are nonicteric.  Neck is supple without mass.  No cervical adenopathy.  No thyromegaly. Heart regular rate and rhythm.  Lungs clear to auscultation bilaterally.  Respiratory effort is good.  Abdomen soft and nontender.  No hepatosplenomegaly.  Extremities no edema.  Elbow exam shows some medial epicondylitis.    Diagnostics:     Orders Only on 06/23/2021   Component Date Value Ref Range Status     Sodium 06/23/2021 140  133 - 144 mmol/L Final      Potassium 06/23/2021 3.0* 3.4 - 5.3 mmol/L Final     Chloride 06/23/2021 102  94 - 109 mmol/L Final     Carbon Dioxide 06/23/2021 33* 20 - 32 mmol/L Final     Anion Gap 06/23/2021 5  3 - 14 mmol/L Final     Glucose 06/23/2021 82  70 - 99 mg/dL Final     Urea Nitrogen 06/23/2021 10  7 - 30 mg/dL Final     Creatinine 06/23/2021 0.71  0.52 - 1.04 mg/dL Final     GFR Estimate 06/23/2021 89  >60 mL/min/[1.73_m2] Final    Comment: Non  GFR Calc  Starting 12/18/2018, serum creatinine based estimated GFR (eGFR) will be   calculated using the Chronic Kidney Disease Epidemiology Collaboration   (CKD-EPI) equation.       GFR Estimate If Black 06/23/2021 >90  >60 mL/min/[1.73_m2] Final    Comment:  GFR Calc  Starting 12/18/2018, serum creatinine based estimated GFR (eGFR) will be   calculated using the Chronic Kidney Disease Epidemiology Collaboration   (CKD-EPI) equation.       Calcium 06/23/2021 8.4* 8.5 - 10.1 mg/dL Final     Bilirubin Total 06/23/2021 0.3  0.2 - 1.3 mg/dL Final     Albumin 06/23/2021 3.6  3.4 - 5.0 g/dL Final     Protein Total 06/23/2021 6.1* 6.8 - 8.8 g/dL Final     Alkaline Phosphatase 06/23/2021 52  40 - 150 U/L Final     ALT 06/23/2021 24  0 - 50 U/L Final     AST 06/23/2021 23  0 - 45 U/L Final     WBC 06/23/2021 8.1  4.0 - 11.0 10e9/L Final     RBC Count 06/23/2021 4.19  3.8 - 5.2 10e12/L Final     Hemoglobin 06/23/2021 12.5  11.7 - 15.7 g/dL Final     Hematocrit 06/23/2021 37.8  35.0 - 47.0 % Final     MCV 06/23/2021 90  78 - 100 fl Final     MCH 06/23/2021 29.8  26.5 - 33.0 pg Final     MCHC 06/23/2021 33.1  31.5 - 36.5 g/dL Final     RDW 06/23/2021 13.0  10.0 - 15.0 % Final     Platelet Count 06/23/2021 240  150 - 450 10e9/L Final     Diff Method 06/23/2021 Automated Method   Final     % Neutrophils 06/23/2021 35.4  % Final     % Lymphocytes 06/23/2021 52.3  % Final     % Monocytes 06/23/2021 8.5  % Final     % Eosinophils 06/23/2021 2.6  % Final     % Basophils  06/23/2021 1.1  % Final     % Immature Granulocytes 06/23/2021 0.1  % Final     Nucleated RBCs 06/23/2021 0  0 /100 Final     Absolute Neutrophil 06/23/2021 2.9  1.6 - 8.3 10e9/L Final     Absolute Lymphocytes 06/23/2021 4.2  0.8 - 5.3 10e9/L Final     Absolute Monocytes 06/23/2021 0.7  0.0 - 1.3 10e9/L Final     Absolute Eosinophils 06/23/2021 0.2  0.0 - 0.7 10e9/L Final     Absolute Basophils 06/23/2021 0.1  0.0 - 0.2 10e9/L Final     Abs Immature Granulocytes 06/23/2021 0.0  0 - 0.4 10e9/L Final     Absolute Nucleated RBC 06/23/2021 0.0   Final        No results found for any visits on 07/13/21.    PHQ-2 Score:    PHQ-2 ( 1999 [employer]) 7/13/2021 2/8/2020   Q1: Little interest or pleasure in doing things 0 0   Q2: Feeling down, depressed or hopeless 0 0   PHQ-2 Score 0 0   Q1: Little interest or pleasure in doing things - Not at all   Q2: Feeling down, depressed or hopeless - Not at all   PHQ-2 Score - 0       Assessment:     1. Hypokalemia    2. S/P allogeneic bone marrow transplant (H)    3. Screening for thyroid disorder    4. Chronic bilateral low back pain, unspecified whether sciatica present    5. Medial epicondylitis of elbow, right    6. Pain syndrome, chronic    7. Mammogram declined    8. Chronic obstructive pulmonary disease, unspecified COPD type (H)    9. Acute myeloid leukemia (AML), M2 (H)    10. Traumatic compression fracture of T8 thoracic vertebra, closed, initial encounter (H)    11. Controlled substance agreement signed - 1/21 - oxycodone - UDS 1/21    12. Tobacco abuse    13. Status post allogeneic bone marrow transplant (H) - 2010 - [facility]         Plan:     1. Given a potassium supplement for short-term use.  2. Blood work done today.  3. Refilled pain medications at this time.  4. Consider arm brace for medial epicondylitis.  5. Patient declines mammogram at this point.  6. Continue current medications otherwise.  7. Follow up sooner if issues.      Jorge Dunn MD  General Internal  [facility]    Return in about 6 months (around 1/13/2022), or if symptoms worsen or fail to improve, for follow up visit.     Future Appointments   Date Time Provider Department Center   1/3/2022 12:00 PM Jorge Dunn MD [facility]

## 2021-07-20 PROBLEM — R79.89 HIGH SERUM THYROID STIMULATING HORMONE (TSH): Status: RESOLVED | Noted: 2020-02-08 | Resolved: 2021-07-20

## 2021-07-20 PROBLEM — M80.00XD AGE-RELATED OSTEOPOROSIS WITH CURRENT PATHOLOGICAL FRACTURE WITH ROUTINE HEALING, SUBSEQUENT ENCOUNTER: Status: RESOLVED | Noted: 2020-02-08 | Resolved: 2021-07-20

## 2021-07-20 PROBLEM — R35.1 NOCTURIA: Status: RESOLVED | Noted: 2020-02-08 | Resolved: 2021-07-20

## 2021-07-20 PROBLEM — Z78.9 FULL CODE STATUS: Chronic | Status: ACTIVE | Noted: 2017-09-10

## 2021-07-20 PROBLEM — R63.6 UNDERWEIGHT: Status: RESOLVED | Noted: 2020-02-08 | Resolved: 2021-07-20

## 2021-08-06 DIAGNOSIS — E87.6 HYPOKALEMIA: ICD-10-CM

## 2021-08-07 RX ORDER — POTASSIUM CHLORIDE 750 MG/1
CAPSULE, EXTENDED RELEASE ORAL
Qty: 0.1 CAPSULE | Refills: 0 | Status: SHIPPED | OUTPATIENT
Start: 2021-08-07 | End: 2022-01-03

## 2021-08-18 DIAGNOSIS — G89.29 CHRONIC BILATERAL LOW BACK PAIN, UNSPECIFIED WHETHER SCIATICA PRESENT: ICD-10-CM

## 2021-08-18 DIAGNOSIS — M54.50 CHRONIC BILATERAL LOW BACK PAIN, UNSPECIFIED WHETHER SCIATICA PRESENT: ICD-10-CM

## 2021-08-18 RX ORDER — OXYCODONE HYDROCHLORIDE 10 MG/1
10 TABLET ORAL 4 TIMES DAILY
Qty: 120 TABLET | Refills: 0 | Status: SHIPPED | OUTPATIENT
Start: 2021-08-20 | End: 2021-09-16

## 2021-08-18 RX ORDER — OXYCODONE HYDROCHLORIDE 10 MG/1
10 TABLET ORAL 4 TIMES DAILY
Qty: 120 TABLET | Refills: 0 | Status: CANCELLED | OUTPATIENT
Start: 2021-08-18

## 2021-08-18 NOTE — TELEPHONE ENCOUNTER
Reason for Call:  Medication or medication refill:    Do you use a [facility]?  Name of the pharmacy and phone number for the current request:      [facility]    Name of the medication requested:     Oxycodone 10 mg    Other request: Patient reports she has about 1-2 days of meds left at this time.    Can we leave a detailed message on this number? YES    Phone number patient can be reached at: Home number on file [phone] (home)    Best Time: Any time    Call taken on 8/18/2021 at 9:30 AM by Tremayne Moreno

## 2021-08-18 NOTE — TELEPHONE ENCOUNTER
Controlled Substance Refill Request for Oxycodone 10 mg    Last refill:   oxyCODONE IR (ROXICODONE) 10 MG tablet 120 tablet 0 7/21/2021 8/20/2021 --   Sig - Route: Take 1 tablet (10 mg) by mouth 4 times daily For use from 7/21/21 to 8/20/21. - Oral   Sent to pharmacy as: oxyCODONE HCl 10 MG Oral Tablet (ROXICODONE)   Class: E-Prescribe   Earliest Fill Date: 7/21/2021   Order: [id number]   E-Prescribing Status: Receipt confirmed by pharmacy (7/13/2021  1:47 PM CDT)         Last clinic visit: 7/13/2021-Dr Jorge Dunn     Assessment:      1. Hypokalemia    2. S/P allogeneic bone marrow transplant (H)    3. Screening for thyroid disorder    4. Chronic bilateral low back pain, unspecified whether sciatica present    5. Medial epicondylitis of elbow, right    6. Pain syndrome, chronic    7. Mammogram declined    8. Chronic obstructive pulmonary disease, unspecified COPD type (H)    9. Acute myeloid leukemia (AML), M2 (H)    10. Traumatic compression fracture of T8 thoracic vertebra, closed, initial encounter (H)    11. Controlled substance agreement signed - 1/21 - oxycodone - UDS 1/21    12. Tobacco abuse    13. Status post allogeneic bone marrow transplant (H) - 2010 - [facility]          Clinic visit frequency required: Q 6  months    Next appt: 1/3/89941    Controlled substance agreement on file: Yes:  Date 1/2021.    Documentation in problem list reviewed:  Yes    Processing:  Rx to be electronically transmitted to pharmacy by provider

## 2021-09-16 ENCOUNTER — NURSE TRIAGE (OUTPATIENT)
Dept: NURSING | Facility: CLINIC | Age: 66
End: 2021-09-16

## 2021-09-16 ENCOUNTER — TELEPHONE (OUTPATIENT)
Dept: INTERNAL MEDICINE | Facility: CLINIC | Age: 66
End: 2021-09-16

## 2021-09-16 DIAGNOSIS — M54.50 CHRONIC BILATERAL LOW BACK PAIN, UNSPECIFIED WHETHER SCIATICA PRESENT: ICD-10-CM

## 2021-09-16 DIAGNOSIS — G89.29 CHRONIC BILATERAL LOW BACK PAIN, UNSPECIFIED WHETHER SCIATICA PRESENT: ICD-10-CM

## 2021-09-16 RX ORDER — OXYCODONE HYDROCHLORIDE 10 MG/1
10 TABLET ORAL 4 TIMES DAILY
Qty: 120 TABLET | Refills: 0 | Status: SHIPPED | OUTPATIENT
Start: 2021-09-19 | End: 2021-10-18

## 2021-09-16 NOTE — TELEPHONE ENCOUNTER
Told there is no proof, got the old people flu shot in the past. They were out of that last year so they gave her the regular shot. Does the shot for the older people cause trouble with Moderna covid shot? I told her it does not cause any issues or problems with the covid-19 and flu shots.. When was the pneumonia shot? She is up to date on the pneumonia shots. I connected her with scheduling for an appointment for the flu shot. She wants to set that up.  Janette Asencio RN  [facility]    Additional Information    Negative: Nursing judgment    Negative: Nursing judgment    Negative: Nursing judgment    Negative: Nursing judgment    Information only question and nurse able to answer    Protocols used: INFORMATION ONLY CALL - NO TRIAGE-A-OH

## 2021-09-16 NOTE — TELEPHONE ENCOUNTER
Reason for Call:  Medication or medication refill:    Do you use a [facility]?  Name of the pharmacy and phone number for the current request:      [facility]    Name of the medication requested:     Oxycodone 10 mg    Other request: N/A    Can we leave a detailed message on this number? YES    Phone number patient can be reached at: Home number on file [phone] (home)    Best Time: Any time    Call taken on 9/16/2021 at 9:35 AM by Tremayne Moreno

## 2021-10-18 DIAGNOSIS — G89.29 CHRONIC BILATERAL LOW BACK PAIN, UNSPECIFIED WHETHER SCIATICA PRESENT: ICD-10-CM

## 2021-10-18 DIAGNOSIS — M54.50 CHRONIC BILATERAL LOW BACK PAIN, UNSPECIFIED WHETHER SCIATICA PRESENT: ICD-10-CM

## 2021-10-18 RX ORDER — OXYCODONE HYDROCHLORIDE 10 MG/1
10 TABLET ORAL 4 TIMES DAILY
Qty: 120 TABLET | Refills: 0 | Status: SHIPPED | OUTPATIENT
Start: 2021-10-19 | End: 2021-11-15

## 2021-10-18 NOTE — TELEPHONE ENCOUNTER
Pt is requesting refill.    Oxy Codone 10 mg    [facility] in [place] is the preferred pharmacy.    Pt shares that she got her flu shot for the older people last week at the [facility].  Doesn't remember the exact date.

## 2021-10-18 NOTE — TELEPHONE ENCOUNTER
Controlled Substance Refill Request for Oxycodone 10 mg     Last refill:   oxyCODONE IR (ROXICODONE) 10 MG tablet 120 tablet    --   Sig - Route: Take 1 tablet (10 mg) by mouth 4 times daily  - Oral   Sent to pharmacy as: oxyCODONE HCl 10 MG Oral Tablet (ROXICODONE)   Class: E-Prescribe                     Last clinic visit: 7/13/2021-Dr Jorge Dunn     Assessment:      1. Hypokalemia    2. S/P allogeneic bone marrow transplant (H)    3. Screening for thyroid disorder    4. Chronic bilateral low back pain, unspecified whether sciatica present    5. Medial epicondylitis of elbow, right    6. Pain syndrome, chronic    7. Mammogram declined    8. Chronic obstructive pulmonary disease, unspecified COPD type (H)    9. Acute myeloid leukemia (AML), M2 (H)    10. Traumatic compression fracture of T8 thoracic vertebra, closed, initial encounter (H)    11. Controlled substance agreement signed - 1/21 - oxycodone - UDS 1/21    12. Tobacco abuse    13. Status post allogeneic bone marrow transplant (H) - 2010 - [facility]             Clinic visit frequency required: Q 6  months     Next appt: 1/3/05907     Controlled substance agreement on file: Yes:  Date 1/2021.     Documentation in problem list reviewed:  Yes     Processing:  Rx to be electronically transmitted to pharmacy by provider

## 2021-11-15 ENCOUNTER — TELEPHONE (OUTPATIENT)
Dept: INTERNAL MEDICINE | Facility: CLINIC | Age: 66
End: 2021-11-15
Payer: COMMERCIAL

## 2021-11-15 DIAGNOSIS — G89.29 CHRONIC BILATERAL LOW BACK PAIN, UNSPECIFIED WHETHER SCIATICA PRESENT: ICD-10-CM

## 2021-11-15 DIAGNOSIS — M54.50 CHRONIC BILATERAL LOW BACK PAIN, UNSPECIFIED WHETHER SCIATICA PRESENT: ICD-10-CM

## 2021-11-15 RX ORDER — OXYCODONE HYDROCHLORIDE 10 MG/1
10 TABLET ORAL 4 TIMES DAILY
Qty: 120 TABLET | Refills: 0 | Status: SHIPPED | OUTPATIENT
Start: 2021-11-18 | End: 2021-12-13

## 2021-11-15 NOTE — TELEPHONE ENCOUNTER
"Pt is requesting refill of Oxycodone 10 MG    Has about \"day and half / two days remaining\" of medication.    [facility] in [place] is the preferred pharmacy.      "

## 2021-12-13 DIAGNOSIS — L30.9 DERMATITIS: ICD-10-CM

## 2021-12-13 DIAGNOSIS — M54.50 CHRONIC BILATERAL LOW BACK PAIN, UNSPECIFIED WHETHER SCIATICA PRESENT: ICD-10-CM

## 2021-12-13 DIAGNOSIS — G89.29 CHRONIC BILATERAL LOW BACK PAIN, UNSPECIFIED WHETHER SCIATICA PRESENT: ICD-10-CM

## 2021-12-13 RX ORDER — OXYCODONE HYDROCHLORIDE 10 MG/1
10 TABLET ORAL 4 TIMES DAILY
Qty: 120 TABLET | Refills: 0 | Status: SHIPPED | OUTPATIENT
Start: 2021-12-18 | End: 2022-01-19

## 2021-12-13 RX ORDER — TRIAMCINOLONE ACETONIDE 1 MG/G
CREAM TOPICAL
Qty: 80 G | Refills: 3 | Status: SHIPPED | OUTPATIENT
Start: 2021-12-13 | End: 2023-02-28

## 2021-12-13 NOTE — TELEPHONE ENCOUNTER
Reason for Call:  Medication or medication refill:    Do you use a [facility]?  Name of the pharmacy and phone number for the current request:      [facility]    Name of the medication requested:     Oxycodone 10 mg tablet    Traimcinolone 0.1% external cream    Other request: Patient states she just took her last Oxycodone earlier today.  She is actively out of medication at the time of this writing.    Can we leave a detailed message on this number? YES    Phone number patient can be reached at: Home number on file [phone] (home)    Best Time: Any time    Call taken on 12/13/2021 at 1:04 PM by Tremayne Moreno

## 2021-12-13 NOTE — TELEPHONE ENCOUNTER
Last appointment 7/13/21  Next appointment 1/3/22    Plan:      1. Given a potassium supplement for short-term use.  2. Blood work done today.  3. Refilled pain medications at this time.  4. Consider arm brace for medial epicondylitis.  5. Patient declines mammogram at this point.  6. Continue current medications otherwise.  7. Follow up sooner if issues.        Jorge Dunn MD  General Internal Medicine  [facility]     Return in about 6 months (around 1/13/2022), or if symptoms worsen or fail to improve, for follow up visit.

## 2021-12-13 NOTE — TELEPHONE ENCOUNTER
Done.    Jorge Dunn MD  General Internal Medicine  [facility]  12/13/2021, 5:03 PM

## 2022-01-03 ENCOUNTER — OFFICE VISIT (OUTPATIENT)
Dept: INTERNAL MEDICINE | Facility: CLINIC | Age: 67
End: 2022-01-03
Payer: COMMERCIAL

## 2022-01-03 VITALS
DIASTOLIC BLOOD PRESSURE: 60 MMHG | TEMPERATURE: 98.2 F | OXYGEN SATURATION: 94 % | WEIGHT: 108 LBS | HEART RATE: 75 BPM | SYSTOLIC BLOOD PRESSURE: 92 MMHG | RESPIRATION RATE: 18 BRPM | BODY MASS INDEX: 16.95 KG/M2 | HEIGHT: 67 IN

## 2022-01-03 DIAGNOSIS — M25.521 RIGHT ELBOW PAIN: ICD-10-CM

## 2022-01-03 DIAGNOSIS — D89.813 GVHD (GRAFT VERSUS HOST DISEASE) (H): ICD-10-CM

## 2022-01-03 DIAGNOSIS — Z94.81 STATUS POST ALLOGENEIC BONE MARROW TRANSPLANT (H): ICD-10-CM

## 2022-01-03 DIAGNOSIS — M25.562 ACUTE PAIN OF LEFT KNEE: ICD-10-CM

## 2022-01-03 DIAGNOSIS — D69.2 SENILE PURPURA (H): ICD-10-CM

## 2022-01-03 DIAGNOSIS — M25.462 EFFUSION OF LEFT KNEE JOINT: ICD-10-CM

## 2022-01-03 DIAGNOSIS — M11.20 PSEUDOGOUT: ICD-10-CM

## 2022-01-03 DIAGNOSIS — J44.9 CHRONIC OBSTRUCTIVE PULMONARY DISEASE, UNSPECIFIED COPD TYPE (H): Primary | ICD-10-CM

## 2022-01-03 DIAGNOSIS — Z72.0 TOBACCO ABUSE: Chronic | ICD-10-CM

## 2022-01-03 DIAGNOSIS — C92.00: ICD-10-CM

## 2022-01-03 DIAGNOSIS — Z51.81 ENCOUNTER FOR THERAPEUTIC DRUG LEVEL MONITORING: ICD-10-CM

## 2022-01-03 LAB — CREAT UR-MCNC: 94 MG/DL

## 2022-01-03 PROCEDURE — 99215 OFFICE O/P EST HI 40 MIN: CPT | Performed by: INTERNAL MEDICINE

## 2022-01-03 PROCEDURE — 80307 DRUG TEST PRSMV CHEM ANLYZR: CPT | Performed by: INTERNAL MEDICINE

## 2022-01-03 RX ORDER — PREDNISONE 20 MG/1
40 TABLET ORAL DAILY
Qty: 10 TABLET | Refills: 0 | Status: SHIPPED | OUTPATIENT
Start: 2022-01-03 | End: 2022-01-08

## 2022-01-03 ASSESSMENT — ANXIETY QUESTIONNAIRES
GAD7 TOTAL SCORE: 0
GAD7 TOTAL SCORE: 0
6. BECOMING EASILY ANNOYED OR IRRITABLE: NOT AT ALL
5. BEING SO RESTLESS THAT IT IS HARD TO SIT STILL: NOT AT ALL
1. FEELING NERVOUS, ANXIOUS, OR ON EDGE: NOT AT ALL
7. FEELING AFRAID AS IF SOMETHING AWFUL MIGHT HAPPEN: NOT AT ALL
7. FEELING AFRAID AS IF SOMETHING AWFUL MIGHT HAPPEN: NOT AT ALL
4. TROUBLE RELAXING: NOT AT ALL
3. WORRYING TOO MUCH ABOUT DIFFERENT THINGS: NOT AT ALL
2. NOT BEING ABLE TO STOP OR CONTROL WORRYING: NOT AT ALL
GAD7 TOTAL SCORE: 0

## 2022-01-03 ASSESSMENT — MIFFLIN-ST. JEOR: SCORE: 1057.51

## 2022-01-03 ASSESSMENT — PATIENT HEALTH QUESTIONNAIRE - PHQ9
SUM OF ALL RESPONSES TO PHQ QUESTIONS 1-9: 0
SUM OF ALL RESPONSES TO PHQ QUESTIONS 1-9: 0
10. IF YOU CHECKED OFF ANY PROBLEMS, HOW DIFFICULT HAVE THESE PROBLEMS MADE IT FOR YOU TO DO YOUR WORK, TAKE CARE OF THINGS AT HOME, OR GET ALONG WITH OTHER PEOPLE: NOT DIFFICULT AT ALL

## 2022-01-03 NOTE — LETTER
Opioid / Opioid Plus Controlled Substance Agreement  FOR OXYCODONE  This is an agreement between you and your provider about the safe and appropriate use of controlled substance/opioids prescribed by your care team. Controlled substances are medicines that can cause physical and mental dependence (abuse).    There are strict laws about having and using these medicines. We here at [facility] are committing to working with you in your efforts to get better. To support you in this work, we ll help you schedule regular office appointments for medicine refills. If we must cancel or change your appointment for any reason, we ll make sure you have enough medicine to last until your next appointment.     As a Provider, I will:    Listen carefully to your concerns and treat you with respect.     Recommend a treatment plan that I believe is in your best interest. This plan may involve therapies other than opioid pain medication.     Talk with you often about the possible benefits, and the risk of harm of any medicine that we prescribe for you.     Provide a plan on how to taper (discontinue or go off) using this medicine if the decision is made to stop its use.    As a Patient, I understand that opioid(s):     Are a controlled substance prescribed by my care team to help me function or work and manage my condition(s).     Are strong medicines and can cause serious side effects such as:    Drowsiness, which can seriously affect my driving ability    A lower breathing rate, enough to cause death    Harm to my thinking ability     Depression     Abuse of and addiction to this medicine    Need to be taken exactly as prescribed. Combining opioids with certain medicines or chemicals (such as illegal drugs, sedatives, sleeping pills, and benzodiazepines) can be dangerous or even fatal. If I stop opioids suddenly, I may have severe withdrawal symptoms.    Do not work for all types of pain nor for all patients. If they re not  helpful, I may be asked to stop them.    The risks, benefits and side effects of these medicine(s) were explained to me. I agree that:  1. I will take part in other treatments as advised by my care team. This may be psychiatry or counseling, physical therapy, behavioral therapy, group treatment or a referral to a specialist.     2. I will keep all my appointments. I understand that this is part of the monitoring of opioids. My care team may require an office visit for EVERY opioid/controlled substance refill. If I miss appointments or don t follow instructions, my care team may stop my medicine.    3. I will take my medicines as prescribed. I will not change the dose or schedule unless my care team tells me to. There will be no refills if I run out early.     4. I may be asked to come to the clinic and complete a urine drug test or complete a pill count at any time. If I don t give a urine sample or participate in a pill count, the care team may stop my medicine.    5. I will only receive prescriptions from this clinic for chronic pain. If I am treated by another provider for acute pain issues, I will tell them that I am taking opioid pain medication for chronic pain and that I have a treatment agreement with this provider. I will inform my [facility] care team within one business day if I am given a prescription for any pain medication by another healthcare provider. My [facility] care team can contact other providers and pharmacists about my use of any medicines.    6. It is up to me to make sure that I don t run out of my medicines on weekends or holidays. If my care team is willing to refill my opioid prescription without a visit, I must request refills only during office hours. Refills may take up to 3 business days to process. I will use one pharmacy to fill all my opioid and other controlled substance prescriptions. I will notify the clinic about any changes to my insurance or medication  availability.    7. I am responsible for my prescriptions. If the medicine/prescription is lost, stolen or destroyed, it will not be replaced. I also agree not to share controlled substance medicines with anyone.    8. I am aware I should not use any illegal or recreational drugs. I agree not to drink alcohol unless my care team says I can.       9. If I enroll in the [place] [facility] Cannabis program, I will tell my care team prior to my next refill.     10. I will tell my care team right away if I become pregnant, have a new medical problem treated outside of my regular clinic, or have a change in my medications.    11. I understand that this medicine can affect my thinking, judgment and reaction time. Alcohol and drugs affect the brain and body, which can affect the safety of my driving. Being under the influence of alcohol or drugs can affect my decision-making, behaviors, personal safety, and the safety of others. Driving while impaired (DWI) can occur if a person is driving, operating, or in physical control of a car, motorcycle, boat, snowmobile, ATV, motorbike, off-road vehicle, or any other motor vehicle ([place] Statute 169A.20). I understand the risk if I choose to drive or operate any vehicle or machinery.    I understand that if I do not follow any of the conditions above, my prescriptions or treatment may be stopped or changed.          Opioids  What You Need to Know    What are opioids?   Opioids are pain medicines that must be prescribed by a doctor. They are also known as narcotics.     Examples are:   1. morphine (MS Contin, Vonnie)  2. oxycodone (Oxycontin)  3. oxycodone and acetaminophen (Percocet)  4. hydrocodone and acetaminophen (Vicodin, Norco)   5. fentanyl patch (Duragesic)   6. hydromorphone (Dilaudid)   7. methadone  8. codeine (Tylenol #3)     What do opioids do well?   Opioids are best for severe short-term pain such as after a surgery or injury. They may work well for cancer pain. They may  help some people with long-lasting (chronic) pain.     What do opioids NOT do well?   Opioids never get rid of pain entirely, and they don t work well for most patients with chronic pain. Opioids don t reduce swelling, one of the causes of pain.                                    Other ways to manage chronic pain and improve function include:       Treat the health problem that may be causing pain    Anti-inflammation medicines, which reduce swelling and tenderness, such as ibuprofen (Advil, Motrin) or naproxen (Aleve)    Acetaminophen (Tylenol)    Antidepressants and anti-seizure medicines, especially for nerve pain    Topical treatments such as patches or creams    Injections or nerve blocks    Chiropractic or osteopathic treatment    Acupuncture, massage, deep breathing, meditation, visual imagery, aromatherapy    Use heat or ice at the pain site    Physical therapy     Exercise    Stop smoking    Take part in therapy       Risks and side effects     Talk to your doctor before you start or decide to keep taking opioids. Possible side effects include:      Lowering your breathing rate enough to cause death    Overdose, including death, especially if taking higher than prescribed doses    Worse depression symptoms; less pleasure in things you usually enjoy    Feeling tired or sluggish    Slower thoughts or cloudy thinking    Being more sensitive to pain over time; pain is harder to control    Trouble sleeping or restless sleep    Changes in hormone levels (for example, less testosterone)    Changes in sex drive or ability to have sex    Constipation    Unsafe driving    Itching and sweating    Dizziness    Nausea, throwing up and dry mouth    What else should I know about opioids?    Opioids may lead to dependence, tolerance, or addiction.      Dependence means that if you stop or reduce the medicine too quickly, you will have withdrawal symptoms. These include loose poop (diarrhea), jitters, flu-like symptoms,  nervousness and tremors. Dependence is not the same as addiction.                       Tolerance means needing higher doses over time to get the same effect. This may increase the chance of serious side effects.      Addiction is when people improperly use a substance that harms their body, their mind or their relations with others. Use of opiates can cause a relapse of addiction if you have a history of drug or alcohol abuse.      People who have used opioids for a long time may have a lower quality of life, worse depression, higher levels of pain and more visits to doctors.    You can overdose on opioids. Take these steps to lower your risk of overdose:    1. Recognize the signs:  Signs of overdose include decrease or loss of consciousness (blackout), slowed breathing, trouble waking up and blue lips. If someone is worried about overdose, they should call 911.    2. Talk to your doctor about Narcan (naloxone).   If you are at risk for overdose, you may be given a prescription for Narcan. This medicine very quickly reverses the effects of opioids.   If you overdose, a friend or family member can give you Narcan while waiting for the ambulance. They need to know the signs of overdose and how to give Narcan.     3. Don't use alcohol or street drugs.   Taking them with opioids can cause death.    4. Do not take any of these medicines unless your doctor says it s OK. Taking these with opioids can cause death:    Benzodiazepines, such as lorazepam (Ativan), alprazolam (Xanax) or diazepam (Valium)    Muscle relaxers, such as cyclobenzaprine (Flexeril)    Sleeping pills like zolpidem (Ambien)     Other opioids      How to keep you and other people safe while taking opioids:    1. Never share your opioids with others.  Opioid medicines are regulated by the Drug Enforcement Agency ([employer]MUNDO). Selling or sharing medications is a criminal act.    2. Be sure to store opioids in a secure place, locked up if possible. Young children  can easily swallow them and overdose.    3. When you are traveling with your medicines, keep them in the original bottles. If you use a pill box, be sure you also carry a copy of your medicine list from your clinic or pharmacy.    4. Safe disposal of opioids    Most pharmacies have places to get rid of medicine, called disposal kiosks. Medicine disposal options are also available in every [facility]. Search your county and  medication disposal  to find more options. You can find more details at:  [email or web address]     I agree that my provider, clinic care team, and pharmacy may work with any city, state or federal law enforcement agency that investigates the misuse, sale, or other diversion of my controlled medicine. I will allow my provider to discuss my care with, or share a copy of, this agreement with any other treating provider, pharmacy or emergency room where I receive care.    I have read this agreement and have asked questions about anything I did not understand.    _______________________________________________________  Patient Signature - Jenn Barclay _____________________                   Date     _______________________________________________________  Provider Signature - Jorge Dunn MD   _____________________                   Date     _______________________________________________________  Witness Signature (required if provider not present while patient signing)   _____________________                   Date

## 2022-01-03 NOTE — PROGRESS NOTES
[facility]    Comprehensive and complex medical care - Chronic disease management - Shared decision making - Care coordination - Compassionate care    Patient advocacy - Rational deprescribing - Minimally disruptive medicine - Ethical focus - Customized care         Date of Service: 1/3/2022  Primary Provider: Jorge Dunn    Patient Care Team:  Jorge Dunn MD as PCP - General (Internal Medicine)  Mohit Montano MD as MD (Hematology & Oncology)  Arabella Grande LICSW as   Evelyn Resendiz RN as BMT Nurse Coordinator  Jorge Dunn MD as Assigned PCP  Joellen Nova MD as Resident (Dermatology)  Mackenzie Moncada MD as Assigned Surgical Provider          Patient's Pharmacy:    [id number] - [place], [place] - [address]  [address]  [facility] [address]  Phone: [phone] Fax: [phone]    Pharmacy [facility] [employer] [facility] - [address] [address]  Phone: [phone] Fax: [phone]     Patient's Contacts:  Name Home Phone Work Phone Mobile Phone Relationship Lgl GrJEAN Kuo [phone] FRANCISCO JAVIER Tobin   [phone] Significant*      Patient's Insurance:    Payor: [employer] / Plan: [employer] ADVANTAGE / Product Type: HMO /          Active Problem List:  Problem List as of 1/3/2022 Reviewed: 2/17/2020 12:04 PM by Jhon Herrmann MD       High    Full code status    Tobacco abuse    COPD (chronic obstructive pulmonary disease) (H)    Acute myeloid leukemia (AML), M2 (H)    Status post allogeneic bone marrow transplant (H) - 2010 - [facility]       Medium    Controlled substance agreement signed - 1/21 - oxycodone - UDS 1/21    Chronic back pain    Financial difficulties    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)    Pseudogout    Traumatic compression fracture  of T8 thoracic vertebra, closed, initial encounter (H)    Chronic diarrhea       Low    Nephrolithiasis    Anxiety    Gastric ulcer    Migraine headache    Papular rash, generalized    Senile purpura (H)    Serrated adenoma of colon    Tubular adenoma of colon    OP (osteoporosis)       Other    Exocrine pancreatic insufficiency    Post-menopausal           Current Outpatient Medications   Medication Instructions     Albuterol Sulfate (VENTOLIN HFA) 108 (90 BASE) MCG/ACT AERS Inhale  into the lungs.     alendronate (FOSAMAX) 70 mg, Oral, EVERY 7 DAYS     amylase-lipase-protease (CREON) 16505-42430 units CPEP per EC capsule 2 capsules, Oral, 3 TIMES DAILY WITH MEALS, And snacks     cholecalciferol (VITAMIN D3) 1000 UNIT capsule 1 capsule, DAILY     clindamycin 1 % EX external lotion Topical, 2 TIMES DAILY     gentamicin 0.1 % EX external ointment Topical, 2 TIMES DAILY     Loperamide HCl (IMODIUM OR) Oral, 2 TIMES DAILY     Multiple Vitamins-Minerals (MULTIVITAL PO) Take  by mouth.     mupirocin (BACTROBAN) 2 % external ointment Use 2 times a day to affected area on cheek     oxyCODONE IR (ROXICODONE) 10 mg, Oral, 4 TIMES DAILY, For use from 12/18/21 to 1/17/22.     predniSONE (DELTASONE) 40 mg, Oral, DAILY     triamcinolone (KENALOG) 0.1 % external cream Apply tid to itchy or tender skin lesions     vitamin C (ASCORBIC ACID) 500 mg, DAILY        Social History     Social History Narrative    , on disability.        Has family in the area.        Patient of Dr. Dunn since 2015.        Subjective:     Jenn Barclay is a 66 year old female who comes in today for:    Chief Complaint   Patient presents with     Clinic Care Coordination - Follow-up     L knee swelling started 2 days ago. Has had productive cough X4 days.        In for follow up multiple issues.    Mainly wanted to reviewed acute left knee pain for 2 days.  Medial and posterior knee.  Swelling noted.  Difficult to put weight on it.  No injury.   "Has had documented pseudogout in the past like this.  Has tried elevating it and acetaminophen (Tylenol) with little success.    Previous x-rays reviewed.    Coughing a little bit more.  Nasal congestion and white and yellow drainage.  Can get up some phlegm.    COPD reviewed and using nebulizer twice a day at this time.  Not looking to do any other inhalers at this time.    Using her Creon.    Still has some medial elbow pain with touch.    We reviewed her other issues noted in the assessment but not specifically addressed in the HPI above.     Objective:     Wt Readings from Last 3 Encounters:   01/03/22 49 kg (108 lb)   07/13/21 48.1 kg (106 lb)   06/23/21 50.4 kg (111 lb 3.2 oz)     BP Readings from Last 3 Encounters:   01/03/22 92/60   07/13/21 132/80   06/23/21 125/68     BP 92/60   Pulse 75   Temp 98.2  F (36.8  C)   Resp 18   Ht 1.702 m (5' 7\")   Wt 49 kg (108 lb)   SpO2 94%   BMI 16.92 kg/m     The patient is comfortable, no acute distress.  Mood good.  Insight good.  Eyes are nonicteric.  Nose shows moderate bluish rhinitis.  Neck is supple without mass.  No cervical adenopathy.  No thyromegaly. Heart regular rate and rhythm.  Lungs clear to auscultation bilaterally.  Respiratory effort is good.  Abdomen soft and nontender.  No hepatosplenomegaly.  Extremities no edema.  Left knee shows  Moderate effusion without redness.  Moderate warmth.  Patient limps with walking.  Medial elbow tender but not swollen.      Diagnostics:     Orders Only on 06/23/2021   Component Date Value Ref Range Status     Sodium 06/23/2021 140  133 - 144 mmol/L Final     Potassium 06/23/2021 3.0* 3.4 - 5.3 mmol/L Final     Chloride 06/23/2021 102  94 - 109 mmol/L Final     Carbon Dioxide 06/23/2021 33* 20 - 32 mmol/L Final     Anion Gap 06/23/2021 5  3 - 14 mmol/L Final     Glucose 06/23/2021 82  70 - 99 mg/dL Final     Urea Nitrogen 06/23/2021 10  7 - 30 mg/dL Final     Creatinine 06/23/2021 0.71  0.52 - 1.04 mg/dL Final     " GFR Estimate 06/23/2021 89  >60 mL/min/[1.73_m2] Final    Comment: Non  GFR Calc  Starting 12/18/2018, serum creatinine based estimated GFR (eGFR) will be   calculated using the Chronic Kidney Disease Epidemiology Collaboration   (CKD-EPI) equation.       GFR Estimate If Black 06/23/2021 >90  >60 mL/min/[1.73_m2] Final    Comment:  GFR Calc  Starting 12/18/2018, serum creatinine based estimated GFR (eGFR) will be   calculated using the Chronic Kidney Disease Epidemiology Collaboration   (CKD-EPI) equation.       Calcium 06/23/2021 8.4* 8.5 - 10.1 mg/dL Final     Bilirubin Total 06/23/2021 0.3  0.2 - 1.3 mg/dL Final     Albumin 06/23/2021 3.6  3.4 - 5.0 g/dL Final     Protein Total 06/23/2021 6.1* 6.8 - 8.8 g/dL Final     Alkaline Phosphatase 06/23/2021 52  40 - 150 U/L Final     ALT 06/23/2021 24  0 - 50 U/L Final     AST 06/23/2021 23  0 - 45 U/L Final     WBC 06/23/2021 8.1  4.0 - 11.0 10e9/L Final     RBC Count 06/23/2021 4.19  3.8 - 5.2 10e12/L Final     Hemoglobin 06/23/2021 12.5  11.7 - 15.7 g/dL Final     Hematocrit 06/23/2021 37.8  35.0 - 47.0 % Final     MCV 06/23/2021 90  78 - 100 fl Final     MCH 06/23/2021 29.8  26.5 - 33.0 pg Final     MCHC 06/23/2021 33.1  31.5 - 36.5 g/dL Final     RDW 06/23/2021 13.0  10.0 - 15.0 % Final     Platelet Count 06/23/2021 240  150 - 450 10e9/L Final     Diff Method 06/23/2021 Automated Method   Final     % Neutrophils 06/23/2021 35.4  % Final     % Lymphocytes 06/23/2021 52.3  % Final     % Monocytes 06/23/2021 8.5  % Final     % Eosinophils 06/23/2021 2.6  % Final     % Basophils 06/23/2021 1.1  % Final     % Immature Granulocytes 06/23/2021 0.1  % Final     Nucleated RBCs 06/23/2021 0  0 /100 Final     Absolute Neutrophil 06/23/2021 2.9  1.6 - 8.3 10e9/L Final     Absolute Lymphocytes 06/23/2021 4.2  0.8 - 5.3 10e9/L Final     Absolute Monocytes 06/23/2021 0.7  0.0 - 1.3 10e9/L Final     Absolute Eosinophils 06/23/2021 0.2  0.0 - 0.7  10e9/L Final     Absolute Basophils 06/23/2021 0.1  0.0 - 0.2 10e9/L Final     Abs Immature Granulocytes 06/23/2021 0.0  0 - 0.4 10e9/L Final     Absolute Nucleated RBC 06/23/2021 0.0   Final       Results for orders placed or performed in visit on 01/03/22   Urine Creatinine for Drug Screen Panel     Status: None   Result Value Ref Range    Creatinine Urine for Drug Screen 94 mg/dL   Drug Confirmation Panel Urine with Creatinine     Status: None (In process)    Narrative    The following orders were created for panel order Drug Confirmation Panel Urine with Creatinine.  Procedure                               Abnormality         Status                     ---------                               -----------         ------                     Urine Drug Confirmation ...[[id number]]                      In process                 Urine Creatinine for Armin...[[id number]]                      Final result                 Please view results for these tests on the individual orders.        Assessment and Plan:     1. Acute myeloid leukemia (AML), M2 (H)  No signs of recurrence.  Continue to monitor.    2. Status post allogeneic bone marrow transplant (H)  Follows [facility] ([facility]    3. 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)  No active issues at this time.  Continue to monitor.    4. Chronic obstructive pulmonary disease, unspecified COPD type (H)  Continue current medications.    5. Senile purpura (H)  No worsening. Continue to monitor.    6. Encounter for therapeutic drug level monitoring  Controlled substance agreement (CSA) and urine drug screen (UDS) done today    - Drug Confirmation Panel Urine with Creatinine; Future  - Drug Confirmation Panel Urine with Creatinine    7. Pseudogout  Will treat knee pain with prednisone.    - predniSONE (DELTASONE) 20 MG tablet; Take 2 tablets (40 mg) by mouth daily for 5 days  Dispense: 10 tablet; Refill: 0    8. Tobacco abuse  Work on stopping.  Reviewed lung  cancer screening in the future.    9. Acute pain of left knee    10. Effusion of left knee joint    11. Right elbow pain       Continue current medications otherwise.  Follow up sooner if issues.        Jorge Dunn MD  General Internal Medicine  [facility]      Return in about 6 months (around 7/5/2022), or if symptoms worsen or fail to improve, for visit and blood work.     Future Appointments   Date Time Provider Department Center   7/5/2022  1:00 PM [facility]         HCC issues resolved at this visit.    40 minutes or greater was spent today on the patient's care on the day of service.      This includes time for chart preparation, reviewing medical tests done before or during the visit, talking with the patient, review of quality indicators, required documentation, and other elements of care.

## 2022-01-04 ASSESSMENT — ANXIETY QUESTIONNAIRES: GAD7 TOTAL SCORE: 0

## 2022-01-04 ASSESSMENT — PATIENT HEALTH QUESTIONNAIRE - PHQ9: SUM OF ALL RESPONSES TO PHQ QUESTIONS 1-9: 0

## 2022-01-05 PROBLEM — D12.6 SERRATED ADENOMA OF COLON: Status: RESOLVED | Noted: 2020-07-17 | Resolved: 2022-01-05

## 2022-01-05 PROBLEM — Z79.899 CONTROLLED SUBSTANCE AGREEMENT SIGNED: Chronic | Status: ACTIVE | Noted: 2017-08-03

## 2022-01-05 PROBLEM — D12.6 TUBULAR ADENOMA OF COLON: Status: ACTIVE | Noted: 2020-07-17

## 2022-01-06 LAB
OXYCODONE UR CFM-MCNC: 860 NG/ML
OXYCODONE/CREAT UR: 915 NG/MG {CREAT}
OXYMORPHONE UR CFM-MCNC: 820 NG/ML
OXYMORPHONE/CREAT UR: 872 NG/MG {CREAT}

## 2022-01-06 NOTE — PATIENT INSTRUCTIONS
Future Appointments   Date Time Provider Department Center   7/5/2022  1:00 PM [facility]

## 2022-01-18 ENCOUNTER — TELEPHONE (OUTPATIENT)
Dept: INTERNAL MEDICINE | Facility: CLINIC | Age: 67
End: 2022-01-18
Payer: COMMERCIAL

## 2022-01-19 ENCOUNTER — TELEPHONE (OUTPATIENT)
Dept: INTERNAL MEDICINE | Facility: CLINIC | Age: 67
End: 2022-01-19
Payer: COMMERCIAL

## 2022-01-19 DIAGNOSIS — M54.50 CHRONIC BILATERAL LOW BACK PAIN, UNSPECIFIED WHETHER SCIATICA PRESENT: ICD-10-CM

## 2022-01-19 DIAGNOSIS — G89.29 CHRONIC BILATERAL LOW BACK PAIN, UNSPECIFIED WHETHER SCIATICA PRESENT: ICD-10-CM

## 2022-01-19 RX ORDER — OXYCODONE HYDROCHLORIDE 10 MG/1
10 TABLET ORAL 4 TIMES DAILY
Qty: 120 TABLET | Refills: 0 | Status: SHIPPED | OUTPATIENT
Start: 2022-01-20 | End: 2022-02-18

## 2022-01-19 NOTE — TELEPHONE ENCOUNTER
Pt is requesting refill of oxycodone 10 mg.  Pt is out of medication.     [facility] in [place] is the preferred pharmacy.

## 2022-01-19 NOTE — TELEPHONE ENCOUNTER
Controlled Substance Refill Request for Oxycodone    Last refill: 12/13/21 for 120 with 0 (Rx to start on 12/18)    Last clinic visit: 1-3-22     Clinic visit frequency required: Q 6  months  Next appt: 7-5-22    Controlled substance agreement on file: Yes:  Date 1-3-22.    Documentation in problem list reviewed:  Yes    Processing:  Rx to be electronically transmitted to pharmacy by provider

## 2022-02-08 ENCOUNTER — TELEPHONE (OUTPATIENT)
Dept: TRANSPLANT | Facility: CLINIC | Age: 67
End: 2022-02-08
Payer: COMMERCIAL

## 2022-02-08 DIAGNOSIS — C92.01 ACUTE MYELOID LEUKEMIA IN REMISSION (H): ICD-10-CM

## 2022-02-08 DIAGNOSIS — Z94.81 S/P ALLOGENEIC BONE MARROW TRANSPLANT (H): ICD-10-CM

## 2022-02-08 DIAGNOSIS — K86.81 EXOCRINE PANCREATIC INSUFFICIENCY: ICD-10-CM

## 2022-02-08 NOTE — TELEPHONE ENCOUNTER
Clinical   Blood and Marrow Transplant Service    Reason for Intervention: CREON free drug application for [employer]     Data: Jenn is a 66 year old female who is 11 years 8 months s/p transplant.     Intervention: Spoke with Ashley and she asked that writer send the application via [employer]PS and she will fax back after signing and including the requested info (copies of her insurance card and income verification). Will get a hard copy of the script from [place].     Provided assessment of coping, supportive counseling, validation of concerns, encouragement and resources.    Education Provided: Will mail application to Ashley - will go out in tomorrow's mail 2/9/22     Follow-up Required: Will await return fax of the application with the accompanying documentation.    Encouraged patient to reach out as questions or concerns arise.     YVROSE Malin MSW  Pager: [phone]  2/8/2022

## 2022-02-18 DIAGNOSIS — M54.50 CHRONIC BILATERAL LOW BACK PAIN, UNSPECIFIED WHETHER SCIATICA PRESENT: ICD-10-CM

## 2022-02-18 DIAGNOSIS — G89.29 CHRONIC BILATERAL LOW BACK PAIN, UNSPECIFIED WHETHER SCIATICA PRESENT: ICD-10-CM

## 2022-02-18 RX ORDER — OXYCODONE HYDROCHLORIDE 10 MG/1
10 TABLET ORAL 4 TIMES DAILY
Qty: 120 TABLET | Refills: 0 | Status: SHIPPED | OUTPATIENT
Start: 2022-02-19 | End: 2022-03-15

## 2022-02-18 NOTE — TELEPHONE ENCOUNTER
Patient calling to request refill on the below medication.    Last refill 1/20/22    Last appt 1/3/22 w/ Dr Dunn    Upcoming appt 7/5/22 currently scheduled

## 2022-03-08 ENCOUNTER — TELEPHONE (OUTPATIENT)
Dept: TRANSPLANT | Facility: CLINIC | Age: 67
End: 2022-03-08
Payer: COMMERCIAL

## 2022-03-08 NOTE — TELEPHONE ENCOUNTER
"Clinical   Blood and Marrow Transplant Service    Reason for Intervention: [employer] application for CREON    Data: Ashley is post-transplant (6.1.2010) and continues to require CREON.    Intervention: Spoke with Ashley to let her know that we received her portion of the application and on 3.4.2022 the entire application was sent in to [employer] for their review. She was glad to hear that we received the application.  We talked about her family members with cancer diagnoses and how she is wondering why she has survived when so many others have not. She learned about her favorite cousin being on her \"death bed\" 2 days ago and is feeling depressed about this news. Writer asked if she has the emotional support that she needs - she replied \"I am okay.\" We talked about if she planned to go and see her cousin - she stated that she has to find someone to give her a ride but is not having much luck. Writer offered assistance through a gas card if that would help her find a ride. She will let writer know..    Provided assessment of coping, supportive counseling, validation of concerns, encouragement and resources.    Education Provided: See above.     Follow-up Required: Will check with [employer] later this week if we have not heard from them in the meantime. Writer will remain available to help with a gas card if patient finds that someone to give her a ride to see her cousin.    Encouraged patient to reach out as questions or concerns arise.     Arabella Grande [facility] MSW  Pager: [phone]  3/8/2022      "

## 2022-03-15 ENCOUNTER — TELEPHONE (OUTPATIENT)
Dept: TRANSPLANT | Facility: CLINIC | Age: 67
End: 2022-03-15
Payer: COMMERCIAL

## 2022-03-15 ENCOUNTER — TELEPHONE (OUTPATIENT)
Dept: INTERNAL MEDICINE | Facility: CLINIC | Age: 67
End: 2022-03-15
Payer: COMMERCIAL

## 2022-03-15 DIAGNOSIS — G89.29 CHRONIC BILATERAL LOW BACK PAIN, UNSPECIFIED WHETHER SCIATICA PRESENT: ICD-10-CM

## 2022-03-15 DIAGNOSIS — M54.50 CHRONIC BILATERAL LOW BACK PAIN, UNSPECIFIED WHETHER SCIATICA PRESENT: ICD-10-CM

## 2022-03-15 RX ORDER — OXYCODONE HYDROCHLORIDE 10 MG/1
10 TABLET ORAL 4 TIMES DAILY
Qty: 120 TABLET | Refills: 0 | Status: SHIPPED | OUTPATIENT
Start: 2022-03-21 | End: 2022-04-14

## 2022-03-15 NOTE — TELEPHONE ENCOUNTER
Refill Request     oxyCodone 10 mg tablet    [facility] in [place] is the preferred pharmacy.    Patient reports she has 10 pills left.    Last Written Prescription Date: 02/19/2022  Last Fill Quanity: 120, # refills: 0  Last office visit w/provider: 01/03/2022  Next office visit with provider: 07/05/2022

## 2022-03-15 NOTE — TELEPHONE ENCOUNTER
Clinical   Blood and Marrow Transplant Service    Reason for Intervention: [employer] Creon Assistance    Data: Jenn is a 66 year old female who is 11y 9m s/p transplant and requires assistance with getting another year of Creon assistance through [employer].     Intervention: Spoke with [employer] and they are granting Jenn another year of Creon - free drug. They will mail out her 1 prescription of the year and should arrive in 7-10 days. She will have coverage until 12.31.2022.     [employer] can be reached at [phone]     Updated Jenn to the decision of [employer] and she said that she just received an automated call from [employer] with the decision.    Provided assessment of coping, supportive counseling, validation of concerns, encouragement and resources.    Education Provided: See above    Follow-up Required: Jenn knows how to call [employer] monthly for refills - she could make her first request on 4.7.2022.    Encouraged patient to reach out as questions or concerns arise.     Arabella Grande [facility] MS[facility]  Pager: [phone]  3/15/2022

## 2022-03-21 ENCOUNTER — TELEPHONE (OUTPATIENT)
Dept: INTERNAL MEDICINE | Facility: CLINIC | Age: 67
End: 2022-03-21
Payer: COMMERCIAL

## 2022-03-21 DIAGNOSIS — J44.9 CHRONIC OBSTRUCTIVE PULMONARY DISEASE, UNSPECIFIED COPD TYPE (H): Primary | ICD-10-CM

## 2022-03-21 NOTE — TELEPHONE ENCOUNTER
Patient is requesting RX for DME Nebulizer compressor and kit.    Patient states her nebulizer stopped working today, and she uses the device several times each day.    Requesting RX is sent to [facility] in [place].

## 2022-03-21 NOTE — TELEPHONE ENCOUNTER
Notify the patient that this was sent to [facility].    Jorge Dunn MD  General Internal Medicine  [facility]  3/21/2022, 10:42 AM

## 2022-03-22 NOTE — TELEPHONE ENCOUNTER
Patient is requesting Nebulizer RX is sent to [facility] as [facility] is unable to fulfill order.    Telephone number is .

## 2022-03-22 NOTE — TELEPHONE ENCOUNTER
"Contacted patient and informed her that Rx for neb as been sent to [facility] as requested.  Patient extremely thankful states \"I love that clinic, you guys take such good care of us which is why I will never leave that clinic.\"  Informed patient that writer will thank Dr. Dunn for her.  Patient states he is a great doctor.  "

## 2022-03-22 NOTE — TELEPHONE ENCOUNTER
Rx printed, signed by PCP and faxed to [facility] at [phone] with confirmation.

## 2022-03-24 ENCOUNTER — OFFICE VISIT (OUTPATIENT)
Dept: FAMILY MEDICINE | Facility: CLINIC | Age: 67
End: 2022-03-24
Payer: COMMERCIAL

## 2022-03-24 ENCOUNTER — NURSE TRIAGE (OUTPATIENT)
Dept: NURSING | Facility: CLINIC | Age: 67
End: 2022-03-24
Payer: COMMERCIAL

## 2022-03-24 VITALS
OXYGEN SATURATION: 94 % | HEART RATE: 76 BPM | RESPIRATION RATE: 24 BRPM | TEMPERATURE: 97.8 F | DIASTOLIC BLOOD PRESSURE: 69 MMHG | SYSTOLIC BLOOD PRESSURE: 113 MMHG

## 2022-03-24 DIAGNOSIS — G89.29 CHRONIC LEFT-SIDED LOW BACK PAIN, UNSPECIFIED WHETHER SCIATICA PRESENT: ICD-10-CM

## 2022-03-24 DIAGNOSIS — M54.50 CHRONIC LEFT-SIDED LOW BACK PAIN, UNSPECIFIED WHETHER SCIATICA PRESENT: ICD-10-CM

## 2022-03-24 DIAGNOSIS — M54.42 ACUTE LEFT-SIDED LOW BACK PAIN WITH LEFT-SIDED SCIATICA: Primary | ICD-10-CM

## 2022-03-24 DIAGNOSIS — M25.552 HIP PAIN, LEFT: ICD-10-CM

## 2022-03-24 PROCEDURE — 99214 OFFICE O/P EST MOD 30 MIN: CPT | Mod: 25 | Performed by: NURSE PRACTITIONER

## 2022-03-24 PROCEDURE — 96372 THER/PROPH/DIAG INJ SC/IM: CPT | Performed by: NURSE PRACTITIONER

## 2022-03-24 RX ORDER — PREDNISONE 20 MG/1
40 TABLET ORAL DAILY
Qty: 10 TABLET | Refills: 0 | Status: SHIPPED | OUTPATIENT
Start: 2022-03-24 | End: 2022-03-29

## 2022-03-24 RX ORDER — KETOROLAC TROMETHAMINE 30 MG/ML
30 INJECTION, SOLUTION INTRAMUSCULAR; INTRAVENOUS ONCE
Status: COMPLETED | OUTPATIENT
Start: 2022-03-24 | End: 2022-03-24

## 2022-03-24 RX ADMIN — KETOROLAC TROMETHAMINE 30 MG: 30 INJECTION, SOLUTION INTRAMUSCULAR; INTRAVENOUS at 17:16

## 2022-03-24 ASSESSMENT — ENCOUNTER SYMPTOMS
NUMBNESS: 1
FEVER: 0
WEAKNESS: 0
DIFFICULTY URINATING: 0

## 2022-03-24 NOTE — TELEPHONE ENCOUNTER
Patient can not afford the nebulizer at [facility].  She is requesting RX is sent to [facility]

## 2022-03-24 NOTE — PROGRESS NOTES
Assessment & Plan     Hip pain, left    - ketorolac (TORADOL) injection 30 mg  - XR Pelvis w Hip Left G/E 2 Views    Chronic left-sided low back pain, unspecified whether sciatica present    - ketorolac (TORADOL) injection 30 mg  - XR Lumbar Spine 2/3 Views    Acute left-sided low back pain with left-sided sciatica    - Spine Referral  - predniSONE (DELTASONE) 20 MG tablet  Dispense: 10 tablet; Refill: 0     Patient with a history of chronic back pain for which she takes 10 mg of oxycodone times daily with very small frame, osteoporosis on Fosamax and atraumatic severe left lateral hip, upper buttock and low back pain shooting down the left leg with numbness in the anterior aspect of the thigh.    Leg symptoms are all new since yesterday.    No loss of bowel or bladder control.  Negative straight leg raise test.  Normal strength today.  Does appear quite painful especially with lying on the left side.    Hip x-ray - neg.  L-spine x-ray -multiple chronic changes including compression deformities and loss of disc height.  Demineralization.  No new changes.  No new compression fractures.    Discussed with patient and she has tolerated prednisone in the past.  Does feel little better now with Toradol injection.    Is fine as long as she is sitting.  Should rest as much as possible.  Does have crutches at home and should use due to instability on the leg with bearing weight.    On exam, her straight leg raise test was actually negative, but she is describing typical radiculopathy symptoms.  No identified dorsi plantarflexion weakness.  Could not tolerate lifting leg directly off bed due to the severity of low back pain.    Recommend semiurgent recheck with spine clinic next week.    Red flag symptoms to return including severe pain not controlled at home, loss of bowel or bladder control or leg weakness discussed.    30 minutes spent on the date of the encounter doing chart review, review of test results, patient visit  and documentation         No follow-ups on file.    Nadya Herrmann, CNP  [facility] FREDI Barajas is a 66 year old female who presents to clinic today for the following health issues:  Chief Complaint   Patient presents with     Musculoskeletal Problem     woke up yesterday x left leg gave out; having left hip raidiating to lower leg; feels numb. When walking pain radiates from lower leg up to hip.     HPI    Left leg/lateral hip and buttock pain upon awakening yesterday.  Feeling numb in left leg.  Hx cement of vertebrae - 9 years ago.  Gets back pain off and on.  Takes oxycodone 4 times daily scheduled.      Since yesterday, left leg Will shoot pain with walking.  Numbness only present above the knee on the anterior aspect of the left lower extremity.  OK if sitting.    Pain with walking and getting up.      Leg pain is all new.  Denies any known hip issues.    Couldn't lie on left side.            Review of Systems   Constitutional: Negative for fever.   Genitourinary: Negative for difficulty urinating.   Neurological: Positive for numbness. Negative for weakness.           Objective    /69   Pulse 76   Temp 97.8  F (36.6  C) (Tympanic)   Resp 24   SpO2 94%   Physical Exam  Constitutional:       General: She is not in acute distress.     Appearance: She is well-developed.   Eyes:      General:         Right eye: No discharge.         Left eye: No discharge.      Conjunctiva/sclera: Conjunctivae normal.   Pulmonary:      Effort: Pulmonary effort is normal.   Musculoskeletal:         General: Tenderness (Severe left lateral hip area tenderness to minimal palpation.  Similar to left upper buttock and left low back.  Reports some tenderness over the L-spine.) present. No swelling. Normal range of motion.      Left lower leg: No edema.      Comments: No joint warmth, or erythema on the left hip.    No rash.       Skin:     General: Skin is warm and dry.      Capillary  Refill: Capillary refill takes less than 2 seconds.   Neurological:      Mental Status: She is alert and oriented to person, place, and time.      Motor: No weakness (Normal dorsi plantarflexion on left.  Normal patellar reflexes.).      Gait: Gait abnormal (Limping gait on the left.).      Comments: Negative straight leg raise test   Psychiatric:         Mood and Affect: Mood normal.         Behavior: Behavior normal.         Thought Content: Thought content normal.         Judgment: Judgment normal.            Results for orders placed or performed in visit on 03/24/22 (from the past 24 hour(s))   XR Pelvis w Hip Left G/E 2 Views    Narrative    EXAM DATE:         03/24/2022    EXAM: X-RAY HIP, LEFT, MINIMUM 2 VIEWS  LOCATION: [facility]  DATE/TIME: 3/24/2022 5:45 PM    INDICATION: History of spine surgery - severe, sudden pain with midline lumbar spine tenderness, severe left lateral hip pain and difficulty walking.    COMPARISON: None.    IMPRESSION: Both hips negative for fracture or dislocation. Pelvis negative for fracture.             XR Lumbar Spine 2/3 Views    Narrative    EXAM DATE:         03/24/2022    EXAM: X-RAY LUMBAR SPINE, AP AND LATERAL  LOCATION: [facility]  DATE/TIME: 3/24/2022 5:30 PM    INDICATION: History of spine surgery-severe, sudden pain with midline lumbar spine tenderness, severe left lateral hip pain and difficulty walking.  COMPARISON: CT of the abdomen and pelvis 10/16/2019.  TECHNIQUE: CR Lumbar Spine.    IMPRESSION: Nomenclature is based on 5 lumbar vertebral bodies. Diffuse osseous demineralization, limiting evaluation for fracture. Unchanged L4 and L5 chronic compression deformities with moderate L4 vertebral height loss and moderate to severe L5   vertebral height loss. As before, there is mild retropulsion of the posterosuperior endplates of L4 on L5 along the ventral aspect of the spinal canal. No new vertebral  body height loss is identified. Partially visualized dextroconvex curvature of the   thoracic spine and mild levoconvex curvature centered in the mid lumbar region. Alignment otherwise appears within normal limits. Minimal/mild multilevel degenerative disc space narrowing. There is at least moderate multilevel degenerative facet disease.   There is a nonspecific ovoid calcification projecting over the right upper abdomen. This measures approximately 6 mm. Small presumed calcified phlebolith in the left lower pelvis.

## 2022-03-24 NOTE — PATIENT INSTRUCTIONS
"You have chronic back issues/compression deformities/loss of the height of your vertebrae and osteoporosis seen on x-ray, but no new compression fractures or other changes visualized on the x-ray.    Start prednisone daily.  Take this with food.    Continue your oxycodone.    If you have any loss of bowel or bladder control or your leg/foot feels very weak or your pain is very severe, go to the emergency room.    Please be rechecked at the spine clinic as soon as possible.    Use crutches when you are up.    If you fall or cannot manage at home, go to the emergency room.      Patient Education     * Sciatica     Sciatica (\"Lumbar Radiculopathy\") causes a pain that spreads from the lower back down into the buttock, hip and leg. Sometimes leg pain can occur without any back pain. Sciatica is due to irritation or pressure on a spinal nerve as it comes out of the spinal canal. This is most often due to pressure on the nerve from a nearby spinal disk (the cartilage cushion between each spinal bone). Other causes include spinal stenosis (narrowing of the spinal canal) and spasm of the piriformis muscle (a muscle in the buttocks that the sciatic nerve passes through).  Sciatica may begin after a sudden twisting/bending force (such as in a car accident), or sometimes after a simple awkward movement. In either case, muscle spasm is commonly present and can add to the pain.  The diagnosis of sciatica is made from the symptoms and physical exam. Unless you had a physical injury (such as a car accident or fall), X-rays are usually not ordered for the initial evaluation of sciatica because the nerves and disks cannot be seen on an X-ray. Most sciatica (80-90%) gets better with time.  What can I do about my low back pain?  There are three main things you can do to ease low back pain and help it go away.    Stay active! Use positions, movements and exercises. Too much rest can make your symptoms worse.    Use heat or cold " packs.    Take medicine as directed.  Using positions, movements and exercises  Research tells us that moving your joints and muscles can help you recover from back pain. Such activity should be simple and gentle.  Use walking to help relieve your discomfort. Try taking a short walk every 3 to 4 hours during the day. Walk for a few minutes inside your home or take longer walks outside, on a treadmill or at a mall. Slowly increase the amount of time you walk. Expect discomfort when you begin, but it should lessen as your back starts to recover.  Finding a position that is comfortable  When your back pain is new, you may find that certain positions will ease your pain. Gently try each of the following positions until you find one that eases your pain. Once you find a position of comfort, use it as often as you like while you recover. Return to your daily routine as soon as possible.    Lie on your back with your legs bent. You can do this by placing a pillow under your knees or lie on the floor and rest your lower legs on the seat of a chair.    Lie on your side with your knees bent and place a pillow between your knees.    Lie on your stomach over pillows.  Using heat or cold packs  Try cold packs or gentle heat to ease your pain. Use whichever gives you the most relief. Apply the cold pack or heat for 15 minutes at a time, as often as needed.  Taking medicine  If taking over-the-counter medicine:    Take ibuprofen (Advil, Motrin) 600 mg. three times a day as needed for pain.  OR    Take Aleve (naproxen sodium) 220 to 440 mg. two times a day as needed for pain.  If your doctor prescribed medicine, follow the instructions. Stop taking the medicine as soon as you can.  When should I call my doctor?  Your back pain should improve over the first couple of weeks. As it improves, you should be able to return to your normal activities. But call your doctor if:    You have a sudden change in your ability to control?your  bladder or bowels.    You begin to feel tingling in your groin or legs.    The pain spreads down your leg and into your foot.    Your toes, feet or leg muscles begin to feel weak.    You feel generally unwell or sick.    Your pain gets worse.  For informational purposes only. Not to replace the advice of your health care provider.  Copyright   2018 [facility]. All rights reserved.

## 2022-03-24 NOTE — TELEPHONE ENCOUNTER
"Pt is calling in about pain in her left leg since yesterday. Pt denies any new injury, but does have chronic low back pain, and has had back surgeries. Pt reports her leg almost gave out on her when she went to get up from her bed yesterday. Pt reports pain level is \"0\" when sitting still, but \"10\" when she is walking. Pt reports pain starts in her back, then radiates down her left leg only in the front. Pt denies any pain in right leg. Pt denies any fever, abdominal pain, pain or burning with urination, blood in her urine, vomiting, numbness, or loss of sensation of her arm hand or upper back, or her leg or foot. Pt is able to walk and bear weight, but it is painful, and she does feel slight weakness. Pt has not tried any ice on her back, but she has tried heat, and does have Oxycodone, and this does not help with the pain level when walking.   Care advice given, use of ice, and per protocol pt should be evaluated in the clinic within 3 days. Pt was transferred to scheduling to make an appointment. Pt was advised to call back if numbness or weakness occur, bowel or bladder problems occur, or pain lasts more than 2 weeks. Pt verbalized understanding.     Ariel Arthur RN on 3/24/2022 at 1:31 PM       Reason for Disposition    Pain radiates into the thigh or further down the leg    Additional Information    Negative: Passed out (i.e., fainted, collapsed and was not responding)    Negative: Shock suspected (e.g., cold/pale/clammy skin, too weak to stand, low BP, rapid pulse)    Negative: Sounds like a life-threatening emergency to the triager    Negative: Major injury to the back (e.g., MVA, fall > 10 feet or 3 meters, penetrating injury, etc.)    Negative: Pain in the upper back over the ribs (rib cage) that radiates (travels) into the chest    Negative: Pain in the upper back over the ribs (rib cage) and worsened by coughing (or clearly increases with breathing)    Negative: SEVERE back pain of sudden onset and " age > 60    Negative: SEVERE abdominal pain (e.g., excruciating)    Negative: Abdominal pain and age > 60    Negative: Unable to urinate (or only a few drops) and bladder feels very full    Negative: Loss of bladder or bowel control (urine or bowel incontinence; wetting self, leaking stool) of new onset    Negative: Numbness (loss of sensation) in groin or rectal area    Negative: Pain radiates into groin, scrotum    Negative: Blood in urine (red, pink, or tea-colored)    Negative: Vomiting and pain over lower ribs of back (i.e., flank - kidney area)    Negative: Weakness of a leg or foot (e.g., unable to bear weight, dragging foot)    Negative: Patient sounds very sick or weak to the triager    Negative: Fever > 100.4 F (38.0 C) and flank pain    Negative: Pain or burning with passing urine (urination)    Negative: SEVERE back pain (e.g., excruciating, unable to do any normal activities) and not improved after pain medicine and CARE ADVICE    Negative: Numbness in an arm or hand (i.e., loss of sensation) and upper back pain    Negative: Numbness in a leg or foot (i.e., loss of sensation)    Negative: High-risk adult (e.g., history of cancer, history of HIV, or history of IV drug abuse)    Negative: Painful rash with multiple small blisters grouped together (i.e., dermatomal distribution or 'band' or 'stripe')    Negative: Pain radiates into the thigh or further down the leg, and in both legs    Negative: Age > 50 and no history of prior similar back pain    Negative: MODERATE back pain (e.g., interferes with normal activities) and present > 3 days    Protocols used: BACK PAIN-A-OH

## 2022-03-29 NOTE — PROGRESS NOTES
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  Patient reports that last week she had abrupt onset of severe left hip pain and paresthesias upon waking.  She prescribed prednisone at her primary care clinic.  That acute flare of pain has resolved.  She does have chronic pain involving the thoracic spine, lumbar spine, and bilateral lateral hips.  My review of an x-ray lumbar spine shows chronic compression deformities L4 and L5.  There is no new fracture identified.  There is mild multilevel degenerative disc disease and moderate multilevel facet arthropathy in the setting of scoliosis.  Patient has not had any recent thoracic spine imaging.  I would like to evaluate for possible new compression fracture given her history.  Bilateral lateral hip pain seems most consistent with trochanteric bursitis.  She does not appear to have any radicular symptoms currently.  She is neurologically intact.    PLAN:  A shared decision making model was used.  The patient's values and choices were respected.  The following represents what was discussed and decided upon by the physician assistant and the patient.      1.  DIAGNOSTIC TESTS:    -I reviewed the x-ray lumbar spine.  -I reviewed the x-ray pelvis and hip.  -I ordered a x-ray of the thoracic spine to rule out thoracic compression fracture.  -If pain fails to improve, would recommend obtaining an MRI scan.    2.  PHYSICAL THERAPY: This patient will benefit from physical therapy for her spine and for trochanteric bursitis.  I held off on ordering this until we have seen the results  of her x-rays.    3.  MEDICATIONS: No changes are made to the patient's medications.  -She is on her last day of prednisone which has been very helpful for her flare of left lower extremity pain.  -Patient takes oxycodone 10 mg 4 times daily.  This is managed by her primary care provider and she has a controlled substance agreement on file.    4.  INTERVENTIONS: No interventions were ordered today.  -Bilateral lateral hip pain fails to improve I recommend bilateral trochanteric bursa injections.  -We could also consider interventional pain management for spine pain if that fails to improve with conservative treatments, however, she would need MRI scans first.    5.  PATIENT EDUCATION: Patient is in agreement the above plan.  All questions were answered.    6.  FOLLOW-UP:   A nurse will call the patient with the results of her x-ray.  As long as there is no fracture I will recommend a trial of physical therapy and follow-up with me in 4-6 weeks.  If she has questions or concerns in the meantime, she should not hesitate to call.      SUBJECTIVE:  Jenn Barclay  Is a 66 year old female who presents today in consultation at request of Nadya Herrmann CNP for new patient evaluation of back and hip pain.  Patient reports that she woke up with severe left hip pain with paresthesias 1 week ago.  She denies any injury or event to cause the pain.  The pain was so severe she could barely walk.  She went to her primary clinic and was prescribed prednisone.  Patient reports prednisone has been very helpful and that flare of pain has resolved.  She does have chronic pain involving the thoracic spine, lumbar spine, and both hips for the past 2 to 3 years.  She has a history of vertebroplasty in the thoracic spine for compression fractures which did provide relief of her pain at that time.    Patient complains of midline thoracic spine pain between the shoulder blades.  She also complains of midline spine pain at the thoracolumbar  junction.  She complains of bilateral lateral hip pain.  Both sides are equally affected.  She states the hip pain is worse when she sleeps at night.  She is a side sleeper and the hip she is sleeping on gets painful so she has to switch positions frequently.  She uses a massage tool that helps alleviate the pain.  She denies any pain radiating further down the leg.  She denies any numbness or tingling in the legs.  Denies weakness.  Denies loss of bowel or bladder control.  Denies recent fevers.    Patient has not had physical therapy for this.  She does not go to a chiropractor.  As mentioned above, she has had vertebroplasty for thoracic spine compression fractures.  She has never had spine or hip injections.  She takes oxycodone 10 mg 4 times daily which is helpful.  She is on her last day of prednisone which is helpful.    Current Outpatient Medications   Medication     albuterol (PROVENTIL) (2.5 MG/3ML) 0.083% neb solution     Albuterol Sulfate (VENTOLIN HFA) 108 (90 BASE) MCG/ACT AERS     alendronate (FOSAMAX) 70 MG tablet     amylase-lipase-protease (CREON) 94283-65739 units CPEP per EC capsule     ascorbic acid (VITAMIN C) 500 MG tablet     cholecalciferol (VITAMIN D3) 1000 UNIT capsule     clindamycin 1 % EX external lotion     gentamicin 0.1 % EX external ointment     Loperamide HCl (IMODIUM OR)     Multiple Vitamins-Minerals (MULTIVITAL PO)     mupirocin (BACTROBAN) 2 % external ointment     nebulizer nebulization     oxyCODONE IR (ROXICODONE) 10 MG tablet     predniSONE (DELTASONE) 20 MG tablet     triamcinolone (KENALOG) 0.1 % external cream         Allergies   Allergen Reactions     Mirtazapine      Other reaction(s): Other (See Comments)  Hallucination      Tape [Adhesive Tape]      Allergy Hx     Liquid Adhesive Rash     Other reaction(s): Other (See Comments)  Allergy Hx - Rash from paper tape       Past Medical History:   Diagnosis Date     Acute myeloid leukemia (AML), M2 (H)      AML (acute  myeloid leukemia) (H) 12/2009     Baker's cyst      Chronic back pain      Chronic diarrhea      Compression fx, lumbar spine (H)      Controlled substance agreement signed 8/3/2017     COPD (chronic obstructive pulmonary disease) (H)      COPD (chronic obstructive pulmonary disease) (H)      Full code status 9/10/2017     Gastric ulcer     pt states she has not had any ulcers     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)      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     History of PID      Metatarsal fracture 2017 2nd      Migraine without aura, without mention of intractable migraine without mention of status migrainosus      Osteoporosis 2020    DXA     Osteoporosis 5/3/2016     Papular rash, generalized      Pseudogout 11/30/2016     Serrated adenoma of colon 7/17/2020     Status post allogeneic bone marrow transplant (H)      Surgical menopause 1976    oophorectomy     Tobacco abuse      Traumatic compression fracture of T8 thoracic vertebra, closed, initial encounter (H) 4/26/2016     Tubular adenoma of colon 7/17/2020     Vertebral compression fracture (H) 2014     Zoster 2018        Patient Active Problem List   Diagnosis     Exocrine pancreatic insufficiency     Post-menopausal     Nephrolithiasis     Anxiety     Chronic back pain     Controlled substance agreement signed - 1/22 - oxycodone - UDS 1/22     COPD (chronic obstructive pulmonary disease) (H)     Financial difficulties     Full code status     Gastric ulcer     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)     Migraine headache     Papular rash, generalized     Pseudogout     Senile purpura (H)     Tobacco abuse     Traumatic compression fracture of T8 thoracic vertebra, closed, initial encounter (H)     Tubular adenoma of colon - advanced adenoma in 2017 - single small adenoma in 2020     OP (osteoporosis)     Acute myeloid leukemia (AML), M2 (H)     Status post allogeneic bone marrow transplant (H) - 2010 - [facility]     Chronic diarrhea        Past Surgical History:   Procedure Laterality Date     APPENDECTOMY       COLONOSCOPY N/A 7/28/2020    Procedure: COLONOSCOPY, WITH POLYPECTOMY AND BIOPSY;  Surgeon: Gianni Valerio MD;  Location: [facility] OR     hysterectomy and oophorectomy  1976     HYSTERECTOMY TOTAL ABDOMINAL, BILATERAL SALPINGO-OOPHORECTOMY, COMBINED       IR MISCELLANEOUS PROCEDURE  2/22/2010     [facility] EGD TRANSORAL BIOPSY SINGLE/MULTIPLE N/A 6/12/2014    Procedure: ESOPHAGOGASTRODUODENOSCOPY BIOPSY;  Surgeon: Eder Alvarado MD;  Location: [facility];  Service: Gastroenterology     TRANSPLANT  2010     VERTEBROPLASTY  2016    T5 and T8 vertebrae.       Family History   Problem Relation Age of Onset     Diabetes Mother         borderline     Pancreatic Cancer Father      Diabetes Maternal Grandmother      Diabetes Maternal Uncle      Thyroid Disease No family hx of      Melanoma No family hx of      Skin Cancer No family hx of      Lung Cancer Mother        Social history: Patient lives with her grandson.  She does not work outside the home.  She smokes 1.5 packs/day.  Denies alcohol use.  Denies illicit drug use.      ROS: Positive for weight loss, ringing in ears, shortness of breath, wheezing, diarrhea, joint pain.  Specifically negative for bowel/bladder dysfunction, fevers,chills, appetite changes. Otherwise 13 systems reviewed are negative.  Please see the patient's intake questionnaire from today for details.      OBJECTIVE:  PHYSICAL EXAMINATION:    CONSTITUTIONAL:  Vital signs as above.  No acute distress.  The patient is well nourished and well groomed.  Patient is very thin.  PSYCHIATRIC:  The patient is awake, alert, oriented to person, place, time and answering questions appropriately with clear speech.    HEENT: Normocephalic, atraumatic.  Sclera clear.  Neck is supple.  SKIN:  Skin over the face, bilateral lower extremities, and posterior torso is clean, dry, intact without rashes.    GAIT:  Gait is non-antalgic.  The patient is  able to heel and toe walk without significant difficulty.    STANDING EXAMINATION: Mild tenderness palpation midline mid thoracic spine between the shoulder blades and tender to palpation midline at the thoracolumbar junction.  Thoracolumbar range of motion is within normal limits.  Patient does have a mild scoliotic deformity.  Tender to palpation bilateral greater trochanters.  MUSCLE STRENGTH:  The patient has 5/5 strength for the bilateral hip flexors, knee flexors/extensors, ankle dorsiflexors/plantar flexors, great toe extensors, ankle evertors/invertors.  NEUROLOGICAL: 2+ patellar, and achilles reflexes bilaterally.  Negative Babinski's bilaterally.  No ankle clonus bilaterally. Sensation to light touch is intact in the bilateral L4, L5, and S1 dermatomes.  VASCULAR:  2/4 dorsalis pedis pulses bilaterally.  Bilateral lower extremities are warm.  There is no pitting edema of the bilateral lower extremities.  ABDOMINAL:  Soft, non-distended, non-tender throughout all quadrants.  No pulsatile mass palpated in the left lower quadrant.  LYMPH NODES:  No palpable or tender inguinal lymph nodes.  MUSCULOSKELETAL: Straight leg raise is negative bilaterally.  Reproduction of lateral hip pain with resisted abduction of the hip bilaterally.  Range of motion of the right hip is within normal limits without increased pain.  Patient has some increased left lateral hip pain at end external rotation of the left hip but range of motion of the left hip is within normal limits.    RESULTS:    I reviewed the x-ray lumbar spine from [facility] dated March 24, 2022.  This shows chronic compression deformities L4 and L5 which are stable compared with a CT abdomen pelvis from 2019.  No new fracture.  There is mild levoconvex curvature centered in the mid lumbar region with partially visualized dextroconvex curvature the thoracic spine.  There is mild multilevel disc base narrowing.  There is at least moderate  multilevel facet disease.    I reviewed the x-ray left hip from [facility] dated March 24, 2022.  This is negative.

## 2022-03-30 ENCOUNTER — OFFICE VISIT (OUTPATIENT)
Dept: PHYSICAL MEDICINE AND REHAB | Facility: CLINIC | Age: 67
End: 2022-03-30
Attending: NURSE PRACTITIONER
Payer: COMMERCIAL

## 2022-03-30 ENCOUNTER — TELEPHONE (OUTPATIENT)
Dept: INTERNAL MEDICINE | Facility: CLINIC | Age: 67
End: 2022-03-30

## 2022-03-30 VITALS
DIASTOLIC BLOOD PRESSURE: 73 MMHG | HEART RATE: 80 BPM | WEIGHT: 115.1 LBS | HEIGHT: 67 IN | BODY MASS INDEX: 18.07 KG/M2 | SYSTOLIC BLOOD PRESSURE: 150 MMHG

## 2022-03-30 DIAGNOSIS — M70.61 TROCHANTERIC BURSITIS OF BOTH HIPS: ICD-10-CM

## 2022-03-30 DIAGNOSIS — M70.62 TROCHANTERIC BURSITIS OF BOTH HIPS: ICD-10-CM

## 2022-03-30 DIAGNOSIS — M54.6 PAIN IN THORACIC SPINE: Primary | ICD-10-CM

## 2022-03-30 DIAGNOSIS — M54.50 LUMBAR SPINE PAIN: ICD-10-CM

## 2022-03-30 PROCEDURE — 99204 OFFICE O/P NEW MOD 45 MIN: CPT | Performed by: PHYSICIAN ASSISTANT

## 2022-03-30 ASSESSMENT — PAIN SCALES - GENERAL: PAINLEVEL: MODERATE PAIN (4)

## 2022-03-30 NOTE — LETTER
3/30/2022         RE: Jenn Barclay  [address] [place] [address]        Dear Colleague,    Thank you for referring your patient, Jenn Barclay, to the [facility] [place]. Please see a copy of my visit note below.    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  Patient reports that last week she had abrupt onset of severe left hip pain and paresthesias upon waking.  She prescribed prednisone at her primary care clinic.  That acute flare of pain has resolved.  She does have chronic pain involving the thoracic spine, lumbar spine, and bilateral lateral hips.  My review of an x-ray lumbar spine shows chronic compression deformities L4 and L5.  There is no new fracture identified.  There is mild multilevel degenerative disc disease and moderate multilevel facet arthropathy in the setting of scoliosis.  Patient has not had any recent thoracic spine imaging.  I would like to evaluate for possible new compression fracture given her history.  Bilateral lateral hip pain seems most consistent with trochanteric bursitis.  She does not appear to have any radicular symptoms currently.  She is neurologically intact.    PLAN:  A shared decision making model was used.  The patient's values and choices were respected.  The following represents what was discussed and decided upon by the physician assistant and the patient.      1.  DIAGNOSTIC TESTS:    -I reviewed the x-ray lumbar spine.  -I reviewed the x-ray pelvis and hip.  -I ordered a x-ray of the thoracic spine to rule out thoracic  compression fracture.  -If pain fails to improve, would recommend obtaining an MRI scan.    2.  PHYSICAL THERAPY: This patient will benefit from physical therapy for her spine and for trochanteric bursitis.  I held off on ordering this until we have seen the results of her x-rays.    3.  MEDICATIONS: No changes are made to the patient's medications.  -She is on her last day of prednisone which has been very helpful for her flare of left lower extremity pain.  -Patient takes oxycodone 10 mg 4 times daily.  This is managed by her primary care provider and she has a controlled substance agreement on file.    4.  INTERVENTIONS: No interventions were ordered today.  -Bilateral lateral hip pain fails to improve I recommend bilateral trochanteric bursa injections.  -We could also consider interventional pain management for spine pain if that fails to improve with conservative treatments, however, she would need MRI scans first.    5.  PATIENT EDUCATION: Patient is in agreement the above plan.  All questions were answered.    6.  FOLLOW-UP:   A nurse will call the patient with the results of her x-ray.  As long as there is no fracture I will recommend a trial of physical therapy and follow-up with me in 4-6 weeks.  If she has questions or concerns in the meantime, she should not hesitate to call.      SUBJECTIVE:  Jenn Barclay  Is a 66 year old female who presents today in consultation at request of Nadya Herrmann CNP for new patient evaluation of back and hip pain.  Patient reports that she woke up with severe left hip pain with paresthesias 1 week ago.  She denies any injury or event to cause the pain.  The pain was so severe she could barely walk.  She went to her primary clinic and was prescribed prednisone.  Patient reports prednisone has been very helpful and that flare of pain has resolved.  She does have chronic pain involving the thoracic spine, lumbar spine, and both hips for the past 2 to 3 years.  She has a  history of vertebroplasty in the thoracic spine for compression fractures which did provide relief of her pain at that time.    Patient complains of midline thoracic spine pain between the shoulder blades.  She also complains of midline spine pain at the thoracolumbar junction.  She complains of bilateral lateral hip pain.  Both sides are equally affected.  She states the hip pain is worse when she sleeps at night.  She is a side sleeper and the hip she is sleeping on gets painful so she has to switch positions frequently.  She uses a massage tool that helps alleviate the pain.  She denies any pain radiating further down the leg.  She denies any numbness or tingling in the legs.  Denies weakness.  Denies loss of bowel or bladder control.  Denies recent fevers.    Patient has not had physical therapy for this.  She does not go to a chiropractor.  As mentioned above, she has had vertebroplasty for thoracic spine compression fractures.  She has never had spine or hip injections.  She takes oxycodone 10 mg 4 times daily which is helpful.  She is on her last day of prednisone which is helpful.    Current Outpatient Medications   Medication     albuterol (PROVENTIL) (2.5 MG/3ML) 0.083% neb solution     Albuterol Sulfate (VENTOLIN HFA) 108 (90 BASE) MCG/ACT AERS     alendronate (FOSAMAX) 70 MG tablet     amylase-lipase-protease (CREON) 52397-47333 units CPEP per EC capsule     ascorbic acid (VITAMIN C) 500 MG tablet     cholecalciferol (VITAMIN D3) 1000 UNIT capsule     clindamycin 1 % EX external lotion     gentamicin 0.1 % EX external ointment     Loperamide HCl (IMODIUM OR)     Multiple Vitamins-Minerals (MULTIVITAL PO)     mupirocin (BACTROBAN) 2 % external ointment     nebulizer nebulization     oxyCODONE IR (ROXICODONE) 10 MG tablet     predniSONE (DELTASONE) 20 MG tablet     triamcinolone (KENALOG) 0.1 % external cream         Allergies   Allergen Reactions     Mirtazapine      Other reaction(s): Other (See  Comments)  Hallucination      Tape [Adhesive Tape]      Allergy Hx     Liquid Adhesive Rash     Other reaction(s): Other (See Comments)  Allergy Hx - Rash from paper tape       Past Medical History:   Diagnosis Date     Acute myeloid leukemia (AML), M2 (H)      AML (acute myeloid leukemia) (H) 12/2009     Baker's cyst      Chronic back pain      Chronic diarrhea      Compression fx, lumbar spine (H)      Controlled substance agreement signed 8/3/2017     COPD (chronic obstructive pulmonary disease) (H)      COPD (chronic obstructive pulmonary disease) (H)      Full code status 9/10/2017     Gastric ulcer     pt states she has not had any ulcers     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)      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     History of PID      Metatarsal fracture 2017 2nd      Migraine without aura, without mention of intractable migraine without mention of status migrainosus      Osteoporosis 2020    DXA     Osteoporosis 5/3/2016     Papular rash, generalized      Pseudogout 11/30/2016     Serrated adenoma of colon 7/17/2020     Status post allogeneic bone marrow transplant (H)      Surgical menopause 1976    oophorectomy     Tobacco abuse      Traumatic compression fracture of T8 thoracic vertebra, closed, initial encounter (H) 4/26/2016     Tubular adenoma of colon 7/17/2020     Vertebral compression fracture (H) 2014     Zoster 2018        Patient Active Problem List   Diagnosis     Exocrine pancreatic insufficiency     Post-menopausal     Nephrolithiasis     Anxiety     Chronic back pain     Controlled substance agreement signed - 1/22 - oxycodone - UDS 1/22     COPD (chronic obstructive pulmonary disease) (H)     Financial difficulties     Full code status     Gastric ulcer     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)     Migraine headache     Papular rash, generalized     Pseudogout     Senile purpura (H)     Tobacco abuse     Traumatic compression fracture of T8 thoracic vertebra,  closed, initial encounter (H)     Tubular adenoma of colon - advanced adenoma in 2017 - single small adenoma in 2020     OP (osteoporosis)     Acute myeloid leukemia (AML), M2 (H)     Status post allogeneic bone marrow transplant (H) - 2010 - [facility]     Chronic diarrhea       Past Surgical History:   Procedure Laterality Date     APPENDECTOMY       COLONOSCOPY N/A 7/28/2020    Procedure: COLONOSCOPY, WITH POLYPECTOMY AND BIOPSY;  Surgeon: Gianni Valerio MD;  Location: [facility]     hysterectomy and oophorectomy  1976     HYSTERECTOMY TOTAL ABDOMINAL, BILATERAL SALPINGO-OOPHORECTOMY, COMBINED       IR MISCELLANEOUS PROCEDURE  2/22/2010     WA EGD TRANSORAL BIOPSY SINGLE/MULTIPLE N/A 6/12/2014    Procedure: ESOPHAGOGASTRODUODENOSCOPY BIOPSY;  Surgeon: Eder Alvarado MD;  Location: [facility];  Service: Gastroenterology     TRANSPLANT  2010     VERTEBROPLASTY  2016    T5 and T8 vertebrae.       Family History   Problem Relation Age of Onset     Diabetes Mother         borderline     Pancreatic Cancer Father      Diabetes Maternal Grandmother      Diabetes Maternal Uncle      Thyroid Disease No family hx of      Melanoma No family hx of      Skin Cancer No family hx of      Lung Cancer Mother        Social history: Patient lives with her grandson.  She does not work outside the home.  She smokes 1.5 packs/day.  Denies alcohol use.  Denies illicit drug use.      ROS: Positive for weight loss, ringing in ears, shortness of breath, wheezing, diarrhea, joint pain.  Specifically negative for bowel/bladder dysfunction, fevers,chills, appetite changes. Otherwise 13 systems reviewed are negative.  Please see the patient's intake questionnaire from today for details.      OBJECTIVE:  PHYSICAL EXAMINATION:    CONSTITUTIONAL:  Vital signs as above.  No acute distress.  The patient is well nourished and well groomed.  Patient is very thin.  PSYCHIATRIC:  The patient is awake, alert, oriented to person, place, time and answering  questions appropriately with clear speech.    HEENT: Normocephalic, atraumatic.  Sclera clear.  Neck is supple.  SKIN:  Skin over the face, bilateral lower extremities, and posterior torso is clean, dry, intact without rashes.    GAIT:  Gait is non-antalgic.  The patient is able to heel and toe walk without significant difficulty.    STANDING EXAMINATION: Mild tenderness palpation midline mid thoracic spine between the shoulder blades and tender to palpation midline at the thoracolumbar junction.  Thoracolumbar range of motion is within normal limits.  Patient does have a mild scoliotic deformity.  Tender to palpation bilateral greater trochanters.  MUSCLE STRENGTH:  The patient has 5/5 strength for the bilateral hip flexors, knee flexors/extensors, ankle dorsiflexors/plantar flexors, great toe extensors, ankle evertors/invertors.  NEUROLOGICAL: 2+ patellar, and achilles reflexes bilaterally.  Negative Babinski's bilaterally.  No ankle clonus bilaterally. Sensation to light touch is intact in the bilateral L4, L5, and S1 dermatomes.  VASCULAR:  2/4 dorsalis pedis pulses bilaterally.  Bilateral lower extremities are warm.  There is no pitting edema of the bilateral lower extremities.  ABDOMINAL:  Soft, non-distended, non-tender throughout all quadrants.  No pulsatile mass palpated in the left lower quadrant.  LYMPH NODES:  No palpable or tender inguinal lymph nodes.  MUSCULOSKELETAL: Straight leg raise is negative bilaterally.  Reproduction of lateral hip pain with resisted abduction of the hip bilaterally.  Range of motion of the right hip is within normal limits without increased pain.  Patient has some increased left lateral hip pain at end external rotation of the left hip but range of motion of the left hip is within normal limits.    RESULTS:    I reviewed the x-ray lumbar spine from [facility] dated March 24, 2022.  This shows chronic compression deformities L4 and L5 which are stable compared  with a CT abdomen pelvis from 2019.  No new fracture.  There is mild levoconvex curvature centered in the mid lumbar region with partially visualized dextroconvex curvature the thoracic spine.  There is mild multilevel disc base narrowing.  There is at least moderate multilevel facet disease.    I reviewed the x-ray left hip from [facility] dated March 24, 2022.  This is negative.        Again, thank you for allowing me to participate in the care of your patient.        Sincerely,        Jo-Ann Alvares PA-C

## 2022-03-31 NOTE — TELEPHONE ENCOUNTER
She states she is getting an x ray done from [place] today.  She states she was told she has scoliosis and she has trochanteric bursitis of the hips.    She will be going to PT for strength of her back then if that does not work the next step will be back injections.   Offered to schedule an appointment she declined she would like to have her appointments done first.  She will call back with results at this time there is nothing for Dr. Dunn to do.  She thanked for the call.

## 2022-03-31 NOTE — TELEPHONE ENCOUNTER
Please call patient -    ______________________________________________________________________     Home phone:  [phone] (home)     Cell phone:   Telephone Information:   Mobile [phone]       Other contacts:  Name Home Phone Work Phone Mobile Phone Relationship Lgl Grd   JEAN JUARES [phone] FRANCISCO JAVIER Tobin   [phone] Significant*      ______________________________________________________________________     I have received some notes related to her back pain.    Please see whether she needs follow up with me related to this as well.      May use same day slot or reserved slot next week for this.    Thank you,    Jorge Dunn MD  [facility]  3/30/2022, 10:31 PM

## 2022-04-01 ENCOUNTER — HOSPITAL ENCOUNTER (OUTPATIENT)
Dept: RADIOLOGY | Facility: HOSPITAL | Age: 67
Discharge: HOME | End: 2022-04-01
Attending: PHYSICIAN ASSISTANT | Admitting: PHYSICIAN ASSISTANT
Payer: COMMERCIAL

## 2022-04-01 DIAGNOSIS — M54.6 PAIN IN THORACIC SPINE: ICD-10-CM

## 2022-04-01 PROCEDURE — 72070 X-RAY EXAM THORAC SPINE 2VWS: CPT

## 2022-04-04 ENCOUNTER — TELEPHONE (OUTPATIENT)
Dept: PHYSICAL MEDICINE AND REHAB | Facility: CLINIC | Age: 67
End: 2022-04-04
Payer: COMMERCIAL

## 2022-04-04 DIAGNOSIS — Z87.81 HISTORY OF COMPRESSION FRACTURE OF SPINE: ICD-10-CM

## 2022-04-04 DIAGNOSIS — M70.62 TROCHANTERIC BURSITIS OF BOTH HIPS: ICD-10-CM

## 2022-04-04 DIAGNOSIS — M54.50 LUMBAR SPINE PAIN: ICD-10-CM

## 2022-04-04 DIAGNOSIS — M70.61 TROCHANTERIC BURSITIS OF BOTH HIPS: ICD-10-CM

## 2022-04-04 DIAGNOSIS — M54.6 PAIN IN THORACIC SPINE: Primary | ICD-10-CM

## 2022-04-04 NOTE — TELEPHONE ENCOUNTER
Pt returned call. Results and recommendations given. Pt stated understanding. Pt ok with physical therapy referral. She would like to go through [facility]. Pt also ok with osteoporosis referral being placed. She is aware she will be contacted to set up appts.

## 2022-04-04 NOTE — TELEPHONE ENCOUNTER
----- Message from Jo-Ann Alvares PA-C sent at 4/3/2022  5:30 PM CDT -----  Please  call the patient and let her know there are no new fractures compared with prior CT chest from 1/4/20.  For her back and hip pain I recommend physical therapy and follow up with me in 4-6 weeks, or sooner if pain flares back up again.  She should see an osteoporosis specialist given her history of multiple compression fractures.

## 2022-04-14 DIAGNOSIS — G89.29 CHRONIC BILATERAL LOW BACK PAIN, UNSPECIFIED WHETHER SCIATICA PRESENT: ICD-10-CM

## 2022-04-14 DIAGNOSIS — M54.50 CHRONIC BILATERAL LOW BACK PAIN, UNSPECIFIED WHETHER SCIATICA PRESENT: ICD-10-CM

## 2022-04-14 RX ORDER — OXYCODONE HYDROCHLORIDE 10 MG/1
10 TABLET ORAL 4 TIMES DAILY
Qty: 120 TABLET | Refills: 0 | Status: SHIPPED | OUTPATIENT
Start: 2022-04-20 | End: 2022-05-16

## 2022-04-14 NOTE — TELEPHONE ENCOUNTER
Patient calling to request refill of her Oxycodone 10 mg.    Last refilled 3/21/22    Last visit 1/3/22    Upcoming appt 7/5/22

## 2022-04-18 ENCOUNTER — HOSPITAL ENCOUNTER (OUTPATIENT)
Dept: PHYSICAL THERAPY | Facility: REHABILITATION | Age: 67
Discharge: HOME | End: 2022-04-18
Attending: PHYSICIAN ASSISTANT
Payer: COMMERCIAL

## 2022-04-18 DIAGNOSIS — M70.62 TROCHANTERIC BURSITIS OF BOTH HIPS: ICD-10-CM

## 2022-04-18 DIAGNOSIS — M54.6 PAIN IN THORACIC SPINE: Primary | ICD-10-CM

## 2022-04-18 DIAGNOSIS — G89.29 CHRONIC HIP PAIN, BILATERAL: ICD-10-CM

## 2022-04-18 DIAGNOSIS — M70.61 TROCHANTERIC BURSITIS OF BOTH HIPS: ICD-10-CM

## 2022-04-18 DIAGNOSIS — M25.552 CHRONIC HIP PAIN, BILATERAL: ICD-10-CM

## 2022-04-18 DIAGNOSIS — G89.29 CHRONIC BILATERAL LOW BACK PAIN WITHOUT SCIATICA: ICD-10-CM

## 2022-04-18 DIAGNOSIS — M54.50 CHRONIC BILATERAL LOW BACK PAIN WITHOUT SCIATICA: ICD-10-CM

## 2022-04-18 DIAGNOSIS — M25.551 CHRONIC HIP PAIN, BILATERAL: ICD-10-CM

## 2022-04-18 PROCEDURE — 97161 PT EVAL LOW COMPLEX 20 MIN: CPT | Mod: GP | Performed by: PHYSICAL THERAPIST

## 2022-04-18 PROCEDURE — 97110 THERAPEUTIC EXERCISES: CPT | Mod: GP | Performed by: PHYSICAL THERAPIST

## 2022-04-18 NOTE — PROGRESS NOTES
[facility]    OUTPATIENT PHYSICAL THERAPY ORTHOPEDIC EVALUATION  PLAN OF TREATMENT FOR OUTPATIENT REHABILITATION  (COMPLETE FOR INITIAL CLAIMS ONLY)  Patient's Last Name, First Name, M.I.  YOB: 1955  Jenn Barclay    Provider s Name:  [facility]   Medical Record No.  [id number]   Start of Care Date:  04/18/22   Onset Date:   4/4/22   Type:     _X__PT   ___OT   ___SLP Medical Diagnosis:  Pain in thoracic spine, Trochanteric bursitis of both hips, Lumbar spine pain     PT Diagnosis:  chronic thoracic and lumbar spine pain without sciatica, joel chronic hip pain, muscle weakness, hx of compression fx in spine and scoliosis   Visits from SOC:  1      _________________________________________________________________________________  Plan of Treatment/Functional Goals:  balance training, gait training, manual therapy, joint mobilization, neuromuscular re-education, ROM, strengthening, stretching (gentle MT due to OP)     Hot packs, Cryotherapy     Goals  Goal Identifier: HEP  Goal Description: Pt will be independent in HEP to address impairments and improve pain/function  Target Date: 05/18/22    Goal Identifier: Pain  Goal Description: Pt will have <3/10 pain in the morning to improve quality of life >=5 days per week  Target Date: 07/17/22    Goal Identifier: Walking  Goal Description: Pt will be able to walk for 2 miles with <4/10 pain for exercise and recreational activities  Target Date: 07/17/22                                                           Therapy Frequency:  1 time/week (or every 1-3 weeks depending on clinic availability)  Predicted Duration of Therapy Intervention:  10 visits over 12 weeks    Marifer Vilchis, PT                 I CERTIFY THE NEED FOR THESE SERVICES FURNISHED UNDER        THIS PLAN OF TREATMENT AND WHILE UNDER MY CARE      "(Physician co-signature of this document indicates review and certification of the therapy plan).                       Certification Date From:  04/18/22   Certification Date To:  07/17/22    Referring Provider:  Jo-Ann Alvares PA-C    Initial Assessment        See Epic Evaluation Start of Care Date: 04/18/22 04/18/22 1200   General Information   Type of Visit Initial OP Ortho PT Evaluation   Start of Care Date 04/18/22   Referring Physician Jo-Ann Alvares PA-C   Patient/Family Goals Statement \"my back\"   Certification Required? 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 She reports having an \"S\" curve in her spine. The pain started 3+ years ago. Recently, the pain in her hips and lower thoracic spine has been worse in the morning. She is relying more on the cart when she is walking- it helps to reduce the pain slightly. She has a history of compression fracture 2-3 years ago with a surgery to add cement. Intermittent numbness in her lower back- it does not happen every day- about 1-2x/month. She does a lot of walking but no specific exercises.   Impairments A. Pain;D. Decreased ROM;E. Decreased flexibility;H. Impaired gait;G. Impaired balance;K. Numbness   Symptom Location joel hips and thoracic, lumbar spine   How/Where did it occur " From insidious onset   Pain rating (0-10 point scale) Best (/10);Worst (/10)   Best (/10) 5   Worst (/10) 9   Pain quality C. Aching   Current Level of Function   Patient role/employment history   (SS disability, used to be a massage therapist)   Fall Risk Screen   Fall screen completed by PT   Have you fallen 2 or more times in the past year? No   Have you fallen and had an injury in the past year? No   Is patient a fall risk? No   Abuse Screen (yes response referral indicated)   Feels Unsafe at Home or Work/School no   Feels Threatened by Someone no   Does Anyone Try to Keep You From Having Contact with Others or Doing Things Outside Your Home? no   Physical Signs of Abuse Present no   Lumbar Spine/SI Objective Findings   Flexion ROM to distal lower legs with pain   Extension ROM Mod with pain   Right Side Bending ROM WNL with pain on L   Left Side Bending ROM WNL with pain on L   Lumbar ROM Comment Thoracic rotation: R severe, L moderate   Hip Screen Hip PROM on L: moderate restriction into IR, otherwise WNL joel, HUYEN, FADIR and scour all negative   Hip Flexion (L2) Strength 5/5 joel   Hip Abduction Strength 4/5 joel   Hip Extension Strength 4/5 joel   Knee Extension (L3) Strength 5/5 joel   Ankle Dorsiflexion (L4) Strength 5/5 joel   Great Toe Extension (L5) Strength 5/5 joel   Lumbar/Hip/Knee/Foot Strength Comments Hip ER: 4-/5 on R, 4/5 L   SLR negative joel   Ely Test + joel   Palpation tender along joel lower thoracic and lumbar paraspinals L>R, tender along joel quadratus lumborum, overall slight hypertonicity, no tenderness in joel buttocks, joel   Posture supine: L ASIS slight anterior rotation, leg length symmetrical   Sensation Testing Today, pt reports no numbness in her legs/back   Planned Therapy Interventions   Planned Therapy Interventions balance training;gait training;manual therapy;joint mobilization;neuromuscular re-education;ROM;strengthening;stretching  (gentle MT due to OP)   Planned Modality  Interventions   Planned Modality Interventions Hot packs;Cryotherapy   Clinical Impression   Criteria for Skilled Therapeutic Interventions Met yes, treatment indicated   PT Diagnosis chronic thoracic and lumbar spine pain without sciatica, joel chronic hip pain, muscle weakness, hx of compression fx in spine and scoliosis   Influenced by the following impairments decreased lumbar and thoracic AROM with pain, abdominal and LE muscle weakness, negative neurodynamic and hip special tests, tenderness along joel gluteus medius, quadratus lumborum, joel lumbar and lower thoracic paraspinal musculature   Functional limitations due to impairments standing, walking, sleeping   Clinical Presentation Stable/Uncomplicated   Clinical Decision Making (Complexity) Low complexity   Therapy Frequency 1 time/week  (or every 1-3 weeks depending on clinic availability)   Predicted Duration of Therapy Intervention (days/wks) 10 visits over 12 weeks   Risk & Benefits of therapy have been explained Yes   Patient, Family & other staff in agreement with plan of care Yes   Clinical Impression Comments Pt presents with joel thoracic, lumbar and hip pain that started 3+ years ago. She has a history of osteoporosis with compression fx in both her lumbar and thoracic spine with scoliosis. Pt is appropriate for skilled PT intervention to address impairments and improve pain/function.   ORTHO GOALS   PT Ortho Eval Goals 1;3;2   Ortho Goal 1   Goal Identifier HEP   Goal Description Pt will be independent in HEP to address impairments and improve pain/function   Target Date 05/18/22   Ortho Goal 2   Goal Identifier Pain   Goal Description Pt will have <3/10 pain in the morning to improve quality of life >=5 days per week   Target Date 07/17/22   Ortho Goal 3   Goal Identifier Walking   Goal Description Pt will be able to walk for 2 miles with <4/10 pain for exercise and recreational activities   Target Date 07/17/22   Total Evaluation Time   PT Zina,  Low Complexity Minutes (55995) 26   Therapy Certification   Certification date from 04/18/22   Certification date to 07/17/22   Medical Diagnosis Pain in thoracic spine, Trochanteric bursitis of both hips, Lumbar spine pain     Marifer Vilchis, PT, DPT, CLT-EMILY

## 2022-05-16 DIAGNOSIS — G89.29 CHRONIC BILATERAL LOW BACK PAIN, UNSPECIFIED WHETHER SCIATICA PRESENT: ICD-10-CM

## 2022-05-16 DIAGNOSIS — M54.50 CHRONIC BILATERAL LOW BACK PAIN, UNSPECIFIED WHETHER SCIATICA PRESENT: ICD-10-CM

## 2022-05-16 RX ORDER — OXYCODONE HYDROCHLORIDE 10 MG/1
10 TABLET ORAL 4 TIMES DAILY
Qty: 120 TABLET | Refills: 0 | Status: SHIPPED | OUTPATIENT
Start: 2022-05-21 | End: 2022-06-16

## 2022-05-16 NOTE — TELEPHONE ENCOUNTER
Reason for Call:  Medication Refill    Do you use a [facility]?  No  Name of the pharmacy and phone number for the current request:    [facility] in [place] on [address]     Name of the medication requested:   Oxycodone IR (ROXICODONE) 10 mg tablet    Last Written Prescription Date: 04/20/2022  Last Fill Quanity: 120, # refills: 0  Last office visit w/provider: 01/03/2022

## 2022-05-20 ENCOUNTER — TRANSFERRED RECORDS (OUTPATIENT)
Dept: HEALTH INFORMATION MANAGEMENT | Facility: CLINIC | Age: 67
End: 2022-05-20
Payer: COMMERCIAL

## 2022-05-25 ENCOUNTER — TELEPHONE (OUTPATIENT)
Dept: PHYSICAL THERAPY | Facility: REHABILITATION | Age: 67
End: 2022-05-25

## 2022-05-25 NOTE — TELEPHONE ENCOUNTER
I spoke with Jenn regarding today's NS. Pt thought her appointment was tomorrow. I reminded her of her next appointment on 6/2 @ 1:00 with Marifer Vilchis PT.  Elzbieta Frost PTA, CLT

## 2022-06-16 DIAGNOSIS — G89.29 CHRONIC BILATERAL LOW BACK PAIN, UNSPECIFIED WHETHER SCIATICA PRESENT: ICD-10-CM

## 2022-06-16 DIAGNOSIS — M54.50 CHRONIC BILATERAL LOW BACK PAIN, UNSPECIFIED WHETHER SCIATICA PRESENT: ICD-10-CM

## 2022-06-16 RX ORDER — OXYCODONE HYDROCHLORIDE 10 MG/1
10 TABLET ORAL 4 TIMES DAILY
Qty: 120 TABLET | Refills: 0 | Status: SHIPPED | OUTPATIENT
Start: 2022-06-20 | End: 2022-07-16

## 2022-06-16 NOTE — TELEPHONE ENCOUNTER
PDMP Review       Value Time User    State PDMP site checked  Yes 5/16/2022 11:13 AM Jorge Dunn MD

## 2022-07-05 ENCOUNTER — TELEPHONE (OUTPATIENT)
Dept: INTERNAL MEDICINE | Facility: CLINIC | Age: 67
End: 2022-07-05

## 2022-07-05 ENCOUNTER — OFFICE VISIT (OUTPATIENT)
Dept: INTERNAL MEDICINE | Facility: CLINIC | Age: 67
End: 2022-07-05
Payer: COMMERCIAL

## 2022-07-05 VITALS
OXYGEN SATURATION: 94 % | BODY MASS INDEX: 16.6 KG/M2 | WEIGHT: 106 LBS | SYSTOLIC BLOOD PRESSURE: 136 MMHG | DIASTOLIC BLOOD PRESSURE: 74 MMHG | HEART RATE: 77 BPM | RESPIRATION RATE: 18 BRPM

## 2022-07-05 DIAGNOSIS — M11.20 CHONDROCALCINOSIS: ICD-10-CM

## 2022-07-05 DIAGNOSIS — K86.81 EXOCRINE PANCREATIC INSUFFICIENCY: ICD-10-CM

## 2022-07-05 DIAGNOSIS — Z72.0 TOBACCO ABUSE: ICD-10-CM

## 2022-07-05 DIAGNOSIS — C92.01 ACUTE MYELOID LEUKEMIA IN REMISSION (H): ICD-10-CM

## 2022-07-05 DIAGNOSIS — H93.13 TINNITUS, BILATERAL: ICD-10-CM

## 2022-07-05 DIAGNOSIS — Z23 NEED FOR COVID-19 VACCINE: ICD-10-CM

## 2022-07-05 DIAGNOSIS — F17.210 NICOTINE DEPENDENCE, CIGARETTES, UNCOMPLICATED: ICD-10-CM

## 2022-07-05 DIAGNOSIS — F11.90 CHRONIC, CONTINUOUS USE OF OPIOIDS: ICD-10-CM

## 2022-07-05 DIAGNOSIS — R53.83 FATIGUE, UNSPECIFIED TYPE: Primary | ICD-10-CM

## 2022-07-05 DIAGNOSIS — J44.9 CHRONIC OBSTRUCTIVE PULMONARY DISEASE, UNSPECIFIED COPD TYPE (H): Primary | ICD-10-CM

## 2022-07-05 DIAGNOSIS — Z13.220 SCREENING FOR HYPERLIPIDEMIA: ICD-10-CM

## 2022-07-05 DIAGNOSIS — Z94.81 S/P ALLOGENEIC BONE MARROW TRANSPLANT (H): ICD-10-CM

## 2022-07-05 DIAGNOSIS — J44.9 CHRONIC OBSTRUCTIVE PULMONARY DISEASE, UNSPECIFIED COPD TYPE (H): ICD-10-CM

## 2022-07-05 DIAGNOSIS — Z12.2 SCREENING FOR LUNG CANCER: ICD-10-CM

## 2022-07-05 DIAGNOSIS — M25.562 LEFT KNEE PAIN, UNSPECIFIED CHRONICITY: ICD-10-CM

## 2022-07-05 LAB
ALBUMIN SERPL BCG-MCNC: 4.1 G/DL (ref 3.5–5.2)
ALP SERPL-CCNC: 49 U/L (ref 35–104)
ALT SERPL W P-5'-P-CCNC: 22 U/L (ref 10–35)
ANION GAP SERPL CALCULATED.3IONS-SCNC: 10 MMOL/L (ref 7–15)
AST SERPL W P-5'-P-CCNC: 31 U/L (ref 10–35)
BILIRUB SERPL-MCNC: 0.4 MG/DL
BUN SERPL-MCNC: 8.9 MG/DL (ref 8–23)
CALCIUM SERPL-MCNC: 9.1 MG/DL (ref 8.8–10.2)
CHLORIDE SERPL-SCNC: 98 MMOL/L (ref 98–107)
CHOLEST SERPL-MCNC: 162 MG/DL
CREAT SERPL-MCNC: 0.7 MG/DL (ref 0.51–0.95)
DEPRECATED HCO3 PLAS-SCNC: 31 MMOL/L (ref 22–29)
ERYTHROCYTE [DISTWIDTH] IN BLOOD BY AUTOMATED COUNT: 12.9 % (ref 10–15)
GFR SERPL CREATININE-BSD FRML MDRD: >90 ML/MIN/1.73M2
GLUCOSE SERPL-MCNC: 135 MG/DL (ref 70–99)
HCT VFR BLD AUTO: 40 % (ref 35–47)
HDLC SERPL-MCNC: 56 MG/DL
HGB BLD-MCNC: 13.3 G/DL (ref 11.7–15.7)
LDLC SERPL CALC-MCNC: 92 MG/DL
MAGNESIUM SERPL-MCNC: 1.9 MG/DL (ref 1.7–2.3)
MCH RBC QN AUTO: 30.3 PG (ref 26.5–33)
MCHC RBC AUTO-ENTMCNC: 33.3 G/DL (ref 31.5–36.5)
MCV RBC AUTO: 91 FL (ref 78–100)
NONHDLC SERPL-MCNC: 106 MG/DL
PLATELET # BLD AUTO: 289 10E3/UL (ref 150–450)
POTASSIUM SERPL-SCNC: 3.6 MMOL/L (ref 3.4–5.3)
PROT SERPL-MCNC: 6.5 G/DL (ref 6.4–8.3)
RBC # BLD AUTO: 4.39 10E6/UL (ref 3.8–5.2)
SODIUM SERPL-SCNC: 139 MMOL/L (ref 136–145)
TRIGL SERPL-MCNC: 72 MG/DL
TSH SERPL DL<=0.005 MIU/L-ACNC: 2.75 UIU/ML (ref 0.3–4.2)
WBC # BLD AUTO: 9.3 10E3/UL (ref 4–11)

## 2022-07-05 PROCEDURE — 85027 COMPLETE CBC AUTOMATED: CPT | Performed by: INTERNAL MEDICINE

## 2022-07-05 PROCEDURE — 80061 LIPID PANEL: CPT | Performed by: INTERNAL MEDICINE

## 2022-07-05 PROCEDURE — 36415 COLL VENOUS BLD VENIPUNCTURE: CPT | Performed by: INTERNAL MEDICINE

## 2022-07-05 PROCEDURE — 83735 ASSAY OF MAGNESIUM: CPT | Performed by: INTERNAL MEDICINE

## 2022-07-05 PROCEDURE — 99215 OFFICE O/P EST HI 40 MIN: CPT | Mod: 25 | Performed by: INTERNAL MEDICINE

## 2022-07-05 PROCEDURE — 91306 COVID-19,PF,MODERNA (18+ YRS BOOSTER .25ML): CPT | Performed by: INTERNAL MEDICINE

## 2022-07-05 PROCEDURE — 84443 ASSAY THYROID STIM HORMONE: CPT | Performed by: INTERNAL MEDICINE

## 2022-07-05 PROCEDURE — 80053 COMPREHEN METABOLIC PANEL: CPT | Performed by: INTERNAL MEDICINE

## 2022-07-05 PROCEDURE — 0064A COVID-19,PF,MODERNA (18+ YRS BOOSTER .25ML): CPT | Performed by: INTERNAL MEDICINE

## 2022-07-05 NOTE — LETTER
7/5/2022    Jenn Barclay   [address] [place] [address]         Dear Jenn,    It was good to see you in clinic.  I hope your questions were answered at the time of your visit.    The results of your tests from your visit were as follows:    Resulted Orders   CBC with platelets   Result Value Ref Range    WBC Count 9.3 4.0 - 11.0 10e3/uL    RBC Count 4.39 3.80 - 5.20 10e6/uL    Hemoglobin 13.3 11.7 - 15.7 g/dL    Hematocrit 40.0 35.0 - 47.0 %    MCV 91 78 - 100 fL    MCH 30.3 26.5 - 33.0 pg    MCHC 33.3 31.5 - 36.5 g/dL    RDW 12.9 10.0 - 15.0 %    Platelet Count 289 150 - 450 10e3/uL   Magnesium   Result Value Ref Range    Magnesium 1.9 1.7 - 2.3 mg/dL   TSH   Result Value Ref Range    TSH 2.75 0.30 - 4.20 uIU/mL   Comprehensive metabolic panel   Result Value Ref Range    Sodium 139 136 - 145 mmol/L    Potassium 3.6 3.4 - 5.3 mmol/L    Creatinine 0.70 0.51 - 0.95 mg/dL    Urea Nitrogen 8.9 8.0 - 23.0 mg/dL    Chloride 98 98 - 107 mmol/L    Carbon Dioxide (CO2) 31 (H) 22 - 29 mmol/L    Anion Gap 10 7 - 15 mmol/L    Glucose 135 (H) 70 - 99 mg/dL    Calcium 9.1 8.8 - 10.2 mg/dL    Protein Total 6.5 6.4 - 8.3 g/dL    Albumin 4.1 3.5 - 5.2 g/dL    Bilirubin Total 0.4 <=1.2 mg/dL    Alkaline Phosphatase 49 35 - 104 U/L    AST 31 10 - 35 U/L    ALT 22 10 - 35 U/L    GFR Estimate >90 >60 mL/min/1.73m2      Comment:      Effective December 21, 2021 eGFRcr in adults is calculated using the 2021 CKD-EPI creatinine equation which includes age and gender (Asia falk al., NEJM, DOI: 10.1056/IQBVfc8233568)   Lipid panel reflex to direct LDL Non-fasting   Result Value Ref Range    Cholesterol 162 <200 mg/dL    Triglycerides 72 <150 mg/dL    Direct Measure HDL 56 >=50 mg/dL    LDL Cholesterol Calculated 92 <=100 mg/dL    Non HDL Cholesterol 106 <130 mg/dL    Narrative    Cholesterol  Desirable:  <200 mg/dL    Triglycerides  Normal:  Less than 150 mg/dL  Borderline High:  150-199 mg/dL  High:  200-499 mg/dL  Very High:   Greater than or equal to 500 mg/dL    Direct Measure HDL  Female:  Greater than or equal to 50 mg/dL   Male:  Greater than or equal to 40 mg/dL    LDL Cholesterol  Desirable:  <100mg/dL  Above Desirable:  100-129 mg/dL   Borderline High:  130-159 mg/dL   High:  160-189 mg/dL   Very High:  >= 190 mg/dL    Non HDL Cholesterol  Desirable:  130 mg/dL  Above Desirable:  130-159 mg/dL  Borderline High:  160-189 mg/dL  High:  190-219 mg/dL  Very High:  Greater than or equal to 220 mg/dL     Your kidney tests are normal.  Your electrolytes are also normal.  There is no signs of diabetes.     Your cholesterol is doing well.     Your thyroid tests are excellent.     Your blood counts are normal and you are not anemic.     There are no concerning findings related to your fatigue    See you again on:  Future Appointments   Date Time Provider Department Center   1/5/2023  1:00 PM [facility]        Follow up sooner if issues.    If you have any questions regarding these results, please feel free to contact me at [phone].  I wish you the best of health!      Sincerely,       Jorge Dunn MD  General Internal Medicine  [facility]

## 2022-07-05 NOTE — TELEPHONE ENCOUNTER
Patient calling to update provider that [employer] sent a message to her for her inhaler and the price will be $440 after insurance paid. cancel order. Would rather have problems breathing than pay the $440.    Call Back  [phone]

## 2022-07-05 NOTE — PROGRESS NOTES
Lung Cancer Screening Shared Decision Making Visit     Jenn Barclay is eligible for lung cancer screening on the basis of:   has not not experienced symptoms suggestive of lung cancer.   born on 1955, 66 year old years old.   smoked 1.5 packs of cigarettes for 55 years for a total of 80 pack-years   And is currently smoking.      I have discussed with patient the risks and benefits of screening for lung cancer with low-dose CT.     The risks include:   radiation exposure    false positives     over-diagnosis    The benefit of early detection of lung cancer is contingent upon adherence to annual screening or more frequent follow up if indicated.     Furthermore, reaping the benefits of screening requires Jenn Barclay to be willing and able to undergo diagnostic procedures, if indicated.     We did discuss that the only way to prevent lung cancer is to not smoke. Smoking cessation assistance was offered.      Wt Readings from Last 20 Encounters:   07/05/22 48.1 kg (106 lb)   03/30/22 52.2 kg (115 lb 1.6 oz)   01/03/22 49 kg (108 lb)   07/13/21 48.1 kg (106 lb)   06/23/21 50.4 kg (111 lb 3.2 oz)   01/14/21 50.8 kg (112 lb)   12/16/20 49.5 kg (109 lb 3.2 oz)   07/16/20 49 kg (108 lb)   06/24/20 50.9 kg (112 lb 4.8 oz)   02/08/20 50.8 kg (112 lb)   01/24/20 51 kg (112 lb 7 oz)   12/17/19 51.3 kg (113 lb)   07/19/19 52.4 kg (115 lb 9.6 oz)   06/18/19 52.3 kg (115 lb 6.4 oz)   01/11/19 52.2 kg (115 lb 1.6 oz)   12/18/18 50.8 kg (112 lb)   07/18/18 50.8 kg (112 lb 1.6 oz)   06/18/18 50.6 kg (111 lb 8 oz)   05/14/18 52.3 kg (115 lb 3.2 oz)   05/09/18 51.1 kg (112 lb 9.6 oz)     BP Readings from Last 20 Encounters:   07/05/22 136/74   03/30/22 (!) 150/73   03/24/22 113/69   01/03/22 92/60   07/13/21 132/80   06/23/21 125/68   12/16/20 126/69   07/28/20 107/70   07/16/20 118/56   06/24/20 110/56   02/17/20 111/73   02/10/20 136/86   02/08/20 128/85   01/24/20 121/65   12/17/19 118/68   07/19/19 105/68   01/11/19  116/73   07/18/18 113/64   05/09/18 (!) 139/91   01/29/18 137/70      Pulse Readings from Last 20 Encounters:   07/05/22 77   03/30/22 80   03/24/22 76   01/03/22 75   07/13/21 67   06/23/21 60   12/16/20 74   07/28/20 61   07/28/20 59   07/16/20 72   06/24/20 72   02/17/20 64   02/10/20 79   02/08/20 72   01/24/20 72   12/17/19 74   07/19/19 68   01/11/19 87   07/18/18 60   05/09/18 75     SpO2 Readings from Last 20 Encounters:   07/05/22 94%   03/24/22 94%   01/03/22 94%   07/13/21 98%   06/23/21 98%   01/14/21 94%   12/16/20 96%   07/28/20 99%   07/16/20 96%   06/24/20 96%   01/24/20 94%   12/17/19 95%   07/19/19 97%   01/11/19 98%   07/18/18 96%   05/09/18 96%   01/29/18 96%   01/17/18 96%   07/12/17 98%   04/12/17 100%

## 2022-07-06 NOTE — TELEPHONE ENCOUNTER
Please call patient -    ______________________________________________________________________     Home phone:  [phone] (home)     Cell phone:   Telephone Information:   Mobile [phone]       Other contacts:  Name Home Phone Work Phone Mobile Phone Relationship Lgl Grnabeel   JEAN JUARES [phone] FRANCISCO JAVIER Tobin   [phone] Significant*      ______________________________________________________________________     I tried sending in a different inhaler but it may or may not have the same cost issue.      If it is too expensive, then no need to update us, we can review at the next visit.    Jorge Dunn MD  [facility]  7/6/2022, 8:27 AM

## 2022-07-07 NOTE — PROGRESS NOTES
[facility] - Primary [facility] Specialists    Comprehensive and complex medical care - Chronic disease management - Shared decision making - Care coordination - Compassionate care    Patient advocacy - Rational deprescribing - Minimally disruptive medicine - Ethical focus - Customized care         Date of Service: 7/5/2022  Primary Provider: Jorge Dunn    Patient Care Team:  Jorge Dunn MD as PCP - General (Internal Medicine)  Mohit Montano MD as MD (Hematology & Oncology)  Jorge Dunn MD as Assigned PCP  Joellen Nova MD as Resident (Dermatology)  Madison Alexander LICSW as  (BMT - Adult)  Ryan Alonso MD as MD (Endocrinology, Diabetes, and Metabolism)  Manju Asher RN as BMT Nurse Coordinator  Umesh Brandt MD as BMT Physician (Transplant)          Patient's Pharmacy:    [id number] - [place], [place] - [address]  [address]  [facility] [address]  Phone: [phone] Fax: [phone]     Patient's Contacts:  Name Home Phone Work Phone Mobile Phone Relationship Lgl Grd   JEAN JUARES [phone] GrandFRANCISCO JAVIER Jackson   [phone] Significant*      Patient's Insurance:    Payor: [employer] / Plan: [facility] MEDICARE ADVANTAGE / Product Type: HMO /            Active Problem List:  Problem List as of 7/5/2022 Reviewed: 2/17/2020 12:04 PM by Jhon Herrmann MD       High    Full code status    Tobacco abuse    COPD (chronic obstructive pulmonary disease) (H)    Acute myeloid leukemia (AML), 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)    Status post allogeneic bone marrow transplant (H) - 2010 - [facility]       Medium    Controlled substance agreement signed - 1/22 - oxycodone - UDS 1/22    Chronic back pain    Financial difficulties    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)    Pseudogout    Traumatic compression fracture of T8 thoracic vertebra, closed, initial encounter (H)    Chronic  diarrhea    Chronic, continuous use of opioids       Low    Nephrolithiasis    Anxiety    Gastric ulcer    Migraine headache    Papular rash, generalized    Senile purpura (H)    Tubular adenoma of colon - advanced adenoma in 2017 - single small adenoma in 2020    OP (osteoporosis)       Other    Exocrine pancreatic insufficiency    Post-menopausal           Current Outpatient Medications   Medication Instructions     albuterol (PROVENTIL) (2.5 MG/3ML) 0.083% neb solution USE 1 VIAL IN NEBULIZER EVERY 6 HOURS AS NEEDED FOR WHEEZING     Albuterol Sulfate (VENTOLIN HFA) 108 (90 BASE) MCG/ACT AERS Inhale  into the lungs.     alendronate (FOSAMAX) 70 mg, Oral, EVERY 7 DAYS     amylase-lipase-protease (CREON) 49141-22725 units CPEP per EC capsule 2 capsules, Oral, 3 TIMES DAILY WITH MEALS, And snacks     cholecalciferol (VITAMIN D3) 25 mcg (1000 units) capsule 1 capsule, DAILY     clindamycin 1 % EX external lotion Topical, 2 TIMES DAILY     gentamicin 0.1 % EX external ointment Topical, 2 TIMES DAILY     Loperamide HCl (IMODIUM OR) Oral, 2 TIMES DAILY     Multiple Vitamins-Minerals (MULTIVITAL PO) Take  by mouth.     mupirocin (BACTROBAN) 2 % external ointment Use 2 times a day to affected area on cheek     nebulizer nebulization 1 nebulizer please as covered by insurance.  May also dispense supplies (tubing, inhalation device, etc) as needed.     Omega-3 Fatty Acids (FISH OIL PO) Oral     oxyCODONE IR (ROXICODONE) 10 mg, Oral, 4 TIMES DAILY, For use from 6/20/22-7/20/22     triamcinolone (KENALOG) 0.1 % external cream Apply tid to itchy or tender skin lesions     umeclidinium (INCRUSE ELLIPTA) 62.5 MCG/INH inhaler 1 puff, Inhalation, DAILY     vitamin C (ASCORBIC ACID) 500 mg, DAILY      Social History     Social History Narrative    , on disability.        Has family in the area.        Patient of Dr. Dunn since 2015.        Subjective:     Jenn Barclay is a 66 year old female who comes in today  for:    Chief Complaint   Patient presents with     RECHECK     COPD     Medication Question     Is fish oil good take      Patient comes in today for follow-up of a number of issues.    We reviewed her overall health issues.    She is having issues with feeling more fatigued.  This is more recent.  She denies any shortness of breath or chest pain associated with this.  She denies any other systemic symptoms.  She just wants to make sure there is nothing more internally going on.    We reviewed her issues with thoracic compression fractures.  She is going to be meeting with endocrinology related to this.  This was reviewed.  She continues on alendronate at this point.    We reviewed her ringing in her ears.  This is constant and rather loud.  She has had audiology evaluation in the past and does not necessarily want to pursue this further.    She has had weight loss.  She has pancreatic insufficiency.  She is on Creon.  She does have some loose bowels about 3 in the morning.  She has been taking fish oil and we talked about how this could lead to more diarrhea issues.    Reviewed her COPD and she been having issues with breathing and coughing.  We talked about additional inhalers to try and she would be willing to try this if we can get them approved through her insurance which has been very poor at covering medications for her.    We reviewed her other issues noted in the assessment but not specifically addressed in the HPI above.     Objective:     Wt Readings from Last 3 Encounters:   07/05/22 48.1 kg (106 lb)   03/30/22 52.2 kg (115 lb 1.6 oz)   01/03/22 49 kg (108 lb)     BP Readings from Last 3 Encounters:   07/05/22 136/74   03/30/22 (!) 150/73   03/24/22 113/69     /74   Pulse 77   Resp 18   Wt 48.1 kg (106 lb)   SpO2 94%   BMI 16.60 kg/m     The patient is comfortable, no acute distress.  Mood good.  Insight good.  Eyes are nonicteric.  Neck is supple without mass.  No cervical adenopathy.  No  thyromegaly. Heart regular rate and rhythm.  Lungs show some wheezing on auscultation bilaterally.  Respiratory effort is good.  Extremities no edema.  Left knee shows no severe effusion at this time.      Diagnostics:     Office Visit on 01/03/2022   Component Date Value Ref Range Status     Oxycodone ng/mL 01/03/2022 860 (A) <50 ng/mL Final     Oxycodone 01/03/2022 915  Absent ng/mg [creat] Final    Sources of oxycodone are scheduled prescription medications.     Oxymorphone ng/mL 01/03/2022 820 (A) <50 ng/mL Final     Oxymorphone 01/03/2022 872  Absent ng/mg [creat] Final    Oxymorphone is an expected metabolite of oxycodone. Oxymorphone is also available as a scheduled prescription medication.     Creatinine Urine for Drug Screen 01/03/2022 94  mg/dL Final    The reference range has not been established for creatinine in random urines. The results should be integrated into the clinical context for interpretation.       Results for orders placed or performed in visit on 07/05/22   CBC with platelets     Status: Normal   Result Value Ref Range    WBC Count 9.3 4.0 - 11.0 10e3/uL    RBC Count 4.39 3.80 - 5.20 10e6/uL    Hemoglobin 13.3 11.7 - 15.7 g/dL    Hematocrit 40.0 35.0 - 47.0 %    MCV 91 78 - 100 fL    MCH 30.3 26.5 - 33.0 pg    MCHC 33.3 31.5 - 36.5 g/dL    RDW 12.9 10.0 - 15.0 %    Platelet Count 289 150 - 450 10e3/uL   Magnesium     Status: Normal   Result Value Ref Range    Magnesium 1.9 1.7 - 2.3 mg/dL   TSH     Status: Normal   Result Value Ref Range    TSH 2.75 0.30 - 4.20 uIU/mL   Comprehensive metabolic panel     Status: Abnormal   Result Value Ref Range    Sodium 139 136 - 145 mmol/L    Potassium 3.6 3.4 - 5.3 mmol/L    Creatinine 0.70 0.51 - 0.95 mg/dL    Urea Nitrogen 8.9 8.0 - 23.0 mg/dL    Chloride 98 98 - 107 mmol/L    Carbon Dioxide (CO2) 31 (H) 22 - 29 mmol/L    Anion Gap 10 7 - 15 mmol/L    Glucose 135 (H) 70 - 99 mg/dL    Calcium 9.1 8.8 - 10.2 mg/dL    Protein Total 6.5 6.4 - 8.3 g/dL     Albumin 4.1 3.5 - 5.2 g/dL    Bilirubin Total 0.4 <=1.2 mg/dL    Alkaline Phosphatase 49 35 - 104 U/L    AST 31 10 - 35 U/L    ALT 22 10 - 35 U/L    GFR Estimate >90 >60 mL/min/1.73m2   Lipid panel reflex to direct LDL Non-fasting     Status: Normal   Result Value Ref Range    Cholesterol 162 <200 mg/dL    Triglycerides 72 <150 mg/dL    Direct Measure HDL 56 >=50 mg/dL    LDL Cholesterol Calculated 92 <=100 mg/dL    Non HDL Cholesterol 106 <130 mg/dL    Narrative    Cholesterol  Desirable:  <200 mg/dL    Triglycerides  Normal:  Less than 150 mg/dL  Borderline High:  150-199 mg/dL  High:  200-499 mg/dL  Very High:  Greater than or equal to 500 mg/dL    Direct Measure HDL  Female:  Greater than or equal to 50 mg/dL   Male:  Greater than or equal to 40 mg/dL    LDL Cholesterol  Desirable:  <100mg/dL  Above Desirable:  100-129 mg/dL   Borderline High:  130-159 mg/dL   High:  160-189 mg/dL   Very High:  >= 190 mg/dL    Non HDL Cholesterol  Desirable:  130 mg/dL  Above Desirable:  130-159 mg/dL  Borderline High:  160-189 mg/dL  High:  190-219 mg/dL  Very High:  Greater than or equal to 220 mg/dL        Assessment:     1. Fatigue, unspecified type    2. Screening for hyperlipidemia    3. Chronic, continuous use of opioids    4. S/P allogeneic bone marrow transplant (H)     - - Continue to monitor - -doing well and no current major issues.   5. Acute myeloid leukemia in remission (H)     - - Continue to monitor - -no signs of recurrence.   6. Exocrine pancreatic insufficiency    7. Chronic obstructive pulmonary disease, unspecified COPD type (H)     - - Continue to monitor - -trying a new inhaler for her.   8. Need for COVID-19 vaccine    9. Tobacco abuse    10. Screening for lung cancer    11. Nicotine dependence, cigarettes, uncomplicated     12. Tinnitus, bilateral    13. Left knee pain, unspecified chronicity    14. Chondrocalcinosis        Plan:     1. Blood work done today/  2. Ordered CT screening lung scan today  given smoking history.  Last CT done in 2020 was good.  3. COVID booster done today.  4. Could consider aspiration, injection, or x-ray of the left knee as needed.  5. Continue current medications otherwise.  6. Follow up sooner if issues.    Orders Placed This Encounter   Procedures     CT Chest Lung Cancer Scrn Low Dose wo     COVID-19,PF,MODERNA (18+ YRS BOOSTER .25ML)     CBC with platelets     Magnesium     TSH     Comprehensive metabolic panel     Lipid panel reflex to direct LDL Non-fasting        40 minutes or greater was spent today on the patient's care on the day of service.      This includes time for chart preparation, reviewing medical tests done before or during the visit, talking with the patient, review of quality indicators, required documentation, and other elements of care.        Jorge Dunn MD  General Internal Medicine  [facility]    Return in about 6 months (around 1/5/2023), or if symptoms worsen or fail to improve, for follow up visit.     Future Appointments   Date Time Provider Department Center   9/23/2022  8:00 AM Ryan Alonso MD [facility]   1/5/2023  1:00 PM [facility]

## 2022-07-07 NOTE — PATIENT INSTRUCTIONS
Future Appointments   Date Time Provider Department Center   9/23/2022  8:00 AM [facility]   1/5/2023  1:00 PM [facility]

## 2022-07-15 DIAGNOSIS — M54.50 CHRONIC BILATERAL LOW BACK PAIN, UNSPECIFIED WHETHER SCIATICA PRESENT: ICD-10-CM

## 2022-07-15 DIAGNOSIS — G89.29 CHRONIC BILATERAL LOW BACK PAIN, UNSPECIFIED WHETHER SCIATICA PRESENT: ICD-10-CM

## 2022-07-15 NOTE — TELEPHONE ENCOUNTER
Reason for Call:  Medication or medication refill: Controlled Substance Refill     Do you use a [facility] [place] of the pharmacy and phone number for the current request:  [facility]   Name of the medication requested:   oxyCodone IR (ROXICODONE) 10 mg tablet    Last office visit with PCP: 7/15/2022  Next office visit with PCP: 01/05/2023    Can we leave a detailed message on this number? YES, per patient return call is not necessary.    Call taken on 7/15/2022 at 9:13 AM by Arely Orta

## 2022-07-16 RX ORDER — OXYCODONE HYDROCHLORIDE 10 MG/1
10 TABLET ORAL 4 TIMES DAILY
Qty: 120 TABLET | Refills: 0 | Status: SHIPPED | OUTPATIENT
Start: 2022-07-20 | End: 2022-08-15

## 2022-07-17 NOTE — TELEPHONE ENCOUNTER
PDMP Review       Value Time User    State PDMP site checked  Yes 7/5/2022  1:26 PM Jorge Dunn MD

## 2022-07-22 ENCOUNTER — NURSE TRIAGE (OUTPATIENT)
Dept: NURSING | Facility: CLINIC | Age: 67
End: 2022-07-22

## 2022-07-22 ENCOUNTER — TELEPHONE (OUTPATIENT)
Dept: INTERNAL MEDICINE | Facility: CLINIC | Age: 67
End: 2022-07-22

## 2022-07-22 ENCOUNTER — HOSPITAL ENCOUNTER (OUTPATIENT)
Dept: CT IMAGING | Facility: HOSPITAL | Age: 67
Discharge: HOME | End: 2022-07-22
Attending: INTERNAL MEDICINE | Admitting: INTERNAL MEDICINE
Payer: COMMERCIAL

## 2022-07-22 DIAGNOSIS — F17.210 NICOTINE DEPENDENCE, CIGARETTES, UNCOMPLICATED: ICD-10-CM

## 2022-07-22 DIAGNOSIS — Z12.2 SCREENING FOR LUNG CANCER: ICD-10-CM

## 2022-07-22 DIAGNOSIS — Z72.0 TOBACCO ABUSE: ICD-10-CM

## 2022-07-22 PROCEDURE — 71271 CT THORAX LUNG CANCER SCR C-: CPT

## 2022-07-22 NOTE — TELEPHONE ENCOUNTER
Patient calling in with problems with her stomach. Patient stated when she eats a little bit it feels like her stomach hurts like she over did it. She gets real bad gas, not smelly. Stomach saying full but its not. Problem with weight loss lately.    Patient was sent to nurse triage to get more info.    Call Back   [phone]

## 2022-07-22 NOTE — TELEPHONE ENCOUNTER
"Clinic Action Needed?  No. FYI.     [facility]NA Triage Call   Presenting Problem: Pt reports the past 2 days whenever she eats any kind of food, even a small amount, she becomes very gassy -  constantly passing flatus along w/ lower abdominal pain. This last 2-3 hours. Currently, at time of call, pt not having pain. No vomiting. No diarrhea w/ the gas but states she has loose stools every AM (not new). Applying warm pack to abdomen helps a little. Patient Teaching/Discussion: Advised see provider w/i 24 hours. Pt declined to make appt. States she would rather see how she does over this weekend and call us back Mon 7/25. Advised c/b if severe, constant abdominal pain x 1 hour or more.         Routed to: Dr Dunn team /Marcial      Reason for Disposition    Age > 60 years    Additional Information    Negative: Shock suspected (e.g., cold/pale/clammy skin, too weak to stand, low BP, rapid pulse)    Negative: Difficult to awaken or acting confused (e.g., disoriented, slurred speech)    Negative: Passed out (i.e., lost consciousness, collapsed and was not responding)    Negative: Sounds like a life-threatening emergency to the triager    Negative: Chest pain    Negative: Pain is mainly in upper abdomen  (if needed ask: \"is it mainly above the belly button?\")    Negative: Followed an abdomen (stomach) injury    Negative: [1] Abdominal pain AND [2] pregnant < 20 weeks    Negative: [1] Abdominal pain AND [2] pregnant > 20 weeks    Negative: [1] Abdominal pain AND [2] postpartum (from 0 to 6 weeks after delivery)    Negative: [1] SEVERE pain (e.g., excruciating) AND [2] present > 1 hour    Negative: [1] SEVERE pain AND [2] age > 60    Negative: [1] Vomiting AND [2] contains red blood or black (\"coffee ground\") material  (Exception: few red streaks in vomit that only happened once)    Negative: Blood in bowel movements   (Exception: blood on surface of BM with constipation)    Negative: Black or tarry bowel movements  (Exception: " chronic-unchanged  black-grey bowel movements AND is taking iron pills or Pepto-bismol)    Negative: Patient sounds very sick or weak to the triager    Negative: [1] MILD-MODERATE pain AND [2] constant AND [3] present > 2 hours    Negative: [1] Vomiting AND [2] abdomen looks much more swollen than usual    Negative: [1] Vomiting AND [2] contains bile (green color)    Negative: White of the eyes have turned yellow (i.e., jaundice)    Negative: Fever > 103 F (39.4 C)    Negative: [1] Fever > 101 F (38.3 C) AND [2] age > 60    Negative: [1] Fever > 100.0 F (37.8 C) AND [2] bedridden (e.g., nursing home patient, CVA, chronic illness, recovering from surgery)    Negative: [1] Fever > 100.0 F (37.8 C) AND [2] diabetes mellitus or weak immune system (e.g., HIV positive, cancer chemo, splenectomy, organ transplant, chronic steroids)    Negative: [1] SEVERE pain AND [2] present < 1 hour    Negative: [1] MODERATE pain (e.g., interferes with normal activities) AND [2] pain comes and goes (cramps) AND [3] present > 24 hours  (Exception: pain with Vomiting or Diarrhea - see that Guideline)    Negative: [1] MILD pain (e.g., does not interfere with normal activities) AND [2] pain comes and goes (cramps) AND [3] present > 48 hours    Protocols used: ABDOMINAL PAIN - FEMALE-A-

## 2022-07-25 ENCOUNTER — TELEPHONE (OUTPATIENT)
Dept: INTERNAL MEDICINE | Facility: CLINIC | Age: 67
End: 2022-07-25

## 2022-07-25 NOTE — TELEPHONE ENCOUNTER
Please call patient -    ______________________________________________________________________     Home Phone:  [phone] (home)     Cell phone:   Telephone Information:   Mobile [phone]     ______________________________________________________________________     Her CAT scan overall was good.  There is no signs of lung cancer based on this.    She does have moderate to advanced emphysema related to her smoking.  If there is anything she can do to help with this or if she needs help with this, please let me know.  She has not been wanting to stop smoking in the past.    She does have a kidney stone in her right kidney but this does not appear to be giving her any issues at this time but I wanted to make her aware of this.  She also has evidence of thinned bones in her spine.  She should remain on the alendronate for this.    Let me know if there are further questions.       Jorge Dunn MD  [facility]  7/25/2022, 11:10 AM   ______________________________________________________________________    Pertinent radiology for this visit includes the following:    CT Chest Lung Cancer Scrn Low Dose wo  Narrative: EXAM: LOW DOSE LUNG CANCER SCREENING CT CHEST  LOCATION: [facility]  DATE/TIME: 7/22/2022 2:14 PM    INDICATION: Lung cancer screening. History of smoking. High risk patient.  COMPARISON: CT chest without contrast 01/04/2020; CT of the abdomen and pelvis 10/16/2019    TECHNIQUE: Low-dose lung cancer screening non-contrast CT chest. Dose reduction techniques were used.     FINDINGS:  NODULES: Calcified granulomata are present near the pleura in the anterior basal segment of the left lower lobe. There are additional granulomatous calcifications in lymph nodes at the left hilum. No subsolid or solid noncalcified lung nodules.    LUNGS AND PLEURA: Symmetric no nodular apical pleural parenchymal scarring is present. Moderately advanced emphysema,  related lung hyperinflation and moderate diffuse bronchial wall thickening, both lung and airway manifestations of COPD. No interstitial   lung abnormalities are superimposed acute airspace opacities.    MEDIASTINUM: Endocardial calcifications of the left ventricle. No cardiac chamber enlargement. Physiologic pericardial fluid. No thoracic aortic aneurysm. No adenopathy.    CORONARY ARTERY CALCIFICATION: None.    LIMITED UPPER ABDOMEN: 3 x 7 mm nonobstructing calcification upper pole right kidney, present in 2019 however larger.    MUSCULOSKELETAL: Generalized bone demineralization. There are compression deformities of multiple mid thoracic vertebra including T5-T8 and T10 with radiopaque cement in T5 and T8.  Impression: IMPRESSION:    Negative for lung cancer screening purposes.    LungRADS CATEGORY: 1: Negative.    RADIOLOGIST RECOMMENDATION: Continue annual screening with low-dose CT chest in 12 months. Late July/August 2023.      ______________________________________________________________________

## 2022-07-25 NOTE — TELEPHONE ENCOUNTER
Spoke to patient, she reports the symptoms below have not resolved.  States she her appetite is decreased and after eating any amount of food feels bloating, abd discomfort & passes flatus.  Patient would like to know if she should be seen for this as recommended last week since not resolving.  No availability with PCP this week    At the end of the conversation, states to tell Dr Dunn that the orange oil is back. Patient did not want to elaborate but stated provider would know what she was talking about

## 2022-07-25 NOTE — TELEPHONE ENCOUNTER
Can offer an 1115 am appointment tomorrow for this if she wishes.    Jorge Dunn MD  General Internal Medicine  [facility]  7/25/2022, 11:57 AM

## 2022-07-26 ENCOUNTER — OFFICE VISIT (OUTPATIENT)
Dept: INTERNAL MEDICINE | Facility: CLINIC | Age: 67
End: 2022-07-26
Payer: COMMERCIAL

## 2022-07-26 VITALS
DIASTOLIC BLOOD PRESSURE: 76 MMHG | SYSTOLIC BLOOD PRESSURE: 134 MMHG | TEMPERATURE: 98.3 F | OXYGEN SATURATION: 96 % | RESPIRATION RATE: 18 BRPM | HEART RATE: 96 BPM | BODY MASS INDEX: 16.45 KG/M2 | WEIGHT: 105 LBS

## 2022-07-26 DIAGNOSIS — R10.13 EPIGASTRIC PAIN: Primary | ICD-10-CM

## 2022-07-26 DIAGNOSIS — R19.7 DIARRHEA, UNSPECIFIED TYPE: ICD-10-CM

## 2022-07-26 LAB
ALBUMIN SERPL-MCNC: 4 G/DL (ref 3.5–5)
ALP SERPL-CCNC: 42 U/L (ref 45–120)
ALT SERPL W P-5'-P-CCNC: 23 U/L (ref 0–45)
ANION GAP SERPL CALCULATED.3IONS-SCNC: 9 MMOL/L (ref 5–18)
AST SERPL W P-5'-P-CCNC: 24 U/L (ref 0–40)
BASOPHILS # BLD AUTO: 0.1 10E3/UL (ref 0–0.2)
BASOPHILS NFR BLD AUTO: 1 %
BILIRUB SERPL-MCNC: 0.4 MG/DL (ref 0–1)
BUN SERPL-MCNC: 12 MG/DL (ref 8–22)
C REACTIVE PROTEIN LHE: 0.4 MG/DL (ref 0–?)
CALCIUM SERPL-MCNC: 9 MG/DL (ref 8.5–10.5)
CHLORIDE BLD-SCNC: 99 MMOL/L (ref 98–107)
CO2 SERPL-SCNC: 31 MMOL/L (ref 22–31)
CREAT SERPL-MCNC: 0.76 MG/DL (ref 0.6–1.1)
EOSINOPHIL # BLD AUTO: 0.2 10E3/UL (ref 0–0.7)
EOSINOPHIL NFR BLD AUTO: 2 %
ERYTHROCYTE [DISTWIDTH] IN BLOOD BY AUTOMATED COUNT: 13 % (ref 10–15)
GFR SERPL CREATININE-BSD FRML MDRD: 85 ML/MIN/1.73M2
GLUCOSE BLD-MCNC: 143 MG/DL (ref 70–125)
HCT VFR BLD AUTO: 39.2 % (ref 35–47)
HGB BLD-MCNC: 12.9 G/DL (ref 11.7–15.7)
IMM GRANULOCYTES # BLD: 0 10E3/UL
IMM GRANULOCYTES NFR BLD: 0 %
LIPASE SERPL-CCNC: <9 U/L (ref 0–52)
LYMPHOCYTES # BLD AUTO: 3.4 10E3/UL (ref 0.8–5.3)
LYMPHOCYTES NFR BLD AUTO: 39 %
MCH RBC QN AUTO: 29.9 PG (ref 26.5–33)
MCHC RBC AUTO-ENTMCNC: 32.9 G/DL (ref 31.5–36.5)
MCV RBC AUTO: 91 FL (ref 78–100)
MONOCYTES # BLD AUTO: 0.7 10E3/UL (ref 0–1.3)
MONOCYTES NFR BLD AUTO: 8 %
NEUTROPHILS # BLD AUTO: 4.4 10E3/UL (ref 1.6–8.3)
NEUTROPHILS NFR BLD AUTO: 50 %
PLATELET # BLD AUTO: 228 10E3/UL (ref 150–450)
POTASSIUM BLD-SCNC: 3.8 MMOL/L (ref 3.5–5)
PROT SERPL-MCNC: 6.7 G/DL (ref 6–8)
RBC # BLD AUTO: 4.32 10E6/UL (ref 3.8–5.2)
SODIUM SERPL-SCNC: 139 MMOL/L (ref 136–145)
WBC # BLD AUTO: 8.8 10E3/UL (ref 4–11)

## 2022-07-26 PROCEDURE — 80053 COMPREHEN METABOLIC PANEL: CPT | Performed by: INTERNAL MEDICINE

## 2022-07-26 PROCEDURE — 86140 C-REACTIVE PROTEIN: CPT | Performed by: INTERNAL MEDICINE

## 2022-07-26 PROCEDURE — 99214 OFFICE O/P EST MOD 30 MIN: CPT | Performed by: INTERNAL MEDICINE

## 2022-07-26 PROCEDURE — 36415 COLL VENOUS BLD VENIPUNCTURE: CPT | Performed by: INTERNAL MEDICINE

## 2022-07-26 PROCEDURE — 85025 COMPLETE CBC W/AUTO DIFF WBC: CPT | Performed by: INTERNAL MEDICINE

## 2022-07-26 PROCEDURE — 83690 ASSAY OF LIPASE: CPT | Performed by: INTERNAL MEDICINE

## 2022-07-26 NOTE — PROGRESS NOTES
[facility]    Comprehensive and complex medical care - Chronic disease management - Shared decision making - Care coordination - Compassionate care    Patient advocacy - Rational deprescribing - Minimally disruptive medicine - Ethical focus - Customized care         Date of Service: 7/26/2022  Primary Provider: Jorge Dunn    Patient Care Team:  Jorge Dunn MD as PCP - General (Internal Medicine)  Mohit Montano MD as MD (Hematology & Oncology)  Jorge Dunn MD as Assigned PCP  Joellen Nova MD as Resident (Dermatology)  Madison Alexander LICSW as  (BMT - Adult)  Ryan Alonso MD as MD (Endocrinology, Diabetes, and Metabolism)  Manju Asher RN as BMT Nurse Coordinator  Umesh Brandt MD as BMT Physician (Transplant)          Patient's Pharmacy:    [facility] - [place], [place] - [address]  [address]  [facility] [address]  Phone: [phone] Fax: [phone]     Patient's Contacts:  Name Home Phone Work Phone Mobile Phone Relationship Lgl Grd   JEAN JUARES [phone] GrandFRANCISCO JAVIER Jackson   [phone] Significant*      Patient's Insurance:    Payor: [employer] / Plan: [facility] MEDICARE ADVANTAGE / Product Type: HMO /           Active Problem List:  Problem List as of 7/26/2022 Reviewed: 2/17/2020 12:04 PM by Jhon Herrmann MD       High    Full code status    Tobacco abuse    COPD (chronic obstructive pulmonary disease) (H)    Acute myeloid leukemia (AML), M2 (H)    Status post allogeneic bone marrow transplant (H) - 2010 - [facility]       Medium    Controlled substance agreement signed - 1/22 - oxycodone - UDS 1/22    Chronic back pain    Financial difficulties    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)    Pseudogout    Traumatic compression fracture of T8 thoracic vertebra, closed, initial encounter (H)    Chronic  diarrhea    Chronic, continuous use of opioids       Low    Nephrolithiasis    Anxiety    Gastric ulcer    Migraine headache    Papular rash, generalized    Senile purpura (H)    Tubular adenoma of colon - advanced adenoma in 2017 - single small adenoma in 2020    OP (osteoporosis)       Other    Exocrine pancreatic insufficiency    Post-menopausal           Current Outpatient Medications   Medication Instructions     albuterol (PROVENTIL) (2.5 MG/3ML) 0.083% neb solution USE 1 VIAL IN NEBULIZER EVERY 6 HOURS AS NEEDED FOR WHEEZING     Albuterol Sulfate (VENTOLIN HFA) 108 (90 BASE) MCG/ACT AERS Inhale  into the lungs.     alendronate (FOSAMAX) 70 MG tablet Take 1 tablet by mouth once a week     amylase-lipase-protease (CREON) 61957-83348 units CPEP per EC capsule 2 capsules, Oral, 3 TIMES DAILY WITH MEALS, And snacks     cholecalciferol (VITAMIN D3) 25 mcg (1000 units) capsule 1 capsule, DAILY     clindamycin 1 % EX external lotion Topical, 2 TIMES DAILY     gentamicin 0.1 % EX external ointment Topical, 2 TIMES DAILY     Loperamide HCl (IMODIUM OR) Oral, 2 TIMES DAILY     Multiple Vitamins-Minerals (MULTIVITAL PO) Take  by mouth.     mupirocin (BACTROBAN) 2 % external ointment Use 2 times a day to affected area on cheek     nebulizer nebulization 1 nebulizer please as covered by insurance.  May also dispense supplies (tubing, inhalation device, etc) as needed.     Omega-3 Fatty Acids (FISH OIL PO) Oral     oxyCODONE IR (ROXICODONE) 10 mg, Oral, 4 TIMES DAILY, For use from 7/20/22-8/19/22     pantoprazole (PROTONIX) 40 mg, Oral, DAILY     triamcinolone (KENALOG) 0.1 % external cream Apply tid to itchy or tender skin lesions     umeclidinium (INCRUSE ELLIPTA) 62.5 MCG/INH inhaler 1 puff, Inhalation, DAILY     vitamin C (ASCORBIC ACID) 500 mg, DAILY     Social History     Social History Narrative    , on disability.        Has family in the area.        Patient of Dr. Dunn since 2015.        Subjective:      Jenn Barclay is a 66 year old female who comes in today for:    Chief Complaint   Patient presents with     digestive issues     For the last 3 days whenever she eats any kind of food, even a small amount, she becomes very gassy -  constantly passing flatus along w/ lower abdominal pain. This last 2-3 hours. No vomiting. No diarrhea w/ the gas but states she has loose stools every AM (not new). Applying warm pack to abdomen helps a little.      Mainly in today for epigastric pain which is new.      Generally does not have this issue.  Has been going on for 5 days.  Postprandial.  No use of non-steroidal anti-inflammatories (NSAIDs).  No blood in the stool.  Usually with heavier and richer food.    Taking creon for pancreatic insufficiency.  Getting orange oil in stools.  Loose stools 4-5 times a day.    No fever or chills.  Some nausea but no vomiting.    Recent CT scan for lung cancer screening was negative.  COPD confirmed on CT scan.    We reviewed her other issues noted in the assessment but not specifically addressed in the HPI above.     Objective:     Wt Readings from Last 3 Encounters:   07/26/22 47.6 kg (105 lb)   07/05/22 48.1 kg (106 lb)   03/30/22 52.2 kg (115 lb 1.6 oz)     BP Readings from Last 3 Encounters:   07/26/22 134/76   07/05/22 136/74   03/30/22 (!) 150/73     /76   Pulse 96   Temp 98.3  F (36.8  C) (Oral)   Resp 18   Wt 47.6 kg (105 lb)   SpO2 96%   BMI 16.45 kg/m     The patient is comfortable, no acute distress.  Mood good.  Insight good.  Eyes are nonicteric.  Neck is supple without mass.  No cervical adenopathy.  No thyromegaly. Heart regular rate and rhythm.  Lungs clear to auscultation bilaterally.  Respiratory effort is good.  Abdomen soft with moderate epigastric tenderness without rebound.  Negative Glass's sign.  No hepatosplenomegaly.  Extremities no edema.      Diagnostics:     Office Visit on 07/05/2022   Component Date Value Ref Range Status     WBC Count  07/05/2022 9.3  4.0 - 11.0 10e3/uL Final     RBC Count 07/05/2022 4.39  3.80 - 5.20 10e6/uL Final     Hemoglobin 07/05/2022 13.3  11.7 - 15.7 g/dL Final     Hematocrit 07/05/2022 40.0  35.0 - 47.0 % Final     MCV 07/05/2022 91  78 - 100 fL Final     MCH 07/05/2022 30.3  26.5 - 33.0 pg Final     MCHC 07/05/2022 33.3  31.5 - 36.5 g/dL Final     RDW 07/05/2022 12.9  10.0 - 15.0 % Final     Platelet Count 07/05/2022 289  150 - 450 10e3/uL Final     Magnesium 07/05/2022 1.9  1.7 - 2.3 mg/dL Final     TSH 07/05/2022 2.75  0.30 - 4.20 uIU/mL Final     Sodium 07/05/2022 139  136 - 145 mmol/L Final     Potassium 07/05/2022 3.6  3.4 - 5.3 mmol/L Final     Creatinine 07/05/2022 0.70  0.51 - 0.95 mg/dL Final     Urea Nitrogen 07/05/2022 8.9  8.0 - 23.0 mg/dL Final     Chloride 07/05/2022 98  98 - 107 mmol/L Final     Carbon Dioxide (CO2) 07/05/2022 31 (A) 22 - 29 mmol/L Final     Anion Gap 07/05/2022 10  7 - 15 mmol/L Final     Glucose 07/05/2022 135 (A) 70 - 99 mg/dL Final     Calcium 07/05/2022 9.1  8.8 - 10.2 mg/dL Final     Protein Total 07/05/2022 6.5  6.4 - 8.3 g/dL Final     Albumin 07/05/2022 4.1  3.5 - 5.2 g/dL Final     Bilirubin Total 07/05/2022 0.4  <=1.2 mg/dL Final     Alkaline Phosphatase 07/05/2022 49  35 - 104 U/L Final     AST 07/05/2022 31  10 - 35 U/L Final     ALT 07/05/2022 22  10 - 35 U/L Final     GFR Estimate 07/05/2022 >90  >60 mL/min/1.73m2 Final    Effective December 21, 2021 eGFRcr in adults is calculated using the 2021 CKD-EPI creatinine equation which includes age and gender (Asia et al., NEJM, DOI: 10.1056/VOQMwv7638371)     Cholesterol 07/05/2022 162  <200 mg/dL Final     Triglycerides 07/05/2022 72  <150 mg/dL Final     Direct Measure HDL 07/05/2022 56  >=50 mg/dL Final     LDL Cholesterol Calculated 07/05/2022 92  <=100 mg/dL Final     Non HDL Cholesterol 07/05/2022 106  <130 mg/dL Final       Results for orders placed or performed in visit on 07/26/22   Comprehensive metabolic panel      Status: Abnormal   Result Value Ref Range    Sodium 139 136 - 145 mmol/L    Potassium 3.8 3.5 - 5.0 mmol/L    Chloride 99 98 - 107 mmol/L    Carbon Dioxide (CO2) 31 22 - 31 mmol/L    Anion Gap 9 5 - 18 mmol/L    Urea Nitrogen 12 8 - 22 mg/dL    Creatinine 0.76 0.60 - 1.10 mg/dL    Calcium 9.0 8.5 - 10.5 mg/dL    Glucose 143 (H) 70 - 125 mg/dL    Alkaline Phosphatase 42 (L) 45 - 120 U/L    AST 24 0 - 40 U/L    ALT 23 0 - 45 U/L    Protein Total 6.7 6.0 - 8.0 g/dL    Albumin 4.0 3.5 - 5.0 g/dL    Bilirubin Total 0.4 0.0 - 1.0 mg/dL    GFR Estimate 85 >60 mL/min/1.73m2   Lipase     Status: Normal   Result Value Ref Range    Lipase <9 0 - 52 U/L   CRP inflammation     Status: Normal   Result Value Ref Range    CRP 0.4 0.0 - <0.8 mg/dL   CBC with platelets and differential     Status: None   Result Value Ref Range    WBC Count 8.8 4.0 - 11.0 10e3/uL    RBC Count 4.32 3.80 - 5.20 10e6/uL    Hemoglobin 12.9 11.7 - 15.7 g/dL    Hematocrit 39.2 35.0 - 47.0 %    MCV 91 78 - 100 fL    MCH 29.9 26.5 - 33.0 pg    MCHC 32.9 31.5 - 36.5 g/dL    RDW 13.0 10.0 - 15.0 %    Platelet Count 228 150 - 450 10e3/uL    % Neutrophils 50 %    % Lymphocytes 39 %    % Monocytes 8 %    % Eosinophils 2 %    % Basophils 1 %    % Immature Granulocytes 0 %    Absolute Neutrophils 4.4 1.6 - 8.3 10e3/uL    Absolute Lymphocytes 3.4 0.8 - 5.3 10e3/uL    Absolute Monocytes 0.7 0.0 - 1.3 10e3/uL    Absolute Eosinophils 0.2 0.0 - 0.7 10e3/uL    Absolute Basophils 0.1 0.0 - 0.2 10e3/uL    Absolute Immature Granulocytes 0.0 <=0.4 10e3/uL   CBC with Platelets & Differential     Status: None    Narrative    The following orders were created for panel order CBC with Platelets & Differential.  Procedure                               Abnormality         Status                     ---------                               -----------         ------                     CBC with platelets and d...[[id number]]                      Final result                 Please  view results for these tests on the individual orders.       Assessment:     1. Epigastric pain    2. Diarrhea, unspecified type        Plan:     1. Check blood work today.     2. Ultrasound of the abdomen ordered.  3. Consider a trial of proton pump inhibitor (PPI) if work-up negative.  4. Continue current medications otherwise.  5. Follow up sooner if issues.    Orders Placed This Encounter   Procedures     Comprehensive metabolic panel     Lipase     CRP inflammation     CBC with platelets and differential     CBC with Platelets & Differential           Jorge Dunn MD  General Internal Medicine  [facility]    Return in about 16 days (around 8/11/2022), or if symptoms worsen or fail to improve, for follow up visit.     Future Appointments   Date Time Provider Department Center   8/11/2022  1:30 PM [facility]   9/23/2022  8:00 AM [facility]   [facility]2023  1:00 PM [facility]     Wt Readings from Last 20 Encounters:   07/26/22 47.6 kg (105 lb)   07/05/22 48.1 kg (106 lb)   03/30/22 52.2 kg (115 lb 1.6 oz)   01/03/22 49 kg (108 lb)   07/13/21 48.1 kg (106 lb)   06/23/21 50.4 kg (111 lb 3.2 oz)   01/14/21 50.8 kg (112 lb)   12/16/20 49.5 kg (109 lb 3.2 oz)   07/16/20 49 kg (108 lb)   06/24/20 50.9 kg (112 lb 4.8 oz)   02/08/20 50.8 kg (112 lb)   01/24/20 51 kg (112 lb 7 oz)   12/17/19 51.3 kg (113 lb)   07/19/19 52.4 kg (115 lb 9.6 oz)   06/18/19 52.3 kg (115 lb 6.4 oz)   01/11/19 52.2 kg (115 lb 1.6 oz)   12/18/18 50.8 kg (112 lb)   07/18/18 50.8 kg (112 lb 1.6 oz)   06/18/18 50.6 kg (111 lb 8 oz)   05/14/18 52.3 kg (115 lb 3.2 oz)     BP Readings from Last 20 Encounters:   07/26/22 134/76   07/05/22 136/74   03/30/22 (!) 150/73   03/24/22 113/69   01/03/22 92/60   07/13/21 132/80   06/23/21 125/68   12/16/20 126/69   07/28/20 107/70   07/16/20 118/56   06/24/20 110/56   02/17/20 111/73   02/10/20 136/86    02/08/20 128/85   01/24/20 121/65   12/17/19 118/68   07/19/19 105/68   01/11/19 116/73   07/18/18 113/64   05/09/18 (!) 139/91      Pulse Readings from Last 20 Encounters:   07/26/22 96   07/05/22 77   03/30/22 80   03/24/22 76   01/03/22 75   07/13/21 67   06/23/21 60   12/16/20 74   07/28/20 61   07/28/20 59   07/16/20 72   06/24/20 72   02/17/20 64   02/10/20 79   02/08/20 72   01/24/20 72   12/17/19 74   07/19/19 68   01/11/19 87   07/18/18 60     SpO2 Readings from Last 20 Encounters:   07/26/22 96%   07/05/22 94%   03/24/22 94%   01/03/22 94%   07/13/21 98%   06/23/21 98%   01/14/21 94%   12/16/20 96%   07/28/20 99%   07/16/20 96%   06/24/20 96%   01/24/20 94%   12/17/19 95%   07/19/19 97%   01/11/19 98%   07/18/18 96%   05/09/18 96%   01/29/18 96%   01/17/18 96%   07/12/17 98%

## 2022-07-27 ENCOUNTER — TELEPHONE (OUTPATIENT)
Dept: INTERNAL MEDICINE | Facility: CLINIC | Age: 67
End: 2022-07-27

## 2022-07-27 ENCOUNTER — HOSPITAL ENCOUNTER (OUTPATIENT)
Dept: ULTRASOUND IMAGING | Facility: HOSPITAL | Age: 67
Discharge: HOME | End: 2022-07-27
Attending: INTERNAL MEDICINE | Admitting: INTERNAL MEDICINE
Payer: COMMERCIAL

## 2022-07-27 DIAGNOSIS — R10.13 EPIGASTRIC PAIN: ICD-10-CM

## 2022-07-27 DIAGNOSIS — R10.13 DYSPEPSIA: Primary | ICD-10-CM

## 2022-07-27 PROCEDURE — 76705 ECHO EXAM OF ABDOMEN: CPT

## 2022-07-27 RX ORDER — PANTOPRAZOLE SODIUM 40 MG/1
40 TABLET, DELAYED RELEASE ORAL DAILY
Qty: 60 TABLET | Refills: 1 | Status: SHIPPED | OUTPATIENT
Start: 2022-07-27 | End: 2023-01-05

## 2022-07-27 NOTE — TELEPHONE ENCOUNTER
Please call patient -    ______________________________________________________________________     Home Phone:  [phone] (home)     Cell phone:   Telephone Information:   Mobile [phone]     ______________________________________________________________________     Her ultrasound of the abdomen did not show any signs of gallstones.    It could be acid related issues with her stomach causing her problems.    Let's try pantoprazole (Protonix) 40 mg a day and I sent this into the pharmacy.    Recommend a follow up in 2-3 weeks in a reserved slot if needed.      Jorge Dunn MD  [facility]  7/27/2022, 3:36 PM   ______________________________________________________________________    Pertinent radiology for this visit includes the following:    US Abdomen Limited  Narrative: EXAM: US ABDOMEN LIMITED  LOCATION: [facility]  DATE/TIME: 7/27/2022 12:31 PM    INDICATION:  Epigastric pain  COMPARISON: Abdomen CT 10/16/2019  TECHNIQUE: Limited abdominal ultrasound.    FINDINGS:    GALLBLADDER: Normal. No gallstones, wall thickening, or pericholecystic fluid. Negative sonographic Glass's sign.    BILE DUCTS: No biliary dilatation. The common duct measures 3 mm.    LIVER: Normal parenchyma with smooth contour. No focal mass.    RIGHT KIDNEY: No hydronephrosis. Intrarenal, punctate calcification noted on CT is not well-defined by ultrasound.    PANCREAS: The visualized portions are normal.    No ascites.  Impression: IMPRESSION:  1.  No evidence of cholelithiasis or cholecystitis.  2.  Findings called to Dr. Dunn's voice mail at 1315 as instructed.      ______________________________________________________________________

## 2022-07-27 NOTE — TELEPHONE ENCOUNTER
Patient informed of message below from PCP.     Patient is agreeable to try Protonix.     Scheduled for a follow up in 2 weeks.     Patient verbalized understanding and had no further questions.     Thanked for call and information.

## 2022-07-29 PROBLEM — Z78.0 POST-MENOPAUSAL: Status: RESOLVED | Noted: 2020-02-08 | Resolved: 2022-07-29

## 2022-08-15 DIAGNOSIS — M54.50 CHRONIC BILATERAL LOW BACK PAIN, UNSPECIFIED WHETHER SCIATICA PRESENT: ICD-10-CM

## 2022-08-15 DIAGNOSIS — G89.29 CHRONIC BILATERAL LOW BACK PAIN, UNSPECIFIED WHETHER SCIATICA PRESENT: ICD-10-CM

## 2022-08-15 RX ORDER — OXYCODONE HYDROCHLORIDE 10 MG/1
10 TABLET ORAL 4 TIMES DAILY
Qty: 120 TABLET | Refills: 0 | Status: SHIPPED | OUTPATIENT
Start: 2022-08-19 | End: 2022-09-15

## 2022-08-15 NOTE — TELEPHONE ENCOUNTER
Patient calling to request refill of the attached.  Stating she has about 3-4 days of medication left at this time.

## 2022-08-18 ENCOUNTER — PATIENT OUTREACH (OUTPATIENT)
Dept: ONCOLOGY | Facility: CLINIC | Age: 67
End: 2022-08-18

## 2022-08-18 NOTE — PROGRESS NOTES
Spoke to patient with Dr Agee's recommendations for follow up care she can follow with her PMD per his last visit. Carmela Santos RN, BSN Nurse Coordinator

## 2022-08-22 NOTE — PROGRESS NOTES
[facility]    Outpatient Physical Therapy Discharge Note  Patient: Jenn Barclay  : 1955    Beginning/End Dates of Reporting Period:  22    Referring Provider: Jo-Ann Alvares PA-C    Therapy Diagnosis: \chronic thoracic and lumbar spine pain without sciatica, joel chronic hip pain, muscle weakness, hx of compression fx in spine and scoliosis     Goals:  Goal Identifier HEP   Goal Description Pt will be independent in HEP to address impairments and improve pain/function   Target Date 22   Date Met      Progress (detail required for progress note):       Goal Identifier Pain   Goal Description Pt will have <3/10 pain in the morning to improve quality of life >=5 days per week   Target Date 22   Date Met      Progress (detail required for progress note):       Goal Identifier Walking   Goal Description Pt will be able to walk for 2 miles with <4/10 pain for exercise and recreational activities   Target Date 22   Date Met      Progress (detail required for progress note):                 Plan:  Discharge from therapy.    Discharge:    Reason for Discharge: Patient chooses to discontinue therapy.  Pt cancelled all remaining PT appointments. Goals not met.    Equipment Issued: none    Discharge Plan: Patient to continue home program.    Marifer Vilchis, PT, DPT, CLT-EMILY

## 2022-08-22 NOTE — ADDENDUM NOTE
Encounter addended by: Marifer Vilchis, PT on: 8/22/2022 8:31 AM   Actions taken: Episode resolved, Clinical Note Signed

## 2022-09-15 DIAGNOSIS — G89.29 CHRONIC BILATERAL LOW BACK PAIN, UNSPECIFIED WHETHER SCIATICA PRESENT: ICD-10-CM

## 2022-09-15 DIAGNOSIS — M54.50 CHRONIC BILATERAL LOW BACK PAIN, UNSPECIFIED WHETHER SCIATICA PRESENT: ICD-10-CM

## 2022-09-15 RX ORDER — OXYCODONE HYDROCHLORIDE 10 MG/1
10 TABLET ORAL 4 TIMES DAILY
Qty: 120 TABLET | Refills: 0 | Status: SHIPPED | OUTPATIENT
Start: 2022-09-18 | End: 2022-10-14

## 2022-09-15 NOTE — TELEPHONE ENCOUNTER
Medication Question or Refill    Contacts       Type Contact Phone/Fax    09/15/2022 10:08 AM CDT Phone (Incoming) Jenn Barclay AVA (Self) [phone] (H)          What medication are you calling about (include dose and sig)?:   Oxycodone IR (ROXICODONE) 10 mg tablet    Take 1 tablet by mouth 4 times daily for 30 days        Controlled Substance Agreement on file:   CSA -- Patient Level:    Controlled Substance Agreement - Opioid - Scan on 1/3/2022  1:31 PM  Controlled Substance Agreement - Opioid - Scan on 12/18/2019  Controlled Substance Agreement - Opioid - Scan on 12/28/2018  Controlled Substance Agreement - Opioid - Scan on 8/9/2017: [facility]       Who prescribed the medication?: PCP    Do you need a refill? Yes: will run out over the weekend.    When did you use the medication last? This morning.    Patient offered an appointment? Yes: declined has appt scheduled for 01/05/2023.  Last appointment with provider 07/05/2022.    Preferred Pharmacy:   [facility] - [place], [place] - [address] [place] [address] [address]  [address]  [facility] [address]  Phone: [phone] Fax: [phone]

## 2022-10-14 DIAGNOSIS — G89.29 CHRONIC BILATERAL LOW BACK PAIN, UNSPECIFIED WHETHER SCIATICA PRESENT: ICD-10-CM

## 2022-10-14 DIAGNOSIS — M54.50 CHRONIC BILATERAL LOW BACK PAIN, UNSPECIFIED WHETHER SCIATICA PRESENT: ICD-10-CM

## 2022-10-14 RX ORDER — OXYCODONE HYDROCHLORIDE 10 MG/1
10 TABLET ORAL 4 TIMES DAILY
Qty: 120 TABLET | Refills: 0 | Status: SHIPPED | OUTPATIENT
Start: 2022-10-18 | End: 2022-11-15

## 2022-10-14 NOTE — TELEPHONE ENCOUNTER
"Patient calling to get a medication refill.    Controlled Substance Refill Request for     oxyCODONE IR (ROXICODONE) 10 MG tablet    Last refill: 9/15/2022    Last clinic visit: 7/26/2022    Clinic visit frequency required:  Next appt: 1/05/2023    Enough for 3 days    - \"went and got the old people flu shot\"              "

## 2022-11-15 DIAGNOSIS — G89.29 CHRONIC BILATERAL LOW BACK PAIN, UNSPECIFIED WHETHER SCIATICA PRESENT: ICD-10-CM

## 2022-11-15 DIAGNOSIS — M54.50 CHRONIC BILATERAL LOW BACK PAIN, UNSPECIFIED WHETHER SCIATICA PRESENT: ICD-10-CM

## 2022-11-15 RX ORDER — OXYCODONE HYDROCHLORIDE 10 MG/1
10 TABLET ORAL 4 TIMES DAILY
Qty: 120 TABLET | Refills: 0 | Status: SHIPPED | OUTPATIENT
Start: 2022-11-21 | End: 2022-12-15

## 2022-11-15 NOTE — TELEPHONE ENCOUNTER
Medication Question or Refill    Contacts       Type Contact Phone/Fax    11/15/2022 08:58 AM CST Phone (Incoming) Jenn Barclay (Self) [phone] (M)          What medication are you calling about (include dose and sig)?: Oxycodone IR 10 mg tablet    Controlled Substance Agreement on file:   CSA -- Patient Level:     [Media Unavailable] Controlled Substance Agreement - Opioid - Scan on 1/3/2022  1:31 PM   [Media Unavailable] Controlled Substance Agreement - Opioid - Scan on 12/18/2019   [Media Unavailable] Controlled Substance Agreement - Opioid - Scan on 12/28/2018   [Media Unavailable] Controlled Substance Agreement - Opioid - Scan on 8/9/2017: [facility]       Who prescribed the medication?: Jorge Dunn    Do you need a refill? Yes:     When did you use the medication last? Today    Patient offered an appointment? No    Do you have any questions or concerns?  No    Preferred Pharmacy:     [facility] [place], [place] - [address]  [address]  [facility] [address]  Phone: [phone] Fax: [phone]      Okay to leave a detailed message?: Yes at Home number on file [phone] (home)

## 2022-12-06 ENCOUNTER — APPOINTMENT (OUTPATIENT)
Dept: RADIOLOGY | Facility: HOSPITAL | Age: 67
End: 2022-12-06
Attending: STUDENT IN AN ORGANIZED HEALTH CARE EDUCATION/TRAINING PROGRAM
Payer: COMMERCIAL

## 2022-12-06 ENCOUNTER — HOSPITAL ENCOUNTER (EMERGENCY)
Facility: HOSPITAL | Age: 67
Discharge: HOME | End: 2022-12-07
Attending: EMERGENCY MEDICINE | Admitting: EMERGENCY MEDICINE
Payer: COMMERCIAL

## 2022-12-06 DIAGNOSIS — J20.9 ACUTE BRONCHITIS, UNSPECIFIED ORGANISM: ICD-10-CM

## 2022-12-06 DIAGNOSIS — R06.02 SHORTNESS OF BREATH: ICD-10-CM

## 2022-12-06 DIAGNOSIS — J44.1 COPD EXACERBATION (H): ICD-10-CM

## 2022-12-06 PROCEDURE — 94640 AIRWAY INHALATION TREATMENT: CPT

## 2022-12-06 PROCEDURE — 85027 COMPLETE CBC AUTOMATED: CPT | Performed by: FAMILY MEDICINE

## 2022-12-06 PROCEDURE — 71046 X-RAY EXAM CHEST 2 VIEWS: CPT

## 2022-12-06 PROCEDURE — C9803 HOPD COVID-19 SPEC COLLECT: HCPCS

## 2022-12-06 PROCEDURE — 83735 ASSAY OF MAGNESIUM: CPT | Performed by: FAMILY MEDICINE

## 2022-12-06 PROCEDURE — 36415 COLL VENOUS BLD VENIPUNCTURE: CPT | Performed by: STUDENT IN AN ORGANIZED HEALTH CARE EDUCATION/TRAINING PROGRAM

## 2022-12-06 PROCEDURE — 83880 ASSAY OF NATRIURETIC PEPTIDE: CPT | Performed by: FAMILY MEDICINE

## 2022-12-06 PROCEDURE — 87637 SARSCOV2&INF A&B&RSV AMP PRB: CPT | Performed by: FAMILY MEDICINE

## 2022-12-06 PROCEDURE — 99285 EMERGENCY DEPT VISIT HI MDM: CPT | Mod: CS,25

## 2022-12-06 PROCEDURE — 84484 ASSAY OF TROPONIN QUANT: CPT | Performed by: EMERGENCY MEDICINE

## 2022-12-06 PROCEDURE — 80048 BASIC METABOLIC PNL TOTAL CA: CPT | Performed by: FAMILY MEDICINE

## 2022-12-06 PROCEDURE — 250N000009 HC RX 250: Performed by: STUDENT IN AN ORGANIZED HEALTH CARE EDUCATION/TRAINING PROGRAM

## 2022-12-06 PROCEDURE — 85007 BL SMEAR W/DIFF WBC COUNT: CPT | Performed by: FAMILY MEDICINE

## 2022-12-06 RX ORDER — IPRATROPIUM BROMIDE AND ALBUTEROL SULFATE 2.5; .5 MG/3ML; MG/3ML
3 SOLUTION RESPIRATORY (INHALATION) ONCE
Status: COMPLETED | OUTPATIENT
Start: 2022-12-06 | End: 2022-12-06

## 2022-12-06 RX ADMIN — IPRATROPIUM BROMIDE AND ALBUTEROL SULFATE 3 ML: 2.5; .5 SOLUTION RESPIRATORY (INHALATION) at 23:20

## 2022-12-07 VITALS
TEMPERATURE: 98.4 F | SYSTOLIC BLOOD PRESSURE: 128 MMHG | BODY MASS INDEX: 16 KG/M2 | HEIGHT: 69 IN | DIASTOLIC BLOOD PRESSURE: 80 MMHG | OXYGEN SATURATION: 97 % | HEART RATE: 78 BPM | RESPIRATION RATE: 20 BRPM | WEIGHT: 108 LBS

## 2022-12-07 LAB
ANION GAP SERPL CALCULATED.3IONS-SCNC: 10 MMOL/L (ref 7–15)
BASOPHILS # BLD MANUAL: 0 10E3/UL (ref 0–0.2)
BASOPHILS NFR BLD MANUAL: 0 %
BUN SERPL-MCNC: 8.1 MG/DL (ref 8–23)
CALCIUM SERPL-MCNC: 9.1 MG/DL (ref 8.8–10.2)
CHLORIDE SERPL-SCNC: 96 MMOL/L (ref 98–107)
CREAT SERPL-MCNC: 0.64 MG/DL (ref 0.51–0.95)
DEPRECATED HCO3 PLAS-SCNC: 34 MMOL/L (ref 22–29)
ELLIPTOCYTES BLD QL SMEAR: SLIGHT
EOSINOPHIL # BLD MANUAL: 0.3 10E3/UL (ref 0–0.7)
EOSINOPHIL NFR BLD MANUAL: 2 %
ERYTHROCYTE [DISTWIDTH] IN BLOOD BY AUTOMATED COUNT: 12.9 % (ref 10–15)
FLUAV RNA SPEC QL NAA+PROBE: NEGATIVE
FLUBV RNA RESP QL NAA+PROBE: NEGATIVE
GFR SERPL CREATININE-BSD FRML MDRD: >90 ML/MIN/1.73M2
GLUCOSE SERPL-MCNC: 137 MG/DL (ref 70–99)
HCT VFR BLD AUTO: 37.8 % (ref 35–47)
HGB BLD-MCNC: 12.4 G/DL (ref 11.7–15.7)
HOLD SPECIMEN: NORMAL
LYMPHOCYTES # BLD MANUAL: 5.6 10E3/UL (ref 0.8–5.3)
LYMPHOCYTES NFR BLD MANUAL: 41 %
MAGNESIUM SERPL-MCNC: 2 MG/DL (ref 1.7–2.3)
MCH RBC QN AUTO: 30 PG (ref 26.5–33)
MCHC RBC AUTO-ENTMCNC: 32.8 G/DL (ref 31.5–36.5)
MCV RBC AUTO: 92 FL (ref 78–100)
MONOCYTES # BLD MANUAL: 0.4 10E3/UL (ref 0–1.3)
MONOCYTES NFR BLD MANUAL: 3 %
NEUTROPHILS # BLD MANUAL: 7.4 10E3/UL (ref 1.6–8.3)
NEUTROPHILS NFR BLD MANUAL: 54 %
NRBC # BLD AUTO: 0 10E3/UL
NRBC BLD AUTO-RTO: 0 /100
NT-PROBNP SERPL-MCNC: 264 PG/ML (ref 0–900)
PLAT MORPH BLD: ABNORMAL
PLATELET # BLD AUTO: 453 10E3/UL (ref 150–450)
POTASSIUM SERPL-SCNC: 3.3 MMOL/L (ref 3.4–5.3)
RBC # BLD AUTO: 4.13 10E6/UL (ref 3.8–5.2)
RBC MORPH BLD: ABNORMAL
RSV RNA SPEC NAA+PROBE: NEGATIVE
SARS-COV-2 RNA RESP QL NAA+PROBE: NEGATIVE
SODIUM SERPL-SCNC: 140 MMOL/L (ref 136–145)
TROPONIN T SERPL HS-MCNC: 14 NG/L
WBC # BLD AUTO: 13.7 10E3/UL (ref 4–11)

## 2022-12-07 PROCEDURE — 250N000013 HC RX MED GY IP 250 OP 250 PS 637: Performed by: EMERGENCY MEDICINE

## 2022-12-07 PROCEDURE — 271N000002 HC RX 271: Performed by: EMERGENCY MEDICINE

## 2022-12-07 PROCEDURE — 36415 COLL VENOUS BLD VENIPUNCTURE: CPT | Performed by: FAMILY MEDICINE

## 2022-12-07 PROCEDURE — 250N000011 HC RX IP 250 OP 636: Performed by: EMERGENCY MEDICINE

## 2022-12-07 PROCEDURE — 93005 ELECTROCARDIOGRAM TRACING: CPT | Performed by: EMERGENCY MEDICINE

## 2022-12-07 PROCEDURE — 96374 THER/PROPH/DIAG INJ IV PUSH: CPT

## 2022-12-07 PROCEDURE — 250N000009 HC RX 250: Performed by: EMERGENCY MEDICINE

## 2022-12-07 RX ORDER — PREDNISONE 20 MG/1
40 TABLET ORAL DAILY
Qty: 10 TABLET | Refills: 0 | Status: SHIPPED | OUTPATIENT
Start: 2022-12-07 | End: 2022-12-12

## 2022-12-07 RX ORDER — POTASSIUM CHLORIDE 1500 MG/1
40 TABLET, EXTENDED RELEASE ORAL ONCE
Status: COMPLETED | OUTPATIENT
Start: 2022-12-07 | End: 2022-12-07

## 2022-12-07 RX ORDER — ALBUTEROL SULFATE 90 UG/1
2 AEROSOL, METERED RESPIRATORY (INHALATION) ONCE
Status: COMPLETED | OUTPATIENT
Start: 2022-12-07 | End: 2022-12-07

## 2022-12-07 RX ORDER — ALBUTEROL SULFATE 0.83 MG/ML
2.5 SOLUTION RESPIRATORY (INHALATION)
Status: DISCONTINUED | OUTPATIENT
Start: 2022-12-07 | End: 2022-12-07 | Stop reason: HOSPADM

## 2022-12-07 RX ORDER — METHYLPREDNISOLONE SODIUM SUCCINATE 125 MG/2ML
125 INJECTION, POWDER, LYOPHILIZED, FOR SOLUTION INTRAMUSCULAR; INTRAVENOUS ONCE
Status: COMPLETED | OUTPATIENT
Start: 2022-12-07 | End: 2022-12-07

## 2022-12-07 RX ORDER — INHALER, ASSIST DEVICES
1 SPACER (EA) MISCELLANEOUS ONCE
Status: COMPLETED | OUTPATIENT
Start: 2022-12-07 | End: 2022-12-07

## 2022-12-07 RX ORDER — IPRATROPIUM BROMIDE AND ALBUTEROL SULFATE 2.5; .5 MG/3ML; MG/3ML
1 SOLUTION RESPIRATORY (INHALATION) EVERY 6 HOURS PRN
Qty: 90 ML | Refills: 1 | Status: SHIPPED | OUTPATIENT
Start: 2022-12-07 | End: 2024-02-04

## 2022-12-07 RX ORDER — DOXYCYCLINE 100 MG/1
100 CAPSULE ORAL 2 TIMES DAILY
Qty: 14 CAPSULE | Refills: 0 | Status: SHIPPED | OUTPATIENT
Start: 2022-12-07 | End: 2022-12-14

## 2022-12-07 RX ORDER — ALBUTEROL SULFATE 90 UG/1
2 AEROSOL, METERED RESPIRATORY (INHALATION) EVERY 4 HOURS PRN
Qty: 18 G | Refills: 0 | Status: SHIPPED | OUTPATIENT
Start: 2022-12-07 | End: 2024-02-04

## 2022-12-07 RX ORDER — IPRATROPIUM BROMIDE AND ALBUTEROL SULFATE 2.5; .5 MG/3ML; MG/3ML
3 SOLUTION RESPIRATORY (INHALATION) ONCE
Status: COMPLETED | OUTPATIENT
Start: 2022-12-07 | End: 2022-12-07

## 2022-12-07 RX ADMIN — IPRATROPIUM BROMIDE AND ALBUTEROL SULFATE 3 ML: 2.5; .5 SOLUTION RESPIRATORY (INHALATION) at 00:47

## 2022-12-07 RX ADMIN — POTASSIUM CHLORIDE 40 MEQ: 1500 TABLET, EXTENDED RELEASE ORAL at 00:46

## 2022-12-07 RX ADMIN — ALBUTEROL SULFATE 2 PUFF: 90 AEROSOL, METERED RESPIRATORY (INHALATION) at 01:22

## 2022-12-07 RX ADMIN — Medication 1 EACH: at 01:23

## 2022-12-07 RX ADMIN — METHYLPREDNISOLONE SODIUM SUCCINATE 125 MG: 125 INJECTION, POWDER, FOR SOLUTION INTRAMUSCULAR; INTRAVENOUS at 00:53

## 2022-12-07 ASSESSMENT — ENCOUNTER SYMPTOMS
CHILLS: 0
COUGH: 1
SORE THROAT: 0
APPETITE CHANGE: 1
SHORTNESS OF BREATH: 1
FEVER: 0

## 2022-12-07 ASSESSMENT — ACTIVITIES OF DAILY LIVING (ADL): ADLS_ACUITY_SCORE: 33

## 2022-12-07 NOTE — ED PROVIDER NOTES
NAME: Jenn Barclay  AGE: 67 year old female  YOB: 1955  MRN: [id number]  EVALUATION DATE & TIME: 2022 11:54 PM    PCP: Jorge Dunn    ED PROVIDER: Wally Nagel M.D.    Chief Complaint   Patient presents with     Cough     Shortness of Breath     FINAL IMPRESSION:  1. Shortness of breath    2. COPD exacerbation (H)    3. Acute bronchitis, unspecified organism      MEDICAL DECISION MAKIN:08 AM I met with the patient, obtained history, performed an initial exam, and discussed options and plan for diagnostics and treatment here in the ED.   1:14 AM Reassessed and updated patient on her results. Patient reports feeling much improved and is able to take deep breaths now. I discussed the plan for discharge with the patient, and patient is agreeable. We discussed supportive cares at home and reasons for return to the ER including new or worsening symptoms - all questions and concerns addressed. Patient to be discharged by RN.    Patient was clinically assessed and consented to treatment. After assessment, medical decision making and workup were discussed with the patient. The patient was agreeable to plan for testing, workup, and treatment.  Pertinent Labs & Imaging studies reviewed. (See chart for details)       Medical Decision Making    History:    Supplemental history from: N/A    External Record(s) reviewed: Documented in HPI, if applicable.    Work Up:    Chart documentation includes differential considered and any EKGs or imaging interpreted by provider.    In additional to work up documented, I considered the following work up: See chart documentation, if applicable.    External consultation:    Discussion of management with another provider: See chart documentation, if applicable    Complicating factors:    Care impacted by chronic illness: Chronic Lung Disease    Care affected by social determinants of health: N/A    Disposition considerations: Discharge. I prescribed  additional prescription strength medication(s) as charted. I considered admission, but discharged patient after significant clinical improvement.    Jenn Barclay is a 67 year old female who presents with cough and shortness of breath.   Differential diagnosis includes but not limited to asthma exacerbation, COPD exacerbation, pneumonia, viral upper respiratory infection, bronchitis.  Patient with significant history of asthma but also COPD and is still a smoker.  Patient with diminished breath sounds bilaterally and slight wheeze.  Patient's oxygen saturation is normal however inhaler is not working at home we will try nebulizer here.  After single nebulizer here patient reports significant improvement and moving air much better.  Wheezing which she reports having earlier is gone however she still has some slight diminished sounds and prolonged expiration.  I discussed with patient further treatment and will start Solu-Medrol as well as further nebulized treatments.  Patient will be watched for rebound and consideration for admission if any rebound or symptoms not significantly improving.  Oxygenation consideration patient is normal and would not require admission for this however symptomatically may require admission.  Patient however improving significantly after the nebs and Solu-Medrol.  Patient watched and had no rebound.  Review of her labs and chest x-ray from triage order set revealed no pneumonia.  Chest x-ray did show hyperinflation but this consistent with COPD.  Patient improved and after review of her labs including slight hypokalemia which was repleted orally here.  Also slight leukocytosis but negative cardiac work-up as well feeling comfortable and would prefer to go home.  I do not feel she meets admission at this time following the improvement of symptoms and normal vital signs.  We discussed home care including nebulized treatments, steroids, and doxycycline for acute bronchitis and COPD.    0  minutes of critical care time    MEDICATIONS GIVEN IN THE EMERGENCY:  Medications   ipratropium - albuterol 0.5 mg/2.5 mg/3 mL (DUONEB) neb solution 3 mL (3 mLs Nebulization Given 12/6/22 2320)   methylPREDNISolone sodium succinate (solu-MEDROL) injection 125 mg (125 mg Intravenous Given 12/7/22 0053)   ipratropium - albuterol 0.5 mg/2.5 mg/3 mL (DUONEB) neb solution 3 mL (3 mLs Nebulization Given 12/7/22 0047)   potassium chloride ER (KLOR-CON M) CR tablet 40 mEq (40 mEq Oral Given 12/7/22 0046)   albuterol (PROVENTIL HFA/VENTOLIN HFA) inhaler (2 puffs Inhalation Given 12/7/22 0122)   aerochamber with mouthpiece (NO mask) - > 5 years 1 each (1 each Inhalation Given 12/7/22 0123)       NEW PRESCRIPTIONS STARTED AT TODAY'S ER VISIT:  Discharge Medication List as of 12/7/2022  1:30 AM      START taking these medications    Details   !! albuterol (PROAIR HFA/PROVENTIL HFA/VENTOLIN HFA) 108 (90 Base) MCG/ACT inhaler Inhale 2 puffs into the lungs every 4 hours as needed for shortness of breath / dyspnea or wheezing, Disp-18 g, R-0, Local PrintPharmacy may dispense brand covered by insurance (Proair, or proventil or ventolin or generic albuterol inhaler)      doxycycline hyclate (VIBRAMYCIN) 100 MG capsule Take 1 capsule (100 mg) by mouth 2 times daily for 7 days, Disp-14 capsule, R-0, Local Print      ipratropium - albuterol 0.5 mg/2.5 mg/3 mL (DUONEB) 0.5-2.5 (3) MG/3ML neb solution Take 1 vial (3 mLs) by nebulization every 6 hours as needed for shortness of breath / dyspnea or wheezing, Disp-90 mL, R-1, Local Print      predniSONE (DELTASONE) 20 MG tablet Take 2 tablets (40 mg) by mouth daily for 5 days, Disp-10 tablet, R-0, Local Print       !! - Potential duplicate medications found. Please discuss with provider.             =================================================================    HPI    Patient information was obtained from: Patient     Use of : N/JOSIAS Barclay is a 67 year old female  with a past medical history of AML, PID, COPD, GVHD, controlled substance agreement, tobacco abuse who presents with cough and shortness of breath. Patient reports history of asthma and recent new diagnosis of COPD. She has been sick for the past 1 week. She notes testing negative for COVID with an outpatient test 5 days ago. For the first 3 days of her illness, she notes having a dry hacking cough that worsened with laying down. For the past couple of days, patient reports worsening cough and associated decreased appetite. Denies fever, chills, congestion, or sore throat. Denies known history of heart problems or recent sick contacts. Patient lives with her grandson who is not ill. Patient has been using her inhaler without relief for her symptoms. However, she is unsure if its because the inhaler is possibly  or if its due to her not being able to get a good deep breath in without coughing. Patient received a neb treatment in triage, which she states greatly improved her shortness of breath. Patient is COVID vaccinated and boosted. Patient denies any additional complaints at this time.     REVIEW OF SYSTEMS   Review of Systems   Constitutional: Positive for appetite change (decreased). Negative for chills and fever.   HENT: Negative for congestion and sore throat.    Respiratory: Positive for cough and shortness of breath.    All other systems reviewed and are negative.     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     History of PID      Metatarsal fracture 2017    2nd      Migraine without aura, without mention of intractable migraine without mention of status migrainosus      Osteoporosis 2020    DXA     Osteoporosis 5/3/2016     Papular rash, generalized      Post-menopausal 2/8/2020    Early surgical menopause     Pseudogout 11/30/2016     Serrated adenoma of colon 7/17/2020     Status post allogeneic bone marrow transplant (H)      Surgical menopause 1976    oophorectomy     Tobacco abuse      Traumatic compression fracture of T8 thoracic vertebra, closed, initial encounter (H) 4/26/2016     Tubular adenoma of colon 7/17/2020     Vertebral compression fracture (H) 2014     Zoster 2018     PAST SURGICAL HISTORY:  Past Surgical History:   Procedure Laterality Date     APPENDECTOMY       COLONOSCOPY N/A 07/28/2020    Procedure: COLONOSCOPY, WITH POLYPECTOMY AND BIOPSY;  Surgeon: Gianni Valerio MD;  Location: [facility] OR     HYSTERECTOMY TOTAL ABDOMINAL, BILATERAL SALPINGO-OOPHORECTOMY, COMBINED  1976     IR MISCELLANEOUS PROCEDURE  02/22/2010     TRANSPLANT  01/01/2010     VERTEBROPLASTY  01/01/2016    T5 and T8 vertebrae.     CURRENT MEDICATIONS:    No current facility-administered medications for this encounter.    Current Outpatient Medications:      albuterol (PROAIR HFA/PROVENTIL HFA/VENTOLIN HFA) 108 (90 Base) MCG/ACT inhaler, Inhale 2 puffs into the lungs every 4 hours as needed for shortness of breath / dyspnea or wheezing, Disp: 18 g, Rfl: 0     doxycycline hyclate (VIBRAMYCIN) 100 MG capsule, Take 1 capsule (100 mg) by mouth 2 times daily for 7 days, Disp: 14 capsule, Rfl: 0     ipratropium - albuterol 0.5 mg/2.5 mg/3 mL (DUONEB) 0.5-2.5 (3) MG/3ML neb solution, Take 1 vial (3 mLs) by nebulization every 6 hours as needed for shortness of breath / dyspnea or wheezing, Disp: 90 mL, Rfl: 1     predniSONE (DELTASONE) 20 MG tablet, Take 2 tablets (40 mg) by mouth daily  for 5 days, Disp: 10 tablet, Rfl: 0     albuterol (PROVENTIL) (2.5 MG/3ML) 0.083% neb solution, USE 1 VIAL IN NEBULIZER EVERY 6 HOURS AS NEEDED FOR WHEEZING, Disp: 75 mL, Rfl: 11     Albuterol Sulfate (VENTOLIN HFA) 108 (90 BASE) MCG/ACT AERS, Inhale  into the lungs., Disp: , Rfl:      alendronate (FOSAMAX) 70 MG tablet, Take 1 tablet by mouth once a week, Disp: 13 tablet, Rfl: 3     amylase-lipase-protease (CREON) 43756-61553 units CPEP per EC capsule, Take 2 capsules by mouth 3 times daily (with meals) And snacks, Disp: 300 capsule, Rfl: 11     ascorbic acid (VITAMIN C) 500 MG tablet, Take 500 mg by mouth daily., Disp: , Rfl:      clindamycin 1 % EX external lotion, Apply topically 2 times daily, Disp: 60 mL, Rfl: 3     gentamicin 0.1 % EX external ointment, Apply topically 2 times daily, Disp: 30 g, Rfl: 3     Loperamide HCl (IMODIUM OR), Take by mouth 2 times daily, Disp: , Rfl:      Multiple Vitamins-Minerals (MULTIVITAL PO), Take  by mouth., Disp: , Rfl:      mupirocin (BACTROBAN) 2 % external ointment, Use 2 times a day to affected area on cheek, Disp: 22 g, Rfl: 0     nebulizer nebulization, 1 nebulizer please as covered by insurance.  May also dispense supplies (tubing, inhalation device, etc) as needed., Disp: 1 each, Rfl: 0     Omega-3 Fatty Acids (FISH OIL PO), , Disp: , Rfl:      oxyCODONE IR (ROXICODONE) 10 MG tablet, Take 1 tablet (10 mg) by mouth 4 times daily for 30 days For use from 11/21/22-12/21/22, Disp: 120 tablet, Rfl: 0     pantoprazole (PROTONIX) 40 MG EC tablet, Take 1 tablet (40 mg) by mouth daily, Disp: 60 tablet, Rfl: 1     triamcinolone (KENALOG) 0.1 % external cream, Apply tid to itchy or tender skin lesions, Disp: 80 g, Rfl: 3     umeclidinium (INCRUSE ELLIPTA) 62.5 MCG/INH inhaler, Inhale 1 puff into the lungs daily, Disp: 30 each, Rfl: 11    ALLERGIES:  Allergies   Allergen Reactions     Mirtazapine      Other reaction(s): Other (See Comments)  Hallucination      Tape [Adhesive  "Tape]      Allergy Hx  In addition to NO paper tape     Liquid Adhesive Rash     Other reaction(s): Other (See Comments)  Allergy Hx - Rash from paper tape       FAMILY HISTORY:  Family History   Problem Relation Age of Onset     Diabetes Mother         borderline     Pancreatic Cancer Father      Diabetes Maternal Grandmother      Diabetes Maternal Uncle      Thyroid Disease No family hx of      Melanoma No family hx of      Skin Cancer No family hx of      Lung Cancer Mother      SOCIAL HISTORY:   Social History     Socioeconomic History     Marital status:    Tobacco Use     Smoking status: Every Day     Packs/day: 0.50     Years: 49.00     Pack years: 24.50     Types: Cigarettes     Smokeless tobacco: Former     Tobacco comments:     has used E-cigarettes in the past, info given   Substance and Sexual Activity     Alcohol use: No     Drug use: No     Sexual activity: Never   Social History Narrative    , on disability.        Has family in the area.        Patient of Dr. Dunn since 2015.      PHYSICAL EXAM:    Vitals: /80   Pulse 78   Temp 98.4  F (36.9  C) (Oral)   Resp 20   Ht 1.753 m (5' 9\")   Wt 49 kg (108 lb)   SpO2 97%   BMI 15.95 kg/m     Physical Exam  Vitals and nursing note reviewed.   Constitutional:       General: She is not in acute distress.     Appearance: She is well-developed and normal weight. She is not ill-appearing or toxic-appearing.   HENT:      Head: Normocephalic.      Mouth/Throat:      Pharynx: Oropharynx is clear.   Neck:      Vascular: No JVD.   Cardiovascular:      Rate and Rhythm: Normal rate and regular rhythm.      Heart sounds: Normal heart sounds.   Pulmonary:      Effort: Pulmonary effort is normal. No tachypnea or accessory muscle usage.      Breath sounds: No stridor. Examination of the right-lower field reveals decreased breath sounds and wheezing. Examination of the left-lower field reveals decreased breath sounds and wheezing. Decreased breath " sounds and wheezing present.   Chest:      Chest wall: No tenderness.   Abdominal:      Palpations: Abdomen is soft.      Tenderness: There is no abdominal tenderness.   Musculoskeletal:      Cervical back: Neck supple.      Right lower leg: No tenderness. No edema.      Left lower leg: No tenderness. No edema.   Skin:     General: Skin is warm and dry.      Coloration: Skin is not cyanotic or pale.   Neurological:      General: No focal deficit present.      Mental Status: She is alert.   Psychiatric:         Behavior: Behavior normal.        LAB:  All pertinent labs reviewed and interpreted.  Labs Ordered and Resulted from Time of ED Arrival to Time of ED Departure   BASIC METABOLIC PANEL - Abnormal       Result Value    Sodium 140      Potassium 3.3 (*)     Chloride 96 (*)     Carbon Dioxide (CO2) 34 (*)     Anion Gap 10      Urea Nitrogen 8.1      Creatinine 0.64      Calcium 9.1      Glucose 137 (*)     GFR Estimate >90     CBC WITH PLATELETS AND DIFFERENTIAL - Abnormal    WBC Count 13.7 (*)     RBC Count 4.13      Hemoglobin 12.4      Hematocrit 37.8      MCV 92      MCH 30.0      MCHC 32.8      RDW 12.9      Platelet Count 453 (*)    MAGNESIUM - Normal    Magnesium 2.0     NT PROBNP INPATIENT - Normal    N terminal Pro BNP Inpatient 264     INFLUENZA A/B & SARS-COV2 PCR MULTIPLEX - Normal    Influenza A PCR Negative      Influenza B PCR Negative      RSV PCR Negative      SARS CoV2 PCR Negative     TROPONIN T, HIGH SENSITIVITY - Normal    Troponin T, High Sensitivity 14       RADIOLOGY:  Chest XR,  PA & LAT   Final Result   IMPRESSION: Heart size and pulmonary vascularity within normal limits. No acute-appearing infiltrates or consolidation. Lungs appear hyperinflated suggesting underlying obstructive lung disease. Generalized osteopenia. Vertebroplasty changes in thoracic    spine. A few additional compression fractures elsewhere in the thoracic spine. Appearance is unchanged from previous. Benign-appearing  chondroid lesion proximal right humerus is unchanged. No definite acute findings.        EKG:   Performed at: 01:07  Impression: Normal sinus rhythm with incomplete right bundle branch block, no signs of acute ST elevation ischemia.  Rate: 61 BPM  Rhythm: Sinus  QRS Interval: 96 ms  QTc Interval: 424/426 ms  Comparison: Compared to prior EKG from October 16, 2019 new incomplete right bundle branch block, no other signs of ischemia.  I have independently reviewed and interpreted the EKG(s) documented above.         I, Talib De La Garza, am serving as a scribe to document services personally performed by Dr. Wally Nagel  based on my observation and the provider's statements to me. I, Wally Nagel MD attest that Talib De La Garza is acting in a scribe capacity, has observed my performance of the services and has documented them in accordance with my direction.      Wally Nagel M.D.  Emergency Medicine  [facility] Emergency Department     Wally Nagel MD  12/07/22 0352

## 2022-12-07 NOTE — ED TRIAGE NOTES
"Pt arrives to triage ambulatory endorsing for past week \"cant breathe, and coughing, and cant sleep for more than 2-3 hours\" hx of asthma, tried nebulizer at home, \"they say I have COPD\", smoker.\" stomach muscle  pain (9/10) and back pain associated w/ cough. Mucous endorsed. Dry cough. Pt coughing quite a bit in triage.     Aspirin at 2130  "

## 2022-12-15 DIAGNOSIS — G89.29 CHRONIC BILATERAL LOW BACK PAIN, UNSPECIFIED WHETHER SCIATICA PRESENT: ICD-10-CM

## 2022-12-15 DIAGNOSIS — M54.50 CHRONIC BILATERAL LOW BACK PAIN, UNSPECIFIED WHETHER SCIATICA PRESENT: ICD-10-CM

## 2022-12-15 RX ORDER — OXYCODONE HYDROCHLORIDE 10 MG/1
10 TABLET ORAL 4 TIMES DAILY
Qty: 120 TABLET | Refills: 0 | Status: SHIPPED | OUTPATIENT
Start: 2022-12-21 | End: 2023-01-16

## 2022-12-15 NOTE — TELEPHONE ENCOUNTER
PDMP Review       Value Time User    State PDMP site checked  Yes 11/15/2022 10:00 PM Jorge Dunn MD

## 2023-01-05 ENCOUNTER — OFFICE VISIT (OUTPATIENT)
Dept: INTERNAL MEDICINE | Facility: CLINIC | Age: 68
End: 2023-01-05
Payer: COMMERCIAL

## 2023-01-05 VITALS
RESPIRATION RATE: 20 BRPM | BODY MASS INDEX: 16.14 KG/M2 | TEMPERATURE: 98.4 F | SYSTOLIC BLOOD PRESSURE: 120 MMHG | HEIGHT: 69 IN | OXYGEN SATURATION: 94 % | HEART RATE: 74 BPM | DIASTOLIC BLOOD PRESSURE: 78 MMHG | WEIGHT: 109 LBS

## 2023-01-05 DIAGNOSIS — M25.841 CYST OF JOINT OF HAND, RIGHT: ICD-10-CM

## 2023-01-05 DIAGNOSIS — M81.6 LOCALIZED OSTEOPOROSIS WITHOUT CURRENT PATHOLOGICAL FRACTURE: ICD-10-CM

## 2023-01-05 DIAGNOSIS — D69.2 SENILE PURPURA (H): ICD-10-CM

## 2023-01-05 DIAGNOSIS — H93.13 TINNITUS, BILATERAL: ICD-10-CM

## 2023-01-05 DIAGNOSIS — Z59.9 FINANCIAL DIFFICULTIES: ICD-10-CM

## 2023-01-05 DIAGNOSIS — F11.90 CHRONIC, CONTINUOUS USE OF OPIOIDS: Chronic | ICD-10-CM

## 2023-01-05 DIAGNOSIS — Z00.00 MEDICARE ANNUAL WELLNESS VISIT, SUBSEQUENT: Primary | ICD-10-CM

## 2023-01-05 DIAGNOSIS — Z79.899 CONTROLLED SUBSTANCE AGREEMENT SIGNED: Chronic | ICD-10-CM

## 2023-01-05 DIAGNOSIS — Z94.81 S/P ALLOGENEIC BONE MARROW TRANSPLANT (H): ICD-10-CM

## 2023-01-05 DIAGNOSIS — G89.29 CHRONIC MIDLINE THORACIC BACK PAIN: ICD-10-CM

## 2023-01-05 DIAGNOSIS — M54.6 CHRONIC MIDLINE THORACIC BACK PAIN: ICD-10-CM

## 2023-01-05 DIAGNOSIS — D89.813 GVHD (GRAFT VERSUS HOST DISEASE) (H): ICD-10-CM

## 2023-01-05 DIAGNOSIS — K86.81 EXOCRINE PANCREATIC INSUFFICIENCY: ICD-10-CM

## 2023-01-05 DIAGNOSIS — L71.9 ACNE ROSACEA: ICD-10-CM

## 2023-01-05 DIAGNOSIS — R42 LOSS OF EQUILIBRIUM: ICD-10-CM

## 2023-01-05 DIAGNOSIS — Z51.81 ENCOUNTER FOR THERAPEUTIC DRUG LEVEL MONITORING: ICD-10-CM

## 2023-01-05 DIAGNOSIS — C92.01 ACUTE MYELOID LEUKEMIA IN REMISSION (H): ICD-10-CM

## 2023-01-05 DIAGNOSIS — J44.9 CHRONIC OBSTRUCTIVE PULMONARY DISEASE, UNSPECIFIED COPD TYPE (H): ICD-10-CM

## 2023-01-05 LAB
CANNABINOIDS UR QL SCN: NORMAL
CREAT UR-MCNC: 59 MG/DL

## 2023-01-05 PROCEDURE — 0134A COVID-19 VACCINE BIVALENT BOOSTER 18+ (MODERNA): CPT | Performed by: INTERNAL MEDICINE

## 2023-01-05 PROCEDURE — 80307 DRUG TEST PRSMV CHEM ANLYZR: CPT | Performed by: INTERNAL MEDICINE

## 2023-01-05 PROCEDURE — G0439 PPPS, SUBSEQ VISIT: HCPCS | Performed by: INTERNAL MEDICINE

## 2023-01-05 PROCEDURE — 91313 COVID-19 VACCINE BIVALENT BOOSTER 18+ (MODERNA): CPT | Performed by: INTERNAL MEDICINE

## 2023-01-05 PROCEDURE — 99214 OFFICE O/P EST MOD 30 MIN: CPT | Mod: 25 | Performed by: INTERNAL MEDICINE

## 2023-01-05 RX ORDER — DOXYCYCLINE 100 MG/1
100 CAPSULE ORAL DAILY
Qty: 30 CAPSULE | Refills: 4 | Status: SHIPPED | OUTPATIENT
Start: 2023-01-05 | End: 2023-02-04

## 2023-01-05 RX ORDER — ALENDRONATE SODIUM 70 MG/1
70 TABLET ORAL WEEKLY
Qty: 13 TABLET | Refills: 3 | Status: SHIPPED | OUTPATIENT
Start: 2023-01-05 | End: 2023-01-06

## 2023-01-05 SDOH — ECONOMIC STABILITY - INCOME SECURITY: PROBLEM RELATED TO HOUSING AND ECONOMIC CIRCUMSTANCES, UNSPECIFIED: Z59.9

## 2023-01-05 ASSESSMENT — ANXIETY QUESTIONNAIRES
2. NOT BEING ABLE TO STOP OR CONTROL WORRYING: NEARLY EVERY DAY
GAD7 TOTAL SCORE: 0
7. FEELING AFRAID AS IF SOMETHING AWFUL MIGHT HAPPEN: NOT AT ALL
3. WORRYING TOO MUCH ABOUT DIFFERENT THINGS: NEARLY EVERY DAY
6. BECOMING EASILY ANNOYED OR IRRITABLE: NOT AT ALL
IF YOU CHECKED OFF ANY PROBLEMS ON THIS QUESTIONNAIRE, HOW DIFFICULT HAVE THESE PROBLEMS MADE IT FOR YOU TO DO YOUR WORK, TAKE CARE OF THINGS AT HOME, OR GET ALONG WITH OTHER PEOPLE: NOT DIFFICULT AT ALL
7. FEELING AFRAID AS IF SOMETHING AWFUL MIGHT HAPPEN: NOT AT ALL
1. FEELING NERVOUS, ANXIOUS, OR ON EDGE: NOT AT ALL
GAD7 TOTAL SCORE: 13
4. TROUBLE RELAXING: NOT AT ALL
2. NOT BEING ABLE TO STOP OR CONTROL WORRYING: NOT AT ALL
4. TROUBLE RELAXING: NEARLY EVERY DAY
5. BEING SO RESTLESS THAT IT IS HARD TO SIT STILL: NOT AT ALL
3. WORRYING TOO MUCH ABOUT DIFFERENT THINGS: NOT AT ALL
6. BECOMING EASILY ANNOYED OR IRRITABLE: SEVERAL DAYS
GAD7 TOTAL SCORE: 0
GAD7 TOTAL SCORE: 0
8. IF YOU CHECKED OFF ANY PROBLEMS, HOW DIFFICULT HAVE THESE MADE IT FOR YOU TO DO YOUR WORK, TAKE CARE OF THINGS AT HOME, OR GET ALONG WITH OTHER PEOPLE?: NOT DIFFICULT AT ALL
5. BEING SO RESTLESS THAT IT IS HARD TO SIT STILL: NEARLY EVERY DAY
7. FEELING AFRAID AS IF SOMETHING AWFUL MIGHT HAPPEN: NOT AT ALL
IF YOU CHECKED OFF ANY PROBLEMS ON THIS QUESTIONNAIRE, HOW DIFFICULT HAVE THESE PROBLEMS MADE IT FOR YOU TO DO YOUR WORK, TAKE CARE OF THINGS AT HOME, OR GET ALONG WITH OTHER PEOPLE: SOMEWHAT DIFFICULT
1. FEELING NERVOUS, ANXIOUS, OR ON EDGE: NOT AT ALL

## 2023-01-05 ASSESSMENT — PATIENT HEALTH QUESTIONNAIRE - PHQ9
10. IF YOU CHECKED OFF ANY PROBLEMS, HOW DIFFICULT HAVE THESE PROBLEMS MADE IT FOR YOU TO DO YOUR WORK, TAKE CARE OF THINGS AT HOME, OR GET ALONG WITH OTHER PEOPLE: NOT DIFFICULT AT ALL
SUM OF ALL RESPONSES TO PHQ QUESTIONS 1-9: 0
SUM OF ALL RESPONSES TO PHQ QUESTIONS 1-9: 0

## 2023-01-05 ASSESSMENT — PAIN SCALES - GENERAL: PAINLEVEL: EXTREME PAIN (8)

## 2023-01-05 NOTE — PROGRESS NOTES
Patient has been advised of split billing requirements and indicates understanding: Yes  Are you in the first 12 months of your Medicare coverage?  No    History of Present Illness       Reason for visit:  Wellness    She eats 2-3 servings of fruits and vegetables daily.She consumes 0 sweetened beverage(s) daily.She exercises with enough effort to increase her heart rate 10 to 19 minutes per day.  She exercises with enough effort to increase her heart rate 3 or less days per week.   She is taking medications regularly.    Today's PHQ-9         PHQ-9 Total Score: 0    PHQ-9 Q9 Thoughts of better off dead/self-harm past 2 weeks :   Not at all    How difficult have these problems made it for you to do your work, take care of things at home, or get along with other people: Not difficult at all  Today's DANY-7 Score: 0      Have you ever done Advance Care Planning? (For example, a Health Directive, POLST, or a discussion with a medical provider or your loved ones about your wishes): No, advance care planning information given to patient to review.  Advanced care planning was discussed at today's visit.    Fall risk  Fallen 2 or more times in the past year?: No  Any fall with injury in the past year?: No    Cognitive Screening   1) Repeat 3 items (Leader, Season, Table)    2) Clock draw: NORMAL  3) 3 item recall: Recalls 3 objects  Results: 3 items recalled: COGNITIVE IMPAIRMENT LESS LIKELY    Mini-CogTM Copyright S [employer]. Licensed by the author for use in [facility]; reprinted with permission (prema@.[facility]). All rights reserved.      Do you have sleep apnea, excessive snoring or daytime drowsiness?: no

## 2023-01-05 NOTE — PROGRESS NOTES
[facility]    Comprehensive and complex medical care - Chronic disease management - Shared decision making - Care coordination - Compassionate care    Patient advocacy - Rational deprescribing - Minimally disruptive medicine - Ethical focus - Customized care         Date of Service: 1/5/2023  Primary Provider: Jorge Dunn    Patient Care Team:  Jorge Dunn MD as PCP - General (Internal Medicine)  Mohit Montano MD as MD (Hematology & Oncology)  Jorge Dunn MD as Assigned PCP  Joellen Nova MD as Resident (Dermatology)  Madison Alexander LICSW as  (BMT - Adult)  Ryan Alonso MD as MD (Endocrinology, Diabetes, and Metabolism)  Manju Asher RN as BMT Nurse Coordinator  Umesh Brandt MD as BMT Physician (Transplant)          Patient's Pharmacy:    [facility] [address] [place], [place] - [address]  [address]  [facility] [address]  Phone: [phone] Fax: [phone]     Patient's Contacts:  Name Home Phone Work Phone Mobile Phone Relationship Lgl Grd   JEAN JUARES [phone] Grandchild    FRANCISCO JAVIER KESSLER   [phone] Significant*      Patient's Insurance:    Payor: [employer] / Plan: UNITED [employer] MEDICARE ADVANTAGE / Product Type: HMO /      Subjective:     History of present illness:    Jenn Barclay is an 67 year old here for an annual wellness visit.    The issues she would like to address at today's visit include the following:    Chief Complaint   Patient presents with     Wellness Visit     Has a lump in her R palm of her hand has been hurting. Ringing in ears has gotten worse. Can she have a script for boost or ensure, she has been buying it and it is very expensive.        Patient comes in today with her granddaughter.    She is in for annual wellness visit and for other issues.    We reviewed her acne rosacea.  This has recently  worsened.  This is been in the last 2 to 3 weeks.  She has pustules especially over her left face.  She has some thickening of the skin as well.  It has not been this bad for quite a while.    Reviewed her COPD.  She continues on the inhalers she has.  She is not on a steroid inhaler due to cost.  We talked about this again with her granddaughter.  They might look into resources for help.    We did a form for [employer] today.  This was done at the visit and a copy from the [facility] given to the patient today.    The patient herself has had issues with equilibrium more so as of late.  She has had a fall in the interim.  No other injury with this.    Reviewed her osteoporosis.  She is on Fosamax for 5 years but there was a lapse in this therapy so we will continue 1 more year.  Consider DEXA next year.    She continues have tinnitus on both ears.  We reviewed options related to this.    She has a painful cyst on the proximal palmar surface of her right hands.  Its more on the ulnar surface as well.  This has been more painful over the last few weeks.  No known injury.    She declines mammogram due to her habitus and painful mammograms with minimal breast tissue.  We reminded her that she is due for a tetanus update.    In relationship to her chronic pain we renewed her pain agreement and urine drug screen today.  She has been appropriate in her use of pain medication.    We reviewed her other issues noted in the assessment but not specifically addressed in the HPI above.           Active Problem List:  Problem List as of 1/5/2023 Reviewed: 12/7/2022 12:55 AM by Marco Antonio De La Garza    Full code status    Tobacco abuse    COPD (chronic obstructive pulmonary disease) (H)    Acute myeloid leukemia (AML), M2 (H)    Status post allogeneic bone marrow transplant (H) - 2010 - [facility]       Medium    Chronic back pain    Financial difficulties    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)    Pseudogout    Traumatic  compression fracture of T8 thoracic vertebra, closed, initial encounter (H)    Chronic diarrhea    Controlled substance agreement signed - 1/22 - oxycodone - UDS 1/22    Chronic, continuous use of opioids       Low    Nephrolithiasis    Anxiety    Gastric ulcer    Migraine headache    Papular rash, generalized    Senile purpura (H)    OP (osteoporosis)    Tubular adenoma of colon - advanced adenoma in 2017 - single small adenoma in 2020       Other    Exocrine pancreatic insufficiency        Past Medical History:   Diagnosis Date     Acute myeloid leukemia (AML), M2 (H)      AML (acute myeloid leukemia) (H) 12/2009     Baker's cyst      Chronic back pain      Chronic diarrhea      Compression fx, lumbar spine (H)      Controlled substance agreement signed 8/3/2017     COPD (chronic obstructive pulmonary disease) (H)      COPD (chronic obstructive pulmonary disease) (H)      Full code status 9/10/2017     Gastric ulcer     pt states she has not had any ulcers     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)      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     History of PID      Metatarsal fracture 2017 2nd      Migraine without aura, without mention of intractable migraine without mention of status migrainosus      Osteoporosis 2020    DXA     Osteoporosis 5/3/2016     Papular rash, generalized      Post-menopausal 2/8/2020    Early surgical menopause     Pseudogout 11/30/2016     Serrated adenoma of colon 7/17/2020     Status post allogeneic bone marrow transplant (H)      Surgical menopause 1976    oophorectomy     Tobacco abuse      Traumatic compression fracture of T8 thoracic vertebra, closed, initial encounter (H) 4/26/2016     Tubular adenoma of colon 7/17/2020     Vertebral compression fracture (H) 2014     Zoster 2018      Past Surgical History:   Procedure Laterality Date     APPENDECTOMY       COLONOSCOPY N/A 07/28/2020    Procedure: COLONOSCOPY, WITH POLYPECTOMY AND BIOPSY;  Surgeon: Gianni Valerio,  MD;  Location: [facility] OR     HYSTERECTOMY TOTAL ABDOMINAL, BILATERAL SALPINGO-OOPHORECTOMY, COMBINED  1976     IR MISCELLANEOUS PROCEDURE  02/22/2010     TRANSPLANT  01/01/2010     VERTEBROPLASTY  01/01/2016    T5 and T8 vertebrae.      Family History   Problem Relation Age of Onset     Diabetes Mother         borderline     Pancreatic Cancer Father      Diabetes Maternal Grandmother      Diabetes Maternal Uncle      Thyroid Disease No family hx of      Melanoma No family hx of      Skin Cancer No family hx of      Lung Cancer Mother       Family history is otherwise noncontributory.    Social History     Occupational History     Not on file   Tobacco Use     Smoking status: Every Day     Packs/day: 0.50     Years: 49.00     Pack years: 24.50     Types: Cigarettes     Smokeless tobacco: Former     Tobacco comments:     has used E-cigarettes in the past, info given   Vaping Use     Vaping Use: Never used   Substance and Sexual Activity     Alcohol use: No     Drug use: No     Sexual activity: Never      Social History     Social History Narrative    , on disability.        Has family in the area.        Patient of Dr. Dunn since 2015.       Current Outpatient Medications   Medication Instructions     albuterol (PROAIR HFA/PROVENTIL HFA/VENTOLIN HFA) 108 (90 Base) MCG/ACT inhaler 2 puffs, Inhalation, EVERY 4 HOURS PRN     albuterol (PROVENTIL) (2.5 MG/3ML) 0.083% neb solution USE 1 VIAL IN NEBULIZER EVERY 6 HOURS AS NEEDED FOR WHEEZING     Albuterol Sulfate (VENTOLIN HFA) 108 (90 BASE) MCG/ACT AERS Inhale  into the lungs.     alendronate (FOSAMAX) 70 mg, Oral, WEEKLY     amylase-lipase-protease (CREON) 16063-24136 units CPEP per EC capsule 2 capsules, Oral, 3 TIMES DAILY WITH MEALS, And snacks     clindamycin 1 % EX external lotion Topical, 2 TIMES DAILY     doxycycline hyclate (VIBRAMYCIN) 100 mg, Oral, DAILY, May use monohydrate if cheaper.  May use tablet or capsule for cost.     gentamicin 0.1 % EX external  ointment Topical, 2 TIMES DAILY     ipratropium - albuterol 0.5 mg/2.5 mg/3 mL (DUONEB) 0.5-2.5 (3) MG/3ML neb solution 3 mLs, Nebulization, EVERY 6 HOURS PRN     Loperamide HCl (IMODIUM OR) Oral, 2 TIMES DAILY     Multiple Vitamins-Minerals (MULTIVITAL PO) Take  by mouth.     mupirocin (BACTROBAN) 2 % external ointment Use 2 times a day to affected area on cheek     nebulizer nebulization 1 nebulizer please as covered by insurance.  May also dispense supplies (tubing, inhalation device, etc) as needed.     Omega-3 Fatty Acids (FISH OIL PO) Oral     oxyCODONE IR (ROXICODONE) 10 mg, Oral, 4 TIMES DAILY, For use from 12/21/22 to 1/20/2023     triamcinolone (KENALOG) 0.1 % external cream Apply tid to itchy or tender skin lesions     umeclidinium (INCRUSE ELLIPTA) 62.5 MCG/INH inhaler 1 puff, Inhalation, DAILY     vitamin C (ASCORBIC ACID) 500 mg, DAILY      Allergies: Mirtazapine, Tape [adhesive tape], and Liquid adhesive     Immunization History   Administered Date(s) Administered     COVID-19 Vaccine 18+ (Moderna) 03/29/2021, 04/26/2021, 11/22/2021     COVID-19 Vaccine Bivalent Booster 18+ (Moderna) 01/05/2023     COVID-19,PF,Moderna Booster 07/05/2022     DT (PEDS <7y) 10/01/1994, 01/06/2005     FLUAD(HD)65+ QUAD 10/09/2022     HepB-Adult 06/20/2011, 08/23/2011, 06/15/2012     Hib (PRP-T) 08/23/2011     Influenza (IIV3) PF 10/14/2010     Influenza Vaccine 65+ (Fluzone HD) 09/20/2021     Influenza Vaccine >6 months (Alfuria,Fluzone) 10/23/2018, 10/12/2020     Pedvax-hib 06/20/2011, 06/15/2012     Pneumo Conj 13-V (2010&after) 06/20/2011, 08/23/2011     Pneumococcal (PCV 7) 06/20/2011     Pneumococcal 23 valent 12/18/2019     Poliovirus, inactivated (IPV) 06/20/2011, 08/23/2011, 06/15/2012     TDAP Vaccine (Boostrix) 06/20/2011, 08/23/2011, 06/15/2012     Td (Adult), Adsorbed 01/06/2005     Tdap (Adacel,Boostrix) 06/20/2011, 08/23/2011, 06/15/2012      Objective:     Wt Readings from Last 3 Encounters:   01/05/23 49.4  "kg (109 lb)   12/06/22 49 kg (108 lb)   07/26/22 47.6 kg (105 lb)     BP Readings from Last 3 Encounters:   01/05/23 120/78   12/07/22 128/80   07/26/22 134/76       PHYSICAL EXAM  /78 (BP Location: Right arm, Patient Position: Sitting, Cuff Size: Adult Small)   Pulse 74   Temp 98.4  F (36.9  C) (Oral)   Resp 20   Ht 1.753 m (5' 9\")   Wt 49.4 kg (109 lb)   SpO2 94%   Breastfeeding No   BMI 16.10 kg/m     The patient is comfortable, no acute distress.  She is thin as evidenced by her BMI.  Mood good.  Insight is good.  She has fairly significant acne rosacea on both cheeks but more predominant on the left with some pustules present.  Ears clear.  Eyes are nonicteric.  Pupils equal and reactive.  Throat is clear.  Neck is supple without mass, no thyromegaly. No cervical or epitrochlear adenopathy.  No axillary lymph nodes. Heart regular rate and rhythm.  Lungs clear to auscultation bilaterally.  Respiratory effort good.  Abdomen soft and nontender.  No hepatosplenomegaly.  Extremities show no edema.  Gait is okay today.  There is a tender palmar cyst in the proximal palm which may be related to the tendon or possibly a recently eruptive mucous or ganglion cyst.    Diagnostics:     Admission on 12/06/2022, Discharged on 12/07/2022   Component Date Value Ref Range Status     Hold Specimen 12/06/2022 [facility]   Final     Hold Specimen 12/06/2022 [facility]   Final     Hold Specimen 12/06/2022 [facility]   Final     Sodium 12/06/2022 140  136 - 145 mmol/L Final     Potassium 12/06/2022 3.3 (L)  3.4 - 5.3 mmol/L Final     Chloride 12/06/2022 96 (L)  98 - 107 mmol/L Final     Carbon Dioxide (CO2) 12/06/2022 34 (H)  22 - 29 mmol/L Final     Anion Gap 12/06/2022 10  7 - 15 mmol/L Final     Urea Nitrogen 12/06/2022 8.1  8.0 - 23.0 mg/dL Final     Creatinine 12/06/2022 0.64  0.51 - 0.95 mg/dL Final     Calcium 12/06/2022 9.1  8.8 - 10.2 mg/dL Final     Glucose 12/06/2022 137 (H)  70 - 99 mg/dL Final     GFR Estimate 12/06/2022 >90  " >60 mL/min/1.73m2 Final    Effective December 21, 2021 eGFRcr in adults is calculated using the 2021 CKD-EPI creatinine equation which includes age and gender (Asia et al., NE, DOI: 10.1056/UDOXzf5203199)     Magnesium 12/06/2022 2.0  1.7 - 2.3 mg/dL Final     N terminal Pro BNP Inpatient 12/06/2022 264  0 - 900 pg/mL Final    Reference range shown and results flagged as abnormal are suggested inpatient cut points for confirming diagnosis if CHF in an acute setting. Establishing a baseline value for each individual patient is useful for follow-up. An inpatient or emergency department NT-proPBNP <300 pg/mL effectively rules out acute CHF, with 99% negative predictive value.    The outpatient non-acute reference range for ruling out CHF is:  0-125 pg/mL (age 18 to less than 75)  0-450 pg/mL (age 75 yrs and older)      Influenza A PCR 12/06/2022 Negative  Negative Final     Influenza B PCR 12/06/2022 Negative  Negative Final     RSV PCR 12/06/2022 Negative  Negative Final     SARS CoV2 PCR 12/06/2022 Negative  Negative Final    NEGATIVE: SARS-CoV-2 (COVID-19) RNA not detected, presumed negative.     WBC Count 12/06/2022 13.7 (H)  4.0 - 11.0 10e3/uL Final     RBC Count 12/06/2022 4.13  3.80 - 5.20 10e6/uL Final     Hemoglobin 12/06/2022 12.4  11.7 - 15.7 g/dL Final     Hematocrit 12/06/2022 37.8  35.0 - 47.0 % Final     MCV 12/06/2022 92  78 - 100 fL Final     MCH 12/06/2022 30.0  26.5 - 33.0 pg Final     MCHC 12/06/2022 32.8  31.5 - 36.5 g/dL Final     RDW 12/06/2022 12.9  10.0 - 15.0 % Final     Platelet Count 12/06/2022 453 (H)  150 - 450 10e3/uL Final     NRBCs per 100 WBC 12/06/2022 0  <1 /100 Final     Absolute NRBCs 12/06/2022 0.0  10e3/uL Final     Troponin T, High Sensitivity 12/06/2022 14  <=14 ng/L Final    Either a High Sensitivity Troponin T baseline (0 hours) value = 100 ng/mL, or an increase in High Sensitivity Troponin T = 7 ng/mL at 2 hours compared to 0 hours (2-0 hours), suggests myocardial injury,  and urgent clinical attention is required.    If the 2-0 hours increase is<7 ng/mL, a High Sensitivity Troponin T result above gender-specific reference ranges warrants further evaluation.   Recommendations for further evaluation include correlation with clinical decision-making tool (e.g., HEART), a 3rd High Sensitivity Troponin T test 2 hours after the 2nd (a 20% change from baseline would represent concern), admission for observation, close PCC/cardiology follow-up, or urgent outpatient provocative testing.     % Neutrophils 12/06/2022 54  % Final     % Lymphocytes 12/06/2022 41  % Final     % Monocytes 12/06/2022 3  % Final     % Eosinophils 12/06/2022 2  % Final     % Basophils 12/06/2022 0  % Final     Absolute Neutrophils 12/06/2022 7.4  1.6 - 8.3 10e3/uL Final     Absolute Lymphocytes 12/06/2022 5.6 (H)  0.8 - 5.3 10e3/uL Final     Absolute Monocytes 12/06/2022 0.4  0.0 - 1.3 10e3/uL Final     Absolute Eosinophils 12/06/2022 0.3  0.0 - 0.7 10e3/uL Final     Absolute Basophils 12/06/2022 0.0  0.0 - 0.2 10e3/uL Final     RBC Morphology 12/06/2022 Confirmed RBC Indices   Final     Platelet Assessment 12/06/2022 Automated Count Confirmed. Platelet morphology is normal.  Automated Count Confirmed. Platelet morphology is normal. Final     Elliptocytes 12/06/2022 Slight (A)  None Seen Final       Results for orders placed or performed in visit on 01/05/23   Urine Creatinine for Drug Screen Panel     Status: None   Result Value Ref Range    Creatinine Urine for Drug Screen 59 mg/dL   Cannabinoids qualitative urine     Status: Normal   Result Value Ref Range    Cannabinoids Urine Screen Negative Screen Negative   [id number] - Urine Drug Confirmation Panel (Comprehensive)     Status: None (In process)    Narrative    The following orders were created for panel order [id number] - Urine Drug Confirmation Panel (Comprehensive).  Procedure                               Abnormality         Status                     ---------                                -----------         ------                     Urine Drug Confirmation ...[[id number]]                      In process                 Urine Creatinine for Armin...[[id number]]                      Final result               Cannabinoids qualitative...[[id number]]  Normal              Final result                 Please view results for these tests on the individual orders.       Assessment:     1. Medicare annual wellness visit, subsequent    2. S/P allogeneic bone marrow transplant (H)     - - Continue to monitor - -no current issues here related to this that are new.   3. Encounter for therapeutic drug level monitoring    4. Acne rosacea    5. Acute myeloid leukemia in remission (H)     - - Continue to monitor - -no signs of recurrence.  Doing well.   6. 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   7. Chronic obstructive pulmonary disease, unspecified COPD type (H)     - - Continue to monitor - -recent exacerbation.  Consider additional therapy for her.  Could see pulmonary in the future if needed.   8. Senile purpura (H)     - - Continue to monitor - -no signs of worsening.    9. Controlled substance agreement signed - 1/23 - oxycodone - UDS 1/23    10. Exocrine pancreatic insufficiency    11. Financial difficulties    12. Chronic midline thoracic back pain    13. Chronic, continuous use of opioids    14. Localized osteoporosis without current pathological fracture    15. Loss of equilibrium    16. Tinnitus, bilateral    17. Cyst of joint of hand, right         Plan:     1. Controlled substance agreement renewed today.  2. Urine drug screen done today.  3. Form for [employer] done today.  4. Consider medication like Advair or Symbicort in the future for her breathing.  Could consider Trelegy as well and get rid of the Incruse.  Cost has been an issue.  5. For her low body weight I did give her a prescription for  Ensure or boost.  She has to check with her insurance on this and may reach out to the county related to this.  6. COVID booster done today.  7. Stay on Fosamax and recheck bone density 1 year.  8. Consider tetanus booster.  9. Could see hand specialist for cyst in the future if needed.  10. Continue current medications  11. Follow up sooner if issues.    Orders Placed This Encounter   Procedures     COVID-19 VACCINE BIVALENT BOOSTER 18+ (MODERNA)     Urine Drug Confirmation Panel     Urine Creatinine for Drug Screen Panel     Cannabinoids qualitative urine     [id number] - Urine Drug Confirmation Panel (Comprehensive)        A personalized health plan based on the identified health risks was provided to the patient on the AVS.       Jorge Dunn MD  General Internal Medicine  [facility]      Return in about 6 months (around 7/5/2023), or if symptoms worsen or fail to improve, for visit and blood work.     Future Appointments   Date Time Provider Department Center   1/13/2023 11:00 AM Ryan Alonso MD [facility]         Health Maintenance   Topic Date Due     ANNUAL REVIEW OF  ORDERS  Never done     MAMMO SCREENING  10/28/2013     DTAP/TDAP/TD IMMUNIZATION (7 - Td or Tdap) 06/15/2022     URINE DRUG SCREEN  01/03/2023     TREATMENT AGREEMENT FOR CHRONIC PAIN MANAGEMENT  01/03/2023     ZOSTER IMMUNIZATION (2 of 2) 07/05/2025 (Originally 5/8/2017)     LUNG CANCER SCREENING  07/22/2023     NICOTINE/TOBACCO CESSATION COUNSELING Q 1 YR  01/05/2024     MEDICARE ANNUAL WELLNESS VISIT  01/05/2024     DANY ASSESSMENT  01/05/2024     FALL RISK ASSESSMENT  01/05/2024     PHQ-9  01/05/2024     DEXA  01/10/2025     LIPID  07/05/2027     ADVANCE CARE PLANNING  01/05/2028     COLORECTAL CANCER SCREENING  07/28/2030     SPIROMETRY  Completed     HEPATITIS C SCREENING  Completed     PHQ-2 (once per calendar year)  Completed     INFLUENZA VACCINE  Completed     Pneumococcal Vaccine: 65+  Years  Completed     COVID-19 Vaccine  Completed     COPD ACTION PLAN  Addressed     IPV IMMUNIZATION  Aged Out     MENINGITIS IMMUNIZATION  Aged Out      ______________________________________________________________________     PHQ-9 score:    PHQ 1/5/2023   PHQ-9 Total Score 0   Q9: Thoughts of better off dead/self-harm past 2 weeks Not at all     DANY-7 SCORE 1/5/2023   Total Score 0 (minimal anxiety)   Total Score 13     ______________________________________________________________________

## 2023-01-05 NOTE — PATIENT INSTRUCTIONS
Patient Education   Personalized Prevention Plan  You are due for the preventive services outlined below.  Your care team is available to assist you in scheduling these services.  If you have already completed any of these items, please share that information with your care team to update in your medical record.  Health Maintenance Due   Topic Date Due    ANNUAL REVIEW OF HM ORDERS  Never done    Mammogram  10/28/2013    Annual Wellness Visit  Never done    Diptheria Tetanus Pertussis (DTAP/TDAP/TD) Vaccine (7 - Td or Tdap) 06/15/2022    COVID-19 Vaccine (5 - Booster for Moderna series) 08/30/2022    URINE DRUG SCREEN  01/03/2023    TREATMENT AGREEMENT FOR CHRONIC PAIN MANAGEMENT  01/03/2023     ______________________________________________________________________     Future Appointments   Date Time Provider Department Center   1/13/2023 11:00 AM Ryan Alonso MD [facility]

## 2023-01-06 PROBLEM — F11.90 CHRONIC, CONTINUOUS USE OF OPIOIDS: Chronic | Status: ACTIVE | Noted: 2022-07-05

## 2023-01-06 RX ORDER — ALENDRONATE SODIUM 70 MG/1
70 TABLET ORAL WEEKLY
Qty: 13 TABLET | Refills: 3
Start: 2023-01-06 | End: 2023-08-11

## 2023-01-09 ENCOUNTER — TELEPHONE (OUTPATIENT)
Dept: INTERNAL MEDICINE | Facility: CLINIC | Age: 68
End: 2023-01-09

## 2023-01-09 LAB
OXYCODONE UR CFM-MCNC: 840 NG/ML
OXYCODONE/CREAT UR: 1424 NG/MG {CREAT}
OXYMORPHONE UR CFM-MCNC: 1200 NG/ML
OXYMORPHONE/CREAT UR: 2034 NG/MG {CREAT}

## 2023-01-09 NOTE — TELEPHONE ENCOUNTER
General Call    Contacts       Type Contact Phone/Fax    01/09/2023 02:46 PM CST Phone (Incoming) Jenn Barclay (Self) [phone] (M)        Reason for Call:  Vaccine Update    What are your questions or concerns:  Received Tetanus vaccine at [facility] on 12/07/2022

## 2023-01-13 ENCOUNTER — OFFICE VISIT (OUTPATIENT)
Dept: ENDOCRINOLOGY | Facility: CLINIC | Age: 68
End: 2023-01-13
Payer: COMMERCIAL

## 2023-01-13 VITALS
WEIGHT: 108.8 LBS | HEART RATE: 64 BPM | SYSTOLIC BLOOD PRESSURE: 124 MMHG | HEIGHT: 68 IN | OXYGEN SATURATION: 96 % | DIASTOLIC BLOOD PRESSURE: 72 MMHG | BODY MASS INDEX: 16.49 KG/M2

## 2023-01-13 DIAGNOSIS — S22.070S CLOSED WEDGE COMPRESSION FRACTURE OF T10 VERTEBRA, SEQUELA: ICD-10-CM

## 2023-01-13 DIAGNOSIS — S32.050S CLOSED COMPRESSION FRACTURE OF L5 VERTEBRA, SEQUELA: ICD-10-CM

## 2023-01-13 DIAGNOSIS — S22.060S CLOSED WEDGE COMPRESSION FRACTURE OF T8 VERTEBRA, SEQUELA: ICD-10-CM

## 2023-01-13 DIAGNOSIS — K52.9 CHRONIC DIARRHEA: ICD-10-CM

## 2023-01-13 DIAGNOSIS — Z72.0 TOBACCO USE: ICD-10-CM

## 2023-01-13 DIAGNOSIS — C92.00: ICD-10-CM

## 2023-01-13 DIAGNOSIS — E28.319 PREMATURE MENOPAUSE: ICD-10-CM

## 2023-01-13 DIAGNOSIS — E55.9 VITAMIN D DEFICIENCY: ICD-10-CM

## 2023-01-13 DIAGNOSIS — K86.81 EXOCRINE PANCREATIC INSUFFICIENCY: ICD-10-CM

## 2023-01-13 DIAGNOSIS — S22.050S CLOSED WEDGE COMPRESSION FRACTURE OF T6 VERTEBRA, SEQUELA: ICD-10-CM

## 2023-01-13 DIAGNOSIS — S22.050S CLOSED WEDGE COMPRESSION FRACTURE OF T5 VERTEBRA, SEQUELA: ICD-10-CM

## 2023-01-13 DIAGNOSIS — Z79.83 HISTORY OF ONGOING TREATMENT WITH ALENDRONATE: ICD-10-CM

## 2023-01-13 DIAGNOSIS — S72.009S CLOSED FRACTURE OF HIP, UNSPECIFIED LATERALITY, SEQUELA: ICD-10-CM

## 2023-01-13 DIAGNOSIS — N20.0 NEPHROLITHIASIS: ICD-10-CM

## 2023-01-13 DIAGNOSIS — S22.060S CLOSED WEDGE COMPRESSION FRACTURE OF T7 VERTEBRA, SEQUELA: ICD-10-CM

## 2023-01-13 DIAGNOSIS — Z94.81 STATUS POST ALLOGENEIC BONE MARROW TRANSPLANT (H): ICD-10-CM

## 2023-01-13 DIAGNOSIS — R73.9 HYPERGLYCEMIA: ICD-10-CM

## 2023-01-13 DIAGNOSIS — M81.0 AGE-RELATED OSTEOPOROSIS WITHOUT CURRENT PATHOLOGICAL FRACTURE: Primary | ICD-10-CM

## 2023-01-13 DIAGNOSIS — S32.040S CLOSED COMPRESSION FRACTURE OF L4 LUMBAR VERTEBRA, SEQUELA: ICD-10-CM

## 2023-01-13 PROCEDURE — 99215 OFFICE O/P EST HI 40 MIN: CPT | Performed by: INTERNAL MEDICINE

## 2023-01-13 NOTE — PROGRESS NOTES
"Subjective:    Established patient, saw Dr. Ross once 2/8/2020 for abnormal TFTs (subclinical hypothyroidism with undetectable Tg Ab, TPO Ab normal, with subsequently normal TFTs)    Jenn Barclay is a 67 year old female who presents for osteoporosis.    We reviewed the osteoporosis dictation template.    Most recent DEXA 1/1/2020 and image not available but per report:  1. The spine bone density L1-L4 with T-score -3.0.  2. Femoral bone density shows left total hip T- score -3.6.  3. Trabecular bone score indicates poor trabecular bone architecture.    CXR 12/6/2022:   -Per radiology: vertebroplasty changes in thoracic spine. A few additional compression fractures elsewhere in the thoracic spine. Appearance is unchanged from previous - 8/15/2020. I reviewed the images and agree with the read.    Thoracic spine XR 4/1/2022:  -Per radiology: prior vertebroplasty of T5 and T8 vertebrae. There are chronic compression fractures of T6 and T7 with 40-50% loss of height unchanged from prior CT. There is 50% loss of height of T10 also unchanged from before. There is no significant change from prior CT chest of 01/04/2020.     Lumbar spine XR 3/24/2022:   -Per radiology: Unchanged L4 and L5 chronic compression deformities (comparison CT A/P 10/16/2019). Images not available.     T5 and T8 vertebroplasty procedures done in 2016    Regarding a cause of her spinal compression fractures, there was no known trauma.     She fractured her right hip when she had a fall and \"bounced\" a few times after her foot got stuck in a pothole, this happened around 2005.    She has trouble with balance. She defers on a PT referral at this time.     Nephrolithiasis (CT chest 7/2022 - 3 x 7 mm nonobstructing calcification upper pole right kidney, present in 2019 however larger). She denies ever passing a stone.     No recent significant GC exposure, and she doesn't recall the details but given her AML and transplant history and COPD I " "assume she has had significant GC exposure over the years.     BMI 16.5 kg/m2.    Active tobacco use.  She declines a referral to quit partner today.    Surgical menopause in her 20s for a benign indication and she recalls being on estrogen therapy afterwards.    -No prior hypercalcemia, PTH checked once in 2020 and normal     -History of vitamin D deficiency     Alendronate 70 mg once weekly: first prescribed 9/8/2017 and taken weekly until ~2 years ago (per her recall, I could not verify the timeline via chart review), and she was off of it for about 1 year before it was restarted and she notes taking it \"religiously\". She takes Alendronate after eating breakfast. She does stay upright 1 hour after taking it. Alendronate has been well tolerated.    No other medications besides Alendronate to reduce fracture risk.     She takes a MVI. She does not take vitamin D or calcium. She drinks several glasses of milk each day.       She hasexocrine pancreatic insufficiency (pancreatic atrophy on imaging) on Creon, elevated random glucose values but not >200 mg/dL (no prior diagnosis of DM, she has never been on a medication to lower glucose), gastric ulcer, chronic diarrhea, AML s/p BMT in 2010.    No GERD. She had all teeth extracted in 2010. She has not had any dental issues since. She did receive radiation prior to transplant. No prior ASCVD event.     Objective:    BMI 16.5 kg/m2, /72.    Sclera white. Edentulous. Kyphotic. Radial pulse with a regular rate and rhythm.     Assessment/Plan:    # Severe osteoporosis with many prior fragility fractures    Nandini has severe osteoporosis.  We will start by updating a DEXA and I ordered standard bone testing including a 24-hour urine supersaturation profile.  We will also screen for diabetes mellitus.  For now she can remain on Fosamax.  We did review the way to take this to maximize absorption and minimize the risk of esophagitis.  Note that she has not been taking " Fosamax to maximize absorption.  We will check bone turnover markers to look at efficacy.    Return to see me after testing to decide on a management plan.  Given the severity of her osteoporosis she would benefit from anabolic therapy.  Given her history of radiation I would favor use of Evenity.    We also reviewed optimization of calcium and vitamin D.  She will start vitamin D3 2000 units daily.  I gave her a printed brochures on dietary sources of calcium and she will aim for 1200 milligrams daily.    # Exocrine pancreatic insufficiency  # Malnutrition, BMI 16.5 kg/meters squared    She notes that despite significant caloric intake she has not been able to gain weight.  I did refer her to gastroenterology.    60 minutes spent on the date of the encounter doing chart review, history and exam, documentation and further activities as noted above.

## 2023-01-13 NOTE — LETTER
"    1/13/2023         RE: Jenn Barclay  [address] [place] [address]        Dear Colleague,    Thank you for referring your patient, Jenn Barclay, to the [facility]. Please see a copy of my visit note below.    Subjective:    Established patient, saw Dr. Ross once 2/8/2020 for abnormal TFTs (subclinical hypothyroidism with undetectable Tg Ab, TPO Ab normal, with subsequently normal TFTs)    Jenn Barclay is a 67 year old female who presents for osteoporosis.    We reviewed the osteoporosis dictation template.    Most recent DEXA 1/1/2020 and image not available but per report:  1. The spine bone density L1-L4 with T-score -3.0.  2. Femoral bone density shows left total hip T- score -3.6.  3. Trabecular bone score indicates poor trabecular bone architecture.    CXR 12/6/2022:   -Per radiology: vertebroplasty changes in thoracic spine. A few additional compression fractures elsewhere in the thoracic spine. Appearance is unchanged from previous - 8/15/2020. I reviewed the images and agree with the read.    Thoracic spine XR 4/1/2022:  -Per radiology: prior vertebroplasty of T5 and T8 vertebrae. There are chronic compression fractures of T6 and T7 with 40-50% loss of height unchanged from prior CT. There is 50% loss of height of T10 also unchanged from before. There is no significant change from prior CT chest of 01/04/2020.     Lumbar spine XR 3/24/2022:   -Per radiology: Unchanged L4 and L5 chronic compression deformities (comparison CT A/P 10/16/2019). Images not available.     T5 and T8 vertebroplasty procedures done in 2016    Regarding a cause of her spinal compression fractures, there was no known trauma.     She fractured her right hip when she had a fall and \"bounced\" a few times after her foot got stuck in a pothole, this happened around 2005.    She has trouble with balance. She defers on a PT referral at this time.     Nephrolithiasis (CT chest 7/2022 - 3 x 7 mm " "nonobstructing calcification upper pole right kidney, present in 2019 however larger). She denies ever passing a stone.     No recent significant GC exposure, and she doesn't recall the details but given her AML and transplant history and COPD I assume she has had significant GC exposure over the years.     BMI 16.5 kg/m2.    Active tobacco use.  She declines a referral to quit partner today.    Surgical menopause in her 20s for a benign indication and she recalls being on estrogen therapy afterwards.    -No prior hypercalcemia, PTH checked once in 2020 and normal     -History of vitamin D deficiency     Alendronate 70 mg once weekly: first prescribed 9/8/2017 and taken weekly until ~2 years ago (per her recall, I could not verify the timeline via chart review), and she was off of it for about 1 year before it was restarted and she notes taking it \"religiously\". She takes Alendronate after eating breakfast. She does stay upright 1 hour after taking it. Alendronate has been well tolerated.    No other medications besides Alendronate to reduce fracture risk.     She takes a MVI. She does not take vitamin D or calcium. She drinks several glasses of milk each day.       She hasexocrine pancreatic insufficiency (pancreatic atrophy on imaging) on Creon, elevated random glucose values but not >200 mg/dL (no prior diagnosis of DM, she has never been on a medication to lower glucose), gastric ulcer, chronic diarrhea, AML s/p BMT in 2010.    No GERD. She had all teeth extracted in 2010. She has not had any dental issues since. She did receive radiation prior to transplant. No prior ASCVD event.     Objective:    BMI 16.5 kg/m2, /72.    Sclera white. Edentulous. Kyphotic. Radial pulse with a regular rate and rhythm.     Assessment/Plan:    # Severe osteoporosis with many prior fragility fractures    Nandini has severe osteoporosis.  We will start by updating a DEXA and I ordered standard bone testing including a 24-hour " urine supersaturation profile.  We will also screen for diabetes mellitus.  For now she can remain on Fosamax.  We did review the way to take this to maximize absorption and minimize the risk of esophagitis.  Note that she has not been taking Fosamax to maximize absorption.  We will check bone turnover markers to look at efficacy.    Return to see me after testing to decide on a management plan.  Given the severity of her osteoporosis she would benefit from anabolic therapy.  Given her history of radiation I would favor use of Evenity.    We also reviewed optimization of calcium and vitamin D.  She will start vitamin D3 2000 units daily.  I gave her a printed brochures on dietary sources of calcium and she will aim for 1200 milligrams daily.    # Exocrine pancreatic insufficiency  # Malnutrition, BMI 16.5 kg/meters squared    She notes that despite significant caloric intake she has not been able to gain weight.  I did refer her to gastroenterology.    60 minutes spent on the date of the encounter doing chart review, history and exam, documentation and further activities as noted above.       Again, thank you for allowing me to participate in the care of your patient.        Sincerely,        Ryan Alonso MD

## 2023-01-16 DIAGNOSIS — M54.50 CHRONIC BILATERAL LOW BACK PAIN, UNSPECIFIED WHETHER SCIATICA PRESENT: ICD-10-CM

## 2023-01-16 DIAGNOSIS — G89.29 CHRONIC BILATERAL LOW BACK PAIN, UNSPECIFIED WHETHER SCIATICA PRESENT: ICD-10-CM

## 2023-01-16 RX ORDER — OXYCODONE HYDROCHLORIDE 10 MG/1
10 TABLET ORAL 4 TIMES DAILY
Qty: 120 TABLET | Refills: 0 | Status: SHIPPED | OUTPATIENT
Start: 2023-01-20 | End: 2023-02-15

## 2023-01-16 NOTE — TELEPHONE ENCOUNTER
Medication Question or Refill    Contacts       Type Contact Phone/Fax    01/16/2023 08:55 AM CST Phone (Incoming) Jenn Barclay (Self) [phone] (M)          What medication are you calling about (include dose and sig)?: Oxycodone IR (ROXICODONE) 10 mg tablet    Controlled Substance Agreement on file:   CSA -- Patient Level:     [Media Unavailable] Controlled Substance Agreement - Opioid - Scan on 1/9/2023  9:02 AM   [Media Unavailable] Controlled Substance Agreement - Opioid - Scan on 1/3/2022  1:31 PM   [Media Unavailable] Controlled Substance Agreement - Opioid - Scan on 12/18/2019   [Media Unavailable] Controlled Substance Agreement - Opioid - Scan on 12/28/2018   [Media Unavailable] Controlled Substance Agreement - Opioid - Scan on 8/9/2017: [facility]       Who prescribed the medication?: Dr. Jorge Dunn    Do you need a refill? Yes: not out yet, but will be soon per patient    When did you use the medication last? Last night    Patient offered an appointment? Yes: Patient declined.  She is already scheduled 7/6/2023  Last visit with PCP = 01/05/2023    Do you have any questions or concerns?  No    Preferred Pharmacy:    [id number] - [place], [place] - [address]  [address]  [facility] [address]  Phone: [phone] Fax: [phone]

## 2023-01-19 ENCOUNTER — PATIENT OUTREACH (OUTPATIENT)
Dept: GASTROENTEROLOGY | Facility: CLINIC | Age: 68
End: 2023-01-19
Payer: COMMERCIAL

## 2023-01-19 DIAGNOSIS — R19.7 DIARRHEA: Primary | ICD-10-CM

## 2023-01-19 NOTE — TELEPHONE ENCOUNTER
Pt referred to [facility] r/t:  exocrine pancreatic insufficiency, inability to gain weight    Per referring MD:    She hasexocrine pancreatic insufficiency (pancreatic atrophy on imaging) on Creon, elevated random glucose values but not >200 mg/dL (no prior diagnosis of DM, she has never been on a medication to lower glucose), gastric ulcer, chronic diarrhea, AML s/p BMT in 2010.    # Exocrine pancreatic insufficiency  # Malnutrition, BMI 16.5 kg/meters squared     She notes that despite significant caloric intake she has not been able to gain weight.  I did refer her to gastroenterology.       Pt has not had a fecal elastase test ordered per protocol. Pt reports orange oily stools. Pt reports eating a lot and still losing weight. Pt has osteoporosis. Clinic scheduled with Dr. Gunn on 3/22, pt will get stool test done prior discussed possibility of starting creon in advance of clinic with Dr. Gunn      ML

## 2023-01-20 ENCOUNTER — LAB (OUTPATIENT)
Dept: LAB | Facility: CLINIC | Age: 68
End: 2023-01-20
Payer: COMMERCIAL

## 2023-01-20 DIAGNOSIS — S22.050S CLOSED WEDGE COMPRESSION FRACTURE OF T5 VERTEBRA, SEQUELA: ICD-10-CM

## 2023-01-20 DIAGNOSIS — M81.0 AGE-RELATED OSTEOPOROSIS WITHOUT CURRENT PATHOLOGICAL FRACTURE: ICD-10-CM

## 2023-01-20 DIAGNOSIS — K86.81 EXOCRINE PANCREATIC INSUFFICIENCY: ICD-10-CM

## 2023-01-20 DIAGNOSIS — S72.009S CLOSED FRACTURE OF HIP, UNSPECIFIED LATERALITY, SEQUELA: ICD-10-CM

## 2023-01-20 DIAGNOSIS — R19.7 DIARRHEA: ICD-10-CM

## 2023-01-20 DIAGNOSIS — S22.060S CLOSED WEDGE COMPRESSION FRACTURE OF T8 VERTEBRA, SEQUELA: ICD-10-CM

## 2023-01-24 ENCOUNTER — APPOINTMENT (OUTPATIENT)
Dept: LAB | Facility: CLINIC | Age: 68
End: 2023-01-24
Payer: COMMERCIAL

## 2023-01-24 PROCEDURE — 82653 EL-1 FECAL QUANTITATIVE: CPT

## 2023-01-26 ENCOUNTER — PATIENT OUTREACH (OUTPATIENT)
Dept: GASTROENTEROLOGY | Facility: CLINIC | Age: 68
End: 2023-01-26
Payer: COMMERCIAL

## 2023-01-26 LAB — ELASTASE PANC STL-MCNT: 0.5 UG/G

## 2023-01-26 NOTE — TELEPHONE ENCOUNTER
Pt scheduled for clinic with Dr. Gunn  Fecal elastase done prior to clinic showing severe EPI   Elastase Fecal >199.9 ug/g 0.5 Low       Called pt to discuss starting creon (per protocol) in advance of clinic with Dr. Gunn  Per patient, she was taking creon previously for about the last 3 years- is currently out of creon and cannot afford more unless getting financial help from [employer]- she noted it did help with to reduce her orange diarrhea. Pt states she has no one to help her reapply for this product.     Will send forms to patient to see if we can keep this therapy going in advance of clinic with Dr. Luis A Siddiqi, RN Care Coordinator        I  amylase-lipase-protease (CREON) 31526-54660 units CPEP per EC capsule 300 capsule 11 7/5/2022  No   Sig - Route: Take 2 capsules by mouth 3 times daily (with meals) And snacks - Oral   Class: No Print Out

## 2023-02-21 DIAGNOSIS — G89.29 CHRONIC BILATERAL LOW BACK PAIN, UNSPECIFIED WHETHER SCIATICA PRESENT: ICD-10-CM

## 2023-02-21 DIAGNOSIS — M54.50 CHRONIC BILATERAL LOW BACK PAIN, UNSPECIFIED WHETHER SCIATICA PRESENT: ICD-10-CM

## 2023-02-21 DIAGNOSIS — J44.9 CHRONIC OBSTRUCTIVE PULMONARY DISEASE, UNSPECIFIED COPD TYPE (H): ICD-10-CM

## 2023-02-21 RX ORDER — OXYCODONE HYDROCHLORIDE 10 MG/1
10 TABLET ORAL 4 TIMES DAILY
Qty: 26 TABLET | Refills: 0 | Status: SHIPPED | OUTPATIENT
Start: 2023-02-21 | End: 2023-02-24

## 2023-02-22 RX ORDER — ALBUTEROL SULFATE 0.83 MG/ML
SOLUTION RESPIRATORY (INHALATION)
Qty: 75 ML | Refills: 0 | Status: SHIPPED | OUTPATIENT
Start: 2023-02-22 | End: 2023-03-24

## 2023-02-22 NOTE — TELEPHONE ENCOUNTER
"Last Written Prescription Date:  2/3/2022  Last Fill Quantity: 75mL,  # refills: 11   Last office visit provider:  1/5/2023 with PCP Patric     Requested Prescriptions   Pending Prescriptions Disp Refills     albuterol (PROVENTIL) (2.5 MG/3ML) 0.083% neb solution [Pharmacy Med Name: Albuterol Sulfate (2.5 MG/3ML) 0.083% Inhalation Nebulization Solution] 75 mL 0     Sig: USE 1 VIAL IN NEBULIZER EVERY 6 HOURS AS NEEDED FOR WHEEZING       Asthma Maintenance Inhalers - Anticholinergics Passed - 2/21/2023  8:46 AM        Passed - Patient is age 12 years or older        Passed - Recent (12 mo) or future (30 days) visit within the authorizing provider's specialty     Patient has had an office visit with the authorizing provider or a provider within the authorizing providers department within the previous 12 mos or has a future within next 30 days. See \"Patient Info\" tab in inbasket, or \"Choose Columns\" in Meds & Orders section of the refill encounter.              Passed - Medication is active on med list       Short-Acting Beta Agonist Inhalers Protocol  Passed - 2/21/2023  8:46 AM        Passed - Patient is age 12 or older        Passed - Recent (12 mo) or future (30 days) visit within the authorizing provider's specialty     Patient has had an office visit with the authorizing provider or a provider within the authorizing providers department within the previous 12 mos or has a future within next 30 days. See \"Patient Info\" tab in inbasket, or \"Choose Columns\" in Meds & Orders section of the refill encounter.              Passed - Medication is active on med list             Caitlin Fuentes RN 02/22/23 1:48 PM  "

## 2023-02-24 RX ORDER — OXYCODONE HYDROCHLORIDE 10 MG/1
10 TABLET ORAL 4 TIMES DAILY
Qty: 26 TABLET | Refills: 0 | Status: SHIPPED | OUTPATIENT
Start: 2023-02-24 | End: 2023-03-16

## 2023-02-28 DIAGNOSIS — L30.9 DERMATITIS: ICD-10-CM

## 2023-02-28 RX ORDER — TRIAMCINOLONE ACETONIDE 1 MG/G
CREAM TOPICAL
Qty: 80 G | Refills: 0 | Status: SHIPPED | OUTPATIENT
Start: 2023-02-28 | End: 2023-09-03

## 2023-02-28 NOTE — TELEPHONE ENCOUNTER
"Last Written Prescription Date:  12/13/21  Last Fill Quantity: 80g,  # refills: 3   Last office visit provider:  1/5/23, PCP     Requested Prescriptions   Pending Prescriptions Disp Refills     triamcinolone (KENALOG) 0.1 % external cream [Pharmacy Med Name: Triamcinolone Acetonide 0.1 % External Cream] 80 g 0     Sig: APPLY  CREAM EXTERNALLY THREE TIMES DAILY TO  ITCH  OR  TENDER  SKIN  LESIONS       Topical Steroids and Nonsteroidals Protocol Passed - 2/28/2023  4:27 PM        Passed - Patient is age 6 or older        Passed - Authorizing prescriber's most recent note related to this medication read.     If refill request is for ophthalmic use, please forward request to provider for approval.          Passed - High potency steroid not ordered        Passed - Recent (12 mo) or future (30 days) visit within the authorizing provider's specialty     Patient has had an office visit with the authorizing provider or a provider within the authorizing providers department within the previous 12 mos or has a future within next 30 days. See \"Patient Info\" tab in inbasket, or \"Choose Columns\" in Meds & Orders section of the refill encounter.              Passed - Medication is active on med list             Criselda Fields RN 02/28/23 4:48 PM  "

## 2023-03-02 ENCOUNTER — LAB (OUTPATIENT)
Dept: LAB | Facility: CLINIC | Age: 68
End: 2023-03-02
Payer: COMMERCIAL

## 2023-03-02 DIAGNOSIS — M81.0 AGE-RELATED OSTEOPOROSIS WITHOUT CURRENT PATHOLOGICAL FRACTURE: ICD-10-CM

## 2023-03-02 DIAGNOSIS — S22.050S CLOSED WEDGE COMPRESSION FRACTURE OF T5 VERTEBRA, SEQUELA: ICD-10-CM

## 2023-03-02 DIAGNOSIS — K86.81 EXOCRINE PANCREATIC INSUFFICIENCY: ICD-10-CM

## 2023-03-02 DIAGNOSIS — R73.9 HYPERGLYCEMIA: ICD-10-CM

## 2023-03-02 DIAGNOSIS — S72.009S CLOSED FRACTURE OF HIP, UNSPECIFIED LATERALITY, SEQUELA: ICD-10-CM

## 2023-03-02 DIAGNOSIS — S22.060S CLOSED WEDGE COMPRESSION FRACTURE OF T8 VERTEBRA, SEQUELA: ICD-10-CM

## 2023-03-02 LAB
ALBUMIN SERPL BCG-MCNC: 4.4 G/DL (ref 3.5–5.2)
CALCIUM SERPL-MCNC: 9.7 MG/DL (ref 8.8–10.2)
FASTING STATUS PATIENT QL REPORTED: NO
GLUCOSE SERPL-MCNC: 102 MG/DL (ref 70–99)
HBA1C MFR BLD: 5.6 % (ref 0–5.6)
PHOSPHATE SERPL-MCNC: 3.6 MG/DL (ref 2.5–4.5)
TOTAL PROTEIN SERUM FOR ELP: 6.5 G/DL (ref 6.4–8.3)

## 2023-03-02 PROCEDURE — 82140 ASSAY OF AMMONIA: CPT | Mod: 90

## 2023-03-02 PROCEDURE — 84560 ASSAY OF URINE/URIC ACID: CPT | Mod: 90

## 2023-03-02 PROCEDURE — 82507 ASSAY OF CITRATE: CPT | Mod: 90

## 2023-03-02 PROCEDURE — 84392 ASSAY OF URINE SULFATE: CPT | Mod: 90

## 2023-03-02 PROCEDURE — 84105 ASSAY OF URINE PHOSPHORUS: CPT | Mod: 90

## 2023-03-02 PROCEDURE — 84133 ASSAY OF URINE POTASSIUM: CPT | Mod: 90

## 2023-03-02 PROCEDURE — 84100 ASSAY OF PHOSPHORUS: CPT

## 2023-03-02 PROCEDURE — 99000 SPECIMEN HANDLING OFFICE-LAB: CPT

## 2023-03-02 PROCEDURE — 84300 ASSAY OF URINE SODIUM: CPT | Mod: 90

## 2023-03-02 PROCEDURE — 82784 ASSAY IGA/IGD/IGG/IGM EACH: CPT

## 2023-03-02 PROCEDURE — 36415 COLL VENOUS BLD VENIPUNCTURE: CPT

## 2023-03-02 PROCEDURE — 84080 ASSAY ALKALINE PHOSPHATASES: CPT | Mod: 90

## 2023-03-02 PROCEDURE — 83986 ASSAY PH BODY FLUID NOS: CPT | Mod: 90

## 2023-03-02 PROCEDURE — 82040 ASSAY OF SERUM ALBUMIN: CPT

## 2023-03-02 PROCEDURE — 86364 TISS TRNSGLTMNASE EA IG CLAS: CPT

## 2023-03-02 PROCEDURE — 82436 ASSAY OF URINE CHLORIDE: CPT | Mod: 90

## 2023-03-02 PROCEDURE — 84540 ASSAY OF URINE/UREA-N: CPT | Mod: 90

## 2023-03-02 PROCEDURE — 82310 ASSAY OF CALCIUM: CPT

## 2023-03-02 PROCEDURE — 82570 ASSAY OF URINE CREATININE: CPT | Mod: 90

## 2023-03-02 PROCEDURE — 83036 HEMOGLOBIN GLYCOSYLATED A1C: CPT

## 2023-03-02 PROCEDURE — 84155 ASSAY OF PROTEIN SERUM: CPT

## 2023-03-02 PROCEDURE — 83945 ASSAY OF OXALATE: CPT | Mod: 90

## 2023-03-02 PROCEDURE — 82947 ASSAY GLUCOSE BLOOD QUANT: CPT

## 2023-03-02 PROCEDURE — 83735 ASSAY OF MAGNESIUM: CPT | Mod: 90

## 2023-03-02 PROCEDURE — 84165 PROTEIN E-PHORESIS SERUM: CPT

## 2023-03-02 PROCEDURE — 83935 ASSAY OF URINE OSMOLALITY: CPT | Mod: 90

## 2023-03-02 PROCEDURE — 82523 COLLAGEN CROSSLINKS: CPT | Mod: 90

## 2023-03-02 PROCEDURE — 82340 ASSAY OF CALCIUM IN URINE: CPT | Mod: 90

## 2023-03-02 PROCEDURE — 82306 VITAMIN D 25 HYDROXY: CPT

## 2023-03-03 LAB
ALBUMIN SERPL ELPH-MCNC: 4.1 G/DL (ref 3.7–5.1)
ALPHA1 GLOB SERPL ELPH-MCNC: 0.3 G/DL (ref 0.2–0.4)
ALPHA2 GLOB SERPL ELPH-MCNC: 0.7 G/DL (ref 0.5–0.9)
B-GLOBULIN SERPL ELPH-MCNC: 0.6 G/DL (ref 0.6–1)
GAMMA GLOB SERPL ELPH-MCNC: 0.7 G/DL (ref 0.7–1.6)
IGA SERPL-MCNC: 73 MG/DL (ref 84–499)
M PROTEIN SERPL ELPH-MCNC: 0 G/DL
PROT PATTERN SERPL ELPH-IMP: NORMAL
TTG IGA SER-ACNC: <0.2 U/ML
TTG IGG SER-ACNC: <0.6 U/ML

## 2023-03-04 LAB
ALP BONE SERPL-MCNC: 6.3 UG/L
COLLAGEN CTX SERPL-MCNC: 159 PG/ML

## 2023-03-06 LAB
DEPRECATED CALCIDIOL+CALCIFEROL SERPL-MC: <39 UG/L (ref 20–75)
VITAMIN D2 SERPL-MCNC: <5 UG/L
VITAMIN D3 SERPL-MCNC: 34 UG/L

## 2023-03-07 LAB
AMMONIA 24H UR-SRATE: 27 MMOL/24 H (ref 15–56)
BSA: ABNORMAL 1.73M(2)
CA H2 PHOS DIHYD 24H SATFR UR: -0.56 DG
CALCIUM 24H UR-MRATE: 88 MG/24 H
CAOX 24H ENGDIFF UR: 1.98 DG
CHLORIDE 24H UR-SRATE: 104 MMOL/24 H (ref 34–286)
CITRATE 24H UR-MRATE: 425 MG/24 H
COLLECT DURATION TIME UR: 24 H
CREAT 24H UR-MRATE: 903 MG/24 H (ref 603–1783)
HYDROXYAPATITE 24H ENGDIFF UR: 2.62 DG
MAGNESIUM 24H UR-MRATE: 70 MG/24 H (ref 51–269)
OSMOLALITY 24H UR: 566 MOSM/KG (ref 150–1150)
OXALATE 24H UR-MRATE: 20.2 MG/24 H (ref 9.7–40.5)
OXALATE 24H UR-SRATE: 0.23 MMOL/24 H (ref 0.11–0.46)
PH 24H UR: 5.7 [PH] (ref 4.5–8)
PHOSPHATE 24H UR-MRATE: 579 MG/24 H (ref 226–1797)
POTASSIUM 24H UR-SRATE: 25 MMOL/24 H (ref 16–105)
PROTEIN CATABOLIC RATE 24H UR-MRATE: 57 G/24 H (ref 56–125)
SODIUM 24H UR-SRATE: 109 MMOL/24 H (ref 22–328)
SPECIMEN VOL 24H UR: 877 ML
SULFATE 24H UR-SRATE: 6 MMOL/24 H (ref 7–47)
URATE 24H SATFR UR: 2.58 DG
URATE 24H UR-MRATE: 360 MG/24 H (ref 250–750)
URINALYSIS SPECIALIST REVIEW: ABNORMAL
UUN 24H UR-MRATE: 5.1 G/24 H (ref 7–42)

## 2023-03-14 ENCOUNTER — DOCUMENTATION ONLY (OUTPATIENT)
Dept: GASTROENTEROLOGY | Facility: CLINIC | Age: 68
End: 2023-03-14
Payer: MEDICARE

## 2023-03-14 NOTE — PROGRESS NOTES
Called PT and confirmed Appt.    Called to remind patient of their upcoming appointment with our GI clinic, on 03/22/23 at 12:30 PM with Dr. Donny Gunn. This appointment is scheduled as an in-person appt. Please arrive 15 minutes early to check in for your appointment. , if your appointment is virtual (video or telephone) you need to be in [place] for the visit. To reschedule or cancel patient to call [phone].      SK

## 2023-03-16 ENCOUNTER — TELEPHONE (OUTPATIENT)
Dept: INTERNAL MEDICINE | Facility: CLINIC | Age: 68
End: 2023-03-16
Payer: MEDICARE

## 2023-03-16 DIAGNOSIS — M54.50 CHRONIC BILATERAL LOW BACK PAIN, UNSPECIFIED WHETHER SCIATICA PRESENT: ICD-10-CM

## 2023-03-16 DIAGNOSIS — G89.29 CHRONIC BILATERAL LOW BACK PAIN, UNSPECIFIED WHETHER SCIATICA PRESENT: ICD-10-CM

## 2023-03-16 RX ORDER — OXYCODONE HYDROCHLORIDE 10 MG/1
10 TABLET ORAL 4 TIMES DAILY
Qty: 26 TABLET | Refills: 0 | Status: SHIPPED | OUTPATIENT
Start: 2023-03-17 | End: 2023-03-20

## 2023-03-16 NOTE — TELEPHONE ENCOUNTER
Medication Question or Refill    Contacts       Type Contact Phone/Fax    03/16/2023 08:37 AM CDT Phone (Incoming) Jenn Barclay AVA (Self) [phone] (M)          What medication are you calling about (include dose and sig)?:     oxyCodone IR (ROXICODONE) 10 mg tablet    Preferred Pharmacy:    [id number] [address] & [address] [address]  Phone: [phone] Fax: [phone]      Controlled Substance Agreement on file:   CSA -- Patient Level:     [Media Unavailable] Controlled Substance Agreement - Opioid - Scan on 1/9/2023  9:02 AM   [Media Unavailable] Controlled Substance Agreement - Opioid - Scan on 1/3/2022  1:31 PM   [Media Unavailable] Controlled Substance Agreement - Opioid - Scan on 12/18/2019   [Media Unavailable] Controlled Substance Agreement - Opioid - Scan on 12/28/2018   [Media Unavailable] Controlled Substance Agreement - Opioid - Scan on 8/9/2017: [facility]       Who prescribed the medication?: Dr. Jorge Dunn    Do you need a refill? Yes    When did you use the medication last? Out, [facility] was only able to provide patient with 94 pills last month.     They are out of medication and do not know when it will be back in stock.     Patient has called several pharmacies and located medication at [facility].     Patient offered an appointment? No    Last visit with PCP = 01/05/2023  Next visit with PCP = 07/06/2023      Do you have any questions or concerns?  No

## 2023-03-20 RX ORDER — OXYCODONE HYDROCHLORIDE 10 MG/1
10 TABLET ORAL 4 TIMES DAILY
Qty: 120 TABLET | Refills: 0 | Status: SHIPPED | OUTPATIENT
Start: 2023-03-20 | End: 2023-04-14

## 2023-03-20 NOTE — TELEPHONE ENCOUNTER
This was my mistake.    I refilled the prescription that [facility] had previously asked for when they ran shot the previous month.    I did send in a prescription for 120 pills instead to     [id number] - [address] AT [facility] [address]  [address] [place] [id number]  Phone: [phone] Fax: [phone]      Jorge Dunn MD  General Internal Medicine  [facility]  3/20/2023, 10:20 AM

## 2023-03-20 NOTE — TELEPHONE ENCOUNTER
General Call    Contacts       Type Contact Phone/Fax    03/16/2023 08:37 AM CDT Phone (Incoming) Jenn Barclay (Self) [phone] (M)    03/20/2023 09:16 AM CDT Phone (Incoming) [place], Jenn ESCALANTE (Self) [phone] (M)        Reason for Call:  Controlled Substance Question    What are your questions or concerns:  States [facility] has not received RX.    Relayed that prescription was sent on 03/16/23 at 10:10 AM for 26 tablets to be released on 03/17/23    Patient began yelling what am I supposed to do with 26 tablets?  I take 4 per day?    I hope you break your back so you can find out what it is like to live in pain this is ridiculous.      Advised patient to stop yelling,  that Writer can not prescribe medication and have no control of the amount of pills of dose simply take message and pass it along to Care Team.    Patient restates that she does not know what she is going to do, and restates chronic back pain ill will wish - advised patient that message will be forwarded to Dr. Dunn ended call.       Date of last appointment with provider: 01/05/2023    Okay to leave a detailed message?: Yes at Home number on file [phone] (home)

## 2023-03-22 ENCOUNTER — TELEPHONE (OUTPATIENT)
Dept: GASTROENTEROLOGY | Facility: CLINIC | Age: 68
End: 2023-03-22

## 2023-03-22 NOTE — TELEPHONE ENCOUNTER
[facility] Call Center    Phone Message    May a detailed message be left on voicemail: yes     Reason for Call: Other: Writer received this call via an escalation.  Patient stated she was not told to go the [facility].  She showed up to the [facility] for her appt today with Dr Gunn.  She expressed that she was angry at whoever scheduled it as they never told her clinic was in [facility].  Patient can not get to [facility] quickly due to tranportation.  She wants a call back asap and wants to be worked into the schedule asap and not wait to be rescheduled as she stated it was not her fault.      Action Taken: Message routed to:  [facility] AE    Travel Screening: Not Applicable                                                                         Start of Shift



Patient is a 30-year old,  female, admitted for  ETOH, Opiates, and Benzodiazepines 
dependence.  Pt is on a 5 day Subutex and 5 day Valium tapers, started 04/08/2017. Patient 
is tolerating tapers well.  Pt is AAOx4, no SOB nor anxiety noted. Pt is ambulatory with 
steady gait and with no skin issues. Fall, universal and safety prec implemented. Call light 
within reach. Kept pt warm, dry and comfortable. COWS=6, CIWA=4. Will monitor.

## 2023-03-22 NOTE — TELEPHONE ENCOUNTER
Returned call to patient, she did not know clinic was scheduled as [facility], went to [facility].      0 Result Notes          Component Ref Range & Units 1 mo ago    Elastase Fecal >199.9 ug/g 0.5 Low          Patient had previously been on Creon, didn't help symptoms. Doesn't have help/social work to Cannot keep weight on, went from 121 lbs (147 at normal weight), now between 108-121 lbs - is not on any enzymes now. Eating fruit, calcium, vitamins, meat- has good appetite, just cannot keep weight on. Spends 90 minutes in the bathroom each morning, varies from solid to liquid stool in that time each morning.     Patient agreed to virtual visit on 4-5 at noon, overbook, pt agreeable to video visit.    ML

## 2023-03-23 DIAGNOSIS — J44.9 CHRONIC OBSTRUCTIVE PULMONARY DISEASE, UNSPECIFIED COPD TYPE (H): ICD-10-CM

## 2023-03-23 NOTE — TELEPHONE ENCOUNTER
"Pt called back asking about refill. When I mentioned clinic is closed she hung up before I could help further.     Last Written Prescription Date:  2/22/2023  Last Fill Quantity: 75 ml,  # refills: 0   Last office visit provider:  1/5/2023     Requested Prescriptions   Pending Prescriptions Disp Refills     albuterol (PROVENTIL) (2.5 MG/3ML) 0.083% neb solution [Pharmacy Med Name: Albuterol Sulfate (2.5 MG/3ML) 0.083% Inhalation Nebulization Solution] 75 mL 0     Sig: USE 1 VIAL IN NEBULIZER EVERY 6 HOURS AS NEEDED FOR WHEEZING       Asthma Maintenance Inhalers - Anticholinergics Passed - 3/23/2023  5:02 PM        Passed - Patient is age 12 years or older        Passed - Recent (12 mo) or future (30 days) visit within the authorizing provider's specialty     Patient has had an office visit with the authorizing provider or a provider within the authorizing providers department within the previous 12 mos or has a future within next 30 days. See \"Patient Info\" tab in inbasket, or \"Choose Columns\" in Meds & Orders section of the refill encounter.              Passed - Medication is active on med list       Short-Acting Beta Agonist Inhalers Protocol  Passed - 3/23/2023  5:02 PM        Passed - Patient is age 12 or older        Passed - Recent (12 mo) or future (30 days) visit within the authorizing provider's specialty     Patient has had an office visit with the authorizing provider or a provider within the authorizing providers department within the previous 12 mos or has a future within next 30 days. See \"Patient Info\" tab in inbasket, or \"Choose Columns\" in Meds & Orders section of the refill encounter.              Passed - Medication is active on med list             Marisol Beauchamp RN 03/24/23 2:38 PM  "

## 2023-03-24 RX ORDER — ALBUTEROL SULFATE 0.83 MG/ML
SOLUTION RESPIRATORY (INHALATION)
Qty: 75 ML | Refills: 4 | Status: SHIPPED | OUTPATIENT
Start: 2023-03-24 | End: 2023-09-03

## 2023-03-30 ENCOUNTER — DOCUMENTATION ONLY (OUTPATIENT)
Dept: GASTROENTEROLOGY | Facility: CLINIC | Age: 68
End: 2023-03-30
Payer: MEDICARE

## 2023-03-30 NOTE — PROGRESS NOTES
Called PT and left VM.    Called to remind patient of their upcoming appointment with our GI clinic, on 04/05/23 at 12:00 PM with Dr. Donny Gunn. This appointment is scheduled as an in-person appt. Please arrive 15 minutes early to check in for your appointment. , if your appointment is virtual (video or telephone) you need to be in [place] for the visit. To reschedule or cancel patient to call [phone].      SK

## 2023-04-05 ENCOUNTER — PATIENT OUTREACH (OUTPATIENT)
Dept: GASTROENTEROLOGY | Facility: CLINIC | Age: 68
End: 2023-04-05

## 2023-04-05 ENCOUNTER — VIRTUAL VISIT (OUTPATIENT)
Dept: GASTROENTEROLOGY | Facility: CLINIC | Age: 68
End: 2023-04-05
Payer: COMMERCIAL

## 2023-04-05 DIAGNOSIS — K86.81 EXOCRINE PANCREATIC INSUFFICIENCY: Primary | ICD-10-CM

## 2023-04-05 PROCEDURE — 99202 OFFICE O/P NEW SF 15 MIN: CPT | Mod: VID | Performed by: INTERNAL MEDICINE

## 2023-04-05 ASSESSMENT — PAIN SCALES - GENERAL: PAINLEVEL: NO PAIN (0)

## 2023-04-05 NOTE — PROGRESS NOTES
Elastase Fecal >199.9 ug/g 0.5 Low     Referred by Dr Dunn for weight loss, oily stools, exocrine pancreatic insufficiency. BMI 16.54, Eats continuously.   Post BMT 2010 for leukemia, in remission.   Was on CREON 24K 2 w ea meal. Ran out 3 months ago.   Had pancreatic atrophy on last CT 2019, no imaging since. No history of acute pancreatitis. No pain, other than crampy pain relieved w BM  Lifelong smoker, still smoking. No ETOH history.   No diabetes  See below for rest of details.  Meds reviewed. Is on multiple vitamins incl D3, calcium, others     IMP:   Severe exocrine pancreatic insuffiency, likely related to atrophic pancreatitis, idiopathic, perhaps smoking related.   Is completely off panc enzymes x 3 months  Severe malnutrition, likely vitamin deficient fat solubles    REC:  Restart CREON 24KU lipase strength, 2-3 ea meal and 1-2 w snacks  Complete nutritional panel including vitamins ADEK  Dietitian amena Vivar  Smoking cessation  Follow-up w me in 2 months    Joined the call at 4/5/2023, 12:09:45 pm.  Left the call at 4/5/2023, 12:29:01 pm.  You were on the call for 19 minutes 16 seconds .        1.  Equivocal thickening in the region of the antrum could be artifact from contraction or spasm. Differential would include inflammation or infection.   2.  Remainder stable.  Narrative    EXAM: CT ABDOMEN PELVIS WO ORAL W IV CONTRAST   LOCATION: [facility]   DATE/TIME: 10/16/2019 11:40 PM     INDICATION: Epigastric abdominal pain with vomiting, AML   COMPARISON: 04/21/2019   TECHNIQUE: CT scan of the abdomen and pelvis was performed following injection of IV contrast. Multiplanar reformats were obtained. Dose reduction techniques were used.   CONTRAST: Iohexol (Omni) 100 mL     FINDINGS:   LOWER CHEST: Evidence for remote granulomatous disease. Similar very small pericardial effusion.     HEPATOBILIARY: Normal.     PANCREAS: Pancreatic atrophy as on prior.     SPLEEN: Normal.     KIDNEYS/BLADDER: Normal.      ADRENAL GLANDS: Normal.     BOWEL: Equivocal thickening in the region of the antrum could be artifact from contraction or spasm. Minimal gastric distention. No surrounding inflammatory change. No bowel obstruction.     LYMPH NODES: Normal.     PELVIC ORGANS: Hysterectomy.     MUSCULOSKELETAL: Degenerative disease. Stable compression fractures. Diffuse osteopenia. Remote right femoral intervention.     OTHER: None.  Other Result Text    Interface, Rad Results In - 10/17/2019 12:12 AM CDT   EXAM: CT ABDOMEN PELVIS WO ORAL W IV CONTRAST   LOCATION: [facility]   DATE/TIME: 10/16/2019 11:40 PM     INDICATION: Epigastric abdominal pain with vomiting, AML   COMPARISON: 04/21/2019   TECHNIQUE: CT scan of the abdomen and pelvis was performed following injection of IV contrast. Multiplanar reformats were obtained. Dose reduction techniques were used.   CONTRAST: Iohexol (Omni) 100 mL     FINDINGS:   LOWER CHEST: Evidence for remote granulomatous disease. Similar very small pericardial effusion.     HEPATOBILIARY: Normal.     PANCREAS: Pancreatic atrophy as on prior.     SPLEEN: Normal.     KIDNEYS/BLADDER: Normal.     ADRENAL GLANDS: Normal.     BOWEL: Equivocal thickening in the region of the antrum could be artifact from contraction or spasm. Minimal gastric distention. No surrounding inflammatory change. No bowel obstruction.     LYMPH NODES: Normal.     PELVIC ORGANS: Hysterectomy.     MUSCULOSKELETAL: Degenerative disease. Stable compression fractures. Diffuse osteopenia. Remote right femoral intervention.     OTHER: None.     IMPRESSION:   1.  Equivocal thickening in the region of the antrum could be artifact from contraction or spasm. Differential would include inflammation or infection.   2.  Remainder stable.      She hasexocrine pancreatic insufficiency (pancreatic atrophy on imaging) on Creon, elevated random glucose values but not >200 mg/dL (no prior diagnosis of DM, she has never been on a  medication to lower glucose), gastric ulcer, chronic diarrhea, AML s/p BMT in 2010.     # Exocrine pancreatic insufficiency  # Malnutrition, BMI 16.5 kg/meters squared     She notes that despite significant caloric intake she has not been able to gain weight.  I did refer her to gastroenterology.        Pt has not had a fecal elastase test ordered per protocol. Pt reports orange oily stools. Pt reports eating a lot and still losing weight. Pt has osteoporosis. Clinic scheduled with Dr. Gunn on 3/22, pt will get stool test done prior discussed possibility of starting creon in advance of clinic with Jorge Ford MD  Physician  Specialty:  Internal Medicine  Progress Notes      Addendum  Encounter Date:  1/5/2023     Addendum                  [facility]     Comprehensive and complex medical care - Chronic disease management - Shared decision making - Care coordination - Compassionate care     Patient advocacy - Rational deprescribing - Minimally disruptive medicine - Ethical focus - Customized care            Date of Service: 1/5/2023  Primary Provider: Jorge Dunn     Patient Care Team:  Jorge Dunn MD as PCP - General (Internal Medicine)  Mohit Montano MD as MD (Hematology & Oncology)  Jorge Dunn MD as Assigned PCP  Joellen Nova MD as Resident (Dermatology)  Madison Alexander LICSW as  (BMT - Adult)  Ryan Alonso MD as MD (Endocrinology, Diabetes, and Metabolism)  Manju Asher RN as BMT Nurse Coordinator  Umesh Brandt MD as BMT Physician (Transplant)            Patient's Pharmacy:     [id number] - [place], [place] - [address]  [address]  [facility] [address]  Phone: [phone] Fax: [phone]     Patient's Contacts:  Name Home Phone Work Phone Mobile Phone Relationship Lgl VitalyJEAN Kuo [phone] Grandchild      FRANCISCO JAVIER KESSLER     [phone] Significant*        Patient's Insurance:     Payor: [facility] / Plan: [employer] MEDICARE ADVANTAGE / Product Type: HMO /       Subjective:      History of present illness:     Jenn Barclay is an 67 year old here for an annual wellness visit.     The issues she would like to address at today's visit include the following:          Chief Complaint   Patient presents with     Wellness Visit       Has a lump in her R palm of her hand has been hurting. Ringing in ears has gotten worse. Can she have a script for boost or ensure, she has been buying it and it is very expensive.        Patient comes in today with her granddaughter.     She is in for annual wellness visit and for other issues.     We reviewed her acne rosacea.  This has recently worsened.  This is been in the last 2 to 3 weeks.  She has pustules especially over her left face.  She has some thickening of the skin as well.  It has not been this bad for quite a while.     Reviewed her COPD.  She continues on the inhalers she has.  She is not on a steroid inhaler due to cost.  We talked about this again with her granddaughter.  They might look into resources for help.     We did a form for [employer] today.  This was done at the visit and a copy from the [facility] [place] given to the patient today.     The patient herself has had issues with equilibrium more so as of late.  She has had a fall in the interim.  No other injury with this.     Reviewed her osteoporosis.  She is on Fosamax for 5 years but there was a lapse in this therapy so we will continue 1 more year.  Consider DEXA next year.     She continues have tinnitus on both ears.  We reviewed options related to this.     She has a painful cyst on the proximal palmar surface of her right hands.  Its more on the ulnar surface as well.  This has been more painful over the last few weeks.  No known injury.     She declines mammogram due to her habitus and  painful mammograms with minimal breast tissue.  We reminded her that she is due for a tetanus update.     In relationship to her chronic pain we renewed her pain agreement and urine drug screen today.  She has been appropriate in her use of pain medication.     We reviewed her other issues noted in the assessment but not specifically addressed in the HPI above.             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)     Pseudogout     Traumatic compression fracture of T8 thoracic vertebra, closed, initial encounter (H)     Chronic diarrhea     Controlled substance agreement signed - 1/22 - oxycodone - UDS 1/22     Chronic, continuous use of opioids          Low     Nephrolithiasis     Anxiety     Gastric ulcer     Migraine headache     Papular rash, generalized     Senile purpura (H)     OP (osteoporosis)     Tubular adenoma of colon - advanced adenoma in 2017 - single small adenoma in 2020          Other     Exocrine pancreatic insufficiency              Past Medical History:   Diagnosis Date     Acute myeloid leukemia (AML), M2 (H)       AML (acute myeloid leukemia) (H) 12/2009     Baker's cyst       Chronic back pain       Chronic diarrhea       Compression fx, lumbar spine (H)       Controlled substance agreement signed 8/3/2017     COPD (chronic obstructive pulmonary disease) (H)       COPD (chronic obstructive pulmonary disease) (H)       Full code status 9/10/2017     Gastric ulcer       pt states she has not had any ulcers     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)       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      History of PID       Metatarsal fracture 2017     2nd      Migraine without aura, without mention of intractable migraine without mention of status migrainosus       Osteoporosis 2020     DXA     Osteoporosis 5/3/2016     Papular rash, generalized       Post-menopausal 2/8/2020     Early surgical menopause     Pseudogout 11/30/2016     Serrated adenoma of colon 7/17/2020     Status post allogeneic bone marrow transplant (H)       Surgical menopause 1976     oophorectomy     Tobacco abuse       Traumatic compression fracture of T8 thoracic vertebra, closed, initial encounter (H) 4/26/2016     Tubular adenoma of colon 7/17/2020     Vertebral compression fracture (H) 2014     Zoster 2018            Past Surgical History:   Procedure Laterality Date     APPENDECTOMY         COLONOSCOPY N/A 07/28/2020     Procedure: COLONOSCOPY, WITH POLYPECTOMY AND BIOPSY;  Surgeon: Gianni Valerio MD;  Location: [facility] OR     HYSTERECTOMY TOTAL ABDOMINAL, BILATERAL SALPINGO-OOPHORECTOMY, COMBINED   1976     IR MISCELLANEOUS PROCEDURE   02/22/2010     TRANSPLANT   01/01/2010     VERTEBROPLASTY   01/01/2016     T5 and T8 vertebrae.            Family History   Problem Relation Age of Onset     Diabetes Mother           borderline     Pancreatic Cancer Father       Diabetes Maternal Grandmother       Diabetes Maternal Uncle       Thyroid Disease No family hx of       Melanoma No family hx of       Skin Cancer No family hx of       Lung Cancer Mother        Family history is otherwise noncontributory.     Social History            Occupational History     Not on file   Tobacco Use     Smoking status: Every Day       Packs/day: 0.50       Years: 49.00       Pack years: 24.50       Types: Cigarettes     Smokeless tobacco: Former     Tobacco comments:       has used E-cigarettes in the past, info given   Vaping Use     Vaping Use: Never used   Substance and Sexual Activity     Alcohol use: No     Drug use: No     Sexual activity: Never      Social  History          Social History Narrative     , on disability.           Has family in the area.           Patient of Dr. Dunn since 2015.            Current Outpatient Medications   Medication Instructions     albuterol (PROAIR HFA/PROVENTIL HFA/VENTOLIN HFA) 108 (90 Base) MCG/ACT inhaler 2 puffs, Inhalation, EVERY 4 HOURS PRN     albuterol (PROVENTIL) (2.5 MG/3ML) 0.083% neb solution USE 1 VIAL IN NEBULIZER EVERY 6 HOURS AS NEEDED FOR WHEEZING     Albuterol Sulfate (VENTOLIN HFA) 108 (90 BASE) MCG/ACT AERS Inhale  into the lungs.     alendronate (FOSAMAX) 70 mg, Oral, WEEKLY     amylase-lipase-protease (CREON) 45502-76085 units CPEP per EC capsule 2 capsules, Oral, 3 TIMES DAILY WITH MEALS, And snacks     clindamycin 1 % EX external lotion Topical, 2 TIMES DAILY     doxycycline hyclate (VIBRAMYCIN) 100 mg, Oral, DAILY, May use monohydrate if cheaper.  May use tablet or capsule for cost.     gentamicin 0.1 % EX external ointment Topical, 2 TIMES DAILY     ipratropium - albuterol 0.5 mg/2.5 mg/3 mL (DUONEB) 0.5-2.5 (3) MG/3ML neb solution 3 mLs, Nebulization, EVERY 6 HOURS PRN     Loperamide HCl (IMODIUM OR) Oral, 2 TIMES DAILY     Multiple Vitamins-Minerals (MULTIVITAL PO) Take  by mouth.     mupirocin (BACTROBAN) 2 % external ointment Use 2 times a day to affected area on cheek     nebulizer nebulization 1 nebulizer please as covered by insurance.  May also dispense supplies (tubing, inhalation device, etc) as needed.     Omega-3 Fatty Acids (FISH OIL PO) Oral     oxyCODONE IR (ROXICODONE) 10 mg, Oral, 4 TIMES DAILY, For use from 12/21/22 to 1/20/2023     triamcinolone (KENALOG) 0.1 % external cream Apply tid to itchy or tender skin lesions     umeclidinium (INCRUSE ELLIPTA) 62.5 MCG/INH inhaler 1 puff, Inhalation, DAILY     vitamin C (ASCORBIC ACID) 500 mg, DAILY      Allergies: Mirtazapine, Tape [adhesive tape], and Liquid adhesive           Immunization History   Administered Date(s) Administered      COVID-19 Vaccine 18+ (Moderna) 03/29/2021, 04/26/2021, 11/22/2021     COVID-19 Vaccine Bivalent Booster 18+ (Moderna) 01/05/2023     COVID-19,PF,Moderna Booster 07/05/2022     DT (PEDS <7y) 10/01/1994, 01/06/2005     FLUAD(HD)65+ QUAD 10/09/2022     HepB-Adult 06/20/2011, 08/23/2011, 06/15/2012     Hib (PRP-T) 08/23/2011     Influenza (IIV3) PF 10/14/2010     Influenza Vaccine 65+ (Fluzone HD) 09/20/2021     Influenza Vaccine >6 months (Alfuria,Fluzone) 10/23/2018, 10/12/2020     Pedvax-hib 06/20/2011, 06/15/2012     Pneumo Conj 13-V (2010&after) 06/20/2011, 08/23/2011     Pneumococcal (PCV 7) 06/20/2011     Pneumococcal 23 valent 12/18/2019     Poliovirus, inactivated (IPV) 06/20/2011, 08/23/2011, 06/15/2012     TDAP Vaccine (Boostrix) 06/20/2011, 08/23/2011, 06/15/2012     Td (Adult), Adsorbed 01/06/2005     Tdap (Adacel,Boostrix) 06/20/2011, 08/23/2011, 06/15/2012                      Narrative & Impression   EXAM: CT ABDOMEN PELVIS WO ORAL W IV CONTRAST  LOCATION: [facility]  DATE/TIME: 10/16/2019 11:40 PM     INDICATION: Epigastric abdominal pain with vomiting, AML  COMPARISON: 04/21/2019  TECHNIQUE: CT scan of the abdomen and pelvis was performed following injection of IV contrast. Multiplanar reformats were obtained. Dose reduction techniques were used.  CONTRAST: Iohexol (Omni) 100 mL     FINDINGS:   LOWER CHEST: Evidence for remote granulomatous disease. Similar very small pericardial effusion.     HEPATOBILIARY: Normal.     PANCREAS: Pancreatic atrophy as on prior.     SPLEEN: Normal.     KIDNEYS/BLADDER: Normal.     ADRENAL GLANDS: Normal.     BOWEL: Equivocal thickening in the region of the antrum could be artifact from contraction or spasm. Minimal gastric distention. No surrounding inflammatory change. No bowel obstruction.     LYMPH NODES: Normal.     PELVIC ORGANS: Hysterectomy.     MUSCULOSKELETAL: Degenerative disease. Stable compression fractures. Diffuse osteopenia. Remote right femoral  intervention.     OTHER: None.     IMPRESSION:  1.  Equivocal thickening in the region of the antrum could be artifact from contraction or spasm. Differential would include inflammation or infection.  2.  Remainder stable.

## 2023-04-05 NOTE — PROGRESS NOTES
Virtual Visit Details    Type of service:  Video Visit   Joined the call at 4/5/2023, 12:09:45 pm.  Left the call at 4/5/2023, 12:29:01 pm.  You were on the call for 19 minutes 16 seconds .    Originating Location (pt. Location): Home  Distant Location (provider location):  On-site  Platform used for Video Visit: [employer]

## 2023-04-05 NOTE — LETTER
4/5/2023         RE: Jenn Barclay  [address] [place] [address]        Dear Colleague,    Thank you for referring your patient, Jenn Barclay, to the [facility] [place]. Please see a copy of my visit note below.       Elastase Fecal >199.9 ug/g 0.5 Low     Referred by Dr Dunn for weight loss, oily stools, exocrine pancreatic insufficiency. BMI 16.54, Eats continuously.   Post BMT 2010 for leukemia, in remission.   Was on CREON 24K 2 w ea meal. Ran out 3 months ago.   Had pancreatic atrophy on last CT 2019, no imaging since. No history of acute pancreatitis. No pain, other than crampy pain relieved w BM  Lifelong smoker, still smoking. No ETOH history.   No diabetes  See below for rest of details.  Meds reviewed. Is on multiple vitamins incl D3, calcium, others     IMP:   Severe exocrine pancreatic insuffiency, likely related to atrophic pancreatitis, idiopathic, perhaps smoking related.   Is completely off panc enzymes x 3 months  Severe malnutrition, likely vitamin deficient fat solubles    REC:  Restart CREON 24KU lipase strength, 2-3 ea meal and 1-2 w snacks  Complete nutritional panel including vitamins ADEK  Dietitian amena Vivar  Smoking cessation  Follow-up w me in 2 months    Joined the call at 4/5/2023, 12:09:45 pm.  Left the call at 4/5/2023, 12:29:01 pm.  You were on the call for 19 minutes 16 seconds .        1.  Equivocal thickening in the region of the antrum could be artifact from contraction or spasm. Differential would include inflammation or infection.   2.  Remainder stable.  Narrative    EXAM: CT ABDOMEN PELVIS WO ORAL W IV CONTRAST   LOCATION: [facility]   DATE/TIME: 10/16/2019 11:40 PM     INDICATION: Epigastric abdominal pain with vomiting, AML   COMPARISON: 04/21/2019   TECHNIQUE: CT scan of the abdomen and pelvis was performed following injection of IV contrast. Multiplanar reformats were obtained. Dose reduction techniques were  used.   CONTRAST: Iohexol (Omni) 100 mL     FINDINGS:   LOWER CHEST: Evidence for remote granulomatous disease. Similar very small pericardial effusion.     HEPATOBILIARY: Normal.     PANCREAS: Pancreatic atrophy as on prior.     SPLEEN: Normal.     KIDNEYS/BLADDER: Normal.     ADRENAL GLANDS: Normal.     BOWEL: Equivocal thickening in the region of the antrum could be artifact from contraction or spasm. Minimal gastric distention. No surrounding inflammatory change. No bowel obstruction.     LYMPH NODES: Normal.     PELVIC ORGANS: Hysterectomy.     MUSCULOSKELETAL: Degenerative disease. Stable compression fractures. Diffuse osteopenia. Remote right femoral intervention.     OTHER: None.  Other Result Text    Interface, Rad Results In - 10/17/2019 12:12 AM CDT   EXAM: CT ABDOMEN PELVIS WO ORAL W IV CONTRAST   LOCATION: [facility]   DATE/TIME: 10/16/2019 11:40 PM     INDICATION: Epigastric abdominal pain with vomiting, AML   COMPARISON: 04/21/2019   TECHNIQUE: CT scan of the abdomen and pelvis was performed following injection of IV contrast. Multiplanar reformats were obtained. Dose reduction techniques were used.   CONTRAST: Iohexol (Omni) 100 mL     FINDINGS:   LOWER CHEST: Evidence for remote granulomatous disease. Similar very small pericardial effusion.     HEPATOBILIARY: Normal.     PANCREAS: Pancreatic atrophy as on prior.     SPLEEN: Normal.     KIDNEYS/BLADDER: Normal.     ADRENAL GLANDS: Normal.     BOWEL: Equivocal thickening in the region of the antrum could be artifact from contraction or spasm. Minimal gastric distention. No surrounding inflammatory change. No bowel obstruction.     LYMPH NODES: Normal.     PELVIC ORGANS: Hysterectomy.     MUSCULOSKELETAL: Degenerative disease. Stable compression fractures. Diffuse osteopenia. Remote right femoral intervention.     OTHER: None.     IMPRESSION:   1.  Equivocal thickening in the region of the antrum could be artifact from contraction or spasm.  Differential would include inflammation or infection.   2.  Remainder stable.      She hasexocrine pancreatic insufficiency (pancreatic atrophy on imaging) on Creon, elevated random glucose values but not >200 mg/dL (no prior diagnosis of DM, she has never been on a medication to lower glucose), gastric ulcer, chronic diarrhea, AML s/p BMT in 2010.     # Exocrine pancreatic insufficiency  # Malnutrition, BMI 16.5 kg/meters squared     She notes that despite significant caloric intake she has not been able to gain weight.  I did refer her to gastroenterology.        Pt has not had a fecal elastase test ordered per protocol. Pt reports orange oily stools. Pt reports eating a lot and still losing weight. Pt has osteoporosis. Clinic scheduled with Dr. Gunn on 3/22, pt will get stool test done prior discussed possibility of starting creon in advance of clinic with Jorge Ford MD  Physician  Specialty:  Internal Medicine  Progress Notes      Addendum  Encounter Date:  1/5/2023     Addendum                                                                                                                                                                                                                                                                                                                                                                                                                                              [facility]     Comprehensive and complex medical care - Chronic disease management - Shared decision making - Care coordination - Compassionate care     Patient advocacy - Rational deprescribing - Minimally disruptive medicine - Ethical focus - Customized care            Date of Service: 1/5/2023  Primary Provider: Jorge Dunn     Patient Care Team:  Jorge Dunn MD as PCP - General (Internal Medicine)  Mohit Montano MD as MD  (Hematology & Oncology)  Jorge Dunn MD as Assigned PCP  Joellen Nova MD as Resident (Dermatology)  Madison Alexander LICSW as  (BMT - Adult)  Ryan Alonso MD as MD (Endocrinology, Diabetes, and Metabolism)  Manju Asher, RN as BMT Nurse Coordinator  Umesh Brandt MD as BMT Physician (Transplant)            Patient's Pharmacy:     [id number] - [place], [place] - [address]  [address]  [facility] [address]  Phone: [phone] Fax: [phone]     Patient's Contacts:  Name Home Phone Work Phone Mobile Phone Relationship Lgl Grd   JEAN JUARES [phone] Grandchild     FRANCISCO JAVIER KESSLER     [phone] Significant*        Patient's Insurance:     Payor: [employer] / Plan: UNITED HE[employer] MEDICARE ADVANTAGE / Product Type: HMO /       Subjective:      History of present illness:     Jenn Barclay is an 67 year old here for an annual wellness visit.     The issues she would like to address at today's visit include the following:          Chief Complaint   Patient presents with    Wellness Visit       Has a lump in her R palm of her hand has been hurting. Ringing in ears has gotten worse. Can she have a script for boost or ensure, she has been buying it and it is very expensive.        Patient comes in today with her granddaughter.     She is in for annual wellness visit and for other issues.     We reviewed her acne rosacea.  This has recently worsened.  This is been in the last 2 to 3 weeks.  She has pustules especially over her left face.  She has some thickening of the skin as well.  It has not been this bad for quite a while.     Reviewed her COPD.  She continues on the inhalers she has.  She is not on a steroid inhaler due to cost.  We talked about this again with her granddaughter.  They might look into resources for help.     We did a form for [employer] today.  This was done at the visit and  a copy from the [facility] given to the patient today.     The patient herself has had issues with equilibrium more so as of late.  She has had a fall in the interim.  No other injury with this.     Reviewed her osteoporosis.  She is on Fosamax for 5 years but there was a lapse in this therapy so we will continue 1 more year.  Consider DEXA next year.     She continues have tinnitus on both ears.  We reviewed options related to this.     She has a painful cyst on the proximal palmar surface of her right hands.  Its more on the ulnar surface as well.  This has been more painful over the last few weeks.  No known injury.     She declines mammogram due to her habitus and painful mammograms with minimal breast tissue.  We reminded her that she is due for a tetanus update.     In relationship to her chronic pain we renewed her pain agreement and urine drug screen today.  She has been appropriate in her use of pain medication.     We reviewed her other issues noted in the assessment but not specifically addressed in the HPI above.             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)     Pseudogout     Traumatic compression fracture of T8 thoracic vertebra, closed, initial encounter (H)     Chronic diarrhea     Controlled substance agreement signed - 1/22 - oxycodone - UDS 1/22     Chronic, continuous use of opioids          Low     Nephrolithiasis     Anxiety     Gastric ulcer     Migraine headache     Papular rash, generalized     Senile purpura (H)     OP (osteoporosis)     Tubular adenoma of colon - advanced adenoma in 2017 - single  small adenoma in 2020          Other     Exocrine pancreatic insufficiency              Past Medical History:   Diagnosis Date    Acute myeloid leukemia (AML), M2 (H)      AML (acute myeloid leukemia) (H) 12/2009    Baker's cyst      Chronic back pain      Chronic diarrhea      Compression fx, lumbar spine (H)      Controlled substance agreement signed 8/3/2017    COPD (chronic obstructive pulmonary disease) (H)      COPD (chronic obstructive pulmonary disease) (H)      Full code status 9/10/2017    Gastric ulcer       pt states she has not had any ulcers    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)      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    History of PID      Metatarsal fracture 2017 2nd     Migraine without aura, without mention of intractable migraine without mention of status migrainosus      Osteoporosis 2020     DXA    Osteoporosis 5/3/2016    Papular rash, generalized      Post-menopausal 2/8/2020     Early surgical menopause    Pseudogout 11/30/2016    Serrated adenoma of colon 7/17/2020    Status post allogeneic bone marrow transplant (H)      Surgical menopause 1976     oophorectomy    Tobacco abuse      Traumatic compression fracture of T8 thoracic vertebra, closed, initial encounter (H) 4/26/2016    Tubular adenoma of colon 7/17/2020    Vertebral compression fracture (H) 2014    Zoster 2018            Past Surgical History:   Procedure Laterality Date    APPENDECTOMY        COLONOSCOPY N/A 07/28/2020     Procedure: COLONOSCOPY, WITH POLYPECTOMY AND BIOPSY;  Surgeon: Gianni Valerio MD;  Location: [facility] OR    HYSTERECTOMY TOTAL ABDOMINAL, BILATERAL SALPINGO-OOPHORECTOMY, COMBINED   1976    IR MISCELLANEOUS PROCEDURE   02/22/2010    TRANSPLANT   01/01/2010    VERTEBROPLASTY   01/01/2016     T5 and T8 vertebrae.            Family History   Problem Relation Age of Onset    Diabetes Mother           borderline    Pancreatic Cancer Father      Diabetes Maternal Grandmother      Diabetes Maternal  Uncle      Thyroid Disease No family hx of      Melanoma No family hx of      Skin Cancer No family hx of      Lung Cancer Mother        Family history is otherwise noncontributory.     Social History            Occupational History    Not on file   Tobacco Use    Smoking status: Every Day       Packs/day: 0.50       Years: 49.00       Pack years: 24.50       Types: Cigarettes    Smokeless tobacco: Former    Tobacco comments:       has used E-cigarettes in the past, info given   Vaping Use    Vaping Use: Never used   Substance and Sexual Activity    Alcohol use: No    Drug use: No    Sexual activity: Never      Social History          Social History Narrative     , on disability.           Has family in the area.           Patient of Dr. Dunn since 2015.            Current Outpatient Medications   Medication Instructions    albuterol (PROAIR HFA/PROVENTIL HFA/VENTOLIN HFA) 108 (90 Base) MCG/ACT inhaler 2 puffs, Inhalation, EVERY 4 HOURS PRN    albuterol (PROVENTIL) (2.5 MG/3ML) 0.083% neb solution USE 1 VIAL IN NEBULIZER EVERY 6 HOURS AS NEEDED FOR WHEEZING    Albuterol Sulfate (VENTOLIN HFA) 108 (90 BASE) MCG/ACT AERS Inhale  into the lungs.    alendronate (FOSAMAX) 70 mg, Oral, WEEKLY    amylase-lipase-protease (CREON) 90292-33968 units CPEP per EC capsule 2 capsules, Oral, 3 TIMES DAILY WITH MEALS, And snacks    clindamycin 1 % EX external lotion Topical, 2 TIMES DAILY    doxycycline hyclate (VIBRAMYCIN) 100 mg, Oral, DAILY, May use monohydrate if cheaper.  May use tablet or capsule for cost.    gentamicin 0.1 % EX external ointment Topical, 2 TIMES DAILY    ipratropium - albuterol 0.5 mg/2.5 mg/3 mL (DUONEB) 0.5-2.5 (3) MG/3ML neb solution 3 mLs, Nebulization, EVERY 6 HOURS PRN    Loperamide HCl (IMODIUM OR) Oral, 2 TIMES DAILY    Multiple Vitamins-Minerals (MULTIVITAL PO) Take  by mouth.    mupirocin (BACTROBAN) 2 % external ointment Use 2 times a day to affected area on cheek    nebulizer nebulization  1 nebulizer please as covered by insurance.  May also dispense supplies (tubing, inhalation device, etc) as needed.    Omega-3 Fatty Acids (FISH OIL PO) Oral    oxyCODONE IR (ROXICODONE) 10 mg, Oral, 4 TIMES DAILY, For use from 12/21/22 to 1/20/2023    triamcinolone (KENALOG) 0.1 % external cream Apply tid to itchy or tender skin lesions    umeclidinium (INCRUSE ELLIPTA) 62.5 MCG/INH inhaler 1 puff, Inhalation, DAILY    vitamin C (ASCORBIC ACID) 500 mg, DAILY      Allergies: Mirtazapine, Tape [adhesive tape], and Liquid adhesive           Immunization History   Administered Date(s) Administered    COVID-19 Vaccine 18+ (Moderna) 03/29/2021, 04/26/2021, 11/22/2021    COVID-19 Vaccine Bivalent Booster 18+ (Moderna) 01/05/2023    COVID-19,PF,Moderna Booster 07/05/2022    DT (PEDS <7y) 10/01/1994, 01/06/2005    FLUAD(HD)65+ QUAD 10/09/2022    HepB-Adult 06/20/2011, 08/23/2011, 06/15/2012    Hib (PRP-T) 08/23/2011    Influenza (IIV3) PF 10/14/2010    Influenza Vaccine 65+ (Fluzone HD) 09/20/2021    Influenza Vaccine >6 months (Alfuria,Fluzone) 10/23/2018, 10/12/2020    Pedvax-hib 06/20/2011, 06/15/2012    Pneumo Conj 13-V (2010&after) 06/20/2011, 08/23/2011    Pneumococcal (PCV 7) 06/20/2011    Pneumococcal 23 valent 12/18/2019    Poliovirus, inactivated (IPV) 06/20/2011, 08/23/2011, 06/15/2012    TDAP Vaccine (Boostrix) 06/20/2011, 08/23/2011, 06/15/2012    Td (Adult), Adsorbed 01/06/2005    Tdap (Adacel,Boostrix) 06/20/2011, 08/23/2011, 06/15/2012                      Narrative & Impression   EXAM: CT ABDOMEN PELVIS WO ORAL W IV CONTRAST  LOCATION: [facility]  DATE/TIME: 10/16/2019 11:40 PM     INDICATION: Epigastric abdominal pain with vomiting, AML  COMPARISON: 04/21/2019  TECHNIQUE: CT scan of the abdomen and pelvis was performed following injection of IV contrast. Multiplanar reformats were obtained. Dose reduction techniques were used.  CONTRAST: Iohexol (Omni) 100 mL     FINDINGS:   LOWER CHEST: Evidence  for remote granulomatous disease. Similar very small pericardial effusion.     HEPATOBILIARY: Normal.     PANCREAS: Pancreatic atrophy as on prior.     SPLEEN: Normal.     KIDNEYS/BLADDER: Normal.     ADRENAL GLANDS: Normal.     BOWEL: Equivocal thickening in the region of the antrum could be artifact from contraction or spasm. Minimal gastric distention. No surrounding inflammatory change. No bowel obstruction.     LYMPH NODES: Normal.     PELVIC ORGANS: Hysterectomy.     MUSCULOSKELETAL: Degenerative disease. Stable compression fractures. Diffuse osteopenia. Remote right femoral intervention.     OTHER: None.     IMPRESSION:  1.  Equivocal thickening in the region of the antrum could be artifact from contraction or spasm. Differential would include inflammation or infection.  2.  Remainder stable.        Again, thank you for allowing me to participate in the care of your patient.      Sincerely,    Donny Gunn MD

## 2023-04-05 NOTE — NURSING NOTE
Is the patient currently in the state of [place]? YES    Visit mode:VIDEO    If the visit is dropped, the patient can be reconnected by: VIDEO VISIT: Text to cell phone: [phone]    Will anyone else be joining the visit? NO      How would you like to obtain your AVS? MyChart    Are changes needed to the allergy or medication list? NO    Reason for visit: pancreas

## 2023-04-05 NOTE — TELEPHONE ENCOUNTER
Post clinic with Dr. Gunn  REC:  Restart CREON 24KU lipase strength, 2-3 ea meal and 1-2 w snacks  Complete nutritional panel including vitamins ISAK  Dietitian amena Vivar  Smoking cessation  Follow-up w me in 2 months       Labs ordered, left patient message    COPAY ASSISTANCE PROGRAMS  1. Enroll in Creon On Course - this program helps to reduce your copay (up to $3,000 per year) and provide access to free vitamins, that may also be needed related to your pancreatic disease.  Visit: [place]  Call: [phone]    2. For Medicare Part D patients:  call to inquire about additional programs that can reduce your monthly copays  Visit: https://www.medicare.gov/drug-coverage-part-d  Call: 1-799.441.3140    3. For patients with financial need not helped by the 2 options above:  Visit: [email or web address]-support/patient-assistance.html  Call: [phone]

## 2023-04-14 DIAGNOSIS — M54.50 CHRONIC BILATERAL LOW BACK PAIN, UNSPECIFIED WHETHER SCIATICA PRESENT: ICD-10-CM

## 2023-04-14 DIAGNOSIS — G89.29 CHRONIC BILATERAL LOW BACK PAIN, UNSPECIFIED WHETHER SCIATICA PRESENT: ICD-10-CM

## 2023-04-14 RX ORDER — OXYCODONE HYDROCHLORIDE 10 MG/1
10 TABLET ORAL 4 TIMES DAILY
Qty: 120 TABLET | Refills: 0 | Status: SHIPPED | OUTPATIENT
Start: 2023-04-19 | End: 2023-05-15

## 2023-04-14 NOTE — TELEPHONE ENCOUNTER
Pharmacy calling to get a medication refill on medications attached    Controlled Substance Refill Request for     oxyCODONE IR (ROXICODONE) 10 MG tablet    Last refill: 3/20/2023    Last clinic visit: 01/05/2023    Clinic visit frequency required:   Next appt: 07/06/2023    Has 3 left     Call back   [phone]

## 2023-05-15 DIAGNOSIS — G89.29 CHRONIC BILATERAL LOW BACK PAIN, UNSPECIFIED WHETHER SCIATICA PRESENT: ICD-10-CM

## 2023-05-15 DIAGNOSIS — M54.50 CHRONIC BILATERAL LOW BACK PAIN, UNSPECIFIED WHETHER SCIATICA PRESENT: ICD-10-CM

## 2023-05-15 RX ORDER — OXYCODONE HYDROCHLORIDE 10 MG/1
10 TABLET ORAL 4 TIMES DAILY
Qty: 120 TABLET | Refills: 0 | Status: SHIPPED | OUTPATIENT
Start: 2023-05-19 | End: 2023-06-13

## 2023-05-15 NOTE — TELEPHONE ENCOUNTER
Medication Question or Refill    Contacts       Type Contact Phone/Fax    05/15/2023 08:42 AM CDT Phone (Incoming) Deny Jenn ESCALANTE (Self) [phone] (M)          What medication are you calling about (include dose and sig)?:     Oxycodone IR (ROXICODONE) 10 mg tablet    Preferred Pharmacy:    [id number] [address] & [address] [address]  Phone: [phone] Fax: [phone]      Controlled Substance Agreement on file:   CSA -- Patient Level:     [Media Unavailable] Controlled Substance Agreement - Opioid - Scan on 1/9/2023  9:02 AM   [Media Unavailable] Controlled Substance Agreement - Opioid - Scan on 1/3/2022  1:31 PM   [Media Unavailable] Controlled Substance Agreement - Opioid - Scan on 12/18/2019   [Media Unavailable] Controlled Substance Agreement - Opioid - Scan on 12/28/2018   [Media Unavailable] Controlled Substance Agreement - Opioid - Scan on 8/9/2017: [facility]       Who prescribed the medication?: PCP Dr. Jorge Dunn    Do you need a refill? Yes    When did you use the medication last? yesterday    Patient offered an appointment? Yes: declined has appt scheduled for 7/06/2023    Do you have any questions or concerns?  No

## 2023-06-04 ENCOUNTER — HOSPITAL ENCOUNTER (EMERGENCY)
Facility: HOSPITAL | Age: 68
Discharge: HOME | End: 2023-06-05
Attending: EMERGENCY MEDICINE | Admitting: EMERGENCY MEDICINE
Payer: COMMERCIAL

## 2023-06-04 DIAGNOSIS — R60.9 DEPENDENT EDEMA: ICD-10-CM

## 2023-06-04 PROCEDURE — 85027 COMPLETE CBC AUTOMATED: CPT | Performed by: EMERGENCY MEDICINE

## 2023-06-04 PROCEDURE — 99284 EMERGENCY DEPT VISIT MOD MDM: CPT | Mod: 25

## 2023-06-04 PROCEDURE — 83880 ASSAY OF NATRIURETIC PEPTIDE: CPT | Performed by: EMERGENCY MEDICINE

## 2023-06-04 PROCEDURE — 36415 COLL VENOUS BLD VENIPUNCTURE: CPT | Performed by: EMERGENCY MEDICINE

## 2023-06-04 PROCEDURE — 80048 BASIC METABOLIC PNL TOTAL CA: CPT | Performed by: EMERGENCY MEDICINE

## 2023-06-05 ENCOUNTER — APPOINTMENT (OUTPATIENT)
Dept: ULTRASOUND IMAGING | Facility: HOSPITAL | Age: 68
End: 2023-06-05
Attending: EMERGENCY MEDICINE
Payer: COMMERCIAL

## 2023-06-05 VITALS
WEIGHT: 107.58 LBS | OXYGEN SATURATION: 92 % | RESPIRATION RATE: 18 BRPM | SYSTOLIC BLOOD PRESSURE: 146 MMHG | TEMPERATURE: 98.2 F | HEIGHT: 68 IN | BODY MASS INDEX: 16.31 KG/M2 | HEART RATE: 65 BPM | DIASTOLIC BLOOD PRESSURE: 82 MMHG

## 2023-06-05 LAB
ANION GAP SERPL CALCULATED.3IONS-SCNC: 9 MMOL/L (ref 7–15)
BUN SERPL-MCNC: 7.9 MG/DL (ref 8–23)
CALCIUM SERPL-MCNC: 8.5 MG/DL (ref 8.8–10.2)
CHLORIDE SERPL-SCNC: 102 MMOL/L (ref 98–107)
CREAT SERPL-MCNC: 0.71 MG/DL (ref 0.51–0.95)
DEPRECATED HCO3 PLAS-SCNC: 32 MMOL/L (ref 22–29)
ERYTHROCYTE [DISTWIDTH] IN BLOOD BY AUTOMATED COUNT: 13.2 % (ref 10–15)
GFR SERPL CREATININE-BSD FRML MDRD: >90 ML/MIN/1.73M2
GLUCOSE SERPL-MCNC: 156 MG/DL (ref 70–99)
HCT VFR BLD AUTO: 36 % (ref 35–47)
HGB BLD-MCNC: 11.8 G/DL (ref 11.7–15.7)
MCH RBC QN AUTO: 29.9 PG (ref 26.5–33)
MCHC RBC AUTO-ENTMCNC: 32.8 G/DL (ref 31.5–36.5)
MCV RBC AUTO: 91 FL (ref 78–100)
NT-PROBNP SERPL-MCNC: 422 PG/ML (ref 0–900)
PLATELET # BLD AUTO: 225 10E3/UL (ref 150–450)
POTASSIUM SERPL-SCNC: 3 MMOL/L (ref 3.4–5.3)
RBC # BLD AUTO: 3.94 10E6/UL (ref 3.8–5.2)
SODIUM SERPL-SCNC: 143 MMOL/L (ref 136–145)
WBC # BLD AUTO: 9.3 10E3/UL (ref 4–11)

## 2023-06-05 PROCEDURE — 93970 EXTREMITY STUDY: CPT

## 2023-06-05 ASSESSMENT — ACTIVITIES OF DAILY LIVING (ADL): ADLS_ACUITY_SCORE: 35

## 2023-06-05 NOTE — ED PROVIDER NOTES
EMERGENCY DEPARTMENT ENCOUNTER      NAME: Jenn Barclay  AGE: 67 year old female  YOB: 1955  MRN: [id number]  EVALUATION DATE & TIME: 2023 11:22 PM    PCP: Jorge Dunn    ED PROVIDER: Gianni Nelson D.O.      Chief Complaint   Patient presents with     Bilateral Swollen Feet       FINAL IMPRESSION:  1. Dependent edema        ED COURSE & MEDICAL DECISION MAKIN:27 PM I met with the patient to gather history and to perform my initial exam. I discussed the plan for care while in the Emergency Department.  1:00 AM I reevaluated and updated the patient. Discussed plans for discharge.         Pertinent Labs & Imaging studies reviewed. (See chart for details)  67 year old female presents to the Emergency Department for evaluation of bilateral feet swelling.  Initial concern was for infection versus CHF versus DVT versus dependent edema.  No evidence of trauma or history.  BNP is not elevated, there is no evidence of infection as there is no erythema or warmth, ultrasound bilateral lower extremities was negative for DVT.  Most consistent with dependent edema.  Do not believe further imaging is indicated.  Believe the patient be safely discharged home with outpatient follow-up with primary care provider.  Symptoms are fairly minimal therefore did not discharge on Lasix.  Return precautions were discussed.    Medical Decision Making    History:    Supplemental history from: Family Member/Significant Other    External Record(s) reviewed: Documented in chart, if applicable.    Work Up:    Chart documentation includes differential considered and any EKGs or imaging independently interpreted by provider, where specified.    In additional to work up documented, I considered the following work up: Documented in chart, if applicable.    External consultation:    Discussion of management with another provider: Documented in chart, if applicable    Complicating factors:    Care impacted by chronic illness:  Cancer/Chemotherapy, Chronic Lung Disease, Chronic Pain and Smoking / Nicotine Use    Care affected by social determinants of health: N/A    Disposition considerations: Discharge. No recommendations on prescription strength medication(s). N/A.        At the conclusion of the encounter I discussed the results of all of the tests and the disposition. The questions were answered. The patient or family acknowledged understanding and was agreeable with the care plan.          HPI    Patient information was obtained from: Patient    Use of : N/A       Jenn Barclay is a 67 year old female who presents by walk in with a family member for the evaluation of feet swelling. Earlier tonight prior to arrival, the patient reports she started to develop bilateral feet swelling. Patient feels her bilateral feet feel warmer than usual.    Patient denies any pain. No other reported complaints at this time. She states the last time her feet were swollen, she was diagnosed with leukemia in 2009. Patient states her leukemia is in remission.    SHx- Endorses tobacco use. Denies alcohol use.        REVIEW OF SYSTEMS  Constitutional:  Denies fever, chills, weight loss or weakness  Eyes:  No pain, discharge, redness  HENT:  Denies sore throat, ear pain, congestion  Respiratory: No SOB, wheeze or cough  Cardiovascular:  No CP, palpitations  GI:  Denies abdominal pain, nausea, vomiting, diarrhea  : Denies dysuria, hematuria  Musculoskeletal:  Positive for feet swelling (bilateral). Denies any new muscle/joint pain, loss of function.  Skin:  Denies rash, pallor  Neurologic:  Denies headache, focal weakness or sensory changes  Lymph: Denies swollen nodes    All other systems negative unless noted in HPI.    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     History of PID      Metatarsal fracture 2017    2nd      Migraine without aura, without mention of intractable migraine without mention of status migrainosus      Osteoporosis 2020    DXA     Osteoporosis 5/3/2016     Papular rash, generalized      Post-menopausal 2/8/2020    Early surgical menopause     Pseudogout 11/30/2016     Serrated adenoma of colon 7/17/2020     Status post allogeneic bone marrow transplant (H)      Surgical menopause 1976    oophorectomy     Tobacco abuse      Traumatic compression fracture of T8 thoracic vertebra, closed, initial encounter (H) 4/26/2016     Tubular adenoma of colon 7/17/2020     Vertebral compression fracture (H) 2014     Zoster 2018       PAST SURGICAL HISTORY:  Past Surgical History:   Procedure Laterality Date     APPENDECTOMY       COLONOSCOPY N/A 07/28/2020    Procedure: COLONOSCOPY, WITH POLYPECTOMY AND BIOPSY;  Surgeon: Gianni Valerio MD;  Location: [facility] OR     HYSTERECTOMY TOTAL ABDOMINAL, BILATERAL SALPINGO-OOPHORECTOMY, COMBINED  1976     IR MISCELLANEOUS PROCEDURE  02/22/2010     TRANSPLANT  01/01/2010     VERTEBROPLASTY  01/01/2016    T5 and T8 vertebrae.         CURRENT MEDICATIONS:    No current facility-administered medications for this encounter.     Current Outpatient Medications   Medication     albuterol (PROAIR HFA/PROVENTIL HFA/VENTOLIN HFA) 108 (90 Base) MCG/ACT inhaler     albuterol (PROVENTIL) (2.5 MG/3ML) 0.083% neb solution     Albuterol Sulfate (VENTOLIN HFA) 108 (90 BASE) MCG/ACT AERS     alendronate (FOSAMAX) 70 MG tablet     amylase-lipase-protease (CREON) 51115-45753 units CPEP  per EC capsule     ascorbic acid (VITAMIN C) 500 MG tablet     clindamycin 1 % EX external lotion     gentamicin 0.1 % EX external ointment     ipratropium - albuterol 0.5 mg/2.5 mg/3 mL (DUONEB) 0.5-2.5 (3) MG/3ML neb solution     Loperamide HCl (IMODIUM OR)     Multiple Vitamins-Minerals (MULTIVITAL PO)     mupirocin (BACTROBAN) 2 % external ointment     nebulizer nebulization     Omega-3 Fatty Acids (FISH OIL PO)     oxyCODONE IR (ROXICODONE) 10 MG tablet     triamcinolone (KENALOG) 0.1 % external cream     umeclidinium (INCRUSE ELLIPTA) 62.5 MCG/INH inhaler         ALLERGIES:  Allergies   Allergen Reactions     Mirtazapine      Other reaction(s): Other (See Comments)  Hallucination      Tape [Adhesive Tape]      Allergy Hx  In addition to NO paper tape     Liquid Adhesive Rash     Other reaction(s): Other (See Comments)  Allergy Hx - Rash from paper tape       FAMILY HISTORY:  Family History   Problem Relation Age of Onset     Diabetes Mother         borderline     Pancreatic Cancer Father      Diabetes Maternal Grandmother      Diabetes Maternal Uncle      Thyroid Disease No family hx of      Melanoma No family hx of      Skin Cancer No family hx of      Lung Cancer Mother        SOCIAL HISTORY:  Social History     Socioeconomic History     Marital status:    Tobacco Use     Smoking status: Every Day     Packs/day: 0.50     Years: 49.00     Pack years: 24.50     Types: Cigarettes     Smokeless tobacco: Former     Tobacco comments:     has used E-cigarettes in the past, info given   Vaping Use     Vaping status: Never Used     Passive vaping exposure: Yes   Substance and Sexual Activity     Alcohol use: No     Drug use: No     Sexual activity: Never   Social History Narrative    , on disability.        Has family in the area.        Patient of Dr. Dunn since 2015.        VITALS:  Patient Vitals for the past 24 hrs:   BP Temp Temp src Pulse Resp SpO2 Height Weight   06/05/23 0010 -- -- -- 65 -- 92  "% -- --   06/05/23 0009 -- -- -- 62 -- 93 % -- --   06/05/23 0008 -- -- -- 62 -- 92 % -- --   06/04/23 2330 (!) 146/82 -- -- 78 -- 95 % -- --   06/04/23 2318 (!) 140/71 98.2  F (36.8  C) Oral 80 18 94 % 1.727 m (5' 8\") 48.8 kg (107 lb 9.4 oz)       PHYSICAL EXAM    VITAL SIGNS: BP (!) 146/82   Pulse 65   Temp 98.2  F (36.8  C) (Oral)   Resp 18   Ht 1.727 m (5' 8\")   Wt 48.8 kg (107 lb 9.4 oz)   SpO2 92%   BMI 16.36 kg/m      General Appearance: Well-appearing, well-nourished, no acute distress   Head:  Normocephalic, without obvious abnormality, atraumatic  Eyes:  PERRL, conjunctiva/corneas clear, EOM's intact,  ENT:  Lips, mucosa, and tongue normal, membranes are moist without pallor  Neck:  Normal ROM, symmetrical, trachea midline    Cardio:  Regular rate and rhythm, no murmur, rub or gallop, 2+ pulses symmetric in all extremities  Pulm:  Clear to auscultation bilaterally, respirations unlabored,  Abdomen:  Soft, non-tender, no rebound or guarding.  Musculoskeletal: Full ROM, 1+ edema to bilateral feet, no cyanosis, good ROM of major joints  Integument:  Warm, Dry, No erythema, No rash.    Neurologic:  Alert & oriented.  No focal deficits appreciated.  Ambulatory.  Psychiatric:  Affect normal, Judgment normal, Mood normal.      LABS  Results for orders placed or performed during the hospital encounter of 06/04/23 (from the past 24 hour(s))   CBC with platelets   Result Value Ref Range    WBC Count 9.3 4.0 - 11.0 10e3/uL    RBC Count 3.94 3.80 - 5.20 10e6/uL    Hemoglobin 11.8 11.7 - 15.7 g/dL    Hematocrit 36.0 35.0 - 47.0 %    MCV 91 78 - 100 fL    MCH 29.9 26.5 - 33.0 pg    MCHC 32.8 31.5 - 36.5 g/dL    RDW 13.2 10.0 - 15.0 %    Platelet Count 225 150 - 450 10e3/uL   Basic metabolic panel   Result Value Ref Range    Sodium 143 136 - 145 mmol/L    Potassium 3.0 (L) 3.4 - 5.3 mmol/L    Chloride 102 98 - 107 mmol/L    Carbon Dioxide (CO2) 32 (H) 22 - 29 mmol/L    Anion Gap 9 7 - 15 mmol/L    Urea Nitrogen " 7.9 (L) 8.0 - 23.0 mg/dL    Creatinine 0.71 0.51 - 0.95 mg/dL    Calcium 8.5 (L) 8.8 - 10.2 mg/dL    Glucose 156 (H) 70 - 99 mg/dL    GFR Estimate >90 >60 mL/min/1.73m2   Nt probnp inpatient   Result Value Ref Range    N terminal Pro BNP Inpatient 422 0 - 900 pg/mL   US Lower Extremity Venous Duplex Bilateral    Narrative    EXAM: US LOWER EXTREMITY VENOUS DUPLEX BILATERAL  LOCATION: [facility]  DATE/TIME: 6/5/2023 12:48 AM CDT    INDICATION: Sudden bilateral foot swelling  COMPARISON: None.  TECHNIQUE: Venous Duplex ultrasound of bilateral lower extremities with and without compression, augmentation and duplex. Color flow and spectral Doppler with waveform analysis performed.    FINDINGS: Exam includes the common femoral, femoral, popliteal veins as well as segmentally visualized deep calf veins and greater saphenous vein.     RIGHT: No deep vein thrombosis. No superficial thrombophlebitis. Right Baker's cyst measures 4.0 x 2.7 x 1.7 cm.    LEFT: No deep vein thrombosis. No superficial thrombophlebitis. No popliteal cyst.      Impression    IMPRESSION:  1.  No deep venous thrombosis in the bilateral lower extremities.         RADIOLOGY  US Lower Extremity Venous Duplex Bilateral   Final Result   IMPRESSION:   1.  No deep venous thrombosis in the bilateral lower extremities.            MEDICATIONS GIVEN IN THE EMERGENCY:  Medications - No data to display    NEW PRESCRIPTIONS STARTED AT TODAY'S ER VISIT  Discharge Medication List as of 6/5/2023  1:02 AM           IAlvarez, am serving as a scribe to document services personally performed by Gianni Nelson D.O., based on my observations and the provider's statements to me.  I, Gianni Nelson D.O., attest that Alvarez Acuna is acting in a scribe capacity, has observed my performance of the services and has documented them in accordance with my direction.     Gianni Nelson D.O.  Emergency Medicine  [facility]  [facility] EMERGENCY DEPARTMENT  [address] [place] [id number]  [phone]  Dept: [phone]     Gianni Nelson DO  06/05/23 0230

## 2023-06-05 NOTE — ED TRIAGE NOTES
"Pt noticed her feet and ankles have been swollen x 2 days. Pt states \"last time I had swollen feet I had leukemia\".     Triage Assessment     Row Name 06/04/23 3883       Triage Assessment (Adult)    Airway WDL WDL       Respiratory WDL    Respiratory WDL WDL       Skin Circulation/Temperature WDL    Skin Circulation/Temperature WDL WDL       Cardiac WDL    Cardiac WDL WDL       Peripheral/Neurovascular WDL    Peripheral Neurovascular WDL X  swelling bilateral feet       Cognitive/Neuro/Behavioral WDL    Cognitive/Neuro/Behavioral WDL WDL              "

## 2023-06-06 ENCOUNTER — LAB (OUTPATIENT)
Dept: LAB | Facility: CLINIC | Age: 68
End: 2023-06-06
Payer: COMMERCIAL

## 2023-06-06 DIAGNOSIS — K86.81 EXOCRINE PANCREATIC INSUFFICIENCY: ICD-10-CM

## 2023-06-06 LAB — FOLATE SERPL-MCNC: 34.1 NG/ML (ref 4.6–34.8)

## 2023-06-06 PROCEDURE — 83735 ASSAY OF MAGNESIUM: CPT

## 2023-06-06 PROCEDURE — 36415 COLL VENOUS BLD VENIPUNCTURE: CPT

## 2023-06-06 PROCEDURE — 82607 VITAMIN B-12: CPT

## 2023-06-06 PROCEDURE — 84446 ASSAY OF VITAMIN E: CPT | Mod: 90

## 2023-06-06 PROCEDURE — 84166 PROTEIN E-PHORESIS/URINE/CSF: CPT

## 2023-06-06 PROCEDURE — 84134 ASSAY OF PREALBUMIN: CPT

## 2023-06-06 PROCEDURE — 80053 COMPREHEN METABOLIC PANEL: CPT

## 2023-06-06 PROCEDURE — 84597 ASSAY OF VITAMIN K: CPT | Mod: 90

## 2023-06-06 PROCEDURE — 84100 ASSAY OF PHOSPHORUS: CPT

## 2023-06-06 PROCEDURE — 84590 ASSAY OF VITAMIN A: CPT | Mod: 90

## 2023-06-06 PROCEDURE — 99000 SPECIMEN HANDLING OFFICE-LAB: CPT

## 2023-06-06 PROCEDURE — 82306 VITAMIN D 25 HYDROXY: CPT

## 2023-06-06 PROCEDURE — 82746 ASSAY OF FOLIC ACID SERUM: CPT

## 2023-06-07 LAB
ALBUMIN SERPL BCG-MCNC: 4.1 G/DL (ref 3.5–5.2)
ALP SERPL-CCNC: 47 U/L (ref 35–104)
ALT SERPL W P-5'-P-CCNC: 18 U/L (ref 10–35)
ANION GAP SERPL CALCULATED.3IONS-SCNC: 13 MMOL/L (ref 7–15)
AST SERPL W P-5'-P-CCNC: 27 U/L (ref 10–35)
BILIRUB SERPL-MCNC: 0.3 MG/DL
BUN SERPL-MCNC: 5.7 MG/DL (ref 8–23)
CALCIUM SERPL-MCNC: 8.9 MG/DL (ref 8.8–10.2)
CHLORIDE SERPL-SCNC: 100 MMOL/L (ref 98–107)
CREAT SERPL-MCNC: 0.69 MG/DL (ref 0.51–0.95)
DEPRECATED CALCIDIOL+CALCIFEROL SERPL-MC: 32 UG/L (ref 20–75)
DEPRECATED HCO3 PLAS-SCNC: 29 MMOL/L (ref 22–29)
GFR SERPL CREATININE-BSD FRML MDRD: >90 ML/MIN/1.73M2
GLUCOSE SERPL-MCNC: 103 MG/DL (ref 70–99)
MAGNESIUM SERPL-MCNC: 1.8 MG/DL (ref 1.7–2.3)
PHOSPHATE SERPL-MCNC: 2.8 MG/DL (ref 2.5–4.5)
POTASSIUM SERPL-SCNC: 3.1 MMOL/L (ref 3.4–5.3)
PREALB SERPL IA-MCNC: 16 MG/DL (ref 15–45)
PROT PATTERN UR ELPH-IMP: NORMAL
PROT SERPL-MCNC: 6.5 G/DL (ref 6.4–8.3)
REVIEWING PATHOLOGIST:: NORMAL
SODIUM SERPL-SCNC: 142 MMOL/L (ref 136–145)
VIT B12 SERPL-MCNC: 827 PG/ML (ref 232–1245)

## 2023-06-08 LAB
A-TOCOPHEROL VIT E SERPL-MCNC: 8 MG/L
ANNOTATION COMMENT IMP: NORMAL
BETA+GAMMA TOCOPHEROL SERPL-MCNC: 0.3 MG/L
RETINYL PALMITATE SERPL-MCNC: <0.02 MG/L
VIT A SERPL-MCNC: 0.39 MG/L

## 2023-06-12 LAB — PHYTONADIONE SERPL-MCNC: 0.14 NMOL/L

## 2023-06-13 DIAGNOSIS — M54.50 CHRONIC BILATERAL LOW BACK PAIN, UNSPECIFIED WHETHER SCIATICA PRESENT: ICD-10-CM

## 2023-06-13 DIAGNOSIS — G89.29 CHRONIC BILATERAL LOW BACK PAIN, UNSPECIFIED WHETHER SCIATICA PRESENT: ICD-10-CM

## 2023-06-13 RX ORDER — OXYCODONE HYDROCHLORIDE 10 MG/1
10 TABLET ORAL 4 TIMES DAILY
Qty: 120 TABLET | Refills: 0 | Status: SHIPPED | OUTPATIENT
Start: 2023-06-18 | End: 2023-07-06

## 2023-06-13 NOTE — TELEPHONE ENCOUNTER
Patient calling to request refill of attached medication. Reporting she has about 3-4 days of medication left at this time.

## 2023-06-26 NOTE — PROGRESS NOTES
"Subjective:    Established patient, saw Dr. Ross once 2/8/2020 for abnormal TFTs (subclinical hypothyroidism with undetectable Tg Ab, TPO Ab normal, with subsequently normal TFTs)    Jenn Barclay is a 67 year old female who presents for osteoporosis. The following is a comprehensive summary of her Endocrine care to date.     We reviewed the osteoporosis dictation template.    Most recent DEXA 1/1/2020 and image not available but per report:  1. The spine bone density L1-L4 with T-score -3.0.  2. Femoral bone density shows left total hip T- score -3.6.  3. Trabecular bone score indicates poor trabecular bone architecture.    CXR 12/6/2022:   -Per radiology: vertebroplasty changes in thoracic spine. A few additional compression fractures elsewhere in the thoracic spine. Appearance is unchanged from previous - 8/15/2020. I reviewed the images and agree with the read.    Thoracic spine XR 4/1/2022:  -Per radiology: prior vertebroplasty of T5 and T8 vertebrae. There are chronic compression fractures of T6 and T7 with 40-50% loss of height unchanged from prior CT. There is 50% loss of height of T10 also unchanged from before. There is no significant change from prior CT chest of 01/04/2020.     Lumbar spine XR 3/24/2022:   -Per radiology: Unchanged L4 and L5 chronic compression deformities (comparison CT A/P 10/16/2019). Images not available.     T5 and T8 vertebroplasty procedures done in 2016    Regarding a cause of her spinal compression fractures, there was no known trauma.     She fractured her right hip when she had a fall and \"bounced\" a few times after her foot got stuck in a pothole, this happened around 2005.     She has trouble with balance. She defers on a PT referral at this time.     Nephrolithiasis (CT chest 7/2022 - 3 x 7 mm nonobstructing calcification upper pole right kidney, present in 2019 however larger). She denies ever passing a stone.     No recent significant GC exposure, and she doesn't " "recall the details but given her AML and transplant history and COPD I assume she has had significant GC exposure over the years.     BMI 16.5 kg/m2.    Active tobacco use.  She declines a referral to quit partner today.    Surgical menopause in her 20s for a benign indication and she recalls being on estrogen therapy afterwards.    As of 6/2023 Jenn has completed a complete evaluation for secondary causes of increased fracture risk with the following notable findings:  -No prior hypercalcemia, PTH checked once in 2020 and normal     -History of vitamin D deficiency, 6/2023: 25-OH vitamin D mid normal    -Bone specific alkaline phosphatase 6.3 (Postmenopausal Female ref range: 7.0 - 22.4 ug/L) drawn 3/2023   -C-Telopeptide, Beta-Cross-Linked, Serum 159 (ref range postmenopausal Females: 177-1015 pg/mL) drawn 3/2023   -Celiac screen negative with mildly low IgA drawn 3/2023   -24 hour urine supersaturation profile 3/2023 (877 mL): elevated calcium oxalate crystals, elevated uric acid crystals, calcium 88 mg    Alendronate 70 mg once weekly: first prescribed 9/8/2017 and taken weekly until ~2 years ago (per her recall, I could not verify the timeline via chart review), and she was off of it for about 1 year before it was restarted and taken to date (6/27/2023) and she notes taking it \"religiously\". She was taking Alendronate after eating breakfast at the time of our initial consult but now takes it appropriately to maximize absorption. Alendronate has been well tolerated.    No other medications besides Alendronate to reduce fracture risk.     She takes a MVI.  After initial consult she started vitamin D and calcium as well. She drinks several glasses of milk each day.       She has exocrine pancreatic insufficiency (pancreatic atrophy on imaging) and saw GI 4/5/2023, she has not yet restarted Creon, elevated random glucose values but not >200 mg/dL (no prior diagnosis of DM, she has never been on a medication to " lower glucose) and HbA1c 5.6% 3/2023, gastric ulcer, chronic diarrhea, AML s/p BMT in 2010.    No GERD. She had all teeth extracted in 2010. She has not had any dental issues since. She did receive radiation prior to transplant. No prior ASCVD event.     Objective:    Today BMI is 15.8 kg/meters 2, blood pressure 136/78.    1/2023: BMI 16.5 kg/m2, /72. Sclera white. Edentulous. Kyphotic. Radial pulse with a regular rate and rhythm.     Assessment/Plan:    # Severe osteoporosis with many prior fragility fractures    Nandini has severe osteoporosis.    1.  We reviewed treatment options in detail and we both have a preference for anabolic therapy if affordable.  Because of her history of radiation the only anabolic agent we can use is Evenity.  I did reach out to our pharmacy team to get a cost estimate.  If Evenity is cost prohibitive we will transition to Reclast. We reviewed the risks and benefits in detail of Evenity, Reclast, and Fosamax.  We reviewed potential adverse effects of Evenity, Reclast, and Fosamax to include osteonecrosis of the jaw, atypical femoral fracture, and for Evenity- blackbox warning for MI/CVA, for Reclast-risk of flulike reaction, and for Fosamax-risk of esophagitis, etc.    2.  She will go for DEXA in the coming weeks.  3.  For now she can remain on Fosamax.  We did review the way to take this to maximize absorption and minimize the risk of esophagitis.  4.  She will continue her current dosing of calcium and vitamin D.    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 She denies ever passing a stone.     24 hour urine supersaturation profile 3/2023 (877 mL): elevated calcium oxalate crystals, elevated uric acid crystals, calcium 88 mg    Her stones are not related to hypercalcemia/hypercalciuria.  She also was found to have  low IgA.  I did offer to refer her to nephrology for medical stone management but she preferred that I let Dr. Dunn know. I did let her primary physician know and defer further management to his expertise.    35 minutes spent on the date of the encounter doing chart review, history and exam, documentation and further activities as noted above.

## 2023-06-27 ENCOUNTER — OFFICE VISIT (OUTPATIENT)
Dept: ENDOCRINOLOGY | Facility: CLINIC | Age: 68
End: 2023-06-27
Payer: COMMERCIAL

## 2023-06-27 VITALS
BODY MASS INDEX: 15.81 KG/M2 | HEART RATE: 76 BPM | SYSTOLIC BLOOD PRESSURE: 136 MMHG | DIASTOLIC BLOOD PRESSURE: 78 MMHG | WEIGHT: 104 LBS

## 2023-06-27 DIAGNOSIS — K86.81 EXOCRINE PANCREATIC INSUFFICIENCY: ICD-10-CM

## 2023-06-27 DIAGNOSIS — S22.060S CLOSED WEDGE COMPRESSION FRACTURE OF T7 VERTEBRA, SEQUELA: ICD-10-CM

## 2023-06-27 DIAGNOSIS — M81.0 AGE-RELATED OSTEOPOROSIS WITHOUT CURRENT PATHOLOGICAL FRACTURE: Primary | ICD-10-CM

## 2023-06-27 DIAGNOSIS — S22.050S CLOSED WEDGE COMPRESSION FRACTURE OF T5 VERTEBRA, SEQUELA: ICD-10-CM

## 2023-06-27 DIAGNOSIS — S22.050S CLOSED WEDGE COMPRESSION FRACTURE OF T6 VERTEBRA, SEQUELA: ICD-10-CM

## 2023-06-27 DIAGNOSIS — Z72.0 TOBACCO USE: ICD-10-CM

## 2023-06-27 DIAGNOSIS — S72.009S CLOSED FRACTURE OF HIP, UNSPECIFIED LATERALITY, SEQUELA: ICD-10-CM

## 2023-06-27 DIAGNOSIS — E28.319 PREMATURE MENOPAUSE: ICD-10-CM

## 2023-06-27 DIAGNOSIS — S22.060S CLOSED WEDGE COMPRESSION FRACTURE OF T8 VERTEBRA, SEQUELA: ICD-10-CM

## 2023-06-27 DIAGNOSIS — S32.040S CLOSED COMPRESSION FRACTURE OF L4 LUMBAR VERTEBRA, SEQUELA: ICD-10-CM

## 2023-06-27 DIAGNOSIS — K52.9 CHRONIC DIARRHEA: ICD-10-CM

## 2023-06-27 DIAGNOSIS — N20.0 NEPHROLITHIASIS: ICD-10-CM

## 2023-06-27 DIAGNOSIS — S22.070S CLOSED WEDGE COMPRESSION FRACTURE OF T10 VERTEBRA, SEQUELA: ICD-10-CM

## 2023-06-27 DIAGNOSIS — S32.050S CLOSED COMPRESSION FRACTURE OF L5 VERTEBRA, SEQUELA: ICD-10-CM

## 2023-06-27 DIAGNOSIS — Z94.81 STATUS POST ALLOGENEIC BONE MARROW TRANSPLANT (H): ICD-10-CM

## 2023-06-27 DIAGNOSIS — Z79.83 HISTORY OF ONGOING TREATMENT WITH ALENDRONATE: ICD-10-CM

## 2023-06-27 PROCEDURE — 99214 OFFICE O/P EST MOD 30 MIN: CPT | Performed by: INTERNAL MEDICINE

## 2023-06-27 NOTE — LETTER
"    6/27/2023         RE: Jenn Barclay  [address] [place] [facility] [place] [address]        Dear Colleague,    Thank you for referring your patient, Jenn Barclay, to the [facility]. Please see a copy of my visit note below.    Subjective:    Established patient, saw Dr. Ross once 2/8/2020 for abnormal TFTs (subclinical hypothyroidism with undetectable Tg Ab, TPO Ab normal, with subsequently normal TFTs)    Jenn Barclay is a 67 year old female who presents for osteoporosis. The following is a comprehensive summary of her Endocrine care to date.     We reviewed the osteoporosis dictation template.    Most recent DEXA 1/1/2020 and image not available but per report:  1. The spine bone density L1-L4 with T-score -3.0.  2. Femoral bone density shows left total hip T- score -3.6.  3. Trabecular bone score indicates poor trabecular bone architecture.    CXR 12/6/2022:   -Per radiology: vertebroplasty changes in thoracic spine. A few additional compression fractures elsewhere in the thoracic spine. Appearance is unchanged from previous - 8/15/2020. I reviewed the images and agree with the read.    Thoracic spine XR 4/1/2022:  -Per radiology: prior vertebroplasty of T5 and T8 vertebrae. There are chronic compression fractures of T6 and T7 with 40-50% loss of height unchanged from prior CT. There is 50% loss of height of T10 also unchanged from before. There is no significant change from prior CT chest of 01/04/2020.     Lumbar spine XR 3/24/2022:   -Per radiology: Unchanged L4 and L5 chronic compression deformities (comparison CT A/P 10/16/2019). Images not available.     T5 and T8 vertebroplasty procedures done in 2016    Regarding a cause of her spinal compression fractures, there was no known trauma.     She fractured her right hip when she had a fall and \"bounced\" a few times after her foot got stuck in a pothole, this happened around 2005.     She has trouble with balance. She defers on a PT " "referral at this time.     Nephrolithiasis (CT chest 7/2022 - 3 x 7 mm nonobstructing calcification upper pole right kidney, present in 2019 however larger). She denies ever passing a stone.     No recent significant GC exposure, and she doesn't recall the details but given her AML and transplant history and COPD I assume she has had significant GC exposure over the years.     BMI 16.5 kg/m2.    Active tobacco use.  She declines a referral to quit partner today.    Surgical menopause in her 20s for a benign indication and she recalls being on estrogen therapy afterwards.    As of 6/2023 Jenn has completed a complete evaluation for secondary causes of increased fracture risk with the following notable findings:  -No prior hypercalcemia, PTH checked once in 2020 and normal     -History of vitamin D deficiency, 6/2023: 25-OH vitamin D mid normal    -Bone specific alkaline phosphatase 6.3 (Postmenopausal Female ref range: 7.0 - 22.4 ug/L) drawn 3/2023   -C-Telopeptide, Beta-Cross-Linked, Serum 159 (ref range postmenopausal Females: 177-1015 pg/mL) drawn 3/2023   -Celiac screen negative with mildly low IgA drawn 3/2023   -24 hour urine supersaturation profile 3/2023 (877 mL): elevated calcium oxalate crystals, elevated uric acid crystals, calcium 88 mg    Alendronate 70 mg once weekly: first prescribed 9/8/2017 and taken weekly until ~2 years ago (per her recall, I could not verify the timeline via chart review), and she was off of it for about 1 year before it was restarted and taken to date (6/27/2023) and she notes taking it \"religiously\". She was taking Alendronate after eating breakfast at the time of our initial consult but now takes it appropriately to maximize absorption. Alendronate has been well tolerated.    No other medications besides Alendronate to reduce fracture risk.     She takes a MVI.  After initial consult she started vitamin D and calcium as well. She drinks several glasses of milk each day.   "     She has exocrine pancreatic insufficiency (pancreatic atrophy on imaging) and saw GI 4/5/2023, she has not yet restarted Creon, elevated random glucose values but not >200 mg/dL (no prior diagnosis of DM, she has never been on a medication to lower glucose) and HbA1c 5.6% 3/2023, gastric ulcer, chronic diarrhea, AML s/p BMT in 2010.    No GERD. She had all teeth extracted in 2010. She has not had any dental issues since. She did receive radiation prior to transplant. No prior ASCVD event.     Objective:    Today BMI is 15.8 kg/meters 2, blood pressure 136/78.    1/2023: BMI 16.5 kg/m2, /72. Sclera white. Edentulous. Kyphotic. Radial pulse with a regular rate and rhythm.     Assessment/Plan:    # Severe osteoporosis with many prior fragility fractures    Nandini has severe osteoporosis.    1.  We reviewed treatment options in detail and we both have a preference for anabolic therapy if affordable.  Because of her history of radiation the only anabolic agent we can use is Evenity.  I did reach out to our pharmacy team to get a cost estimate.  If Evenity is cost prohibitive we will transition to Reclast. We reviewed the risks and benefits in detail of Evenity, Reclast, and Fosamax.  We reviewed potential adverse effects of Evenity, Reclast, and Fosamax to include osteonecrosis of the jaw, atypical femoral fracture, and for Evenity- blackbox warning for MI/CVA, for Reclast-risk of flulike reaction, and for Fosamax-risk of esophagitis, etc.    2.  She will go for DEXA in the coming weeks.  3.  For now she can remain on Fosamax.  We did review the way to take this to maximize absorption and minimize the risk of esophagitis.  4.  She will continue her current dosing of calcium and vitamin D.    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 She denies  ever passing a stone.     24 hour urine supersaturation profile 3/2023 (877 mL): elevated calcium oxalate crystals, elevated uric acid crystals, calcium 88 mg    Her stones are not related to hypercalcemia/hypercalciuria.  She also was found to have low IgA.  I did offer to refer her to nephrology for medical stone management but she preferred that I let Dr. Dunn know. I did let her primary physician know and defer further management to his expertise.    35 minutes spent on the date of the encounter doing chart review, history and exam, documentation and further activities as noted above.       Again, thank you for allowing me to participate in the care of your patient.        Sincerely,        Ryan Alonso MD

## 2023-07-06 ENCOUNTER — OFFICE VISIT (OUTPATIENT)
Dept: INTERNAL MEDICINE | Facility: CLINIC | Age: 68
End: 2023-07-06
Payer: COMMERCIAL

## 2023-07-06 VITALS
DIASTOLIC BLOOD PRESSURE: 78 MMHG | OXYGEN SATURATION: 96 % | SYSTOLIC BLOOD PRESSURE: 133 MMHG | BODY MASS INDEX: 16.32 KG/M2 | HEIGHT: 68 IN | WEIGHT: 107.7 LBS | RESPIRATION RATE: 16 BRPM | TEMPERATURE: 98.4 F | HEART RATE: 65 BPM

## 2023-07-06 DIAGNOSIS — K86.81 EXOCRINE PANCREATIC INSUFFICIENCY: ICD-10-CM

## 2023-07-06 DIAGNOSIS — Z94.81 STATUS POST ALLOGENEIC BONE MARROW TRANSPLANT (H): ICD-10-CM

## 2023-07-06 DIAGNOSIS — M81.6 LOCALIZED OSTEOPOROSIS WITHOUT CURRENT PATHOLOGICAL FRACTURE: ICD-10-CM

## 2023-07-06 DIAGNOSIS — M54.50 CHRONIC BILATERAL LOW BACK PAIN, UNSPECIFIED WHETHER SCIATICA PRESENT: Primary | ICD-10-CM

## 2023-07-06 DIAGNOSIS — N20.0 NEPHROLITHIASIS: ICD-10-CM

## 2023-07-06 DIAGNOSIS — L60.9 NAIL ABNORMALITY: ICD-10-CM

## 2023-07-06 DIAGNOSIS — D89.813 GVHD (GRAFT VERSUS HOST DISEASE) (H): ICD-10-CM

## 2023-07-06 DIAGNOSIS — J44.9 CHRONIC OBSTRUCTIVE PULMONARY DISEASE, UNSPECIFIED COPD TYPE (H): ICD-10-CM

## 2023-07-06 DIAGNOSIS — Z85.6 HISTORY OF ACUTE MYELOID LEUKEMIA: ICD-10-CM

## 2023-07-06 DIAGNOSIS — G89.29 CHRONIC BILATERAL LOW BACK PAIN, UNSPECIFIED WHETHER SCIATICA PRESENT: Primary | ICD-10-CM

## 2023-07-06 DIAGNOSIS — Z59.9 FINANCIAL DIFFICULTIES: ICD-10-CM

## 2023-07-06 PROCEDURE — 99215 OFFICE O/P EST HI 40 MIN: CPT | Performed by: INTERNAL MEDICINE

## 2023-07-06 RX ORDER — OXYCODONE HYDROCHLORIDE 10 MG/1
10 TABLET ORAL 4 TIMES DAILY
Qty: 120 TABLET | Refills: 0 | Status: SHIPPED | OUTPATIENT
Start: 2023-07-18 | End: 2023-08-15

## 2023-07-06 SDOH — ECONOMIC STABILITY - INCOME SECURITY: PROBLEM RELATED TO HOUSING AND ECONOMIC CIRCUMSTANCES, UNSPECIFIED: Z59.9

## 2023-07-06 ASSESSMENT — PAIN SCALES - GENERAL: PAINLEVEL: NO PAIN (0)

## 2023-07-06 ASSESSMENT — ANXIETY QUESTIONNAIRES
4. TROUBLE RELAXING: NOT AT ALL
GAD7 TOTAL SCORE: 1
3. WORRYING TOO MUCH ABOUT DIFFERENT THINGS: SEVERAL DAYS
GAD7 TOTAL SCORE: 1
6. BECOMING EASILY ANNOYED OR IRRITABLE: NOT AT ALL
5. BEING SO RESTLESS THAT IT IS HARD TO SIT STILL: NOT AT ALL
IF YOU CHECKED OFF ANY PROBLEMS ON THIS QUESTIONNAIRE, HOW DIFFICULT HAVE THESE PROBLEMS MADE IT FOR YOU TO DO YOUR WORK, TAKE CARE OF THINGS AT HOME, OR GET ALONG WITH OTHER PEOPLE: NOT DIFFICULT AT ALL
7. FEELING AFRAID AS IF SOMETHING AWFUL MIGHT HAPPEN: NOT AT ALL
2. NOT BEING ABLE TO STOP OR CONTROL WORRYING: NOT AT ALL
1. FEELING NERVOUS, ANXIOUS, OR ON EDGE: NOT AT ALL

## 2023-07-06 NOTE — PROGRESS NOTES
[facility]    Comprehensive and complex medical care - Chronic disease management - Shared decision making - Care coordination - Compassionate care    Patient advocacy - Rational deprescribing - Minimally disruptive medicine - Ethical focus - Customized care         Date of Service: 7/6/2023  Primary Provider: Jorge Dunn    Patient Care Team:  Jorge Dunn MD as PCP - General (Internal Medicine)  Mohit Montano MD as MD (Hematology & Oncology)  Jorge Dunn MD as Assigned PCP  Joellen Nova MD as Resident (Dermatology)  Madison Alexander LICSW as  (BMT - Adult)  Ryan Alonso MD as MD (Endocrinology, Diabetes, and Metabolism)  Manju Asher, RN as BMT Nurse Coordinator  Umesh Brandt MD as BMT Physician (Transplant)  Jorge Dunn MD as Assigned Pain Medication Provider  Ryan Alonso MD as Assigned Endocrinology Provider  Donny Gunn MD as Assigned Gastroenterology Provider          Patient's Pharmacy:    [facility] [address] [place], [place] - [address]  [address]  [facility] [address]  Phone: [phone] Fax: [phone]    [facility][id number] [address]  [address] [place] [address]  Phone: [phone] Fax: [phone]     Patient's Contacts:  Name Home Phone Work Phone Mobile Phone Relationship Lgl Joey   FRANCISCO JAVIER KESSLER   [phone] Significant*    MORJEAN [phone] Grandchild      Patient's Insurance:    Payor: [employer] / Plan: UNITED [facility] MEDICARE ADVANTAGE / Product Type: HMO /            Active Problem List:  Problem List as of 7/6/2023 Reviewed: 6/4/2023 11:34 PM by Alvarez Paredes    Full code status    Tobacco abuse    COPD (chronic obstructive pulmonary disease) (H)    Acute myeloid leukemia (AML), M2 (H)    Status  post allogeneic bone marrow transplant (H) - 2010 - [facility]       Medium    Chronic back pain    Financial difficulties    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)    Pseudogout    Traumatic compression fracture of T8 thoracic vertebra, closed, initial encounter (H)    Chronic diarrhea    Controlled substance agreement signed - 1/23 - oxycodone - UDS 1/23    Chronic, continuous use of opioids       Low    Nephrolithiasis    Anxiety    Gastric ulcer    Migraine headache    Papular rash, generalized    Senile purpura (H)    OP (osteoporosis)    Tubular adenoma of colon - advanced adenoma in 2017 - single small adenoma in 2020       Other    Exocrine pancreatic insufficiency        Current Outpatient Medications   Medication Instructions     albuterol (PROAIR HFA/PROVENTIL HFA/VENTOLIN HFA) 108 (90 Base) MCG/ACT inhaler 2 puffs, Inhalation, EVERY 4 HOURS PRN     albuterol (PROVENTIL) (2.5 MG/3ML) 0.083% neb solution USE 1 VIAL IN NEBULIZER EVERY 6 HOURS AS NEEDED FOR WHEEZING     Albuterol Sulfate (VENTOLIN HFA) 108 (90 BASE) MCG/ACT AERS Inhale  into the lungs.     alendronate (FOSAMAX) 70 mg, Oral, WEEKLY     amylase-lipase-protease (CREON) 89476-64920 units CPEP per EC capsule 2 capsules, Oral, 3 TIMES DAILY WITH MEALS, And snacks     clindamycin 1 % EX external lotion Topical, 2 TIMES DAILY     gentamicin 0.1 % EX external ointment Topical, 2 TIMES DAILY     ipratropium - albuterol 0.5 mg/2.5 mg/3 mL (DUONEB) 0.5-2.5 (3) MG/3ML neb solution 3 mLs, Nebulization, EVERY 6 HOURS PRN     Loperamide HCl (IMODIUM OR) Oral, 2 TIMES DAILY     Multiple Vitamins-Minerals (MULTIVITAL PO) Take  by mouth.     mupirocin (BACTROBAN) 2 % external ointment Use 2 times a day to affected area on cheek     nebulizer nebulization 1 nebulizer please as covered by insurance.  May also dispense supplies (tubing, inhalation device, etc) as needed.     Omega-3 Fatty Acids (FISH OIL PO) No dose, route, or frequency recorded.     [START ON  7/18/2023] oxyCODONE IR (ROXICODONE) 10 mg, Oral, 4 TIMES DAILY, For use from 7/18/23 to 8/17/23.     triamcinolone (KENALOG) 0.1 % external cream APPLY  CREAM EXTERNALLY THREE TIMES DAILY TO  ITCH  OR  TENDER  SKIN  LESIONS     umeclidinium (INCRUSE ELLIPTA) 62.5 MCG/INH inhaler 1 puff, Inhalation, DAILY     vitamin C (ASCORBIC ACID) 500 mg, DAILY      Social History     Social History Narrative    , on disability.        Has family in the area.        Patient of Dr. Dunn since 2015.        Subjective:     Jenn Barclay is a 67 year old female who comes in today for:    Chief Complaint   Patient presents with     Follow Up     6 Month Follow Up          7/6/2023    12:50 PM   Additional Questions   Roomed by Timi SIFUENTES   Accompanied by N/A     Patient comes in today for a number of issues.    We reviewed her pancreatic insufficiency.  This continues to be an issue.  She does have issues with diarrhea and oranges stools that float.  She is underweight.  She cannot afford Creon at this time.  We did give her information about drug assistance.  She has limited finances.  She lives in a trailer home.  We reviewed this with her.    She does have a rash which I think is related to her transplant.  She does gets this intermittently and it does respond to steroid cream.    We reviewed her compression fractures.  She has had follow-up with endocrinology.  She will be determining her follow-up with this and at the osteoporosis.  She was recently noted to have nephrolithiasis on her scans and we reviewed this as well.    We reviewed her COPD and this is stable at this time.  This continues to give her problems intermittently.    She does have issues with her thumb nails bilaterally.  She has longitudinal fissures in both about around the middle of the nail.  This came on over the last month.  She is using peroxide on these.    We reviewed her other issues noted in the assessment but not specifically addressed in the  "HPI above.     Objective:     Wt Readings from Last 3 Encounters:   07/06/23 48.9 kg (107 lb 11.2 oz)   06/27/23 47.2 kg (104 lb)   06/04/23 48.8 kg (107 lb 9.4 oz)     BP Readings from Last 3 Encounters:   07/06/23 133/78   06/27/23 136/78   06/04/23 (!) 146/82     /78 (BP Location: Left arm, Patient Position: Sitting, Cuff Size: Adult Small)   Pulse 65   Temp 98.4  F (36.9  C) (Oral)   Resp 16   Ht 1.727 m (5' 8\")   Wt 48.9 kg (107 lb 11.2 oz)   LMP  (LMP Unknown)   SpO2 96%   BMI 16.38 kg/m     The patient is comfortable, no acute distress.  Mood good.  Insight good.  Eyes are nonicteric.  Neck is supple without mass.  No cervical adenopathy.  No thyromegaly. Heart regular rate and rhythm.  Lungs clear to auscultation bilaterally.  Respiratory effort is good.  Abdomen soft and nontender.  No hepatosplenomegaly.  Extremities no edema.  There is longitudinal fissures along the thumbnails.  No obvious nailbed abnormality noted.      Diagnostics:     Lab on 06/06/2023   Component Date Value Ref Range Status     Vitamin D, Total (25-Hydroxy) 06/06/2023 32  20 - 75 ug/L Final     Vitamin A 06/06/2023 0.39  0.30 - 1.20 mg/L Final     Retinol Palmitate 06/06/2023 <0.02  0.00 - 0.10 mg/L Final     Vitamin A Interp 06/06/2023 Normal   Final      This test was developed and its performance characteristics   determined by [facility]. It has not been cleared or   approved by the US Food and Drug Administration. This test   was performed in a CLIA certified laboratory and is   intended for clinical purposes.  Performed By: [facility]  [address] [place] [id number]  : Jose Boyd MD, PhD     Vitamin B12 06/06/2023 827  232 - 1,245 pg/mL Final     Folic Acid 06/06/2023 34.1  4.6 - 34.8 ng/mL Final     Vitamin E 06/06/2023 8.0  5.5 - 18.0 mg/L Final      This test was developed and its performance characteristics   determined by [facility]. It has not been " cleared or   approved by the US Food and Drug Administration. This test   was performed in a CLIA certified laboratory and is   intended for clinical purposes.     Vitamin E Gamma 06/06/2023 0.3  0.0 - 6.0 mg/L Final    Performed By: [facility]  [address] [place], [place] [id number]  : Jose Boyd MD, PhD     Vitamin K 06/06/2023 0.14 (L)  0.22 - 4.88 nmol/L Final       Vitamin K concentrations in healthy individuals typically   are greater than 0.22 nmol/L. Low vitamin K concentrations   reflect low hepatic stores. Repeated specimen freezing and   thawing and exposure to ultraviolet light may result in   decreased values.  INTERPRETIVE INFORMATION: Vitamin K1, Serum    Vitamin K concentration is reported as nanomoles per liter   (nmol/L). To convert concentration to nanograms per   milliliter (ng/mL), multiply the result by 0.45.    This test was developed and its performance characteristics   determined by [facility]. It has not been cleared or   approved by the US Food and Drug Administration. This test   was performed in a CLIA certified laboratory and is   intended for clinical purposes.  Performed By: [facility]  [address] [place], [place] [id number]  : Jose Boyd MD, PhD     Sodium 06/06/2023 142  136 - 145 mmol/L Final     Potassium 06/06/2023 3.1 (L)  3.4 - 5.3 mmol/L Final     Chloride 06/06/2023 100  98 - 107 mmol/L Final     Carbon Dioxide (CO2) 06/06/2023 29  22 - 29 mmol/L Final     Anion Gap 06/06/2023 13  7 - 15 mmol/L Final     Urea Nitrogen 06/06/2023 5.7 (L)  8.0 - 23.0 mg/dL Final     Creatinine 06/06/2023 0.69  0.51 - 0.95 mg/dL Final     Calcium 06/06/2023 8.9  8.8 - 10.2 mg/dL Final     Glucose 06/06/2023 103 (H)  70 - 99 mg/dL Final     Alkaline Phosphatase 06/06/2023 47  35 - 104 U/L Final     AST 06/06/2023 27  10 - 35 U/L Final     ALT 06/06/2023 18  10 - 35 U/L Final     Protein Total 06/06/2023 6.5  6.4 -  8.3 g/dL Final     Albumin 06/06/2023 4.1  3.5 - 5.2 g/dL Final     Bilirubin Total 06/06/2023 0.3  <=1.2 mg/dL Final     GFR Estimate 06/06/2023 >90  >60 mL/min/1.73m2 Final    eGFR calculated using 2021 CKD-EPI equation.     Magnesium 06/06/2023 1.8  1.7 - 2.3 mg/dL Final     Phosphorus 06/06/2023 2.8  2.5 - 4.5 mg/dL Final     Prealbumin 06/06/2023 16  15 - 45 mg/dL Final       No results found for any visits on 07/06/23.     Assessment:     1. Chronic bilateral low back pain, unspecified whether sciatica present    2. Acute myeloid leukemia (AML), M2 (H)   No signs of recurrence.  CONTINUE TO MONITOR.   3. Exocrine pancreatic insufficiency    4. 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 Status post allogeneic bone marrow transplant (H) - 2010 - [facility]    6. Nail abnormality    7. Nephrolithiasis    8. Localized osteoporosis without current pathological fracture    9. Chronic obstructive pulmonary disease, unspecified COPD type (H)    10. Financial difficulties        Plan:     1. Refilled pain medications.  2. Go through prescription assistance to see if she can get Creon, Pancreaze, Zenpep, Viokase, or Pertzye.  3. Try Vaseline on the thumb nails for moisturization.  Could see specialty in the future if needed.  No signs of concerning disease.  Suspect nutritional.  4. Repeat urine drug screen and controlled substance agreement next visit.  Annual wellness visit next visit.  5. Continue current medications otherwise.  6. Follow up sooner if issues.    No orders of the defined types were placed in this encounter.       40 minutes or greater was spent today on the patient's care on the day of service.      This includes time for chart preparation, reviewing medical tests done before or during the visit, talking with the patient, review of quality indicators, required documentation, and other elements of care.        Jorge Dunn MD  General Internal Medicine  [facility]  Clinic    Return in about 6 months (around 1/8/2024) for annual wellness visit, visit and blood work.     Future Appointments   Date Time Provider Department Center   1/8/2024 12:00 PM [facility]   6/25/2024  1:30 PM Ryan Alonso MD [facility]

## 2023-07-07 NOTE — PATIENT INSTRUCTIONS
Future Appointments   Date Time Provider Department Center   1/8/2024 12:00 PM [facility]   6/25/2024  1:30 PM Ryan Alonso MD MDENDO [facility]

## 2023-07-25 ENCOUNTER — TELEPHONE (OUTPATIENT)
Dept: ENDOCRINOLOGY | Facility: CLINIC | Age: 68
End: 2023-07-25
Payer: MEDICARE

## 2023-07-25 NOTE — TELEPHONE ENCOUNTER
----- Message from Ryan Alonso MD sent at 7/24/2023  9:03 PM CDT -----  Regarding: RE: Estimate  Can we help her get a cost estimate for Evenity and then let me know? Thanks!

## 2023-08-10 DIAGNOSIS — M81.0 AGE-RELATED OSTEOPOROSIS WITHOUT CURRENT PATHOLOGICAL FRACTURE: Primary | ICD-10-CM

## 2023-08-10 DIAGNOSIS — Z94.81 S/P ALLOGENEIC BONE MARROW TRANSPLANT (H): ICD-10-CM

## 2023-08-10 RX ORDER — ZOLEDRONIC ACID 5 MG/100ML
5 INJECTION, SOLUTION INTRAVENOUS ONCE
Status: CANCELLED
Start: 2023-08-10

## 2023-08-10 RX ORDER — HEPARIN SODIUM,PORCINE 10 UNIT/ML
5-20 VIAL (ML) INTRAVENOUS DAILY PRN
Status: CANCELLED | OUTPATIENT
Start: 2023-08-10

## 2023-08-10 RX ORDER — EPINEPHRINE 1 MG/ML
0.3 INJECTION, SOLUTION, CONCENTRATE INTRAVENOUS EVERY 5 MIN PRN
Status: CANCELLED | OUTPATIENT
Start: 2023-08-10

## 2023-08-10 RX ORDER — HEPARIN SODIUM (PORCINE) LOCK FLUSH IV SOLN 100 UNIT/ML 100 UNIT/ML
5 SOLUTION INTRAVENOUS
Status: CANCELLED | OUTPATIENT
Start: 2023-08-10

## 2023-08-10 RX ORDER — ALBUTEROL SULFATE 0.83 MG/ML
2.5 SOLUTION RESPIRATORY (INHALATION)
Status: CANCELLED | OUTPATIENT
Start: 2023-08-10

## 2023-08-10 RX ORDER — METHYLPREDNISOLONE SODIUM SUCCINATE 125 MG/2ML
125 INJECTION, POWDER, LYOPHILIZED, FOR SOLUTION INTRAMUSCULAR; INTRAVENOUS
Status: CANCELLED
Start: 2023-08-10

## 2023-08-10 RX ORDER — DIPHENHYDRAMINE HYDROCHLORIDE 50 MG/ML
50 INJECTION INTRAMUSCULAR; INTRAVENOUS
Status: CANCELLED
Start: 2023-08-10

## 2023-08-10 RX ORDER — ALBUTEROL SULFATE 90 UG/1
1-2 AEROSOL, METERED RESPIRATORY (INHALATION)
Status: CANCELLED
Start: 2023-08-10

## 2023-08-10 NOTE — TELEPHONE ENCOUNTER
"Routing refill request to provider for review/approval because:  No Dexa on file    Last Written Prescription Date:  1/6/2023  Last Fill Quantity: 13,  # refills: 3   Last office visit provider:  7/6/2023     Requested Prescriptions   Pending Prescriptions Disp Refills    alendronate (FOSAMAX) 70 MG tablet [Pharmacy Med Name: Alendronate Sodium 70 MG Oral Tablet] 4 tablet 0     Sig: Take 1 tablet by mouth once a week       Bisphosphonates Failed - 8/10/2023  2:59 PM        Failed - Dexa on file within past 2 years     Please review last Dexa result.           Passed - Recent (12 mo) or future (30 days) visit within the authorizing provider's specialty     Patient has had an office visit with the authorizing provider or a provider within the authorizing providers department within the previous 12 mos or has a future within next 30 days. See \"Patient Info\" tab in inbasket, or \"Choose Columns\" in Meds & Orders section of the refill encounter.              Passed - Medication is active on med list        Passed - Patient is age 18 or older        Passed - Normal serum creatinine on file within past 12 months     Recent Labs   Lab Test 06/06/23  1331   CR 0.69       Ok to refill medication if creatinine is low               Madison Mcgarry RN 08/10/23 5:11 PM  "

## 2023-08-11 ENCOUNTER — TELEPHONE (OUTPATIENT)
Dept: ENDOCRINOLOGY | Facility: CLINIC | Age: 68
End: 2023-08-11
Payer: MEDICARE

## 2023-08-11 RX ORDER — ALENDRONATE SODIUM 70 MG/1
70 TABLET ORAL WEEKLY
Qty: 13 TABLET | Refills: 3 | Status: SHIPPED | OUTPATIENT
Start: 2023-08-11 | End: 2024-06-18

## 2023-08-11 NOTE — TELEPHONE ENCOUNTER
[facility] Call Center    Phone Message    May a detailed message be left on voicemail: yes     Reason for Call: Other: .     Per Patient is wanting to get a call back. Patient states she is wanting to know when she is needing to have the reclast infusion. Patient states she has not heard anything from Dr. Alonso on when that needed to be schedule and wanting to get updated on what she all needs to do. Please advise.     Action Taken: Message routed to:  [facility] Endo    Travel Screening: Not Applicable

## 2023-08-11 NOTE — TELEPHONE ENCOUNTER
Can we let her know:     1. Evenity was very expensive so I recommend Reclast instead. When she receives Reclast (30 minutes IV) she can stop Fosamax.   2. Reminder to take Tylenol 500 mg TID the day before, day of, and 2 days after Reclast.   3. Return to see me in 1 year with labs beforehand.     Thanks!     The above is per the MD and I Called and left a detailed message of the above. Pt is advised to call with questions.

## 2023-08-15 DIAGNOSIS — G89.29 CHRONIC BILATERAL LOW BACK PAIN, UNSPECIFIED WHETHER SCIATICA PRESENT: ICD-10-CM

## 2023-08-15 DIAGNOSIS — M54.50 CHRONIC BILATERAL LOW BACK PAIN, UNSPECIFIED WHETHER SCIATICA PRESENT: ICD-10-CM

## 2023-08-15 RX ORDER — OXYCODONE HYDROCHLORIDE 10 MG/1
10 TABLET ORAL 4 TIMES DAILY
Qty: 120 TABLET | Refills: 0 | Status: SHIPPED | OUTPATIENT
Start: 2023-08-17 | End: 2023-09-14

## 2023-08-15 NOTE — TELEPHONE ENCOUNTER
Medication Question or Refill    Contacts         Type Contact Phone/Fax    08/15/2023 09:28 AM CDT Phone (Incoming) Deny Jenn ESCALANTE (Self) [phone] (M)            What medication are you calling about (include dose and sig)?:   OXYCODONE IR     [facility]    [id number] [address]  [address] [facility] [address]  Phone: [phone] Fax: [phone]      Controlled Substance Agreement on file:   CSA -- Patient Level:     [Media Unavailable] Controlled Substance Agreement - Opioid - Scan on 1/9/2023  9:02 AM   [Media Unavailable] Controlled Substance Agreement - Opioid - Scan on 1/3/2022  1:31 PM   [Media Unavailable] Controlled Substance Agreement - Opioid - Scan on 12/18/2019   [Media Unavailable] Controlled Substance Agreement - Opioid - Scan on 12/28/2018   [Media Unavailable] Controlled Substance Agreement - Opioid - Scan on 8/9/2017: [facility]       Who prescribed the medication?: PCP, Dr.Joseph Dunn    Do you need a refill? Yes    When did you use the medication last? today    Patient offered an appointment? No    Last Visit with PCP = 07/06/2023  Next visit with PCP = 01/08/2024    Do you have any questions or concerns?  No

## 2023-08-16 ENCOUNTER — HOSPITAL ENCOUNTER (OUTPATIENT)
Dept: BONE DENSITY | Facility: HOSPITAL | Age: 68
Discharge: HOME | End: 2023-08-16
Attending: INTERNAL MEDICINE | Admitting: INTERNAL MEDICINE
Payer: COMMERCIAL

## 2023-08-16 DIAGNOSIS — M81.0 AGE-RELATED OSTEOPOROSIS WITHOUT CURRENT PATHOLOGICAL FRACTURE: ICD-10-CM

## 2023-08-16 DIAGNOSIS — S22.050S CLOSED WEDGE COMPRESSION FRACTURE OF T5 VERTEBRA, SEQUELA: ICD-10-CM

## 2023-08-16 DIAGNOSIS — S72.009S CLOSED FRACTURE OF HIP, UNSPECIFIED LATERALITY, SEQUELA: ICD-10-CM

## 2023-08-16 DIAGNOSIS — S22.060S CLOSED WEDGE COMPRESSION FRACTURE OF T8 VERTEBRA, SEQUELA: ICD-10-CM

## 2023-08-16 DIAGNOSIS — K86.81 EXOCRINE PANCREATIC INSUFFICIENCY: ICD-10-CM

## 2023-08-16 PROCEDURE — 77080 DXA BONE DENSITY AXIAL: CPT

## 2023-08-24 ENCOUNTER — INFUSION THERAPY VISIT (OUTPATIENT)
Dept: INFUSION THERAPY | Facility: HOSPITAL | Age: 68
End: 2023-08-24
Attending: INTERNAL MEDICINE
Payer: COMMERCIAL

## 2023-08-24 ENCOUNTER — TELEPHONE (OUTPATIENT)
Dept: ONCOLOGY | Facility: HOSPITAL | Age: 68
End: 2023-08-24
Payer: MEDICARE

## 2023-08-24 VITALS
WEIGHT: 107 LBS | SYSTOLIC BLOOD PRESSURE: 105 MMHG | HEART RATE: 62 BPM | DIASTOLIC BLOOD PRESSURE: 62 MMHG | BODY MASS INDEX: 15.85 KG/M2 | RESPIRATION RATE: 16 BRPM | OXYGEN SATURATION: 98 % | HEIGHT: 69 IN

## 2023-08-24 DIAGNOSIS — M81.0 AGE-RELATED OSTEOPOROSIS WITHOUT CURRENT PATHOLOGICAL FRACTURE: Primary | ICD-10-CM

## 2023-08-24 LAB
ALBUMIN SERPL BCG-MCNC: 4.2 G/DL (ref 3.5–5.2)
CALCIUM SERPL-MCNC: 8.6 MG/DL (ref 8.8–10.2)
CREAT SERPL-MCNC: 0.67 MG/DL (ref 0.51–0.95)
GFR SERPL CREATININE-BSD FRML MDRD: >90 ML/MIN/1.73M2

## 2023-08-24 PROCEDURE — 82310 ASSAY OF CALCIUM: CPT

## 2023-08-24 PROCEDURE — 82565 ASSAY OF CREATININE: CPT

## 2023-08-24 PROCEDURE — 82306 VITAMIN D 25 HYDROXY: CPT

## 2023-08-24 PROCEDURE — 96374 THER/PROPH/DIAG INJ IV PUSH: CPT

## 2023-08-24 PROCEDURE — 250N000011 HC RX IP 250 OP 636: Mod: JZ | Performed by: INTERNAL MEDICINE

## 2023-08-24 PROCEDURE — 36415 COLL VENOUS BLD VENIPUNCTURE: CPT

## 2023-08-24 PROCEDURE — 82040 ASSAY OF SERUM ALBUMIN: CPT

## 2023-08-24 RX ORDER — ALBUTEROL SULFATE 0.83 MG/ML
2.5 SOLUTION RESPIRATORY (INHALATION)
Status: DISCONTINUED | OUTPATIENT
Start: 2023-08-24 | End: 2023-08-24

## 2023-08-24 RX ORDER — ZOLEDRONIC ACID 5 MG/100ML
5 INJECTION, SOLUTION INTRAVENOUS ONCE
Status: CANCELLED
Start: 2023-08-24

## 2023-08-24 RX ORDER — EPINEPHRINE 1 MG/ML
0.3 INJECTION, SOLUTION INTRAMUSCULAR; SUBCUTANEOUS EVERY 5 MIN PRN
Status: CANCELLED | OUTPATIENT
Start: 2023-08-24

## 2023-08-24 RX ORDER — ALBUTEROL SULFATE 90 UG/1
1-2 AEROSOL, METERED RESPIRATORY (INHALATION)
Status: DISCONTINUED | OUTPATIENT
Start: 2023-08-24 | End: 2023-08-24

## 2023-08-24 RX ORDER — METHYLPREDNISOLONE SODIUM SUCCINATE 125 MG/2ML
125 INJECTION, POWDER, LYOPHILIZED, FOR SOLUTION INTRAMUSCULAR; INTRAVENOUS
Status: DISCONTINUED | OUTPATIENT
Start: 2023-08-24 | End: 2023-08-24

## 2023-08-24 RX ORDER — HEPARIN SODIUM (PORCINE) LOCK FLUSH IV SOLN 100 UNIT/ML 100 UNIT/ML
5 SOLUTION INTRAVENOUS
Status: CANCELLED | OUTPATIENT
Start: 2023-08-24

## 2023-08-24 RX ORDER — ALBUTEROL SULFATE 0.83 MG/ML
2.5 SOLUTION RESPIRATORY (INHALATION)
Status: CANCELLED | OUTPATIENT
Start: 2023-08-24

## 2023-08-24 RX ORDER — EPINEPHRINE 1 MG/ML
0.3 INJECTION, SOLUTION INTRAMUSCULAR; SUBCUTANEOUS EVERY 5 MIN PRN
Status: DISCONTINUED | OUTPATIENT
Start: 2023-08-24 | End: 2023-08-24

## 2023-08-24 RX ORDER — ALBUTEROL SULFATE 90 UG/1
1-2 AEROSOL, METERED RESPIRATORY (INHALATION)
Status: CANCELLED
Start: 2023-08-24

## 2023-08-24 RX ORDER — ZOLEDRONIC ACID 5 MG/100ML
5 INJECTION, SOLUTION INTRAVENOUS ONCE
Status: COMPLETED | OUTPATIENT
Start: 2023-08-24 | End: 2023-08-24

## 2023-08-24 RX ORDER — HEPARIN SODIUM,PORCINE 10 UNIT/ML
5-20 VIAL (ML) INTRAVENOUS DAILY PRN
Status: CANCELLED | OUTPATIENT
Start: 2023-08-24

## 2023-08-24 RX ORDER — METHYLPREDNISOLONE SODIUM SUCCINATE 125 MG/2ML
125 INJECTION, POWDER, LYOPHILIZED, FOR SOLUTION INTRAMUSCULAR; INTRAVENOUS
Status: CANCELLED
Start: 2023-08-24

## 2023-08-24 RX ORDER — DIPHENHYDRAMINE HYDROCHLORIDE 50 MG/ML
50 INJECTION INTRAMUSCULAR; INTRAVENOUS
Status: CANCELLED
Start: 2023-08-24

## 2023-08-24 RX ORDER — DIPHENHYDRAMINE HYDROCHLORIDE 50 MG/ML
50 INJECTION INTRAMUSCULAR; INTRAVENOUS
Status: DISCONTINUED | OUTPATIENT
Start: 2023-08-24 | End: 2023-08-24

## 2023-08-24 RX ADMIN — ZOLEDRONIC ACID 5 MG: 5 INJECTION, SOLUTION INTRAVENOUS at 14:32

## 2023-08-24 ASSESSMENT — PAIN SCALES - GENERAL: PAINLEVEL: NO PAIN (0)

## 2023-08-24 NOTE — TELEPHONE ENCOUNTER
Jenn called and questioned wether she could take her pain pill or some tylenol if she is getting her Reclast today. I told her there were not any warnings about having Reclast while taking oxycodone or tylenol. She thanked me and I wisher her a good day. BOGDAN Berman RN, OCN, CBCN

## 2023-08-24 NOTE — PROGRESS NOTES
Infusion Nursing Note:  Jenn Barclay presents today for Reclast.    Patient seen by provider today: No   present during visit today: Not Applicable.    Note: Medication explained to pt who verbalized understanding.       Intravenous Access:  Peripheral IV placed.    Treatment Conditions:  Lab Results   Component Value Date     06/06/2023    POTASSIUM 3.1 (L) 06/06/2023    MAG 1.8 06/06/2023    CR 0.67 08/24/2023    WILMER 8.6 (L) 08/24/2023    BILITOTAL 0.3 06/06/2023    ALBUMIN 4.2 08/24/2023    ALT 18 06/06/2023    AST 27 06/06/2023       Results reviewed, labs MET treatment parameters, ok to proceed with treatment.      Post Infusion Assessment:  Patient tolerated infusion without incident.  Site patent and intact, free from redness, edema or discomfort.  Access discontinued per protocol.       Discharge Plan:   Patient discharged in stable condition accompanied by: self.  Departure Mode: Ambulatory.      Naty Ramires RN

## 2023-08-27 LAB
DEPRECATED CALCIDIOL+CALCIFEROL SERPL-MC: <47 UG/L (ref 20–75)
VITAMIN D2 SERPL-MCNC: <5 UG/L
VITAMIN D3 SERPL-MCNC: 42 UG/L

## 2023-08-30 ENCOUNTER — TELEPHONE (OUTPATIENT)
Dept: INTERNAL MEDICINE | Facility: CLINIC | Age: 68
End: 2023-08-30
Payer: MEDICARE

## 2023-08-30 NOTE — TELEPHONE ENCOUNTER
General Call    Contacts         Type Contact Phone/Fax    08/30/2023 02:32 PM CDT Phone (Incoming) [place], Jenn ESCALANTE (Self) [phone] (M)          Reason for Call:  Covid Booster & Pneumonia shot    What are your questions or concerns:  Patient would like to know if she should get the two shots this year.    Date of last appointment with provider: n/a    Okay to leave a detailed message?: Yes at Cell number on file:    Telephone Information:   Mobile [phone]

## 2023-09-03 DIAGNOSIS — J44.9 CHRONIC OBSTRUCTIVE PULMONARY DISEASE, UNSPECIFIED COPD TYPE (H): ICD-10-CM

## 2023-09-03 DIAGNOSIS — L30.9 DERMATITIS: ICD-10-CM

## 2023-09-03 RX ORDER — TRIAMCINOLONE ACETONIDE 1 MG/G
CREAM TOPICAL
Qty: 80 G | Refills: 4 | Status: SHIPPED | OUTPATIENT
Start: 2023-09-03 | End: 2024-02-04

## 2023-09-03 RX ORDER — ALBUTEROL SULFATE 0.83 MG/ML
SOLUTION RESPIRATORY (INHALATION)
Qty: 300 ML | Refills: 11 | Status: SHIPPED | OUTPATIENT
Start: 2023-09-03 | End: 2024-02-04

## 2023-09-03 NOTE — TELEPHONE ENCOUNTER
"TRIAMCINOLONE CREAM  Routing refill request to provider for review/approval because:  Please review for continued use of this medication.  Last Written Prescription Date:  2/28/2023  Last Fill Quantity: 80 g,  # refills: 0   Last office visit provider:  7/6/2023     ALBUTEROL NEB SOLUTION  Routing refill request to provider for review/approval because:  Please review as there is a note attached to this medication dated 6/27/2023 that says \"patient not taking\".  Last Written Prescription Date:  3/24/2023  Last Fill Quantity: 75 mL,  # refills: 4   Last office visit provider:  7/6/2023     Requested Prescriptions   Pending Prescriptions Disp Refills    triamcinolone (KENALOG) 0.1 % external cream [Pharmacy Med Name: Triamcinolone Acetonide 0.1 % External Cream] 80 g 0     Sig: APPLY  CREAM TOPICALLY THREE TIMES DAILY TO ITCH OR  TENDER  SKIN  LESIONS       Topical Steroids and Nonsteroidals Protocol Passed - 9/3/2023  4:27 PM        Passed - Patient is age 6 or older        Passed - Authorizing prescriber's most recent note related to this medication read.     If refill request is for ophthalmic use, please forward request to provider for approval.          Passed - High potency steroid not ordered        Passed - Recent (12 mo) or future (30 days) visit within the authorizing provider's specialty     Patient has had an office visit with the authorizing provider or a provider within the authorizing providers department within the previous 12 mos or has a future within next 30 days. See \"Patient Info\" tab in inbasket, or \"Choose Columns\" in Meds & Orders section of the refill encounter.              Passed - Medication is active on med list          albuterol (PROVENTIL) (2.5 MG/3ML) 0.083% neb solution [Pharmacy Med Name: Albuterol Sulfate (2.5 MG/3ML) 0.083% Inhalation Nebulization Solution] 300 mL 0     Sig: USE 1 VIAL IN NEBULIZER EVERY 6 HOURS AS NEEDED FOR WHEEZING       Asthma Maintenance Inhalers - " "Anticholinergics Passed - 9/3/2023  4:27 PM        Passed - Patient is age 12 years or older        Passed - Recent (12 mo) or future (30 days) visit within the authorizing provider's specialty     Patient has had an office visit with the authorizing provider or a provider within the authorizing providers department within the previous 12 mos or has a future within next 30 days. See \"Patient Info\" tab in inbasket, or \"Choose Columns\" in Meds & Orders section of the refill encounter.              Passed - Medication is active on med list       Short-Acting Beta Agonist Inhalers Protocol  Passed - 9/3/2023  4:27 PM        Passed - Patient is age 12 or older        Passed - Recent (12 mo) or future (30 days) visit within the authorizing provider's specialty     Patient has had an office visit with the authorizing provider or a provider within the authorizing providers department within the previous 12 mos or has a future within next 30 days. See \"Patient Info\" tab in inbasket, or \"Choose Columns\" in Meds & Orders section of the refill encounter.              Passed - Medication is active on med list             Monique Davies RN 09/03/23 4:27 PM  "

## 2023-09-14 DIAGNOSIS — G89.29 CHRONIC BILATERAL LOW BACK PAIN, UNSPECIFIED WHETHER SCIATICA PRESENT: ICD-10-CM

## 2023-09-14 DIAGNOSIS — M54.50 CHRONIC BILATERAL LOW BACK PAIN, UNSPECIFIED WHETHER SCIATICA PRESENT: ICD-10-CM

## 2023-09-14 RX ORDER — OXYCODONE HYDROCHLORIDE 10 MG/1
10 TABLET ORAL 4 TIMES DAILY
Qty: 120 TABLET | Refills: 0 | Status: SHIPPED | OUTPATIENT
Start: 2023-09-16 | End: 2023-10-16

## 2023-09-14 NOTE — TELEPHONE ENCOUNTER
Patient calling to get a medication refill on medications attached    Controlled Substance Refill Request for     oxyCODONE IR (ROXICODONE) 10 MG tablet     Last refill: 8/17/2023    Last clinic visit: 7/6/2023    Clinic visit frequency required:   Next appt: 1/18/2024

## 2023-10-16 DIAGNOSIS — G89.29 CHRONIC BILATERAL LOW BACK PAIN, UNSPECIFIED WHETHER SCIATICA PRESENT: ICD-10-CM

## 2023-10-16 DIAGNOSIS — M54.50 CHRONIC BILATERAL LOW BACK PAIN, UNSPECIFIED WHETHER SCIATICA PRESENT: ICD-10-CM

## 2023-10-16 NOTE — TELEPHONE ENCOUNTER
Patient calling to get a medication refill on medication(s) attached      Controlled Substance Refill Request for     oxyCODONE IR (ROXICODONE) 10 MG tablet     Last refill: 9/16/2023    Last clinic visit: 7/6/2023    Clinic visit frequency required:   Next appt: 1/8/2023    Patient has 7 left

## 2023-10-17 RX ORDER — OXYCODONE HYDROCHLORIDE 10 MG/1
10 TABLET ORAL 4 TIMES DAILY
Qty: 120 TABLET | Refills: 0 | Status: SHIPPED | OUTPATIENT
Start: 2023-10-17 | End: 2023-11-13

## 2023-10-25 ENCOUNTER — PATIENT OUTREACH (OUTPATIENT)
Dept: GASTROENTEROLOGY | Facility: CLINIC | Age: 68
End: 2023-10-25
Payer: MEDICARE

## 2023-10-26 ENCOUNTER — TELEPHONE (OUTPATIENT)
Dept: INTERNAL MEDICINE | Facility: CLINIC | Age: 68
End: 2023-10-26
Payer: MEDICARE

## 2023-10-26 NOTE — TELEPHONE ENCOUNTER
New Medication Request        What medication are you requesting?: Itch relief for Rash     Reason for medication request: Per pt Mona already knows about a rash she has been dealing with, per pt its starting to move more up her back and she just itches like crazy. The ointment she was prescribed is not working very well for her, would like a different ointment.     Have you taken this medication before?: Yes:    Controlled Substance Agreement on file:   CSA -- Patient Level:     [Media Unavailable] Controlled Substance Agreement - Opioid - Scan on 1/9/2023  9:02 AM   [Media Unavailable] Controlled Substance Agreement - Opioid - Scan on 1/3/2022  1:31 PM   [Media Unavailable] Controlled Substance Agreement - Opioid - Scan on 12/18/2019   [Media Unavailable] Controlled Substance Agreement - Opioid - Scan on 12/28/2018   [Media Unavailable] Controlled Substance Agreement - Opioid - Scan on 8/9/2017: [facility]         Patient offered an appointment? No, pt already has a appt scheduled for Michael    Preferred Pharmacy:   [id number] - [place], [place] - [address]  [address]  [facility] [address]  Phone: [phone] Fax: [phone]        Okay to leave a detailed message?: Yes at Home number on file [phone] (home)

## 2023-10-27 NOTE — TELEPHONE ENCOUNTER
Called pt and offered 10/31 appt. Pt states appt time will not work due to the time. Asking if there is another date or time next week. No appts available. Informed would let PCP know and reach back out to pt.     Pt thanked for the call.

## 2023-10-27 NOTE — TELEPHONE ENCOUNTER
See if she could come in for the same day slot next Tuesday to check the rash out.    Thank you,    Jorge Dunn MD  General Internal Medicine  [facility]  10/26/2023, 10:52 PM

## 2023-10-27 NOTE — TELEPHONE ENCOUNTER
Can offer next Friday at 230 since she cannot make the early appointments.  November 3.    She could be seen in walk-in care for this if it requires more urgent attention.

## 2023-10-30 NOTE — TELEPHONE ENCOUNTER
Called pt. Pt rescheduled to tomorrow 10/31 at 11:30 AM. Pt expresses thanks for getting her in sooner.

## 2023-10-30 NOTE — TELEPHONE ENCOUNTER
Can offer 1130 am tomorrow for this.    Jorge Dunn MD  General Internal Medicine  [facility]  10/30/2023, 12:29 PM

## 2023-10-30 NOTE — TELEPHONE ENCOUNTER
Called pt. Pt scheduled for 11/3.     Pt states her skin rash is getting worse. Pt reports its very itchy and has spread. Pt also reports losing about 5 lbs and none of her clothes are fitting on her due to weight loss.     Writer states WIC/urgent care may be appropriate given her worsening symptoms and per PCP recommendations. Pt declined and states she does not want to have to explain her issues over again with a different provider who is not familiar with her history and that this has been an issue for over 10 years for her and no one really can tell her why.     Writer informed would route this update to PCP.     Pt thanked for the call back.

## 2023-10-31 ENCOUNTER — OFFICE VISIT (OUTPATIENT)
Dept: INTERNAL MEDICINE | Facility: CLINIC | Age: 68
End: 2023-10-31
Payer: COMMERCIAL

## 2023-10-31 VITALS
DIASTOLIC BLOOD PRESSURE: 82 MMHG | SYSTOLIC BLOOD PRESSURE: 120 MMHG | WEIGHT: 106 LBS | HEIGHT: 69 IN | HEART RATE: 65 BPM | RESPIRATION RATE: 14 BRPM | BODY MASS INDEX: 15.7 KG/M2 | TEMPERATURE: 98.4 F | OXYGEN SATURATION: 95 %

## 2023-10-31 DIAGNOSIS — R21 PAPULAR RASH, GENERALIZED: ICD-10-CM

## 2023-10-31 DIAGNOSIS — R19.5 LOOSE BOWEL MOVEMENT: ICD-10-CM

## 2023-10-31 DIAGNOSIS — L30.9 DERMATITIS: Primary | ICD-10-CM

## 2023-10-31 PROCEDURE — 90480 ADMN SARSCOV2 VAC 1/ONLY CMP: CPT | Performed by: INTERNAL MEDICINE

## 2023-10-31 PROCEDURE — 91320 SARSCV2 VAC 30MCG TRS-SUC IM: CPT | Performed by: INTERNAL MEDICINE

## 2023-10-31 PROCEDURE — 99214 OFFICE O/P EST MOD 30 MIN: CPT | Performed by: INTERNAL MEDICINE

## 2023-10-31 RX ORDER — BETAMETHASONE DIPROPIONATE 0.5 MG/G
CREAM TOPICAL 2 TIMES DAILY
Qty: 50 G | Refills: 3 | Status: SHIPPED | OUTPATIENT
Start: 2023-10-31

## 2023-10-31 RX ORDER — HYDROXYZINE HYDROCHLORIDE 25 MG/1
25 TABLET, FILM COATED ORAL 3 TIMES DAILY PRN
Qty: 100 TABLET | Refills: 1 | Status: SHIPPED | OUTPATIENT
Start: 2023-10-31

## 2023-10-31 ASSESSMENT — PAIN SCALES - GENERAL: PAINLEVEL: NO PAIN (0)

## 2023-10-31 NOTE — COMMUNITY RESOURCES LIST (ENGLISH)
10/31/2023   [facility]  N/A  For questions about this resource list or additional care needs, please contact your primary care clinic or care manager.  Phone: [phone]   Email: N/A   Address: [address] [place] [address]   Hours: N/A        Food and Nutrition       Food pantry  1  [employer] [place] - Department of [employer] Work (DIW) Distance: 0.9 miles      Delivery, [facility]   [address] [place] [address]  Language: English, Ojibwe, Brazilian  Hours: Mon 1:00 PM - 6:00 PM , Tue - Thu 9:30 AM - 2:30 PM  Fees: Free   Phone: (648) 417-3465 Email: mira@[employer] Website: http://[employer]/diw     2  [employer] [facility] - Food Shelf Distance: 1.77 miles      Delivery, [facility]   [address] [place] [address]  Language: English  Hours: Sat 9:00 AM - 10:00 AM  Fees: Free   Phone: (589) 521-4459 Email: nunu@[employer] Website: http://[employer]Cs[employer].org/sites/[facility] Centernaishts/page-8.php     SNAP application assistance  3  [facility] Distance: 3.98 miles      Phone/Virtual   [address] [place] [address]  Language: English, Hmong, Equatorial Guinean, Brazilian, Uzbek  Hours: Mon - Thu 8:00 AM - 5:30 PM , Fri 8:00 AM - 12:00 PM  Fees: Free   Phone: (118) 178-5599 Email: info@[employer].org Website: http://www.[employer].org     4  [employer] [place] Distance: 6 miles      Phone/Virtual   [address] [facility] [address] [place] [address]  Language: English, Hmong, Egyptian, Equatorial Guinean, Brazilian  Hours: Mon - Fri 8:30 AM - 4:30 PM  Fees: Free   Phone: (762) 187-7876 Email: helpline@[employer].org Website: https://www.[employer]ers[employer].org/programs/[place]-food-helpline/     Soup kitchen or free meals  5  [facility] - [employer] - [employer] F[employer] Distance: 4.51 miles      Pickup   [address] [place] [address]  Language: English  Hours: Wed 5:30 PM  - 6:30 PM  Fees: Free   Phone: (708) 975-6013 Email: office@[facility].org Website: https://www.[facility].org     [employer] [place] [employer] [facility] [employer] [place] - Free Summer Meals Distance: 4.94 miles      In-Person   [address] [place] [address]  Language: English  Hours: Mon - Fri 4:00 PM - 4:30 PM  Fees: Free   Phone: (273) 925-8168 Email: Adolfo@[facility]. Website: https://www.[facility]/facilities/tcrjchres-jsrj-tjnxpgeldm-Cope          Important Numbers & Websites       Emergency Services   911  [facility] Services   311  Poison Control   (823) 868-1648  Suicide Prevention Lifeline   (230) 559-1332 (TALK)  Child Abuse Hotline   (463) 777-1144 (4-A-Child)  Sexual Assault Hotline   (643) 316-7091 (HOPE)  National Runaway Safeline   (571) 913-5979 (RUNAWAY)  All-Options Talkline   (794) 415-8143  Substance Abuse Referral   (970) 394-4263 (HELP)

## 2023-10-31 NOTE — PROGRESS NOTES
"  {PROVIDER CHARTING PREFERENCE:757568}    Serg Barajas is a 68 year old, presenting for the following health issues:  Derm Problem (Rash hip to hip)        10/31/2023    11:21 AM   Additional Questions   Roomed by Rissa Muller   Accompanied by yoko       History of Present Illness       Reason for visit:  Bad    She eats 2-3 servings of fruits and vegetables daily.She consumes 2 sweetened beverage(s) daily. She exercises with enough effort to increase her heart rate 7 days per week. She is missing 1 dose(s) of medications per week.       {MA/LPN/RN Pre-Provider Visit Orders- hCG/UA/Strep (Optional):125657}  Rash  Onset/Duration: ongoing   Description  Location: hips and buttocks   Character: painful, burning, draining, red  Itching: severe  Intensity:  severe  Progression of Symptoms:  worsening  Accompanying signs and symptoms:   Fever: YES  Body aches or joint pain: No  Sore throat symptoms: No  Recent cold symptoms: YES running nose  History:           Previous episodes of similar rash: Yes  New exposures:  None  Recent travel: No  Exposure to similar rash: no   Precipitating or alleviating factors:   Therapies tried and outcome:creams  {additonal problems for provider to add (Optional):074879}      Review of Systems   {ROS COMP (Optional):575829}      Objective    /82 (BP Location: Right arm, Patient Position: Sitting, Cuff Size: Adult Small)   Pulse 65   Resp 14   Ht 1.753 m (5' 9\")   Wt 48.1 kg (106 lb)   LMP  (LMP Unknown)   SpO2 95%   BMI 15.65 kg/m    Body mass index is 15.65 kg/m .  Physical Exam   {Exam List (Optional):892180}    {Diagnostic Test Results (Optional):753782}    {AMBULATORY ATTESTATION (Optional):584295}              "

## 2023-11-03 NOTE — PATIENT INSTRUCTIONS
Future Appointments   Date Time Provider Department Center   [facility]2024 12:00 PM [facility]   6/25/2024  1:30 PM Ryan Alonso MD MDEND[facility]

## 2023-11-03 NOTE — PROGRESS NOTES
[facility] - Primary Care Specialists    Comprehensive and complex medical care - Chronic disease management - Shared decision making - Care coordination - Compassionate care    Patient advocacy - Rational deprescribing - Minimally disruptive medicine - Ethical focus - Customized care         Date of Service: 10/31/2023  Primary Provider: Jorge Dunn    Patient Care Team:  Jorge Dunn MD as PCP - General (Internal Medicine)  Mohit Montano MD as MD (Hematology & Oncology)  Jorge Dunn MD as Assigned PCP  Joellen Nova MD as Resident (Dermatology)  Madison Alexander LICSW as  (BMT - Adult)  Ryan Alonso MD as MD (Endocrinology, Diabetes, and Metabolism)  Manju Asher, RN as BMT Nurse Coordinator  Umesh Brandt MD as BMT Physician (Transplant)  Jorge Dunn MD as Assigned Pain Medication Provider  Ryan Alonso MD as Assigned Endocrinology Provider  Donny Gunn MD as Assigned Gastroenterology Provider          Patient's Pharmacy:    [facility] [place], [place] - [address]  [address]  [facility] [address]  Phone: [phone] Fax: [phone]    [id number] [address]  [address] [place] [id number]  Phone: [phone] Fax: [phone]     Patient's Contacts:  Name Home Phone Work Phone Mobile Phone Relationship Lgl Joey   FRANCISCO JAVIER KESSLER   [phone] Significant*    JEAN JUARES [phone] Grandchild      Patient's Insurance:    Payor: UNITED HEALTHCARE / Plan: [employer] [facility] MEDICARE ADVANTAGE / Product Type: HMO /           Current Outpatient Medications   Medication Instructions    albuterol (PROAIR HFA/PROVENTIL HFA/VENTOLIN HFA) 108 (90 Base) MCG/ACT inhaler 2 puffs, Inhalation, EVERY 4 HOURS PRN    albuterol (PROVENTIL) (2.5 MG/3ML) 0.083% neb solution USE 1 VIAL IN  NEBULIZER EVERY 6 HOURS AS NEEDED FOR WHEEZING    Albuterol Sulfate (VENTOLIN HFA) 108 (90 BASE) MCG/ACT AERS Inhale  into the lungs.    alendronate (FOSAMAX) 70 mg, Oral, WEEKLY    amylase-lipase-protease (CREON) 89113-19139 units CPEP per EC capsule 2 capsules, Oral, 3 TIMES DAILY WITH MEALS, And snacks    augmented betamethasone dipropionate (DIPROLENE AF) 0.05 % external cream Topical, 2 TIMES DAILY, Stronger cream for rash.    clindamycin 1 % EX external lotion Topical, 2 TIMES DAILY    gentamicin 0.1 % EX external ointment Topical, 2 TIMES DAILY    hydrOXYzine (ATARAX) 25 mg, Oral, 3 TIMES DAILY PRN    ipratropium - albuterol 0.5 mg/2.5 mg/3 mL (DUONEB) 0.5-2.5 (3) MG/3ML neb solution 3 mLs, Nebulization, EVERY 6 HOURS PRN    Loperamide HCl (IMODIUM OR) Oral, 2 TIMES DAILY    Multiple Vitamins-Minerals (MULTIVITAL PO) Take  by mouth.    mupirocin (BACTROBAN) 2 % external ointment Use 2 times a day to affected area on cheek    nebulizer nebulization 1 nebulizer please as covered by insurance.  May also dispense supplies (tubing, inhalation device, etc) as needed.    Omega-3 Fatty Acids (FISH OIL PO) Oral    oxyCODONE IR (ROXICODONE) 10 mg, Oral, 4 TIMES DAILY, For use from 10/17/23 to 11/16/23.    triamcinolone (KENALOG) 0.1 % external cream APPLY  CREAM TOPICALLY THREE TIMES DAILY TO ITCH OR  TENDER  SKIN  LESIONS    umeclidinium (INCRUSE ELLIPTA) 62.5 MCG/INH inhaler 1 puff, Inhalation, DAILY    vitamin C (ASCORBIC ACID) 500 mg, DAILY        Subjective:      Jenn Barclay is a 68 year old female who comes in today for:    Chief Complaint   Patient presents with    Derm Problem     Rash hip to hip          10/31/2023    11:21 AM   Additional Questions   Roomed by Rissa Muller   Accompanied by yoko       In for follow up of rash.  Usually on back and buttocks and intensely itchy.  No signs of dermatitis herpetiformis on biopsy in the past.  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    She has seen dermatology in the past for this.    Has been bad in the last week.  Somewhat in the left leg as well.    Reviewed her loose bowels as well.  Has been a bit worse lately.  We reviewed the \"orange oil\" which comes out with this.  Weight is low and this was reviewed in the past as well.    Objective:     Wt Readings from Last 3 Encounters:   10/31/23 48.1 kg (106 lb)   08/24/23 48.5 kg (107 lb)   07/06/23 48.9 kg (107 lb 11.2 oz)     BP Readings from Last 3 Encounters:   10/31/23 120/82   08/24/23 105/62   07/06/23 133/78      /82 (BP Location: Right arm, Patient Position: Sitting, Cuff Size: Adult Small)   Pulse 65   Temp 98.4  F (36.9  C) (Oral)   Resp 14   Ht 1.753 m (5' 9\")   Wt 48.1 kg (106 lb)   LMP  (LMP Unknown)   SpO2 95%   BMI 15.65 kg/m     In general, the patient is comfortable, no apparent distress.  Mood good.  Insight good.  Heart regular rate and rhythm.  Lungs clear to ausculation bilaterally.  Papular rash on buttocks and low back noted.  No blistering.    Diagnostics:     No results found for any visits on 10/31/23.     Assessment:     1. Dermatitis    2. Papular rash, generalized    3. Loose bowel movement        Plan:     Cream and atarax and noted below.  Consider prednisone or dapsome in the future for rash if not improving.  Covid booster done today   Consider stool testing including stool calprotectin and fecal elastase.  Continue current medications otherwise.  Follow up sooner if issues.    Orders Placed This Encounter   Procedures    COVID-19 12+ (2023-24) (PFIZER)     Orders Placed This Encounter   Medications    augmented betamethasone dipropionate (DIPROLENE AF) 0.05 % external cream     Sig: Apply topically 2 times daily Stronger cream for rash.     Dispense:  50 g     Refill:  3    hydrOXYzine (ATARAX) 25 MG tablet     Sig: Take 1 tablet (25 mg) by mouth 3 times daily as needed for itching     Dispense:  100 tablet     Refill:  1      30 " minutes or greater was spent today on the patient's care on the day of service.      This includes time for chart preparation, reviewing medical tests done before or during the visit, talking with the patient, review of quality indicators, required documentation, and other elements of care.     Jorge Dunn MD  General Internal Medicine  [facility]    Return in about 2 months (around 1/8/2024) for annual wellness visit.     Future Appointments   Date Time Provider Department Center   1/8/2024 12:00 PM Jorge Dunn MD [facility]   6/25/2024  1:30 PM [facility]       Answers submitted by the patient for this visit:  General Questionnaire (Submitted on 10/31/2023)  Chief Complaint: Chronic problems general questions HPI Form  What is the reason for your visit today? : bad  How many servings of fruits and vegetables do you eat daily?: 2-3  On average, how many sweetened beverages do you drink each day (Examples: soda, juice, sweet tea, etc.  Do NOT count diet or artificially sweetened beverages)?: 2  How many days a week do you exercise enough to make your heart beat faster?: 7  How many days per week do you miss taking your medication?: 1

## 2023-11-13 ENCOUNTER — TELEPHONE (OUTPATIENT)
Dept: INTERNAL MEDICINE | Facility: CLINIC | Age: 68
End: 2023-11-13
Payer: MEDICARE

## 2023-11-13 DIAGNOSIS — M54.50 CHRONIC BILATERAL LOW BACK PAIN, UNSPECIFIED WHETHER SCIATICA PRESENT: ICD-10-CM

## 2023-11-13 DIAGNOSIS — G89.29 CHRONIC BILATERAL LOW BACK PAIN, UNSPECIFIED WHETHER SCIATICA PRESENT: ICD-10-CM

## 2023-11-13 RX ORDER — OXYCODONE HYDROCHLORIDE 10 MG/1
10 TABLET ORAL 4 TIMES DAILY
Qty: 120 TABLET | Refills: 0 | Status: SHIPPED | OUTPATIENT
Start: 2023-11-16 | End: 2023-12-14

## 2023-11-13 NOTE — TELEPHONE ENCOUNTER
Medication Refill request- Controlled Substance       Oxybutynin Counseling:  I discussed with the patient the risks of oxybutynin including but not limited to skin rash, drowsiness, dry mouth, difficulty urinating, and blurred vision.

## 2023-11-16 ENCOUNTER — OFFICE VISIT (OUTPATIENT)
Dept: FAMILY MEDICINE | Facility: CLINIC | Age: 68
End: 2023-11-16
Payer: COMMERCIAL

## 2023-11-16 VITALS
WEIGHT: 106 LBS | BODY MASS INDEX: 15.65 KG/M2 | TEMPERATURE: 98.4 F | DIASTOLIC BLOOD PRESSURE: 75 MMHG | HEART RATE: 82 BPM | OXYGEN SATURATION: 96 % | RESPIRATION RATE: 18 BRPM | SYSTOLIC BLOOD PRESSURE: 131 MMHG

## 2023-11-16 DIAGNOSIS — J44.1 COPD EXACERBATION (H): ICD-10-CM

## 2023-11-16 DIAGNOSIS — L03.221 CELLULITIS OF NECK: Primary | ICD-10-CM

## 2023-11-16 PROCEDURE — 99214 OFFICE O/P EST MOD 30 MIN: CPT | Performed by: STUDENT IN AN ORGANIZED HEALTH CARE EDUCATION/TRAINING PROGRAM

## 2023-11-16 RX ORDER — DOXYCYCLINE 100 MG/1
100 CAPSULE ORAL 2 TIMES DAILY
Qty: 20 CAPSULE | Refills: 0 | Status: SHIPPED | OUTPATIENT
Start: 2023-11-16 | End: 2023-11-26

## 2023-11-16 RX ORDER — PREDNISONE 20 MG/1
40 TABLET ORAL DAILY
Qty: 10 TABLET | Refills: 0 | Status: SHIPPED | OUTPATIENT
Start: 2023-11-16 | End: 2023-11-21

## 2023-11-17 NOTE — PROGRESS NOTES
"ASSESSMENT & PLAN:   Diagnoses and all orders for this visit:  Cellulitis of neck  -     doxycycline hyclate (VIBRAMYCIN) 100 MG capsule; Take 1 capsule (100 mg) by mouth 2 times daily for 10 days  COPD exacerbation (H)  -     doxycycline hyclate (VIBRAMYCIN) 100 MG capsule; Take 1 capsule (100 mg) by mouth 2 times daily for 10 days  -     predniSONE (DELTASONE) 20 MG tablet; Take 2 tablets (40 mg) by mouth daily for 5 days    Cellulitis of neck following removal of tick 2 days ago. Also cough x2 days in COPD patient with increased sputum production and wheezing on exam. Consistent with COPD exacerbation. Will treat both COPD exacerbation and cellulitis with doxycycline x10 days and prednisone burst. Continue albuterol nebulizer.    At the end of the encounter, I discussed results, diagnosis, medications. Discussed red flags for immediate return to clinic/ER, as well as indications for follow up if no improvement. Patient and/or caregiver understood and agreed to plan. Patient was stable for discharge.    There are no Patient Instructions on file for this visit.    ------------------------------------------------------------------------  SUBJECTIVE  History was obtained from patient.    Patient presents with:  Insect Bites: Tick bit on neck 3 days ago  Cough: X 2 days cough with mucus     HPI  Jenn Barclya is a(n) 68 year old female presenting to urgent care for tick bite to neck. Removed tick 2 days ago. Tick was not engorged. Developed redness around site this morning. Also has cough for \"awhile\" which has worsened x 2 days. Cough productive with mucus. Had wheezing after exercise today - used neb which helped. Denies fever, chills, congestion, rhinorrhea, sore throat, abdominal pain, nausea, vomiting. No known sick contacts. Has COPD    Review of Systems    Current Outpatient Medications   Medication Sig Dispense Refill    doxycycline hyclate (VIBRAMYCIN) 100 MG capsule Take 1 capsule (100 mg) by mouth 2 times " daily for 10 days 20 capsule 0    predniSONE (DELTASONE) 20 MG tablet Take 2 tablets (40 mg) by mouth daily for 5 days 10 tablet 0    albuterol (PROAIR HFA/PROVENTIL HFA/VENTOLIN HFA) 108 (90 Base) MCG/ACT inhaler Inhale 2 puffs into the lungs every 4 hours as needed for shortness of breath / dyspnea or wheezing 18 g 0    albuterol (PROVENTIL) (2.5 MG/3ML) 0.083% neb solution USE 1 VIAL IN NEBULIZER EVERY 6 HOURS AS NEEDED FOR WHEEZING 300 mL 11    Albuterol Sulfate (VENTOLIN HFA) 108 (90 BASE) MCG/ACT AERS Inhale  into the lungs.      alendronate (FOSAMAX) 70 MG tablet Take 1 tablet by mouth once a week 13 tablet 3    amylase-lipase-protease (CREON) 47785-20097 units CPEP per EC capsule Take 2 capsules by mouth 3 times daily (with meals) And snacks 300 capsule 11    ascorbic acid (VITAMIN C) 500 MG tablet Take 500 mg by mouth daily.      augmented betamethasone dipropionate (DIPROLENE AF) 0.05 % external cream Apply topically 2 times daily Stronger cream for rash. 50 g 3    clindamycin 1 % EX external lotion Apply topically 2 times daily 60 mL 3    gentamicin 0.1 % EX external ointment Apply topically 2 times daily 30 g 3    hydrOXYzine (ATARAX) 25 MG tablet Take 1 tablet (25 mg) by mouth 3 times daily as needed for itching 100 tablet 1    ipratropium - albuterol 0.5 mg/2.5 mg/3 mL (DUONEB) 0.5-2.5 (3) MG/3ML neb solution Take 1 vial (3 mLs) by nebulization every 6 hours as needed for shortness of breath / dyspnea or wheezing 90 mL 1    Loperamide HCl (IMODIUM OR) Take by mouth 2 times daily      Multiple Vitamins-Minerals (MULTIVITAL PO) Take  by mouth.      mupirocin (BACTROBAN) 2 % external ointment Use 2 times a day to affected area on cheek 22 g 0    nebulizer nebulization 1 nebulizer please as covered by insurance.  May also dispense supplies (tubing, inhalation device, etc) as needed. 1 each 0    Omega-3 Fatty Acids (FISH OIL PO)       oxyCODONE IR (ROXICODONE) 10 MG tablet Take 1 tablet (10 mg) by mouth 4  times daily for 30 days For use from 11/176/23 to 12/16/23. 120 tablet 0    triamcinolone (KENALOG) 0.1 % external cream APPLY  CREAM TOPICALLY THREE TIMES DAILY TO ITCH OR  TENDER  SKIN  LESIONS 80 g 4    umeclidinium (INCRUSE ELLIPTA) 62.5 MCG/INH inhaler Inhale 1 puff into the lungs daily (Patient not taking: Reported on 4/5/2023) 30 each 11     Problem List:  2022-07: Chronic, continuous use of opioids  2021-07: Chronic back pain  2021-07: COPD (chronic obstructive pulmonary disease) (H)  2021-07: Migraine headache  2020-07: Serrated adenoma of colon  2020-07: Tubular adenoma of colon - advanced adenoma in 2017 - single   small adenoma in 2020 2020-02: Underweight  2020-02: High serum thyroid stimulating hormone (TSH)  2020-02: Nocturia  2020-02: Post-menopausal  2020-02: Age-related osteoporosis with current pathological fracture   with routine healing, subsequent encounter  2020-02: Nephrolithiasis  2019-12: Senile purpura (H24)  2018-11: COPD exacerbation (H)  2018-05: Exocrine pancreatic insufficiency  2017-09: Full code status  2017-08: Controlled substance agreement signed - 1/23 - oxycodone -   UDS 1/23 2016-11: Pseudogout  2016-11: S/P allogeneic bone marrow transplant (H)  2016-10: Financial difficulties  2016-10: COPD exacerbation (H)  2016-05: OP (osteoporosis)  2016-04: Traumatic compression fracture of T8 thoracic vertebra,   closed, initial encounter (H)  2016-03: Anxiety  2016-02: 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)  2016-02: Papular rash, generalized  2016-02: Tobacco abuse  2016-02: History of acute myeloid leukemia  2016-02: Status post allogeneic bone marrow transplant (H) - 2010 - [facility] [place]  2016-02: Chronic diarrhea  2014-09: COPD exacerbation (H)  2014-06: Gastric ulcer  2011-12: Shoulder pain  2011-04: AML (acute myelogenous leukemia) (H)  2011-04: Encounter for long-term current use of medication  2010-11: Pain in joint, shoulder region  Other specified chronic obstructive pulmonary  disease  Acute Myelogenous Leukemia - In Remission    Allergies   Allergen Reactions    Mirtazapine      Other reaction(s): Other (See Comments)  Hallucination     Tape [Adhesive Tape]      Allergy Hx  In addition to NO paper tape    Liquid Adhesive Rash     Other reaction(s): Other (See Comments)  Allergy Hx - Rash from paper tape         OBJECTIVE  Vitals:    11/16/23 1820   BP: 131/75   BP Location: Left arm   Patient Position: Sitting   Cuff Size: Adult Regular   Pulse: 82   Resp: 18   Temp: 98.4  F (36.9  C)   TempSrc: Oral   SpO2: 96%   Weight: 48.1 kg (106 lb)     Physical Exam   GENERAL: healthy, alert, no acute distress.   PSYCH: mentation appears normal. Normal affect  HEAD: normocephalic, atraumatic.  EYE: PERRL. EOMs intact. No scleral injection bilaterally.   EAR: external ear normal. Bilateral ear canals normal and nonpainful. Bilateral TM intact, pearly, translucent without bulging.  NOSE: external nose atraumatic without lesions.  OROPHARYNX: moist mucous membranes. Posterior oropharynx without erythema or exudate. Uvula midline. Patent airway.  LUNGS: no increased work of breathing. Mild end-expiratory wheeze. No crackles, rhonchi, or rales.   CV: regular rate and rhythm. No clicks, murmurs, or rubs.  SKIN: left side of neck with erythematous patch approx 1 cm in diameter with central punctate scab.    No results found for any visits on 11/16/23.

## 2023-12-14 DIAGNOSIS — M54.50 CHRONIC BILATERAL LOW BACK PAIN, UNSPECIFIED WHETHER SCIATICA PRESENT: ICD-10-CM

## 2023-12-14 DIAGNOSIS — G89.29 CHRONIC BILATERAL LOW BACK PAIN, UNSPECIFIED WHETHER SCIATICA PRESENT: ICD-10-CM

## 2023-12-14 RX ORDER — OXYCODONE HYDROCHLORIDE 10 MG/1
10 TABLET ORAL 4 TIMES DAILY
Qty: 120 TABLET | Refills: 0 | Status: SHIPPED | OUTPATIENT
Start: 2023-12-16 | End: 2024-01-08

## 2023-12-14 NOTE — TELEPHONE ENCOUNTER
Reason for Call:  Medication or medication refill:    Do you use a [facility]?  [place] of the pharmacy and phone number for the current request:  [facility] in [place] on [address]    Name of the medication requested: oxyCODONE IR (ROXICODONE) 10 MG tablet     Last Visit with PCP: 7/6/23  Next Visit with PCP: 01/08/24    Call taken on 12/14/2023 at 8:23 AM by Arely Orta

## 2023-12-14 NOTE — TELEPHONE ENCOUNTER
PDMP Review         Value Time User    State PDMP site checked  Yes 7/6/2023  1:10 PM Jorge Dunn MD

## 2024-01-08 ENCOUNTER — OFFICE VISIT (OUTPATIENT)
Dept: INTERNAL MEDICINE | Facility: CLINIC | Age: 69
End: 2024-01-08
Payer: COMMERCIAL

## 2024-01-08 VITALS
HEIGHT: 69 IN | RESPIRATION RATE: 18 BRPM | DIASTOLIC BLOOD PRESSURE: 70 MMHG | BODY MASS INDEX: 16 KG/M2 | SYSTOLIC BLOOD PRESSURE: 132 MMHG | HEART RATE: 68 BPM | OXYGEN SATURATION: 96 % | TEMPERATURE: 97.9 F | WEIGHT: 108 LBS

## 2024-01-08 DIAGNOSIS — Z51.81 ENCOUNTER FOR THERAPEUTIC DRUG LEVEL MONITORING: ICD-10-CM

## 2024-01-08 DIAGNOSIS — Z00.00 ENCOUNTER FOR MEDICARE ANNUAL WELLNESS EXAM: Primary | ICD-10-CM

## 2024-01-08 DIAGNOSIS — M54.50 CHRONIC BILATERAL LOW BACK PAIN, UNSPECIFIED WHETHER SCIATICA PRESENT: ICD-10-CM

## 2024-01-08 DIAGNOSIS — Z94.81 STATUS POST ALLOGENEIC BONE MARROW TRANSPLANT (H): ICD-10-CM

## 2024-01-08 DIAGNOSIS — Z79.899 CONTROLLED SUBSTANCE AGREEMENT SIGNED: Chronic | ICD-10-CM

## 2024-01-08 DIAGNOSIS — Z72.0 TOBACCO ABUSE: Chronic | ICD-10-CM

## 2024-01-08 DIAGNOSIS — D69.2 SENILE PURPURA (H): ICD-10-CM

## 2024-01-08 DIAGNOSIS — M81.0 AGE-RELATED OSTEOPOROSIS WITHOUT CURRENT PATHOLOGICAL FRACTURE: ICD-10-CM
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
## 2024-01-08 DIAGNOSIS — G89.29 CHRONIC BILATERAL LOW BACK PAIN, UNSPECIFIED WHETHER SCIATICA PRESENT: ICD-10-CM

## 2024-01-08 DIAGNOSIS — J44.9 CHRONIC OBSTRUCTIVE PULMONARY DISEASE, UNSPECIFIED COPD TYPE (H): ICD-10-CM

## 2024-01-08 PROCEDURE — G0481 DRUG TEST DEF 8-14 CLASSES: HCPCS | Performed by: INTERNAL MEDICINE

## 2024-01-08 PROCEDURE — G0439 PPPS, SUBSEQ VISIT: HCPCS | Performed by: INTERNAL MEDICINE

## 2024-01-08 PROCEDURE — 99214 OFFICE O/P EST MOD 30 MIN: CPT | Mod: 25 | Performed by: INTERNAL MEDICINE

## 2024-01-08 RX ORDER — RESPIRATORY SYNCYTIAL VIRUS VACCINE 120MCG/0.5
0.5 KIT INTRAMUSCULAR ONCE
Qty: 1 EACH | Refills: 0 | Status: CANCELLED | OUTPATIENT
Start: 2024-01-08 | End: 2024-01-08

## 2024-01-08 RX ORDER — OXYCODONE HYDROCHLORIDE 10 MG/1
10 TABLET ORAL 4 TIMES DAILY
Qty: 120 TABLET | Refills: 0 | Status: SHIPPED | OUTPATIENT
Start: 2024-01-15 | End: 2024-02-14

## 2024-01-08 ASSESSMENT — ENCOUNTER SYMPTOMS
DIARRHEA: 1
SHORTNESS OF BREATH: 1
BREAST MASS: 0
ARTHRALGIAS: 1

## 2024-01-08 ASSESSMENT — PATIENT HEALTH QUESTIONNAIRE - PHQ9
10. IF YOU CHECKED OFF ANY PROBLEMS, HOW DIFFICULT HAVE THESE PROBLEMS MADE IT FOR YOU TO DO YOUR WORK, TAKE CARE OF THINGS AT HOME, OR GET ALONG WITH OTHER PEOPLE: NOT DIFFICULT AT ALL
SUM OF ALL RESPONSES TO PHQ QUESTIONS 1-9: 4
SUM OF ALL RESPONSES TO PHQ QUESTIONS 1-9: 4

## 2024-01-08 ASSESSMENT — ACTIVITIES OF DAILY LIVING (ADL): CURRENT_FUNCTION: NO ASSISTANCE NEEDED

## 2024-01-08 NOTE — PROGRESS NOTES
[facility] Specialists    Comprehensive and complex medical care - Chronic disease management - Shared decision making - Care coordination - Compassionate care    Patient advocacy - Rational deprescribing - Minimally disruptive medicine - Ethical focus - Customized care         Date of Service: 1/8/2024  Primary Provider: Jorge Dunn    Patient Care Team:  Jorge Dunn MD as PCP - General (Internal Medicine)  Mohit Montano MD as MD (Hematology & Oncology)  Jorge Dunn MD as Assigned PCP  Joellen Nova MD as Resident (Dermatology)  Madison Alexander LICSW as  (BMT - Adult)  Ryan Alonso MD as MD (Endocrinology, Diabetes, and Metabolism)  Manju Asher, RN as BMT Nurse Coordinator  Umesh Brandt MD as BMT Physician (Transplant)  Jorge Dunn MD as Assigned Pain Medication Provider  Ryan Alonso MD as Assigned Endocrinology Provider  Donny Gunn MD as Assigned Gastroenterology Provider          Patient's Pharmacy:    [facility] [address] [place], [place] - [address]  [address]  [facility] [address]  Phone: [phone] Fax: [phone]    [id number] [address]  [address] [facility] [address]  Phone: [phone] Fax: [phone]     Patient's Contacts:  Name Home Phone Work Phone Mobile Phone Relationship Lgl Joey   FRANCISCO JAVIER KESSLER   [phone] Significant*    JEAN JUARES [phone] Grandchild      Patient's Insurance:    Payor: [employer] / Plan: [employer] MEDICARE ADVANTAGE / Product Type: HMO /      Subjective:     History of present illness:    Jenn Barclay is an 68 year old here for an annual wellness visit.    The issues she would like to address at today's visit include the following:    Chief Complaint   Patient presents with    Physical     AWV            1/8/2024    11:45 AM   Additional Questions   Roomed by Adriana Cardona   Accompanied by N/A     Patient comes in today for annual wellness visit and for other issues.    We reviewed a cat scratch she had on her right wrist.  There was no bite.  It is slow to heal over the last week.    She has had some hip pain caused by her mattress.  She is looking into getting a new mattress.  It is when laying on her sides.    Her COPD and smoking are the same.  She continues to smoke.  We reviewed her lungs.  She was in the clinic in November and given doxycycline and prednisone.    Her low weight has been stable at this time.  She has pancreatic insufficiency.  She does have oily stools.    She has had vertebral fractures in the past and is on Evenity and Fosamax at this time.  She follows with endocrinology.    Her chronic pain is stable at this point.  We renewed her controlled substance agreement and urine drug screen today.    We reviewed her other issues noted in the assessment but not specifically addressed in the HPI above.           Active Problem List:  Problem List as of 1/8/2024 Reviewed: 7/7/2023  8:38 AM by Jorge Dunn MD         High    Full code status    Tobacco abuse    COPD (chronic obstructive pulmonary disease) (H)    History of acute myeloid leukemia    Status post allogeneic bone marrow transplant (H) - 2010 - [facility] [place]       Medium    Controlled substance agreement signed - 1/24 - oxycodone - UDS 1/24    Chronic, continuous use of opioids    Exocrine pancreatic insufficiency    Chronic back pain    Financial difficulties    Pseudogout    Traumatic compression fracture of T8 thoracic vertebra, closed, initial encounter (H)    Chronic diarrhea       Low    Nephrolithiasis    Anxiety    Gastric ulcer    Migraine headache    Papular rash, generalized    Senile purpura (H24)    Tubular adenoma of colon - advanced adenoma in 2017 - single small adenoma in 2020    OP (osteoporosis)        Past Medical History:    Diagnosis Date    Acute myeloid leukemia (AML), M2 (H)     AML (acute myeloid leukemia) (H) 12/2009    Baker's cyst     Chronic back pain     Chronic diarrhea     Compression fx, lumbar spine (H)     Controlled substance agreement signed 8/3/2017    COPD (chronic obstructive pulmonary disease) (H)     COPD (chronic obstructive pulmonary disease) (H)     Full code status 9/10/2017    Gastric ulcer     pt states she has not had any ulcers    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)     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    History of PID     Metatarsal fracture 2017 2nd     Migraine without aura, without mention of intractable migraine without mention of status migrainosus     Osteoporosis 2020    DXA    Osteoporosis 5/3/2016    Papular rash, generalized     Post-menopausal 2/8/2020    Early surgical menopause    Pseudogout 11/30/2016    Serrated adenoma of colon 7/17/2020    Status post allogeneic bone marrow transplant (H)     Surgical menopause 1976    oophorectomy    Tobacco abuse     Traumatic compression fracture of T8 thoracic vertebra, closed, initial encounter (H) 4/26/2016    Tubular adenoma of colon 7/17/2020    Vertebral compression fracture (H) 2014    Zoster 2018      Past Surgical History:   Procedure Laterality Date    APPENDECTOMY      COLONOSCOPY N/A 07/28/2020    Procedure: COLONOSCOPY, WITH POLYPECTOMY AND BIOPSY;  Surgeon: Gianni Valerio MD;  Location: [facility] OR    HYSTERECTOMY TOTAL ABDOMINAL, BILATERAL SALPINGO-OOPHORECTOMY, COMBINED  1976    IR MISCELLANEOUS PROCEDURE  02/22/2010    TRANSPLANT  01/01/2010    VERTEBROPLASTY  01/01/2016    T5 and T8 vertebrae.      Family History   Problem Relation Age of Onset    Diabetes Mother         borderline    Pancreatic Cancer Father     Diabetes Maternal Grandmother     Diabetes Maternal Uncle     Thyroid Disease No family hx of     Melanoma No family hx of     Skin Cancer No family hx of     Lung Cancer Mother       Family history is  otherwise noncontributory.    Social History     Occupational History    Not on file   Tobacco Use    Smoking status: Every Day     Packs/day: 0.50     Years: 49.00     Additional pack years: 0.00     Total pack years: 24.50     Types: Cigarettes    Smokeless tobacco: Former    Tobacco comments:     has used E-cigarettes in the past, info given   Vaping Use    Vaping Use: Never used   Substance and Sexual Activity    Alcohol use: No    Drug use: No    Sexual activity: Never      Social History     Social History Narrative    , on disability.        Has family in the area.        Patient of Dr. Dunn since 2015.       Current Outpatient Medications   Medication Instructions    albuterol (PROAIR HFA/PROVENTIL HFA/VENTOLIN HFA) 108 (90 Base) MCG/ACT inhaler 2 puffs, Inhalation, EVERY 4 HOURS PRN    albuterol (PROVENTIL) (2.5 MG/3ML) 0.083% neb solution USE 1 VIAL IN NEBULIZER EVERY 6 HOURS AS NEEDED FOR WHEEZING    Albuterol Sulfate (VENTOLIN HFA) 108 (90 BASE) MCG/ACT AERS Inhale  into the lungs.    alendronate (FOSAMAX) 70 mg, Oral, WEEKLY    amylase-lipase-protease (CREON) 53571-37098 units CPEP per EC capsule 2 capsules, Oral, 3 TIMES DAILY WITH MEALS, And snacks    augmented betamethasone dipropionate (DIPROLENE AF) 0.05 % external cream Topical, 2 TIMES DAILY, Stronger cream for rash.    clindamycin 1 % EX external lotion Topical, 2 TIMES DAILY    gentamicin 0.1 % EX external ointment Topical, 2 TIMES DAILY    hydrOXYzine HCl (ATARAX) 25 mg, Oral, 3 TIMES DAILY PRN    ipratropium - albuterol 0.5 mg/2.5 mg/3 mL (DUONEB) 0.5-2.5 (3) MG/3ML neb solution 3 mLs, Nebulization, EVERY 6 HOURS PRN    Loperamide HCl (IMODIUM OR) Oral, 2 TIMES DAILY    Multiple Vitamins-Minerals (MULTIVITAL PO) Take  by mouth.    mupirocin (BACTROBAN) 2 % external ointment Use 2 times a day to affected area on cheek    nebulizer nebulization 1 nebulizer please as covered by insurance.  May also dispense supplies (tubing, inhalation  "device, etc) as needed.    Omega-3 Fatty Acids (FISH OIL PO) Oral    oxyCODONE IR (ROXICODONE) 10 mg, Oral, 4 TIMES DAILY, For use from 12/16/23 to 1/15/24    triamcinolone (KENALOG) 0.1 % external cream APPLY  CREAM TOPICALLY THREE TIMES DAILY TO ITCH OR  TENDER  SKIN  LESIONS    vitamin C (ASCORBIC ACID) 500 mg, DAILY      Allergies: Mirtazapine, Tape [adhesive tape], and Liquid adhesive     Immunization History   Administered Date(s) Administered    COVID-19 12+ (2023-24) (Pfizer) 10/31/2023    COVID-19 Bivalent 18+ (Moderna) 01/05/2023    COVID-19 Monovalent 18+ (Moderna) 03/29/2021, 04/26/2021, 11/22/2021    COVID-19 Monovalent Booster 18+ (Moderna) 07/05/2022    DT (PEDS <7y) 10/01/1994, 01/06/2005    HIB (PRP-T) 08/23/2011    HIB(PRP-OMP)(PedvaxHIB) 06/20/2011, 06/15/2012    Hepatitis B, Adult 06/20/2011, 08/23/2011, 06/15/2012    Influenza (IIV3) PF 10/14/2010    Influenza Vaccine 65+ (FLUAD) 10/09/2022    Influenza Vaccine 65+ (Fluzone HD) 09/20/2021, 09/16/2023    Influenza Vaccine >6 months,quad, PF 10/23/2018, 10/12/2020    Pneumo Conj 13-V (2010&after) 06/20/2011, 08/23/2011    Pneumococcal (PCV 7) 06/20/2011    Pneumococcal 23 valent 12/18/2019    Poliovirus, inactivated (IPV) 06/20/2011, 08/23/2011, 06/15/2012    TDAP (Adacel,Boostrix) 06/20/2011, 08/23/2011, 06/15/2012, 01/06/2023    TDAP Vaccine (Boostrix) 06/20/2011, 08/23/2011, 06/15/2012, 12/07/2022    Td (Adult), Adsorbed 01/06/2005      Objective:     Wt Readings from Last 3 Encounters:   01/08/24 49 kg (108 lb)   11/16/23 48.1 kg (106 lb)   10/31/23 48.1 kg (106 lb)     BP Readings from Last 3 Encounters:   01/08/24 132/70   11/16/23 131/75   10/31/23 120/82       PHYSICAL EXAM  /70 (BP Location: Right arm, Patient Position: Sitting, Cuff Size: Adult Small)   Pulse 68   Temp 97.9  F (36.6  C) (Oral)   Resp 18   Ht 1.741 m (5' 8.55\")   Wt 49 kg (108 lb)   LMP  (LMP Unknown)   SpO2 96%   BMI 16.16 kg/m     The patient is " comfortable, no acute distress.  Very thin.  Mood good.  Insight is good.  No skin lesions or nodules of concern.  Scratch on the right wrist shows no obvious signs of infection.  Ears clear.  Eyes are nonicteric.  Pupils equal and reactive.  Throat is clear.  Neck is supple without mass, no thyromegaly. No cervical or epitrochlear adenopathy.  Heart regular rate and rhythm.  Lungs clear to auscultation bilaterally.  Respiratory effort good.  Abdomen soft and nontender.  No hepatosplenomegaly.  Extremities show no edema.      Diagnostics:     Infusion Therapy Visit on 08/24/2023   Component Date Value Ref Range Status    Albumin 08/24/2023 4.2  3.5 - 5.2 g/dL Final    Calcium 08/24/2023 8.6 (L)  8.8 - 10.2 mg/dL Final    Creatinine 08/24/2023 0.67  0.51 - 0.95 mg/dL Final    GFR Estimate 08/24/2023 >90  >60 mL/min/1.73m2 Final    25 OH Vitamin D2 08/24/2023 <5  ug/L Final    25 OH Vitamin D3 08/24/2023 42  ug/L Final    25 OH Vit D Total 08/24/2023 <47  20 - 75 ug/L Final    Season, race, dietary intake, and treatment affect the concentration of 25-hydroxy-Vitamin D. Values may decrease during winter months and increase during summer months. Values 20-29 ug/L may indicate Vitamin D insufficiency and values <20 ug/L may indicate Vitamin D deficiency.       No results found for any visits on 01/08/24.    Assessment:     1. Encounter for Medicare annual wellness exam    2. Status post allogeneic bone marrow transplant (H)   Stable in relationship to this.  Continue to monitor.   3. Chronic obstructive pulmonary disease, unspecified COPD type (H)   Have advised smoking cessation.  Continue to monitor.   4. Senile purpura (H24)   No signs of worsening.  Continue to monitor.   5. Encounter for therapeutic drug level monitoring    6. Controlled substance agreement signed - 1/24 - oxycodone - UDS 1/24    7. Cat scratch    8. Tobacco abuse    9. Age-related osteoporosis without current pathological fracture         Plan:      Urine drug screen done today.  Reviewed current vaccination status and no changes desired.  She has been declined mammogram and lung cancer screening in the past and we will continue to monitor this.  Continue current medications  Follow up sooner if issues.    Orders Placed This Encounter   Procedures    [id number] - Urine Drug Confirmation Panel (Comprehensive)        A personalized health plan based on the identified health risks was provided to the patient on the AVS.       Jorge Dunn MD  General Internal Medicine  [facility]      Return in about 6 months (around 7/8/2024) for follow up visit.     Future Appointments   Date Time Provider Department Center   6/18/2024  2:00 PM Ryan Alonso MD [facility]   7/9/2024  1:00 PM [facility]         Health Maintenance   Topic Date Due    ANNUAL REVIEW OF  ORDERS  Never done    MAMMO SCREENING  10/28/2013    RSV VACCINE (Pregnancy & 60+) (1 - 1-dose 60+ series) Never done    LUNG CANCER SCREENING  07/22/2023    COVID-19 Vaccine (7 - 2023-24 season) 12/26/2023    URINE DRUG SCREEN  01/05/2024    CONTROLLED SUBSTANCE AGREEMENT FOR CHRONIC PAIN MANAGEMENT  01/09/2024    ZOSTER IMMUNIZATION (2 of 2) 07/05/2025 (Originally 5/8/2017)    DANY ASSESSMENT  07/06/2024    NICOTINE/TOBACCO CESSATION COUNSELING Q 1 YR  01/08/2025    MEDICARE ANNUAL WELLNESS VISIT  01/08/2025    FALL RISK ASSESSMENT  01/08/2025    PHQ-9  01/08/2025    COLORECTAL CANCER SCREENING  07/28/2025    LIPID  07/05/2027    DEXA  08/16/2028    ADVANCE CARE PLANNING  01/08/2029    DTAP/TDAP/TD IMMUNIZATION (9 - Td or Tdap) 01/06/2033    SPIROMETRY  Completed    HEPATITIS C SCREENING  Completed    PHQ-2 (once per calendar year)  Completed    INFLUENZA VACCINE  Completed    Pneumococcal Vaccine: 65+ Years  Completed    IPV IMMUNIZATION  Completed    COPD ACTION PLAN  Addressed    HPV IMMUNIZATION  Aged Out    MENINGITIS IMMUNIZATION  Aged  Out    RSV MONOCLONAL ANTIBODY  Aged Out          I have reviewed Opioid Use Disorder and Substance Use Disorder risk factors and made any needed referrals.  This may not apply to this individual patient, but this documentation is required by Medicare.    She is at risk for lack of exercise and has been provided with information to increase physical activity for the benefit of her well-being.  The patient was counseled and encouraged to consider modifying their diet and eating habits. She was provided with information on recommended healthy diet options.  Information on urinary incontinence and treatment options given to patient.

## 2024-01-08 NOTE — LETTER

## 2024-01-08 NOTE — PROGRESS NOTES
"      Are you in the first 12 months of your Medicare coverage?  No    Healthy Habits:     In general, how would you rate your overall health?  Good    Frequency of exercise:  None    Do you usually eat at least 4 servings of fruit and vegetables a day, include whole grains    & fiber and avoid regularly eating high fat or \"junk\" foods?  No    Taking medications regularly:  Yes    Medication side effects:  None    Ability to successfully perform activities of daily living:  No assistance needed    Home Safety:  No safety concerns identified    Hearing Impairment:  No hearing concerns    In the past 6 months, have you been bothered by leaking of urine? Yes    In general, how would you rate your overall mental or emotional health?  Good    Additional concerns today:  Yes      Today's PHQ-9 Score:       1/8/2024    11:24 AM   PHQ-9 SCORE   PHQ-9 Total Score MyChart 4 (Minimal depression)   PHQ-9 Total Score 4           Have you ever done Advance Care Planning? (For example, a Health Directive, POLST, or a discussion with a medical provider or your loved ones about your wishes): Yes, advance care planning is on file.       Fall risk  Fallen 2 or more times in the past year?: No  Any fall with injury in the past year?: No    Cognitive Screening   1) Repeat 3 items (Leader, Season, Table)    2) Clock draw: NORMAL  3) 3 item recall: Recalls 3 objects  Results: 3 items recalled: COGNITIVE IMPAIRMENT LESS LIKELY    Mini-CogTM Copyright MARISOL Paige. Licensed by the author for use in [facility]; reprinted with permission (prema@.[facility]). All rights reserved.      Do you have sleep apnea, excessive snoring or daytime drowsiness? : no  "

## 2024-01-08 NOTE — PATIENT INSTRUCTIONS
"Patient Education   Personalized Prevention Plan  You are due for the preventive services outlined below.  Your care team is available to assist you in scheduling these services.  If you have already completed any of these items, please share that information with your care team to update in your medical record.  Health Maintenance Due   Topic Date Due    ANNUAL REVIEW OF HM ORDERS  Never done    Mammogram  10/28/2013    RSV VACCINE (Pregnancy & 60+) (1 - 1-dose 60+ series) Never done    LUNG CANCER SCREENING  07/22/2023    COVID-19 Vaccine (7 - 2023-24 season) 12/26/2023    URINE DRUG SCREEN  01/05/2024    CONTROLLED SUBSTANCE AGREEMENT FOR CHRONIC PAIN MANAGEMENT  01/09/2024     Learning About Being Physically Active  What is physical activity?     Being physically active means doing any kind of activity that gets your body moving.  The types of physical activity that can help you get fit and stay healthy include:  Aerobic or \"cardio\" activities. These make your heart beat faster and make you breathe harder, such as brisk walking, riding a bike, or running. They strengthen your heart and lungs and build up your endurance.  Strength training activities. These make your muscles work against, or \"resist,\" something. Examples include lifting weights or doing push-ups. These activities help tone and strengthen your muscles and bones.  Stretches. These let you move your joints and muscles through their full range of motion. Stretching helps you be more flexible.  Reaching a balance between these three types of physical activity is important because each one contributes to your overall fitness.  What are the benefits of being active?  Being active is one of the best things you can do for your health. It helps you to:  Feel stronger and have more energy to do all the things you like to do.  Focus better at school or work.  Feel, think, and sleep better.  Reach and stay at a healthy weight.  Lose fat and build lean " "muscle.  Lower your risk for serious health problems, including diabetes, heart attack, high blood pressure, and some cancers.  Keep your heart, lungs, bones, muscles, and joints strong and healthy.  How can you make being active part of your life?  Start slowly. Make it your long-term goal to get at least 30 minutes of exercise on most days of the week. Walking is a good choice. You also may want to do other activities, such as running, swimming, cycling, or playing tennis or team sports.  Pick activities that you like--ones that make your heart beat faster, your muscles stronger, and your muscles and joints more flexible. If you find more than one thing you like doing, do them all. You don't have to do the same thing every day.  Get your heart pumping every day. Any activity that makes your heart beat faster and keeps it at that rate for a while counts.  Here are some great ways to get your heart beating faster:  Go for a brisk walk, run, or hike.  Go for a swim or bike ride.  Take an online exercise class or dance.  Play a game of touch football, basketball, or soccer.  Play tennis, pickleball, or racquetball.  Climb stairs.  Even some household chores can be aerobic. Just do them at a faster pace. Raking or mowing the lawn, sweeping the garage, and vacuuming and cleaning your home all can help get your heart rate up.  Strengthen your muscles during the week. You don't have to lift heavy weights or grow big, bulky muscles to get stronger. Doing a few simple activities that make your muscles work against, or \"resist,\" something can help you get stronger. Aim for at least twice a week.  For example, you can:  Do push-ups or sit-ups, which use your own body weight as resistance.  Lift weights or dumbbells or use stretch bands at home or in a gym or community center.  Stretch your muscles often. Stretching will help you as you become more active. It can help you stay flexible and loosen tight muscles. It can also " "help improve your balance and posture and can be a great way to relax.  Be sure to stretch the muscles you'll be using when you work out. It's best to warm your muscles slightly before you stretch them. Walk or do some other light aerobic activity for a few minutes. Then start stretching.  When you stretch your muscles:  Do it slowly. Stretching is not about going fast or making sudden movements.  Don't push or bounce during a stretch.  Hold each stretch for at least 15 to 30 seconds, if you can. You should feel a stretch in the muscle, but not pain.  Breathe out as you do the stretch. Then breathe in as you hold the stretch. Don't hold your breath.  If you're worried about how more activity might affect your health, have a checkup before you start. Follow any special advice your doctor gives you for getting a smart start.  Where can you learn more?  Go to https://www.[employer].com.[employer]/patiented  Enter W332 in the search box to learn more about \"Learning About Being Physically Active.\"  Current as of: June 6, 2023               Content Version: 13.8    4698-9440 [employer].   Care instructions adapted under license by your healthcare professional. If you have questions about a medical condition or this instruction, always ask your healthcare professional. [employer] disclaims any warranty or liability for your use of this information.      Learning About Dietary Guidelines  What are the Dietary Guidelines for Americans?     Dietary Guidelines for Americans provide tips for eating well and staying healthy. This helps reduce the risk for long-term (chronic) diseases.  These guidelines recommend that you:  Eat and drink the right amount for you. The [place] government's food guide is called MyPlate. It can help you make your own well-balanced eating plan.  Try to balance your eating with your activity. This helps you stay at a healthy weight.  Drink alcohol in moderation, if at all.  Limit foods " "high in salt, saturated fat, trans fat, and added sugar.  These guidelines are from the U.[place] Department of Agriculture and the U[place] Department of Health and Human Services. They are updated every 5 years.  What is MyPlate?  MyPlate is the [place] government's food guide. It can help you make your own well-balanced eating plan. A balanced eating plan means that you eat enough, but not too much, and that your food gives you the nutrients you need to stay healthy.  MyPlate focuses on eating plenty of whole grains, fruits, and vegetables, and on limiting fat and sugar. It is available online at www.ChooseMyPlate.gov.  How can you get started?  If you're trying to eat healthier, you can slowly change your eating habits over time. You don't have to make big changes all at once. Start by adding one or two healthy foods to your meals each day.  Grains  Choose whole-grain breads and cereals and whole-wheat pasta and whole-grain crackers.  Vegetables  Eat a variety of vegetables every day. They have lots of nutrients and are part of a heart-healthy diet.  Fruits  Eat a variety of fruits every day. Fruits contain lots of nutrients. Choose fresh fruit instead of fruit juice.  Protein foods  Choose fish and lean poultry more often. Eat red meat and fried meats less often. Dried beans, tofu, and nuts are also good sources of protein.  Dairy  Choose low-fat or fat-free products from this food group. If you have problems digesting milk, try eating cheese or yogurt instead.  Fats and oils  Limit fats and oils if you're trying to cut calories. Choose healthy fats when you cook. These include canola oil and olive oil.  Where can you learn more?  Go to https://www.[employer]RewardMe.net/patiented  Enter D676 in the search box to learn more about \"Learning About Dietary Guidelines.\"  Current as of: February 28, 2023               Content Version: 13.8    6691-1302 [employer].   Care instructions adapted under license by your " healthcare professional. If you have questions about a medical condition or this instruction, always ask your healthcare professional. [employer], [facility] disclaims any warranty or liability for your use of this information.      Bladder Training: Care Instructions  Your Care Instructions     Bladder training is used to treat urge incontinence and stress incontinence. Urge incontinence means that the need to urinate comes on so fast that you can't get to a toilet in time. Stress incontinence means that you leak urine because of pressure on your bladder. For example, it may happen when you laugh, cough, or lift something heavy.  Bladder training can increase how long you can wait before you have to urinate. It can also help your bladder hold more urine. And it can give you better control over the urge to urinate.  It is important to remember that bladder training takes a few weeks to a few months to make a difference. You may not see results right away, but don't give up.  Follow-up care is a key part of your treatment and safety. Be sure to make and go to all appointments, and call your doctor if you are having problems. It's also a good idea to know your test results and keep a list of the medicines you take.  How can you care for yourself at home?  Work with your doctor to come up with a bladder training program that is right for you. You may use one or more of the following methods.  Delayed urination  In the beginning, try to keep from urinating for 5 minutes after you first feel the need to go.  While you wait, take deep, slow breaths to relax. Kegel exercises can also help you delay the need to go to the bathroom.  After some practice, when you can easily wait 5 minutes to urinate, try to wait 10 minutes before you urinate.  Slowly increase the waiting period until you are able to control when you have to urinate.  Scheduled urination  Empty your bladder when you first wake up in the morning.  Schedule  "times throughout the day when you will urinate.  Start by going to the bathroom every hour, even if you don't need to go.  Slowly increase the time between trips to the bathroom.  When you have found a schedule that works well for you, keep doing it.  If you wake up during the night and have to urinate, do it. Apply your schedule to waking hours only.  Kegel exercises  These tighten and strengthen pelvic muscles, which can help you control the flow of urine. (If doing these exercises causes pain, stop doing them and talk with your doctor.) To do Kegel exercises:  Squeeze your muscles as if you were trying not to pass gas. Or squeeze your muscles as if you were stopping the flow of urine. Your belly, legs, and buttocks shouldn't move.  Hold the squeeze for 3 seconds, then relax for 5 to 10 seconds.  Start with 3 seconds, then add 1 second each week until you are able to squeeze for 10 seconds.  Repeat the exercise 10 times a session. Do 3 to 8 sessions a day.  When should you call for help?  Watch closely for changes in your health, and be sure to contact your doctor if:    Your incontinence is getting worse.     You do not get better as expected.   Where can you learn more?  Go to https://www.[employer].net/patiented  Enter V684 in the search box to learn more about \"Bladder Training: Care Instructions.\"  Current as of: February 28, 2023               Content Version: 13.8    9434-4508 [employer].   Care instructions adapted under license by your healthcare professional. If you have questions about a medical condition or this instruction, always ask your healthcare professional. [employer] disclaims any warranty or liability for your use of this information.         "

## 2024-01-09 LAB — CREAT UR-MCNC: 43 MG/DL

## 2024-01-10 LAB
OXYCODONE UR CFM-MCNC: 550 NG/ML
OXYCODONE/CREAT UR: 1279 NG/MG {CREAT}
OXYMORPHONE UR CFM-MCNC: 426 NG/ML
OXYMORPHONE/CREAT UR: 991 NG/MG {CREAT}

## 2024-01-20 PROBLEM — R54 FRAILTY: Status: ACTIVE | Noted: 2024-01-20

## 2024-02-03 ENCOUNTER — APPOINTMENT (OUTPATIENT)
Dept: MRI IMAGING | Facility: HOSPITAL | Age: 69
DRG: 177 | End: 2024-02-03
Attending: EMERGENCY MEDICINE
Payer: COMMERCIAL

## 2024-02-03 ENCOUNTER — APPOINTMENT (OUTPATIENT)
Dept: CT IMAGING | Facility: HOSPITAL | Age: 69
DRG: 177 | End: 2024-02-03
Attending: EMERGENCY MEDICINE
Payer: COMMERCIAL

## 2024-02-03 ENCOUNTER — HOSPITAL ENCOUNTER (INPATIENT)
Facility: HOSPITAL | Age: 69
LOS: 1 days | Discharge: HOME | DRG: 177 | End: 2024-02-04
Attending: EMERGENCY MEDICINE | Admitting: STUDENT IN AN ORGANIZED HEALTH CARE EDUCATION/TRAINING PROGRAM
Payer: COMMERCIAL

## 2024-02-03 ENCOUNTER — APPOINTMENT (OUTPATIENT)
Dept: RADIOLOGY | Facility: HOSPITAL | Age: 69
DRG: 177 | End: 2024-02-03
Attending: EMERGENCY MEDICINE
Payer: COMMERCIAL

## 2024-02-03 DIAGNOSIS — R09.02 HYPOXEMIA: ICD-10-CM

## 2024-02-03 DIAGNOSIS — U07.1 COVID-19 VIRUS INFECTION: ICD-10-CM

## 2024-02-03 LAB
ALBUMIN SERPL BCG-MCNC: 3.6 G/DL (ref 3.5–5.2)
ALBUMIN UR-MCNC: 20 MG/DL
ALP SERPL-CCNC: 44 U/L (ref 40–150)
ALT SERPL W P-5'-P-CCNC: 17 U/L (ref 0–50)
ANION GAP SERPL CALCULATED.3IONS-SCNC: 10 MMOL/L (ref 7–15)
APPEARANCE UR: CLEAR
AST SERPL W P-5'-P-CCNC: 20 U/L (ref 0–45)
BASOPHILS # BLD AUTO: 0 10E3/UL (ref 0–0.2)
BASOPHILS NFR BLD AUTO: 0 %
BILIRUB DIRECT SERPL-MCNC: <0.2 MG/DL (ref 0–0.3)
BILIRUB SERPL-MCNC: 0.4 MG/DL
BILIRUB UR QL STRIP: NEGATIVE
BUN SERPL-MCNC: 8.4 MG/DL (ref 8–23)
CALCIUM SERPL-MCNC: 8.3 MG/DL (ref 8.8–10.2)
CHLORIDE SERPL-SCNC: 97 MMOL/L (ref 98–107)
COLOR UR AUTO: YELLOW
CREAT SERPL-MCNC: 0.65 MG/DL (ref 0.51–0.95)
CRP SERPL-MCNC: 54.7 MG/L
DEPRECATED HCO3 PLAS-SCNC: 30 MMOL/L (ref 22–29)
EGFRCR SERPLBLD CKD-EPI 2021: >90 ML/MIN/1.73M2
EOSINOPHIL # BLD AUTO: 0 10E3/UL (ref 0–0.7)
EOSINOPHIL NFR BLD AUTO: 0 %
ERYTHROCYTE [DISTWIDTH] IN BLOOD BY AUTOMATED COUNT: 12.7 % (ref 10–15)
ERYTHROCYTE [SEDIMENTATION RATE] IN BLOOD BY WESTERGREN METHOD: 20 MM/HR (ref 0–30)
FLUAV RNA SPEC QL NAA+PROBE: NEGATIVE
FLUBV RNA RESP QL NAA+PROBE: NEGATIVE
GLUCOSE SERPL-MCNC: 125 MG/DL (ref 70–99)
GLUCOSE UR STRIP-MCNC: NEGATIVE MG/DL
HCT VFR BLD AUTO: 34.8 % (ref 35–47)
HGB BLD-MCNC: 11.8 G/DL (ref 11.7–15.7)
HGB UR QL STRIP: ABNORMAL
HOLD SPECIMEN: NORMAL
HOLD SPECIMEN: NORMAL
IMM GRANULOCYTES # BLD: 0.1 10E3/UL
IMM GRANULOCYTES NFR BLD: 0 %
KETONES UR STRIP-MCNC: NEGATIVE MG/DL
LEUKOCYTE ESTERASE UR QL STRIP: ABNORMAL
LYMPHOCYTES # BLD AUTO: 2.3 10E3/UL (ref 0.8–5.3)
LYMPHOCYTES NFR BLD AUTO: 17 %
MCH RBC QN AUTO: 29.9 PG (ref 26.5–33)
MCHC RBC AUTO-ENTMCNC: 33.9 G/DL (ref 31.5–36.5)
MCV RBC AUTO: 88 FL (ref 78–100)
MONOCYTES # BLD AUTO: 1.5 10E3/UL (ref 0–1.3)
MONOCYTES NFR BLD AUTO: 11 %
MUCOUS THREADS #/AREA URNS LPF: PRESENT /LPF
NEUTROPHILS # BLD AUTO: 9.6 10E3/UL (ref 1.6–8.3)
NEUTROPHILS NFR BLD AUTO: 72 %
NITRATE UR QL: NEGATIVE
NRBC # BLD AUTO: 0 10E3/UL
NRBC BLD AUTO-RTO: 0 /100
PH UR STRIP: 5.5 [PH] (ref 5–7)
PLATELET # BLD AUTO: 289 10E3/UL (ref 150–450)
POTASSIUM SERPL-SCNC: 3.3 MMOL/L (ref 3.4–5.3)
PROT SERPL-MCNC: 6.1 G/DL (ref 6.4–8.3)
RBC # BLD AUTO: 3.95 10E6/UL (ref 3.8–5.2)
RBC URINE: 48 /HPF
RSV RNA SPEC NAA+PROBE: NEGATIVE
SARS-COV-2 RNA RESP QL NAA+PROBE: POSITIVE
SODIUM SERPL-SCNC: 137 MMOL/L (ref 135–145)
SP GR UR STRIP: 1.02 (ref 1–1.03)
SQUAMOUS EPITHELIAL: 1 /HPF
UROBILINOGEN UR STRIP-MCNC: <2 MG/DL
WBC # BLD AUTO: 13.5 10E3/UL (ref 4–11)
WBC URINE: 27 /HPF

## 2024-02-03 PROCEDURE — 250N000011 HC RX IP 250 OP 636: Performed by: EMERGENCY MEDICINE

## 2024-02-03 PROCEDURE — 71045 X-RAY EXAM CHEST 1 VIEW: CPT

## 2024-02-03 PROCEDURE — A9585 GADOBUTROL INJECTION: HCPCS | Performed by: EMERGENCY MEDICINE

## 2024-02-03 PROCEDURE — 36415 COLL VENOUS BLD VENIPUNCTURE: CPT | Performed by: EMERGENCY MEDICINE

## 2024-02-03 PROCEDURE — 96375 TX/PRO/DX INJ NEW DRUG ADDON: CPT

## 2024-02-03 PROCEDURE — 96374 THER/PROPH/DIAG INJ IV PUSH: CPT | Mod: 59

## 2024-02-03 PROCEDURE — 87086 URINE CULTURE/COLONY COUNT: CPT | Performed by: EMERGENCY MEDICINE

## 2024-02-03 PROCEDURE — 87637 SARSCOV2&INF A&B&RSV AMP PRB: CPT | Performed by: EMERGENCY MEDICINE

## 2024-02-03 PROCEDURE — 74177 CT ABD & PELVIS W/CONTRAST: CPT | Mod: MA

## 2024-02-03 PROCEDURE — 86140 C-REACTIVE PROTEIN: CPT | Performed by: EMERGENCY MEDICINE

## 2024-02-03 PROCEDURE — 81001 URINALYSIS AUTO W/SCOPE: CPT | Performed by: EMERGENCY MEDICINE

## 2024-02-03 PROCEDURE — 255N000002 HC RX 255 OP 636: Performed by: EMERGENCY MEDICINE

## 2024-02-03 PROCEDURE — 82248 BILIRUBIN DIRECT: CPT | Performed by: EMERGENCY MEDICINE

## 2024-02-03 PROCEDURE — 99285 EMERGENCY DEPT VISIT HI MDM: CPT | Mod: 25

## 2024-02-03 PROCEDURE — 85025 COMPLETE CBC W/AUTO DIFF WBC: CPT | Performed by: EMERGENCY MEDICINE

## 2024-02-03 PROCEDURE — 80053 COMPREHEN METABOLIC PANEL: CPT | Performed by: EMERGENCY MEDICINE

## 2024-02-03 PROCEDURE — 72157 MRI CHEST SPINE W/O & W/DYE: CPT | Mod: MA

## 2024-02-03 PROCEDURE — 72158 MRI LUMBAR SPINE W/O & W/DYE: CPT | Mod: MA

## 2024-02-03 PROCEDURE — 85652 RBC SED RATE AUTOMATED: CPT | Performed by: EMERGENCY MEDICINE

## 2024-02-03 RX ORDER — GADOBUTROL 604.72 MG/ML
5 INJECTION INTRAVENOUS ONCE
Status: COMPLETED | OUTPATIENT
Start: 2024-02-03 | End: 2024-02-03

## 2024-02-03 RX ORDER — IOPAMIDOL 755 MG/ML
80 INJECTION, SOLUTION INTRAVASCULAR ONCE
Status: COMPLETED | OUTPATIENT
Start: 2024-02-03 | End: 2024-02-03

## 2024-02-03 RX ORDER — MORPHINE SULFATE 4 MG/ML
8 INJECTION, SOLUTION INTRAMUSCULAR; INTRAVENOUS ONCE
Status: COMPLETED | OUTPATIENT
Start: 2024-02-03 | End: 2024-02-03

## 2024-02-03 RX ORDER — ONDANSETRON 2 MG/ML
4 INJECTION INTRAMUSCULAR; INTRAVENOUS ONCE
Status: COMPLETED | OUTPATIENT
Start: 2024-02-03 | End: 2024-02-03

## 2024-02-03 RX ADMIN — IOPAMIDOL 75 ML: 755 INJECTION, SOLUTION INTRAVENOUS at 20:34

## 2024-02-03 RX ADMIN — MORPHINE SULFATE 8 MG: 4 INJECTION, SOLUTION INTRAMUSCULAR; INTRAVENOUS at 19:49

## 2024-02-03 RX ADMIN — ONDANSETRON 4 MG: 2 INJECTION INTRAMUSCULAR; INTRAVENOUS at 19:47

## 2024-02-03 RX ADMIN — GADOBUTROL 5 ML: 604.72 INJECTION INTRAVENOUS at 22:41

## 2024-02-03 ASSESSMENT — ACTIVITIES OF DAILY LIVING (ADL)
ADLS_ACUITY_SCORE: 36
ADLS_ACUITY_SCORE: 36

## 2024-02-04 VITALS
BODY MASS INDEX: 16 KG/M2 | HEIGHT: 69 IN | OXYGEN SATURATION: 92 % | HEART RATE: 62 BPM | WEIGHT: 108 LBS | TEMPERATURE: 97.7 F | RESPIRATION RATE: 20 BRPM | SYSTOLIC BLOOD PRESSURE: 99 MMHG | DIASTOLIC BLOOD PRESSURE: 58 MMHG

## 2024-02-04 PROBLEM — U07.1 COVID-19 VIRUS INFECTION: Status: ACTIVE | Noted: 2024-02-04

## 2024-02-04 PROBLEM — R09.02 HYPOXEMIA: Status: ACTIVE | Noted: 2024-02-04

## 2024-02-04 LAB
ALBUMIN SERPL BCG-MCNC: 3.4 G/DL (ref 3.5–5.2)
ALP SERPL-CCNC: 43 U/L (ref 40–150)
ALT SERPL W P-5'-P-CCNC: 16 U/L (ref 0–50)
ANION GAP SERPL CALCULATED.3IONS-SCNC: 10 MMOL/L (ref 7–15)
AST SERPL W P-5'-P-CCNC: 20 U/L (ref 0–45)
BILIRUB SERPL-MCNC: 0.4 MG/DL
BUN SERPL-MCNC: 10.7 MG/DL (ref 8–23)
CALCIUM SERPL-MCNC: 7.7 MG/DL (ref 8.8–10.2)
CHLORIDE SERPL-SCNC: 96 MMOL/L (ref 98–107)
CREAT SERPL-MCNC: 0.71 MG/DL (ref 0.51–0.95)
DEPRECATED HCO3 PLAS-SCNC: 28 MMOL/L (ref 22–29)
EGFRCR SERPLBLD CKD-EPI 2021: >90 ML/MIN/1.73M2
ERYTHROCYTE [DISTWIDTH] IN BLOOD BY AUTOMATED COUNT: 12.9 % (ref 10–15)
GLUCOSE SERPL-MCNC: 320 MG/DL (ref 70–99)
HCT VFR BLD AUTO: 33.4 % (ref 35–47)
HGB BLD-MCNC: 11.3 G/DL (ref 11.7–15.7)
MCH RBC QN AUTO: 30.1 PG (ref 26.5–33)
MCHC RBC AUTO-ENTMCNC: 33.8 G/DL (ref 31.5–36.5)
MCV RBC AUTO: 89 FL (ref 78–100)
PLATELET # BLD AUTO: 269 10E3/UL (ref 150–450)
POTASSIUM SERPL-SCNC: 3.5 MMOL/L (ref 3.4–5.3)
POTASSIUM SERPL-SCNC: 3.5 MMOL/L (ref 3.4–5.3)
PROT SERPL-MCNC: 6 G/DL (ref 6.4–8.3)
RBC # BLD AUTO: 3.76 10E6/UL (ref 3.8–5.2)
SODIUM SERPL-SCNC: 134 MMOL/L (ref 135–145)
WBC # BLD AUTO: 8.8 10E3/UL (ref 4–11)

## 2024-02-04 PROCEDURE — 80053 COMPREHEN METABOLIC PANEL: CPT | Performed by: STUDENT IN AN ORGANIZED HEALTH CARE EDUCATION/TRAINING PROGRAM

## 2024-02-04 PROCEDURE — 94640 AIRWAY INHALATION TREATMENT: CPT | Mod: 76

## 2024-02-04 PROCEDURE — 36415 COLL VENOUS BLD VENIPUNCTURE: CPT | Performed by: STUDENT IN AN ORGANIZED HEALTH CARE EDUCATION/TRAINING PROGRAM

## 2024-02-04 PROCEDURE — 120N000001 HC R&B MED SURG/OB

## 2024-02-04 PROCEDURE — 250N000013 HC RX MED GY IP 250 OP 250 PS 637: Performed by: INTERNAL MEDICINE

## 2024-02-04 PROCEDURE — 999N000157 HC STATISTIC RCP TIME EA 10 MIN

## 2024-02-04 PROCEDURE — 99207 PR APP CREDIT; MD BILLING SHARED VISIT: CPT | Performed by: INTERNAL MEDICINE

## 2024-02-04 PROCEDURE — 250N000011 HC RX IP 250 OP 636: Performed by: STUDENT IN AN ORGANIZED HEALTH CARE EDUCATION/TRAINING PROGRAM

## 2024-02-04 PROCEDURE — 999N000156 HC STATISTIC RCP CONSULT EA 30 MIN

## 2024-02-04 PROCEDURE — 250N000013 HC RX MED GY IP 250 OP 250 PS 637: Performed by: STUDENT IN AN ORGANIZED HEALTH CARE EDUCATION/TRAINING PROGRAM

## 2024-02-04 PROCEDURE — 94640 AIRWAY INHALATION TREATMENT: CPT

## 2024-02-04 PROCEDURE — 250N000009 HC RX 250: Performed by: STUDENT IN AN ORGANIZED HEALTH CARE EDUCATION/TRAINING PROGRAM

## 2024-02-04 PROCEDURE — 250N000012 HC RX MED GY IP 250 OP 636 PS 637: Performed by: STUDENT IN AN ORGANIZED HEALTH CARE EDUCATION/TRAINING PROGRAM

## 2024-02-04 PROCEDURE — 85027 COMPLETE CBC AUTOMATED: CPT | Performed by: STUDENT IN AN ORGANIZED HEALTH CARE EDUCATION/TRAINING PROGRAM

## 2024-02-04 PROCEDURE — 99235 HOSP IP/OBS SAME DATE MOD 70: CPT | Performed by: STUDENT IN AN ORGANIZED HEALTH CARE EDUCATION/TRAINING PROGRAM

## 2024-02-04 RX ORDER — LIDOCAINE 40 MG/G
CREAM TOPICAL
Status: DISCONTINUED | OUTPATIENT
Start: 2024-02-04 | End: 2024-02-04 | Stop reason: HOSPADM

## 2024-02-04 RX ORDER — ALBUTEROL SULFATE 0.83 MG/ML
2.5 SOLUTION RESPIRATORY (INHALATION) EVERY 4 HOURS PRN
Status: DISCONTINUED | OUTPATIENT
Start: 2024-02-04 | End: 2024-02-04 | Stop reason: HOSPADM

## 2024-02-04 RX ORDER — LIDOCAINE 4 G/G
1 PATCH TOPICAL
Status: DISCONTINUED | OUTPATIENT
Start: 2024-02-04 | End: 2024-02-04 | Stop reason: HOSPADM

## 2024-02-04 RX ORDER — LIDOCAINE 4 G/G
1 PATCH TOPICAL EVERY 24 HOURS
Qty: 20 PATCH | Refills: 0 | Status: ON HOLD | OUTPATIENT
Start: 2024-02-04 | End: 2024-02-11

## 2024-02-04 RX ORDER — NICOTINE 21 MG/24HR
1 PATCH, TRANSDERMAL 24 HOURS TRANSDERMAL DAILY PRN
Status: DISCONTINUED | OUTPATIENT
Start: 2024-02-04 | End: 2024-02-04 | Stop reason: HOSPADM

## 2024-02-04 RX ORDER — PROCHLORPERAZINE MALEATE 5 MG
5 TABLET ORAL EVERY 6 HOURS PRN
Status: DISCONTINUED | OUTPATIENT
Start: 2024-02-04 | End: 2024-02-04 | Stop reason: HOSPADM

## 2024-02-04 RX ORDER — ONDANSETRON 4 MG/1
4 TABLET, ORALLY DISINTEGRATING ORAL EVERY 6 HOURS PRN
Status: DISCONTINUED | OUTPATIENT
Start: 2024-02-04 | End: 2024-02-04 | Stop reason: HOSPADM

## 2024-02-04 RX ORDER — POTASSIUM CHLORIDE 1500 MG/1
20 TABLET, EXTENDED RELEASE ORAL ONCE
Status: COMPLETED | OUTPATIENT
Start: 2024-02-04 | End: 2024-02-04

## 2024-02-04 RX ORDER — TRIAMCINOLONE ACETONIDE 1 MG/G
CREAM TOPICAL 3 TIMES DAILY
COMMUNITY

## 2024-02-04 RX ORDER — LIDOCAINE 4 G/G
1 PATCH TOPICAL
Status: DISCONTINUED | OUTPATIENT
Start: 2024-02-05 | End: 2024-02-04

## 2024-02-04 RX ORDER — ACETAMINOPHEN 650 MG/1
650 SUPPOSITORY RECTAL EVERY 4 HOURS PRN
Status: DISCONTINUED | OUTPATIENT
Start: 2024-02-04 | End: 2024-02-04 | Stop reason: HOSPADM

## 2024-02-04 RX ORDER — ENOXAPARIN SODIUM 100 MG/ML
30 INJECTION SUBCUTANEOUS AT BEDTIME
Status: DISCONTINUED | OUTPATIENT
Start: 2024-02-04 | End: 2024-02-04 | Stop reason: HOSPADM

## 2024-02-04 RX ORDER — IPRATROPIUM BROMIDE AND ALBUTEROL SULFATE 2.5; .5 MG/3ML; MG/3ML
3 SOLUTION RESPIRATORY (INHALATION)
Status: DISCONTINUED | OUTPATIENT
Start: 2024-02-04 | End: 2024-02-04 | Stop reason: HOSPADM

## 2024-02-04 RX ORDER — ENOXAPARIN SODIUM 100 MG/ML
40 INJECTION SUBCUTANEOUS AT BEDTIME
Status: DISCONTINUED | OUTPATIENT
Start: 2024-02-04 | End: 2024-02-04

## 2024-02-04 RX ORDER — ONDANSETRON 2 MG/ML
4 INJECTION INTRAMUSCULAR; INTRAVENOUS EVERY 6 HOURS PRN
Status: DISCONTINUED | OUTPATIENT
Start: 2024-02-04 | End: 2024-02-04 | Stop reason: HOSPADM

## 2024-02-04 RX ORDER — POLYETHYLENE GLYCOL 3350 17 G/17G
17 POWDER, FOR SOLUTION ORAL 2 TIMES DAILY PRN
Status: DISCONTINUED | OUTPATIENT
Start: 2024-02-04 | End: 2024-02-04 | Stop reason: HOSPADM

## 2024-02-04 RX ORDER — BISACODYL 10 MG
10 SUPPOSITORY, RECTAL RECTAL DAILY PRN
Status: DISCONTINUED | OUTPATIENT
Start: 2024-02-04 | End: 2024-02-04 | Stop reason: HOSPADM

## 2024-02-04 RX ORDER — PROCHLORPERAZINE 25 MG
12.5 SUPPOSITORY, RECTAL RECTAL EVERY 12 HOURS PRN
Status: DISCONTINUED | OUTPATIENT
Start: 2024-02-04 | End: 2024-02-04 | Stop reason: HOSPADM

## 2024-02-04 RX ORDER — ACETAMINOPHEN 325 MG/1
650 TABLET ORAL EVERY 4 HOURS PRN
Status: DISCONTINUED | OUTPATIENT
Start: 2024-02-04 | End: 2024-02-04 | Stop reason: HOSPADM

## 2024-02-04 RX ORDER — DEXAMETHASONE 2 MG/1
6 TABLET ORAL DAILY
Status: DISCONTINUED | OUTPATIENT
Start: 2024-02-04 | End: 2024-02-04 | Stop reason: HOSPADM

## 2024-02-04 RX ORDER — DEXAMETHASONE 6 MG/1
6 TABLET ORAL DAILY
Qty: 8 TABLET | Refills: 0 | Status: ON HOLD | OUTPATIENT
Start: 2024-02-05 | End: 2024-02-11

## 2024-02-04 RX ORDER — ALBUTEROL SULFATE 0.83 MG/ML
2.5 SOLUTION RESPIRATORY (INHALATION) EVERY 6 HOURS PRN
COMMUNITY

## 2024-02-04 RX ADMIN — POTASSIUM CHLORIDE 20 MEQ: 1500 TABLET, EXTENDED RELEASE ORAL at 04:07

## 2024-02-04 RX ADMIN — IPRATROPIUM BROMIDE AND ALBUTEROL SULFATE 3 ML: .5; 3 SOLUTION RESPIRATORY (INHALATION) at 08:59

## 2024-02-04 RX ADMIN — DEXAMETHASONE 6 MG: 2 TABLET ORAL at 08:25

## 2024-02-04 RX ADMIN — LIDOCAINE 1 PATCH: 4 PATCH TOPICAL at 10:03

## 2024-02-04 RX ADMIN — IPRATROPIUM BROMIDE AND ALBUTEROL SULFATE 3 ML: .5; 3 SOLUTION RESPIRATORY (INHALATION) at 01:26

## 2024-02-04 RX ADMIN — DEXAMETHASONE 6 MG: 2 TABLET ORAL at 01:13

## 2024-02-04 RX ADMIN — ENOXAPARIN SODIUM 40 MG: 40 INJECTION SUBCUTANEOUS at 01:14

## 2024-02-04 RX ADMIN — IPRATROPIUM BROMIDE AND ALBUTEROL SULFATE 3 ML: .5; 3 SOLUTION RESPIRATORY (INHALATION) at 13:30

## 2024-02-04 ASSESSMENT — ACTIVITIES OF DAILY LIVING (ADL)
ADLS_ACUITY_SCORE: 21
ADLS_ACUITY_SCORE: 24
ADLS_ACUITY_SCORE: 21
ADLS_ACUITY_SCORE: 24
ADLS_ACUITY_SCORE: 21
DEPENDENT_IADLS:: CLEANING;COOKING;LAUNDRY;SHOPPING;MEAL PREPARATION;TRANSPORTATION
ADLS_ACUITY_SCORE: 21
ADLS_ACUITY_SCORE: 24
ADLS_ACUITY_SCORE: 23

## 2024-02-04 NOTE — ED PROVIDER NOTES
EMERGENCY DEPARTMENT NOTE     Name: Jenn Barclay    Age/Sex: 68 year old female   MRN: [id number]   Evaluation Date & Time:  2/3/2024  7:09 PM    PCP:    Jorge Dunn   ED Provider: Charlie Sifuentes D.O.       CHIEF COMPLAINT    Fever and Back Pain       DIAGNOSIS & DISPOSITION/MEDICAL DECISION MAKING     1. COVID-19 virus infection    2. Hypoxemia        Jenn Barclay is a 68 year old female with relevant past history of COPD, AML, h/o allogeneic bone marrow transplant (2010), chronic back pain, vertebral compression fracture, vertebroplasty (2016), osteoporosis, controlled substance agreement signed, DANY, tobacco abuse, who presents to the emergency department for evaluation of back pain.    Differential  diagnosis considered included but not limited to urinary tract infection or pyelonephritis, obstructing urolithiasis, appendicitis, discitis or spinal epidural abscess, other infectious process including pneumonia or COVID/influenza infection    Medical Decision Making  Patient on exam had clear lungs with good breath sounds bilaterally.  Cardiac exam normal.  Abdominal exam with tenderness in the right lower quadrant as well as right CVA tenderness.  Musculoskeletal exam and dorsal spinous process tenderness at L4 and L5 without overlying erythema.  Neurologically she reported decreased sensation of the top of her right thigh but had normal strength 5 out of 5 in all extremities including right lower extremity.  CT of the abdomen and pelvis without abnormality.  Appendix was not visualized but no periappendiceal inflammation.  MRI of the lumbar spine and thoracic spine without evidence of discitis or spinal epidural abscess.  Patient has multiple chronic appearing compression fractures of the thoracic spine and also at L4-L5  X-ray demonstrated slight worsening of interstitial markings with possible inflammatory process but no discrete infiltrate.  Radha PCR negative, COVID PCR positive  WBC 13.5, hemoglobin  "11.8, comprehensive metabolic profile within normal limits including renal function except for potassium 3.3 and CO2 30  ESR not elevated, CRP 55  Patient received IV morphine with improvement and back pain.  She is remained alert.  Patient was noted on oximetry to have hypoxemia but remains without respiratory distress.  Discussed case with the hospitalist service Dr. Hoffmann who saw the patient in the emergency department.  At this time hypoxemia is felt to be probably chronic with underlying COPD exacerbated by COVID-19 infection and further evaluation with CTA of the chest was not pursued.  Will be admitted for further management of acute COVID-19 infection with hypoxemia.    Interventions: IV morphine, ondansetron, nasal cannula oxygen  Discharge Vital Signs:/53 (BP Location: Left arm)   Pulse 76   Temp 98.6  F (37  C) (Oral)   Resp 20   Ht 1.753 m (5' 9\")   Wt 49 kg (108 lb)   LMP  (LMP Unknown)   SpO2 94%   BMI 15.95 kg/m       DISPOSITION:Admit  To [facility] [place]    Diagnostic studies:  Imaging:  XR Chest Port 1 View   Final Result   IMPRESSION: Since 12/06/2022 there is been a slight increase in interstitial lung markings which may be inflammatory in nature, given patient's history of fever. If indicated unenhanced CT the chest may be helpful for further evaluation. Otherwise the    chest is stable with again seen hyperinflation of both lungs. Methacrylate fixation involving 2 thoracic vertebral bodies. Mild scattered hypertrophic changes the bony skeleton.      MR Thoracic Spine w/o & w Contrast   Final Result   IMPRESSION:      THORACIC SPINE MRI:   1.  No evidence for spine infection.   2.  Chronic compression fractures T5, T6, T7, T8, and T10, status post vertebral augmentation at T5 and T8.      LUMBAR SPINE MRI:   1.  Moderate facet arthropathy on the right at L3-L4 with edema and enhancement involving the facet and surrounding soft tissues, nonspecific. This is potentially degenerative " though early changes of infectious process also possible in this clinical    context.   2.  Nonspecific edema and enhancement at the left L5 transverse process where it abuts the sacrum, with similar differential considerations as above.   3.  No evidence for discitis.   4.  Chronic compression fractures at L4 and L5. No acute fracture.   5.  Left foraminal disc protrusion at L4-L5 contributing to moderate to severe foraminal stenosis. Mild lateral recess and foraminal stenoses at other levels, as detailed.      MR Lumbar Spine w/o & w Contrast   Final Result   IMPRESSION:      THORACIC SPINE MRI:   1.  No evidence for spine infection.   2.  Chronic compression fractures T5, T6, T7, T8, and T10, status post vertebral augmentation at T5 and T8.      LUMBAR SPINE MRI:   1.  Moderate facet arthropathy on the right at L3-L4 with edema and enhancement involving the facet and surrounding soft tissues, nonspecific. This is potentially degenerative though early changes of infectious process also possible in this clinical    context.   2.  Nonspecific edema and enhancement at the left L5 transverse process where it abuts the sacrum, with similar differential considerations as above.   3.  No evidence for discitis.   4.  Chronic compression fractures at L4 and L5. No acute fracture.   5.  Left foraminal disc protrusion at L4-L5 contributing to moderate to severe foraminal stenosis. Mild lateral recess and foraminal stenoses at other levels, as detailed.      CT Abdomen Pelvis w Contrast   Final Result   IMPRESSION:    1.  The appendix is not seen with any certainty, however I do not see any obvious pericecal inflammation to suggest acute appendicitis.      2.  Moderate atrophy of the pancreas.      3.  Benign calcified granulomas.      4.  Nonobstructive stone in the medial aspect of the right kidney midpole which measures approximately 9.1 x 3.6 cm. This has increased in size since 10/16/2019 CT and this measured 4.8 x 2.8  mm.      5.  No stones or obstructive changes in the ureters/bladder.      6.  Old compression fractures at L4 and L5.         Lab:  Labs Ordered and Resulted from Time of ED Arrival to Time of ED Departure   CRP INFLAMMATION - Abnormal       Result Value    CRP Inflammation 54.70 (*)    BASIC METABOLIC PANEL - Abnormal    Sodium 137      Potassium 3.3 (*)     Chloride 97 (*)     Carbon Dioxide (CO2) 30 (*)     Anion Gap 10      Urea Nitrogen 8.4      Creatinine 0.65      GFR Estimate >90      Calcium 8.3 (*)     Glucose 125 (*)    HEPATIC FUNCTION PANEL - Abnormal    Protein Total 6.1 (*)     Albumin 3.6      Bilirubin Total 0.4      Alkaline Phosphatase 44      AST 20      ALT 17      Bilirubin Direct <0.20     ROUTINE UA WITH MICROSCOPIC REFLEX TO CULTURE - Abnormal    Color Urine Yellow      Appearance Urine Clear      Glucose Urine Negative      Bilirubin Urine Negative      Ketones Urine Negative      Specific Gravity Urine 1.020      Blood Urine 0.5 mg/dL (*)     pH Urine 5.5      Protein Albumin Urine 20 (*)     Urobilinogen Urine <2.0      Nitrite Urine Negative      Leukocyte Esterase Urine 250 Amie/uL (*)     Mucus Urine Present (*)     RBC Urine 48 (*)     WBC Urine 27 (*)     Squamous Epithelials Urine 1     CBC WITH PLATELETS AND DIFFERENTIAL - Abnormal    WBC Count 13.5 (*)     RBC Count 3.95      Hemoglobin 11.8      Hematocrit 34.8 (*)     MCV 88      MCH 29.9      MCHC 33.9      RDW 12.7      Platelet Count 289      % Neutrophils 72      % Lymphocytes 17      % Monocytes 11      % Eosinophils 0      % Basophils 0      % Immature Granulocytes 0      NRBCs per 100 WBC 0      Absolute Neutrophils 9.6 (*)     Absolute Lymphocytes 2.3      Absolute Monocytes 1.5 (*)     Absolute Eosinophils 0.0      Absolute Basophils 0.0      Absolute Immature Granulocytes 0.1      Absolute NRBCs 0.0     INFLUENZA A/B, RSV, & SARS-COV2 PCR - Abnormal    Influenza A PCR Negative      Influenza B PCR Negative      RSV PCR  Negative      SARS CoV2 PCR Positive (*)    ERYTHROCYTE SEDIMENTATION RATE AUTO - Normal    Erythrocyte Sedimentation Rate 20     COMPREHENSIVE METABOLIC PANEL   CBC WITH PLATELETS   URINE CULTURE               Triage note reviewed:  Pt developed left lower abd pain on Thursday,but then yesterday the pain switched to her right upper and lower back going to the right knee. Pt appears uncomfortable.  Pt had a fever of 101 at 6pm tonite.  Temp is 100.8 in triage. Right corie hurts worse when she breathes in and coughs.   Had a oxycodone at 1800 with minimal relief.  Pm- copd          Medical Decision Making  Obtained supplemental history:Supplemental history obtained?: No  Reviewed external records: Primary care office visit January 2024  Care impacted by chronic illness:Cancer/Chemotherapy, Chronic Lung Disease, Chronic Pain, Smoking / Nicotine Use, and Other: DANY  Care significantly affected by social determinants of health:Access to Medical Care  Considered CTA of the chest but deferred after discussion with hospitalist  Did you interpret images independently  Chest x-ray independently reviewed iConsultation discussion with other provider: Hospitalist  Admit.    At the conclusion of the encounter I discussed the results of all of the tests and the disposition. The questions were answered. The patient or family acknowledged understanding and was agreeable with the care plan.    TOTAL CRITICAL CARE TIME (EXCLUDING PROCEDURES): Not applicable    PROCEDURES:   None    EMERGENCY DEPARTMENT COURSE   7:22 PM I met with the patient to gather history and to perform my initial exam.  We discussed treatment options and the plan for care while in the Emergency Department.    ED INTERVENTIONS     Medications   lidocaine 1 % 0.1-1 mL (has no administration in time range)   lidocaine (LMX4) cream (has no administration in time range)   sodium chloride (PF) 0.9% PF flush 3 mL (3 mLs Intracatheter $Given 2/4/24 0105)   sodium  chloride (PF) 0.9% PF flush 3 mL (has no administration in time range)   enoxaparin ANTICOAGULANT (LOVENOX) injection 40 mg (40 mg Subcutaneous $Given 2/4/24 0114)   acetaminophen (TYLENOL) tablet 650 mg (has no administration in time range)     Or   acetaminophen (TYLENOL) Suppository 650 mg (has no administration in time range)   polyethylene glycol (MIRALAX) Packet 17 g (has no administration in time range)   bisacodyl (DULCOLAX) suppository 10 mg (has no administration in time range)   ondansetron (ZOFRAN ODT) ODT tab 4 mg (has no administration in time range)     Or   ondansetron (ZOFRAN) injection 4 mg (has no administration in time range)   prochlorperazine (COMPAZINE) injection 5 mg (has no administration in time range)     Or   prochlorperazine (COMPAZINE) tablet 5 mg (has no administration in time range)     Or   prochlorperazine (COMPAZINE) suppository 12.5 mg (has no administration in time range)   nicotine Patch in Place ( Transdermal Patch in Place 2/4/24 0103)   nicotine (NICODERM CQ) 21 MG/24HR 24 hr patch 1 patch (has no administration in time range)   nicotine (NICORETTE) gum 2 mg (has no administration in time range)   dexAMETHasone (DECADRON) tablet 6 mg (6 mg Oral $Given 2/4/24 0113)   ipratropium - albuterol 0.5 mg/2.5 mg/3 mL (DUONEB) neb solution 3 mL (3 mLs Nebulization $Given 2/4/24 0126)   albuterol (PROVENTIL) neb solution 2.5 mg (has no administration in time range)   morphine (PF) injection 8 mg (8 mg Intravenous $Given 2/3/24 1949)   ondansetron (ZOFRAN) injection 4 mg (4 mg Intravenous $Given 2/3/24 1947)   iopamidol (ISOVUE-370) solution 80 mL (75 mLs Intravenous $Given 2/3/24 2034)   gadobutrol (GADAVIST) injection 5 mL (5 mLs Intravenous $Given 2/3/24 2241)       DISCHARGE MEDICATIONS        Review of your medicines        UNREVIEWED medicines. Ask your doctor about these medicines        Dose / Directions   albuterol (2.5 MG/3ML) 0.083% neb solution  Commonly known as:  PROVENTIL  Ask about: Which instructions should I use?      Dose: 2.5 mg  Take 2.5 mg by nebulization every 6 hours as needed for shortness of breath, wheezing or cough  Refills: 0     alendronate 70 MG tablet  Commonly known as: FOSAMAX  Used for: S/P allogeneic bone marrow transplant (H)      Dose: 70 mg  Take 1 tablet by mouth once a week  Quantity: 13 tablet  Refills: 3     augmented betamethasone dipropionate 0.05 % external cream  Commonly known as: DIPROLENE AF  Used for: Dermatitis      Apply topically 2 times daily Stronger cream for rash.  Quantity: 50 g  Refills: 3     hydrOXYzine HCl 25 MG tablet  Commonly known as: ATARAX  Used for: Dermatitis      Dose: 25 mg  Take 1 tablet (25 mg) by mouth 3 times daily as needed for itching  Quantity: 100 tablet  Refills: 1     MULTIVITAL PO      Take  by mouth.  Refills: 0     oxyCODONE IR 10 MG tablet  Commonly known as: ROXICODONE  Used for: Chronic bilateral low back pain, unspecified whether sciatica present      Dose: 10 mg  Take 1 tablet (10 mg) by mouth 4 times daily for 30 days For use from 1/15/24 to 2/14/2024  Quantity: 120 tablet  Refills: 0     triamcinolone 0.1 % external cream  Commonly known as: KENALOG  Ask about: Which instructions should I use?      Apply topically 3 times daily  Refills: 0     vitamin C 500 MG tablet  Commonly known as: ASCORBIC ACID      Dose: 500 mg  Take 500 mg by mouth daily.  Refills: 0            CONTINUE these medicines which have NOT CHANGED        Dose / Directions   nebulizer nebulization  Used for: Chronic obstructive pulmonary disease, unspecified COPD type (H)      1 nebulizer please as covered by insurance.  May also dispense supplies (tubing, inhalation device, etc) as needed.  Quantity: 1 each  Refills: 0                INFORMATION SOURCE AND LIMITATIONS    History/Exam limitations: None  Patient information was obtained from: patient  Use of : N/A    HISTORY OF PRESENT ILLNESS   Jenn Barclay is a 68  year old year old female with a relevant past history of COPD, AML, h/o allogeneic bone marrow transplant (2010), chronic back pain, vertebral compression fracture, vertebroplasty (2016), osteoporosis, controlled substance agreement signed, DANY, tobacco abuse, who presents to this ED via walk-in with family for evaluation of back pain.    Patient reports persistent severe right lower back pain since Wednesday (1/31/2024). The pain radiates to her right flank, right side of her pelvis, RLQ of her abdomen, and to her right knee. She associates numbness and tingling to the medial groin and right knee, fever (101.4F) today, nausea, dry heaving, and shortness of breath secondary to the pain. She has been taking oxycodone for her chronic upper back pain without any relief.    She otherwise denies associating vomiting, urinary symptoms, or bowel or urinary incontinence. She does have chronic diarrhea at baseline which remains unchanged. There were no other concerns/complaints at this time.     Shx: She is a smoker. She denies alcohol use.    REVIEW OF SYSTEMS:   All other systems reviewed and are negative except as noted above in HPI.    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)      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     History of PID      Metatarsal fracture 2017     Migraine without aura, without mention of intractable migraine without mention of status migrainosus      Osteoporosis 2020     Osteoporosis 5/3/2016     Papular rash, generalized       Post-menopausal 2/8/2020     Pseudogout 11/30/2016     Serrated adenoma of colon 7/17/2020     Status post allogeneic bone marrow transplant (H)      Surgical menopause 1976     Tobacco abuse      Traumatic compression fracture of T8 thoracic vertebra, closed, initial encounter (H) 4/26/2016     Tubular adenoma of colon 7/17/2020     Vertebral compression fracture (H) 2014     Zoster 2018     Patient Active Problem List   Diagnosis     Exocrine pancreatic insufficiency     Nephrolithiasis     Anxiety     Chronic back pain     Controlled substance agreement signed - 1/24 - oxycodone - UDS 1/24     COPD (chronic obstructive pulmonary disease) (H)     Financial difficulties     Full code status     Gastric ulcer     Migraine headache     Papular rash, generalized     Pseudogout     Senile purpura (H24)     Tobacco abuse     Traumatic compression fracture of T8 thoracic vertebra, closed, initial encounter (H)     Tubular adenoma of colon - advanced adenoma in 2017 - single small adenoma in 2020     OP (osteoporosis)     History of acute myeloid leukemia     Status post allogeneic bone marrow transplant (H) - 2010 - [facility]     Chronic diarrhea     Chronic, continuous use of opioids     Frailty     Hypoxemia     COVID-19 virus infection     Past Surgical History:   Procedure Laterality Date     APPENDECTOMY       COLONOSCOPY N/A 07/28/2020    Procedure: COLONOSCOPY, WITH POLYPECTOMY AND BIOPSY;  Surgeon: Gianni Valerio MD;  Location: [facility] OR     HYSTERECTOMY TOTAL ABDOMINAL, BILATERAL SALPINGO-OOPHORECTOMY, COMBINED  1976     IR MISCELLANEOUS PROCEDURE  02/22/2010     TRANSPLANT  01/01/2010     VERTEBROPLASTY  01/01/2016    T5 and T8 vertebrae.       Allergies   Allergen Reactions     Mirtazapine      Other reaction(s): Other (See Comments)  Hallucination      Tape [Adhesive Tape]      Allergy Hx  In addition to NO paper tape     Liquid Adhesive Rash     Other reaction(s): Other (See Comments)  Allergy Hx - Rash from paper  tape       OUTPATIENT MEDICATIONS     Current Discharge Medication List         Vitals:    02/04/24 0037 02/04/24 0102 02/04/24 0115 02/04/24 0126   BP: 99/59 100/53     BP Location:  Left arm     Pulse:  76     Resp:  20  20   Temp:  98.6  F (37  C)     TempSrc:  Oral     SpO2:  97% 95% 94%   Weight:       Height:           Physical Exam   Constitutional: Oriented to person, place, and time. Appears well-developed and well-nourished.   HEENT:    Head: Atraumatic.   Neck: Normal range of motion. Neck supple.   Cardiovascular: Normal rate, regular rhythm and normal heart sounds.    Pulmonary/Chest: Normal effort  and breath sounds normal.   Abdominal: Soft. Bowel sounds are normal. RLQ tenderness  Musculoskeletal: Normal range of motion. Right CVA tenderness. Tenderness over the dorsal spinous process of L4-L5.   Neurological: Alert and oriented to person, place, and time. Normal strength. No cranial nerve deficit. Decreased sensation to the top of her right thigh and some decreased sensation in the medial groin. None in the daniel inguinal. 5/5 motor strength in the lower extremities.  Skin: Skin is warm and dry.   Psychiatric: Normal mood and affect. Behavior is normal. Thought content normal.         DIAGNOSTICS    LABORATORY FINDINGS (REVIEWED AND INTERPRETED):  Labs Ordered and Resulted from Time of ED Arrival to Time of ED Departure   CRP INFLAMMATION - Abnormal       Result Value    CRP Inflammation 54.70 (*)    BASIC METABOLIC PANEL - Abnormal    Sodium 137      Potassium 3.3 (*)     Chloride 97 (*)     Carbon Dioxide (CO2) 30 (*)     Anion Gap 10      Urea Nitrogen 8.4      Creatinine 0.65      GFR Estimate >90      Calcium 8.3 (*)     Glucose 125 (*)    HEPATIC FUNCTION PANEL - Abnormal    Protein Total 6.1 (*)     Albumin 3.6      Bilirubin Total 0.4      Alkaline Phosphatase 44      AST 20      ALT 17      Bilirubin Direct <0.20     ROUTINE UA WITH MICROSCOPIC REFLEX TO CULTURE - Abnormal    Color Urine  Yellow      Appearance Urine Clear      Glucose Urine Negative      Bilirubin Urine Negative      Ketones Urine Negative      Specific Gravity Urine 1.020      Blood Urine 0.5 mg/dL (*)     pH Urine 5.5      Protein Albumin Urine 20 (*)     Urobilinogen Urine <2.0      Nitrite Urine Negative      Leukocyte Esterase Urine 250 Amie/uL (*)     Mucus Urine Present (*)     RBC Urine 48 (*)     WBC Urine 27 (*)     Squamous Epithelials Urine 1     CBC WITH PLATELETS AND DIFFERENTIAL - Abnormal    WBC Count 13.5 (*)     RBC Count 3.95      Hemoglobin 11.8      Hematocrit 34.8 (*)     MCV 88      MCH 29.9      MCHC 33.9      RDW 12.7      Platelet Count 289      % Neutrophils 72      % Lymphocytes 17      % Monocytes 11      % Eosinophils 0      % Basophils 0      % Immature Granulocytes 0      NRBCs per 100 WBC 0      Absolute Neutrophils 9.6 (*)     Absolute Lymphocytes 2.3      Absolute Monocytes 1.5 (*)     Absolute Eosinophils 0.0      Absolute Basophils 0.0      Absolute Immature Granulocytes 0.1      Absolute NRBCs 0.0     INFLUENZA A/B, RSV, & SARS-COV2 PCR - Abnormal    Influenza A PCR Negative      Influenza B PCR Negative      RSV PCR Negative      SARS CoV2 PCR Positive (*)    ERYTHROCYTE SEDIMENTATION RATE AUTO - Normal    Erythrocyte Sedimentation Rate 20     COMPREHENSIVE METABOLIC PANEL   CBC WITH PLATELETS   URINE CULTURE         IMAGING (REVIEWED AND INTERPRETED):  XR Chest Port 1 View   Final Result   IMPRESSION: Since 12/06/2022 there is been a slight increase in interstitial lung markings which may be inflammatory in nature, given patient's history of fever. If indicated unenhanced CT the chest may be helpful for further evaluation. Otherwise the    chest is stable with again seen hyperinflation of both lungs. Methacrylate fixation involving 2 thoracic vertebral bodies. Mild scattered hypertrophic changes the bony skeleton.      MR Thoracic Spine w/o & w Contrast   Final Result   IMPRESSION:       THORACIC SPINE MRI:   1.  No evidence for spine infection.   2.  Chronic compression fractures T5, T6, T7, T8, and T10, status post vertebral augmentation at T5 and T8.      LUMBAR SPINE MRI:   1.  Moderate facet arthropathy on the right at L3-L4 with edema and enhancement involving the facet and surrounding soft tissues, nonspecific. This is potentially degenerative though early changes of infectious process also possible in this clinical    context.   2.  Nonspecific edema and enhancement at the left L5 transverse process where it abuts the sacrum, with similar differential considerations as above.   3.  No evidence for discitis.   4.  Chronic compression fractures at L4 and L5. No acute fracture.   5.  Left foraminal disc protrusion at L4-L5 contributing to moderate to severe foraminal stenosis. Mild lateral recess and foraminal stenoses at other levels, as detailed.      MR Lumbar Spine w/o & w Contrast   Final Result   IMPRESSION:      THORACIC SPINE MRI:   1.  No evidence for spine infection.   2.  Chronic compression fractures T5, T6, T7, T8, and T10, status post vertebral augmentation at T5 and T8.      LUMBAR SPINE MRI:   1.  Moderate facet arthropathy on the right at L3-L4 with edema and enhancement involving the facet and surrounding soft tissues, nonspecific. This is potentially degenerative though early changes of infectious process also possible in this clinical    context.   2.  Nonspecific edema and enhancement at the left L5 transverse process where it abuts the sacrum, with similar differential considerations as above.   3.  No evidence for discitis.   4.  Chronic compression fractures at L4 and L5. No acute fracture.   5.  Left foraminal disc protrusion at L4-L5 contributing to moderate to severe foraminal stenosis. Mild lateral recess and foraminal stenoses at other levels, as detailed.      CT Abdomen Pelvis w Contrast   Final Result   IMPRESSION:    1.  The appendix is not seen with any  certainty, however I do not see any obvious pericecal inflammation to suggest acute appendicitis.      2.  Moderate atrophy of the pancreas.      3.  Benign calcified granulomas.      4.  Nonobstructive stone in the medial aspect of the right kidney midpole which measures approximately 9.1 x 3.6 cm. This has increased in size since 10/16/2019 CT and this measured 4.8 x 2.8 mm.      5.  No stones or obstructive changes in the ureters/bladder.      6.  Old compression fractures at L4 and L5.            ECG (REVIEWED AND INTERPRETED):   ECG:   None        I, Elisa Johnson, am serving as a scribe to document services personally performed by Charlie Sifuentes D.O., based on my observation and the provider s statements to me.    I, Charlie Sifuentes D.O., attest that Elisa Johnson is acting in a scribe capacity, has observed my performance of the services and has documented them in accordance with my direction.    Charlie Sifuentes D.O.  EMERGENCY MEDICINE   02/03/24  [facility] EMERGENCY DEPARTMENT  [address] [place] [id number]  [phone]  Dept: [phone]     Charlie Sifuentes DO  02/04/24 0410

## 2024-02-04 NOTE — PROVIDER NOTIFICATION
Patient has been assessed for Home Oxygen needs. Oxygen readings:    *Pulse oximetry (SpO2) = 90% on room air at rest while awake.    *SpO2 improved to 92% on 1liters/minute at rest.    *SpO2 = 91% on room air during activity/with exercise.    *SpO2 improved to 93% on 1liters/minute during activity/with exercise.

## 2024-02-04 NOTE — ED NOTES
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)     AML (acute myeloid leukemia) (H) 12/2009    Baker's cyst     Chronic back pain     Chronic diarrhea     Compression fx, lumbar spine (H)     Controlled substance agreement signed 8/3/2017    COPD (chronic obstructive pulmonary disease) (H)     COPD (chronic obstructive pulmonary disease) (H)     Full code status 9/10/2017    Gastric ulcer     pt states she has not had any ulcers    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)     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    History of PID     Metatarsal fracture 2017 2nd     Migraine without aura, without mention of intractable migraine without mention of status migrainosus     Osteoporosis 2020    DXA    Osteoporosis 5/3/2016    Papular rash, generalized     Post-menopausal 2/8/2020    Early surgical menopause    Pseudogout 11/30/2016    Serrated adenoma of colon 7/17/2020    Status post allogeneic bone marrow transplant (H)     Surgical menopause 1976    oophorectomy    Tobacco abuse     Traumatic compression fracture of T8 thoracic vertebra, closed, initial encounter (H) 4/26/2016    Tubular adenoma of colon 7/17/2020    Vertebral compression fracture (H) 2014    Zoster 2018        Code Status:  No Order     Falls Risk: Yes Band: Applied    Current Living Situation/Residence: lives with their son or daughter     Elimination Status: Continent: Yes     Activity Level: SBA    Patients Preferred Language:  English     Needed: No    Vital Signs:  /63   Pulse 75   Temp (!) 100.8  F (38.2  C) (Tympanic)   Resp 16   Ht 1.753 m (5' 9\")   Wt 49 kg (108 lb)   LMP  (LMP Unknown)   SpO2 (!) 84%   BMI 15.95 kg/m       Cardiac Rhythm: NSR    Pain Score: 5/10    Is the Patient Confused:  No    Last Food " or Drink: 02/04/24    Focused Assessment: Pt is alert and oriented, calm and cooperative. Pt came in for lumbar back pain and fever. Pt was hypoxic at 84%. Oxygen via nasal cannula @ 2L applied. Pt has hx of COPD and is a smoker. Pt is COVID positive. Back pain 5/10, worse with movement. Stable chronic back fractures seen on MRI.     Tests Performed: Done: Labs and Imaging    Treatments Provided: Oxygen, pain medication    Family Dynamics/Concerns: No    Family Updated On Visitor Policy: Yes    Plan of Care Communicated to Family: Yes    Who Was Updated about Plan of Care: Daughter, grandaughter, patient    Belongings Checklist Done and Signed by Patient: No    Medications sent with patient: N/a    Covid: symptomatic, positive    RN: Brittny Rodarte RN 2/4/2024 12:18 AM

## 2024-02-04 NOTE — PLAN OF CARE
Problem: Gas Exchange Impaired  Goal: Optimal Gas Exchange  Outcome: Progressing  Intervention: Optimize Oxygenation and Ventilation  Recent Flowsheet Documentation  Taken 2/4/2024 0115 by Praveen Darling, RN  Head of Bed (HOB) Positioning: HOB at 30-45 degrees     Problem: Pain Acute  Goal: Optimal Pain Control and Function  Outcome: Progressing  Intervention: Develop Pain Management Plan  Recent Flowsheet Documentation  Taken 2/4/2024 0115 by Praveen Darling, RN  Pain Management Interventions:   repositioned   rest  Intervention: Prevent or Manage Pain  Recent Flowsheet Documentation  Taken 2/4/2024 0115 by Praveen Darling, RN  Medication Review/Management: medications reviewed   Goal Outcome Evaluation:       Admitted to room 129 from ED around 0100 for Covid pneumonia and back pain. With supplemental O2 at 1 l/nc. Tried to wean off  from O2 but saturation dropped to 88%. Lungs sound diminished. Respiration are even and non labored. No sob or chest discomfort reported. Offered pain meds for back pain but pt refused. On K+ protocol. K+ bump given recheck today at 9 am.VSS. Slept between cares.

## 2024-02-04 NOTE — ED TRIAGE NOTES
Pt developed left lower abd pain on Thursday,but then yesterday the pain switched to her right upper and lower back going to the right knee. Pt appears uncomfortable.  Pt had a fever of 101 at 6pm tonite.  Temp is 100.8 in triage. Right corie hurts worse when she breathes in and coughs.   Had a oxycodone at 1800 with minimal relief.  Pmh- copd

## 2024-02-04 NOTE — MEDICATION SCRIBE - ADMISSION MEDICATION HISTORY
Medication Scribe Admission Medication History    Admission medication history is complete. The information provided in this note is only as accurate as the sources available at the time of the update.    Information Source(s): Patient via in-person    Pertinent Information: Patient states that she does not have Albuterol inhaler because of affordability.    Changes made to PTA medication list:  Added: None  Deleted: Albuterol inhaler, Creon 24 capsule, Clindamycin 1% lotion, Gentamicin 1% ointment, Loperamide  tablet, Mupirocin 2% ointment, Omega 3 capsule (per patient)  Changed: None    Medication Affordability:       Allergies reviewed with patient and updates made in EHR: yes    Medication History Completed By: Sanjuanita Cornejo 2/4/2024 12:22 AM    PTA Med List   Medication Sig Last Dose    albuterol (PROVENTIL) (2.5 MG/3ML) 0.083% neb solution Take 2.5 mg by nebulization every 6 hours as needed for shortness of breath, wheezing or cough Unknown at prn    alendronate (FOSAMAX) 70 MG tablet Take 1 tablet by mouth once a week 2/2/2024 at am    ascorbic acid (VITAMIN C) 500 MG tablet Take 500 mg by mouth daily. 2/3/2024 at am    augmented betamethasone dipropionate (DIPROLENE AF) 0.05 % external cream Apply topically 2 times daily Stronger cream for rash. 2/3/2024 at pm    hydrOXYzine (ATARAX) 25 MG tablet Take 1 tablet (25 mg) by mouth 3 times daily as needed for itching Unknown at prn    Multiple Vitamins-Minerals (MULTIVITAL PO) Take  by mouth. 2/3/2024 at am    oxyCODONE IR (ROXICODONE) 10 MG tablet Take 1 tablet (10 mg) by mouth 4 times daily for 30 days For use from 1/15/24 to 2/14/2024 2/3/2024 at am    triamcinolone (KENALOG) 0.1 % external cream Apply topically 3 times daily 2/3/2024 at pm

## 2024-02-04 NOTE — PLAN OF CARE
Problem: Adult Inpatient Plan of Care  Goal: Absence of Hospital-Acquired Illness or Injury  Outcome: Progressing  Intervention: Identify and Manage Fall Risk  Recent Flowsheet Documentation  Taken 2/4/2024 0825 by Radha Hernandez RN  Safety Promotion/Fall Prevention:   activity supervised   assistive device/personal items within reach   clutter free environment maintained   nonskid shoes/slippers when out of bed  Intervention: Prevent Infection  Recent Flowsheet Documentation  Taken 2/4/2024 0825 by Radha Hernandez RN  Infection Prevention: hand hygiene promoted     Problem: Adult Inpatient Plan of Care  Goal: Absence of Hospital-Acquired Illness or Injury  Intervention: Prevent Infection  Recent Flowsheet Documentation  Taken 2/4/2024 0825 by Radha Hernandez RN  Infection Prevention: hand hygiene promoted     Problem: Adult Inpatient Plan of Care  Goal: Optimal Comfort and Wellbeing  Outcome: Progressing  Intervention: Monitor Pain and Promote Comfort  Recent Flowsheet Documentation  Taken 2/4/2024 0825 by Radha Hernandez RN  Pain Management Interventions:   Provider notified (comment)   declines     Problem: Gas Exchange Impaired  Goal: Optimal Gas Exchange  Outcome: Progressing   Pt continues to be on 1-2 liter O2 sating in the lower 90%.  Pt has history of COPD her O2 saturations go as low as 89% on room air.     Pt c/o back pain rating 7/10.  Lidocaine patch ordered and placed on lower right side of back with good results.

## 2024-02-04 NOTE — TREATMENT PLAN
RCAT Treatment Plan    Patient Score: 5  Patient Acuity: 5    Clinical Indication for Therapy: history of mucous producing disease    Therapy Ordered: continue current therapy    Assessment Summary: pt here with hypoxemia, Covid-19, likely COPD exacerbation. Current 1/2 pack/day tobacco smoker, CXR 2/3 hyperinflation. RR 16, LS diminished, NPC, on 1L NC SpO2 90-91. Will reassess in 72 hours or as needed.      Joselito Romano, RT  2/4/2024

## 2024-02-04 NOTE — CONSULTS
Care Management Initial Consult    General Information  Assessment completed with: Patient,    Type of CM/SW Visit: Initial Assessment    Primary Care Provider verified and updated as needed:     Readmission within the last 30 days: no previous admission in last 30 days      Reason for Consult: discharge planning  Advance Care Planning:            Communication Assessment  Patient's communication style: spoken language (English or Bilingual)    Hearing Difficulty or Deaf: no   Wear Glasses or Blind: yes    Cognitive  Cognitive/Neuro/Behavioral: WDL                      Living Environment:   People in home: grandchild(jocy)     Current living Arrangements: house      Able to return to prior arrangements: yes       Family/Social Support:  Care provided by: self, other (see comments) (grandsons)  Provides care for: no one     Children, Other (specify) (grandchildren)          Description of Support System: Supportive, Uninvolved         Current Resources:   Patient receiving home care services: No     Community Resources: None  Equipment currently used at home: other (see comments) (nebulizer)  Supplies currently used at home: None    Employment/Financial:  Employment Status: disabled        Financial Concerns:     Referral to Financial Worker: No       Does the patient's insurance plan have a 3 day qualifying hospital stay waiver?  No    Lifestyle & Psychosocial Needs:  Social Determinants of Health     Food Insecurity: High Risk (10/31/2023)    Food Insecurity     Within the past 12 months, did you worry that your food would run out before you got money to buy more?: Yes     Within the past 12 months, did the food you bought just not last and you didn t have money to get more?: No   Depression: Not at risk (1/8/2024)    PHQ-2     PHQ-2 Score: 0   Housing Stability: Low Risk  (10/31/2023)    Housing Stability     Do you have housing? : Yes     Are you worried about losing your housing?: No   Tobacco Use: High Risk  (2/3/2024)    Patient History     Smoking Tobacco Use: Every Day     Smokeless Tobacco Use: Former     Passive Exposure: Not on file   Financial Resource Strain: Low Risk  (10/31/2023)    Financial Resource Strain     Within the past 12 months, have you or your family members you live with been unable to get utilities (heat, electricity) when it was really needed?: No   Alcohol Use: Not on file   Transportation Needs: Low Risk  (10/31/2023)    Transportation Needs     Within the past 12 months, has lack of transportation kept you from medical appointments, getting your medicines, non-medical meetings or appointments, work, or from getting things that you need?: No   Physical Activity: Not on file   Interpersonal Safety: Low Risk  (1/8/2024)    Interpersonal Safety     Do you feel physically and emotionally safe where you currently live?: Yes     Within the past 12 months, have you been hit, slapped, kicked or otherwise physically hurt by someone?: No     Within the past 12 months, have you been humiliated or emotionally abused in other ways by your partner or ex-partner?: No   Stress: Not on file   Social Connections: Not on file       Functional Status:  Prior to admission patient needed assistance:   Dependent ADLs:: Independent  Dependent IADLs:: Cleaning, Cooking, Laundry, Shopping, Meal Preparation, Transportation       Mental Health Status:          Chemical Dependency Status:                Values/Beliefs:  Spiritual, Cultural Beliefs, [employer] Practices, Values that affect care:                 Additional Information:  SW spoke with pt for initial assessment over the phone due to covid status. Pt reports that she lives with her two adult grandsons who assist her with IADLs. She reports she is independent with ADLs. She reports that her grandson and daughter provide transportation. Pt reports that she wanted to get her grandson to be her PCA but it was too much work with the [facility] so they haven't done so.  She denies other needs at this time. SW available if discharge needs identified by care team.    YVROSE Boateng

## 2024-02-04 NOTE — H&P
"[facility]    History and Physical - Hospitalist Service       Date of Admission:  2/3/2024    Assessment & Plan      Jenn Barclay is a 68F presents with fever, back pain; pmhx includes COPD (FEV1 59% 2010, no home O2), AML (s/p marrow transplant), osteoporosis, chronic back pain with chronic opioid usage, tobacco dependence; admitted for acute hypoxic respiratory failure and COVID pneumonia.    #COVID PNA  Acute covid. Has gotten vax x4.  -pulse ox  -CMP trend  -CBC trend  -dexamethasone  6mg PO every day  -NO remdesivir    #COPD  #acute hypoxic respiratory failure  Unknown baseline hypoxia/oxygen demands; suspect likely chronic needs that have not been assessed. Last pulmonary evaluation was in 2010. FEV1 59% at that time.  -duonebs q6h  -albuterol q4h/PRN  -dexamethasone 6mg PO every day  -flutter therapy    #hypokalemia  -BMP trend  -replete as indicated    #hypocalcemia  -CMP trend    #hyperglycemia  In acute setting  -CMP trend    #leukocytosis  Most likely related to acute Covid infection.  -CBC trend    #tobacco dependence  Active 1ppd cigarette use, estimated 57pk/yr history. Precontemplative stage of cessation. Discussed for 4 minutes on night of admission    #chronic compression frax  -oxycodone PO PRN            Diet: Combination Diet Regular Diet Adult  DVT Prophylaxis: Enoxaparin (Lovenox) SQ  Panda Catheter: Not present  Lines: None     Cardiac Monitoring: None  Code Status: Full Code    Clinically Significant Risk Factors Present on Admission        # Hypokalemia: Lowest K = 3.3 mmol/L in last 2 days, will replace as needed   # Hypocalcemia: Lowest Ca = 8.3 mg/dL in last 2 days, will monitor and replace as appropriate           # Acute Respiratory Failure: Documented O2 saturation < 91%.  Continue supplemental oxygen as needed     # Cachexia: Estimated body mass index is 15.95 kg/m  as calculated from the following:    Height as of this encounter: 1.753 m (5' 9\").    Weight " as of this encounter: 49 kg (108 lb).              Disposition Plan      Expected Discharge Date: 02/06/2024                  Marco Antonio Hoffmann MD  Hospitalist Service  [facility]  Securely message with [employer] (more info)  Text page via [employer] Paging/Directory     ______________________________________________________________________    Chief Complaint   Back pain    History is obtained from the patient    History of Present Illness   Jenn Barclay is a 68F presents with fever, back pain; pmhx includes COPD (FEV1 59% 2010, no home O2), AML (s/p marrow transplant), osteoporosis, chronic back pain with chronic opioid usage, tobacco dependence; admitted for acute hypoxic respiratory failure and COVID pneumonia.    Chronic, cough, back pain.  Presents after bilateral leg pain with associated numbness and tingling, somewhat worsened in the past week.  She did have a fever elevated up to 101 at home.  She does not see primary care, however has been outside of specialist care for some extended time period.  She does not have a pulmonologist.  She has chronic functional diarrhea.      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    History of PID     Metatarsal fracture 2017 2nd     Migraine without aura, without mention of intractable migraine without mention of status migrainosus     Osteoporosis 2020    DXA    Osteoporosis 5/3/2016    Papular rash, generalized      Post-menopausal 2020    Early surgical menopause    Pseudogout 2016    Serrated adenoma of colon 2020    Status post allogeneic bone marrow transplant (H)     Surgical menopause     oophorectomy    Tobacco abuse     Traumatic compression fracture of T8 thoracic vertebra, closed, initial encounter (H) 2016    Tubular adenoma of colon 2020    Vertebral compression fracture (H) 2014    Zoster 2018       Past Surgical History   Past Surgical History:   Procedure Laterality Date    APPENDECTOMY      COLONOSCOPY N/A 2020    Procedure: COLONOSCOPY, WITH POLYPECTOMY AND BIOPSY;  Surgeon: Gianni Valerio MD;  Location: [facility] OR    HYSTERECTOMY TOTAL ABDOMINAL, BILATERAL SALPINGO-OOPHORECTOMY, COMBINED      IR MISCELLANEOUS PROCEDURE  2010    TRANSPLANT  2010    VERTEBROPLASTY  2016    T5 and T8 vertebrae.       Prior to Admission Medications   Prior to Admission Medications   Prescriptions Last Dose Informant Patient Reported? Taking?   Multiple Vitamins-Minerals (MULTIVITAL PO) 2/3/2024 at am Self Yes Yes   Sig: Take  by mouth.   albuterol (PROVENTIL) (2.5 MG/3ML) 0.083% neb solution Unknown at prn Self Yes Yes   Sig: Take 2.5 mg by nebulization every 6 hours as needed for shortness of breath, wheezing or cough   alendronate (FOSAMAX) 70 MG tablet 2024 at am Self No Yes   Sig: Take 1 tablet by mouth once a week   ascorbic acid (VITAMIN C) 500 MG tablet 2/3/2024 at am Self Yes Yes   Sig: Take 500 mg by mouth daily.   augmented betamethasone dipropionate (DIPROLENE AF) 0.05 % external cream 2/3/2024 at pm Self No Yes   Sig: Apply topically 2 times daily Stronger cream for rash.   hydrOXYzine (ATARAX) 25 MG tablet Unknown at prn Self No Yes   Sig: Take 1 tablet (25 mg) by mouth 3 times daily as needed for itching   nebulizer nebulization  Self No No   Si nebulizer please as covered by insurance.  May also dispense supplies (tubing, inhalation device, etc) as  needed.   oxyCODONE IR (ROXICODONE) 10 MG tablet 2/3/2024 at am Self No Yes   Sig: Take 1 tablet (10 mg) by mouth 4 times daily for 30 days For use from 1/15/24 to 2/14/2024   triamcinolone (KENALOG) 0.1 % external cream 2/3/2024 at pm Self Yes Yes   Sig: Apply topically 3 times daily      Facility-Administered Medications: None        Review of Systems    The 10 point Review of Systems is negative other than noted in the HPI or here.      Physical Exam   Vital Signs: Temp: (!) 100.8  F (38.2  C) Temp src: Tympanic BP: 99/59 Pulse: 75   Resp: 16 SpO2: 100 % O2 Device: None (Room air) Oxygen Delivery: 3 LPM  Weight: 108 lbs 0 oz    Constitutional: awake, alert, cooperative, no apparent distress, and appears stated age  Respiratory: CTAB  Cardiovascular: RRR no m/g/r  Musculoskeletal: generalised muscle wasting; shoulder/elbow flexion/extension 5/5 bilaterally and symmetric; ambulatory  Neurologic: AOx4, no focal deficits.    Medical Decision Making       45 MINUTES SPENT BY ME on the date of service doing chart review, history, exam, documentation & further activities per the note.  MANAGEMENT DISCUSSED with the following over the past 24 hours: patient, daughter   NOTE(S)/MEDICAL RECORDS REVIEWED over the past 24 hours: outpatient IM, distant PFT,   Tests ORDERED & REVIEWED in the past 24 hours:  - See lab/imaging results included in the data section of the note      Data     I have personally reviewed the following data over the past 24 hrs:    13.5 (H)  \   11.8   / 289     137 97 (L) 8.4 /  125 (H)   3.3 (L) 30 (H) 0.65 \     ALT: 17 AST: 20 AP: 44 TBILI: 0.4   ALB: 3.6 TOT PROTEIN: 6.1 (L) LIPASE: N/A     Procal: N/A CRP: 54.70 (H) Lactic Acid: N/A         Imaging results reviewed over the past 24 hrs:   Recent Results (from the past 24 hour(s))   CT Abdomen Pelvis w Contrast    Narrative    EXAM: CT ABDOMEN PELVIS W CONTRAST  LOCATION: [facility]  DATE: 2/3/2024    INDICATION: Fever,  back pain right flank pain.  COMPARISON: 10/16/2019 CT.  TECHNIQUE: CT scan of the abdomen and pelvis was performed following injection of IV contrast. Multiplanar reformats were obtained. Dose reduction techniques were used.  CONTRAST: Isovue 370 75 ml.    FINDINGS:   LOWER CHEST: Stable calcifications in the left atrium. Mild findings of centrilobular emphysema. Benign calcified granulomas left lung base. Stable minimal pericardial fluid.    HEPATOBILIARY: Normal.    PANCREAS: Moderate atrophy. Otherwise, normal.    SPLEEN: Benign calcified granulomas.    ADRENAL GLANDS: Normal.    KIDNEYS/BLADDER: Nonobstructive stones seen in the medial aspect of the right kidney midpole which measures approximately 9.1 x 3.6 mm.    BOWEL: The appendix is not seen with any certainty, however I do not see any obvious pericecal inflammation to suggest acute appendicitis.    LYMPH NODES: Normal.    VASCULATURE: Mild calcification with no aneurysm.    PELVIC ORGANS: Surgically absent.    MUSCULOSKELETAL: Old appearing compression fractures involving the L4 and L5 levels. Postoperative changes involving the right hip. Mild to moderate thoracolumbar curvature convex to the left.      Impression    IMPRESSION:   1.  The appendix is not seen with any certainty, however I do not see any obvious pericecal inflammation to suggest acute appendicitis.    2.  Moderate atrophy of the pancreas.    3.  Benign calcified granulomas.    4.  Nonobstructive stone in the medial aspect of the right kidney midpole which measures approximately 9.1 x 3.6 cm. This has increased in size since 10/16/2019 CT and this measured 4.8 x 2.8 mm.    5.  No stones or obstructive changes in the ureters/bladder.    6.  Old compression fractures at L4 and L5.   MR Lumbar Spine w/o & w Contrast    Narrative    EXAM: MR THORACIC SPINE W/O AND W CONTRAST, MR LUMBAR SPINE W/O AND W CONTRAST  LOCATION: [facility]  DATE: 2/3/2024    INDICATION: Fever,  lower back pain.  COMPARISON: Chest CT 07/22/2022. CT abdomen and pelvis 02/03/2024. Lumbar MRI 04/27/2016.  CONTRAST: 5 ml Gadavist.  TECHNIQUE:   1) Routine Thoracic Spine MRI without and with IV contrast.  2) Routine Lumbar Spine MRI without and with IV contrast.    FINDINGS:    THORACIC SPINE:  Accentuation of thoracic kyphosis with otherwise normal alignment. Moderate chronic compression fractures at T5 and T8, status post vertebral augmentation. Chronic mild compression fractures at T6, T7, and T10. No acute fractures. No marrow edema or   abnormal enhancement to suggest discitis or osteomyelitis. Mild disc space narrowing. No significant disc herniation. Mild facet arthropathy. No spinal canal or neural foraminal stenosis. No abnormal cord signal.     No extraspinal abnormality.    LUMBAR SPINE:   Nomenclature is based on 5 lumbar type vertebral bodies. Mild anterior spondylolisthesis L3-L4 and L4-L5 with otherwise normal alignment. Moderate chronic compression fractures at L4-L5. No acute fracture. Mild edema within the right L3-L4 facet joint   with edema and enhancement within the adjacent paraspinal soft tissues and paraspinal musculature. Edema and enhancement centered at the left L5 transverse process and surrounding soft tissues (sagittal image 27, axial image 14). Normal distal spinal   cord and cauda equina with conus medullaris at mid L1. No extraspinal abnormality. Unremarkable visualized bony pelvis.    T12-L1: Normal disc height. Minimal disc bulge without herniation. Unremarkable facets. No spinal canal or foraminal stenosis.     L1-L2: Normal disc height without herniation. Unremarkable facets. No spinal canal or foraminal stenosis.    L2-L3: Mild disc space narrowing with mild disc bulge. No herniation. Unremarkable facets. No spinal canal or foraminal stenosis.     L3-L4: Mild disc space narrowing with mild to moderate disc bulge. No herniation. Moderate facet arthropathy asymmetric on the  right. No spinal canal stenosis. Mild to moderate right foraminal stenosis. No left foraminal stenosis.    L4-L5: Mild disc space narrowing with mild to moderate disc bulge and broad-based central disc protrusion and broad-based left foraminal disc protrusion. Enhancement along the annulus within the left foramen (sagittal image 23, axial image 18). Mild   spinal canal and lateral recess stenoses. Moderate to severe left foraminal stenosis. Mild right foraminal stenosis.    L5-S1: Normal disc height without herniation. Minimal facet arthropathy. No spinal canal stenosis. Mild left foraminal stenosis. No right foraminal stenosis.      Impression    IMPRESSION:    THORACIC SPINE MRI:  1.  No evidence for spine infection.  2.  Chronic compression fractures T5, T6, T7, T8, and T10, status post vertebral augmentation at T5 and T8.    LUMBAR SPINE MRI:  1.  Moderate facet arthropathy on the right at L3-L4 with edema and enhancement involving the facet and surrounding soft tissues, nonspecific. This is potentially degenerative though early changes of infectious process also possible in this clinical   context.  2.  Nonspecific edema and enhancement at the left L5 transverse process where it abuts the sacrum, with similar differential considerations as above.  3.  No evidence for discitis.  4.  Chronic compression fractures at L4 and L5. No acute fracture.  5.  Left foraminal disc protrusion at L4-L5 contributing to moderate to severe foraminal stenosis. Mild lateral recess and foraminal stenoses at other levels, as detailed.   MR Thoracic Spine w/o & w Contrast    Narrative    EXAM: MR THORACIC SPINE W/O AND W CONTRAST, MR LUMBAR SPINE W/O AND W CONTRAST  LOCATION: [facility]  DATE: 2/3/2024    INDICATION: Fever, lower back pain.  COMPARISON: Chest CT 07/22/2022. CT abdomen and pelvis 02/03/2024. Lumbar MRI 04/27/2016.  CONTRAST: 5 ml Gadavist.  TECHNIQUE:   1) Routine Thoracic Spine MRI without  and with IV contrast.  2) Routine Lumbar Spine MRI without and with IV contrast.    FINDINGS:    THORACIC SPINE:  Accentuation of thoracic kyphosis with otherwise normal alignment. Moderate chronic compression fractures at T5 and T8, status post vertebral augmentation. Chronic mild compression fractures at T6, T7, and T10. No acute fractures. No marrow edema or   abnormal enhancement to suggest discitis or osteomyelitis. Mild disc space narrowing. No significant disc herniation. Mild facet arthropathy. No spinal canal or neural foraminal stenosis. No abnormal cord signal.     No extraspinal abnormality.    LUMBAR SPINE:   Nomenclature is based on 5 lumbar type vertebral bodies. Mild anterior spondylolisthesis L3-L4 and L4-L5 with otherwise normal alignment. Moderate chronic compression fractures at L4-L5. No acute fracture. Mild edema within the right L3-L4 facet joint   with edema and enhancement within the adjacent paraspinal soft tissues and paraspinal musculature. Edema and enhancement centered at the left L5 transverse process and surrounding soft tissues (sagittal image 27, axial image 14). Normal distal spinal   cord and cauda equina with conus medullaris at mid L1. No extraspinal abnormality. Unremarkable visualized bony pelvis.    T12-L1: Normal disc height. Minimal disc bulge without herniation. Unremarkable facets. No spinal canal or foraminal stenosis.     L1-L2: Normal disc height without herniation. Unremarkable facets. No spinal canal or foraminal stenosis.    L2-L3: Mild disc space narrowing with mild disc bulge. No herniation. Unremarkable facets. No spinal canal or foraminal stenosis.     L3-L4: Mild disc space narrowing with mild to moderate disc bulge. No herniation. Moderate facet arthropathy asymmetric on the right. No spinal canal stenosis. Mild to moderate right foraminal stenosis. No left foraminal stenosis.    L4-L5: Mild disc space narrowing with mild to moderate disc bulge and broad-based  central disc protrusion and broad-based left foraminal disc protrusion. Enhancement along the annulus within the left foramen (sagittal image 23, axial image 18). Mild   spinal canal and lateral recess stenoses. Moderate to severe left foraminal stenosis. Mild right foraminal stenosis.    L5-S1: Normal disc height without herniation. Minimal facet arthropathy. No spinal canal stenosis. Mild left foraminal stenosis. No right foraminal stenosis.      Impression    IMPRESSION:    THORACIC SPINE MRI:  1.  No evidence for spine infection.  2.  Chronic compression fractures T5, T6, T7, T8, and T10, status post vertebral augmentation at T5 and T8.    LUMBAR SPINE MRI:  1.  Moderate facet arthropathy on the right at L3-L4 with edema and enhancement involving the facet and surrounding soft tissues, nonspecific. This is potentially degenerative though early changes of infectious process also possible in this clinical   context.  2.  Nonspecific edema and enhancement at the left L5 transverse process where it abuts the sacrum, with similar differential considerations as above.  3.  No evidence for discitis.  4.  Chronic compression fractures at L4 and L5. No acute fracture.  5.  Left foraminal disc protrusion at L4-L5 contributing to moderate to severe foraminal stenosis. Mild lateral recess and foraminal stenoses at other levels, as detailed.   XR Chest Port 1 View    Narrative    EXAM: XR CHEST PORT 1 VIEW  LOCATION: [facility]  DATE: 2/3/2024    INDICATION: fever  COMPARISON: 12/06/2022      Impression    IMPRESSION: Since 12/06/2022 there is been a slight increase in interstitial lung markings which may be inflammatory in nature, given patient's history of fever. If indicated unenhanced CT the chest may be helpful for further evaluation. Otherwise the   chest is stable with again seen hyperinflation of both lungs. Methacrylate fixation involving 2 thoracic vertebral bodies. Mild scattered  hypertrophic changes the bony skeleton.

## 2024-02-05 LAB — BACTERIA UR CULT: NORMAL

## 2024-02-10 ENCOUNTER — APPOINTMENT (OUTPATIENT)
Dept: RADIOLOGY | Facility: HOSPITAL | Age: 69
DRG: 177 | End: 2024-02-10
Attending: EMERGENCY MEDICINE
Payer: COMMERCIAL

## 2024-02-10 ENCOUNTER — HOSPITAL ENCOUNTER (INPATIENT)
Facility: HOSPITAL | Age: 69
LOS: 1 days | Discharge: HOME | DRG: 177 | End: 2024-02-11
Attending: EMERGENCY MEDICINE | Admitting: HOSPITALIST
Payer: COMMERCIAL

## 2024-02-10 ENCOUNTER — APPOINTMENT (OUTPATIENT)
Dept: CT IMAGING | Facility: HOSPITAL | Age: 69
DRG: 177 | End: 2024-02-10
Attending: EMERGENCY MEDICINE
Payer: COMMERCIAL

## 2024-02-10 ENCOUNTER — APPOINTMENT (OUTPATIENT)
Dept: CARDIOLOGY | Facility: HOSPITAL | Age: 69
DRG: 177 | End: 2024-02-10
Attending: HOSPITALIST
Payer: COMMERCIAL

## 2024-02-10 DIAGNOSIS — J44.1 COPD WITH ACUTE EXACERBATION (H): ICD-10-CM

## 2024-02-10 DIAGNOSIS — E87.6 HYPOKALEMIA: ICD-10-CM

## 2024-02-10 DIAGNOSIS — U07.1 COVID-19: ICD-10-CM

## 2024-02-10 DIAGNOSIS — R06.02 SOB (SHORTNESS OF BREATH): ICD-10-CM

## 2024-02-10 PROBLEM — M54.9 CHRONIC BACK PAIN: Status: ACTIVE | Noted: 2021-07-13

## 2024-02-10 PROBLEM — G89.29 CHRONIC BACK PAIN: Status: ACTIVE | Noted: 2021-07-13

## 2024-02-10 LAB
ALBUMIN SERPL BCG-MCNC: 3.8 G/DL (ref 3.5–5.2)
ALP SERPL-CCNC: 50 U/L (ref 40–150)
ALT SERPL W P-5'-P-CCNC: 103 U/L (ref 0–50)
ANION GAP SERPL CALCULATED.3IONS-SCNC: 9 MMOL/L (ref 7–15)
AST SERPL W P-5'-P-CCNC: 70 U/L (ref 0–45)
BASE EXCESS BLDV CALC-SCNC: 13 MMOL/L (ref -3–3)
BASOPHILS # BLD AUTO: 0 10E3/UL (ref 0–0.2)
BASOPHILS NFR BLD AUTO: 0 %
BILIRUB SERPL-MCNC: 0.2 MG/DL
BUN SERPL-MCNC: 18.9 MG/DL (ref 8–23)
CALCIUM SERPL-MCNC: 8.4 MG/DL (ref 8.8–10.2)
CHLORIDE SERPL-SCNC: 97 MMOL/L (ref 98–107)
CREAT SERPL-MCNC: 0.67 MG/DL (ref 0.51–0.95)
DEPRECATED HCO3 PLAS-SCNC: 37 MMOL/L (ref 22–29)
EGFRCR SERPLBLD CKD-EPI 2021: >90 ML/MIN/1.73M2
EOSINOPHIL # BLD AUTO: 0 10E3/UL (ref 0–0.7)
EOSINOPHIL NFR BLD AUTO: 0 %
ERYTHROCYTE [DISTWIDTH] IN BLOOD BY AUTOMATED COUNT: 13.3 % (ref 10–15)
GLUCOSE BLDC GLUCOMTR-MCNC: 158 MG/DL (ref 70–99)
GLUCOSE BLDC GLUCOMTR-MCNC: 175 MG/DL (ref 70–99)
GLUCOSE BLDC GLUCOMTR-MCNC: 181 MG/DL (ref 70–99)
GLUCOSE SERPL-MCNC: 124 MG/DL (ref 70–99)
HCO3 BLDV-SCNC: 37 MMOL/L (ref 21–28)
HCT VFR BLD AUTO: 37.3 % (ref 35–47)
HGB BLD-MCNC: 12.2 G/DL (ref 11.7–15.7)
HOLD SPECIMEN: NORMAL
IMM GRANULOCYTES # BLD: 0.1 10E3/UL
IMM GRANULOCYTES NFR BLD: 1 %
LACTATE SERPL-SCNC: 2.1 MMOL/L (ref 0.7–2)
LACTATE SERPL-SCNC: 2.5 MMOL/L (ref 0.7–2)
LVEF ECHO: NORMAL
LYMPHOCYTES # BLD AUTO: 1.7 10E3/UL (ref 0.8–5.3)
LYMPHOCYTES NFR BLD AUTO: 16 %
MAGNESIUM SERPL-MCNC: 2.1 MG/DL (ref 1.7–2.3)
MCH RBC QN AUTO: 29.3 PG (ref 26.5–33)
MCHC RBC AUTO-ENTMCNC: 32.7 G/DL (ref 31.5–36.5)
MCV RBC AUTO: 90 FL (ref 78–100)
MONOCYTES # BLD AUTO: 1.1 10E3/UL (ref 0–1.3)
MONOCYTES NFR BLD AUTO: 10 %
NEUTROPHILS # BLD AUTO: 7.8 10E3/UL (ref 1.6–8.3)
NEUTROPHILS NFR BLD AUTO: 73 %
NRBC # BLD AUTO: 0 10E3/UL
NRBC BLD AUTO-RTO: 0 /100
NT-PROBNP SERPL-MCNC: 5114 PG/ML (ref 0–900)
O2/TOTAL GAS SETTING VFR VENT: 28 %
OXYHGB MFR BLDV: 56 % (ref 70–75)
PCO2 BLDV: 57 MM HG (ref 40–50)
PH BLDV: 7.42 [PH] (ref 7.32–7.43)
PHOSPHATE SERPL-MCNC: 2.8 MG/DL (ref 2.5–4.5)
PLATELET # BLD AUTO: 382 10E3/UL (ref 150–450)
PO2 BLDV: 30 MM HG (ref 25–47)
POTASSIUM SERPL-SCNC: 2.7 MMOL/L (ref 3.4–5.3)
POTASSIUM SERPL-SCNC: 3.4 MMOL/L (ref 3.4–5.3)
PROCALCITONIN SERPL IA-MCNC: 0.03 NG/ML
PROT SERPL-MCNC: 6.1 G/DL (ref 6.4–8.3)
RBC # BLD AUTO: 4.16 10E6/UL (ref 3.8–5.2)
SAO2 % BLDV: 59 % (ref 70–75)
SODIUM SERPL-SCNC: 143 MMOL/L (ref 135–145)
TROPONIN T SERPL HS-MCNC: 20 NG/L
TROPONIN T SERPL HS-MCNC: 22 NG/L
WBC # BLD AUTO: 10.8 10E3/UL (ref 4–11)

## 2024-02-10 PROCEDURE — 85025 COMPLETE CBC W/AUTO DIFF WBC: CPT | Performed by: EMERGENCY MEDICINE

## 2024-02-10 PROCEDURE — 250N000009 HC RX 250

## 2024-02-10 PROCEDURE — 258N000003 HC RX IP 258 OP 636: Performed by: EMERGENCY MEDICINE

## 2024-02-10 PROCEDURE — 36415 COLL VENOUS BLD VENIPUNCTURE: CPT | Performed by: EMERGENCY MEDICINE

## 2024-02-10 PROCEDURE — 71275 CT ANGIOGRAPHY CHEST: CPT

## 2024-02-10 PROCEDURE — 80053 COMPREHEN METABOLIC PANEL: CPT | Performed by: EMERGENCY MEDICINE

## 2024-02-10 PROCEDURE — 99291 CRITICAL CARE FIRST HOUR: CPT

## 2024-02-10 PROCEDURE — 83605 ASSAY OF LACTIC ACID: CPT | Performed by: EMERGENCY MEDICINE

## 2024-02-10 PROCEDURE — 71045 X-RAY EXAM CHEST 1 VIEW: CPT

## 2024-02-10 PROCEDURE — 84132 ASSAY OF SERUM POTASSIUM: CPT | Performed by: STUDENT IN AN ORGANIZED HEALTH CARE EDUCATION/TRAINING PROGRAM

## 2024-02-10 PROCEDURE — 84100 ASSAY OF PHOSPHORUS: CPT | Performed by: HOSPITALIST

## 2024-02-10 PROCEDURE — 82962 GLUCOSE BLOOD TEST: CPT

## 2024-02-10 PROCEDURE — 94640 AIRWAY INHALATION TREATMENT: CPT

## 2024-02-10 PROCEDURE — 93306 TTE W/DOPPLER COMPLETE: CPT | Mod: 26 | Performed by: INTERNAL MEDICINE

## 2024-02-10 PROCEDURE — 250N000011 HC RX IP 250 OP 636: Performed by: EMERGENCY MEDICINE

## 2024-02-10 PROCEDURE — 93306 TTE W/DOPPLER COMPLETE: CPT

## 2024-02-10 PROCEDURE — 82805 BLOOD GASES W/O2 SATURATION: CPT | Performed by: EMERGENCY MEDICINE

## 2024-02-10 PROCEDURE — 250N000011 HC RX IP 250 OP 636: Performed by: HOSPITALIST

## 2024-02-10 PROCEDURE — 250N000013 HC RX MED GY IP 250 OP 250 PS 637: Performed by: EMERGENCY MEDICINE

## 2024-02-10 PROCEDURE — 36415 COLL VENOUS BLD VENIPUNCTURE: CPT | Performed by: STUDENT IN AN ORGANIZED HEALTH CARE EDUCATION/TRAINING PROGRAM

## 2024-02-10 PROCEDURE — 120N000001 HC R&B MED SURG/OB

## 2024-02-10 PROCEDURE — 84145 PROCALCITONIN (PCT): CPT | Performed by: HOSPITALIST

## 2024-02-10 PROCEDURE — 94799 UNLISTED PULMONARY SVC/PX: CPT

## 2024-02-10 PROCEDURE — 96375 TX/PRO/DX INJ NEW DRUG ADDON: CPT

## 2024-02-10 PROCEDURE — 272N000202 HC AEROBIKA WITH MANOMETER

## 2024-02-10 PROCEDURE — 93005 ELECTROCARDIOGRAM TRACING: CPT | Performed by: EMERGENCY MEDICINE

## 2024-02-10 PROCEDURE — 96365 THER/PROPH/DIAG IV INF INIT: CPT | Mod: 59

## 2024-02-10 PROCEDURE — 96361 HYDRATE IV INFUSION ADD-ON: CPT

## 2024-02-10 PROCEDURE — 250N000009 HC RX 250: Performed by: EMERGENCY MEDICINE

## 2024-02-10 PROCEDURE — 250N000013 HC RX MED GY IP 250 OP 250 PS 637: Performed by: STUDENT IN AN ORGANIZED HEALTH CARE EDUCATION/TRAINING PROGRAM

## 2024-02-10 PROCEDURE — 84484 ASSAY OF TROPONIN QUANT: CPT | Performed by: EMERGENCY MEDICINE

## 2024-02-10 PROCEDURE — 250N000009 HC RX 250: Performed by: HOSPITALIST

## 2024-02-10 PROCEDURE — 83880 ASSAY OF NATRIURETIC PEPTIDE: CPT | Performed by: EMERGENCY MEDICINE

## 2024-02-10 PROCEDURE — 94640 AIRWAY INHALATION TREATMENT: CPT | Mod: 76

## 2024-02-10 PROCEDURE — 83735 ASSAY OF MAGNESIUM: CPT | Performed by: EMERGENCY MEDICINE

## 2024-02-10 PROCEDURE — 96368 THER/DIAG CONCURRENT INF: CPT

## 2024-02-10 PROCEDURE — 999N000157 HC STATISTIC RCP TIME EA 10 MIN

## 2024-02-10 PROCEDURE — 250N000013 HC RX MED GY IP 250 OP 250 PS 637: Performed by: HOSPITALIST

## 2024-02-10 PROCEDURE — 99223 1ST HOSP IP/OBS HIGH 75: CPT | Performed by: HOSPITALIST

## 2024-02-10 PROCEDURE — 87040 BLOOD CULTURE FOR BACTERIA: CPT | Performed by: EMERGENCY MEDICINE

## 2024-02-10 RX ORDER — AMOXICILLIN 250 MG
2 CAPSULE ORAL 2 TIMES DAILY PRN
Status: DISCONTINUED | OUTPATIENT
Start: 2024-02-10 | End: 2024-02-11 | Stop reason: HOSPADM

## 2024-02-10 RX ORDER — POTASSIUM CHLORIDE 1500 MG/1
40 TABLET, EXTENDED RELEASE ORAL ONCE
Status: COMPLETED | OUTPATIENT
Start: 2024-02-10 | End: 2024-02-10

## 2024-02-10 RX ORDER — ONDANSETRON 2 MG/ML
4 INJECTION INTRAMUSCULAR; INTRAVENOUS EVERY 6 HOURS PRN
Status: DISCONTINUED | OUTPATIENT
Start: 2024-02-10 | End: 2024-02-11 | Stop reason: HOSPADM

## 2024-02-10 RX ORDER — METHYLPREDNISOLONE SODIUM SUCCINATE 40 MG/ML
40 INJECTION, POWDER, LYOPHILIZED, FOR SOLUTION INTRAMUSCULAR; INTRAVENOUS EVERY 12 HOURS
Status: DISCONTINUED | OUTPATIENT
Start: 2024-02-10 | End: 2024-02-11

## 2024-02-10 RX ORDER — POTASSIUM CHLORIDE 7.45 MG/ML
10 INJECTION INTRAVENOUS ONCE
Status: COMPLETED | OUTPATIENT
Start: 2024-02-10 | End: 2024-02-10

## 2024-02-10 RX ORDER — IPRATROPIUM BROMIDE AND ALBUTEROL SULFATE 2.5; .5 MG/3ML; MG/3ML
3 SOLUTION RESPIRATORY (INHALATION) ONCE
Status: COMPLETED | OUTPATIENT
Start: 2024-02-10 | End: 2024-02-10

## 2024-02-10 RX ORDER — IPRATROPIUM BROMIDE AND ALBUTEROL SULFATE 2.5; .5 MG/3ML; MG/3ML
3 SOLUTION RESPIRATORY (INHALATION)
Status: DISCONTINUED | OUTPATIENT
Start: 2024-02-10 | End: 2024-02-11

## 2024-02-10 RX ORDER — ALBUTEROL SULFATE 0.83 MG/ML
2.5 SOLUTION RESPIRATORY (INHALATION)
Status: DISCONTINUED | OUTPATIENT
Start: 2024-02-10 | End: 2024-02-11 | Stop reason: HOSPADM

## 2024-02-10 RX ORDER — LIDOCAINE 40 MG/G
CREAM TOPICAL
Status: DISCONTINUED | OUTPATIENT
Start: 2024-02-10 | End: 2024-02-11 | Stop reason: HOSPADM

## 2024-02-10 RX ORDER — METHYLPREDNISOLONE SODIUM SUCCINATE 125 MG/2ML
125 INJECTION, POWDER, LYOPHILIZED, FOR SOLUTION INTRAMUSCULAR; INTRAVENOUS ONCE
Status: COMPLETED | OUTPATIENT
Start: 2024-02-10 | End: 2024-02-10

## 2024-02-10 RX ORDER — NALOXONE HYDROCHLORIDE 0.4 MG/ML
0.4 INJECTION, SOLUTION INTRAMUSCULAR; INTRAVENOUS; SUBCUTANEOUS
Status: DISCONTINUED | OUTPATIENT
Start: 2024-02-10 | End: 2024-02-11 | Stop reason: HOSPADM

## 2024-02-10 RX ORDER — NALOXONE HYDROCHLORIDE 0.4 MG/ML
0.2 INJECTION, SOLUTION INTRAMUSCULAR; INTRAVENOUS; SUBCUTANEOUS
Status: DISCONTINUED | OUTPATIENT
Start: 2024-02-10 | End: 2024-02-11 | Stop reason: HOSPADM

## 2024-02-10 RX ORDER — ONDANSETRON 4 MG/1
4 TABLET, ORALLY DISINTEGRATING ORAL EVERY 6 HOURS PRN
Status: DISCONTINUED | OUTPATIENT
Start: 2024-02-10 | End: 2024-02-11 | Stop reason: HOSPADM

## 2024-02-10 RX ORDER — CEFTRIAXONE 2 G/1
2 INJECTION, POWDER, FOR SOLUTION INTRAMUSCULAR; INTRAVENOUS ONCE
Status: COMPLETED | OUTPATIENT
Start: 2024-02-10 | End: 2024-02-10

## 2024-02-10 RX ORDER — HYDRALAZINE HYDROCHLORIDE 20 MG/ML
10 INJECTION INTRAMUSCULAR; INTRAVENOUS EVERY 4 HOURS PRN
Status: DISCONTINUED | OUTPATIENT
Start: 2024-02-10 | End: 2024-02-11 | Stop reason: HOSPADM

## 2024-02-10 RX ORDER — CEFTRIAXONE 2 G/1
2 INJECTION, POWDER, FOR SOLUTION INTRAMUSCULAR; INTRAVENOUS EVERY 24 HOURS
Qty: 120 ML | Refills: 0 | Status: DISCONTINUED | OUTPATIENT
Start: 2024-02-11 | End: 2024-02-11

## 2024-02-10 RX ORDER — FUROSEMIDE 10 MG/ML
20 INJECTION INTRAMUSCULAR; INTRAVENOUS ONCE
Status: COMPLETED | OUTPATIENT
Start: 2024-02-10 | End: 2024-02-10

## 2024-02-10 RX ORDER — ACETAMINOPHEN 325 MG/1
650 TABLET ORAL EVERY 4 HOURS PRN
Status: DISCONTINUED | OUTPATIENT
Start: 2024-02-10 | End: 2024-02-11 | Stop reason: HOSPADM

## 2024-02-10 RX ORDER — GUAIFENESIN/DEXTROMETHORPHAN 100-10MG/5
10 SYRUP ORAL EVERY 4 HOURS PRN
Status: DISCONTINUED | OUTPATIENT
Start: 2024-02-10 | End: 2024-02-11 | Stop reason: HOSPADM

## 2024-02-10 RX ORDER — AMOXICILLIN 250 MG
1 CAPSULE ORAL 2 TIMES DAILY PRN
Status: DISCONTINUED | OUTPATIENT
Start: 2024-02-10 | End: 2024-02-11 | Stop reason: HOSPADM

## 2024-02-10 RX ORDER — HYDROXYZINE HYDROCHLORIDE 25 MG/1
25 TABLET, FILM COATED ORAL 3 TIMES DAILY PRN
Status: DISCONTINUED | OUTPATIENT
Start: 2024-02-10 | End: 2024-02-11 | Stop reason: HOSPADM

## 2024-02-10 RX ORDER — OXYCODONE HYDROCHLORIDE 5 MG/1
10 TABLET ORAL 4 TIMES DAILY
Status: DISCONTINUED | OUTPATIENT
Start: 2024-02-10 | End: 2024-02-11 | Stop reason: HOSPADM

## 2024-02-10 RX ORDER — POTASSIUM CHLORIDE 1.5 G/1.58G
40 POWDER, FOR SOLUTION ORAL ONCE
Status: COMPLETED | OUTPATIENT
Start: 2024-02-10 | End: 2024-02-10

## 2024-02-10 RX ORDER — BENZONATATE 100 MG/1
100 CAPSULE ORAL ONCE
Status: COMPLETED | OUTPATIENT
Start: 2024-02-10 | End: 2024-02-10

## 2024-02-10 RX ORDER — ENOXAPARIN SODIUM 100 MG/ML
30 INJECTION SUBCUTANEOUS EVERY 24 HOURS
Status: DISCONTINUED | OUTPATIENT
Start: 2024-02-10 | End: 2024-02-11 | Stop reason: HOSPADM

## 2024-02-10 RX ORDER — CALCIUM CARBONATE 500 MG/1
1000 TABLET, CHEWABLE ORAL 4 TIMES DAILY PRN
Status: DISCONTINUED | OUTPATIENT
Start: 2024-02-10 | End: 2024-02-11 | Stop reason: HOSPADM

## 2024-02-10 RX ORDER — ACETAMINOPHEN 650 MG/1
650 SUPPOSITORY RECTAL EVERY 4 HOURS PRN
Status: DISCONTINUED | OUTPATIENT
Start: 2024-02-10 | End: 2024-02-11 | Stop reason: HOSPADM

## 2024-02-10 RX ORDER — SALIVA STIMULANT COMB. NO.3
1 SPRAY, NON-AEROSOL (ML) MUCOUS MEMBRANE 4 TIMES DAILY PRN
Status: DISCONTINUED | OUTPATIENT
Start: 2024-02-10 | End: 2024-02-11 | Stop reason: HOSPADM

## 2024-02-10 RX ORDER — IOPAMIDOL 755 MG/ML
90 INJECTION, SOLUTION INTRAVASCULAR ONCE
Status: COMPLETED | OUTPATIENT
Start: 2024-02-10 | End: 2024-02-10

## 2024-02-10 RX ORDER — MAGNESIUM SULFATE HEPTAHYDRATE 40 MG/ML
2 INJECTION, SOLUTION INTRAVENOUS ONCE
Status: COMPLETED | OUTPATIENT
Start: 2024-02-10 | End: 2024-02-10

## 2024-02-10 RX ORDER — IPRATROPIUM BROMIDE AND ALBUTEROL SULFATE 2.5; .5 MG/3ML; MG/3ML
SOLUTION RESPIRATORY (INHALATION)
Status: COMPLETED
Start: 2024-02-10 | End: 2024-02-10

## 2024-02-10 RX ORDER — HYDRALAZINE HYDROCHLORIDE 10 MG/1
10 TABLET, FILM COATED ORAL EVERY 4 HOURS PRN
Status: DISCONTINUED | OUTPATIENT
Start: 2024-02-10 | End: 2024-02-11 | Stop reason: HOSPADM

## 2024-02-10 RX ADMIN — SODIUM CHLORIDE 500 ML: 9 INJECTION, SOLUTION INTRAVENOUS at 03:01

## 2024-02-10 RX ADMIN — BENZONATATE 100 MG: 100 CAPSULE ORAL at 03:14

## 2024-02-10 RX ADMIN — AZITHROMYCIN MONOHYDRATE 500 MG: 500 INJECTION, POWDER, LYOPHILIZED, FOR SOLUTION INTRAVENOUS at 05:51

## 2024-02-10 RX ADMIN — IPRATROPIUM BROMIDE AND ALBUTEROL SULFATE 3 ML: .5; 3 SOLUTION RESPIRATORY (INHALATION) at 03:02

## 2024-02-10 RX ADMIN — ENOXAPARIN SODIUM 30 MG: 30 INJECTION SUBCUTANEOUS at 07:46

## 2024-02-10 RX ADMIN — OXYCODONE HYDROCHLORIDE 10 MG: 5 TABLET ORAL at 17:26

## 2024-02-10 RX ADMIN — OXYCODONE HYDROCHLORIDE 10 MG: 5 TABLET ORAL at 12:50

## 2024-02-10 RX ADMIN — IPRATROPIUM BROMIDE AND ALBUTEROL SULFATE 3 ML: .5; 3 SOLUTION RESPIRATORY (INHALATION) at 08:38

## 2024-02-10 RX ADMIN — METHYLPREDNISOLONE SODIUM SUCCINATE 40 MG: 40 INJECTION, POWDER, FOR SOLUTION INTRAMUSCULAR; INTRAVENOUS at 17:26

## 2024-02-10 RX ADMIN — OXYCODONE HYDROCHLORIDE 10 MG: 5 TABLET ORAL at 21:51

## 2024-02-10 RX ADMIN — POTASSIUM CHLORIDE 40 MEQ: 1500 TABLET, EXTENDED RELEASE ORAL at 03:47

## 2024-02-10 RX ADMIN — IPRATROPIUM BROMIDE AND ALBUTEROL SULFATE 3 ML: .5; 3 SOLUTION RESPIRATORY (INHALATION) at 14:32

## 2024-02-10 RX ADMIN — POTASSIUM CHLORIDE 40 MEQ: 1.5 POWDER, FOR SOLUTION ORAL at 17:26

## 2024-02-10 RX ADMIN — IPRATROPIUM BROMIDE AND ALBUTEROL SULFATE 3 ML: .5; 3 SOLUTION RESPIRATORY (INHALATION) at 02:54

## 2024-02-10 RX ADMIN — IPRATROPIUM BROMIDE AND ALBUTEROL SULFATE 3 ML: .5; 3 SOLUTION RESPIRATORY (INHALATION) at 20:34

## 2024-02-10 RX ADMIN — MAGNESIUM SULFATE HEPTAHYDRATE 2 G: 40 INJECTION, SOLUTION INTRAVENOUS at 03:41

## 2024-02-10 RX ADMIN — IOPAMIDOL 90 ML: 755 INJECTION, SOLUTION INTRAVENOUS at 04:08

## 2024-02-10 RX ADMIN — METHYLPREDNISOLONE SODIUM SUCCINATE 125 MG: 125 INJECTION, POWDER, FOR SOLUTION INTRAMUSCULAR; INTRAVENOUS at 03:03

## 2024-02-10 RX ADMIN — POTASSIUM CHLORIDE 40 MEQ: 1500 TABLET, EXTENDED RELEASE ORAL at 23:01

## 2024-02-10 RX ADMIN — FUROSEMIDE 20 MG: 10 INJECTION, SOLUTION INTRAMUSCULAR; INTRAVENOUS at 04:54

## 2024-02-10 RX ADMIN — POTASSIUM CHLORIDE 10 MEQ: 10 INJECTION, SOLUTION INTRAVENOUS at 04:18

## 2024-02-10 RX ADMIN — CEFTRIAXONE SODIUM 2 G: 2 INJECTION, POWDER, FOR SOLUTION INTRAMUSCULAR; INTRAVENOUS at 05:12

## 2024-02-10 RX ADMIN — IPRATROPIUM BROMIDE AND ALBUTEROL SULFATE 3 ML: .5; 3 SOLUTION RESPIRATORY (INHALATION) at 05:19

## 2024-02-10 RX ADMIN — OXYCODONE HYDROCHLORIDE 10 MG: 5 TABLET ORAL at 07:46

## 2024-02-10 RX ADMIN — IPRATROPIUM BROMIDE AND ALBUTEROL SULFATE 3 ML: 2.5; .5 SOLUTION RESPIRATORY (INHALATION) at 02:54

## 2024-02-10 ASSESSMENT — ACTIVITIES OF DAILY LIVING (ADL)
ADLS_ACUITY_SCORE: 26
ADLS_ACUITY_SCORE: 39
ADLS_ACUITY_SCORE: 35
DEPENDENT_IADLS:: CLEANING;COOKING;LAUNDRY;SHOPPING;MEAL PREPARATION;TRANSPORTATION
ADLS_ACUITY_SCORE: 35
ADLS_ACUITY_SCORE: 39
ADLS_ACUITY_SCORE: 35

## 2024-02-10 ASSESSMENT — ENCOUNTER SYMPTOMS
COUGH: 1
WHEEZING: 1
CHEST TIGHTNESS: 1
SHORTNESS OF BREATH: 1

## 2024-02-10 NOTE — MEDICATION SCRIBE - ADMISSION MEDICATION HISTORY
Medication Scribe Admission Medication History    Admission medication history is complete. The information provided in this note is only as accurate as the sources available at the time of the update.    Information Source(s): Patient, Hospital records, and CareEverywhere/SureScripts via in-person    Pertinent Information: patient reported as admin for Med Hx interview.  Says she hasn't taken vitamins for 7 days by MD instruction when prescribed recent scripts.      Changes made to PTA medication list:  Added: None  Deleted: Paxlovid, done per pt.  Changed: Fosamax is taken on Tuesdays.      Allergies reviewed with patient and updates made in EHR: yes    Medication History Completed By: Lydia Castillo 2/10/2024 6:03 AM  Prior to Admission medications    Medication Sig Last Dose Taking? Auth Provider Long Term End Date   albuterol (PROVENTIL) (2.5 MG/3ML) 0.083% neb solution Take 2.5 mg by nebulization every 6 hours as needed for shortness of breath, wheezing or cough 2/9/2024 at x2 Yes Reported, Patient Yes    alendronate (FOSAMAX) 70 MG tablet Take 1 tablet by mouth once a week  Patient taking differently: Take 70 mg by mouth once a week Tuesday Past Week at Tuesday Yes Jorge Dunn MD Yes    ascorbic acid (VITAMIN C) 500 MG tablet Take 500 mg by mouth daily. Past Week at am Yes Reported, Patient     augmented betamethasone dipropionate (DIPROLENE AF) 0.05 % external cream Apply topically 2 times daily Stronger cream for rash. 2/9/2024 at prn Yes Jorge Dunn MD     dexAMETHasone (DECADRON) 6 MG tablet Take 1 tablet (6 mg) by mouth daily for 8 days 2/9/2024 at am Yes Luke Nguyen MD Yes 2/13/24   hydrOXYzine (ATARAX) 25 MG tablet Take 1 tablet (25 mg) by mouth 3 times daily as needed for itching 2/9/2024 at am Yes Jorge Dunn MD     Lidocaine (LIDOCARE) 4 % Patch Place 1 patch onto the skin every 24 hours To prevent lidocaine toxicity, patient should be patch free for 12 hrs daily. 2/9/2024 at x1 Yes  Luke Nguyen MD     Multiple Vitamins-Minerals (MULTIVITAL PO) Take  by mouth. Past Week at am Yes Reported, Patient     oxyCODONE IR (ROXICODONE) 10 MG tablet Take 1 tablet (10 mg) by mouth 4 times daily for 30 days For use from 1/15/24 to 2/14/2024 2/9/2024 at x2 Yes Jorge Dunn MD  2/14/24   triamcinolone (KENALOG) 0.1 % external cream Apply topically 3 times daily 2/9/2024 at prn Yes Reported, Patient     nebulizer nebulization 1 nebulizer please as covered by insurance.  May also dispense supplies (tubing, inhalation device, etc) as needed.   Jorge Dunn MD

## 2024-02-10 NOTE — ED NOTES
"[facility] ED Handoff Report    ED Chief Complaint: SOB    ED Diagnosis:  (R06.02) SOB (shortness of breath)    (U07.1) COVID-19    (J44.1) COPD with acute exacerbation (H)    (E87.6) Hypokalemia       PMH:    Past Medical History:   Diagnosis Date    Acute myeloid leukemia (AML), M2 (H)     AML (acute myeloid leukemia) (H) 12/2009    Baker's cyst     Chronic back pain     Chronic diarrhea     Compression fx, lumbar spine (H)     Controlled substance agreement signed 8/3/2017    COPD (chronic obstructive pulmonary disease) (H)     COPD (chronic obstructive pulmonary disease) (H)     Full code status 9/10/2017    Gastric ulcer     pt states she has not had any ulcers    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)     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    History of PID     Metatarsal fracture 2017 2nd     Migraine without aura, without mention of intractable migraine without mention of status migrainosus     Osteoporosis 2020    DXA    Osteoporosis 5/3/2016    Papular rash, generalized     Post-menopausal 2/8/2020    Early surgical menopause    Pseudogout 11/30/2016    Serrated adenoma of colon 7/17/2020    Status post allogeneic bone marrow transplant (H)     Surgical menopause 1976    oophorectomy    Tobacco abuse     Traumatic compression fracture of T8 thoracic vertebra, closed, initial encounter (H) 4/26/2016    Tubular adenoma of colon 7/17/2020    Vertebral compression fracture (H) 2014    Zoster 2018        Code Status:  Full Code     Falls Risk: No Band: Not applicable    Current Living Situation/Residence: lives alone     Elimination Status: Continent: Yes, using bed pan per patient request    Activity Level: SBA w/ walker    Patients Preferred Language:  English     Needed: No    Vital Signs:  /62   Pulse 66   Temp 98.2  F (36.8  C) (Oral)   Resp 22   Ht 1.778 m (5' 10\")   Wt 49 kg (108 lb)   LMP  (LMP Unknown)   SpO2 99%   BMI 15.50 kg/m       Pain Score: " 6/10 chronic pain, getting scheduled pain meds    Is the Patient Confused:  No    Last Food or Drink: 02/10/24 at lunch    Focused Assessment:  eJnn Barclay is a 68 year old female who came to the ED for evaluation of worsening shortness of breath.  Past medical history of COPD, tobacco abuse, AML, marrow transplant in 2010, osteoporosis, T8 compression fracture, chronic back pain, chronic opioid use, anxiety, migraine.  Patient was evaluated in the ED on 2/3/2024 secondary to shortness of breath and diagnosed with COVID infection.  She was treated with Paxlovid and dexamethasone which she completed at home.  However, prior to ED arrival she had increased work of breathing with a dry cough.  She denied fevers but has had some chills.  She denied chest pain or palpitations.  Her symptoms did not improve at home after 4 nebulizer treatments.        Tests Performed: Done: Labs and Imaging    Treatments Provided:  meds, oxygen at 2L    Family Dynamics/Concerns: No    Belongings Sent with Patient: yes    Medications sent with patient: no    Covid: symptomatic, positive on 2/3    Additional Information: none    RN: Rupal Wakefield RN 2/10/2024 4:11 PM

## 2024-02-10 NOTE — INTERIM SUMMARY
Brief hospitalist Note  68-year-old female patient admitted for management of COPD exacerbation and COVID-19 infection.  Patient states she is feeling better.    Plan    Continue management as per admitting hospitalist    Jocelin Ace MD

## 2024-02-10 NOTE — ED PROVIDER NOTES
NAME: Jenn Barclay  AGE: 68 year old female  YOB: 1955  MRN: [id number]  EVALUATION DATE & TIME: 2/10/2024  2:44 AM    PCP: Jorge Dunn    ED PROVIDER: Wally Nagel M.D.    Chief Complaint   Patient presents with    Shortness of Breath     FINAL IMPRESSION:  1. SOB (shortness of breath)    2. COVID-19    3. COPD with acute exacerbation (H)    4. Hypokalemia      MEDICAL DECISION MAKIN:49 AM I met with the patient, obtained history, performed an initial exam, and discussed options and plan for diagnostics and treatment here in the ED.   3:21 AM recheck on patient and breathing much better.  Auscultation did reveal improved air movement to mid lungs and patient much more comfortable with subjective improvement as well.  4:41 AM Spoke to Sarai Hospitalist, regarding plan for admission.    Patient was clinically assessed and consented to treatment. After assessment, medical decision making and workup were discussed with the patient. The patient was agreeable to plan for testing, workup, and treatment.  Pertinent Labs & Imaging studies reviewed. (See chart for details)     Medical Decision Making  Obtained supplemental history:Supplemental history obtained?: Documented in chart  Reviewed external records: External records reviewed?: Documented in chart  Care impacted by chronic illness:Chronic Lung Disease  Care significantly affected by social determinants of health:N/A  Did you consider but not order tests?: In addition to work-up documented, I considered the following work up:   Did you interpret images independently?: Independent interpretation of ECG and images noted in documentation, when applicable.  Consultation discussion with other provider:Did you involve another provider (consultant, , pharmacy, etc.)?: I discussed the care with another health care provider, see documentation for details.  Admit.    Jenn Barclay is a 68 year old female who presents with shortness of  breath.   Differential diagnosis includes but not limited to COVID-19, COPD exacerbation, hypoxemia, hypercapnia, pneumonia, acute coronary syndrome, pulmonary embolism.  Patient with history of COVID-19 diagnosed 1 week ago roughly.  Symptoms now with worsening shortness of breath and slightly hypoxemic though with her COPD 90% could be normal.  Patient 88% on room air and placed on oxygen.  With  the oxygen and this did help with breathing but after 2 DuoNebs patient starting to move oxygen better and breathe much more comfortably.  We discussed treatment and patient started with Solu-Medrol and fluids.  Additionally labs showed equivocal troponin which will be repeated.  EKG did not show acute ischemia.  BNP also elevated which is new for patient though she does not have any history of CHF.  Chest x-ray showed findings of interstitial opacities which could be from the COVID or possibly superimposed bacterial pneumonia.  Lactic acid was also elevated patient will be started on antibiotics after blood cultures obtained.  Patient already received Paxlovid and Decadron as an outpatient for the COVID.  Possibly suspect more so superimposed bacterial pneumonia and will continue with antibiotic treatment and also CT scan given the elevated BNP.  CTA was done which showed no pulmonary embolism but did show the bilateral infiltrates and will continue with plan for admission and treatment.      Critical Care     Performed by: Dr Vishal Nagel  Authorized by: Dr Vishal Nagel  Total critical care time: 60 minutes  Critical care was necessary to treat or prevent imminent or life-threatening deterioration of the following conditions: Respiratory stress, r respiratory failure, COPD symptoms, CHF expansion, pulmonary embolism, hypercapnia, hypoxemia, pneumonia, COVID-19.  Critical care was time spent personally by me on the following activities: development of treatment plan with patient or surrogate, discussions with consultants,  examination of patient, evaluation of patient's response to treatment, obtaining history from patient or surrogate, ordering and performing treatments and interventions, ordering and review of laboratory studies, ordering and review of radiographic studies, re-evaluation of patient's condition and monitoring for potential decompensation.  Critical care time was exclusive of separately billable procedures and treating other patients.     MEDICATIONS GIVEN IN THE EMERGENCY:  Medications   lidocaine 1 % 0.1-1 mL (has no administration in time range)   lidocaine (LMX4) cream (has no administration in time range)   sodium chloride (PF) 0.9% PF flush 3 mL (0 mLs Intracatheter Hold 2/10/24 0529)   sodium chloride (PF) 0.9% PF flush 3 mL (has no administration in time range)   senna-docusate (SENOKOT-S/PERICOLACE) 8.6-50 MG per tablet 1 tablet (has no administration in time range)     Or   senna-docusate (SENOKOT-S/PERICOLACE) 8.6-50 MG per tablet 2 tablet (has no administration in time range)   calcium carbonate (TUMS) chewable tablet 1,000 mg (has no administration in time range)   ipratropium - albuterol 0.5 mg/2.5 mg/3 mL (DUONEB) neb solution 3 mL (has no administration in time range)   enoxaparin ANTICOAGULANT (LOVENOX) injection 30 mg (has no administration in time range)   hydrALAZINE (APRESOLINE) tablet 10 mg (has no administration in time range)     Or   hydrALAZINE (APRESOLINE) injection 10 mg (has no administration in time range)   acetaminophen (TYLENOL) tablet 650 mg (has no administration in time range)     Or   acetaminophen (TYLENOL) Suppository 650 mg (has no administration in time range)   melatonin tablet 1 mg (has no administration in time range)   ondansetron (ZOFRAN ODT) ODT tab 4 mg (has no administration in time range)     Or   ondansetron (ZOFRAN) injection 4 mg (has no administration in time range)   benzocaine-menthol (CHLORASEPTIC) 6-10 MG lozenge 1 lozenge (has no administration in time range)    guaiFENesin-dextromethorphan (ROBITUSSIN DM) 100-10 MG/5ML syrup 10 mL (has no administration in time range)   artificial saliva (BIOTENE MT) solution 1 spray (has no administration in time range)   methylPREDNISolone sodium succinate (SOLU-MEDROL) injection 40 mg (has no administration in time range)   albuterol (PROVENTIL) neb solution 2.5 mg (has no administration in time range)   cefTRIAXone (ROCEPHIN) 2 g vial to attach to  ml bag for ADULTS or NS 50 ml bag for PEDS (has no administration in time range)   azithromycin (ZITHROMAX) 250 mg in sodium chloride 0.9 % 250 mL intermittent infusion (has no administration in time range)   hydrOXYzine HCl (ATARAX) tablet 25 mg (has no administration in time range)   oxyCODONE (ROXICODONE) tablet 10 mg (has no administration in time range)   ipratropium - albuterol 0.5 mg/2.5 mg/3 mL (DUONEB) neb solution 3 mL (3 mLs Nebulization $Given 2/10/24 0302)   sodium chloride 0.9% BOLUS 500 mL (0 mLs Intravenous Stopped 2/10/24 0340)   methylPREDNISolone sodium succinate (solu-MEDROL) injection 125 mg (125 mg Intravenous $Given 2/10/24 0303)   ipratropium - albuterol 0.5 mg/2.5 mg/3 mL (DUONEB) neb solution 3 mL (3 mLs Nebulization $Given 2/10/24 0254)   benzonatate (TESSALON) capsule 100 mg (100 mg Oral $Given 2/10/24 0314)   magnesium sulfate 2 g in 50 mL sterile water intermittent infusion (0 g Intravenous Stopped 2/10/24 0440)   potassium chloride ER (KLOR-CON M) CR tablet 40 mEq (40 mEq Oral $Given 2/10/24 0347)   potassium chloride 10 mEq in 100 mL sterile water infusion (0 mEq Intravenous Stopped 2/10/24 0515)   iopamidol (ISOVUE-370) solution 90 mL (90 mLs Intravenous $Given 2/10/24 0408)   cefTRIAXone (ROCEPHIN) 2 g vial to attach to  ml bag for ADULTS or NS 50 ml bag for PEDS (0 g Intravenous Stopped 2/10/24 0550)   azithromycin (ZITHROMAX) 500 mg in sodium chloride 0.9 % 250 mL intermittent infusion (500 mg Intravenous $New Bag 2/10/24 2444)   furosemide  (LASIX) injection 20 mg (20 mg Intravenous $Given 2/10/24 4942)   ipratropium - albuterol 0.5 mg/2.5 mg/3 mL (DUONEB) neb solution 3 mL (3 mLs Nebulization $Given 2/10/24 1138)       NEW PRESCRIPTIONS STARTED AT TODAY'S ER VISIT:  New Prescriptions    No medications on file       =================================================================    HPI    Patient information was obtained from: Patient    Use of : N/A , Yes ([facility]  Phone In Person) - Language English        Jenn Barclay is a 68 year old female with a past medical history of remote AML, chronic back pain, COPD, GERD, compression fracture, who presents with shortness of breath.  Patient seen on February 3 and diagnosed with COVID-19.  Patient had been started on Paxlovid and was having continued episodes of COPD exacerbation but has been managing with nebulizers at home.  Patient however worsening shortness of breath tonight and unable to lay flat at home presented to the ER.  She reports no fevers or chills presently, no sore throat but worsening shortness of breath with cough.  She does report some wheezing at home but now just very tight and despite nebulizers at home denies felt no improvement so came back to the ER.  No nausea or vomiting, no pain in the chest but tightness, no lightheadedness.    REVIEW OF SYSTEMS   Review of Systems   Respiratory:  Positive for cough, chest tightness, shortness of breath and wheezing.         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    History of PID     Metatarsal fracture 2017    2nd     Migraine without aura, without mention of intractable migraine without mention of status migrainosus     Osteoporosis 2020    DXA    Osteoporosis 5/3/2016    Papular rash, generalized     Post-menopausal 2/8/2020    Early surgical menopause    Pseudogout 11/30/2016    Serrated adenoma of colon 7/17/2020    Status post allogeneic bone marrow transplant (H)     Surgical menopause 1976    oophorectomy    Tobacco abuse     Traumatic compression fracture of T8 thoracic vertebra, closed, initial encounter (H) 4/26/2016    Tubular adenoma of colon 7/17/2020    Vertebral compression fracture (H) 2014    Zoster 2018       PAST SURGICAL HISTORY:  Past Surgical History:   Procedure Laterality Date    APPENDECTOMY      COLONOSCOPY N/A 07/28/2020    Procedure: COLONOSCOPY, WITH POLYPECTOMY AND BIOPSY;  Surgeon: Gianni Valerio MD;  Location: [facility] OR    HYSTERECTOMY TOTAL ABDOMINAL, BILATERAL SALPINGO-OOPHORECTOMY, COMBINED  1976    IR MISCELLANEOUS PROCEDURE  02/22/2010    TRANSPLANT  01/01/2010    VERTEBROPLASTY  01/01/2016    T5 and T8 vertebrae.       CURRENT MEDICATIONS:      Current Facility-Administered Medications:     acetaminophen (TYLENOL) tablet 650 mg, 650 mg, Oral, Q4H PRN **OR** acetaminophen (TYLENOL) Suppository 650 mg, 650 mg, Rectal, Q4H PRN, José Moon MD    albuterol (PROVENTIL) neb solution 2.5 mg, 2.5 mg, Nebulization, Q2H PRN, José Moon MD    artificial saliva (BIOTENE MT) solution 1 spray, 1 spray, Mouth/Throat, 4x Daily PRN, José Moon MD    [START ON 2/11/2024] azithromycin (ZITHROMAX) 250 mg in sodium chloride 0.9 % 250 mL intermittent infusion, 250 mg, Intravenous, Q24H, José Moon MD    benzocaine-menthol (CHLORASEPTIC) 6-10 MG lozenge 1 lozenge, 1 lozenge, Buccal, Q1H PRN, José Moon MD    calcium carbonate (TUMS) chewable tablet  1,000 mg, 1,000 mg, Oral, 4x Daily PRN, José Moon MD    [START ON 2/11/2024] cefTRIAXone (ROCEPHIN) 2 g vial to attach to  ml bag for ADULTS or NS 50 ml bag for PEDS, 2 g, Intravenous, Q24H, José Moon MD    enoxaparin ANTICOAGULANT (LOVENOX) injection 30 mg, 30 mg, Subcutaneous, Q24H, José Moon MD    guaiFENesin-dextromethorphan (ROBITUSSIN DM) 100-10 MG/5ML syrup 10 mL, 10 mL, Oral, Q4H PRN, José Moon MD    hydrALAZINE (APRESOLINE) tablet 10 mg, 10 mg, Oral, Q4H PRN **OR** hydrALAZINE (APRESOLINE) injection 10 mg, 10 mg, Intravenous, Q4H PRN, José Moon MD    hydrOXYzine HCl (ATARAX) tablet 25 mg, 25 mg, Oral, TID PRN, José Moon MD    ipratropium - albuterol 0.5 mg/2.5 mg/3 mL (DUONEB) neb solution 3 mL, 3 mL, Nebulization, Q6H, José Moon MD    lidocaine (LMX4) cream, , Topical, Q1H PRN, José Moon MD    lidocaine 1 % 0.1-1 mL, 0.1-1 mL, Other, Q1H PRN, José Moon MD    melatonin tablet 1 mg, 1 mg, Oral, At Bedtime PRN, José Moon MD    methylPREDNISolone sodium succinate (SOLU-MEDROL) injection 40 mg, 40 mg, Intravenous, Q12H, José Moon MD    ondansetron (ZOFRAN ODT) ODT tab 4 mg, 4 mg, Oral, Q6H PRN **OR** ondansetron (ZOFRAN) injection 4 mg, 4 mg, Intravenous, Q6H PRN, José Moon MD    oxyCODONE (ROXICODONE) tablet 10 mg, 10 mg, Oral, 4x Daily, José Moon MD    senna-docusate (SENOKOT-S/PERICOLACE) 8.6-50 MG per tablet 1 tablet, 1 tablet, Oral, BID PRN **OR** senna-docusate (SENOKOT-S/PERICOLACE) 8.6-50 MG per tablet 2 tablet, 2 tablet, Oral, BID PRN, José Moon MD    sodium chloride (PF) 0.9% PF flush 3 mL, 3 mL, Intracatheter, Q8H, José Moon MD    sodium chloride (PF) 0.9% PF flush 3 mL, 3 mL, Intracatheter, q1 min prn, José Moon MD    Current Outpatient Medications:     albuterol (PROVENTIL) (2.5 MG/3ML) 0.083% neb solution, Take 2.5 mg by nebulization  every 6 hours as needed for shortness of breath, wheezing or cough, Disp: , Rfl:     alendronate (FOSAMAX) 70 MG tablet, Take 1 tablet by mouth once a week (Patient taking differently: Take 70 mg by mouth once a week Tuesday), Disp: 13 tablet, Rfl: 3    ascorbic acid (VITAMIN C) 500 MG tablet, Take 500 mg by mouth daily., Disp: , Rfl:     augmented betamethasone dipropionate (DIPROLENE AF) 0.05 % external cream, Apply topically 2 times daily Stronger cream for rash., Disp: 50 g, Rfl: 3    dexAMETHasone (DECADRON) 6 MG tablet, Take 1 tablet (6 mg) by mouth daily for 8 days, Disp: 8 tablet, Rfl: 0    hydrOXYzine (ATARAX) 25 MG tablet, Take 1 tablet (25 mg) by mouth 3 times daily as needed for itching, Disp: 100 tablet, Rfl: 1    Lidocaine (LIDOCARE) 4 % Patch, Place 1 patch onto the skin every 24 hours To prevent lidocaine toxicity, patient should be patch free for 12 hrs daily., Disp: 20 patch, Rfl: 0    oxyCODONE IR (ROXICODONE) 10 MG tablet, Take 1 tablet (10 mg) by mouth 4 times daily for 30 days For use from 1/15/24 to 2/14/2024, Disp: 120 tablet, Rfl: 0    triamcinolone (KENALOG) 0.1 % external cream, Apply topically 3 times daily, Disp: , Rfl:     nebulizer nebulization, 1 nebulizer please as covered by insurance.  May also dispense supplies (tubing, inhalation device, etc) as needed., Disp: 1 each, Rfl: 0    ALLERGIES:  Allergies   Allergen Reactions    Mirtazapine      Other reaction(s): Other (See Comments)  Hallucination     Tape [Adhesive Tape]      Allergy Hx  In addition to NO paper tape    Liquid Adhesive Rash     Other reaction(s): Other (See Comments)  Allergy Hx - Rash from paper tape       FAMILY HISTORY:  Family History   Problem Relation Age of Onset    Diabetes Mother         borderline    Pancreatic Cancer Father     Diabetes Maternal Grandmother     Diabetes Maternal Uncle     Thyroid Disease No family hx of     Melanoma No family hx of     Skin Cancer No family hx of     Lung Cancer Mother         SOCIAL HISTORY:   Social History     Socioeconomic History    Marital status:    Tobacco Use    Smoking status: Every Day     Packs/day: 0.50     Years: 49.00     Additional pack years: 0.00     Total pack years: 24.50     Types: Cigarettes    Smokeless tobacco: Former    Tobacco comments:     has used E-cigarettes in the past, info given   Vaping Use    Vaping Use: Never used   Substance and Sexual Activity    Alcohol use: No    Drug use: No    Sexual activity: Never   Social History Narrative    , on disability.        Has family in the area.        Patient of Dr. Dunn since 2015.      Social Determinants of Health     Financial Resource Strain: Low Risk  (10/31/2023)    Financial Resource Strain     Within the past 12 months, have you or your family members you live with been unable to get utilities (heat, electricity) when it was really needed?: No   Food Insecurity: High Risk (10/31/2023)    Food Insecurity     Within the past 12 months, did you worry that your food would run out before you got money to buy more?: Yes     Within the past 12 months, did the food you bought just not last and you didn t have money to get more?: No   Transportation Needs: Low Risk  (10/31/2023)    Transportation Needs     Within the past 12 months, has lack of transportation kept you from medical appointments, getting your medicines, non-medical meetings or appointments, work, or from getting things that you need?: No   Interpersonal Safety: Low Risk  (1/8/2024)    Interpersonal Safety     Do you feel physically and emotionally safe where you currently live?: Yes     Within the past 12 months, have you been hit, slapped, kicked or otherwise physically hurt by someone?: No     Within the past 12 months, have you been humiliated or emotionally abused in other ways by your partner or ex-partner?: No   Housing Stability: Low Risk  (10/31/2023)    Housing Stability     Do you have housing? : Yes     Are you worried  "about losing your housing?: No       PHYSICAL EXAM:    Vitals: /72   Pulse 58   Temp 98.2  F (36.8  C) (Oral)   Resp 18   Ht 1.778 m (5' 10\")   Wt 49 kg (108 lb)   LMP  (LMP Unknown)   SpO2 97%   BMI 15.50 kg/m     Physical Exam  Constitutional:       General: She is in acute distress.      Appearance: She is well-developed. She is ill-appearing. She is not toxic-appearing.   HENT:      Head: Normocephalic.      Mouth/Throat:      Comments: Dry mucous membranes  Neck:      Thyroid: No thyromegaly.      Vascular: No JVD.   Cardiovascular:      Rate and Rhythm: Regular rhythm. Tachycardia present.      Pulses: Normal pulses.      Heart sounds: Normal heart sounds.   Pulmonary:      Effort: Tachypnea, accessory muscle usage and respiratory distress present.      Breath sounds: Examination of the right-upper field reveals decreased breath sounds. Examination of the left-upper field reveals decreased breath sounds. Examination of the right-middle field reveals decreased breath sounds. Examination of the left-middle field reveals decreased breath sounds. Examination of the right-lower field reveals decreased breath sounds and rales. Examination of the left-lower field reveals decreased breath sounds and rales. Decreased breath sounds and rales present. No wheezing or rhonchi.   Chest:      Chest wall: No tenderness.   Abdominal:      Palpations: Abdomen is soft.      Tenderness: There is no abdominal tenderness.   Musculoskeletal:      Cervical back: Neck supple.      Right lower leg: No tenderness. No edema.      Left lower leg: No tenderness. No edema.   Skin:     General: Skin is warm and dry.      Capillary Refill: Capillary refill takes less than 2 seconds.      Coloration: Skin is not cyanotic.   Neurological:      General: No focal deficit present.      Mental Status: She is alert.   Psychiatric:         Behavior: Behavior normal.        LAB:  All pertinent labs reviewed and interpreted.  Labs Ordered " and Resulted from Time of ED Arrival to Time of ED Departure   COMPREHENSIVE METABOLIC PANEL - Abnormal       Result Value    Sodium 143      Potassium 2.7 (*)     Carbon Dioxide (CO2) 37 (*)     Anion Gap 9      Urea Nitrogen 18.9      Creatinine 0.67      GFR Estimate >90      Calcium 8.4 (*)     Chloride 97 (*)     Glucose 124 (*)     Alkaline Phosphatase 50      AST 70 (*)      (*)     Protein Total 6.1 (*)     Albumin 3.8      Bilirubin Total 0.2     BLOOD GAS VENOUS - Abnormal    pH Venous 7.42      pCO2 Venous 57 (*)     pO2 Venous 30      Bicarbonate Venous 37 (*)     Base Excess/Deficit Venous 13.0 (*)     FIO2 28      Oxyhemoglobin Venous 56 (*)     O2 Sat, Venous 59.0 (*)    TROPONIN T, HIGH SENSITIVITY - Abnormal    Troponin T, High Sensitivity 20 (*)    LACTIC ACID WHOLE BLOOD - Abnormal    Lactic Acid 2.5 (*)    NT PROBNP INPATIENT - Abnormal    N terminal Pro BNP Inpatient 5,114 (*)    LACTIC ACID WHOLE BLOOD - Abnormal    Lactic Acid 2.1 (*)    TROPONIN T, HIGH SENSITIVITY - Abnormal    Troponin T, High Sensitivity 22 (*)    GLUCOSE BY METER - Abnormal    GLUCOSE BY METER POCT 158 (*)    MAGNESIUM - Normal    Magnesium 2.1     PROCALCITONIN - Normal    Procalcitonin 0.03     PHOSPHORUS - Normal    Phosphorus 2.8     CBC WITH PLATELETS AND DIFFERENTIAL    WBC Count 10.8      RBC Count 4.16      Hemoglobin 12.2      Hematocrit 37.3      MCV 90      MCH 29.3      MCHC 32.7      RDW 13.3      Platelet Count 382      % Neutrophils 73      % Lymphocytes 16      % Monocytes 10      % Eosinophils 0      % Basophils 0      % Immature Granulocytes 1      NRBCs per 100 WBC 0      Absolute Neutrophils 7.8      Absolute Lymphocytes 1.7      Absolute Monocytes 1.1      Absolute Eosinophils 0.0      Absolute Basophils 0.0      Absolute Immature Granulocytes 0.1      Absolute NRBCs 0.0     GLUCOSE MONITOR NURSING POCT   BLOOD CULTURE   BLOOD CULTURE   RESPIRATORY AEROBIC BACTERIAL CULTURE       RADIOLOGY:  CT  Chest Pulmonary Embolism w Contrast   Final Result   IMPRESSION:   1.  There is no pulmonary embolus. No aortic aneurysm.   2.  Mild bilateral upper lobe infiltrates may be pneumonia.   3.  Emphysema.   4.  Right renal stone. No hydronephrosis.            XR Chest Port 1 View   Final Result   IMPRESSION: Coarse interstitial opacities in the lungs bilaterally right greater than left. Some of this may be due to underlying interstitial lung disease but given the asymmetric nature, some degree of interstitial infiltrate on the right not excluded.    Clinical correlation. CT could better evaluate if indicated. Mild cardiac enlargement. Pulmonary vascularity within normal limits. No significant bony abnormalities.      Echocardiogram Complete    (Results Pending)     EKG:   Performed at: 3:12 AM on February 10th, 2024.  Impression: Sinus rhythm with flattened T waves, no acute ST elevation ischemia, no irregular rhythm such as atrial fibrillation, similar morphology to prior.  Rate: 63 bpm  Rhythm: Sinus rhythm  QRS Interval: 100 ms  QTc Interval: 421 ms  Comparison: Comparison prior EKG from December 7, 2022 with resolution of right bundle branch block.  I have independently reviewed and interpreted the EKG(s) documented above.     PROCEDURES:   Procedures     Wally Nagel M.D.  Emergency Medicine  [facility] Emergency Department         Wally Nagel MD  02/10/24 0721

## 2024-02-10 NOTE — H&P
[facility]    History and Physical - Hospitalist Service       Date of Admission:  2/10/2024    Assessment & Plan      Jenn Barclay is a 68 year old female admitted on 2/10/2024. She came to the ED for evaluation of worsening shortness of breath    #Confirmed COVID-19 infection  -Diagnosed on 2/3/2024, treated with Paxlovid x 5 days and dexamethasone  -Special precautions until 2/13/2024    #COPD exacerbation  -IV Solu-Medrol  -Scheduled and as needed DuoNebs  -Flutter valve    #Rule out community-acquired pneumonia versus superimposed bacterial infection  -CT showed mild bilateral upper lobe infiltrates may be pneumonia  -Check procalcitonin and sputum culture  -Continue IV ceftriaxone and azithromycin    #Hypokalemia  -Replace    #Metabolic alkalosis  -Contraction versus compensatory mechanism for chronic hypercapnia    #Abnormal LFTs  -No concern for biliary obstruction with normal alk phos and bilirubin  -Probable secondary to medications versus hepatic congestion  -Monitor labs    #Abnormal N-terminal proBNP  -Level 5114  -No peripheral edema or signs of volume overload  -Echocardiogram to evaluate structure and function    #Abnormal high-sensitivity troponin T  -Denies angina symptoms  -ECG reviewed: Sinus rhythm at 63 bpm, nonspecific T waves, no significant changes from 12/7/2022    #Underweight  -BMI 15.5  -Check phosphorus, monitor electrolytes and replace as needed    #Tobacco abuse  -Cessation education  -Declined nicotine patch    #Steroids hyperglycemia  -Monitor    #Elevated blood pressure  -Monitor    #Chronic back pain  #Opioid dependence  -Reconcile PTA medications        Diet: Combination Diet Regular Diet Adult    DVT Prophylaxis: Enoxaparin (Lovenox) SQ  Panda Catheter: Not present  Lines: None     Cardiac Monitoring: ACTIVE order. Indication: Acute decompensated heart failure (48 hours)  Code Status: Full Code          Disposition Plan      Expected Discharge Date:  02/12/2024                  José Moon MD  Hospitalist Service  [facility]  Securely message with [employer] (more info)  Text page via [employer] Paging/Directory     ______________________________________________________________________    Chief Complaint   Shortness of breath    History is obtained from the patient, electronic health record, and emergency department physician    History of Present Illness   Jenn Barclay is a 68 year old female who came to the ED for evaluation of worsening shortness of breath.  Past medical history of COPD, tobacco abuse, AML, marrow transplant in 2010, osteoporosis, T8 compression fracture, chronic back pain, chronic opioid use, anxiety, migraine.  Patient was evaluated in the ED on 2/3/2024 secondary to shortness of breath and diagnosed with COVID infection.  She was treated with Paxlovid and dexamethasone which she completed at home.  However, prior to ED arrival she had increased work of breathing with a dry cough.  She denied fevers but has had some chills.  She denied chest pain or palpitations.  Her symptoms did not improve at home after 4 nebulizer treatments.      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    History of PID     Metatarsal fracture 2017 2nd     Migraine without aura, without mention of intractable migraine without mention of status migrainosus     Osteoporosis 2020    DXA    Osteoporosis 5/3/2016     Papular rash, generalized     Post-menopausal 2/8/2020    Early surgical menopause    Pseudogout 11/30/2016    Serrated adenoma of colon 7/17/2020    Status post allogeneic bone marrow transplant (H)     Surgical menopause 1976    oophorectomy    Tobacco abuse     Traumatic compression fracture of T8 thoracic vertebra, closed, initial encounter (H) 4/26/2016    Tubular adenoma of colon 7/17/2020    Vertebral compression fracture (H) 2014    Zoster 2018       Past Surgical History   Past Surgical History:   Procedure Laterality Date    APPENDECTOMY      COLONOSCOPY N/A 07/28/2020    Procedure: COLONOSCOPY, WITH POLYPECTOMY AND BIOPSY;  Surgeon: Gianni Valerio MD;  Location: [facility] OR    HYSTERECTOMY TOTAL ABDOMINAL, BILATERAL SALPINGO-OOPHORECTOMY, COMBINED  1976    IR MISCELLANEOUS PROCEDURE  02/22/2010    TRANSPLANT  01/01/2010    VERTEBROPLASTY  01/01/2016    T5 and T8 vertebrae.       Prior to Admission Medications   Prior to Admission Medications   Prescriptions Last Dose Informant Patient Reported? Taking?   Lidocaine (LIDOCARE) 4 % Patch   No No   Sig: Place 1 patch onto the skin every 24 hours To prevent lidocaine toxicity, patient should be patch free for 12 hrs daily.   Multiple Vitamins-Minerals (MULTIVITAL PO)  Self Yes No   Sig: Take  by mouth.   albuterol (PROVENTIL) (2.5 MG/3ML) 0.083% neb solution  Self Yes No   Sig: Take 2.5 mg by nebulization every 6 hours as needed for shortness of breath, wheezing or cough   alendronate (FOSAMAX) 70 MG tablet  Self No No   Sig: Take 1 tablet by mouth once a week   ascorbic acid (VITAMIN C) 500 MG tablet  Self Yes No   Sig: Take 500 mg by mouth daily.   augmented betamethasone dipropionate (DIPROLENE AF) 0.05 % external cream  Self No No   Sig: Apply topically 2 times daily Stronger cream for rash.   dexAMETHasone (DECADRON) 6 MG tablet   No No   Sig: Take 1 tablet (6 mg) by mouth daily for 8 days   hydrOXYzine (ATARAX) 25 MG tablet  Self No No   Sig: Take 1 tablet  (25 mg) by mouth 3 times daily as needed for itching   nebulizer nebulization  Self No No   Si nebulizer please as covered by insurance.  May also dispense supplies (tubing, inhalation device, etc) as needed.   nirmatrelvir and ritonavir (PAXLOVID) 300 mg/100 mg therapy pack   No No   Sig: Take 3 tablets by mouth 2 times daily for 5 days   oxyCODONE IR (ROXICODONE) 10 MG tablet  Self No No   Sig: Take 1 tablet (10 mg) by mouth 4 times daily for 30 days For use from 1/15/24 to 2024   triamcinolone (KENALOG) 0.1 % external cream  Self Yes No   Sig: Apply topically 3 times daily      Facility-Administered Medications: None           Physical Exam   Vital Signs: Temp: 98.2  F (36.8  C) Temp src: Oral BP: (!) 141/83 Pulse: 77   Resp: (!) 32 SpO2: 92 % O2 Device: None (Room air)    Weight: 108 lbs 0 oz    Constitutional: mild distress  Respiratory: tachypneic and rhonchi scattered  Cardiovascular: regular rate and rhythm  GI: normal bowel sounds  Skin: no bruising or bleeding  Musculoskeletal: no lower extremity pitting edema present  Neurologic: Mental Status Exam:  Level of Alertness:   awake  Orientation:   person, place, time    Medical Decision Making       55 MINUTES SPENT BY ME on the date of service doing chart review, history, exam, documentation & further activities per the note.  MANAGEMENT DISCUSSED with the following over the past 24 hours: Patient       Data     I have personally reviewed the following data over the past 24 hrs:    10.8  \   12.2   / 382     143 97 (L) 18.9 /  124 (H)   2.7 (L) 37 (H) 0.67 \     ALT: 103 (H) AST: 70 (H) AP: 50 TBILI: 0.2   ALB: 3.8 TOT PROTEIN: 6.1 (L) LIPASE: N/A     Trop: 20 (H) BNP: 5,114 (H)     Procal: N/A CRP: N/A Lactic Acid: 2.5 (H)         Imaging results reviewed over the past 24 hrs:   Recent Results (from the past 24 hour(s))   XR Chest Port 1 View    Narrative    EXAM: XR CHEST PORT 1 VIEW  LOCATION: [facility]  DATE:  2/10/2024    INDICATION: Dyspnea.  COMPARISON: 2/3/2024      Impression    IMPRESSION: Coarse interstitial opacities in the lungs bilaterally right greater than left. Some of this may be due to underlying interstitial lung disease but given the asymmetric nature, some degree of interstitial infiltrate on the right not excluded.   Clinical correlation. CT could better evaluate if indicated. Mild cardiac enlargement. Pulmonary vascularity within normal limits. No significant bony abnormalities.   CT Chest Pulmonary Embolism w Contrast    Narrative    EXAM: CT CHEST PULMONARY EMBOLISM WITH CONTRAST  LOCATION: [facility]  DATE: 02/10/2024    INDICATION: COVID+. Dyspnea, increasing pulmonary markings.  COMPARISON: 07/22/2022.  TECHNIQUE: CT chest pulmonary angiogram during arterial phase injection of IV contrast. Multiplanar reformats and MIP reconstructions were performed. Dose reduction techniques were used.   CONTRAST: Isovue 370, 90 mL.    FINDINGS:  ANGIOGRAM CHEST: Pulmonary arteries are normal caliber and negative for pulmonary emboli. The thoracic aorta is not well-opacified which limits evaluation for dissection. There is no aortic aneurysm. No CT evidence of right heart strain.    LUNGS AND PLEURA: Emphysema. Fibrotic changes at the periphery of the lungs. Patchy infiltrates in the bilateral upper lobes anteriorly. There is no pneumothorax or pleural effusion.    MEDIASTINUM/AXILLAE: There is no lymph node enlargement. The heart size is normal. There are again calcifications in the left ventricle. Small pericardial effusion.    CORONARY ARTERY CALCIFICATION: None.    UPPER ABDOMEN: Right renal stone. No hydronephrosis. Calcified granulomas in the spleen.    MUSCULOSKELETAL: There are two thoracic vertebroplasties. Stable mild T10 compression fracture.      Impression    IMPRESSION:  1.  There is no pulmonary embolus. No aortic aneurysm.  2.  Mild bilateral upper lobe infiltrates may be  pneumonia.  3.  Emphysema.  4.  Right renal stone. No hydronephrosis.

## 2024-02-10 NOTE — CONSULTS
Care Management Initial Consult    General Information  Assessment completed with: Patient, pt  Type of CM/SW Visit: Initial Assessment    Primary Care Provider verified and updated as needed: Yes   Readmission within the last 30 days: previous discharge plan unsuccessful   Return Category: Progression of disease  Reason for Consult: discharge planning  Advance Care Planning: Advance Care Planning Reviewed: verified with patient, no concerns identified          Communication Assessment  Patient's communication style: spoken language (English or Bilingual)             Cognitive  Cognitive/Neuro/Behavioral: WDL                      Living Environment:   People in home: grandchild(jocy)     Current living Arrangements: house      Able to return to prior arrangements: yes       Family/Social Support:  Care provided by: self  Provides care for: no one  Marital Status: Single  Children          Description of Support System: Supportive, Involved    Support Assessment: Adequate family and caregiver support, Adequate social supports    Current Resources:   Patient receiving home care services: No     Community Resources: None  Equipment currently used at home:    Supplies currently used at home: None    Employment/Financial:  Employment Status: disabled        Financial Concerns: none   Referral to Financial Worker: No       Does the patient's insurance plan have a 3 day qualifying hospital stay waiver?  No    Lifestyle & Psychosocial Needs:  Social Determinants of Health     Food Insecurity: High Risk (10/31/2023)    Food Insecurity     Within the past 12 months, did you worry that your food would run out before you got money to buy more?: Yes     Within the past 12 months, did the food you bought just not last and you didn t have money to get more?: No   Depression: Not at risk (1/8/2024)    PHQ-2     PHQ-2 Score: 0   Housing Stability: Low Risk  (10/31/2023)    Housing Stability     Do you have housing? : Yes     Are you  worried about losing your housing?: No   Tobacco Use: High Risk (2/3/2024)    Patient History     Smoking Tobacco Use: Every Day     Smokeless Tobacco Use: Former     Passive Exposure: Not on file   Financial Resource Strain: Low Risk  (10/31/2023)    Financial Resource Strain     Within the past 12 months, have you or your family members you live with been unable to get utilities (heat, electricity) when it was really needed?: No   Alcohol Use: Not on file   Transportation Needs: Low Risk  (10/31/2023)    Transportation Needs     Within the past 12 months, has lack of transportation kept you from medical appointments, getting your medicines, non-medical meetings or appointments, work, or from getting things that you need?: No   Physical Activity: Not on file   Interpersonal Safety: Low Risk  (1/8/2024)    Interpersonal Safety     Do you feel physically and emotionally safe where you currently live?: Yes     Within the past 12 months, have you been hit, slapped, kicked or otherwise physically hurt by someone?: No     Within the past 12 months, have you been humiliated or emotionally abused in other ways by your partner or ex-partner?: No   Stress: Not on file   Social Connections: Not on file       Functional Status:  Prior to admission patient needed assistance:   Dependent ADLs:: Independent  Dependent IADLs:: Cleaning, Cooking, Laundry, Shopping, Meal Preparation, Transportation  Assesssment of Functional Status: Not at baseline with ADL Functioning    Mental Health Status:  Mental Health Status: No Current Concerns       Chemical Dependency Status:                Values/Beliefs:  Spiritual, Cultural Beliefs, Yarsani Practices, Values that affect care:                 Additional Information:  Assessed, lives w/grandsons, Walt and Charlie, Dtr Rhona to transport, no svcs and independent at baseline. CM to follow, last discharge was unsuccessful. Pt was COVID + last week and stayed one night but went home and did  not get better. No other services at this time.    Ladi Marrero RN

## 2024-02-10 NOTE — PROGRESS NOTES
Provider notified of K of 2.7, replacement was ordered at 0500 today but K was not replaced. Protocol ran at 1700, and replacing now.

## 2024-02-11 ENCOUNTER — TELEPHONE (OUTPATIENT)
Dept: INTERNAL MEDICINE | Facility: CLINIC | Age: 69
End: 2024-02-11

## 2024-02-11 VITALS
DIASTOLIC BLOOD PRESSURE: 77 MMHG | TEMPERATURE: 97.8 F | SYSTOLIC BLOOD PRESSURE: 137 MMHG | WEIGHT: 115.08 LBS | BODY MASS INDEX: 16.48 KG/M2 | RESPIRATION RATE: 18 BRPM | HEART RATE: 70 BPM | HEIGHT: 70 IN | OXYGEN SATURATION: 91 %

## 2024-02-11 PROBLEM — E87.6 HYPOKALEMIA: Status: ACTIVE | Noted: 2024-02-11

## 2024-02-11 LAB
ALBUMIN SERPL BCG-MCNC: 3.1 G/DL (ref 3.5–5.2)
ALP SERPL-CCNC: 42 U/L (ref 40–150)
ALT SERPL W P-5'-P-CCNC: 75 U/L (ref 0–50)
ANION GAP SERPL CALCULATED.3IONS-SCNC: 1 MMOL/L (ref 7–15)
AST SERPL W P-5'-P-CCNC: 28 U/L (ref 0–45)
BILIRUB SERPL-MCNC: 0.2 MG/DL
BUN SERPL-MCNC: 13.5 MG/DL (ref 8–23)
CALCIUM SERPL-MCNC: 7.7 MG/DL (ref 8.8–10.2)
CHLORIDE SERPL-SCNC: 95 MMOL/L (ref 98–107)
CREAT SERPL-MCNC: 0.66 MG/DL (ref 0.51–0.95)
DEPRECATED HCO3 PLAS-SCNC: 41 MMOL/L (ref 22–29)
EGFRCR SERPLBLD CKD-EPI 2021: >90 ML/MIN/1.73M2
ERYTHROCYTE [DISTWIDTH] IN BLOOD BY AUTOMATED COUNT: 13.4 % (ref 10–15)
GLUCOSE BLDC GLUCOMTR-MCNC: 140 MG/DL (ref 70–99)
GLUCOSE BLDC GLUCOMTR-MCNC: 208 MG/DL (ref 70–99)
GLUCOSE SERPL-MCNC: 130 MG/DL (ref 70–99)
HCT VFR BLD AUTO: 33.3 % (ref 35–47)
HGB BLD-MCNC: 10.7 G/DL (ref 11.7–15.7)
MCH RBC QN AUTO: 29.1 PG (ref 26.5–33)
MCHC RBC AUTO-ENTMCNC: 32.1 G/DL (ref 31.5–36.5)
MCV RBC AUTO: 91 FL (ref 78–100)
PLATELET # BLD AUTO: 310 10E3/UL (ref 150–450)
POTASSIUM SERPL-SCNC: 3.5 MMOL/L (ref 3.4–5.3)
POTASSIUM SERPL-SCNC: 3.5 MMOL/L (ref 3.4–5.3)
PROT SERPL-MCNC: 5.3 G/DL (ref 6.4–8.3)
RBC # BLD AUTO: 3.68 10E6/UL (ref 3.8–5.2)
SODIUM SERPL-SCNC: 137 MMOL/L (ref 135–145)
WBC # BLD AUTO: 11.8 10E3/UL (ref 4–11)

## 2024-02-11 PROCEDURE — 85027 COMPLETE CBC AUTOMATED: CPT | Performed by: HOSPITALIST

## 2024-02-11 PROCEDURE — 250N000013 HC RX MED GY IP 250 OP 250 PS 637: Performed by: INTERNAL MEDICINE

## 2024-02-11 PROCEDURE — 80053 COMPREHEN METABOLIC PANEL: CPT | Performed by: HOSPITALIST

## 2024-02-11 PROCEDURE — 250N000013 HC RX MED GY IP 250 OP 250 PS 637: Performed by: HOSPITALIST

## 2024-02-11 PROCEDURE — 87205 SMEAR GRAM STAIN: CPT | Performed by: HOSPITALIST

## 2024-02-11 PROCEDURE — 250N000011 HC RX IP 250 OP 636: Performed by: HOSPITALIST

## 2024-02-11 PROCEDURE — 94640 AIRWAY INHALATION TREATMENT: CPT | Mod: 76

## 2024-02-11 PROCEDURE — 250N000012 HC RX MED GY IP 250 OP 636 PS 637: Performed by: STUDENT IN AN ORGANIZED HEALTH CARE EDUCATION/TRAINING PROGRAM

## 2024-02-11 PROCEDURE — 250N000009 HC RX 250: Performed by: HOSPITALIST

## 2024-02-11 PROCEDURE — 99239 HOSP IP/OBS DSCHRG MGMT >30: CPT | Performed by: STUDENT IN AN ORGANIZED HEALTH CARE EDUCATION/TRAINING PROGRAM

## 2024-02-11 PROCEDURE — 36415 COLL VENOUS BLD VENIPUNCTURE: CPT | Performed by: HOSPITALIST

## 2024-02-11 PROCEDURE — 258N000003 HC RX IP 258 OP 636: Performed by: HOSPITALIST

## 2024-02-11 PROCEDURE — 999N000157 HC STATISTIC RCP TIME EA 10 MIN

## 2024-02-11 RX ORDER — IPRATROPIUM BROMIDE AND ALBUTEROL SULFATE 2.5; .5 MG/3ML; MG/3ML
3 SOLUTION RESPIRATORY (INHALATION) 3 TIMES DAILY
Status: DISCONTINUED | OUTPATIENT
Start: 2024-02-11 | End: 2024-02-11 | Stop reason: HOSPADM

## 2024-02-11 RX ORDER — POTASSIUM CHLORIDE 1500 MG/1
20 TABLET, EXTENDED RELEASE ORAL ONCE
Status: COMPLETED | OUTPATIENT
Start: 2024-02-11 | End: 2024-02-11

## 2024-02-11 RX ORDER — PREDNISONE 10 MG/1
TABLET ORAL
Qty: 9 TABLET | Refills: 0 | Status: SHIPPED | OUTPATIENT
Start: 2024-02-12 | End: 2024-02-20

## 2024-02-11 RX ORDER — NICOTINE POLACRILEX 4 MG
15-30 LOZENGE BUCCAL
Status: DISCONTINUED | OUTPATIENT
Start: 2024-02-11 | End: 2024-02-11 | Stop reason: HOSPADM

## 2024-02-11 RX ORDER — PREDNISONE 20 MG/1
20 TABLET ORAL DAILY
Qty: 3 TABLET | Refills: 0 | Status: DISCONTINUED | OUTPATIENT
Start: 2024-02-17 | End: 2024-02-11 | Stop reason: HOSPADM

## 2024-02-11 RX ORDER — AZITHROMYCIN 250 MG/1
500 TABLET, FILM COATED ORAL DAILY
Status: DISCONTINUED | OUTPATIENT
Start: 2024-02-12 | End: 2024-02-11 | Stop reason: HOSPADM

## 2024-02-11 RX ORDER — DEXTROSE MONOHYDRATE 25 G/50ML
25-50 INJECTION, SOLUTION INTRAVENOUS
Status: DISCONTINUED | OUTPATIENT
Start: 2024-02-11 | End: 2024-02-11 | Stop reason: HOSPADM

## 2024-02-11 RX ORDER — AZITHROMYCIN 500 MG/1
500 TABLET, FILM COATED ORAL DAILY
Qty: 2 TABLET | Refills: 0 | Status: SHIPPED | OUTPATIENT
Start: 2024-02-12 | End: 2024-02-14

## 2024-02-11 RX ORDER — PREDNISONE 20 MG/1
40 TABLET ORAL DAILY
Qty: 6 TABLET | Refills: 0 | Status: DISCONTINUED | OUTPATIENT
Start: 2024-02-14 | End: 2024-02-11 | Stop reason: HOSPADM

## 2024-02-11 RX ORDER — PREDNISONE 5 MG/1
10 TABLET ORAL DAILY
Qty: 6 TABLET | Refills: 0 | Status: DISCONTINUED | OUTPATIENT
Start: 2024-02-20 | End: 2024-02-11 | Stop reason: HOSPADM

## 2024-02-11 RX ORDER — GUAIFENESIN/DEXTROMETHORPHAN 100-10MG/5
10 SYRUP ORAL EVERY 4 HOURS PRN
Qty: 118 ML | Refills: 0 | Status: SHIPPED | OUTPATIENT
Start: 2024-02-11 | End: 2024-09-11

## 2024-02-11 RX ORDER — PREDNISONE 20 MG/1
60 TABLET ORAL DAILY
Qty: 9 TABLET | Refills: 0 | Status: DISCONTINUED | OUTPATIENT
Start: 2024-02-11 | End: 2024-02-11 | Stop reason: HOSPADM

## 2024-02-11 RX ADMIN — POTASSIUM CHLORIDE 20 MEQ: 1500 TABLET, EXTENDED RELEASE ORAL at 06:27

## 2024-02-11 RX ADMIN — ENOXAPARIN SODIUM 30 MG: 30 INJECTION SUBCUTANEOUS at 09:46

## 2024-02-11 RX ADMIN — PREDNISONE 60 MG: 20 TABLET ORAL at 13:34

## 2024-02-11 RX ADMIN — OXYCODONE HYDROCHLORIDE 10 MG: 5 TABLET ORAL at 09:46

## 2024-02-11 RX ADMIN — IPRATROPIUM BROMIDE AND ALBUTEROL SULFATE 3 ML: .5; 3 SOLUTION RESPIRATORY (INHALATION) at 13:21

## 2024-02-11 RX ADMIN — Medication 1 TABLET: at 00:00

## 2024-02-11 RX ADMIN — AZITHROMYCIN MONOHYDRATE 250 MG: 500 INJECTION, POWDER, LYOPHILIZED, FOR SOLUTION INTRAVENOUS at 06:26

## 2024-02-11 RX ADMIN — METHYLPREDNISOLONE SODIUM SUCCINATE 40 MG: 40 INJECTION, POWDER, FOR SOLUTION INTRAMUSCULAR; INTRAVENOUS at 06:27

## 2024-02-11 RX ADMIN — CEFTRIAXONE SODIUM 2 G: 2 INJECTION, POWDER, FOR SOLUTION INTRAMUSCULAR; INTRAVENOUS at 05:20

## 2024-02-11 RX ADMIN — IPRATROPIUM BROMIDE AND ALBUTEROL SULFATE 3 ML: .5; 3 SOLUTION RESPIRATORY (INHALATION) at 07:45

## 2024-02-11 RX ADMIN — IPRATROPIUM BROMIDE AND ALBUTEROL SULFATE 3 ML: .5; 3 SOLUTION RESPIRATORY (INHALATION) at 02:06

## 2024-02-11 RX ADMIN — INSULIN ASPART 1 UNITS: 100 INJECTION, SOLUTION INTRAVENOUS; SUBCUTANEOUS at 12:41

## 2024-02-11 RX ADMIN — OXYCODONE HYDROCHLORIDE 10 MG: 5 TABLET ORAL at 13:34

## 2024-02-11 ASSESSMENT — ACTIVITIES OF DAILY LIVING (ADL)
ADLS_ACUITY_SCORE: 26

## 2024-02-11 NOTE — PROGRESS NOTES
RCAT Treatment Plan    Patient Score: 7  Patient Acuity: 4    Clinical Indication for Therapy: COPD, COVID    Therapy Ordered: Duoneb TID    Assessment Summary: RCAT evaluation completed indicating nebs PRN however pt does do them daily at home.  Will change frequency to TID.  Pt is on 2L nasal cannula; SpO2 93%.  Duoneb given X1 on schedule.  Breath sounds diminished; coarse crackles noted in RUL post Tx.  Pt has a strong non-productive cough.  RT will continue to follow and reassess in 3 days.     Edward Delgado, RT  2/11/2024

## 2024-02-11 NOTE — PROGRESS NOTES
Discharge paperwork gone over with patient questions answered and prescriptions are sent to pharmacy of choice. Family in room and able to take patient home

## 2024-02-11 NOTE — PROGRESS NOTES
STATUS: SOB, COVID+, COPD  NEURO: A/Ox4  VS: stable  ACTIVITY: SBA   PAIN: chronic pain, back/hip location, scheduled oxycodone  CARDIAC: on tele, normal sinus  RESP: nasal cannula, 2L   GI/: regular diet, voids when up to bathroom  SKIN: no new skin concerns  LDA: PIV L upper arm    K+ was 2.7 this morning, replaced around 1715, recheck tonight around 2200

## 2024-02-11 NOTE — UTILIZATION REVIEW
"Admission Status; Secondary Review Determination         Under the authority of the Utilization Management Committee, the utilization review process indicated a secondary review on the above patient.  The review outcome is based on review of the medical records, discussions with staff, and applying clinical experience noted on the date of the review.          (x) Observation Status Appropriate - This patient does not meet hospital inpatient criteria and is placed in observation status. If this patient's primary payer is Medicare and was admitted as an inpatient, Condition Code 44 should be used and patient status changed to \"observation\".     RATIONALE FOR DETERMINATION:  68 year old female admitted on 2/10/2024. She came to the ED for evaluation of worsening shortness of breath.  She was found to have an Acute COVID-19 infection superimposed on COPD.  She quickly improved and is feeling better/planning to be discharged today.  Given rapid improvement monitoring overnight would recommend observation status.       The severity of illness, intensity of service provided, expected LOS and risk for adverse outcome make the care appropriate for further observation; however, doesn't meet criteria for hospital inpatient admission. Dr. Ace notified of this determination.    The information on this document is developed by the utilization review team in order for the business office to ensure compliance.  This only denotes the appropriateness of proper admission status and does not reflect the quality of care rendered.         The definitions of Inpatient Status and Observation Status used in making the determination above are those provided in the CMS Coverage Manual, Chapter 1 and Chapter 6, section 70.4.      Sincerely,    Curtis Valenzuela DO, [facility]  Utilization Review  Physician Advisor   "

## 2024-02-11 NOTE — PLAN OF CARE
Problem: Gas Exchange Impaired  Goal: Optimal Gas Exchange  2/11/2024 0637 by Nicole Joaquin RN  Outcome: Progressing  2/11/2024 0636 by Nicole Joaquin RN  Outcome: Progressing  Intervention: Optimize Oxygenation and Ventilation  Recent Flowsheet Documentation  Taken 2/11/2024 0355 by Nicole Joaquin RN  Head of Bed (HOB) Positioning: HOB at 30 degrees  Taken 2/11/2024 0000 by Nicole Joaquin RN  Head of Bed (HOB) Positioning: HOB at 30 degrees  Taken 2/10/2024 2300 by Nciole Joaquin RN  Head of Bed (HOB) Positioning: HOB at 30 degrees   Goal Outcome Evaluation:  Jenn continues on 2 lt nasal canula oxygen satting 93-95%.  Her lungs are diminished and she has a congested cough.  She was able to cough up a small bit to send for culture.  She received rocephin & zithromax tonight.  Problem: Electrolyte Imbalance  Goal: Electrolyte Balance  Outcome: Progressing   Her potassium at 2300 was 3.4.  On recheck it was 3.5.  She received 20meq oral & will be rechecked tomorrow AM.

## 2024-02-11 NOTE — PROVIDER NOTIFICATION
Pt has mild swelling on ankles. This is new according to her, which makes her anxious. She is questioning  if its AML or fluid overload. House officer paged. Waiting response.

## 2024-02-11 NOTE — DISCHARGE SUMMARY
"[facility]  Hospitalist Discharge Summary      Date of Admission:  2/10/2024  Date of Discharge:  2/11/2024  2:45 PM  Discharging Provider: Jocelin Ace MD  Discharge Service: Hospitalist Service    Discharge Diagnoses   COVID-19 infection  COPD exacerbation  Hypokalemia  Metabolic alkalosis  Abnormal LFTs  Elevated BNP  Elevated high-sensitivity troponin  Underweight  Tobacco abuse  Steroid hyperglycemia  Elevated blood pressure  Chronic back pain      Clinically Significant Risk Factors     # Cachexia: Estimated body mass index is 16.51 kg/m  as calculated from the following:    Height as of this encounter: 1.778 m (5' 10\").    Weight as of this encounter: 52.2 kg (115 lb 1.3 oz).       Follow-ups Needed After Discharge   Follow-up Appointments     Follow-up and recommended labs and tests       Follow up with primary care provider, Jorge Dunn, within 7 days for   hospital follow- up.  No follow up labs or test are needed.Patient needs   to follow with PCP and pulmonology            Unresulted Labs Ordered in the Past 30 Days of this Admission       Date and Time Order Name Status Description    2/10/2024  5:20 AM Respiratory Aerobic Bacterial Culture with Gram Stain Preliminary     2/10/2024  4:32 AM Blood Culture Peripheral Blood Preliminary     2/10/2024  4:32 AM Blood Culture Peripheral Blood Preliminary         These results will be followed up by     Discharge Disposition   Discharged to home  Condition at discharge: Good    Hospital Course   Jenn Barclay is a 68 year old female admitted on 2/10/2024. She came to the ED for evaluation of worsening shortness of breath   Confirmed COVID-19 infection  -Diagnosed on 2/3/2024, treated with Paxlovid x 5 days and dexamethasone   COPD exacerbation  -initially started on IV Solu-Medrol.  Prednisone taper ordered  -Scheduled and as needed Carly  -Marily valve   Rule out community-acquired pneumonia versus superimposed " bacterial infection  -CT showed mild bilateral upper lobe infiltrates may be pneumonia  -Continue IV ceftriaxone and azithromycin   Hypokalemia  -Replace   Abnormal LFTs  -No concern for biliary obstruction with normal alk phos and bilirubin  -Probable secondary to medications versus hepatic congestion  -Monitor labs  Abnormal N-terminal proBNP  -Level 5114  -No peripheral edema or signs of volume overload  -Echocardiogram: Estimated left ventricular ejection fraction 60 to 65%  Abnormal high-sensitivity troponin T  -Denies angina symptoms  -ECG reviewed: Sinus rhythm at 63 bpm, nonspecific T waves, no significant changes from 12/7/2022  Underweight  -BMI 15.5  Tobacco abuse  Steroids hyperglycemia  -Monitor  Elevated blood pressure  -Monitor  Chronic back pain  Opioid dependence    Patient is clinically stable enough to be discharged home today.  Medication sent to her pharmacy.    Consultations This Hospital Stay   CARE MANAGEMENT / SOCIAL WORK IP CONSULT    Code Status   Full Code    Time Spent on this Encounter   I, Jocelin Ace MD, personally saw the patient today and spent greater than 30 minutes discharging this patient.       Jocelin Ace MD  [address] [address]  Phone: [phone]  Fax: [phone]  ______________________________________________________________________    Physical Exam   Vital Signs: Temp: 97.8  F (36.6  C) Temp src: Oral BP: 137/77 Pulse: 70   Resp: 18 SpO2: 91 % O2 Device: None (Room air) Oxygen Delivery: 1/2 LPM  Weight: 115 lbs 1.28 oz    Constitutional: mild distress  Respiratory: tachypneic and rhonchi scattered  Cardiovascular: regular rate and rhythm  GI: normal bowel sounds  Skin: no bruising or bleeding  Musculoskeletal: no lower extremity pitting edema present  Neurologic: Mental Status Exam:  Level of Alertness:   awake  Orientation:   person, place, time       Primary Care Physician   Jorge  Mona    Discharge Orders      Reason for your hospital stay    COPD excerbation     Follow-up and recommended labs and tests     Follow up with primary care provider, Jorge Dunn, within 7 days for hospital follow- up.  No follow up labs or test are needed.Patient needs to follow with PCP and pulmonology     Activity    Your activity upon discharge: activity as tolerated     Diet    Follow this diet upon discharge: Orders Placed This Encounter      Combination Diet Regular Diet Adult       Significant Results and Procedures       Discharge Medications   Current Discharge Medication List        START taking these medications    Details   amoxicillin-clavulanate (AUGMENTIN) 875-125 MG tablet Take 1 tablet by mouth every 12 hours  Qty: 14 tablet, Refills: 0    Associated Diagnoses: COPD with acute exacerbation (H)      azithromycin (ZITHROMAX) 500 MG tablet Take 1 tablet (500 mg) by mouth daily for 2 days  Qty: 2 tablet, Refills: 0    Associated Diagnoses: COPD with acute exacerbation (H)      guaiFENesin-dextromethorphan (ROBITUSSIN DM) 100-10 MG/5ML syrup Take 10 mLs by mouth every 4 hours as needed for cough  Qty: 118 mL, Refills: 0    Associated Diagnoses: COPD with acute exacerbation (H)      predniSONE (DELTASONE) 10 MG tablet Take 6 tablets (60 mg) by mouth daily for 3 days, THEN 4 tablets (40 mg) daily for 3 days, THEN 2 tablets (20 mg) daily for 3 days, THEN 1 tablet (10 mg) daily for 3 days.  Qty: 9 tablet, Refills: 0    Associated Diagnoses: COPD with acute exacerbation (H)           CONTINUE these medications which have NOT CHANGED    Details   albuterol (PROVENTIL) (2.5 MG/3ML) 0.083% neb solution Take 2.5 mg by nebulization every 6 hours as needed for shortness of breath, wheezing or cough      alendronate (FOSAMAX) 70 MG tablet Take 1 tablet by mouth once a week  Qty: 13 tablet, Refills: 3    Associated Diagnoses: S/P allogeneic bone marrow transplant (H)      ascorbic acid (VITAMIN C) 500 MG tablet  Take 500 mg by mouth daily.      augmented betamethasone dipropionate (DIPROLENE AF) 0.05 % external cream Apply topically 2 times daily Stronger cream for rash.  Qty: 50 g, Refills: 3    Associated Diagnoses: Dermatitis      hydrOXYzine (ATARAX) 25 MG tablet Take 1 tablet (25 mg) by mouth 3 times daily as needed for itching  Qty: 100 tablet, Refills: 1    Associated Diagnoses: Dermatitis      oxyCODONE IR (ROXICODONE) 10 MG tablet Take 1 tablet (10 mg) by mouth 4 times daily for 30 days For use from 1/15/24 to 2/14/2024  Qty: 120 tablet, Refills: 0    Associated Diagnoses: Chronic bilateral low back pain, unspecified whether sciatica present      triamcinolone (KENALOG) 0.1 % external cream Apply topically 3 times daily      nebulizer nebulization 1 nebulizer please as covered by insurance.  May also dispense supplies (tubing, inhalation device, etc) as needed.  Qty: 1 each, Refills: 0    Associated Diagnoses: Chronic obstructive pulmonary disease, unspecified COPD type (H)           STOP taking these medications       dexAMETHasone (DECADRON) 6 MG tablet Comments:   Reason for Stopping:         Lidocaine (LIDOCARE) 4 % Patch Comments:   Reason for Stopping:             Allergies   Allergies   Allergen Reactions    Mirtazapine      Other reaction(s): Other (See Comments)  Hallucination     Tape [Adhesive Tape]      Allergy Hx  In addition to NO paper tape    Liquid Adhesive Rash     Other reaction(s): Other (See Comments)  Allergy Hx - Rash from paper tape

## 2024-02-11 NOTE — PLAN OF CARE
Problem: Gas Exchange Impaired  Goal: Optimal Gas Exchange  Outcome: Progressing   Goal Outcome Evaluation:       Pt alert and oriented x4. She reports chronic lower back pain. On scheduled oxycodone. Oxygen at 2 LPM via nasal cannula. Lung sounds clear but diminished. Frequent non productive cough. Afebrile.

## 2024-02-11 NOTE — PLAN OF CARE
"  Problem: Adult Inpatient Plan of Care  Goal: Plan of Care Review  Description: The Plan of Care Review/Shift note should be completed every shift.  The Outcome Evaluation is a brief statement about your assessment that the patient is improving, declining, or no change.  This information will be displayed automatically on your shift  note.  Outcome: Progressing  Goal: Patient-Specific Goal (Individualized)  Description: You can add care plan individualizations to a care plan. Examples of Individualization might be:  \"Parent requests to be called daily at 9am for status\", \"I have a hard time hearing out of my right ear\", or \"Do not touch me to wake me up as it startles  me\".  Outcome: Progressing  Goal: Absence of Hospital-Acquired Illness or Injury  Outcome: Progressing  Intervention: Identify and Manage Fall Risk  Recent Flowsheet Documentation  Taken 2/11/2024 0900 by Cata Santamaria RN  Safety Promotion/Fall Prevention:   activity supervised   assistive device/personal items within reach   clutter free environment maintained   lighting adjusted   mobility aid in reach   nonskid shoes/slippers when out of bed   patient and family education  Intervention: Prevent Infection  Recent Flowsheet Documentation  Taken 2/11/2024 0900 by Cata Santamaria, RN  Infection Prevention:   hand hygiene promoted   personal protective equipment utilized   rest/sleep promoted  Goal: Optimal Comfort and Wellbeing  Outcome: Progressing  Goal: Readiness for Transition of Care  Outcome: Progressing     Problem: Gas Exchange Impaired  Goal: Optimal Gas Exchange  Outcome: Progressing     Problem: Electrolyte Imbalance  Goal: Electrolyte Balance  Outcome: Progressing   Goal Outcome Evaluation:    Pt alert and oriented x4. She reports chronic lower back pain 5/10. On scheduled oxycodone. Pt weaned to RA 91-93%. Lung sounds clear but diminished. Frequent non productive cough. Vitals stable as recorded. Pt mood has fluctuated all day. Pt " was very rude and demanding this morning and stating she would not listed and pumps etc are driving her crazy. Pt then laughing and nice later in the day. Now pt is anxious and rude and stating she doesn't need to be here anymore. MD aware. Plan for discharge this afternoon.

## 2024-02-12 DIAGNOSIS — Z09 HOSPITAL DISCHARGE FOLLOW-UP: ICD-10-CM

## 2024-02-12 NOTE — TELEPHONE ENCOUNTER
Please call patient -    ______________________________________________________________________     Home phone:  [phone] (home)     Cell phone:   Telephone Information:   Mobile [phone]       Other contacts:  Name Home Phone Work Phone Mobile Phone Relationship Lgl Grd   JEAN JUARES [phone] Grandchild    ADALGISA JUARES   [phone] Child      ______________________________________________________________________     Please call the patient and see how she is doing after hospital     We would like to see her back in follow up after her hospital stay.  May use a reserved slot for this.    Jorge Dunn MD  [facility]  2/11/2024, 9:31 PM

## 2024-02-13 ENCOUNTER — PATIENT OUTREACH (OUTPATIENT)
Dept: CARE COORDINATION | Facility: CLINIC | Age: 69
End: 2024-02-13

## 2024-02-13 ENCOUNTER — TELEPHONE (OUTPATIENT)
Dept: INTERNAL MEDICINE | Facility: CLINIC | Age: 69
End: 2024-02-13

## 2024-02-13 LAB
BACTERIA SPT CULT: NORMAL
GRAM STAIN RESULT: NORMAL

## 2024-02-13 NOTE — PROGRESS NOTES
Clinic Care Coordination Contact  Community Health Worker Initial Outreach    Patient accepts CC: No, Pt declined needs for extra support.   Scheduled follow up appt with PCP to be on 02/27/2024 @ 08:45 - confirmed with Pt.     - Pt reported having pain, difficulty breathing, declined needs to talk with medical staff.   - Pt reported she had an unpleasant healthcare experience with nurses while she was at hospital, advised Pt to talk with Patient Relation Dept, transferred call to talk with them.     Clinic Care Coordination Contact  [facility]: Post-Discharge Note  SITUATION                                                      Admission:    Admission Date: 02/10/24   Reason for Admission: COVID-19 infection  COPD exacerbation  Hypokalemia  Metabolic alkalosis  Abnormal LFTs  Elevated BNP  Elevated high-sensitivity troponin  Underweight  Tobacco abuse  Steroid hyperglycemia  Elevated blood pressure  Chronic back pain  Discharge:   Discharge Date: 02/11/24  Discharge Diagnosis: COVID-19 infection  COPD exacerbation  Hypokalemia  Metabolic alkalosis  Abnormal LFTs  Elevated BNP  Elevated high-sensitivity troponin  Underweight  Tobacco abuse  Steroid hyperglycemia  Elevated blood pressure  Chronic back pain    BACKGROUND                                                      Per hospital discharge summary and inpatient provider notes:    Jenn Barclay is a 68 year old female admitted on 2/10/2024. She came to the ED for evaluation of worsening shortness of breath   Confirmed COVID-19 infection  -Diagnosed on 2/3/2024, treated with Paxlovid x 5 days and dexamethasone   COPD exacerbation  -initially started on IV Solu-Medrol.  Prednisone taper ordered  -Scheduled and as needed DuoNebs  -Flutter valve    ASSESSMENT           Discharge Assessment  How are you doing now that you are home?: feeling pain  How are your symptoms? (Red Flag symptoms escalate to triage hotline per guidelines): Unchanged  Do you feel your  condition is stable enough to be safe at home until your provider visit?: Yes  Does the patient have their discharge instructions? : Yes  Does the patient have questions regarding their discharge instructions? : No  Were you started on any new medications or were there changes to any of your previous medications? : Yes  Does the patient have all of their medications?: Yes  Do you have questions regarding any of your medications? : No  Do you have all of your needed medical supplies or equipment (DME)?  (i.e. oxygen tank, CPAP, cane, etc.): Yes  Discharge follow-up appointment scheduled within 14 calendar days? : Yes  Discharge Follow Up Appointment Date: 02/27/24  Discharge Follow Up Appointment Scheduled with?: Primary Care Provider    Post-op ([facility] Only)  If the patient had a surgery or procedure, do they have any questions for a nurse?: No         PLAN                                                      Outpatient Plan:      Follow-ups Needed After Discharge  Follow-up Appointments     Follow-up and recommended labs and tests       Follow up with primary care provider, Jorge Dunn, within 7 days for   hospital follow- up.  No follow up labs or test are needed.Patient needs   to follow with PCP and pulmonology      Future Appointments   Date Time Provider Department Center   2[facility]2024  9:00 AM [facility]   [facility]24  2:00 PM [facility]   7/[facility]/2024  1:00 PM [facility]       For any urgent concerns, please contact our 24 hour nurse triage line: [phone]     CHRIST Guerrero  [phone]  [facility]

## 2024-02-13 NOTE — TELEPHONE ENCOUNTER
MTM referral from: Transitions of Care (recent hospital discharge or ED visit)    MTM referral outreach attempt #1 on February 13, 2024 at 11:22 AM      Outcome: Spoke with patient, she declined services    Use [facility] med adv (jasmin) for the carrier/Plan on the flowsheet          Kinza Eckert CPhT  MTM

## 2024-02-13 NOTE — TELEPHONE ENCOUNTER
Spoke to patient, still having intermittent SOB- using nebulizer & finds relief from this.  Using Incentive spirometer 10-15 times a day    Moved appt for hospital follow up to 2/20/24    Advised pt to call back if symptoms worsen

## 2024-02-14 DIAGNOSIS — M54.50 CHRONIC BILATERAL LOW BACK PAIN, UNSPECIFIED WHETHER SCIATICA PRESENT: ICD-10-CM

## 2024-02-14 DIAGNOSIS — G89.29 CHRONIC BILATERAL LOW BACK PAIN, UNSPECIFIED WHETHER SCIATICA PRESENT: ICD-10-CM

## 2024-02-14 RX ORDER — OXYCODONE HYDROCHLORIDE 10 MG/1
10 TABLET ORAL 4 TIMES DAILY
Qty: 120 TABLET | Refills: 0 | Status: SHIPPED | OUTPATIENT
Start: 2024-02-14 | End: 2024-03-15

## 2024-02-14 NOTE — TELEPHONE ENCOUNTER
Medication Question or Refill    Contacts         Type Contact Phone/Fax    02/14/2024 11:32 AM CST Phone (Incoming) Jenn Barclay AVA (Self) [phone] (M)            What medication are you calling about (include dose and sig)?:  oxyCODONE IR (ROXICODONE) 10 MG tablet     Preferred Pharmacy:    [id number] [address] & [address] [address]  Phone: [phone] Fax: [phone]      Controlled Substance Agreement on file:   CSA -- Patient Level:     [Media Unavailable] Controlled Substance Agreement - Opioid - Scan on 1/9/2024 10:15 AM   [Media Unavailable] Controlled Substance Agreement - Opioid - Scan on 1/9/2023  9:02 AM   [Media Unavailable] Controlled Substance Agreement - Opioid - Scan on 1/3/2022  1:31 PM   [Media Unavailable] Controlled Substance Agreement - Opioid - Scan on 12/18/2019   [Media Unavailable] Controlled Substance Agreement - Opioid - Scan on 12/28/2018   [Media Unavailable] Controlled Substance Agreement - Opioid - Scan on 8/9/2017: [facility]       Who prescribed the medication?: Dr. Jorge Dunn     Do you need a refill? Yes    When did you use the medication last? today    Patient offered an appointment? No scheduled to see PCP on 02/20/2024.  Last visit with PCP = 01/8/2024    Do you have any questions or concerns?  No

## 2024-02-15 LAB
BACTERIA BLD CULT: NO GROWTH
BACTERIA BLD CULT: NO GROWTH

## 2024-02-19 LAB
ATRIAL RATE - MUSE: 63 BPM
DIASTOLIC BLOOD PRESSURE - MUSE: 91 MMHG
INTERPRETATION ECG - MUSE: NORMAL
P AXIS - MUSE: 83 DEGREES
PR INTERVAL - MUSE: 148 MS
QRS DURATION - MUSE: 100 MS
QT - MUSE: 412 MS
QTC - MUSE: 421 MS
R AXIS - MUSE: 73 DEGREES
SYSTOLIC BLOOD PRESSURE - MUSE: 199 MMHG
T AXIS - MUSE: 72 DEGREES
VENTRICULAR RATE- MUSE: 63 BPM

## 2024-02-20 ENCOUNTER — OFFICE VISIT (OUTPATIENT)
Dept: INTERNAL MEDICINE | Facility: CLINIC | Age: 69
End: 2024-02-20

## 2024-02-20 VITALS
HEART RATE: 80 BPM | BODY MASS INDEX: 14.6 KG/M2 | HEIGHT: 70 IN | RESPIRATION RATE: 18 BRPM | WEIGHT: 102 LBS | DIASTOLIC BLOOD PRESSURE: 78 MMHG | OXYGEN SATURATION: 92 % | SYSTOLIC BLOOD PRESSURE: 140 MMHG

## 2024-02-20 DIAGNOSIS — Z09 HOSPITAL DISCHARGE FOLLOW-UP: ICD-10-CM

## 2024-02-20 DIAGNOSIS — R54 FRAILTY: ICD-10-CM

## 2024-02-20 DIAGNOSIS — K52.9 CHRONIC DIARRHEA: ICD-10-CM

## 2024-02-20 DIAGNOSIS — Z72.0 TOBACCO ABUSE: Chronic | ICD-10-CM

## 2024-02-20 DIAGNOSIS — J44.9 CHRONIC OBSTRUCTIVE PULMONARY DISEASE, UNSPECIFIED COPD TYPE (H): ICD-10-CM

## 2024-02-20 DIAGNOSIS — U07.1 COVID-19 VIRUS INFECTION: ICD-10-CM

## 2024-02-20 DIAGNOSIS — J44.1 COPD WITH ACUTE EXACERBATION (H): Primary | ICD-10-CM

## 2024-02-20 PROCEDURE — 99215 OFFICE O/P EST HI 40 MIN: CPT | Performed by: INTERNAL MEDICINE

## 2024-02-20 RX ORDER — IPRATROPIUM BROMIDE AND ALBUTEROL SULFATE 2.5; .5 MG/3ML; MG/3ML
1 SOLUTION RESPIRATORY (INHALATION) EVERY 6 HOURS PRN
Qty: 90 ML | Refills: 3 | Status: SHIPPED | OUTPATIENT
Start: 2024-02-20 | End: 2024-07-23

## 2024-02-20 RX ORDER — MONTELUKAST SODIUM 4 MG/1
1-2 TABLET, CHEWABLE ORAL DAILY
Qty: 100 TABLET | Refills: 3 | Status: SHIPPED | OUTPATIENT
Start: 2024-02-20 | End: 2024-03-22

## 2024-02-20 RX ORDER — MONTELUKAST SODIUM 4 MG/1
TABLET, CHEWABLE ORAL
Qty: 180 TABLET | OUTPATIENT
Start: 2024-02-20

## 2024-02-20 RX ORDER — RESPIRATORY SYNCYTIAL VIRUS VACCINE 120MCG/0.5
0.5 KIT INTRAMUSCULAR ONCE
Qty: 1 EACH | Refills: 0 | Status: CANCELLED | OUTPATIENT
Start: 2024-02-20 | End: 2024-02-20

## 2024-02-20 NOTE — PROGRESS NOTES
Wt Readings from Last 20 Encounters:   02/20/24 46.3 kg (102 lb)   02/10/24 52.2 kg (115 lb 1.3 oz)   02/03/24 49 kg (108 lb)   01/08/24 49 kg (108 lb)   11/16/23 48.1 kg (106 lb)   10/31/23 48.1 kg (106 lb)   08/24/23 48.5 kg (107 lb)   07/06/23 48.9 kg (107 lb 11.2 oz)   06/27/23 47.2 kg (104 lb)   06/04/23 48.8 kg (107 lb 9.4 oz)   01/13/23 49.4 kg (108 lb 12.8 oz)   01/05/23 49.4 kg (109 lb)   12/06/22 49 kg (108 lb)   07/26/22 47.6 kg (105 lb)   07/05/22 48.1 kg (106 lb)   03/30/22 52.2 kg (115 lb 1.6 oz)   01/03/22 49 kg (108 lb)   07/13/21 48.1 kg (106 lb)   06/23/21 50.4 kg (111 lb 3.2 oz)   01/14/21 50.8 kg (112 lb)     BP Readings from Last 20 Encounters:   02/20/24 (!) 140/78   02/11/24 137/77   02/04/24 99/58   01/08/24 132/70   11/16/23 131/75   10/31/23 120/82   08/24/23 105/62   07/06/23 133/78   06/27/23 136/78   06/04/23 (!) 146/82   01/13/23 124/72   01/05/23 120/78   12/07/22 128/80   07/26/22 134/76   07/05/22 136/74   03/30/22 (!) 150/73   03/24/22 113/69   01/03/22 92/60   07/13/21 132/80   06/23/21 125/68      Pulse Readings from Last 20 Encounters:   02/20/24 80   02/11/24 70   02/04/24 62   01/08/24 68   11/16/23 82   10/31/23 65   08/24/23 62   07/06/23 65   06/27/23 76   06/05/23 65   01/13/23 64   01/05/23 74   12/07/22 78   07/26/22 96   07/05/22 77   03/30/22 80   03/24/22 76   01/03/22 75   07/13/21 67   06/23/21 60     SpO2 Readings from Last 20 Encounters:   02/20/24 92%   02/11/24 91%   02/04/24 92%   01/08/24 96%   11/16/23 96%   10/31/23 95%   08/24/23 98%   07/06/23 96%   06/05/23 92%   01/13/23 96%   01/05/23 94%   12/07/22 97%   07/26/22 96%   07/05/22 94%   03/24/22 94%   01/03/22 94%   07/13/21 98%   06/23/21 98%   01/14/21 94%   12/16/20 96%

## 2024-02-20 NOTE — PROGRESS NOTES
[facility]    Comprehensive and complex medical care - Chronic disease management - Shared decision making - Care coordination - Compassionate care    Patient advocacy - Rational deprescribing - Minimally disruptive medicine - Ethical focus - Customized care         Date of Service: 2/20/2024  Primary Provider: Jorge Dunn    Patient Care Team:  Jorge Dunn MD as PCP - General (Internal Medicine)  Mohit Montano MD as MD (Hematology & Oncology)  Jorge Dunn MD as Assigned PCP  Joellen Nova MD as Resident (Dermatology)  Madison Alexander LICSW as  (BMT - Adult)  Ryan Alonso MD as MD (Endocrinology, Diabetes, and Metabolism)  Manju Asher, RN as BMT Nurse Coordinator  Umesh Brandt MD as BMT Physician (Transplant)  Jorge Dunn MD as Assigned Pain Medication Provider  Ryan Alonso MD as Assigned Endocrinology Provider  Donny Gunn MD as Assigned Gastroenterology Provider          Patient's Pharmacy:    [facility] [address] [place], [place] - [address]  [address]  [facility] [address]  Phone: [phone] Fax: [phone]    [facility][id number] [address]  [address] [place] [id number]  Phone: [phone] Fax: [phone]     Patient's Contacts:  Name Home Phone Work Phone Mobile Phone Relationship Lgl JEAN Hernández [phone] Grandchild    ADALGISA JUARES   [phone] Child      Patient's Insurance:    Payor: [employer] / Plan: [employer] HEALTHCARE MEDICARE ADVANTAGE / Product Type: HMO /            Active Problem List:  Problem List as of 2/20/2024 Reviewed: 2/3/2024  7:33 PM by Elisa Johnson    Full code status    Tobacco abuse    COPD (chronic obstructive pulmonary disease) (H)    History of acute myeloid leukemia    Status post allogeneic bone  marrow transplant (H) - 2010 - [facility]       Medium    Controlled substance agreement signed - 1/24 - oxycodone - UDS 1/24    Chronic, continuous use of opioids    Exocrine pancreatic insufficiency    Chronic back pain    Financial difficulties    Pseudogout    Traumatic compression fracture of T8 thoracic vertebra, closed, initial encounter (H)    Chronic diarrhea    Hypoxemia    COVID-19 virus infection    SOB (shortness of breath)    COPD with acute exacerbation (H)    Hypokalemia       Low    Nephrolithiasis    Anxiety    Gastric ulcer    Migraine headache    Papular rash, generalized    Senile purpura (H24)    Tubular adenoma of colon - advanced adenoma in 2017 - single small adenoma in 2020    OP (osteoporosis)    Frailty        Current Outpatient Medications   Medication Instructions    albuterol (PROVENTIL) 2.5 mg, Nebulization, EVERY 6 HOURS PRN    alendronate (FOSAMAX) 70 mg, Oral, WEEKLY    amoxicillin-clavulanate (AUGMENTIN) 875-125 MG tablet 1 tablet, Oral, EVERY 12 HOURS    augmented betamethasone dipropionate (DIPROLENE AF) 0.05 % external cream Topical, 2 TIMES DAILY, Stronger cream for rash.    colestipol (COLESTID) 1-2 g, Oral, DAILY    guaiFENesin-dextromethorphan (ROBITUSSIN DM) 100-10 MG/5ML syrup 10 mLs, Oral, EVERY 4 HOURS PRN    hydrOXYzine HCl (ATARAX) 25 mg, Oral, 3 TIMES DAILY PRN    ipratropium - albuterol 0.5 mg/2.5 mg/3 mL (DUONEB) 0.5-2.5 (3) MG/3ML neb solution 3 mLs, Nebulization, EVERY 6 HOURS PRN    nebulizer nebulization 1 nebulizer please as covered by insurance.  May also dispense supplies (tubing, inhalation device, etc) as needed.    oxyCODONE IR (ROXICODONE) 10 mg, Oral, 4 TIMES DAILY, For use from 2/14/24 to 3/15/2024    triamcinolone (KENALOG) 0.1 % external cream Topical, 3 TIMES DAILY    vitamin C (ASCORBIC ACID) 500 mg, Oral, DAILY      Social History     Social History Narrative    , on disability.        Has family in the area.        Patient of Dr. Dunn since  "2015.        Subjective:     Jenn Barclay is a 68 year old female who comes in today for:    Chief Complaint   Patient presents with    Hospital F/U     Starting taking potassium - daily           2/20/2024     9:30 AM   Additional Questions   Roomed by Adriana Cardona   Accompanied by N/A     In for follow up multiple issues and hospital stay.    Had covid infection and severe exacerbation of COPD requiring hospital stay.    Was on high dose steroids and this did really cause anxiety for her as well as lack of sleep.    Breathing and cough are doing better at this time.  Still coughs up some significant phlegm still though.      Insurance has not been good at covering medications.  This has been a problem.    Skin rash doing okay at this time.    Having loose orange oil type stools still.  Her insurance does not cover Creon or similar products.  She would like to try something for this.    We reviewed her other issues noted in the assessment but not specifically addressed in the HPI above.     Objective:     Wt Readings from Last 3 Encounters:   02/20/24 46.3 kg (102 lb)   02/10/24 52.2 kg (115 lb 1.3 oz)   02/03/24 49 kg (108 lb)     BP Readings from Last 3 Encounters:   02/20/24 (!) 140/78   02/11/24 137/77   02/04/24 99/58     BP (!) 140/78 (BP Location: Right arm, Patient Position: Sitting, Cuff Size: Adult Regular)   Pulse 80   Resp 18   Ht 1.778 m (5' 10\")   Wt 46.3 kg (102 lb)   LMP  (LMP Unknown)   SpO2 92%   BMI 14.64 kg/m     The patient is comfortable, no acute distress.  Mood good.  Insight good.  Eyes are nonicteric.  Neck is supple without mass.  No cervical adenopathy.  No thyromegaly. Heart regular rate and rhythm.  Lungs show distant lung sounds on auscultation bilaterally.  Respiratory effort is good.  Abdomen soft and nontender.  No hepatosplenomegaly.  Extremities no edema.      Diagnostics:     Admission on 02/10/2024, Discharged on 02/11/2024   Component Date Value Ref Range Status    " Systolic Blood Pressure 02/10/2024 199  mmHg Final    Diastolic Blood Pressure 02/10/2024 91  mmHg Final    Ventricular Rate 02/10/2024 63  BPM Final    Atrial Rate 02/10/2024 63  BPM Final    FL Interval 02/10/2024 148  ms Final    QRS Duration 02/10/2024 100  ms Final    QT 02/10/2024 412  ms Final    QTc 02/10/2024 421  ms Final    P Axis 02/10/2024 83  degrees Final    R AXIS 02/10/2024 73  degrees Final    T Axis 02/10/2024 72  degrees Final    Interpretation ECG 02/10/2024    Final                    Value:Sinus rhythm  Septal infarct (cited on or before 07-DEC-2022)  Abnormal ECG  When compared with ECG of 07-DEC-2022 01:07,  Incomplete right bundle branch block is no longer Present  Confirmed by SEE ED PROVIDER NOTE FOR, ECG INTERPRETATION ([id number]),  ROBBI NGUYỄN ([id number]) on 2/19/2024 4:22:52 AM      Sodium 02/10/2024 143  135 - 145 mmol/L Final    Reference intervals for this test were updated on 09/26/2023 to more accurately reflect our healthy population. There may be differences in the flagging of prior results with similar values performed with this method. Interpretation of those prior results can be made in the context of the updated reference intervals.     Potassium 02/10/2024 2.7 (L)  3.4 - 5.3 mmol/L Final    Carbon Dioxide (CO2) 02/10/2024 37 (H)  22 - 29 mmol/L Final    Anion Gap 02/10/2024 9  7 - 15 mmol/L Final    Urea Nitrogen 02/10/2024 18.9  8.0 - 23.0 mg/dL Final    Creatinine 02/10/2024 0.67  0.51 - 0.95 mg/dL Final    GFR Estimate 02/10/2024 >90  >60 mL/min/1.73m2 Final    Calcium 02/10/2024 8.4 (L)  8.8 - 10.2 mg/dL Final    Chloride 02/10/2024 97 (L)  98 - 107 mmol/L Final    Glucose 02/10/2024 124 (H)  70 - 99 mg/dL Final    Alkaline Phosphatase 02/10/2024 50  40 - 150 U/L Final    Reference intervals for this test were updated on 11/14/2023 to more accurately reflect our healthy population. There may be differences in the flagging of prior results with similar values performed  with this method. Interpretation of those prior results can be made in the context of the updated reference intervals.    AST 02/10/2024 70 (H)  0 - 45 U/L Final    Reference intervals for this test were updated on 6/12/2023 to more accurately reflect our healthy population. There may be differences in the flagging of prior results with similar values performed with this method. Interpretation of those prior results can be made in the context of the updated reference intervals.    ALT 02/10/2024 103 (H)  0 - 50 U/L Final    Reference intervals for this test were updated on 6/12/2023 to more accurately reflect our healthy population. There may be differences in the flagging of prior results with similar values performed with this method. Interpretation of those prior results can be made in the context of the updated reference intervals.      Protein Total 02/10/2024 6.1 (L)  6.4 - 8.3 g/dL Final    Albumin 02/10/2024 3.8  3.5 - 5.2 g/dL Final    Bilirubin Total 02/10/2024 0.2  <=1.2 mg/dL Final    pH Venous 02/10/2024 7.42  7.32 - 7.43 Final    pCO2 Venous 02/10/2024 57 (H)  40 - 50 mm Hg Final    pO2 Venous 02/10/2024 30  25 - 47 mm Hg Final    Bicarbonate Venous 02/10/2024 37 (H)  21 - 28 mmol/L Final    Base Excess/Deficit Venous 02/10/2024 13.0 (H)  -3.0 - 3.0 mmol/L Final    FIO2 02/10/2024 28   Final    Oxyhemoglobin Venous 02/10/2024 56 (L)  70 - 75 % Final    O2 Sat, Venous 02/10/2024 59.0 (L)  70.0 - 75.0 % Final    Magnesium 02/10/2024 2.1  1.7 - 2.3 mg/dL Final    Troponin T, High Sensitivity 02/10/2024 20 (H)  <=14 ng/L Final    Either a High Sensitivity Troponin T baseline (0 hours) value = 100 ng/L, or an increase in High Sensitivity Troponin T = 7 ng/L at 2 hours compared to 0 hours (2-0 hours), suggests myocardial injury, and urgent clinical attention is required.    If the 2-0 hours increase is <7 ng/L, a High Sensitivity Troponin T result above gender-specific reference ranges warrants further  evaluation.   Recommendations for further evaluation include correlation with clinical decision-making tool (e.g., HEART), a 3rd High Sensitivity Troponin T test 2 hours after the 2nd (a 20% change from baseline would represent concern), admission for observation, close PCC/cardiology follow-up, or urgent outpatient provocative testing.    Lactic Acid 02/10/2024 2.5 (H)  0.7 - 2.0 mmol/L Final    N terminal Pro BNP Inpatient 02/10/2024 5,114 (H)  0 - 900 pg/mL Final    Reference range shown and results flagged as abnormal are suggested inpatient cut points for confirming diagnosis if CHF in an acute setting. Establishing a baseline value for each individual patient is useful for follow-up. An inpatient or emergency department NT-proPBNP <300 pg/mL effectively rules out acute CHF, with 99% negative predictive value.    The outpatient non-acute reference range for ruling out CHF is:  0-125 pg/mL (age 18 to less than 75)  0-450 pg/mL (age 75 yrs and older)     WBC Count 02/10/2024 10.8  4.0 - 11.0 10e3/uL Final    RBC Count 02/10/2024 4.16  3.80 - 5.20 10e6/uL Final    Hemoglobin 02/10/2024 12.2  11.7 - 15.7 g/dL Final    Hematocrit 02/10/2024 37.3  35.0 - 47.0 % Final    MCV 02/10/2024 90  78 - 100 fL Final    MCH 02/10/2024 29.3  26.5 - 33.0 pg Final    MCHC 02/10/2024 32.7  31.5 - 36.5 g/dL Final    RDW 02/10/2024 13.3  10.0 - 15.0 % Final    Platelet Count 02/10/2024 382  150 - 450 10e3/uL Final    % Neutrophils 02/10/2024 73  % Final    % Lymphocytes 02/10/2024 16  % Final    % Monocytes 02/10/2024 10  % Final    % Eosinophils 02/10/2024 0  % Final    % Basophils 02/10/2024 0  % Final    % Immature Granulocytes 02/10/2024 1  % Final    NRBCs per 100 WBC 02/10/2024 0  <1 /100 Final    Absolute Neutrophils 02/10/2024 7.8  1.6 - 8.3 10e3/uL Final    Absolute Lymphocytes 02/10/2024 1.7  0.8 - 5.3 10e3/uL Final    Absolute Monocytes 02/10/2024 1.1  0.0 - 1.3 10e3/uL Final    Absolute Eosinophils 02/10/2024 0.0  0.0 -  0.7 10e3/uL Final    Absolute Basophils 02/10/2024 0.0  0.0 - 0.2 10e3/uL Final    Absolute Immature Granulocytes 02/10/2024 0.1  <=0.4 10e3/uL Final    Absolute NRBCs 02/10/2024 0.0  10e3/uL Final    Hold Specimen 02/10/2024 JIC   Final    Hold Specimen 02/10/2024 JIC   Final    Lactic Acid 02/10/2024 2.1 (H)  0.7 - 2.0 mmol/L Final    Hold Specimen 02/10/2024 JIC   Final    Troponin T, High Sensitivity 02/10/2024 22 (H)  <=14 ng/L Final    Either a High Sensitivity Troponin T baseline (0 hours) value = 100 ng/L, or an increase in High Sensitivity Troponin T = 7 ng/L at 2 hours compared to 0 hours (2-0 hours), suggests myocardial injury, and urgent clinical attention is required.    If the 2-0 hours increase is <7 ng/L, a High Sensitivity Troponin T result above gender-specific reference ranges warrants further evaluation.   Recommendations for further evaluation include correlation with clinical decision-making tool (e.g., HEART), a 3rd High Sensitivity Troponin T test 2 hours after the 2nd (a 20% change from baseline would represent concern), admission for observation, close PCC/cardiology follow-up, or urgent outpatient provocative testing.    Culture 02/10/2024 No Growth   Final    Culture 02/10/2024 No Growth   Final    Procalcitonin 02/10/2024 0.03  <0.50 ng/mL Final    Comment: Interpretation and Recommendations     <0.5 ng/mL:   Systemic bacterial infection unlikely. Local bacterial infection is possible.     0.5-1.99 ng/mL:   Systemic bacterial infection possible, but various other conditions are known to induce PCT as well.     >=2.00 ng/mL:   Systemic bacterial infection likely, unless other causes are known.     Decision to start antibiotics should not be based on procalcitonin level alone. See Procalcitonin Guidance document for more details. https://[employer]/files/[facility]/documents/adult-procalcitonin-guidance-on-inzgsvggyme00563.pdf    Factors that may affect PCT levels (not all-inclusive):     -  Increased PCT level           Severe trauma/burns           Invasive surgery           Cooling therapy after cardiac arrest/surgery           Treatment with agents which stimulate cytokines           Acute kidney injury           Chronic kidney disease and end stage renal disease           Acute graft vs host disease           Non-specific shock                            causing decreased organ perfusion and/or infarction       - Normal or unchanged PCT level           Early in infections (if low and infection is suspected, repeating in 6-12 hours is recommended)           Chronic infections (endocarditis, osteomyelitis, prosthetic device/graft infections)           Localized infections (cellulitis, wound infections, intra-abdominal abscess)     Note: PCT has not been extensively studied in pregnancy/breastfeeding, pediatrics, severe immunosuppression, and cystic fibrosis.    Phosphorus 02/10/2024 2.8  2.5 - 4.5 mg/dL Final    Culture 02/11/2024 4+ Normal yelena   Final    Gram Stain Result 02/11/2024 <10 Squamous epithelial cells/low power field   Final    Gram Stain Result 02/11/2024 >25 PMNs/low power field   Final    Gram Stain Result 02/11/2024 3+ Mixed yelena   Final    LVEF  02/10/2024 60-65%   Final    GLUCOSE BY METER POCT 02/10/2024 158 (H)  70 - 99 mg/dL Final    Potassium 02/10/2024 3.4  3.4 - 5.3 mmol/L Final    GLUCOSE BY METER POCT 02/10/2024 175 (H)  70 - 99 mg/dL Final    GLUCOSE BY METER POCT 02/10/2024 181 (H)  70 - 99 mg/dL Final    Potassium 02/11/2024 3.5  3.4 - 5.3 mmol/L Final    Sodium 02/11/2024 137  135 - 145 mmol/L Final    Reference intervals for this test were updated on 09/26/2023 to more accurately reflect our healthy population. There may be differences in the flagging of prior results with similar values performed with this method. Interpretation of those prior results can be made in the context of the updated reference intervals.     Potassium 02/11/2024 3.5  3.4 - 5.3 mmol/L Final     Carbon Dioxide (CO2) 02/11/2024 41 (H)  22 - 29 mmol/L Final    Anion Gap 02/11/2024 1 (L)  7 - 15 mmol/L Final    Urea Nitrogen 02/11/2024 13.5  8.0 - 23.0 mg/dL Final    Creatinine 02/11/2024 0.66  0.51 - 0.95 mg/dL Final    GFR Estimate 02/11/2024 >90  >60 mL/min/1.73m2 Final    Calcium 02/11/2024 7.7 (L)  8.8 - 10.2 mg/dL Final    Chloride 02/11/2024 95 (L)  98 - 107 mmol/L Final    Glucose 02/11/2024 130 (H)  70 - 99 mg/dL Final    Alkaline Phosphatase 02/11/2024 42  40 - 150 U/L Final    Reference intervals for this test were updated on 11/14/2023 to more accurately reflect our healthy population. There may be differences in the flagging of prior results with similar values performed with this method. Interpretation of those prior results can be made in the context of the updated reference intervals.    AST 02/11/2024 28  0 - 45 U/L Final    Reference intervals for this test were updated on 6/12/2023 to more accurately reflect our healthy population. There may be differences in the flagging of prior results with similar values performed with this method. Interpretation of those prior results can be made in the context of the updated reference intervals.    ALT 02/11/2024 75 (H)  0 - 50 U/L Final    Reference intervals for this test were updated on 6/12/2023 to more accurately reflect our healthy population. There may be differences in the flagging of prior results with similar values performed with this method. Interpretation of those prior results can be made in the context of the updated reference intervals.      Protein Total 02/11/2024 5.3 (L)  6.4 - 8.3 g/dL Final    Albumin 02/11/2024 3.1 (L)  3.5 - 5.2 g/dL Final    Bilirubin Total 02/11/2024 0.2  <=1.2 mg/dL Final    WBC Count 02/11/2024 11.8 (H)  4.0 - 11.0 10e3/uL Final    RBC Count 02/11/2024 3.68 (L)  3.80 - 5.20 10e6/uL Final    Hemoglobin 02/11/2024 10.7 (L)  11.7 - 15.7 g/dL Final    Hematocrit 02/11/2024 33.3 (L)  35.0 - 47.0 % Final    MCV  02/11/2024 91  78 - 100 fL Final    MCH 02/11/2024 29.1  26.5 - 33.0 pg Final    MCHC 02/11/2024 32.1  31.5 - 36.5 g/dL Final    RDW 02/11/2024 13.4  10.0 - 15.0 % Final    Platelet Count 02/11/2024 310  150 - 450 10e3/uL Final    GLUCOSE BY METER POCT 02/11/2024 208 (H)  70 - 99 mg/dL Final    GLUCOSE BY METER POCT 02/11/2024 140 (H)  70 - 99 mg/dL Final       No results found for any visits on 02/20/24.     Assessment:     1. COPD with acute exacerbation (H)    2. Frailty    3. Hospital discharge follow-up    4. Chronic obstructive pulmonary disease, unspecified COPD type (H)    5. Chronic diarrhea    6. Tobacco abuse    7. COVID-19 virus infection        Plan:     Try Duoneb and see if this helps beter for the cough and phlegm.  Consider other inhaler or nebulizer medication but insurance is a limitation.  Try colestipol for diarrhea.  Doing better.  Continue current medications otherwise.  Follow up sooner if issues.    No orders of the defined types were placed in this encounter.       40 minutes or greater was spent today on the patient's care on the day of service.      This includes time for chart preparation, reviewing medical tests done before or during the visit, talking with the patient, review of quality indicators, required documentation, and other elements of care.        Jorge Dunn MD  General Internal Medicine  [facility]    Return in about 31 days (around 3/22/2024).     Future Appointments   Date Time Provider Department Center   3/22/2024 10:00 AM [facility]   6/18/2024  2:00 PM [facility]   7/9/2024  1:00 PM Jorge Dunn MD MDINT[facility]

## 2024-02-22 PROBLEM — J44.1 COPD WITH ACUTE EXACERBATION (H): Status: RESOLVED | Noted: 2024-02-10 | Resolved: 2024-02-22

## 2024-02-22 NOTE — PATIENT INSTRUCTIONS
Future Appointments   Date Time Provider Department Center   3/22/2024 10:00 AM Jorge Dunn MD [facility]INTM [facility]   6/18/2024  2:00 PM Ryan Alonso MD MDENDO [facility]   7/9/2024  1:00 PM Jorge Dunn MD MDINTM [facility]

## 2024-03-04 NOTE — DISCHARGE SUMMARY
"[facility]  Hospitalist Discharge Summary      Date of Admission:  2/3/2024  Date of Discharge:  2/4/2024  4:30 PM  Discharging Provider: Luke Nguyen MD  Discharge Service: Hospitalist Service    Discharge Diagnoses       Jenn Barclay is a 68F presents with fever, back pain; pmhx includes COPD (FEV1 59% 2010, no home O2), AML (s/p marrow transplant), osteoporosis, chronic back pain with chronic opioid usage, tobacco dependence; admitted for acute hypoxic respiratory failure and COVID pneumonia.       2/4/24 :     Medically stable  Discharge today    A/p :       #COVID PNA  Acute covid. Has gotten vax x4.  -pulse ox  -CMP trend  -CBC trend  -dexamethasone  6mg PO every day  -NO remdesivir, started on paxlovid    Clinically stable, not needing any oxgyen  Discharge home on paxlovid, dexa for 10 day course     #COPD  #acute hypoxic respiratory failure  Unknown baseline hypoxia/oxygen demands; suspect likely chronic needs that have not been assessed. Last pulmonary evaluation was in 2010. FEV1 59% at that time.  -duonebs q6h  -albuterol q4h/PRN  -dexamethasone 6mg PO every day  -flutter therapy  Respiratory status improving, not needing any oxygen  Outpatient follow up     #hypokalemia  -BMP trend  -replete as indicated     #hypocalcemia  -CMP trend     #hyperglycemia  In acute setting  -CMP trend     #leukocytosis, mild  Most likely related to acute Covid infection.  -CBC trend     #tobacco dependence  Active 1ppd cigarette use, estimated 57pk/yr history. Precontemplative stage of cessation. Discussed for 4 minutes on night of admission     #chronic compression frax  -oxycodone PO PRN      Clinically Significant Risk Factors     # Cachexia: Estimated body mass index is 15.95 kg/m  as calculated from the following:    Height as of this encounter: 1.753 m (5' 9\").    Weight as of this encounter: 49 kg (108 lb).       Follow-ups Needed After Discharge   Follow-up Appointments     Follow-up " and recommended labs and tests       Follow up with primary care provider, Jorge Dunn, within 7 days for   hospital follow- up.  No follow up labs or test are needed.        {Additional follow-up instructions/to-do's for PCP    : none    Unresulted Labs Ordered in the Past 30 Days of this Admission       No orders found from 1/4/2024 to 2/4/2024.        These results will be followed up by PCP    Discharge Disposition   Discharged to home  Condition at discharge: Stable        Consultations This Hospital Stay   SMOKING CESSATION PROGRAM IP CONSULT  CARE MANAGEMENT / SOCIAL WORK IP CONSULT    Code Status   Prior    Time Spent on this Encounter   I, Luke Nguyen MD, personally saw the patient today and spent greater than 30 minutes discharging this patient.       Luke Nguyen MD  [place] [id number]  Phone: [phone]  Fax: [phone]  ______________________________________________________________________    Physical Exam   Vital Signs:                    Weight: 108 lbs 0 oz         GENERAL: The patient is not in any acute distressed. Awake and alert.  HEENT: Nonicteric sclerae, PERRLA, EOMI. Oropharynx clear. Moist mucous membranes. Conjunctivae appear well perfused.  HEART: Regular rate and rhythm without murmurs.  LUNGS: Clear to auscultation bilaterally. No wheezing or crackles.  ABDOMEN: Soft, positive bowel sounds, nontender.  SKIN: No rash, no excessive bruising, petechiae, or purpura.  EXTREMITIES : no rashes, no swelling in legs.  NEUROLOGIC: conscious and oriented, follows commands, no obvious focal deficits.  ROS: All other systems negative          Primary Care Physician   Jorge Dunn    Discharge Orders      Reason for your hospital stay    Covid infection     Follow-up and recommended labs and tests     Follow up with primary care provider, Jorge Dunn, within 7 days for hospital follow- up.  No follow up labs or test are  needed.     Activity    Your activity upon discharge: activity as tolerated     Diet    Follow this diet upon discharge: Orders Placed This Encounter      Combination Diet Regular Diet Adult       Significant Results and Procedures   Most Recent 3 CBC's:  Recent Labs   Lab Test 02/11/24  0448 02/10/24  0255 02/04/24  1035   WBC 11.8* 10.8 8.8   HGB 10.7* 12.2 11.3*   MCV 91 90 89    382 269     Most Recent 3 BMP's:  Recent Labs   Lab Test 02/11/24  1128 02/11/24  0945 02/11/24  0448 02/10/24  2221 02/10/24  0549 02/10/24  0255 02/04/24  1035   NA  --   --  137  --   --  143 134*   POTASSIUM  --   --  3.5  3.5 3.4  --  2.7* 3.5  3.5   CHLORIDE  --   --  95*  --   --  97* 96*   CO2  --   --  41*  --   --  37* 28   BUN  --   --  13.5  --   --  18.9 10.7   CR  --   --  0.66  --   --  0.67 0.71   ANIONGAP  --   --  1*  --   --  9 10   WILMER  --   --  7.7*  --   --  8.4* 7.7*   * 208* 130*  --    < > 124* 320*    < > = values in this interval not displayed.     Most Recent 2 LFT's:  Recent Labs   Lab Test 02/11/24  0448 02/10/24  0255   AST 28 70*   ALT 75* 103*   ALKPHOS 42 50   BILITOTAL 0.2 0.2     Most Recent 3 INR's:No lab results found.    Discharge Medications   Discharge Medication List as of 2/4/2024  4:16 PM        START taking these medications    Details   dexAMETHasone (DECADRON) 6 MG tablet Take 1 tablet (6 mg) by mouth daily for 8 days, Disp-8 tablet, R-0, E-Prescribe      Lidocaine (LIDOCARE) 4 % Patch Place 1 patch onto the skin every 24 hours To prevent lidocaine toxicity, patient should be patch free for 12 hrs daily.Disp-20 patch, R-0Local Print      nirmatrelvir and ritonavir (PAXLOVID) 300 mg/100 mg therapy pack Take 3 tablets by mouth 2 times daily for 5 days, Disp-30 tablet, R-0, E-PrescribeDate of symptom onset: 3; Risk criteria met: Yes; Weight >40 kg Yes; Renal fxn: normal;  Drug-Drug interactions reviewed & addressed: Yes           CONTINUE these medications which have NOT CHANGED     Details   albuterol (PROVENTIL) (2.5 MG/3ML) 0.083% neb solution Take 2.5 mg by nebulization every 6 hours as needed for shortness of breath, wheezing or cough, Historical      alendronate (FOSAMAX) 70 MG tablet Take 1 tablet by mouth once a week, Disp-13 tablet, R-3, E-Prescribe      ascorbic acid (VITAMIN C) 500 MG tablet Take 500 mg by mouth daily., Historical      augmented betamethasone dipropionate (DIPROLENE AF) 0.05 % external cream Apply topically 2 times daily Stronger cream for rash.Disp-50 g, [id number]      hydrOXYzine (ATARAX) 25 MG tablet Take 1 tablet (25 mg) by mouth 3 times daily as needed for itching, Disp-100 tablet, R-1, E-Prescribe      triamcinolone (KENALOG) 0.1 % external cream Apply topically 3 times dailyHistorical      Multiple Vitamins-Minerals (MULTIVITAL PO) Take  by mouth., Historical      oxyCODONE IR (ROXICODONE) 10 MG tablet Take 1 tablet (10 mg) by mouth 4 times daily for 30 days For use from 1/15/24 to 2/14/2024, Disp-120 tablet, R-0, E-Prescribe      nebulizer nebulization 1 nebulizer please as covered by insurance.  May also dispense supplies (tubing, inhalation device, etc) as needed., Disp-1 each, R-0, E-Prescribe           Allergies   Allergies   Allergen Reactions    Mirtazapine      Other reaction(s): Other (See Comments)  Hallucination     Tape [Adhesive Tape]      Allergy Hx  In addition to NO paper tape    Liquid Adhesive Rash     Other reaction(s): Other (See Comments)  Allergy Hx - Rash from paper tape

## 2024-03-15 DIAGNOSIS — M54.50 CHRONIC BILATERAL LOW BACK PAIN, UNSPECIFIED WHETHER SCIATICA PRESENT: ICD-10-CM

## 2024-03-15 DIAGNOSIS — G89.29 CHRONIC BILATERAL LOW BACK PAIN, UNSPECIFIED WHETHER SCIATICA PRESENT: ICD-10-CM

## 2024-03-15 RX ORDER — OXYCODONE HYDROCHLORIDE 10 MG/1
10 TABLET ORAL 4 TIMES DAILY
Qty: 120 TABLET | Refills: 0 | Status: SHIPPED | OUTPATIENT
Start: 2024-03-16 | End: 2024-04-15

## 2024-03-22 ENCOUNTER — OFFICE VISIT (OUTPATIENT)
Dept: INTERNAL MEDICINE | Facility: CLINIC | Age: 69
End: 2024-03-22
Payer: COMMERCIAL

## 2024-03-22 VITALS
RESPIRATION RATE: 18 BRPM | OXYGEN SATURATION: 97 % | SYSTOLIC BLOOD PRESSURE: 120 MMHG | WEIGHT: 106 LBS | DIASTOLIC BLOOD PRESSURE: 68 MMHG | BODY MASS INDEX: 15.17 KG/M2 | HEIGHT: 70 IN | HEART RATE: 70 BPM

## 2024-03-22 DIAGNOSIS — K86.81 EXOCRINE PANCREATIC INSUFFICIENCY: ICD-10-CM

## 2024-03-22 DIAGNOSIS — J44.9 CHRONIC OBSTRUCTIVE PULMONARY DISEASE, UNSPECIFIED COPD TYPE (H): ICD-10-CM

## 2024-03-22 DIAGNOSIS — M54.6 CHRONIC MIDLINE THORACIC BACK PAIN: ICD-10-CM

## 2024-03-22 DIAGNOSIS — G89.29 CHRONIC MIDLINE THORACIC BACK PAIN: ICD-10-CM

## 2024-03-22 DIAGNOSIS — K52.9 CHRONIC DIARRHEA: Primary | ICD-10-CM

## 2024-03-22 PROCEDURE — 99214 OFFICE O/P EST MOD 30 MIN: CPT | Performed by: INTERNAL MEDICINE

## 2024-03-22 PROCEDURE — G2211 COMPLEX E/M VISIT ADD ON: HCPCS | Performed by: INTERNAL MEDICINE

## 2024-03-22 RX ORDER — MONTELUKAST SODIUM 4 MG/1
2 TABLET, CHEWABLE ORAL DAILY
Qty: 180 TABLET | Refills: 3 | Status: SHIPPED | OUTPATIENT
Start: 2024-03-22 | End: 2025-03-17

## 2024-03-22 RX ORDER — RESPIRATORY SYNCYTIAL VIRUS VACCINE 120MCG/0.5
0.5 KIT INTRAMUSCULAR ONCE
Qty: 1 EACH | Refills: 0 | Status: CANCELLED | OUTPATIENT
Start: 2024-03-22 | End: 2024-03-22

## 2024-03-22 NOTE — PATIENT INSTRUCTIONS
Future Appointments   Date Time Provider Department Center   6/18/2024  2:00 PM Ryan Alonso MD [facility]   [facility]2024  1[facility]

## 2024-03-22 NOTE — PROGRESS NOTES
[facility] - [facility] Specialists    Comprehensive and complex medical care - Chronic disease management - Shared decision making - Care coordination - Compassionate care    Patient advocacy - Rational deprescribing - Minimally disruptive medicine - Ethical focus - Customized care         Date of Service: 3/22/2024  Primary Provider: Jorge Dunn    Patient Care Team:  Jorge Dunn MD as PCP - General (Internal Medicine)  Mohit Montano MD as MD (Hematology & Oncology)  Jorge Dunn MD as Assigned PCP  Joellen Nova MD as Resident (Dermatology)  Madison Alexander LICSW as  (BMT - Adult)  Ryan Alonso MD as MD (Endocrinology, Diabetes, and Metabolism)  Manju Asher, RN as BMT Nurse Coordinator  Umesh Brandt MD as BMT Physician (Transplant)  Jorge Dunn MD as Assigned Pain Medication Provider  Ryan Alonso MD as Assigned Endocrinology Provider  Donny Gunn MD as Assigned Gastroenterology Provider          Patient's Pharmacy:    [facility] [id number] [facility] [address]  [address]  [facility] [address]  Phone: [phone] Fax: [phone]    [facility] #[id number] [address]  [address] [place] [address]  Phone: [phone] Fax: [phone]     Patient's Contacts:  Name Home Phone Work Phone Mobile Phone Relationship Lgl JEAN Hernández [phone] Grandchild    ADALGISA JUARES   [phone] Child      Patient's Insurance:    Payor: AETNA / Plan: [employer] AETNA MEDICARE ADVANTAGE / Product Type: Medicare /           Active Problem List:  Problem List as of 3/22/2024 Reviewed: 2/3/2024  7:33 PM by Elisa Johnson    Full code status    Tobacco abuse    COPD (chronic obstructive pulmonary disease) (H)    History of acute myeloid leukemia    Status post allogeneic bone marrow  transplant (H) - 2010 - [facility]    Controlled substance agreement signed - 1/24 - oxycodone - UDS 1/24    Chronic, continuous use of opioids    Exocrine pancreatic insufficiency    Chronic back pain    Financial difficulties    Pseudogout    Traumatic compression fracture of T8 thoracic vertebra, closed, initial encounter (H)    Chronic diarrhea    Hypokalemia       Low    Nephrolithiasis    Anxiety    Gastric ulcer    Migraine headache    Papular rash, generalized    Senile purpura (H24)    Tubular adenoma of colon - advanced adenoma in 2017 - single small adenoma in 2020    OP (osteoporosis)    Frailty        Current Outpatient Medications   Medication Instructions    albuterol (PROVENTIL) 2.5 mg, Nebulization, EVERY 6 HOURS PRN    alendronate (FOSAMAX) 70 mg, Oral, WEEKLY    augmented betamethasone dipropionate (DIPROLENE AF) 0.05 % external cream Topical, 2 TIMES DAILY, Stronger cream for rash.    colestipol (COLESTID) 2 g, Oral, DAILY    guaiFENesin-dextromethorphan (ROBITUSSIN DM) 100-10 MG/5ML syrup 10 mLs, Oral, EVERY 4 HOURS PRN    hydrOXYzine HCl (ATARAX) 25 mg, Oral, 3 TIMES DAILY PRN    ipratropium - albuterol 0.5 mg/2.5 mg/3 mL (DUONEB) 0.5-2.5 (3) MG/3ML neb solution 3 mLs, Nebulization, EVERY 6 HOURS PRN    nebulizer nebulization 1 nebulizer please as covered by insurance.  May also dispense supplies (tubing, inhalation device, etc) as needed.    oxyCODONE IR (ROXICODONE) 10 mg, Oral, 4 TIMES DAILY, For use from 3/16/24 to 4/15/2024    triamcinolone (KENALOG) 0.1 % external cream Topical, 3 TIMES DAILY    vitamin C (ASCORBIC ACID) 500 mg, Oral, DAILY     Social History     Social History Narrative    , on disability.        Has family in the area.        Patient of Dr. Dunn since 2015.        Subjective:     Jenn Barclay is a 68 year old female who comes in today for:    Chief Complaint   Patient presents with    Follow Up          3/22/2024    10:07 AM   Additional Questions  "  Roomed by Adriana Cardona   Accompanied by N/A     Patient comes in today for follow-up of a number of issues.    She is doing better than she was at the last visit.  She feels better.    She is doing well with sleeping at this point.  When she was on the prednisone, this was not doing well.    She is taking colestipol to see if it helps with her bowels.  It has improved.  They are more formed but she still does have some looseness at times with this.  She would like to stay on the same dose and see how it goes.    We reviewed her COPD and there is been no new issues here.  She did have recent COVID infection as well.    We reviewed her other issues noted in the assessment but not specifically addressed in the HPI above.     Objective:     Wt Readings from Last 3 Encounters:   03/22/24 48.1 kg (106 lb)   02/20/24 46.3 kg (102 lb)   02/10/24 52.2 kg (115 lb 1.3 oz)     BP Readings from Last 3 Encounters:   03/22/24 120/68   02/20/24 (!) 140/78   02/11/24 137/77     /68 (BP Location: Right arm, Patient Position: Sitting, Cuff Size: Adult Regular)   Pulse 70   Resp 18   Ht 1.778 m (5' 10\")   Wt 48.1 kg (106 lb)   LMP  (LMP Unknown)   SpO2 97%   BMI 15.21 kg/m     The patient is comfortable, no acute distress.  Mood good.  Insight good.  Eyes are nonicteric.  Neck is supple without mass.  No cervical adenopathy.  No thyromegaly. Heart regular rate and rhythm.  Lungs clear to auscultation bilaterally.  Respiratory effort is good.  Extremities no edema.      Diagnostics:     Admission on 02/10/2024, Discharged on 02/11/2024   Component Date Value Ref Range Status    Systolic Blood Pressure 02/10/2024 199  mmHg Final    Diastolic Blood Pressure 02/10/2024 91  mmHg Final    Ventricular Rate 02/10/2024 63  BPM Final    Atrial Rate 02/10/2024 63  BPM Final    TN Interval 02/10/2024 148  ms Final    QRS Duration 02/10/2024 100  ms Final    QT 02/10/2024 412  ms Final    QTc 02/10/2024 421  ms Final    P Axis 02/10/2024 " 83  degrees Final    R AXIS 02/10/2024 73  degrees Final    T Axis 02/10/2024 72  degrees Final    Interpretation ECG 02/10/2024    Final                    Value:Sinus rhythm  Septal infarct (cited on or before 07-DEC-2022)  Abnormal ECG  When compared with ECG of 07-DEC-2022 01:07,  Incomplete right bundle branch block is no longer Present  Confirmed by SEE ED PROVIDER NOTE FOR, ECG INTERPRETATION ([id number]),  ROBBI NGUYỄN ([id number]) on 2/19/2024 4:22:52 AM      Sodium 02/10/2024 143  135 - 145 mmol/L Final    Reference intervals for this test were updated on 09/26/2023 to more accurately reflect our healthy population. There may be differences in the flagging of prior results with similar values performed with this method. Interpretation of those prior results can be made in the context of the updated reference intervals.     Potassium 02/10/2024 2.7 (L)  3.4 - 5.3 mmol/L Final    Carbon Dioxide (CO2) 02/10/2024 37 (H)  22 - 29 mmol/L Final    Anion Gap 02/10/2024 9  7 - 15 mmol/L Final    Urea Nitrogen 02/10/2024 18.9  8.0 - 23.0 mg/dL Final    Creatinine 02/10/2024 0.67  0.51 - 0.95 mg/dL Final    GFR Estimate 02/10/2024 >90  >60 mL/min/1.73m2 Final    Calcium 02/10/2024 8.4 (L)  8.8 - 10.2 mg/dL Final    Chloride 02/10/2024 97 (L)  98 - 107 mmol/L Final    Glucose 02/10/2024 124 (H)  70 - 99 mg/dL Final    Alkaline Phosphatase 02/10/2024 50  40 - 150 U/L Final    Reference intervals for this test were updated on 11/14/2023 to more accurately reflect our healthy population. There may be differences in the flagging of prior results with similar values performed with this method. Interpretation of those prior results can be made in the context of the updated reference intervals.    AST 02/10/2024 70 (H)  0 - 45 U/L Final    Reference intervals for this test were updated on 6/12/2023 to more accurately reflect our healthy population. There may be differences in the flagging of prior results with similar values  performed with this method. Interpretation of those prior results can be made in the context of the updated reference intervals.    ALT 02/10/2024 103 (H)  0 - 50 U/L Final    Reference intervals for this test were updated on 6/12/2023 to more accurately reflect our healthy population. There may be differences in the flagging of prior results with similar values performed with this method. Interpretation of those prior results can be made in the context of the updated reference intervals.      Protein Total 02/10/2024 6.1 (L)  6.4 - 8.3 g/dL Final    Albumin 02/10/2024 3.8  3.5 - 5.2 g/dL Final    Bilirubin Total 02/10/2024 0.2  <=1.2 mg/dL Final    pH Venous 02/10/2024 7.42  7.32 - 7.43 Final    pCO2 Venous 02/10/2024 57 (H)  40 - 50 mm Hg Final    pO2 Venous 02/10/2024 30  25 - 47 mm Hg Final    Bicarbonate Venous 02/10/2024 37 (H)  21 - 28 mmol/L Final    Base Excess/Deficit Venous 02/10/2024 13.0 (H)  -3.0 - 3.0 mmol/L Final    FIO2 02/10/2024 28   Final    Oxyhemoglobin Venous 02/10/2024 56 (L)  70 - 75 % Final    O2 Sat, Venous 02/10/2024 59.0 (L)  70.0 - 75.0 % Final    Magnesium 02/10/2024 2.1  1.7 - 2.3 mg/dL Final    Troponin T, High Sensitivity 02/10/2024 20 (H)  <=14 ng/L Final    Either a High Sensitivity Troponin T baseline (0 hours) value = 100 ng/L, or an increase in High Sensitivity Troponin T = 7 ng/L at 2 hours compared to 0 hours (2-0 hours), suggests myocardial injury, and urgent clinical attention is required.    If the 2-0 hours increase is <7 ng/L, a High Sensitivity Troponin T result above gender-specific reference ranges warrants further evaluation.   Recommendations for further evaluation include correlation with clinical decision-making tool (e.g., HEART), a 3rd High Sensitivity Troponin T test 2 hours after the 2nd (a 20% change from baseline would represent concern), admission for observation, close PCC/cardiology follow-up, or urgent outpatient provocative testing.    Lactic Acid  02/10/2024 2.5 (H)  0.7 - 2.0 mmol/L Final    N terminal Pro BNP Inpatient 02/10/2024 5,114 (H)  0 - 900 pg/mL Final    Reference range shown and results flagged as abnormal are suggested inpatient cut points for confirming diagnosis if CHF in an acute setting. Establishing a baseline value for each individual patient is useful for follow-up. An inpatient or emergency department NT-proPBNP <300 pg/mL effectively rules out acute CHF, with 99% negative predictive value.    The outpatient non-acute reference range for ruling out CHF is:  0-125 pg/mL (age 18 to less than 75)  0-450 pg/mL (age 75 yrs and older)     WBC Count 02/10/2024 10.8  4.0 - 11.0 10e3/uL Final    RBC Count 02/10/2024 4.16  3.80 - 5.20 10e6/uL Final    Hemoglobin 02/10/2024 12.2  11.7 - 15.7 g/dL Final    Hematocrit 02/10/2024 37.3  35.0 - 47.0 % Final    MCV 02/10/2024 90  78 - 100 fL Final    MCH 02/10/2024 29.3  26.5 - 33.0 pg Final    MCHC 02/10/2024 32.7  31.5 - 36.5 g/dL Final    RDW 02/10/2024 13.3  10.0 - 15.0 % Final    Platelet Count 02/10/2024 382  150 - 450 10e3/uL Final    % Neutrophils 02/10/2024 73  % Final    % Lymphocytes 02/10/2024 16  % Final    % Monocytes 02/10/2024 10  % Final    % Eosinophils 02/10/2024 0  % Final    % Basophils 02/10/2024 0  % Final    % Immature Granulocytes 02/10/2024 1  % Final    NRBCs per 100 WBC 02/10/2024 0  <1 /100 Final    Absolute Neutrophils 02/10/2024 7.8  1.6 - 8.3 10e3/uL Final    Absolute Lymphocytes 02/10/2024 1.7  0.8 - 5.3 10e3/uL Final    Absolute Monocytes 02/10/2024 1.1  0.0 - 1.3 10e3/uL Final    Absolute Eosinophils 02/10/2024 0.0  0.0 - 0.7 10e3/uL Final    Absolute Basophils 02/10/2024 0.0  0.0 - 0.2 10e3/uL Final    Absolute Immature Granulocytes 02/10/2024 0.1  <=0.4 10e3/uL Final    Absolute NRBCs 02/10/2024 0.0  10e3/uL Final    Hold Specimen 02/10/2024 JI   Final    Hold Specimen 02/10/2024 [facility]   Final    Lactic Acid 02/10/2024 2.1 (H)  0.7 - 2.0 mmol/L Final    Hold Specimen  02/10/2024 [facility]   Final    Troponin T, High Sensitivity 02/10/2024 22 (H)  <=14 ng/L Final    Either a High Sensitivity Troponin T baseline (0 hours) value = 100 ng/L, or an increase in High Sensitivity Troponin T = 7 ng/L at 2 hours compared to 0 hours (2-0 hours), suggests myocardial injury, and urgent clinical attention is required.    If the 2-0 hours increase is <7 ng/L, a High Sensitivity Troponin T result above gender-specific reference ranges warrants further evaluation.   Recommendations for further evaluation include correlation with clinical decision-making tool (e.g., HEART), a 3rd High Sensitivity Troponin T test 2 hours after the 2nd (a 20% change from baseline would represent concern), admission for observation, close PCC/cardiology follow-up, or urgent outpatient provocative testing.    Culture 02/10/2024 No Growth   Final    Culture 02/10/2024 No Growth   Final    Procalcitonin 02/10/2024 0.03  <0.50 ng/mL Final    Comment: Interpretation and Recommendations     <0.5 ng/mL:   Systemic bacterial infection unlikely. Local bacterial infection is possible.     0.5-1.99 ng/mL:   Systemic bacterial infection possible, but various other conditions are known to induce PCT as well.     >=2.00 ng/mL:   Systemic bacterial infection likely, unless other causes are known.     Decision to start antibiotics should not be based on procalcitonin level alone. See Procalcitonin Guidance document for more details. [email or web address]-[email or web address]-guidance-[email or web address]-[email or web address]    Factors that may affect PCT levels (not all-inclusive):     - Increased PCT level           Severe trauma/burns           Invasive surgery           Cooling therapy after cardiac arrest/surgery           Treatment with agents which stimulate cytokines           Acute kidney injury           Chronic kidney disease and end stage renal disease           Acute graft vs host disease           Non-specific shock                             causing decreased organ perfusion and/or infarction       - Normal or unchanged PCT level           Early in infections (if low and infection is suspected, repeating in 6-12 hours is recommended)           Chronic infections (endocarditis, osteomyelitis, prosthetic device/graft infections)           Localized infections (cellulitis, wound infections, intra-abdominal abscess)     Note: PCT has not been extensively studied in pregnancy/breastfeeding, pediatrics, severe immunosuppression, and cystic fibrosis.    Phosphorus 02/10/2024 2.8  2.5 - 4.5 mg/dL Final    Culture 02/11/2024 4+ Normal yelena   Final    Gram Stain Result 02/11/2024 <10 Squamous epithelial cells/low power field   Final    Gram Stain Result 02/11/2024 >25 PMNs/low power field   Final    Gram Stain Result 02/11/2024 3+ Mixed yelena   Final    LVEF  02/10/2024 60-65%   Final    GLUCOSE BY METER POCT 02/10/2024 158 (H)  70 - 99 mg/dL Final    Potassium 02/10/2024 3.4  3.4 - 5.3 mmol/L Final    GLUCOSE BY METER POCT 02/10/2024 175 (H)  70 - 99 mg/dL Final    GLUCOSE BY METER POCT 02/10/2024 181 (H)  70 - 99 mg/dL Final    Potassium 02/11/2024 3.5  3.4 - 5.3 mmol/L Final    Sodium 02/11/2024 137  135 - 145 mmol/L Final    Reference intervals for this test were updated on 09/26/2023 to more accurately reflect our healthy population. There may be differences in the flagging of prior results with similar values performed with this method. Interpretation of those prior results can be made in the context of the updated reference intervals.     Potassium 02/11/2024 3.5  3.4 - 5.3 mmol/L Final    Carbon Dioxide (CO2) 02/11/2024 41 (H)  22 - 29 mmol/L Final    Anion Gap 02/11/2024 1 (L)  7 - 15 mmol/L Final    Urea Nitrogen 02/11/2024 13.5  8.0 - 23.0 mg/dL Final    Creatinine 02/11/2024 0.66  0.51 - 0.95 mg/dL Final    GFR Estimate 02/11/2024 >90  >60 mL/min/1.73m2 Final    Calcium 02/11/2024 7.7 (L)  8.8 - 10.2 mg/dL Final    Chloride  02/11/2024 95 (L)  98 - 107 mmol/L Final    Glucose 02/11/2024 130 (H)  70 - 99 mg/dL Final    Alkaline Phosphatase 02/11/2024 42  40 - 150 U/L Final    Reference intervals for this test were updated on 11/14/2023 to more accurately reflect our healthy population. There may be differences in the flagging of prior results with similar values performed with this method. Interpretation of those prior results can be made in the context of the updated reference intervals.    AST 02/11/2024 28  0 - 45 U/L Final    Reference intervals for this test were updated on 6/12/2023 to more accurately reflect our healthy population. There may be differences in the flagging of prior results with similar values performed with this method. Interpretation of those prior results can be made in the context of the updated reference intervals.    ALT 02/11/2024 75 (H)  0 - 50 U/L Final    Reference intervals for this test were updated on 6/12/2023 to more accurately reflect our healthy population. There may be differences in the flagging of prior results with similar values performed with this method. Interpretation of those prior results can be made in the context of the updated reference intervals.      Protein Total 02/11/2024 5.3 (L)  6.4 - 8.3 g/dL Final    Albumin 02/11/2024 3.1 (L)  3.5 - 5.2 g/dL Final    Bilirubin Total 02/11/2024 0.2  <=1.2 mg/dL Final    WBC Count 02/11/2024 11.8 (H)  4.0 - 11.0 10e3/uL Final    RBC Count 02/11/2024 3.68 (L)  3.80 - 5.20 10e6/uL Final    Hemoglobin 02/11/2024 10.7 (L)  11.7 - 15.7 g/dL Final    Hematocrit 02/11/2024 33.3 (L)  35.0 - 47.0 % Final    MCV 02/11/2024 91  78 - 100 fL Final    MCH 02/11/2024 29.1  26.5 - 33.0 pg Final    MCHC 02/11/2024 32.1  31.5 - 36.5 g/dL Final    RDW 02/11/2024 13.4  10.0 - 15.0 % Final    Platelet Count 02/11/2024 310  150 - 450 10e3/uL Final    GLUCOSE BY METER POCT 02/11/2024 208 (H)  70 - 99 mg/dL Final    GLUCOSE BY METER POCT 02/11/2024 140 (H)  70 - 99  mg/dL Final       No results found for any visits on 03/22/24.    Assessment:     1. Chronic diarrhea    2. Chronic obstructive pulmonary disease, unspecified COPD type (H)    3. Chronic midline thoracic back pain    4. Exocrine pancreatic insufficiency        Plan:     Continue colestipol at this time.  Can increase to twice daily if needed in the future.  Continue current medications as is.  Follow up sooner if issues.    No orders of the defined types were placed in this encounter.          Jorge Dunn MD  General Internal Medicine  [facility]    The longitudinal plan of care for the diagnoses and conditions as documented were addressed during this visit. Due to the added complexity in care, I will continue to support Jenn in the subsequent management and with ongoing continuity of care.     Return in about 4 months (around 7/9/2024) for follow up visit.     Future Appointments   Date Time Provider Department Center   6/18/2024  2:00 PM Ryan Alonso MD [facility]   [facility]2024  1[facility]00 [facility]

## 2024-04-15 ENCOUNTER — TELEPHONE (OUTPATIENT)
Dept: INTERNAL MEDICINE | Facility: CLINIC | Age: 69
End: 2024-04-15
Payer: COMMERCIAL

## 2024-04-15 DIAGNOSIS — M54.50 CHRONIC BILATERAL LOW BACK PAIN, UNSPECIFIED WHETHER SCIATICA PRESENT: ICD-10-CM

## 2024-04-15 DIAGNOSIS — G89.29 CHRONIC BILATERAL LOW BACK PAIN, UNSPECIFIED WHETHER SCIATICA PRESENT: ICD-10-CM

## 2024-04-15 RX ORDER — OXYCODONE HYDROCHLORIDE 10 MG/1
10 TABLET ORAL 4 TIMES DAILY
Qty: 120 TABLET | Refills: 0 | Status: SHIPPED | OUTPATIENT
Start: 2024-04-15 | End: 2024-05-14

## 2024-04-15 NOTE — TELEPHONE ENCOUNTER
Medication Question or Refill        What medication are you calling about (include dose and sig)?: oxyCODONE Take 1 tablet (10 mg) by mouth 4 times daily     Preferred Pharmacy:     [facility] #[id number] [address]  [address] [place] [address]  Phone: [phone] Fax: [phone]      Controlled Substance Agreement on file:   CSA -- Patient Level:     [Media Unavailable] Controlled Substance Agreement - Opioid - Scan on 1/9/2024 10:15 AM   [Media Unavailable] Controlled Substance Agreement - Opioid - Scan on 1/9/2023  9:02 AM   [Media Unavailable] Controlled Substance Agreement - Opioid - Scan on 1/3/2022  1:31 PM   [Media Unavailable] Controlled Substance Agreement - Opioid - Scan on 12/18/2019   [Media Unavailable] Controlled Substance Agreement - Opioid - Scan on 12/28/2018   [Media Unavailable] Controlled Substance Agreement - Opioid - Scan on 8/9/2017: [facility]       Who prescribed the medication?: Dr Dunn    Do you need a refill? Yes    When did you use the medication last? today    Patient offered an appointment?  7/9/24    Do you have any questions or concerns?  No      Okay to leave a detailed message?: Yes at Cell number on file:    Telephone Information:   Mobile [phone]

## 2024-05-14 ENCOUNTER — TELEPHONE (OUTPATIENT)
Dept: INTERNAL MEDICINE | Facility: CLINIC | Age: 69
End: 2024-05-14
Payer: COMMERCIAL

## 2024-05-14 DIAGNOSIS — G89.29 CHRONIC BILATERAL LOW BACK PAIN, UNSPECIFIED WHETHER SCIATICA PRESENT: ICD-10-CM

## 2024-05-14 DIAGNOSIS — M54.50 CHRONIC BILATERAL LOW BACK PAIN, UNSPECIFIED WHETHER SCIATICA PRESENT: ICD-10-CM

## 2024-05-14 RX ORDER — OXYCODONE HYDROCHLORIDE 10 MG/1
10 TABLET ORAL 4 TIMES DAILY
Qty: 120 TABLET | Refills: 0 | Status: SHIPPED | OUTPATIENT
Start: 2024-05-15 | End: 2024-06-11

## 2024-05-14 NOTE — TELEPHONE ENCOUNTER
Medication is genet'd up for verification and approval, if appropriate.       Thank you,  Jhon Navarro Jr., CMA on 5/14/2024 at 12:31 PM

## 2024-05-14 NOTE — TELEPHONE ENCOUNTER
Medication Question or Refill    Contacts         Type Contact Phone/Fax    05/14/2024 08:28 AM CDT Phone (Incoming) Jenn Barclay (Self) [phone] (M)            What medication are you calling about (include dose and sig)?: oxyCODONE IR (ROXICODONE) 10 MG tablet     Preferred Pharmacy:   [facility] #[id number] [address] [address]  Phone: [phone] Fax: [phone]      Controlled Substance Agreement on file:   CSA -- Patient Level:     [Media Unavailable] Controlled Substance Agreement - Opioid - Scan on 1/9/2024 10:15 AM   [Media Unavailable] Controlled Substance Agreement - Opioid - Scan on 1/9/2023  9:02 AM   [Media Unavailable] Controlled Substance Agreement - Opioid - Scan on 1/3/2022  1:31 PM   [Media Unavailable] Controlled Substance Agreement - Opioid - Scan on 12/18/2019   [Media Unavailable] Controlled Substance Agreement - Opioid - Scan on 12/28/2018   [Media Unavailable] Controlled Substance Agreement - Opioid - Scan on 8/9/2017: [facility]       Who prescribed the medication?: Dr. Dunn    Do you need a refill? Yes    When did you use the medication last? 05/14    Patient offered an appointment? No    Do you have any questions or concerns?  Yes: PT only has a couple left       Okay to leave a detailed message?: Yes at Cell number on file:    Telephone Information:   Mobile [phone]

## 2024-05-14 NOTE — TELEPHONE ENCOUNTER
PDMP Review         Value Time User    State PDMP site checked  Yes 4/15/2024  2:04 PM Camila Gilbert, NP

## 2024-06-11 DIAGNOSIS — M54.50 CHRONIC BILATERAL LOW BACK PAIN, UNSPECIFIED WHETHER SCIATICA PRESENT: ICD-10-CM

## 2024-06-11 DIAGNOSIS — G89.29 CHRONIC BILATERAL LOW BACK PAIN, UNSPECIFIED WHETHER SCIATICA PRESENT: ICD-10-CM

## 2024-06-11 RX ORDER — OXYCODONE HYDROCHLORIDE 10 MG/1
10 TABLET ORAL 4 TIMES DAILY
Qty: 120 TABLET | Refills: 0 | Status: SHIPPED | OUTPATIENT
Start: 2024-06-14 | End: 2024-07-11

## 2024-06-11 NOTE — TELEPHONE ENCOUNTER
Called patient and rescheduled appointment. Patient very thankful. Patient also needs a refill of oxycodone, genet'd up for PCP.

## 2024-06-11 NOTE — TELEPHONE ENCOUNTER
Reason for Call:  Appointment Request    Patient requesting this type of appt:  Hair loss 2 months and med review    Requested provider: Jorge Dunn    Reason patient unable to be scheduled: Not within requested timeframe    When does patient want to be seen/preferred time: 3-7 days    Comments: Patient is concerned about recent hair loss and cancer. She is current ly scheduled for 7/9/24. Please advise at the number provided.    Okay to leave a detailed message?: Yes at Cell number on file:    Telephone Information:   Mobile [phone]       Call taken on 6/11/2024 at 12:04 PM by Brenda Henley

## 2024-06-11 NOTE — TELEPHONE ENCOUNTER
There is a reserved slot this Thursday if she would like to use it and it is still available.  Otherwise, can use other future reserved slot.    Thank you,    Jorge Dunn MD  General Internal Medicine  [facility]  6/11/2024, 1:46 PM

## 2024-06-13 ENCOUNTER — OFFICE VISIT (OUTPATIENT)
Dept: INTERNAL MEDICINE | Facility: CLINIC | Age: 69
End: 2024-06-13
Payer: COMMERCIAL

## 2024-06-13 VITALS
HEIGHT: 70 IN | BODY MASS INDEX: 15.07 KG/M2 | SYSTOLIC BLOOD PRESSURE: 128 MMHG | HEART RATE: 78 BPM | TEMPERATURE: 97.9 F | RESPIRATION RATE: 18 BRPM | OXYGEN SATURATION: 96 % | DIASTOLIC BLOOD PRESSURE: 64 MMHG | WEIGHT: 105.3 LBS

## 2024-06-13 DIAGNOSIS — K52.9 CHRONIC DIARRHEA: ICD-10-CM

## 2024-06-13 DIAGNOSIS — L72.0 EPIDERMOID CYST OF FACE: ICD-10-CM

## 2024-06-13 DIAGNOSIS — L65.9 HAIR LOSS: Primary | ICD-10-CM

## 2024-06-13 DIAGNOSIS — K90.9 INTESTINAL MALABSORPTION, UNSPECIFIED TYPE: ICD-10-CM

## 2024-06-13 DIAGNOSIS — J44.9 CHRONIC OBSTRUCTIVE PULMONARY DISEASE, UNSPECIFIED COPD TYPE (H): ICD-10-CM

## 2024-06-13 LAB
ERYTHROCYTE [DISTWIDTH] IN BLOOD BY AUTOMATED COUNT: 12.5 % (ref 10–15)
HCT VFR BLD AUTO: 39.8 % (ref 35–47)
HGB BLD-MCNC: 13.2 G/DL (ref 11.7–15.7)
MCH RBC QN AUTO: 29.4 PG (ref 26.5–33)
MCHC RBC AUTO-ENTMCNC: 33.2 G/DL (ref 31.5–36.5)
MCV RBC AUTO: 89 FL (ref 78–100)
PLATELET # BLD AUTO: 264 10E3/UL (ref 150–450)
RBC # BLD AUTO: 4.49 10E6/UL (ref 3.8–5.2)
WBC # BLD AUTO: 9.1 10E3/UL (ref 4–11)

## 2024-06-13 PROCEDURE — 82728 ASSAY OF FERRITIN: CPT | Performed by: INTERNAL MEDICINE

## 2024-06-13 PROCEDURE — 82180 ASSAY OF ASCORBIC ACID: CPT | Mod: 90 | Performed by: INTERNAL MEDICINE

## 2024-06-13 PROCEDURE — 85027 COMPLETE CBC AUTOMATED: CPT | Performed by: INTERNAL MEDICINE

## 2024-06-13 PROCEDURE — 99214 OFFICE O/P EST MOD 30 MIN: CPT | Performed by: INTERNAL MEDICINE

## 2024-06-13 PROCEDURE — 83550 IRON BINDING TEST: CPT | Performed by: INTERNAL MEDICINE

## 2024-06-13 PROCEDURE — 84443 ASSAY THYROID STIM HORMONE: CPT | Performed by: INTERNAL MEDICINE

## 2024-06-13 PROCEDURE — 99000 SPECIMEN HANDLING OFFICE-LAB: CPT | Performed by: INTERNAL MEDICINE

## 2024-06-13 PROCEDURE — 83540 ASSAY OF IRON: CPT | Performed by: INTERNAL MEDICINE

## 2024-06-13 PROCEDURE — 84630 ASSAY OF ZINC: CPT | Mod: 90 | Performed by: INTERNAL MEDICINE

## 2024-06-13 PROCEDURE — 36415 COLL VENOUS BLD VENIPUNCTURE: CPT | Performed by: INTERNAL MEDICINE

## 2024-06-13 PROCEDURE — G2211 COMPLEX E/M VISIT ADD ON: HCPCS | Performed by: INTERNAL MEDICINE

## 2024-06-13 RX ORDER — RESPIRATORY SYNCYTIAL VIRUS VACCINE 120MCG/0.5
0.5 KIT INTRAMUSCULAR ONCE
Qty: 1 EACH | Refills: 0 | Status: CANCELLED | OUTPATIENT
Start: 2024-06-13 | End: 2024-06-13

## 2024-06-13 ASSESSMENT — PAIN SCALES - GENERAL: PAINLEVEL: NO PAIN (0)

## 2024-06-13 NOTE — PROGRESS NOTES
[facility]    Comprehensive and complex medical care - Chronic disease management - Shared decision making - Care coordination - Compassionate care    Patient advocacy - Rational deprescribing - Minimally disruptive medicine - Ethical focus - Customized care         Date of Service: 6/13/2024  Primary Provider: Jorge Dunn    Patient Care Team:  Jorge Dunn MD as PCP - General (Internal Medicine)  Mohit Montano MD as MD (Hematology & Oncology)  Jorge Dunn MD as Assigned PCP  Joellen Nova MD as Resident (Dermatology)  Madison Alexander LICSW as  (BMT - Adult)  Ryan Alonso MD as MD (Endocrinology, Diabetes, and Metabolism)  Manju Asher, RN as BMT Nurse Coordinator  Umesh Brandt MD as BMT Physician (Transplant)  Jorge Dunn MD as Assigned Pain Medication Provider  Ryan Alonso MD as Assigned Endocrinology Provider  Donny Gunn MD as Assigned Gastroenterology Provider          Patient's Pharmacy:    [facility] [id number] [facility] [address]  [address]  [facility] [address]  Phone: [phone] Fax: [phone]    [id number] [address]  [address] [place] [id number]  Phone: [phone] Fax: [phone]     Patient's Contacts:  Name Home Phone Work Phone Mobile Phone Relationship Lgl JEAN Hernández [phone] Grandchild    ADALGISA JUARES   [phone] Child      Patient's Insurance:    Payor: AETNA / Plan: [employer] MEDICARE ADVANTAGE / Product Type: Medicare /           Active Problem List:  Problem List as of 6/13/2024 Reviewed: 2/3/2024  7:33 PM by Elisa Johnson    Full code status    Tobacco abuse    COPD (chronic obstructive pulmonary disease) (H)    History of acute myeloid leukemia    Status post allogeneic bone marrow  transplant (H) - [place] [facility] [place]    Controlled substance agreement signed - 1/24 - oxycodone - UDS 1/24    Chronic, continuous use of opioids    Exocrine pancreatic insufficiency    Chronic back pain    Financial difficulties    Pseudogout    Traumatic compression fracture of T8 thoracic vertebra, closed, initial encounter (H)    Chronic diarrhea    Hypokalemia       Low    Nephrolithiasis    Anxiety    Gastric ulcer    Migraine headache    Papular rash, generalized    Senile purpura (H24)    Tubular adenoma of colon - advanced adenoma in 2017 - single small adenoma in 2020    OP (osteoporosis)    Frailty        Current Outpatient Medications   Medication Instructions    albuterol (PROVENTIL) 2.5 mg, Nebulization, EVERY 6 HOURS PRN    alendronate (FOSAMAX) 70 mg, Oral, WEEKLY    augmented betamethasone dipropionate (DIPROLENE AF) 0.05 % external cream Topical, 2 TIMES DAILY, Stronger cream for rash.    colestipol (COLESTID) 2 g, Oral, DAILY    guaiFENesin-dextromethorphan (ROBITUSSIN DM) 100-10 MG/5ML syrup 10 mLs, Oral, EVERY 4 HOURS PRN    hydrOXYzine HCl (ATARAX) 25 mg, Oral, 3 TIMES DAILY PRN    ipratropium - albuterol 0.5 mg/2.5 mg/3 mL (DUONEB) 0.5-2.5 (3) MG/3ML neb solution 3 mLs, Nebulization, EVERY 6 HOURS PRN    nebulizer nebulization 1 nebulizer please as covered by insurance.  May also dispense supplies (tubing, inhalation device, etc) as needed.    [START ON 6/14/2024] oxyCODONE IR (ROXICODONE) 10 mg, Oral, 4 TIMES DAILY, For use from 6/14/24-7/14/24.    triamcinolone (KENALOG) 0.1 % external cream Topical, 3 TIMES DAILY    vitamin C (ASCORBIC ACID) 500 mg, Oral, DAILY     Social History     Social History Narrative    , on disability.        Has family in the area.        Patient of Dr. Dunn since 2015.        Subjective:     Jenn Barclay is a 68 year old female who comes in today for:    Chief Complaint   Patient presents with    Recheck Medication    Hair Loss           "6/13/2024     1:14 PM   Additional Questions   Roomed by KATRIN Portillo   Accompanied by Self     In to review hair loss with me.  More of an issue in the last 1-2 weeks.  Diffuse not patchy.  Mainly when pulls at or brushes hair.    Reviewed chronic obstructive pulmonary disease (COPD) and no recent prednisone use.  Doing okay.    Reviewed recurrent cysts of the cheeks of the face.  They continue to bother and give issues.  Whitish discharge when squeezes at them.    We reviewed her other issues noted in the assessment but not specifically addressed in the HPI above.     Objective:     Wt Readings from Last 3 Encounters:   06/13/24 47.8 kg (105 lb 4.8 oz)   03/22/24 48.1 kg (106 lb)   02/20/24 46.3 kg (102 lb)     BP Readings from Last 3 Encounters:   06/13/24 128/64   03/22/24 120/68   02/20/24 (!) 140/78     /64 (BP Location: Right arm, Patient Position: Sitting, Cuff Size: Adult Small)   Pulse 78   Temp 97.9  F (36.6  C) (Oral)   Resp 18   Ht 1.778 m (5' 10\")   Wt 47.8 kg (105 lb 4.8 oz)   LMP  (LMP Unknown)   SpO2 96%   BMI 15.11 kg/m     The patient is comfortable, no acute distress.  Mood good.  Insight good.  Eyes are nonicteric.  Neck is supple without mass.  No cervical adenopathy.  No thyromegaly. Heart regular rate and rhythm.  Lungs clear to auscultation bilaterally.  Respiratory effort is good.  Extremities no edema.  Diffuse hair loss without alopecia areata.      Diagnostics:     Admission on 02/10/2024, Discharged on 02/11/2024   Component Date Value Ref Range Status    Systolic Blood Pressure 02/10/2024 199  mmHg Final    Diastolic Blood Pressure 02/10/2024 91  mmHg Final    Ventricular Rate 02/10/2024 63  BPM Final    Atrial Rate 02/10/2024 63  BPM Final    CO Interval 02/10/2024 148  ms Final    QRS Duration 02/10/2024 100  ms Final    QT 02/10/2024 412  ms Final    QTc 02/10/2024 421  ms Final    P Axis 02/10/2024 83  degrees Final    R AXIS 02/10/2024 73  degrees Final    T Axis " 02/10/2024 72  degrees Final    Interpretation ECG 02/10/2024    Final                    Value:Sinus rhythm  Septal infarct (cited on or before 07-DEC-2022)  Abnormal ECG  When compared with ECG of 07-DEC-2022 01:07,  Incomplete right bundle branch block is no longer Present  Confirmed by SEE ED PROVIDER NOTE FOR, ECG INTERPRETATION ([id number]),  ROBBI NGUYỄN ([id number]) on 2/19/2024 4:22:52 AM      Sodium 02/10/2024 143  135 - 145 mmol/L Final    Reference intervals for this test were updated on 09/26/2023 to more accurately reflect our healthy population. There may be differences in the flagging of prior results with similar values performed with this method. Interpretation of those prior results can be made in the context of the updated reference intervals.     Potassium 02/10/2024 2.7 (L)  3.4 - 5.3 mmol/L Final    Carbon Dioxide (CO2) 02/10/2024 37 (H)  22 - 29 mmol/L Final    Anion Gap 02/10/2024 9  7 - 15 mmol/L Final    Urea Nitrogen 02/10/2024 18.9  8.0 - 23.0 mg/dL Final    Creatinine 02/10/2024 0.67  0.51 - 0.95 mg/dL Final    GFR Estimate 02/10/2024 >90  >60 mL/min/1.73m2 Final    Calcium 02/10/2024 8.4 (L)  8.8 - 10.2 mg/dL Final    Chloride 02/10/2024 97 (L)  98 - 107 mmol/L Final    Glucose 02/10/2024 124 (H)  70 - 99 mg/dL Final    Alkaline Phosphatase 02/10/2024 50  40 - 150 U/L Final    Reference intervals for this test were updated on 11/14/2023 to more accurately reflect our healthy population. There may be differences in the flagging of prior results with similar values performed with this method. Interpretation of those prior results can be made in the context of the updated reference intervals.    AST 02/10/2024 70 (H)  0 - 45 U/L Final    Reference intervals for this test were updated on 6/12/2023 to more accurately reflect our healthy population. There may be differences in the flagging of prior results with similar values performed with this method. Interpretation of those prior results  can be made in the context of the updated reference intervals.    ALT 02/10/2024 103 (H)  0 - 50 U/L Final    Reference intervals for this test were updated on 6/12/2023 to more accurately reflect our healthy population. There may be differences in the flagging of prior results with similar values performed with this method. Interpretation of those prior results can be made in the context of the updated reference intervals.      Protein Total 02/10/2024 6.1 (L)  6.4 - 8.3 g/dL Final    Albumin 02/10/2024 3.8  3.5 - 5.2 g/dL Final    Bilirubin Total 02/10/2024 0.2  <=1.2 mg/dL Final    pH Venous 02/10/2024 7.42  7.32 - 7.43 Final    pCO2 Venous 02/10/2024 57 (H)  40 - 50 mm Hg Final    pO2 Venous 02/10/2024 30  25 - 47 mm Hg Final    Bicarbonate Venous 02/10/2024 37 (H)  21 - 28 mmol/L Final    Base Excess/Deficit Venous 02/10/2024 13.0 (H)  -3.0 - 3.0 mmol/L Final    FIO2 02/10/2024 28   Final    Oxyhemoglobin Venous 02/10/2024 56 (L)  70 - 75 % Final    O2 Sat, Venous 02/10/2024 59.0 (L)  70.0 - 75.0 % Final    Magnesium 02/10/2024 2.1  1.7 - 2.3 mg/dL Final    Troponin T, High Sensitivity 02/10/2024 20 (H)  <=14 ng/L Final    Either a High Sensitivity Troponin T baseline (0 hours) value = 100 ng/L, or an increase in High Sensitivity Troponin T = 7 ng/L at 2 hours compared to 0 hours (2-0 hours), suggests myocardial injury, and urgent clinical attention is required.    If the 2-0 hours increase is <7 ng/L, a High Sensitivity Troponin T result above gender-specific reference ranges warrants further evaluation.   Recommendations for further evaluation include correlation with clinical decision-making tool (e.g., HEART), a 3rd High Sensitivity Troponin T test 2 hours after the 2nd (a 20% change from baseline would represent concern), admission for observation, close PCC/cardiology follow-up, or urgent outpatient provocative testing.    Lactic Acid 02/10/2024 2.5 (H)  0.7 - 2.0 mmol/L Final    N terminal Pro BNP  Inpatient 02/10/2024 5,114 (H)  0 - 900 pg/mL Final    Reference range shown and results flagged as abnormal are suggested inpatient cut points for confirming diagnosis if CHF in an acute setting. Establishing a baseline value for each individual patient is useful for follow-up. An inpatient or emergency department NT-proPBNP <300 pg/mL effectively rules out acute CHF, with 99% negative predictive value.    The outpatient non-acute reference range for ruling out CHF is:  0-125 pg/mL (age 18 to less than 75)  0-450 pg/mL (age 75 yrs and older)     WBC Count 02/10/2024 10.8  4.0 - 11.0 10e3/uL Final    RBC Count 02/10/2024 4.16  3.80 - 5.20 10e6/uL Final    Hemoglobin 02/10/2024 12.2  11.7 - 15.7 g/dL Final    Hematocrit 02/10/2024 37.3  35.0 - 47.0 % Final    MCV 02/10/2024 90  78 - 100 fL Final    MCH 02/10/2024 29.3  26.5 - 33.0 pg Final    MCHC 02/10/2024 32.7  31.5 - 36.5 g/dL Final    RDW 02/10/2024 13.3  10.0 - 15.0 % Final    Platelet Count 02/10/2024 382  150 - 450 10e3/uL Final    % Neutrophils 02/10/2024 73  % Final    % Lymphocytes 02/10/2024 16  % Final    % Monocytes 02/10/2024 10  % Final    % Eosinophils 02/10/2024 0  % Final    % Basophils 02/10/2024 0  % Final    % Immature Granulocytes 02/10/2024 1  % Final    NRBCs per 100 WBC 02/10/2024 0  <1 /100 Final    Absolute Neutrophils 02/10/2024 7.8  1.6 - 8.3 10e3/uL Final    Absolute Lymphocytes 02/10/2024 1.7  0.8 - 5.3 10e3/uL Final    Absolute Monocytes 02/10/2024 1.1  0.0 - 1.3 10e3/uL Final    Absolute Eosinophils 02/10/2024 0.0  0.0 - 0.7 10e3/uL Final    Absolute Basophils 02/10/2024 0.0  0.0 - 0.2 10e3/uL Final    Absolute Immature Granulocytes 02/10/2024 0.1  <=0.4 10e3/uL Final    Absolute NRBCs 02/10/2024 0.0  10e3/uL Final    Hold Specimen 02/10/2024 [facility]   Final    Hold Specimen 02/10/2024 [facility]   Final    Lactic Acid 02/10/2024 2.1 (H)  0.7 - 2.0 mmol/L Final    Hold Specimen 02/10/2024 [facility]   Final    Troponin T, High Sensitivity 02/10/2024  22 (H)  <=14 ng/L Final    Either a High Sensitivity Troponin T baseline (0 hours) value = 100 ng/L, or an increase in High Sensitivity Troponin T = 7 ng/L at 2 hours compared to 0 hours (2-0 hours), suggests myocardial injury, and urgent clinical attention is required.    If the 2-0 hours increase is <7 ng/L, a High Sensitivity Troponin T result above gender-specific reference ranges warrants further evaluation.   Recommendations for further evaluation include correlation with clinical decision-making tool (e.g., HEART), a 3rd High Sensitivity Troponin T test 2 hours after the 2nd (a 20% change from baseline would represent concern), admission for observation, close PCC/cardiology follow-up, or urgent outpatient provocative testing.    Culture 02/10/2024 No Growth   Final    Culture 02/10/2024 No Growth   Final    Procalcitonin 02/10/2024 0.03  <0.50 ng/mL Final    Comment: Interpretation and Recommendations     <0.5 ng/mL:   Systemic bacterial infection unlikely. Local bacterial infection is possible.     0.5-1.99 ng/mL:   Systemic bacterial infection possible, but various other conditions are known to induce PCT as well.     >=2.00 ng/mL:   Systemic bacterial infection likely, unless other causes are known.     Decision to start antibiotics should not be based on procalcitonin level alone. See Procalcitonin Guidance document for more details. https://[employer].com/files/[facility]/documents/adult-procalcitonin-guidance-on-[email or web address].pdf    Factors that may affect PCT levels (not all-inclusive):     - Increased PCT level           Severe trauma/burns           Invasive surgery           Cooling therapy after cardiac arrest/surgery           Treatment with agents which stimulate cytokines           Acute kidney injury           Chronic kidney disease and end stage renal disease           Acute graft vs host disease           Non-specific shock                            causing decreased organ perfusion and/or  infarction       - Normal or unchanged PCT level           Early in infections (if low and infection is suspected, repeating in 6-12 hours is recommended)           Chronic infections (endocarditis, osteomyelitis, prosthetic device/graft infections)           Localized infections (cellulitis, wound infections, intra-abdominal abscess)     Note: PCT has not been extensively studied in pregnancy/breastfeeding, pediatrics, severe immunosuppression, and cystic fibrosis.    Phosphorus 02/10/2024 2.8  2.5 - 4.5 mg/dL Final    Culture 02/11/2024 4+ Normal yelena   Final    Gram Stain Result 02/11/2024 <10 Squamous epithelial cells/low power field   Final    Gram Stain Result 02/11/2024 >25 PMNs/low power field   Final    Gram Stain Result 02/11/2024 3+ Mixed yelena   Final    LVEF  02/10/2024 60-65%   Final    GLUCOSE BY METER POCT 02/10/2024 158 (H)  70 - 99 mg/dL Final    Potassium 02/10/2024 3.4  3.4 - 5.3 mmol/L Final    GLUCOSE BY METER POCT 02/10/2024 175 (H)  70 - 99 mg/dL Final    GLUCOSE BY METER POCT 02/10/2024 181 (H)  70 - 99 mg/dL Final    Potassium 02/11/2024 3.5  3.4 - 5.3 mmol/L Final    Sodium 02/11/2024 137  135 - 145 mmol/L Final    Reference intervals for this test were updated on 09/26/2023 to more accurately reflect our healthy population. There may be differences in the flagging of prior results with similar values performed with this method. Interpretation of those prior results can be made in the context of the updated reference intervals.     Potassium 02/11/2024 3.5  3.4 - 5.3 mmol/L Final    Carbon Dioxide (CO2) 02/11/2024 41 (H)  22 - 29 mmol/L Final    Anion Gap 02/11/2024 1 (L)  7 - 15 mmol/L Final    Urea Nitrogen 02/11/2024 13.5  8.0 - 23.0 mg/dL Final    Creatinine 02/11/2024 0.66  0.51 - 0.95 mg/dL Final    GFR Estimate 02/11/2024 >90  >60 mL/min/1.73m2 Final    Calcium 02/11/2024 7.7 (L)  8.8 - 10.2 mg/dL Final    Chloride 02/11/2024 95 (L)  98 - 107 mmol/L Final    Glucose 02/11/2024 130  (H)  70 - 99 mg/dL Final    Alkaline Phosphatase 02/11/2024 42  40 - 150 U/L Final    Reference intervals for this test were updated on 11/14/2023 to more accurately reflect our healthy population. There may be differences in the flagging of prior results with similar values performed with this method. Interpretation of those prior results can be made in the context of the updated reference intervals.    AST 02/11/2024 28  0 - 45 U/L Final    Reference intervals for this test were updated on 6/12/2023 to more accurately reflect our healthy population. There may be differences in the flagging of prior results with similar values performed with this method. Interpretation of those prior results can be made in the context of the updated reference intervals.    ALT 02/11/2024 75 (H)  0 - 50 U/L Final    Reference intervals for this test were updated on 6/12/2023 to more accurately reflect our healthy population. There may be differences in the flagging of prior results with similar values performed with this method. Interpretation of those prior results can be made in the context of the updated reference intervals.      Protein Total 02/11/2024 5.3 (L)  6.4 - 8.3 g/dL Final    Albumin 02/11/2024 3.1 (L)  3.5 - 5.2 g/dL Final    Bilirubin Total 02/11/2024 0.2  <=1.2 mg/dL Final    WBC Count 02/11/2024 11.8 (H)  4.0 - 11.0 10e3/uL Final    RBC Count 02/11/2024 3.68 (L)  3.80 - 5.20 10e6/uL Final    Hemoglobin 02/11/2024 10.7 (L)  11.7 - 15.7 g/dL Final    Hematocrit 02/11/2024 33.3 (L)  35.0 - 47.0 % Final    MCV 02/11/2024 91  78 - 100 fL Final    MCH 02/11/2024 29.1  26.5 - 33.0 pg Final    MCHC 02/11/2024 32.1  31.5 - 36.5 g/dL Final    RDW 02/11/2024 13.4  10.0 - 15.0 % Final    Platelet Count 02/11/2024 310  150 - 450 10e3/uL Final    GLUCOSE BY METER POCT 02/11/2024 208 (H)  70 - 99 mg/dL Final    GLUCOSE BY METER POCT 02/11/2024 140 (H)  70 - 99 mg/dL Final       Results for orders placed or performed in visit on  06/13/24   CBC with platelets     Status: Normal   Result Value Ref Range    WBC Count 9.1 4.0 - 11.0 10e3/uL    RBC Count 4.49 3.80 - 5.20 10e6/uL    Hemoglobin 13.2 11.7 - 15.7 g/dL    Hematocrit 39.8 35.0 - 47.0 %    MCV 89 78 - 100 fL    MCH 29.4 26.5 - 33.0 pg    MCHC 33.2 31.5 - 36.5 g/dL    RDW 12.5 10.0 - 15.0 %    Platelet Count 264 150 - 450 10e3/uL       Assessment:     1. Hair loss    2. Intestinal malabsorption, unspecified type    3. Chronic obstructive pulmonary disease, unspecified COPD type (H)    4. Chronic diarrhea    5. Epidermoid cyst of face        Plan:     May be telogen effluvium.  Check blood work.  May continue biotin as she is taking a supplement for this.  Consider doxycycline or tetracycline for the face.  Continue current medications otherwise.  Follow up sooner if issues.    Orders Placed This Encounter   Procedures    Vitamin C    TSH    CBC with platelets    Ferritin    Iron & Iron Binding Capacity    Zinc           Jorge Wilmer Dunn MD  General Internal Medicine  [facility]    The longitudinal plan of care for the diagnoses and conditions as documented were addressed during this visit. Due to the added complexity in care, I will continue to support Jenn in the subsequent management and with ongoing continuity of care.     No follow-ups on file.     Future Appointments   Date Time Provider Department Center   6/18/2024  2:00 PM Ryan Alonso MD [facility]

## 2024-06-13 NOTE — PATIENT INSTRUCTIONS
Future Appointments   Date Time Provider Department Center   6/18/2024  2:00 PM Ryan Alonso MD [facility]

## 2024-06-14 LAB
FERRITIN SERPL-MCNC: 319 NG/ML (ref 11–328)
IRON BINDING CAPACITY (ROCHE): 208 UG/DL (ref 240–430)
IRON SATN MFR SERPL: 36 % (ref 15–46)
IRON SERPL-MCNC: 74 UG/DL (ref 37–145)
TSH SERPL DL<=0.005 MIU/L-ACNC: 5.22 UIU/ML (ref 0.3–4.2)

## 2024-06-15 LAB — ZINC SERPL-MCNC: 68.5 UG/DL

## 2024-06-16 LAB — VIT C SERPL-MCNC: 68 UMOL/L

## 2024-06-17 NOTE — PROGRESS NOTES
"Subjective:    Established patient, saw Dr. Ross once 2/8/2020 for abnormal TFTs (subclinical hypothyroidism with undetectable Tg Ab, TPO Ab normal, with subsequently normal TFTs)    Jenn Barclay is a 68 year old female who presents for osteoporosis. The following is a comprehensive summary of her Endocrine care to date.     We previously reviewed the osteoporosis dictation template.    DEXA 8/16/2023:  -L1 - L4 T-score -2.5 and stable compared to 2020  -Lowest overall T-score at the hips is -3.5 at the left total hip; BMD at the left total hip is stable compared to 2020   -Lumbar Spine L1-L4: TBS: 0.957. TBS T-score: -5.4.    T5 and T8 vertebroplasty procedures done in 2016    MRI thoracic and lumbar spine 2/3/2024: Moderate chronic compression fractures at T5 and T8, status post vertebral augmentation. Chronic mild compression fractures at T6, T7, and T10. Moderate chronic compression fractures at L4-L5.     Regarding a cause of her spinal compression fractures, there was no known trauma.     She fractured her right hip when she had a fall and \"bounced\" a few times after her foot got stuck in a pothole, this happened around 2005.     She has trouble with balance. She defers on a PT referral at this time.     No recent significant GC exposure, and she doesn't recall the details but given her AML and transplant history and COPD I assume she has had significant GC exposure over the years.     BMI ~16.5 kg/m2 and stable.     Active tobacco use. She declines a referral to quit partner today.    Surgical menopause in her 20s for a benign indication and she recalls being on estrogen therapy afterwards.    As of 6/2023 Jenn has completed a complete evaluation for secondary causes of increased fracture risk with the following notable findings:  -No prior hypercalcemia, PTH checked once in 2020 and normal     -History of vitamin D deficiency, 8/2023: 25-OH vitamin D mid normal    -Bone specific alkaline phosphatase " "6.3 (Postmenopausal Female ref range: 7.0 - 22.4 ug/L) drawn 3/2023   -C-Telopeptide, Beta-Cross-Linked, Serum 159 (ref range postmenopausal Females: 177-1015 pg/mL) drawn 3/2023   -24 hour urine supersaturation profile 3/2023 (877 mL): elevated calcium oxalate crystals, elevated uric acid crystals, calcium 88 mg    Alendronate 70 mg once weekly: first prescribed 9/8/2017 and taken weekly until ~2021 (per her recall, I could not verify the timeline via chart review), and she was off of it for about 1 year before it was restarted ~2022 and taken until she started Reclast 8/2023 and she notes taking it \"religiously\". She was taking Alendronate after eating breakfast at the time of our initial consult but now takes it appropriately to maximize absorption. Alendronate has been well tolerated.    6/2023: We reviewed treatment options in detail and we both have a preference for anabolic therapy if affordable.  Because of her history of radiation the only anabolic agent we can use is Evenity.  I did reach out to our pharmacy team to get a cost estimate.  If Evenity is cost prohibitive we will transition to Reclast.     8/11/2023: Evenity was cost prohibitive.    Reclast 5 mg IV given: 8/24/2023     She takes a MVI.  After initial consult she started vitamin D and calcium as well. She drinks 1 glass of milk each day and has at least 1 additional serving of dairy each day.        She has exocrine pancreatic insufficiency (pancreatic atrophy on imaging) and saw GI 4/5/2023, she has not yet restarted Creon, elevated random glucose values but not >200 mg/dL (no prior diagnosis of DM, she has never been on a medication to lower glucose) and HbA1c 5.6% 3/2023, gastric ulcer, chronic diarrhea, AML s/p BMT in 2010.    No GERD. She had all teeth extracted in 2010. She has not had any dental issues since. She did receive radiation prior to transplant. No prior ASCVD event.     Objective:    BMI 16.7 kg/m2 today. Pleasant and " conversational.     1/2023: BMI 16.5 kg/m2, /72. Sclera white. Edentulous. Kyphotic. Radial pulse with a regular rate and rhythm.     Assessment/Plan:    # Severe osteoporosis with many prior fragility fractures    Will go ahead with Reclast dose #2 - ordered for end of August 2024, with labs beforehand. We reviewed the risks and benefits of Reclast in detail including osteonecrosis of the jaw, atypical femoral fracture, and the risk of flulike reaction. She knows to take Tylenol around the time of Reclast to minimize the risk of flulike reaction.     When I see her labs result I'll place orders for a DEXA and bone labs with follow-up for 8/2025.     She also has chronic back discomfort in the setting of her multiple compression fractures and is interested in a referral to [facility], which I placed.     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     CT A/P 2/2024: 9 mm nonobstructive right renal stone    She denies ever passing a stone.     24 hour urine supersaturation profile 3/2023 (877 mL): elevated calcium oxalate crystals, elevated uric acid crystals, calcium 88 mg    Her stones are not related to hypercalcemia/hypercalciuria.  She also was found to have low IgA.  I did offer to refer her to nephrology for medical stone management but she previously preferred that I let Dr. Dunn know, which I did. Further management per Dr. Dunn's expertise.     20 minutes spent on the date of the encounter doing chart review, history and exam, documentation and further activities as noted above.     The longitudinal plan of care for the diagnosis(es)/condition(s) as documented were addressed during this visit. Due to the added complexity in care, I will continue to support Jenn in the subsequent management and with ongoing continuity of care.

## 2024-06-18 ENCOUNTER — OFFICE VISIT (OUTPATIENT)
Dept: ENDOCRINOLOGY | Facility: CLINIC | Age: 69
End: 2024-06-18
Payer: COMMERCIAL

## 2024-06-18 VITALS
WEIGHT: 105 LBS | HEART RATE: 76 BPM | SYSTOLIC BLOOD PRESSURE: 138 MMHG | DIASTOLIC BLOOD PRESSURE: 64 MMHG | HEIGHT: 67 IN | BODY MASS INDEX: 16.48 KG/M2

## 2024-06-18 DIAGNOSIS — M81.0 AGE-RELATED OSTEOPOROSIS WITHOUT CURRENT PATHOLOGICAL FRACTURE: Primary | ICD-10-CM

## 2024-06-18 DIAGNOSIS — Z94.81 STATUS POST ALLOGENEIC BONE MARROW TRANSPLANT (H): ICD-10-CM

## 2024-06-18 DIAGNOSIS — S22.050S CLOSED WEDGE COMPRESSION FRACTURE OF T6 VERTEBRA, SEQUELA: ICD-10-CM

## 2024-06-18 DIAGNOSIS — Z51.81 ENCOUNTER FOR MONITORING ZOLEDRONIC ACID THERAPY: ICD-10-CM

## 2024-06-18 DIAGNOSIS — S32.050S CLOSED COMPRESSION FRACTURE OF L5 VERTEBRA, SEQUELA: ICD-10-CM

## 2024-06-18 DIAGNOSIS — K86.81 EXOCRINE PANCREATIC INSUFFICIENCY: ICD-10-CM

## 2024-06-18 DIAGNOSIS — E28.319 PREMATURE MENOPAUSE: ICD-10-CM

## 2024-06-18 DIAGNOSIS — S22.060S CLOSED WEDGE COMPRESSION FRACTURE OF T8 VERTEBRA, SEQUELA: ICD-10-CM

## 2024-06-18 DIAGNOSIS — S22.050S CLOSED WEDGE COMPRESSION FRACTURE OF T5 VERTEBRA, SEQUELA: ICD-10-CM

## 2024-06-18 DIAGNOSIS — S32.040S CLOSED COMPRESSION FRACTURE OF L4 LUMBAR VERTEBRA, SEQUELA: ICD-10-CM

## 2024-06-18 DIAGNOSIS — Z72.0 TOBACCO USE: ICD-10-CM

## 2024-06-18 DIAGNOSIS — Z79.83 ENCOUNTER FOR MONITORING ZOLEDRONIC ACID THERAPY: ICD-10-CM

## 2024-06-18 DIAGNOSIS — S22.070S CLOSED WEDGE COMPRESSION FRACTURE OF T10 VERTEBRA, SEQUELA: ICD-10-CM

## 2024-06-18 DIAGNOSIS — M40.00 KYPHOSIS (ACQUIRED) (POSTURAL): ICD-10-CM

## 2024-06-18 DIAGNOSIS — S22.060S CLOSED WEDGE COMPRESSION FRACTURE OF T7 VERTEBRA, SEQUELA: ICD-10-CM

## 2024-06-18 PROCEDURE — G2211 COMPLEX E/M VISIT ADD ON: HCPCS | Performed by: INTERNAL MEDICINE

## 2024-06-18 PROCEDURE — 99213 OFFICE O/P EST LOW 20 MIN: CPT | Performed by: INTERNAL MEDICINE

## 2024-06-18 RX ORDER — DIPHENHYDRAMINE HYDROCHLORIDE 50 MG/ML
50 INJECTION INTRAMUSCULAR; INTRAVENOUS
Status: CANCELLED
Start: 2024-08-26

## 2024-06-18 RX ORDER — HEPARIN SODIUM (PORCINE) LOCK FLUSH IV SOLN 100 UNIT/ML 100 UNIT/ML
5 SOLUTION INTRAVENOUS
Status: CANCELLED | OUTPATIENT
Start: 2024-08-26

## 2024-06-18 RX ORDER — ALBUTEROL SULFATE 90 UG/1
1-2 AEROSOL, METERED RESPIRATORY (INHALATION)
Status: CANCELLED
Start: 2024-08-26

## 2024-06-18 RX ORDER — HEPARIN SODIUM,PORCINE 10 UNIT/ML
5-20 VIAL (ML) INTRAVENOUS DAILY PRN
Status: CANCELLED | OUTPATIENT
Start: 2024-08-26

## 2024-06-18 RX ORDER — ALBUTEROL SULFATE 0.83 MG/ML
2.5 SOLUTION RESPIRATORY (INHALATION)
Status: CANCELLED | OUTPATIENT
Start: 2024-08-26

## 2024-06-18 RX ORDER — ZOLEDRONIC ACID 5 MG/100ML
5 INJECTION, SOLUTION INTRAVENOUS ONCE
Status: CANCELLED
Start: 2024-08-26

## 2024-06-18 RX ORDER — METHYLPREDNISOLONE SODIUM SUCCINATE 125 MG/2ML
125 INJECTION, POWDER, LYOPHILIZED, FOR SOLUTION INTRAMUSCULAR; INTRAVENOUS
Status: CANCELLED
Start: 2024-08-26

## 2024-06-18 RX ORDER — EPINEPHRINE 1 MG/ML
0.3 INJECTION, SOLUTION, CONCENTRATE INTRAVENOUS EVERY 5 MIN PRN
Status: CANCELLED | OUTPATIENT
Start: 2024-08-26

## 2024-06-18 NOTE — LETTER
"6/18/2024      Jenn Barclay  [address] [place] [facility] [place] [address]      Dear Colleague,    Thank you for referring your patient, Jenn Barclay, to the [facility]. Please see a copy of my visit note below.    Subjective:    Established patient, saw Dr. Ross once 2/8/2020 for abnormal TFTs (subclinical hypothyroidism with undetectable Tg Ab, TPO Ab normal, with subsequently normal TFTs)    Jenn Barclay is a 68 year old female who presents for osteoporosis. The following is a comprehensive summary of her Endocrine care to date.     We previously reviewed the osteoporosis dictation template.    DEXA 8/16/2023:  -L1 - L4 T-score -2.5 and stable compared to 2020  -Lowest overall T-score at the hips is -3.5 at the left total hip; BMD at the left total hip is stable compared to 2020   -Lumbar Spine L1-L4: TBS: 0.957. TBS T-score: -5.4.    T5 and T8 vertebroplasty procedures done in 2016    MRI thoracic and lumbar spine 2/3/2024: Moderate chronic compression fractures at T5 and T8, status post vertebral augmentation. Chronic mild compression fractures at T6, T7, and T10. Moderate chronic compression fractures at L4-L5.     Regarding a cause of her spinal compression fractures, there was no known trauma.     She fractured her right hip when she had a fall and \"bounced\" a few times after her foot got stuck in a pothole, this happened around 2005.     She has trouble with balance. She defers on a PT referral at this time.     No recent significant GC exposure, and she doesn't recall the details but given her AML and transplant history and COPD I assume she has had significant GC exposure over the years.     BMI ~16.5 kg/m2 and stable.     Active tobacco use. She declines a referral to quit partner today.    Surgical menopause in her 20s for a benign indication and she recalls being on estrogen therapy afterwards.    As of 6/2023 Jenn has completed a complete evaluation for secondary causes of " "increased fracture risk with the following notable findings:  -No prior hypercalcemia, PTH checked once in 2020 and normal     -History of vitamin D deficiency, 8/2023: 25-OH vitamin D mid normal    -Bone specific alkaline phosphatase 6.3 (Postmenopausal Female ref range: 7.0 - 22.4 ug/L) drawn 3/2023   -C-Telopeptide, Beta-Cross-Linked, Serum 159 (ref range postmenopausal Females: 177-1015 pg/mL) drawn 3/2023   -24 hour urine supersaturation profile 3/2023 (877 mL): elevated calcium oxalate crystals, elevated uric acid crystals, calcium 88 mg    Alendronate 70 mg once weekly: first prescribed 9/8/2017 and taken weekly until ~2021 (per her recall, I could not verify the timeline via chart review), and she was off of it for about 1 year before it was restarted ~2022 and taken until she started Reclast 8/2023 and she notes taking it \"religiously\". She was taking Alendronate after eating breakfast at the time of our initial consult but now takes it appropriately to maximize absorption. Alendronate has been well tolerated.    6/2023: We reviewed treatment options in detail and we both have a preference for anabolic therapy if affordable.  Because of her history of radiation the only anabolic agent we can use is Evenity.  I did reach out to our pharmacy team to get a cost estimate.  If Evenity is cost prohibitive we will transition to Reclast.     8/11/2023: Evenity was cost prohibitive.    Reclast 5 mg IV given: 8/24/2023     She takes a MVI.  After initial consult she started vitamin D and calcium as well. She drinks 1 glass of milk each day and has at least 1 additional serving of dairy each day.        She has exocrine pancreatic insufficiency (pancreatic atrophy on imaging) and saw GI 4/5/2023, she has not yet restarted Creon, elevated random glucose values but not >200 mg/dL (no prior diagnosis of DM, she has never been on a medication to lower glucose) and HbA1c 5.6% 3/2023, gastric ulcer, chronic diarrhea, AML " s/p BMT in 2010.    No GERD. She had all teeth extracted in 2010. She has not had any dental issues since. She did receive radiation prior to transplant. No prior ASCVD event.     Objective:    BMI 16.7 kg/m2 today. Pleasant and conversational.     1/2023: BMI 16.5 kg/m2, /72. Sclera white. Edentulous. Kyphotic. Radial pulse with a regular rate and rhythm.     Assessment/Plan:    # Severe osteoporosis with many prior fragility fractures    Will go ahead with Reclast dose #2 - ordered for end of August 2024, with labs beforehand. We reviewed the risks and benefits of Reclast in detail including osteonecrosis of the jaw, atypical femoral fracture, and the risk of flulike reaction. She knows to take Tylenol around the time of Reclast to minimize the risk of flulike reaction.     When I see her labs result I'll place orders for a DEXA and bone labs with follow-up for 8/2025.     She also has chronic back discomfort in the setting of her multiple compression fractures and is interested in a referral to the [facility], which I placed.     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     CT A/P 2/2024: 9 mm nonobstructive right renal stone    She denies ever passing a stone.     24 hour urine supersaturation profile 3/2023 (877 mL): elevated calcium oxalate crystals, elevated uric acid crystals, calcium 88 mg    Her stones are not related to hypercalcemia/hypercalciuria.  She also was found to have low IgA.  I did offer to refer her to nephrology for medical stone management but she previously preferred that I let Dr. Dunn know, which I did. Further management per Dr. Dunn's expertise.     20 minutes spent on the date of the encounter doing chart review, history and exam, documentation and further activities as noted above.     The longitudinal plan  of care for the diagnosis(es)/condition(s) as documented were addressed during this visit. Due to the added complexity in care, I will continue to support Jenn in the subsequent management and with ongoing continuity of care.      Again, thank you for allowing me to participate in the care of your patient.        Sincerely,        Ryan Alonso MD

## 2024-06-25 ENCOUNTER — OFFICE VISIT (OUTPATIENT)
Dept: PHYSICAL MEDICINE AND REHAB | Facility: CLINIC | Age: 69
End: 2024-06-25
Attending: INTERNAL MEDICINE
Payer: COMMERCIAL

## 2024-06-25 VITALS
HEIGHT: 67 IN | SYSTOLIC BLOOD PRESSURE: 144 MMHG | DIASTOLIC BLOOD PRESSURE: 78 MMHG | BODY MASS INDEX: 15.85 KG/M2 | WEIGHT: 101 LBS | HEART RATE: 82 BPM

## 2024-06-25 DIAGNOSIS — Z94.81 STATUS POST ALLOGENEIC BONE MARROW TRANSPLANT (H): ICD-10-CM

## 2024-06-25 DIAGNOSIS — S22.060S CLOSED WEDGE COMPRESSION FRACTURE OF T7 VERTEBRA, SEQUELA: ICD-10-CM

## 2024-06-25 DIAGNOSIS — R63.4 WEIGHT LOSS: ICD-10-CM

## 2024-06-25 DIAGNOSIS — H93.13 TINNITUS, BILATERAL: ICD-10-CM

## 2024-06-25 DIAGNOSIS — S32.050S CLOSED COMPRESSION FRACTURE OF L5 VERTEBRA, SEQUELA: ICD-10-CM

## 2024-06-25 DIAGNOSIS — M54.50 CHRONIC BILATERAL LOW BACK PAIN WITHOUT SCIATICA: ICD-10-CM

## 2024-06-25 DIAGNOSIS — S32.040S CLOSED COMPRESSION FRACTURE OF L4 LUMBAR VERTEBRA, SEQUELA: ICD-10-CM

## 2024-06-25 DIAGNOSIS — M40.00 KYPHOSIS (ACQUIRED) (POSTURAL): ICD-10-CM

## 2024-06-25 DIAGNOSIS — S22.050S CLOSED WEDGE COMPRESSION FRACTURE OF T5 VERTEBRA, SEQUELA: ICD-10-CM

## 2024-06-25 DIAGNOSIS — M54.6 CHRONIC MIDLINE THORACIC BACK PAIN: ICD-10-CM

## 2024-06-25 DIAGNOSIS — M47.819 FACET ARTHROPATHY: Primary | ICD-10-CM

## 2024-06-25 DIAGNOSIS — S22.070S CLOSED WEDGE COMPRESSION FRACTURE OF T10 VERTEBRA, SEQUELA: ICD-10-CM

## 2024-06-25 DIAGNOSIS — R20.0 NUMBNESS AND TINGLING OF UPPER EXTREMITY: ICD-10-CM

## 2024-06-25 DIAGNOSIS — R20.2 NUMBNESS AND TINGLING OF UPPER EXTREMITY: ICD-10-CM

## 2024-06-25 DIAGNOSIS — G89.29 CHRONIC BILATERAL LOW BACK PAIN WITHOUT SCIATICA: ICD-10-CM

## 2024-06-25 DIAGNOSIS — S22.050S CLOSED WEDGE COMPRESSION FRACTURE OF T6 VERTEBRA, SEQUELA: ICD-10-CM

## 2024-06-25 DIAGNOSIS — S22.060S CLOSED WEDGE COMPRESSION FRACTURE OF T8 VERTEBRA, SEQUELA: ICD-10-CM

## 2024-06-25 DIAGNOSIS — G89.29 CHRONIC MIDLINE THORACIC BACK PAIN: ICD-10-CM

## 2024-06-25 PROCEDURE — 99204 OFFICE O/P NEW MOD 45 MIN: CPT | Performed by: NURSE PRACTITIONER

## 2024-06-25 ASSESSMENT — PAIN SCALES - GENERAL: PAINLEVEL: EXTREME PAIN (8)

## 2024-06-25 NOTE — PATIENT INSTRUCTIONS
Schedule an EMG (nerve test) with Dr Huggins.        Imaging (thoracic and lumbar MRIs) have been ordered today.   Radiology will call you to schedule. Please call below if you do not hear from them in the next couple of days.     [facility] Scheduling:  Please call [phone] to schedule your image(s) (select option #1).    There are 3 different locations:    [facility]  [address] [address]    [facility]  [address]   [facility] [address]    [facility]  [address] [address]       Referrals placed for:  ONCOLOGY  GASTROENTEROLOGY  EAR NOSE THROAT  [facility] central scheduling phone number: [phone]       ~You have been referred for Physical Therapy to [facility]. They will call you to schedule an appointment.      Scheduling phone number is [phone] for [facility] [place], [place], or [place] location.  If you have not heard from the scheduling office within 2 business days, please call [phone] for ALL other locations.    Discussed the importance of core strengthening, ROM, stretching exercises and how each of these entities is important in decreasing pain and improving long term spine health.  The purpose of physical therapy is to teach you an individualized home exercise program.  These exercises need to be performed every day in order to decrease pain and prevent future occurrences of pain.           ~Please call our [facility] Nurse Navigation line [phone] with any questions or concerns about your treatment plan, if symptoms worsen and you would like to be seen urgently, or if you have any new or worsening numbness, weakness, or problems controlling bladder and bowel function.  ~You are also welcome to contact Leonela Santiago via [facility], but please be aware that responses to [facility] message may take 2-3 days due to  the high volume of patients seen in clinic.

## 2024-06-25 NOTE — LETTER
6/25/2024      Jenn Barclay  [address] [facility] [place] [address]      Dear Colleague,    Thank you for referring your patient, Jenn Barclay, to the [facility]. Please see a copy of my visit note below.    ASSESSMENT: Jenn Barclay is a 68 year old female who presents for consultation at the request of PCP Jorge Dunn, who presents today for new patient evaluation of:    -thoracic compression fractures    Patient is neurologically intact on exam. No myelopathic or red flag symptoms. We reviewed her thoracolumbar MRIs from feb of this year. The radiologist reports nonspecific enhancement and edema in the lumbar spine, potentially degenerative vs early infection. I feel given ongoing pain that we need to recheck this area for any progression of edema with new MRI imaging. She has chronic compression fractures T5-8, T10, with augmentation of T5 and T8. Given osteoporosis and ongoing severe mid-thoracic back pain I also suggest we recheck this area with thoracic MRI to see if any interval recent height loss of vertebral bodies at this point.    Referred to PT  EMG for joel upper extremity numbness though could be thoracic outlet syndrome  For her severe weight loss: recommended GI consult (given orange oily diarrhea), hem onc consult (re-establish care given hx bone marrow transplant).  For her joel tinnitis: ENT    If no concern for progressing infection, would suggest possible MBB over areas of previous thoracic fractures/ kyphosis or levels of facet arthropathy lumbar spine. If concern for ongoing infection, would add labs, consider IR biopsy.  Continue current pain meds. Would avoid steroid if possible given osteoporosis. Could consider rx NSAID after GI symptoms are evaluated .          3/30/2022     2:13 PM   OSWESTRY DISABILITY INDEX   Count 9   Sum 3   Oswestry Score (%) 6.67 %            Diagnoses and all orders for this visit:  Closed wedge compression fracture of T5  vertebra, sequela  -     Spine  Referral  Closed wedge compression fracture of T8 vertebra, sequela  -     Spine  Referral  Closed wedge compression fracture of T6 vertebra, sequela  -     Spine  Referral  Closed wedge compression fracture of T7 vertebra, sequela  -     Spine  Referral  Closed wedge compression fracture of T10 vertebra, sequela  -     Spine  Referral  Closed compression fracture of L4 lumbar vertebra, sequela  -     Spine  Referral  Closed compression fracture of L5 vertebra, sequela  -     Spine  Referral  Kyphosis (acquired) (postural)  -     Spine  Referral      PLAN:  Reviewed spine anatomy and disease process. Discussed diagnosis and treatment options with the patient today. A shared decision making model was used.  The patient's values and choices were respected. The following represents what was discussed and decided upon by the provider and the patient.      -DIAGNOSTIC TESTS:  Images were personally reviewed and interpreted and explained to patient today using a spine model.   -Lumbar MRI and thoracic with and without contrast orders placed  -EMG joel upper ext    -PHYSICAL THERAPY:    --Referral to PT placed  Discussed the importance of core strengthening, ROM, stretching exercises with the patient and how each of these entities is important in decreasing pain.  Explained to the patient that the purpose of physical therapy is to teach the patient a home exercise program.  These exercises need to be performed every day in order to decrease pain and prevent future occurrences of pain.      REFERRALS:  -oncology  -GI  -ENT      -MEDICATIONS:    --did not make any changes today     -INTERVENTIONS:    -Discussed the role of injections with patient today. Patient would be a good candidate in the future for either epidural steroid injections or medial branch blocks if indicated based on symptoms and supported by imaging  results.    -PATIENT EDUCATION:  Total time of 40 minutes, on the day of service, spent with the patient, reviewing the chart, placing orders, and documenting.   -Today we also discussed the pros and cons of the current treatment plan.    -FOLLOW-UP:   we will call with imaging results    Advised patient to call the [facility] if symptoms worsen, new numbness or weakness develops in the legs, or if they develop new or worsening problems controlling bladder or bowel function.   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 She has had many thoracolumbar compression fractures, was treated with a back brace at one time, had 2 thoracic vertebroplasties. The pain continues. The pain is constant. She has numbness in both arms whenever she lies flat. She has chronic lower back pain as well. Denies radicular arm or leg pain, leg numbness, arm or leg weakness, or changes in bowel or bladder control. She has been taking oxycodone 10mg as needed for her back pain but this is not working. She did 1 session of PT in 2022 for her back.  She has not had any injections, chiropractic care, any surgeries. Pain level 7/10 today, 3/10 at best.    She separately endorses severe constant tinnitus and hearing loss impacting her daily functioning - saw ENT a few years ago and had what sounds like an audiogram, states she was told she had a little hearing loss and to listen closer to traffic.  She separately endorses irregular bowel movements on a daily basis, with significant diarrhea daily basis,  sometimes orange and oily.  She separately reports significant weight loss without cause so far. States she eats almost constantly all day, high carb and protein foods, and she is still losing weight. She has lost 4 lb since last week. She is very concerned that no cause has been found for this so far. She has seen a GI specialist in the past and had a colonoscopy within the last 5 years which she states found an encased polyp which was not reportedly concerning    She denies night sweats, vomiting, abdominal pain, shortness of breath, cough.       -Treatment to Date:     -Medications:      Current Outpatient Medications   Medication Sig Dispense Refill     albuterol (PROVENTIL) (2.5 MG/3ML) 0.083% neb solution Take 2.5 mg by nebulization every 6 hours as needed for shortness of breath, wheezing or cough       ascorbic acid (VITAMIN C) 500 MG tablet Take 500 mg by mouth daily.       augmented betamethasone dipropionate (DIPROLENE AF) 0.05 % external cream Apply topically 2 times daily Stronger cream for rash. 50 g 3     colestipol (COLESTID) 1 g tablet Take 2 tablets (2 g) by mouth daily for 360 days (Patient not taking: Reported on 6/18/2024) 180 tablet 3     guaiFENesin-dextromethorphan (ROBITUSSIN DM) 100-10 MG/5ML syrup Take 10 mLs by mouth every 4 hours as needed for cough (Patient not taking: Reported on 6/18/2024) 118 mL 0     hydrOXYzine (ATARAX) 25 MG tablet Take 1 tablet (25 mg) by mouth 3 times daily as needed for itching (Patient not taking: Reported on 6/18/2024) 100 tablet 1     ipratropium - albuterol 0.5 mg/2.5 mg/3 mL (DUONEB) 0.5-2.5 (3) MG/3ML neb solution Take 1 vial (3 mLs) by nebulization every 6 hours as needed for other (cough with phelgm) 90 mL 3     nebulizer nebulization 1 nebulizer please as covered by insurance.  May also dispense supplies (tubing, inhalation device, etc) as needed. 1 each 0     oxyCODONE IR (ROXICODONE) 10 MG tablet Take 1 tablet (10 mg) by mouth 4 times daily for 30  days For use from 6/14/24-7/14/24. 120 tablet 0     triamcinolone (KENALOG) 0.1 % external cream Apply topically 3 times daily       No current facility-administered medications for this visit.       Allergies   Allergen Reactions     Mirtazapine      Other reaction(s): Other (See Comments)  Hallucination      Tape [Adhesive Tape]      Allergy Hx  In addition to NO paper tape     Liquid Adhesive Rash     Other reaction(s): Other (See Comments)  Allergy Hx - Rash from paper tape       Past Medical History:   Diagnosis Date     Acute myeloid leukemia (AML), M2 (H)      AML (acute myeloid leukemia) (H) 12/2009     Baker's cyst      Chronic back pain      Chronic diarrhea      Compression fx, lumbar spine (H)      Controlled substance agreement signed 8/3/2017     COPD (chronic obstructive pulmonary disease) (H)      COPD (chronic obstructive pulmonary disease) (H)      Full code status 9/10/2017     Gastric ulcer     pt states she has not had any ulcers     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)      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     History of PID      Metatarsal fracture 2017 2nd      Migraine without aura, without mention of intractable migraine without mention of status migrainosus      Osteoporosis 2020    DXA     Osteoporosis 5/3/2016     Papular rash, generalized      Post-menopausal 2/8/2020    Early surgical menopause     Pseudogout 11/30/2016     Serrated adenoma of colon 7/17/2020     Status post allogeneic bone marrow transplant (H)      Surgical menopause 1976    oophorectomy     Tobacco abuse      Traumatic compression fracture of T8 thoracic vertebra, closed, initial encounter (H) 4/26/2016     Tubular adenoma of colon 7/17/2020     Vertebral compression fracture (H) 2014     Zoster 2018        Patient Active Problem List   Diagnosis     Exocrine pancreatic insufficiency     Nephrolithiasis     Anxiety     Chronic back pain     Controlled substance agreement signed - 1/24 -  oxycodone - UDS 1/24     COPD (chronic obstructive pulmonary disease) (H)     Financial difficulties     Full code status     Gastric ulcer     Migraine headache     Papular rash, generalized     Pseudogout     Senile purpura (H24)     Tobacco abuse     Traumatic compression fracture of T8 thoracic vertebra, closed, initial encounter (H)     Tubular adenoma of colon - advanced adenoma in 2017 - single small adenoma in 2020     OP (osteoporosis)     History of acute myeloid leukemia     Status post allogeneic bone marrow transplant (H) - 2010 - [facility]     Chronic diarrhea     Chronic, continuous use of opioids     Frailty     Hypokalemia       Past Surgical History:   Procedure Laterality Date     APPENDECTOMY       COLONOSCOPY N/A 07/28/2020    Procedure: COLONOSCOPY, WITH POLYPECTOMY AND BIOPSY;  Surgeon: Gianni Valerio MD;  Location: [facility] OR     HYSTERECTOMY TOTAL ABDOMINAL, BILATERAL SALPINGO-OOPHORECTOMY, COMBINED  1976     IR MISCELLANEOUS PROCEDURE  02/22/2010     TRANSPLANT  01/01/2010     VERTEBROPLASTY  01/01/2016    T5 and T8 vertebrae.       Family History   Problem Relation Age of Onset     Diabetes Mother         borderline     Pancreatic Cancer Father      Diabetes Maternal Grandmother      Diabetes Maternal Uncle      Thyroid Disease No family hx of      Melanoma No family hx of      Skin Cancer No family hx of      Lung Cancer Mother        Reviewed past medical, surgical, and family history with patient found on new patient intake packet located in EMR Media tab.     SOCIAL HX: smoker, no alcohol use, no rec drug use, no heavy drinking    ROS: positive for weight gain, weight loss, ringing in ears, diarrhea, joint pain. Specifically negative for bowel/bladder dysfunction, balance changes, headache, dizziness, foot drop, fevers, chills, appetite changes, nausea/vomiting, unexplained weight loss. Otherwise 13 systems reviewed are negative. Please see the patient's intake questionnaire from today for  "details.    OBJECTIVE:  BP (!) 144/78   Pulse 82   Ht 5' 6.58\" (1.691 m)   Wt 101 lb (45.8 kg)   LMP  (LMP Unknown)   BMI 16.02 kg/m      PHYSICAL EXAMINATION:    --CONSTITUTIONAL:  Vital signs as above.  No acute distress.  The patient is well nourished and well groomed. Frail appearing  --PSYCHIATRIC:  Appropriate mood and affect. The patient is awake, alert, oriented to person, place, time and answering questions appropriately with clear speech.    --SKIN:  Skin over the face, bilateral lower extremities, and posterior torso is clean, dry, intact without rashes.    --RESPIRATORY: Normal rhythm and effort. No abnormal accessory muscle breathing patterns noted.   --ABDOMINAL:  Non-distended.    --GROSS MOTOR: Gait is non-antalgic. Easily arises from a seated position. Toe walking and heel walking are normal without significant difficulty.     UPPER EXTREMITY MOTOR TESTING  Full strength throughout both upper extremities, all planes  Reflexes are 2/4 joel upper ext throughout  Negative domonique  Negative babinski  Sensation intact throughout both upper extremities     --LOWER EXTREMITY MOTOR TESTING:  Hip flexion: right 5/5, left 5/5  Hip abduction: right 5/5, left 5/5  Hip adduction: right 5/5, left 5/5   Quads: right 5/5, left 5/5  Hamstrings: right 5/5, left 5/5  Dorsiflexion: right 5/5, left 5/5  Plantar flexion: right 5/5, left 5/5    Great toe MTP extension/EHL: right 5/5, left 5/5    --NEUROLOGICAL:  2/4 patellar and achilles reflexes bilaterally. Sensation to light touch is intact throughout both lower extremities. Babinski is negative. No clonus.    --STANDING EXAMINATION:  significant upper thoracic kyphosis    --MUSCULOSKELETAL: thoracic, cervical, lumbar spine inspection reveals no evidence of deformity. Range of motion is not limited in lumbar flexion, extension, lateral rotation. No point tenderness to palpation thoracic or lumbar spine. No paraspinal musculature tenderness.     Straight leg " raising is negative.    --SACROILIAC JOINT:  Negative SI joint tenderness to palpation bilaterally.    --VASCULAR:  Bilateral lower extremities are warm without any discoloration.  There is no pitting edema of the bilateral lower extremities.    RESULTS:   Prior medical records from [facility] were reviewed today.    Imaging: Spine imaging was personally reviewed and interpreted today. The images were shown to the patient and the findings were explained using a spine model.    EXAM: MR THORACIC SPINE W/O AND W CONTRAST, MR LUMBAR SPINE W/O AND W CONTRAST  LOCATION: [facility]  DATE: 2/3/2024     INDICATION: Fever, lower back pain.  COMPARISON: Chest CT 07/22/2022. CT abdomen and pelvis 02/03/2024. Lumbar MRI 04/27/2016.  CONTRAST: 5 ml Gadavist.  TECHNIQUE:   1) Routine Thoracic Spine MRI without and with IV contrast.  2) Routine Lumbar Spine MRI without and with IV contrast.     FINDINGS:     THORACIC SPINE:  Accentuation of thoracic kyphosis with otherwise normal alignment. Moderate chronic compression fractures at T5 and T8, status post vertebral augmentation. Chronic mild compression fractures at T6, T7, and T10. No acute fractures. No marrow edema or   abnormal enhancement to suggest discitis or osteomyelitis. Mild disc space narrowing. No significant disc herniation. Mild facet arthropathy. No spinal canal or neural foraminal stenosis. No abnormal cord signal.      No extraspinal abnormality.     LUMBAR SPINE:   Nomenclature is based on 5 lumbar type vertebral bodies. Mild anterior spondylolisthesis L3-L4 and L4-L5 with otherwise normal alignment. Moderate chronic compression fractures at L4-L5. No acute fracture. Mild edema within the right L3-L4 facet joint   with edema and enhancement within the adjacent paraspinal soft tissues and paraspinal musculature. Edema and enhancement centered at the left L5 transverse process and surrounding soft tissues  (sagittal image 27, axial image 14). Normal distal spinal   cord and cauda equina with conus medullaris at mid L1. No extraspinal abnormality. Unremarkable visualized bony pelvis.     T12-L1: Normal disc height. Minimal disc bulge without herniation. Unremarkable facets. No spinal canal or foraminal stenosis.      L1-L2: Normal disc height without herniation. Unremarkable facets. No spinal canal or foraminal stenosis.     L2-L3: Mild disc space narrowing with mild disc bulge. No herniation. Unremarkable facets. No spinal canal or foraminal stenosis.      L3-L4: Mild disc space narrowing with mild to moderate disc bulge. No herniation. Moderate facet arthropathy asymmetric on the right. No spinal canal stenosis. Mild to moderate right foraminal stenosis. No left foraminal stenosis.     L4-L5: Mild disc space narrowing with mild to moderate disc bulge and broad-based central disc protrusion and broad-based left foraminal disc protrusion. Enhancement along the annulus within the left foramen (sagittal image 23, axial image 18). Mild   spinal canal and lateral recess stenoses. Moderate to severe left foraminal stenosis. Mild right foraminal stenosis.     L5-S1: Normal disc height without herniation. Minimal facet arthropathy. No spinal canal stenosis. Mild left foraminal stenosis. No right foraminal stenosis.                                                                      IMPRESSION:     THORACIC SPINE MRI:  1.  No evidence for spine infection.  2.  Chronic compression fractures T5, T6, T7, T8, and T10, status post vertebral augmentation at T5 and T8.     LUMBAR SPINE MRI:  1.  Moderate facet arthropathy on the right at L3-L4 with edema and enhancement involving the facet and surrounding soft tissues, nonspecific. This is potentially degenerative though early changes of infectious process also possible in this clinical   context.  2.  Nonspecific edema and enhancement at the left L5 transverse process where it  abuts the sacrum, with similar differential considerations as above.  3.  No evidence for discitis.  4.  Chronic compression fractures at L4 and L5. No acute fracture.  5.  Left foraminal disc protrusion at L4-L5 contributing to moderate to severe foraminal stenosis. Mild lateral recess and foraminal stenoses at other levels, as detailed.      EXAM: CT ABDOMEN PELVIS W CONTRAST  LOCATION: [facility]  DATE: 2/3/2024     INDICATION: Fever, back pain right flank pain.  COMPARISON: 10/16/2019 CT.  TECHNIQUE: CT scan of the abdomen and pelvis was performed following injection of IV contrast. Multiplanar reformats were obtained. Dose reduction techniques were used.  CONTRAST: Isovue 370 75 ml.     FINDINGS:   LOWER CHEST: Stable calcifications in the left atrium. Mild findings of centrilobular emphysema. Benign calcified granulomas left lung base. Stable minimal pericardial fluid.     HEPATOBILIARY: Normal.     PANCREAS: Moderate atrophy. Otherwise, normal.     SPLEEN: Benign calcified granulomas.     ADRENAL GLANDS: Normal.     KIDNEYS/BLADDER: Nonobstructive stones seen in the medial aspect of the right kidney midpole which measures approximately 9.1 x 3.6 mm.     BOWEL: The appendix is not seen with any certainty, however I do not see any obvious pericecal inflammation to suggest acute appendicitis.     LYMPH NODES: Normal.     VASCULATURE: Mild calcification with no aneurysm.     PELVIC ORGANS: Surgically absent.     MUSCULOSKELETAL: Old appearing compression fractures involving the L4 and L5 levels. Postoperative changes involving the right hip. Mild to moderate thoracolumbar curvature convex to the left.                                                                      IMPRESSION:   1.  The appendix is not seen with any certainty, however I do not see any obvious pericecal inflammation to suggest acute appendicitis.     2.  Moderate atrophy of the pancreas.     3.  Benign calcified  granulomas.     4.  Nonobstructive stone in the medial aspect of the right kidney midpole which measures approximately 9.1 x 3.6 cm. This has increased in size since 10/16/2019 CT and this measured 4.8 x 2.8 mm.     5.  No stones or obstructive changes in the ureters/bladder.     6.  Old compression fractures at L4 and L5.    This note was dictated using voice recognition software. Any grammatical or context distortions are unintentional and inherent to the software.       Leonela AN-C  [facility]  O. [phone]             Again, thank you for allowing me to participate in the care of your patient.        Sincerely,        LYSSA Mariee CNP

## 2024-06-25 NOTE — PROGRESS NOTES
ASSESSMENT: Jenn Barclay is a 68 year old female who presents for consultation at the request of PCP Jorge Dunn, who presents today for new patient evaluation of:    -thoracic compression fractures    Patient is neurologically intact on exam. No myelopathic or red flag symptoms. We reviewed her thoracolumbar MRIs from feb of this year. The radiologist reports nonspecific enhancement and edema in the lumbar spine, potentially degenerative vs early infection. I feel given ongoing pain that we need to recheck this area for any progression of edema with new MRI imaging. She has chronic compression fractures T5-8, T10, with augmentation of T5 and T8. Given osteoporosis and ongoing severe mid-thoracic back pain I also suggest we recheck this area with thoracic MRI to see if any interval recent height loss of vertebral bodies at this point.    Referred to PT  EMG for joel upper extremity numbness though could be thoracic outlet syndrome  For her severe weight loss: recommended GI consult (given orange oily diarrhea), hem onc consult (re-establish care given hx bone marrow transplant).  For her joel tinnitis: ENT    If no concern for progressing infection, would suggest possible MBB over areas of previous thoracic fractures/ kyphosis or levels of facet arthropathy lumbar spine. If concern for ongoing infection, would add labs, consider IR biopsy.  Continue current pain meds. Would avoid steroid if possible given osteoporosis. Could consider rx NSAID after GI symptoms are evaluated .          3/30/2022     2:13 PM   OSWESTRY DISABILITY INDEX   Count 9   Sum 3   Oswestry Score (%) 6.67 %            Diagnoses and all orders for this visit:  Closed wedge compression fracture of T5 vertebra, sequela  -     Spine  Referral  Closed wedge compression fracture of T8 vertebra, sequela  -     Spine  Referral  Closed wedge compression fracture of T6 vertebra, sequela  -     Spine  Referral  Closed wedge  compression fracture of T7 vertebra, sequela  -     Spine  Referral  Closed wedge compression fracture of T10 vertebra, sequela  -     Spine  Referral  Closed compression fracture of L4 lumbar vertebra, sequela  -     Spine  Referral  Closed compression fracture of L5 vertebra, sequela  -     Spine  Referral  Kyphosis (acquired) (postural)  -     Spine  Referral      PLAN:  Reviewed spine anatomy and disease process. Discussed diagnosis and treatment options with the patient today. A shared decision making model was used.  The patient's values and choices were respected. The following represents what was discussed and decided upon by the provider and the patient.      -DIAGNOSTIC TESTS:  Images were personally reviewed and interpreted and explained to patient today using a spine model.   -Lumbar MRI and thoracic with and without contrast orders placed  -EMG joel upper ext    -PHYSICAL THERAPY:    --Referral to PT placed  Discussed the importance of core strengthening, ROM, stretching exercises with the patient and how each of these entities is important in decreasing pain.  Explained to the patient that the purpose of physical therapy is to teach the patient a home exercise program.  These exercises need to be performed every day in order to decrease pain and prevent future occurrences of pain.      REFERRALS:  -oncology  -GI  -ENT      -MEDICATIONS:    --did not make any changes today     -INTERVENTIONS:    -Discussed the role of injections with patient today. Patient would be a good candidate in the future for either epidural steroid injections or medial branch blocks if indicated based on symptoms and supported by imaging results.    -PATIENT EDUCATION:  Total time of 40 minutes, on the day of service, spent with the patient, reviewing the chart, placing orders, and documenting.   -Today we also discussed the pros and cons of the current treatment plan.    -FOLLOW-UP:    we will call with imaging results    Advised patient to call the [facility] if symptoms worsen, new numbness or weakness develops in the legs, or if they develop new or worsening problems controlling bladder or bowel function.   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 She has had many thoracolumbar compression fractures, was treated with a back brace at one time, had 2 thoracic vertebroplasties. The pain continues. The pain is constant. She has numbness in both arms whenever she lies flat. She has chronic lower back pain as well. Denies radicular arm or leg pain, leg numbness, arm or leg weakness, or changes in bowel or bladder control. She has been taking oxycodone 10mg as needed for her back pain but this is not working. She did 1 session of PT in 2022 for her back.  She has not had any injections, chiropractic care, any surgeries. Pain level 7/10 today, 3/10 at best.    She separately endorses severe constant tinnitus and hearing loss impacting her daily functioning - saw ENT a few years ago and had what sounds like an audiogram, states she was told she had a little hearing loss and to listen closer to traffic.  She separately endorses irregular bowel movements on a daily basis, with significant diarrhea daily basis, sometimes orange and oily.  She separately reports significant weight loss without cause so far. States she eats almost constantly all day, high carb and protein foods, and she is still losing weight. She has lost 4 lb since last week. She is very  concerned that no cause has been found for this so far. She has seen a GI specialist in the past and had a colonoscopy within the last 5 years which she states found an encased polyp which was not reportedly concerning    She denies night sweats, vomiting, abdominal pain, shortness of breath, cough.       -Treatment to Date:     -Medications:      Current Outpatient Medications   Medication Sig Dispense Refill    albuterol (PROVENTIL) (2.5 MG/3ML) 0.083% neb solution Take 2.5 mg by nebulization every 6 hours as needed for shortness of breath, wheezing or cough      ascorbic acid (VITAMIN C) 500 MG tablet Take 500 mg by mouth daily.      augmented betamethasone dipropionate (DIPROLENE AF) 0.05 % external cream Apply topically 2 times daily Stronger cream for rash. 50 g 3    colestipol (COLESTID) 1 g tablet Take 2 tablets (2 g) by mouth daily for 360 days (Patient not taking: Reported on 6/18/2024) 180 tablet 3    guaiFENesin-dextromethorphan (ROBITUSSIN DM) 100-10 MG/5ML syrup Take 10 mLs by mouth every 4 hours as needed for cough (Patient not taking: Reported on 6/18/2024) 118 mL 0    hydrOXYzine (ATARAX) 25 MG tablet Take 1 tablet (25 mg) by mouth 3 times daily as needed for itching (Patient not taking: Reported on 6/18/2024) 100 tablet 1    ipratropium - albuterol 0.5 mg/2.5 mg/3 mL (DUONEB) 0.5-2.5 (3) MG/3ML neb solution Take 1 vial (3 mLs) by nebulization every 6 hours as needed for other (cough with phelgm) 90 mL 3    nebulizer nebulization 1 nebulizer please as covered by insurance.  May also dispense supplies (tubing, inhalation device, etc) as needed. 1 each 0    oxyCODONE IR (ROXICODONE) 10 MG tablet Take 1 tablet (10 mg) by mouth 4 times daily for 30 days For use from 6/14/24-7/14/24. 120 tablet 0    triamcinolone (KENALOG) 0.1 % external cream Apply topically 3 times daily       No current facility-administered medications for this visit.       Allergies   Allergen Reactions    Mirtazapine      Other  reaction(s): Other (See Comments)  Hallucination     Tape [Adhesive Tape]      Allergy Hx  In addition to NO paper tape    Liquid Adhesive Rash     Other reaction(s): Other (See Comments)  Allergy Hx - Rash from paper tape       Past Medical History:   Diagnosis Date    Acute myeloid leukemia (AML), M2 (H)     AML (acute myeloid leukemia) (H) 12/2009    Baker's cyst     Chronic back pain     Chronic diarrhea     Compression fx, lumbar spine (H)     Controlled substance agreement signed 8/3/2017    COPD (chronic obstructive pulmonary disease) (H)     COPD (chronic obstructive pulmonary disease) (H)     Full code status 9/10/2017    Gastric ulcer     pt states she has not had any ulcers    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)     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    History of PID     Metatarsal fracture 2017 2nd     Migraine without aura, without mention of intractable migraine without mention of status migrainosus     Osteoporosis 2020    DXA    Osteoporosis 5/3/2016    Papular rash, generalized     Post-menopausal 2/8/2020    Early surgical menopause    Pseudogout 11/30/2016    Serrated adenoma of colon 7/17/2020    Status post allogeneic bone marrow transplant (H)     Surgical menopause 1976    oophorectomy    Tobacco abuse     Traumatic compression fracture of T8 thoracic vertebra, closed, initial encounter (H) 4/26/2016    Tubular adenoma of colon 7/17/2020    Vertebral compression fracture (H) 2014    Zoster 2018        Patient Active Problem List   Diagnosis    Exocrine pancreatic insufficiency    Nephrolithiasis    Anxiety    Chronic back pain    Controlled substance agreement signed - 1/24 - oxycodone - UDS 1/24    COPD (chronic obstructive pulmonary disease) (H)    Financial difficulties    Full code status    Gastric ulcer    Migraine headache    Papular rash, generalized    Pseudogout    Senile purpura (H24)    Tobacco abuse    Traumatic compression fracture of T8 thoracic vertebra,  "closed, initial encounter (H)    Tubular adenoma of colon - advanced adenoma in 2017 - single small adenoma in 2020    OP (osteoporosis)    History of acute myeloid leukemia    Status post allogeneic bone marrow transplant (H) - 2010 - [facility]    Chronic diarrhea    Chronic, continuous use of opioids    Frailty    Hypokalemia       Past Surgical History:   Procedure Laterality Date    APPENDECTOMY      COLONOSCOPY N/A 07/28/2020    Procedure: COLONOSCOPY, WITH POLYPECTOMY AND BIOPSY;  Surgeon: Gianni Valerio MD;  Location: [facility] OR    HYSTERECTOMY TOTAL ABDOMINAL, BILATERAL SALPINGO-OOPHORECTOMY, COMBINED  1976    IR MISCELLANEOUS PROCEDURE  02/22/2010    TRANSPLANT  01/01/2010    VERTEBROPLASTY  01/01/2016    T5 and T8 vertebrae.       Family History   Problem Relation Age of Onset    Diabetes Mother         borderline    Pancreatic Cancer Father     Diabetes Maternal Grandmother     Diabetes Maternal Uncle     Thyroid Disease No family hx of     Melanoma No family hx of     Skin Cancer No family hx of     Lung Cancer Mother        Reviewed past medical, surgical, and family history with patient found on new patient intake packet located in EMR Media tab.     SOCIAL HX: smoker, no alcohol use, no rec drug use, no heavy drinking    ROS: positive for weight gain, weight loss, ringing in ears, diarrhea, joint pain. Specifically negative for bowel/bladder dysfunction, balance changes, headache, dizziness, foot drop, fevers, chills, appetite changes, nausea/vomiting, unexplained weight loss. Otherwise 13 systems reviewed are negative. Please see the patient's intake questionnaire from today for details.    OBJECTIVE:  BP (!) 144/78   Pulse 82   Ht 5' 6.58\" (1.691 m)   Wt 101 lb (45.8 kg)   LMP  (LMP Unknown)   BMI 16.02 kg/m      PHYSICAL EXAMINATION:    --CONSTITUTIONAL:  Vital signs as above.  No acute distress.  The patient is well nourished and well groomed. Frail appearing  --PSYCHIATRIC:  Appropriate mood and " affect. The patient is awake, alert, oriented to person, place, time and answering questions appropriately with clear speech.    --SKIN:  Skin over the face, bilateral lower extremities, and posterior torso is clean, dry, intact without rashes.    --RESPIRATORY: Normal rhythm and effort. No abnormal accessory muscle breathing patterns noted.   --ABDOMINAL:  Non-distended.    --GROSS MOTOR: Gait is non-antalgic. Easily arises from a seated position. Toe walking and heel walking are normal without significant difficulty.     UPPER EXTREMITY MOTOR TESTING  Full strength throughout both upper extremities, all planes  Reflexes are 2/4 joel upper ext throughout  Negative domonique  Negative babinski  Sensation intact throughout both upper extremities     --LOWER EXTREMITY MOTOR TESTING:  Hip flexion: right 5/5, left 5/5  Hip abduction: right 5/5, left 5/5  Hip adduction: right 5/5, left 5/5   Quads: right 5/5, left 5/5  Hamstrings: right 5/5, left 5/5  Dorsiflexion: right 5/5, left 5/5  Plantar flexion: right 5/5, left 5/5    Great toe MTP extension/EHL: right 5/5, left 5/5    --NEUROLOGICAL:  2/4 patellar and achilles reflexes bilaterally. Sensation to light touch is intact throughout both lower extremities. Babinski is negative. No clonus.    --STANDING EXAMINATION:  significant upper thoracic kyphosis    --MUSCULOSKELETAL: thoracic, cervical, lumbar spine inspection reveals no evidence of deformity. Range of motion is not limited in lumbar flexion, extension, lateral rotation. No point tenderness to palpation thoracic or lumbar spine. No paraspinal musculature tenderness.     Straight leg raising is negative.    --SACROILIAC JOINT:  Negative SI joint tenderness to palpation bilaterally.    --VASCULAR:  Bilateral lower extremities are warm without any discoloration.  There is no pitting edema of the bilateral lower extremities.    RESULTS:   Prior medical records from [facility] and [facility] Everywhere were reviewed  today.    Imaging: Spine imaging was personally reviewed and interpreted today. The images were shown to the patient and the findings were explained using a spine model.    EXAM: MR THORACIC SPINE W/O AND W CONTRAST, MR LUMBAR SPINE W/O AND W CONTRAST  LOCATION: [facility]  DATE: 2/3/2024     INDICATION: Fever, lower back pain.  COMPARISON: Chest CT 07/22/2022. CT abdomen and pelvis 02/03/2024. Lumbar MRI 04/27/2016.  CONTRAST: 5 ml Gadavist.  TECHNIQUE:   1) Routine Thoracic Spine MRI without and with IV contrast.  2) Routine Lumbar Spine MRI without and with IV contrast.     FINDINGS:     THORACIC SPINE:  Accentuation of thoracic kyphosis with otherwise normal alignment. Moderate chronic compression fractures at T5 and T8, status post vertebral augmentation. Chronic mild compression fractures at T6, T7, and T10. No acute fractures. No marrow edema or   abnormal enhancement to suggest discitis or osteomyelitis. Mild disc space narrowing. No significant disc herniation. Mild facet arthropathy. No spinal canal or neural foraminal stenosis. No abnormal cord signal.      No extraspinal abnormality.     LUMBAR SPINE:   Nomenclature is based on 5 lumbar type vertebral bodies. Mild anterior spondylolisthesis L3-L4 and L4-L5 with otherwise normal alignment. Moderate chronic compression fractures at L4-L5. No acute fracture. Mild edema within the right L3-L4 facet joint   with edema and enhancement within the adjacent paraspinal soft tissues and paraspinal musculature. Edema and enhancement centered at the left L5 transverse process and surrounding soft tissues (sagittal image 27, axial image 14). Normal distal spinal   cord and cauda equina with conus medullaris at mid L1. No extraspinal abnormality. Unremarkable visualized bony pelvis.     T12-L1: Normal disc height. Minimal disc bulge without herniation. Unremarkable facets. No spinal canal or foraminal stenosis.      L1-L2: Normal disc height  without herniation. Unremarkable facets. No spinal canal or foraminal stenosis.     L2-L3: Mild disc space narrowing with mild disc bulge. No herniation. Unremarkable facets. No spinal canal or foraminal stenosis.      L3-L4: Mild disc space narrowing with mild to moderate disc bulge. No herniation. Moderate facet arthropathy asymmetric on the right. No spinal canal stenosis. Mild to moderate right foraminal stenosis. No left foraminal stenosis.     L4-L5: Mild disc space narrowing with mild to moderate disc bulge and broad-based central disc protrusion and broad-based left foraminal disc protrusion. Enhancement along the annulus within the left foramen (sagittal image 23, axial image 18). Mild   spinal canal and lateral recess stenoses. Moderate to severe left foraminal stenosis. Mild right foraminal stenosis.     L5-S1: Normal disc height without herniation. Minimal facet arthropathy. No spinal canal stenosis. Mild left foraminal stenosis. No right foraminal stenosis.                                                                      IMPRESSION:     THORACIC SPINE MRI:  1.  No evidence for spine infection.  2.  Chronic compression fractures T5, T6, T7, T8, and T10, status post vertebral augmentation at T5 and T8.     LUMBAR SPINE MRI:  1.  Moderate facet arthropathy on the right at L3-L4 with edema and enhancement involving the facet and surrounding soft tissues, nonspecific. This is potentially degenerative though early changes of infectious process also possible in this clinical   context.  2.  Nonspecific edema and enhancement at the left L5 transverse process where it abuts the sacrum, with similar differential considerations as above.  3.  No evidence for discitis.  4.  Chronic compression fractures at L4 and L5. No acute fracture.  5.  Left foraminal disc protrusion at L4-L5 contributing to moderate to severe foraminal stenosis. Mild lateral recess and foraminal stenoses at other levels, as  detailed.      EXAM: CT ABDOMEN PELVIS W CONTRAST  LOCATION: [facility]  DATE: 2/3/2024     INDICATION: Fever, back pain right flank pain.  COMPARISON: 10/16/2019 CT.  TECHNIQUE: CT scan of the abdomen and pelvis was performed following injection of IV contrast. Multiplanar reformats were obtained. Dose reduction techniques were used.  CONTRAST: Isovue 370 75 ml.     FINDINGS:   LOWER CHEST: Stable calcifications in the left atrium. Mild findings of centrilobular emphysema. Benign calcified granulomas left lung base. Stable minimal pericardial fluid.     HEPATOBILIARY: Normal.     PANCREAS: Moderate atrophy. Otherwise, normal.     SPLEEN: Benign calcified granulomas.     ADRENAL GLANDS: Normal.     KIDNEYS/BLADDER: Nonobstructive stones seen in the medial aspect of the right kidney midpole which measures approximately 9.1 x 3.6 mm.     BOWEL: The appendix is not seen with any certainty, however I do not see any obvious pericecal inflammation to suggest acute appendicitis.     LYMPH NODES: Normal.     VASCULATURE: Mild calcification with no aneurysm.     PELVIC ORGANS: Surgically absent.     MUSCULOSKELETAL: Old appearing compression fractures involving the L4 and L5 levels. Postoperative changes involving the right hip. Mild to moderate thoracolumbar curvature convex to the left.                                                                      IMPRESSION:   1.  The appendix is not seen with any certainty, however I do not see any obvious pericecal inflammation to suggest acute appendicitis.     2.  Moderate atrophy of the pancreas.     3.  Benign calcified granulomas.     4.  Nonobstructive stone in the medial aspect of the right kidney midpole which measures approximately 9.1 x 3.6 cm. This has increased in size since 10/16/2019 CT and this measured 4.8 x 2.8 mm.     5.  No stones or obstructive changes in the ureters/bladder.     6.  Old compression fractures at L4 and L5.    This note  was dictated using voice recognition software. Any grammatical or context distortions are unintentional and inherent to the software.       Leonela Santiago [facility]  [facility]  O. [phone]

## 2024-06-26 ENCOUNTER — PATIENT OUTREACH (OUTPATIENT)
Dept: ONCOLOGY | Facility: CLINIC | Age: 69
End: 2024-06-26
Payer: COMMERCIAL

## 2024-06-26 NOTE — PROGRESS NOTES
New Patient Oncology Nurse Navigator Note     Referral Received: 06/26/24      Referring provider: Leonela Santiago APRN CNP     Referring Clinic/Organization: [facility]     Referred to: Malignant Hematology    Requested provider (if applicable): First available - did not specify      Evaluation for :   Diagnosis   R63.4 (ICD-10-CM) - Weight loss   Z94.81 (ICD-10-CM) - Status post allogeneic bone marrow transplant (H)        Clinical History (per Nurse review of records provided):      Unexplained severe weight loss. History leukemia sp bone marrow transplant. Needs to establish care with new provide.    Last Onc OV note 5/14/2018:  .AML, M2 subtype, status post allogenic transplant, June 2010  Significant smoking  Rash and diarrhea  CBC is normal and there is no evidence of relapse of the mL.  She is in remission.  Will continue observation.  We will see her again in 1 year.    Records Location: [facility]     Records Needed:     ?    Additional testing needed prior to consult:     ?    Referral updates and Plan:     06/26/2024 8:24 AM -  Referral received and reviewed.  Message sent to Dr. Perez to review for appropriateness to reestablish care with AML provider.       Jenny Tavarez, MILLYN, RN  Hematology/Oncology Nurse Navigator  [facility]  [phone] / [phone]

## 2024-06-28 ENCOUNTER — OFFICE VISIT (OUTPATIENT)
Dept: PHYSICAL MEDICINE AND REHAB | Facility: CLINIC | Age: 69
End: 2024-06-28
Attending: NURSE PRACTITIONER
Payer: COMMERCIAL

## 2024-06-28 DIAGNOSIS — R20.0 NUMBNESS AND TINGLING OF UPPER EXTREMITY: ICD-10-CM

## 2024-06-28 DIAGNOSIS — R20.2 NUMBNESS AND TINGLING OF UPPER EXTREMITY: ICD-10-CM

## 2024-06-28 PROCEDURE — 95886 MUSC TEST DONE W/N TEST COMP: CPT | Performed by: PHYSICAL MEDICINE & REHABILITATION

## 2024-06-28 PROCEDURE — 95910 NRV CNDJ TEST 7-8 STUDIES: CPT | Performed by: PHYSICAL MEDICINE & REHABILITATION

## 2024-06-28 NOTE — PROGRESS NOTES
[facility]  [address] [place] [address] [address]  [place] [address]  Office: [phone] Fax: [phone]    Electromyography and Nerve Conduction Study Report        Indication: Patient presents at the request of Leonela Santiago CNP for bilateral upper extremity EMG.  She has bilateral hand and arm numbness and tingling with lying supine.  She is right-handed.  No weakness and no cervical spine pain.  On exam, she has normal sensation to light touch through the upper extremities, 2+ biceps, triceps, brachioradialis reflexes with negative Amandeep's, and normal muscle strength throughout the major muscle groups of the bilateral upper extremities.      Pt Exam Discussion (Communication Barriers):  Electromyography and nerve conduction testing, including associated discomfort, risks, benefits, and alternatives was discussed with the patient prior to the procedure.  No learning/ communication barriers; patient verbalized understanding of procedure.  Informed consent was obtained.           Pt Assessment:  Testing was successfully completed; patient tolerated testing well.       Blood Thinners: None Skin Temperature: Warmed 31.0                     EMG/NCS  results:     Nerve Conduction Studies  Motor Sites      Segment Distal Latency Neg. Amp CV F-Latency F-Estimate Comment   Site  (ms) (mV) (m/s) (ms) (ms)    Left Median (APB) Motor   Wrist Wrist-APB 4.2 6.2       Elbow Elbow-Wrist 8.8 5.7 53      Right Median (APB) Motor   Wrist Wrist-APB 4.0 4.2       Elbow Elbow-Wrist 8.7 4.2 53      Left Ulnar (ADM) Motor   Wrist  3.1 12.3       Bel Elbow Bel Elbow-Wrist 7.4 11.1 58      Ab Elbow Ab Elbow-Wrist 8.9 11.2 62      Right Ulnar (ADM) Motor   Wrist  3.2 12.8       Bel Elbow Bel Elbow-Wrist 7.5 11.5 57      Ab Elbow Ab Elbow-Wrist 9.5 11.5 57        Sensory Sites      Onset Lat Peak Lat Amp CV Comment   Site (ms) (ms) ( V) (m/s)    Right Median Anti Sensory   Wrist-Dig II 2.6 3.5 22 50    Left  Median-Ulnar Palmar Sensory        Median   Palm-Wrist 1.78 2.3 116 45         Ulnar   Palm-Wrist 1.60 2.1 23 50    Right Median-Ulnar Palmar Sensory        Median   Palm-Wrist 1.65 2.2 93 48         Ulnar   Palm-Wrist 1.68 2.2 22 48    Right Ulnar Anti Sensory   Wrist-Dig V 2.6 *3.1 23 42        NCS Waveforms:    Motor                Sensory                  Electromyography     Side Muscle Nerve Root Ins Act Fibs Psw Fasc Recrt Dur Amp Poly Comment   Right Deltoid Axillary C5-6 Nml Nml Nml Nml Nml Nml Nml 0    Right Triceps Radial C6-7-8 Nml Nml Nml Nml Nml Nml Nml 0    Right PronatorTeres Median C6-7 Nml Nml Nml Nml Nml Nml Nml 0    Right 1stDorInt Ulnar C8-T1 Nml Nml Nml Nml Nml Nml Nml 0    Right Abd Poll Brev Median C8-T1 Nml Nml Nml Nml Nml Nml Nml 0    Left Deltoid Axillary C5-6 Nml Nml Nml Nml Nml Nml Nml 0    Left Triceps Radial C6-7-8 Nml Nml Nml Nml Nml Nml Nml 0    Left PronatorTeres Median C6-7 Nml Nml Nml Nml Nml Nml Nml 0    Left 1stDorInt Ulnar C8-T1 Nml Nml Nml Nml Nml Nml Nml 0    Left Abd Poll Brev Median C8-T1 Nml Nml Nml Nml Nml Nml Nml 0        Comment NCS: Normal study  1.  Normal nerve conduction studies bilateral upper extremities.  This includes normal right median and ulnar SNAPs, bilateral median and ulnar transcarpal studies, and bilateral median and ulnar CMAP's.     Comment EMG: Normal study  1.  Normal needle EMG bilateral upper extremities.     Interpretation: Normal study    1. There is no electrodiagnostic evidence of cervical radiculopathy, brachial plexopathy, or focal neuropathy in the bilateral upper extremities.     The testing was completed in its entirety by the physician.       It was our pleasure caring for your patient today, if there any questions or concerns please do not hesitate to contact us.

## 2024-06-28 NOTE — PATIENT INSTRUCTIONS
Thank you for choosing the [facility] for your EMG testing.     The ordering provider will receive your final EMG results within the next few days.  Please follow up with your provider for the results and further treatment recommendations.  You may make your follow up appointment on your way out today. Please allow one week for results to be completed.

## 2024-06-28 NOTE — LETTER
6/28/2024      Jenn Barclay  [address] [place] [facility] [address]      Dear Colleague,    Thank you for referring your patient, Jenn Barclay, to the [facility]. Please see a copy of my visit note below.    [facility]  [address] [facility] [address]  [place] [address]  Office: [phone] Fax: [phone]    Electromyography and Nerve Conduction Study Report        Indication: Patient presents at the request of Leonela Santiago CNP for bilateral upper extremity EMG.  She has bilateral hand and arm numbness and tingling with lying supine.  She is right-handed.  No weakness and no cervical spine pain.  On exam, she has normal sensation to light touch through the upper extremities, 2+ biceps, triceps, brachioradialis reflexes with negative Amandeep's, and normal muscle strength throughout the major muscle groups of the bilateral upper extremities.      Pt Exam Discussion (Communication Barriers):  Electromyography and nerve conduction testing, including associated discomfort, risks, benefits, and alternatives was discussed with the patient prior to the procedure.  No learning/ communication barriers; patient verbalized understanding of procedure.  Informed consent was obtained.           Pt Assessment:  Testing was successfully completed; patient tolerated testing well.       Blood Thinners: None Skin Temperature: Warmed 31.0                     EMG/NCS  results:     Nerve Conduction Studies  Motor Sites      Segment Distal Latency Neg. Amp CV F-Latency F-Estimate Comment   Site  (ms) (mV) (m/s) (ms) (ms)    Left Median (APB) Motor   Wrist Wrist-APB 4.2 6.2       Elbow Elbow-Wrist 8.8 5.7 53      Right Median (APB) Motor   Wrist Wrist-APB 4.0 4.2       Elbow Elbow-Wrist 8.7 4.2 53      Left Ulnar (ADM) Motor   Wrist  3.1 12.3       Bel Elbow Bel Elbow-Wrist 7.4 11.1 58      Ab Elbow Ab Elbow-Wrist 8.9 11.2 62      Right Ulnar (ADM) Motor   Wrist  3.2  12.8       Bel Elbow Bel Elbow-Wrist 7.5 11.5 57      Ab Elbow Ab Elbow-Wrist 9.5 11.5 57        Sensory Sites      Onset Lat Peak Lat Amp CV Comment   Site (ms) (ms) ( V) (m/s)    Right Median Anti Sensory   Wrist-Dig II 2.6 3.5 22 50    Left Median-Ulnar Palmar Sensory        Median   Palm-Wrist 1.78 2.3 116 45         Ulnar   Palm-Wrist 1.60 2.1 23 50    Right Median-Ulnar Palmar Sensory        Median   Palm-Wrist 1.65 2.2 93 48         Ulnar   Palm-Wrist 1.68 2.2 22 48    Right Ulnar Anti Sensory   Wrist-Dig V 2.6 *3.1 23 42        NCS Waveforms:    Motor                Sensory                  Electromyography     Side Muscle Nerve Root Ins Act Fibs Psw Fasc Recrt Dur Amp Poly Comment   Right Deltoid Axillary C5-6 Nml Nml Nml Nml Nml Nml Nml 0    Right Triceps Radial C6-7-8 Nml Nml Nml Nml Nml Nml Nml 0    Right PronatorTeres Median C6-7 Nml Nml Nml Nml Nml Nml Nml 0    Right 1stDorInt Ulnar C8-T1 Nml Nml Nml Nml Nml Nml Nml 0    Right Abd Poll Brev Median C8-T1 Nml Nml Nml Nml Nml Nml Nml 0    Left Deltoid Axillary C5-6 Nml Nml Nml Nml Nml Nml Nml 0    Left Triceps Radial C6-7-8 Nml Nml Nml Nml Nml Nml Nml 0    Left PronatorTeres Median C6-7 Nml Nml Nml Nml Nml Nml Nml 0    Left 1stDorInt Ulnar C8-T1 Nml Nml Nml Nml Nml Nml Nml 0    Left Abd Poll Brev Median C8-T1 Nml Nml Nml Nml Nml Nml Nml 0        Comment NCS: Normal study  1.  Normal nerve conduction studies bilateral upper extremities.  This includes normal right median and ulnar SNAPs, bilateral median and ulnar transcarpal studies, and bilateral median and ulnar CMAP's.     Comment EMG: Normal study  1.  Normal needle EMG bilateral upper extremities.     Interpretation: Normal study    1. There is no electrodiagnostic evidence of cervical radiculopathy, brachial plexopathy, or focal neuropathy in the bilateral upper extremities.     The testing was completed in its entirety by the physician.       It was our pleasure caring for your patient today, if  there any questions or concerns please do not hesitate to contact us.    Again, thank you for allowing me to participate in the care of your patient.        Sincerely,        Den Huggins, DO

## 2024-07-01 ENCOUNTER — TELEPHONE (OUTPATIENT)
Dept: PHYSICAL MEDICINE AND REHAB | Facility: CLINIC | Age: 69
End: 2024-07-01
Payer: COMMERCIAL

## 2024-07-01 NOTE — TELEPHONE ENCOUNTER
----- Message from Leonela Santiago sent at 7/1/2024  8:50 AM CDT -----  Please let Jenn know that her EMG results of her arms were normal.

## 2024-07-01 NOTE — TELEPHONE ENCOUNTER
Call placed to patient with provider's results and recommendations.  Pt stated understanding.   Warm transfer completed to [facility] to assist patient in scheduling MRIs.

## 2024-07-03 ENCOUNTER — TELEPHONE (OUTPATIENT)
Dept: PHYSICAL MEDICINE AND REHAB | Facility: CLINIC | Age: 69
End: 2024-07-03
Payer: COMMERCIAL

## 2024-07-03 NOTE — TELEPHONE ENCOUNTER
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

## 2024-07-03 NOTE — TELEPHONE ENCOUNTER
"Call placed to pt with provider's message. Pt expressed frustrations. \"I'm wasting away! One appointment I was 105 and my last one I was 101. Somebody has to tell me why! You trying losing weight and your hair for the last 2 years and no one can figure out what is going on!\" Empathized with patient. Pt stated \"thanks for trying\" and then ended call.       "

## 2024-07-11 DIAGNOSIS — M54.50 CHRONIC BILATERAL LOW BACK PAIN, UNSPECIFIED WHETHER SCIATICA PRESENT: ICD-10-CM

## 2024-07-11 DIAGNOSIS — G89.29 CHRONIC BILATERAL LOW BACK PAIN, UNSPECIFIED WHETHER SCIATICA PRESENT: ICD-10-CM

## 2024-07-11 RX ORDER — OXYCODONE HYDROCHLORIDE 10 MG/1
10 TABLET ORAL 4 TIMES DAILY
Qty: 120 TABLET | Refills: 0 | Status: SHIPPED | OUTPATIENT
Start: 2024-07-14 | End: 2024-08-12

## 2024-07-23 DIAGNOSIS — J44.9 CHRONIC OBSTRUCTIVE PULMONARY DISEASE, UNSPECIFIED COPD TYPE (H): ICD-10-CM

## 2024-07-23 RX ORDER — IPRATROPIUM BROMIDE AND ALBUTEROL SULFATE 2.5; .5 MG/3ML; MG/3ML
SOLUTION RESPIRATORY (INHALATION)
Qty: 90 ML | Refills: 2 | Status: SHIPPED | OUTPATIENT
Start: 2024-07-23

## 2024-07-24 ENCOUNTER — HOSPITAL ENCOUNTER (OUTPATIENT)
Dept: MRI IMAGING | Facility: HOSPITAL | Age: 69
Discharge: HOME | End: 2024-07-24
Attending: NURSE PRACTITIONER | Admitting: NURSE PRACTITIONER
Payer: COMMERCIAL

## 2024-07-24 DIAGNOSIS — S22.060S CLOSED WEDGE COMPRESSION FRACTURE OF T8 VERTEBRA, SEQUELA: ICD-10-CM

## 2024-07-24 DIAGNOSIS — M47.819 FACET ARTHROPATHY: ICD-10-CM

## 2024-07-24 DIAGNOSIS — S22.070S CLOSED WEDGE COMPRESSION FRACTURE OF T10 VERTEBRA, SEQUELA: ICD-10-CM

## 2024-07-24 DIAGNOSIS — S22.060S CLOSED WEDGE COMPRESSION FRACTURE OF T7 VERTEBRA, SEQUELA: ICD-10-CM

## 2024-07-24 DIAGNOSIS — S32.040S CLOSED COMPRESSION FRACTURE OF L4 LUMBAR VERTEBRA, SEQUELA: ICD-10-CM

## 2024-07-24 DIAGNOSIS — S32.050S CLOSED COMPRESSION FRACTURE OF L5 VERTEBRA, SEQUELA: ICD-10-CM

## 2024-07-24 DIAGNOSIS — S22.050S CLOSED WEDGE COMPRESSION FRACTURE OF T6 VERTEBRA, SEQUELA: ICD-10-CM

## 2024-07-24 DIAGNOSIS — S22.050S CLOSED WEDGE COMPRESSION FRACTURE OF T5 VERTEBRA, SEQUELA: ICD-10-CM

## 2024-07-24 PROCEDURE — A9585 GADOBUTROL INJECTION: HCPCS | Performed by: NURSE PRACTITIONER

## 2024-07-24 PROCEDURE — 255N000002 HC RX 255 OP 636: Performed by: NURSE PRACTITIONER

## 2024-07-24 PROCEDURE — 72158 MRI LUMBAR SPINE W/O & W/DYE: CPT

## 2024-07-24 PROCEDURE — 72157 MRI CHEST SPINE W/O & W/DYE: CPT

## 2024-07-24 RX ORDER — GADOBUTROL 604.72 MG/ML
0.1 INJECTION INTRAVENOUS ONCE
Status: COMPLETED | OUTPATIENT
Start: 2024-07-24 | End: 2024-07-24

## 2024-07-24 RX ADMIN — GADOBUTROL 4.5 ML: 604.72 INJECTION INTRAVENOUS at 14:17

## 2024-07-25 ENCOUNTER — TELEPHONE (OUTPATIENT)
Dept: PHYSICAL MEDICINE AND REHAB | Facility: CLINIC | Age: 69
End: 2024-07-25
Payer: COMMERCIAL

## 2024-07-25 NOTE — TELEPHONE ENCOUNTER
----- Message from Ten Jalloh sent at 7/24/2024  3:58 PM CDT -----  I reviewed MRI of thoracic spine and lumbar spine as Leonela Santiago is out of the office.  The compression fractures that you have in your thoracic spine are unchanged.  There is a moderate amount of arthritis in your low back which could be cause of pain.  Does not seem to be due to infection.  I recommend that follow-up with either Claribel Young or Jo-Ann Alvares for a 40-minute appointment to evaluate potential treatment plan including medial branch blocks that Leonela Santiago and discussed.

## 2024-07-25 NOTE — TELEPHONE ENCOUNTER
Phone call to patient to review results and provider's recommendations. Results given and explained. Discussed recommendation to return for follow up. Did explain her PSP is out for 3-4 weeks so she can see another KEVIN here. Patient wants to make appointment for after 8/9 as she is completing PT. Transferred to scheduling to make appointment.

## 2024-07-26 ENCOUNTER — THERAPY VISIT (OUTPATIENT)
Dept: PHYSICAL THERAPY | Facility: REHABILITATION | Age: 69
End: 2024-07-26
Attending: NURSE PRACTITIONER
Payer: COMMERCIAL

## 2024-07-26 DIAGNOSIS — G89.29 CHRONIC MIDLINE THORACIC BACK PAIN: ICD-10-CM

## 2024-07-26 DIAGNOSIS — S22.050S CLOSED WEDGE COMPRESSION FRACTURE OF T5 VERTEBRA, SEQUELA: ICD-10-CM

## 2024-07-26 DIAGNOSIS — S32.040S CLOSED COMPRESSION FRACTURE OF L4 LUMBAR VERTEBRA, SEQUELA: Primary | ICD-10-CM

## 2024-07-26 DIAGNOSIS — M54.50 CHRONIC BILATERAL LOW BACK PAIN WITHOUT SCIATICA: ICD-10-CM

## 2024-07-26 DIAGNOSIS — M40.00 KYPHOSIS (ACQUIRED) (POSTURAL): ICD-10-CM

## 2024-07-26 DIAGNOSIS — M54.6 CHRONIC MIDLINE THORACIC BACK PAIN: ICD-10-CM

## 2024-07-26 DIAGNOSIS — G89.29 CHRONIC BILATERAL LOW BACK PAIN WITHOUT SCIATICA: ICD-10-CM

## 2024-07-26 PROCEDURE — 97110 THERAPEUTIC EXERCISES: CPT | Mod: GP

## 2024-07-26 PROCEDURE — 97161 PT EVAL LOW COMPLEX 20 MIN: CPT | Mod: GP

## 2024-07-26 NOTE — PROGRESS NOTES
"PHYSICAL THERAPY EVALUATION  Type of Visit: Evaluation       Fall Risk Screen:  Fall screen completed by: PT  Have you fallen 2 or more times in the past year?: No  Have you fallen and had an injury in the past year?: No  Is patient a fall risk?: No    Subjective       Presenting condition or subjective complaint:    Date of onset: 06/25/24    Relevant medical history:     Dates & types of surgery:      Prior diagnostic imaging/testing results:       Prior therapy history for the same diagnosis, illness or injury:        Prior Level of Function  Transfers: Independent  Ambulation: Independent  ADL: Independent  IADL: Driving, Finances, Housekeeping, Laundry, Meal preparation, Medication management, Work    Living Environment  Social support:     Type of home:     Stairs to enter the home:         Ramp:     Stairs inside the home:         Help at home:    Equipment owned:       Employment:      Hobbies/Interests:      Patient goals for therapy:      Per H&P: \"Patient is neurologically intact on exam. No myelopathic or red flag symptoms. We reviewed her thoracolumbar MRIs from feb of this year. The radiologist reports nonspecific enhancement and edema in the lumbar spine, potentially degenerative vs early infection. I feel given ongoing pain that we need to recheck this area for any progression of edema with new MRI imaging. She has chronic compression fractures T5-8, T10, with augmentation of T5 and T8. Given osteoporosis and ongoing severe mid-thoracic back pain I also suggest we recheck this area with thoracic MRI to see if any interval recent height loss of vertebral bodies at this point.     Referred to PT  EMG for joel upper extremity numbness though could be thoracic outlet syndrome  For her severe weight loss: recommended GI consult (given orange oily diarrhea), hem onc consult (re-establish care given hx bone marrow transplant).  For her joel tinnitis: ENT\"    SUBJECTIVE: Pt reports that she has arthritis in " her low back. She reports that she had Leukemia in 2008 and was cancer free in 2011. She had a bone marrow transplant, which was successful. She has cement filled in some of her upper back fractures per her own report. If she lays down on her back for more than 5 mins, she will start to have numbness and tingling going down her legs. She sleeps on her sides in order to avoid this. If she sits too long, then she will get very stiff and have more intense pain.        Objective   LUMBAR SPINE EVALUATION  PAIN: Pain Level at Rest: 4/10  Pain Level with Use: 9/10  Pain Location: thoracic spine and lumbar spine  Pain Quality: Aching, Burning, Exhausting, Stabbing, Tender, and Throbbing  Pain Frequency: intermittent or daily  Pain is Worst: no pattern  Pain is Exacerbated By: sitting, walking in grocery store  Pain is Relieved By: heat and stretch  Pain Progression: Unchanged    POSTURE: Sitting Posture: Rounded shoulders, Forward head, Thoracic kyphosis increased    BALANCE/PROPRIOCEPTION: Single Leg Stance Eyes Open (seconds): L: 14 sec, R: 18 sec - stopped d/t faitgue, Single Leg Stance Eyes Closed (seconds): NT    ROM:   (Degrees) Left AROM Left PROM  Right AROM Right PROM   Lumbar Side glide     Lumbar Flexion Fingers to feet   Lumbar Extension Limited 75%, tightness/pain in low back     STRENGTH:   HIP L R   Flex 4 4-   Ext 3+ 3   ABD 3- 3-   ADD 3- 3-   ER 5 5   IR 5 5   KNEE     Flex 4 4+   Ext 4 4   ANKLE     PF 5 5   DF 5 5   INV 5 5   EV 5 5     DTR S: WNL  CORD SIGNS: WNL  DERMATOMES: WNL  NEURAL TENSION:  Slump: +    LUMBAR/HIP Special Tests:    Left Right   HUYEN Negative  Negative    FADIR/Labrum/RAPHAEL Positive Positive   Femoral Nerve Positive Positive   Sole's Negative  Negative    Piriformis Positive Positive      Assessment & Plan   CLINICAL IMPRESSIONS  Medical Diagnosis: S22.050S (ICD-10-CM) - Closed wedge compression fracture of T5 vertebra, sequela  S32.040S (ICD-10-CM) - Closed compression fracture of  L4 lumbar vertebra, sequela  M40.00 (ICD-10-CM) - Kyphosis (acquired) (postural)  M54.50, G89.29 (ICD-10-CM) - Chronic bilateral low back pain without sciatica  M54.6, G89.29 (ICD-10-CM) - Chronic midline thoracic back pain    Treatment Diagnosis: LBP, hip weakness, core weakness   Impression/Assessment: Patient is a 68 year old female with low back and mid back pain complaints.  The following significant findings have been identified: Pain, Decreased ROM/flexibility, Decreased joint mobility, Decreased strength, Impaired balance, Impaired muscle performance, Decreased activity tolerance, and Impaired posture. These impairments interfere with their ability to perform self care tasks, recreational activities, household chores, driving , household mobility, and community mobility as compared to previous level of function.     Clinical Decision Making (Complexity):  Clinical Presentation: Stable/Uncomplicated  Clinical Presentation Rationale: based on medical and personal factors listed in PT evaluation  Clinical Decision Making (Complexity): Low complexity    PLAN OF CARE  Treatment Interventions:  Modalities: Cryotherapy, E-stim, Hot Pack  Interventions: Gait Training, Manual Therapy, Neuromuscular Re-education, Therapeutic Activity, Therapeutic Exercise, Self-Care/Home Management    Long Term Goals     PT Goal 1  Goal Description: Pt will improve Lumbar SB and ROT by 15 degrees in order to be able to look over their shoulder while driving  Target Date: 10/18/24  PT Goal 2  Goal Description: Pt will improve hip abduction and extension strength to > 4.5 in order to improve pelvic stability while walking  Target Date: 10/18/24  PT Goal 3  Goal Description: Pt will demonstrate appropriate adherence to HEP as evidenced by improved control and muscular coordination with prescribed exercises.  Target Date: 10/18/24      Frequency of Treatment: 1x per week x 4 weeks  Duration of Treatment: 12 weeks    Education Assessment:    Learner/Method: Patient;Listening;Reading;Demonstration  Education Comments: Education provided regarding appropriate response to exercise, the difference between pain and soreness, as well as the importance of time, effort and consistency with HEP.    Risks and benefits of evaluation/treatment have been explained.   Patient/Family/caregiver agrees with Plan of Care.     Evaluation Time:           Signing Clinician: Marcelino Grimaldo PT        [facility]                                                                                   OUTPATIENT PHYSICAL THERAPY      PLAN OF TREATMENT FOR OUTPATIENT REHABILITATION   Patient's Last Name, First Name, MAKAYLAAnastacia Araujoa  AVA YOB: 1955   Provider's Name   [facility]   Medical Record No.  [id number]     Onset Date: 06/25/24  Start of Care Date: 07/26/24     Medical Diagnosis:  S22.050S (ICD-10-CM) - Closed wedge compression fracture of T5 vertebra, sequela  S32.040S (ICD-10-CM) - Closed compression fracture of L4 lumbar vertebra, sequela  M40.00 (ICD-10-CM) - Kyphosis (acquired) (postural)  M54.50, G89.29 (ICD-10-CM) - Chronic bilateral low back pain without sciatica  M54.6, G89.29 (ICD-10-CM) - Chronic midline thoracic back pain      PT Treatment Diagnosis:  LBP, hip weakness, core weakness Plan of Treatment  Frequency/Duration: 1x per week x 4 weeks/ 12 weeks    Certification date from 07/26/24 to 10/18/24         See note for plan of treatment details and functional goals     Marcelino Grimaldo, PT                         I CERTIFY THE NEED FOR THESE SERVICES FURNISHED UNDER        THIS PLAN OF TREATMENT AND WHILE UNDER MY CARE     (Physician attestation of this document indicates review and certification of the therapy plan).              Referring Provider:  Leonela Santiago    Initial Assessment  See Epic Evaluation- Start of Care Date: 07/26/24

## 2024-08-02 ENCOUNTER — THERAPY VISIT (OUTPATIENT)
Dept: PHYSICAL THERAPY | Facility: REHABILITATION | Age: 69
End: 2024-08-02
Attending: NURSE PRACTITIONER
Payer: COMMERCIAL

## 2024-08-02 DIAGNOSIS — M54.6 CHRONIC MIDLINE THORACIC BACK PAIN: ICD-10-CM

## 2024-08-02 DIAGNOSIS — G89.29 CHRONIC BILATERAL LOW BACK PAIN WITHOUT SCIATICA: ICD-10-CM

## 2024-08-02 DIAGNOSIS — S22.050S CLOSED WEDGE COMPRESSION FRACTURE OF T5 VERTEBRA, SEQUELA: ICD-10-CM

## 2024-08-02 DIAGNOSIS — G89.29 CHRONIC MIDLINE THORACIC BACK PAIN: ICD-10-CM

## 2024-08-02 DIAGNOSIS — M40.00 KYPHOSIS (ACQUIRED) (POSTURAL): ICD-10-CM

## 2024-08-02 DIAGNOSIS — S32.040S CLOSED COMPRESSION FRACTURE OF L4 LUMBAR VERTEBRA, SEQUELA: Primary | ICD-10-CM

## 2024-08-02 DIAGNOSIS — M54.50 CHRONIC BILATERAL LOW BACK PAIN WITHOUT SCIATICA: ICD-10-CM

## 2024-08-02 PROCEDURE — 97110 THERAPEUTIC EXERCISES: CPT | Mod: GP

## 2024-08-02 PROCEDURE — 97530 THERAPEUTIC ACTIVITIES: CPT | Mod: GP

## 2024-08-12 ENCOUNTER — OFFICE VISIT (OUTPATIENT)
Dept: PHYSICAL MEDICINE AND REHAB | Facility: CLINIC | Age: 69
End: 2024-08-12
Payer: COMMERCIAL

## 2024-08-12 VITALS — HEART RATE: 70 BPM | SYSTOLIC BLOOD PRESSURE: 114 MMHG | DIASTOLIC BLOOD PRESSURE: 63 MMHG

## 2024-08-12 DIAGNOSIS — G89.29 CHRONIC BILATERAL LOW BACK PAIN, UNSPECIFIED WHETHER SCIATICA PRESENT: ICD-10-CM

## 2024-08-12 DIAGNOSIS — G89.29 CHRONIC MIDLINE THORACIC BACK PAIN: ICD-10-CM

## 2024-08-12 DIAGNOSIS — Z87.81 HISTORY OF VERTEBRAL COMPRESSION FRACTURE: ICD-10-CM

## 2024-08-12 DIAGNOSIS — M54.50 CHRONIC BILATERAL LOW BACK PAIN WITHOUT SCIATICA: Primary | ICD-10-CM

## 2024-08-12 DIAGNOSIS — M54.6 CHRONIC MIDLINE THORACIC BACK PAIN: ICD-10-CM

## 2024-08-12 DIAGNOSIS — G89.29 CHRONIC BILATERAL LOW BACK PAIN WITHOUT SCIATICA: Primary | ICD-10-CM

## 2024-08-12 DIAGNOSIS — M51.369 LUMBAR DEGENERATIVE DISC DISEASE: ICD-10-CM

## 2024-08-12 DIAGNOSIS — M54.50 CHRONIC BILATERAL LOW BACK PAIN, UNSPECIFIED WHETHER SCIATICA PRESENT: ICD-10-CM

## 2024-08-12 PROCEDURE — 99214 OFFICE O/P EST MOD 30 MIN: CPT | Performed by: NURSE PRACTITIONER

## 2024-08-12 RX ORDER — OXYCODONE HYDROCHLORIDE 10 MG/1
10 TABLET ORAL 4 TIMES DAILY
Qty: 120 TABLET | Refills: 0 | Status: SHIPPED | OUTPATIENT
Start: 2024-08-13 | End: 2024-09-11

## 2024-08-12 ASSESSMENT — PAIN SCALES - GENERAL: PAINLEVEL: MODERATE PAIN (5)

## 2024-08-12 NOTE — TELEPHONE ENCOUNTER
Controlled Substance Refill Request for      Disp Refills Start End DANISH   oxyCODONE IR (ROXICODONE) 10 MG tablet 120 tablet 0 7/14/2024 8/13/2024 No   Sig - Route: Take 1 tablet (10 mg) by mouth 4 times daily for 30 days For use from 7/14/24-8/13/2024. - Oral   Sent to pharmacy as: oxyCODONE HCl 10 MG Oral Tablet (ROXICODONE)   Class: E-Prescribe   Earliest Fill Date: 7/14/2024   Order: [id number]       Last refill: 7/14/2024    Last clinic visit: 6/13/2024    Next appt: 12/13/2024    Controlled substance agreement on file: Yes:  Date 1/8/2024.

## 2024-08-12 NOTE — PATIENT INSTRUCTIONS
~Spine Center Scheduling #[phone].  ~Please call our [facility]ine Nurse Navigation #[phone] with any questions or concerns about your treatment plan, if symptoms worsen and you would like to be seen urgently, or if you have problems controlling bladder and bowel function.  ~For any future flareups or new symptoms, recommend follow-up in clinic or contact the nurse navigator line.  ~Please note that any My Chart messages may take multiple days for a response due to the high volume of patients seen in clinic.  Anything sent Thursday night or after will be answered the following week when able, as Claribel Young CNP does not work in clinic on Fridays.   ~Claribel Young CNP is at the [facility] on Tuesdays, otherwise primarily at the [facility].       Importance of specialized Physical Therapy: Discussed the importance of core strengthening, ROM, stretching exercises with the patient and how each of these entities is important in decreasing pain.  Explained to the patient that the purpose of physical therapy is to teach the patient a home exercise program individualized to them and their specific health concerns.  These exercises need to be performed every day in order to decrease pain and prevent future occurrences of pain.

## 2024-08-12 NOTE — PROGRESS NOTES
Assessment:     Diagnoses and all orders for this visit:  Chronic bilateral low back pain without sciatica  Chronic midline thoracic back pain  Lumbar degenerative disc disease  History of vertebral compression fracture     Jnen Barclay is a 68 year old y.o. 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  Chronic compression fractures T5-T8, T10, augmentation of T5 and T8.  Chronic L4 and L5 compression fracture.    *Patient has seen SINAI Baker in 2022, recently Leonela Santiago CNP 6/25/2024.     Plan:     A shared decision making plan was used. The patient's values and choices were respected. Prior medical records were reviewed today. The following represents what was discussed and decided upon by the provider and the patient.        -DIAGNOSTIC TESTS: Images were personally reviewed and interpreted.   -- Thoracic and lumbar spine MRI 4/24/2024 with chronic/unchanged compression fracture T5, T6, T7, T8, T9.  Chronic L4 and L5 compression fractures.  No new fractures.  L3-4 right facet joint effusion with moderate to severe facet arthropathy bilaterally.  Otherwise degenerative changes noted at both L3-4 and L4-5.    -INTERVENTIONS: Patient is not interested in injections at this time.    -MEDICATIONS: No changes in medications today. .  Discussed side effects of medications and proper use. Patient verbalized understanding.    -PHYSICAL THERAPY:  Patient is is working on physical therapy at this time and continuing with home exercises which is recommended  Discussed the importance of core strengthening, ROM, stretching exercises with the patient and how each of these entities is important in decreasing pain.  Explained  to the patient that the purpose of physical therapy is to teach the patient a home exercise program.  These exercises need to be performed every day in order to decrease pain and prevent future occurrences of pain.        -PATIENT EDUCATION:  Total time of 32 minutes, on the day of service, spent with the patient, reviewing the chart, placing orders, and documenting.     -FOLLOW UP: Follow-up with Leonela in 8 weeks if symptoms have not improved, otherwise sooner if symptoms worsen/change.  Advised to contact clinic if symptoms worsen or change.    Subjective:     Jenn Barclay is a 68 year old female who presents today for follow-up regarding ongoing midline thoracic pain as well as bilateral lower lumbar spine pain that has been ongoing, worse with sitting for long periods of time, does improve with laying down and stretching.  Currently pain is a 5/10, 9 at its worst, 3 at its best.  She does report that Tylenol gives her some benefit as well.  Otherwise she denies any radiating lower extremity pain.  Denies any recent trips or falls or balance changes.  Denies bowel or bladder loss control, denies saddle anesthesia.    She has been working with physical therapy and tolerating the exercises, she does have further sessions scheduled.    Treatment to Date:   History of vertebroplasty T5 and T8.  Physical therapy x 1 session 2022 LBP.    Patient Active Problem List   Diagnosis    Exocrine pancreatic insufficiency    Nephrolithiasis    Anxiety    Chronic back pain    Controlled substance agreement signed - 1/24 - oxycodone - UDS 1/24    COPD (chronic obstructive pulmonary disease) (H)    Financial difficulties    Full code status    Gastric ulcer    Migraine headache    Papular rash, generalized    Pseudogout    Senile purpura (H24)    Tobacco abuse    Traumatic compression fracture of T8 thoracic vertebra, closed, initial encounter (H)    Tubular adenoma of colon - advanced adenoma in 2017 - single small adenoma in  2020    OP (osteoporosis)    History of acute myeloid leukemia    Status post allogeneic bone marrow transplant (H) - 2010 - [facility]    Chronic diarrhea    Chronic, continuous use of opioids    Frailty    Hypokalemia    Closed compression fracture of L4 lumbar vertebra, sequela    Kyphosis (acquired) (postural)    Closed wedge compression fracture of T5 vertebra, sequela       Current Outpatient Medications   Medication Sig Dispense Refill    albuterol (PROVENTIL) (2.5 MG/3ML) 0.083% neb solution Take 2.5 mg by nebulization every 6 hours as needed for shortness of breath, wheezing or cough      ascorbic acid (VITAMIN C) 500 MG tablet Take 500 mg by mouth daily.      augmented betamethasone dipropionate (DIPROLENE AF) 0.05 % external cream Apply topically 2 times daily Stronger cream for rash. 50 g 3    colestipol (COLESTID) 1 g tablet Take 2 tablets (2 g) by mouth daily for 360 days (Patient not taking: Reported on 6/18/2024) 180 tablet 3    guaiFENesin-dextromethorphan (ROBITUSSIN DM) 100-10 MG/5ML syrup Take 10 mLs by mouth every 4 hours as needed for cough (Patient not taking: Reported on 6/18/2024) 118 mL 0    hydrOXYzine (ATARAX) 25 MG tablet Take 1 tablet (25 mg) by mouth 3 times daily as needed for itching 100 tablet 1    ipratropium - albuterol 0.5 mg/2.5 mg/3 mL (DUONEB) 0.5-2.5 (3) MG/3ML neb solution TAKE 1 VIAL(3ML) BY NEBULIZATION EVERY 6 HOURS AS NEEDED FOR COUGH WITH PHELGM. 90 mL 2    nebulizer nebulization 1 nebulizer please as covered by insurance.  May also dispense supplies (tubing, inhalation device, etc) as needed. 1 each 0    [START ON 8/13/2024] oxyCODONE IR (ROXICODONE) 10 MG tablet Take 1 tablet (10 mg) by mouth 4 times daily for 30 days For use from 8/13/2024-9/12/24 120 tablet 0    triamcinolone (KENALOG) 0.1 % external cream Apply topically 3 times daily       No current facility-administered medications for this visit.       Allergies   Allergen Reactions    Mirtazapine      Other  reaction(s): Other (See Comments)  Hallucination     Tape [Adhesive Tape]      Allergy Hx  In addition to NO paper tape    Liquid Adhesive Rash     Other reaction(s): Other (See Comments)  Allergy Hx - Rash from paper tape       Past Medical History:   Diagnosis Date    Acute myeloid leukemia (AML), M2 (H)     AML (acute myeloid leukemia) (H) 12/2009    Baker's cyst     Chronic back pain     Chronic diarrhea     Compression fx, lumbar spine (H)     Controlled substance agreement signed 8/3/2017    COPD (chronic obstructive pulmonary disease) (H)     COPD (chronic obstructive pulmonary disease) (H)     Full code status 9/10/2017    Gastric ulcer     pt states she has not had any ulcers    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)     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    History of PID     Metatarsal fracture 2017 2nd     Migraine without aura, without mention of intractable migraine without mention of status migrainosus     Osteoporosis 2020    DXA    Osteoporosis 5/3/2016    Papular rash, generalized     Post-menopausal 2/8/2020    Early surgical menopause    Pseudogout 11/30/2016    Serrated adenoma of colon 7/17/2020    Status post allogeneic bone marrow transplant (H)     Surgical menopause 1976    oophorectomy    Tobacco abuse     Traumatic compression fracture of T8 thoracic vertebra, closed, initial encounter (H) 4/26/2016    Tubular adenoma of colon 7/17/2020    Vertebral compression fracture (H) 2014    Zoster 2018        Review of Systems  ROS:  Specifically negative for bowel/bladder dysfunction, balance changes, headache, dizziness, foot drop, fevers, chills, appetite changes, nausea/vomiting, unexplained weight loss. Otherwise 13 systems reviewed are negative. Please see the patient's intake questionnaire from today for details.    Reviewed Social, Family, Past Medical and Past Surgical history with patient, no significant changes noted since prior visit.     Objective:     /63    Pulse 70   LMP  (LMP Unknown)     PHYSICAL EXAMINATION:    --CONSTITUTIONAL: Well developed, well nourished, healthy appearing individual.  --PSYCHIATRIC: Appropriate mood and affect. No difficulty interacting due to temper, social withdrawal, or memory issues.  --SKIN: Lumbar region is dry and intact.   --RESPIRATORY: Normal rhythm and effort. No abnormal accessory muscle breathing patterns noted.   --MUSCULOSKELETAL:  Normal lumbar lordosis noted, no lateral shift.  --GROSS MOTOR: Easily arises from a seated position. Gait is non-antalgic  --LUMBAR SPINE:  Inspection reveals no evidence of deformity. Range of motion is not limited in lumbar flexion, extension, lateral rotation. No tenderness to palpation lumbar spine.       RESULTS:   Imaging: Spine imaging was reviewed today. The images were shown to the patient and the findings were explained using a spine model.    MR Lumbar Spine w/o & w Contrast    Result Date: 7/24/2024  EXAM: MR THORACIC SPINE W/O and W CONTRAST, MR LUMBAR SPINE W/O and W CONTRAST LOCATION: [facility] DATE: 7/24/2024 INDICATION: mid back pain hx fractures. eval acuity of any and rule out infection given lumbar findings COMPARISON: MRI lumbar spine 2-3 2024, MRI thoracic spine 2/3/2024 CONTRAST: 4.5 ml gadavist TECHNIQUE: 2) MRI Thoracic Spine without and with IV contrast. 3) MRI Lumbar Spine without and with IV contrast. FINDINGS: THORACIC SPINE: Unchanged appearance of the T5, T6, T7, T8, and T10 fractures with vertebroplasty cement at T5 and T8. No abnormal associated postcontrast enhancement. No significant spinal canal stenosis. No high-grade neural foraminal stenosis. No cord signal abnormality. Visualized soft tissue structures are unremarkable. LUMBAR SPINE: Unchanged appearance of the L4 and L5 fractures. No progressive height loss. No new fracture. -At L3-L4, there is unchanged mild right neural foraminal stenosis. There is unchanged moderate to severe  right facet arthropathy. Accounting for differences in technique, there is been slightly worsened enhancement involving the facet joints with slightly decreased extent of enhancement of the daniel- facet soft tissues. . -At L4-L5, there is unchanged mild to moderate left neural foraminal stenosis. -At L5-S1, there is unchanged mild left neural foraminal stenosis. No significant spinal canal stenosis. The conus terminates at L1. No abnormal postcontrast enhancement.     IMPRESSION: THORACIC SPINE MRI: 1.  When compared to 2/3/2024, there is unchanged appearance of the T5, T6, T7, T8, and T10 fractures. 2.  No new fractures. LUMBAR SPINE MRI: 1.  When compared to 2/3/2024, at L3-L4, there is slightly worsened enhancement involving the right facet joint, however, there is slightly decreased extent of enhancement in the daniel-facet soft tissues. The underlying moderate to severe facet arthropathy is grossly similar. Overall, these findings probably relate to ongoing reactive enhancement secondary to the underlying degenerative changes. The lack of significant interval destructive change makes septic facet arthritis less likely 2.  Unchanged appearance of the L4 and L5 fractures.    MR Thoracic Spine w/o & w Contrast    Result Date: 7/24/2024  EXAM: MR THORACIC SPINE W/O and W CONTRAST, MR LUMBAR SPINE W/O and W CONTRAST LOCATION: [facility] DATE: 7/24/2024 INDICATION: mid back pain hx fractures. eval acuity of any and rule out infection given lumbar findings COMPARISON: MRI lumbar spine 2-3 2024, MRI thoracic spine 2/3/2024 CONTRAST: 4.5 ml gadavist TECHNIQUE: 2) MRI Thoracic Spine without and with IV contrast. 3) MRI Lumbar Spine without and with IV contrast. FINDINGS: THORACIC SPINE: Unchanged appearance of the T5, T6, T7, T8, and T10 fractures with vertebroplasty cement at T5 and T8. No abnormal associated postcontrast enhancement. No significant spinal canal stenosis. No high-grade neural  foraminal stenosis. No cord signal abnormality. Visualized soft tissue structures are unremarkable. LUMBAR SPINE: Unchanged appearance of the L4 and L5 fractures. No progressive height loss. No new fracture. -At L3-L4, there is unchanged mild right neural foraminal stenosis. There is unchanged moderate to severe right facet arthropathy. Accounting for differences in technique, there is been slightly worsened enhancement involving the facet joints with slightly decreased extent of enhancement of the daniel- facet soft tissues. . -At L4-L5, there is unchanged mild to moderate left neural foraminal stenosis. -At L5-S1, there is unchanged mild left neural foraminal stenosis. No significant spinal canal stenosis. The conus terminates at L1. No abnormal postcontrast enhancement.     IMPRESSION: THORACIC SPINE MRI: 1.  When compared to 2/3/2024, there is unchanged appearance of the T5, T6, T7, T8, and T10 fractures. 2.  No new fractures. LUMBAR SPINE MRI: 1.  When compared to 2/3/2024, at L3-L4, there is slightly worsened enhancement involving the right facet joint, however, there is slightly decreased extent of enhancement in the daniel-facet soft tissues. The underlying moderate to severe facet arthropathy is grossly similar. Overall, these findings probably relate to ongoing reactive enhancement secondary to the underlying degenerative changes. The lack of significant interval destructive change makes septic facet arthritis less likely 2.  Unchanged appearance of the L4 and L5 fractures.

## 2024-08-12 NOTE — LETTER
8/12/2024      Jenn Barclay  [address] [place] [facility] [address]      Dear Colleague,    Thank you for referring your patient, Jenn Barclay, to the [facility]. Please see a copy of my visit note below.      Assessment:     Diagnoses and all orders for this visit:  Chronic bilateral low back pain without sciatica  Chronic midline thoracic back pain  Lumbar degenerative disc disease  History of vertebral compression fracture     Jenn Barclay is a 68 year old y.o. 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  Chronic compression fractures T5-T8, T10, augmentation of T5 and T8.  Chronic L4 and L5 compression fracture.    *Patient has seen SINAI Baker in 2022, recently Leonela Santiago CNP 6/25/2024.     Plan:     A shared decision making plan was used. The patient's values and choices were respected. Prior medical records were reviewed today. The following represents what was discussed and decided upon by the provider and the patient.        -DIAGNOSTIC TESTS: Images were personally reviewed and interpreted.   -- Thoracic and lumbar spine MRI 4/24/2024 with chronic/unchanged compression fracture T5, T6, T7, T8, T9.  Chronic L4 and L5 compression fractures.  No new fractures.  L3-4 right facet joint effusion with moderate to severe facet arthropathy bilaterally.  Otherwise degenerative changes noted at both L3-4 and L4-5.    -INTERVENTIONS: Patient is not interested in injections at this time.    -MEDICATIONS: No changes in medications today. .  Discussed side effects of medications and proper use. Patient verbalized understanding.    -PHYSICAL THERAPY:  Patient is is working on physical  therapy at this time and continuing with home exercises which is recommended  Discussed the importance of core strengthening, ROM, stretching exercises with the patient and how each of these entities is important in decreasing pain.  Explained to the patient that the purpose of physical therapy is to teach the patient a home exercise program.  These exercises need to be performed every day in order to decrease pain and prevent future occurrences of pain.        -PATIENT EDUCATION:  Total time of 32 minutes, on the day of service, spent with the patient, reviewing the chart, placing orders, and documenting.     -FOLLOW UP: Follow-up with Leonela in 8 weeks if symptoms have not improved, otherwise sooner if symptoms worsen/change.  Advised to contact clinic if symptoms worsen or change.    Subjective:     Jenn Barclay is a 68 year old female who presents today for follow-up regarding ongoing midline thoracic pain as well as bilateral lower lumbar spine pain that has been ongoing, worse with sitting for long periods of time, does improve with laying down and stretching.  Currently pain is a 5/10, 9 at its worst, 3 at its best.  She does report that Tylenol gives her some benefit as well.  Otherwise she denies any radiating lower extremity pain.  Denies any recent trips or falls or balance changes.  Denies bowel or bladder loss control, denies saddle anesthesia.    She has been working with physical therapy and tolerating the exercises, she does have further sessions scheduled.    Treatment to Date:   History of vertebroplasty T5 and T8.  Physical therapy x 1 session 2022 LBP.    Patient Active Problem List   Diagnosis     Exocrine pancreatic insufficiency     Nephrolithiasis     Anxiety     Chronic back pain     Controlled substance agreement signed - 1/24 - oxycodone - UDS 1/24     COPD (chronic obstructive pulmonary disease) (H)     Financial difficulties     Full code status     Gastric ulcer     Migraine headache      Papular rash, generalized     Pseudogout     Senile purpura (H24)     Tobacco abuse     Traumatic compression fracture of T8 thoracic vertebra, closed, initial encounter (H)     Tubular adenoma of colon - advanced adenoma in 2017 - single small adenoma in 2020     OP (osteoporosis)     History of acute myeloid leukemia     Status post allogeneic bone marrow transplant (H) - 2010 - [facility]     Chronic diarrhea     Chronic, continuous use of opioids     Frailty     Hypokalemia     Closed compression fracture of L4 lumbar vertebra, sequela     Kyphosis (acquired) (postural)     Closed wedge compression fracture of T5 vertebra, sequela       Current Outpatient Medications   Medication Sig Dispense Refill     albuterol (PROVENTIL) (2.5 MG/3ML) 0.083% neb solution Take 2.5 mg by nebulization every 6 hours as needed for shortness of breath, wheezing or cough       ascorbic acid (VITAMIN C) 500 MG tablet Take 500 mg by mouth daily.       augmented betamethasone dipropionate (DIPROLENE AF) 0.05 % external cream Apply topically 2 times daily Stronger cream for rash. 50 g 3     colestipol (COLESTID) 1 g tablet Take 2 tablets (2 g) by mouth daily for 360 days (Patient not taking: Reported on 6/18/2024) 180 tablet 3     guaiFENesin-dextromethorphan (ROBITUSSIN DM) 100-10 MG/5ML syrup Take 10 mLs by mouth every 4 hours as needed for cough (Patient not taking: Reported on 6/18/2024) 118 mL 0     hydrOXYzine (ATARAX) 25 MG tablet Take 1 tablet (25 mg) by mouth 3 times daily as needed for itching 100 tablet 1     ipratropium - albuterol 0.5 mg/2.5 mg/3 mL (DUONEB) 0.5-2.5 (3) MG/3ML neb solution TAKE 1 VIAL(3ML) BY NEBULIZATION EVERY 6 HOURS AS NEEDED FOR COUGH WITH PHELGM. 90 mL 2     nebulizer nebulization 1 nebulizer please as covered by insurance.  May also dispense supplies (tubing, inhalation device, etc) as needed. 1 each 0     [START ON 8/13/2024] oxyCODONE IR (ROXICODONE) 10 MG tablet Take 1 tablet (10 mg) by mouth 4  times daily for 30 days For use from 8/13/2024-9/12/24 120 tablet 0     triamcinolone (KENALOG) 0.1 % external cream Apply topically 3 times daily       No current facility-administered medications for this visit.       Allergies   Allergen Reactions     Mirtazapine      Other reaction(s): Other (See Comments)  Hallucination      Tape [Adhesive Tape]      Allergy Hx  In addition to NO paper tape     Liquid Adhesive Rash     Other reaction(s): Other (See Comments)  Allergy Hx - Rash from paper tape       Past Medical History:   Diagnosis Date     Acute myeloid leukemia (AML), M2 (H)      AML (acute myeloid leukemia) (H) 12/2009     Baker's cyst      Chronic back pain      Chronic diarrhea      Compression fx, lumbar spine (H)      Controlled substance agreement signed 8/3/2017     COPD (chronic obstructive pulmonary disease) (H)      COPD (chronic obstructive pulmonary disease) (H)      Full code status 9/10/2017     Gastric ulcer     pt states she has not had any ulcers     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)      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     History of PID      Metatarsal fracture 2017 2nd      Migraine without aura, without mention of intractable migraine without mention of status migrainosus      Osteoporosis 2020    DXA     Osteoporosis 5/3/2016     Papular rash, generalized      Post-menopausal 2/8/2020    Early surgical menopause     Pseudogout 11/30/2016     Serrated adenoma of colon 7/17/2020     Status post allogeneic bone marrow transplant (H)      Surgical menopause 1976    oophorectomy     Tobacco abuse      Traumatic compression fracture of T8 thoracic vertebra, closed, initial encounter (H) 4/26/2016     Tubular adenoma of colon 7/17/2020     Vertebral compression fracture (H) 2014     Zoster 2018        Review of Systems  ROS:  Specifically negative for bowel/bladder dysfunction, balance changes, headache, dizziness, foot drop, fevers, chills, appetite changes,  nausea/vomiting, unexplained weight loss. Otherwise 13 systems reviewed are negative. Please see the patient's intake questionnaire from today for details.    Reviewed Social, Family, Past Medical and Past Surgical history with patient, no significant changes noted since prior visit.     Objective:     /63   Pulse 70   LMP  (LMP Unknown)     PHYSICAL EXAMINATION:    --CONSTITUTIONAL: Well developed, well nourished, healthy appearing individual.  --PSYCHIATRIC: Appropriate mood and affect. No difficulty interacting due to temper, social withdrawal, or memory issues.  --SKIN: Lumbar region is dry and intact.   --RESPIRATORY: Normal rhythm and effort. No abnormal accessory muscle breathing patterns noted.   --MUSCULOSKELETAL:  Normal lumbar lordosis noted, no lateral shift.  --GROSS MOTOR: Easily arises from a seated position. Gait is non-antalgic  --LUMBAR SPINE:  Inspection reveals no evidence of deformity. Range of motion is not limited in lumbar flexion, extension, lateral rotation. No tenderness to palpation lumbar spine.       RESULTS:   Imaging: Spine imaging was reviewed today. The images were shown to the patient and the findings were explained using a spine model.    MR Lumbar Spine w/o & w Contrast    Result Date: 7/24/2024  EXAM: MR THORACIC SPINE W/O and W CONTRAST, MR LUMBAR SPINE W/O and W CONTRAST LOCATION: [facility] DATE: 7/24/2024 INDICATION: mid back pain hx fractures. eval acuity of any and rule out infection given lumbar findings COMPARISON: MRI lumbar spine 2-3 2024, MRI thoracic spine 2/3/2024 CONTRAST: 4.5 ml gadavist TECHNIQUE: 2) MRI Thoracic Spine without and with IV contrast. 3) MRI Lumbar Spine without and with IV contrast. FINDINGS: THORACIC SPINE: Unchanged appearance of the T5, T6, T7, T8, and T10 fractures with vertebroplasty cement at T5 and T8. No abnormal associated postcontrast enhancement. No significant spinal canal stenosis. No high-grade neural  foraminal stenosis. No cord signal abnormality. Visualized soft tissue structures are unremarkable. LUMBAR SPINE: Unchanged appearance of the L4 and L5 fractures. No progressive height loss. No new fracture. -At L3-L4, there is unchanged mild right neural foraminal stenosis. There is unchanged moderate to severe right facet arthropathy. Accounting for differences in technique, there is been slightly worsened enhancement involving the facet joints with slightly decreased extent of enhancement of the daniel- facet soft tissues. . -At L4-L5, there is unchanged mild to moderate left neural foraminal stenosis. -At L5-S1, there is unchanged mild left neural foraminal stenosis. No significant spinal canal stenosis. The conus terminates at L1. No abnormal postcontrast enhancement.     IMPRESSION: THORACIC SPINE MRI: 1.  When compared to 2/3/2024, there is unchanged appearance of the T5, T6, T7, T8, and T10 fractures. 2.  No new fractures. LUMBAR SPINE MRI: 1.  When compared to 2/3/2024, at L3-L4, there is slightly worsened enhancement involving the right facet joint, however, there is slightly decreased extent of enhancement in the daniel-facet soft tissues. The underlying moderate to severe facet arthropathy is grossly similar. Overall, these findings probably relate to ongoing reactive enhancement secondary to the underlying degenerative changes. The lack of significant interval destructive change makes septic facet arthritis less likely 2.  Unchanged appearance of the L4 and L5 fractures.    MR Thoracic Spine w/o & w Contrast    Result Date: 7/24/2024  EXAM: MR THORACIC SPINE W/O and W CONTRAST, MR LUMBAR SPINE W/O and W CONTRAST LOCATION: [facility] DATE: 7/24/2024 INDICATION: mid back pain hx fractures. eval acuity of any and rule out infection given lumbar findings COMPARISON: MRI lumbar spine 2-3 2024, MRI thoracic spine 2/3/2024 CONTRAST: 4.5 ml gadavist TECHNIQUE: 2) MRI Thoracic Spine without  and with IV contrast. 3) MRI Lumbar Spine without and with IV contrast. FINDINGS: THORACIC SPINE: Unchanged appearance of the T5, T6, T7, T8, and T10 fractures with vertebroplasty cement at T5 and T8. No abnormal associated postcontrast enhancement. No significant spinal canal stenosis. No high-grade neural foraminal stenosis. No cord signal abnormality. Visualized soft tissue structures are unremarkable. LUMBAR SPINE: Unchanged appearance of the L4 and L5 fractures. No progressive height loss. No new fracture. -At L3-L4, there is unchanged mild right neural foraminal stenosis. There is unchanged moderate to severe right facet arthropathy. Accounting for differences in technique, there is been slightly worsened enhancement involving the facet joints with slightly decreased extent of enhancement of the daniel- facet soft tissues. . -At L4-L5, there is unchanged mild to moderate left neural foraminal stenosis. -At L5-S1, there is unchanged mild left neural foraminal stenosis. No significant spinal canal stenosis. The conus terminates at L1. No abnormal postcontrast enhancement.     IMPRESSION: THORACIC SPINE MRI: 1.  When compared to 2/3/2024, there is unchanged appearance of the T5, T6, T7, T8, and T10 fractures. 2.  No new fractures. LUMBAR SPINE MRI: 1.  When compared to 2/3/2024, at L3-L4, there is slightly worsened enhancement involving the right facet joint, however, there is slightly decreased extent of enhancement in the daniel-facet soft tissues. The underlying moderate to severe facet arthropathy is grossly similar. Overall, these findings probably relate to ongoing reactive enhancement secondary to the underlying degenerative changes. The lack of significant interval destructive change makes septic facet arthritis less likely 2.  Unchanged appearance of the L4 and L5 fractures.                          Again, thank you for allowing me to participate in the care of your patient.        Sincerely,        Claribel  Hannah, CNP

## 2024-08-15 ENCOUNTER — LAB (OUTPATIENT)
Dept: LAB | Facility: CLINIC | Age: 69
End: 2024-08-15
Payer: COMMERCIAL

## 2024-08-15 DIAGNOSIS — M81.0 AGE-RELATED OSTEOPOROSIS WITHOUT CURRENT PATHOLOGICAL FRACTURE: ICD-10-CM

## 2024-08-15 LAB
ALBUMIN SERPL BCG-MCNC: 4.2 G/DL (ref 3.5–5.2)
CALCIUM SERPL-MCNC: 9.6 MG/DL (ref 8.8–10.4)
CREAT SERPL-MCNC: 0.82 MG/DL (ref 0.51–0.95)
EGFRCR SERPLBLD CKD-EPI 2021: 77 ML/MIN/1.73M2

## 2024-08-15 PROCEDURE — 36415 COLL VENOUS BLD VENIPUNCTURE: CPT

## 2024-08-15 PROCEDURE — 82040 ASSAY OF SERUM ALBUMIN: CPT

## 2024-08-15 PROCEDURE — 82310 ASSAY OF CALCIUM: CPT

## 2024-08-15 PROCEDURE — 82565 ASSAY OF CREATININE: CPT

## 2024-08-15 PROCEDURE — 82306 VITAMIN D 25 HYDROXY: CPT

## 2024-08-19 ENCOUNTER — INFUSION THERAPY VISIT (OUTPATIENT)
Dept: INFUSION THERAPY | Facility: HOSPITAL | Age: 69
End: 2024-08-19
Payer: COMMERCIAL

## 2024-08-19 VITALS
TEMPERATURE: 97.4 F | DIASTOLIC BLOOD PRESSURE: 56 MMHG | RESPIRATION RATE: 16 BRPM | WEIGHT: 102.31 LBS | SYSTOLIC BLOOD PRESSURE: 101 MMHG | BODY MASS INDEX: 16.06 KG/M2 | HEART RATE: 61 BPM | OXYGEN SATURATION: 98 % | HEIGHT: 67 IN

## 2024-08-19 DIAGNOSIS — M81.0 AGE-RELATED OSTEOPOROSIS WITHOUT CURRENT PATHOLOGICAL FRACTURE: Primary | ICD-10-CM

## 2024-08-19 PROCEDURE — 96365 THER/PROPH/DIAG IV INF INIT: CPT

## 2024-08-19 PROCEDURE — 258N000003 HC RX IP 258 OP 636: Performed by: INTERNAL MEDICINE

## 2024-08-19 PROCEDURE — 250N000011 HC RX IP 250 OP 636: Performed by: INTERNAL MEDICINE

## 2024-08-19 RX ORDER — ZOLEDRONIC ACID 5 MG/100ML
5 INJECTION, SOLUTION INTRAVENOUS ONCE
Start: 2025-08-19

## 2024-08-19 RX ORDER — DIPHENHYDRAMINE HYDROCHLORIDE 50 MG/ML
50 INJECTION INTRAMUSCULAR; INTRAVENOUS
Start: 2025-08-19

## 2024-08-19 RX ORDER — ALBUTEROL SULFATE 90 UG/1
1-2 AEROSOL, METERED RESPIRATORY (INHALATION)
Status: DISCONTINUED | OUTPATIENT
Start: 2024-08-19 | End: 2024-08-19

## 2024-08-19 RX ORDER — EPINEPHRINE 1 MG/ML
0.3 INJECTION, SOLUTION INTRAMUSCULAR; SUBCUTANEOUS EVERY 5 MIN PRN
OUTPATIENT
Start: 2025-08-19

## 2024-08-19 RX ORDER — METHYLPREDNISOLONE SODIUM SUCCINATE 125 MG/2ML
125 INJECTION, POWDER, LYOPHILIZED, FOR SOLUTION INTRAMUSCULAR; INTRAVENOUS
Start: 2025-08-19

## 2024-08-19 RX ORDER — EPINEPHRINE 1 MG/ML
0.3 INJECTION, SOLUTION INTRAMUSCULAR; SUBCUTANEOUS EVERY 5 MIN PRN
Status: DISCONTINUED | OUTPATIENT
Start: 2024-08-19 | End: 2024-08-19

## 2024-08-19 RX ORDER — HEPARIN SODIUM (PORCINE) LOCK FLUSH IV SOLN 100 UNIT/ML 100 UNIT/ML
5 SOLUTION INTRAVENOUS
OUTPATIENT
Start: 2025-08-19

## 2024-08-19 RX ORDER — HEPARIN SODIUM,PORCINE 10 UNIT/ML
5-20 VIAL (ML) INTRAVENOUS DAILY PRN
OUTPATIENT
Start: 2025-08-19

## 2024-08-19 RX ORDER — DIPHENHYDRAMINE HYDROCHLORIDE 50 MG/ML
50 INJECTION INTRAMUSCULAR; INTRAVENOUS
Status: DISCONTINUED | OUTPATIENT
Start: 2024-08-19 | End: 2024-08-19

## 2024-08-19 RX ORDER — METHYLPREDNISOLONE SODIUM SUCCINATE 125 MG/2ML
125 INJECTION, POWDER, LYOPHILIZED, FOR SOLUTION INTRAMUSCULAR; INTRAVENOUS
Status: DISCONTINUED | OUTPATIENT
Start: 2024-08-19 | End: 2024-08-19

## 2024-08-19 RX ORDER — ALBUTEROL SULFATE 90 UG/1
1-2 AEROSOL, METERED RESPIRATORY (INHALATION)
Start: 2025-08-19

## 2024-08-19 RX ORDER — ALBUTEROL SULFATE 0.83 MG/ML
2.5 SOLUTION RESPIRATORY (INHALATION)
Status: DISCONTINUED | OUTPATIENT
Start: 2024-08-19 | End: 2024-08-19

## 2024-08-19 RX ORDER — ZOLEDRONIC ACID 5 MG/100ML
5 INJECTION, SOLUTION INTRAVENOUS ONCE
Status: COMPLETED | OUTPATIENT
Start: 2024-08-19 | End: 2024-08-19

## 2024-08-19 RX ORDER — ALBUTEROL SULFATE 0.83 MG/ML
2.5 SOLUTION RESPIRATORY (INHALATION)
OUTPATIENT
Start: 2025-08-19

## 2024-08-19 RX ADMIN — SODIUM CHLORIDE 250 ML: 9 INJECTION, SOLUTION INTRAVENOUS at 13:35

## 2024-08-19 RX ADMIN — ZOLEDRONIC ACID 5 MG: 5 INJECTION, SOLUTION INTRAVENOUS at 13:54

## 2024-08-19 ASSESSMENT — PAIN SCALES - GENERAL: PAINLEVEL: NO PAIN (0)

## 2024-08-19 NOTE — PROGRESS NOTES
Infusion Nursing Note:  Jenn Barclay presents today for Yearon [place].    Patient seen by provider today: No   present during visit today: Not Applicable.    Note: Jenn arrived into the infusion center ambulatory in a stable condition for yearly Reclast, VSS. Plan of care reviewed, she verbalized understanding of hs plan of care and return to clinic in one year.    Intravenous Access:  Peripheral IV placed.    Treatment Conditions:  Not Applicable.    Post Infusion Assessment:  Patient tolerated infusion without incident.  Site patent and intact, free from redness, edema or discomfort.  No evidence of extravasations.  Access discontinued per protocol.     Discharge Plan:   Discharge instructions reviewed with: Patient.  Patient and/or family verbalized understanding of discharge instructions and all questions answered.  Patient discharged in stable condition accompanied by: self.  Departure Mode: Ambulatory.    Kina Hamilton RN

## 2024-08-25 DIAGNOSIS — M81.0 AGE-RELATED OSTEOPOROSIS WITHOUT CURRENT PATHOLOGICAL FRACTURE: Primary | ICD-10-CM

## 2024-09-03 ENCOUNTER — TELEPHONE (OUTPATIENT)
Dept: INTERNAL MEDICINE | Facility: CLINIC | Age: 69
End: 2024-09-03

## 2024-09-03 ENCOUNTER — THERAPY VISIT (OUTPATIENT)
Dept: PHYSICAL THERAPY | Facility: REHABILITATION | Age: 69
End: 2024-09-03
Attending: NURSE PRACTITIONER
Payer: COMMERCIAL

## 2024-09-03 DIAGNOSIS — S32.040S CLOSED COMPRESSION FRACTURE OF L4 LUMBAR VERTEBRA, SEQUELA: ICD-10-CM

## 2024-09-03 DIAGNOSIS — M54.6 CHRONIC MIDLINE THORACIC BACK PAIN: Primary | ICD-10-CM

## 2024-09-03 DIAGNOSIS — S22.050S CLOSED WEDGE COMPRESSION FRACTURE OF T5 VERTEBRA, SEQUELA: ICD-10-CM

## 2024-09-03 DIAGNOSIS — G89.29 CHRONIC MIDLINE THORACIC BACK PAIN: Primary | ICD-10-CM

## 2024-09-03 DIAGNOSIS — M40.00 KYPHOSIS (ACQUIRED) (POSTURAL): ICD-10-CM

## 2024-09-03 PROCEDURE — 97116 GAIT TRAINING THERAPY: CPT | Mod: GP

## 2024-09-03 PROCEDURE — 97110 THERAPEUTIC EXERCISES: CPT | Mod: GP

## 2024-09-03 NOTE — TELEPHONE ENCOUNTER
FYI - Status Update    Who is Calling: patient    Update: Patient calling as she was at PT today & weighed 99.5lbs.  Patient was told to notify PCP if below 100lbs.  Patient states that she is eating more and doesn't understand weight loss.     RSV vaccine & Shingles done at [facility] on Sat 8/31- updated chart     Does caller want a call/response back: Yes     Okay to leave a detailed message?: Yes at Home number on file [phone] (home)

## 2024-09-04 NOTE — TELEPHONE ENCOUNTER
Can use a reserved slot in the next 2-4 weeks to follow up on this if she would like.    Jorge Dunn MD  General Internal Medicine  [facility]  9/3/2024, 10:31 PM

## 2024-09-11 ENCOUNTER — TELEPHONE (OUTPATIENT)
Dept: INTERNAL MEDICINE | Facility: CLINIC | Age: 69
End: 2024-09-11
Payer: COMMERCIAL

## 2024-09-11 DIAGNOSIS — M54.50 CHRONIC BILATERAL LOW BACK PAIN, UNSPECIFIED WHETHER SCIATICA PRESENT: ICD-10-CM

## 2024-09-11 DIAGNOSIS — G89.29 CHRONIC BILATERAL LOW BACK PAIN, UNSPECIFIED WHETHER SCIATICA PRESENT: ICD-10-CM

## 2024-09-11 RX ORDER — OXYCODONE HYDROCHLORIDE 10 MG/1
10 TABLET ORAL 4 TIMES DAILY
Qty: 120 TABLET | Refills: 0 | Status: SHIPPED | OUTPATIENT
Start: 2024-09-12 | End: 2024-10-12

## 2024-09-19 ENCOUNTER — OFFICE VISIT (OUTPATIENT)
Dept: INTERNAL MEDICINE | Facility: CLINIC | Age: 69
End: 2024-09-19
Payer: COMMERCIAL

## 2024-09-19 VITALS
OXYGEN SATURATION: 97 % | BODY MASS INDEX: 15.79 KG/M2 | RESPIRATION RATE: 12 BRPM | HEART RATE: 81 BPM | WEIGHT: 100.6 LBS | HEIGHT: 67 IN | SYSTOLIC BLOOD PRESSURE: 131 MMHG | TEMPERATURE: 98.1 F | DIASTOLIC BLOOD PRESSURE: 78 MMHG

## 2024-09-19 DIAGNOSIS — S22.060A TRAUMATIC COMPRESSION FRACTURE OF T8 THORACIC VERTEBRA, CLOSED, INITIAL ENCOUNTER (H): ICD-10-CM

## 2024-09-19 DIAGNOSIS — D69.9 BLEEDS EASILY (H): ICD-10-CM

## 2024-09-19 DIAGNOSIS — M72.2 PLANTAR FASCIITIS OF LEFT FOOT: Primary | ICD-10-CM

## 2024-09-19 DIAGNOSIS — L84 CORN OR CALLUS: ICD-10-CM

## 2024-09-19 DIAGNOSIS — J44.9 CHRONIC OBSTRUCTIVE PULMONARY DISEASE, UNSPECIFIED COPD TYPE (H): ICD-10-CM

## 2024-09-19 PROCEDURE — G2211 COMPLEX E/M VISIT ADD ON: HCPCS | Performed by: INTERNAL MEDICINE

## 2024-09-19 PROCEDURE — 99214 OFFICE O/P EST MOD 30 MIN: CPT | Performed by: INTERNAL MEDICINE

## 2024-09-19 ASSESSMENT — ANXIETY QUESTIONNAIRES
8. IF YOU CHECKED OFF ANY PROBLEMS, HOW DIFFICULT HAVE THESE MADE IT FOR YOU TO DO YOUR WORK, TAKE CARE OF THINGS AT HOME, OR GET ALONG WITH OTHER PEOPLE?: NOT DIFFICULT AT ALL
GAD7 TOTAL SCORE: 16
GAD7 TOTAL SCORE: 16
7. FEELING AFRAID AS IF SOMETHING AWFUL MIGHT HAPPEN: NEARLY EVERY DAY
GAD7 TOTAL SCORE: 16

## 2024-09-19 ASSESSMENT — PAIN SCALES - GENERAL: PAINLEVEL: WORST PAIN (10)

## 2024-09-19 NOTE — PATIENT INSTRUCTIONS
Future Appointments   Date Time Provider Department Center   10/1/2024 10:40 AM Marcelino Grimaldo, PT [facility]PT [facility]   10/7/2024  1:00 PM Leonela Santiago, APRN CNP SNSPCR [facility]   10/8/2024 11:20 AM Marcelino Grimaldo, PT [facility]   10/15/2024 11:20 AM Marcelino Grimaldo, PT [facility]   12/13/2024 12:30 PM Jorge Dunn MD MDINTM [facility]   8/18/2025  1:00 PM [facility] LAB MDLABR [facility]   8/18/2025  1:30 PM MICDX1 JNDXIC [facility] IMG   9/2/2025  4:00 PM Ryan Alonso MD MDENDO [facility]

## 2024-09-19 NOTE — PROGRESS NOTES
[facility]    Comprehensive and complex medical care - Chronic disease management - Shared decision making - Care coordination - Compassionate care    Patient advocacy - Rational deprescribing - Minimally disruptive medicine - Ethical focus - Customized care         Date of Service: 9/19/2024  Primary Provider: Jorge Dunn    Patient Care Team:  Jorge Dunn MD as PCP - General (Internal Medicine)  Mohit Montano MD as MD (Hematology & Oncology)  Jorge Dunn MD as Assigned PCP  Joellen Fontenot MD as Resident (Dermatology)  Madison Alexander LICSW as  (BMT - Adult)  Ryan Alonso MD as MD (Endocrinology, Diabetes, and Metabolism)  Manju Asher, RN as BMT Nurse Coordinator  Umesh Brandt MD as BMT Physician (Transplant)  Jorge Dunn MD as Assigned Pain Medication Provider  Ryan Alonso MD as Assigned Endocrinology Provider  Donny Gunn MD as Assigned Gastroenterology Provider  Claribel Young CNP as Assigned Neuroscience Provider          Patient's Pharmacy:    [facility] [address]  [address]  [facility] [address]  Phone: [phone] Fax: [phone]    [id number] [address] AT [facility] [address]  [address] [address]  Phone: [phone] Fax: [phone]     Patient's Contacts:  Name Home Phone Work Phone Mobile Phone Relationship Lgl VitalyJEAN Kuo   [phone] Grandchild    ADALGISA JUARES   [phone] Child      Patient's Insurance:    Payor: AETNA / Plan: [employer] MEDICARE ADVANTAGE / Product Type: Medicare /           Active Problem List:  Problem List as of 9/19/2024 Reviewed: 2/3/2024  7:33 PM by Elisa Johnson    Full code status    Tobacco abuse    COPD (chronic obstructive pulmonary disease) (H)    History of acute myeloid  leukemia    Status post allogeneic bone marrow transplant (H) - 2010 - [facility]       Medium    Controlled substance agreement signed - 1/24 - oxycodone - UDS 1/24    Chronic, continuous use of opioids    Exocrine pancreatic insufficiency    Chronic back pain    Financial difficulties    Pseudogout    Traumatic compression fracture of T8 thoracic vertebra, closed, initial encounter (H)    Chronic diarrhea    Hypokalemia    Closed compression fracture of L4 lumbar vertebra, sequela    Kyphosis (acquired) (postural)    Closed wedge compression fracture of T5 vertebra, sequela       Low    Nephrolithiasis    Anxiety    Gastric ulcer    Migraine headache    Papular rash, generalized    Senile purpura (H24)    Tubular adenoma of colon - advanced adenoma in 2017 - single small adenoma in 2020    OP (osteoporosis)    Frailty        Current Outpatient Medications   Medication Instructions    albuterol (PROVENTIL) 2.5 mg, Nebulization, EVERY 6 HOURS PRN    augmented betamethasone dipropionate (DIPROLENE AF) 0.05 % external cream Topical, 2 TIMES DAILY, Stronger cream for rash.    colestipol (COLESTID) 2 g, Oral, DAILY    diclofenac (VOLTAREN) 50 mg, Oral, 2 TIMES DAILY    hydrOXYzine HCl (ATARAX) 25 mg, Oral, 3 TIMES DAILY PRN    ipratropium - albuterol 0.5 mg/2.5 mg/3 mL (DUONEB) 0.5-2.5 (3) MG/3ML neb solution TAKE 1 VIAL(3ML) BY NEBULIZATION EVERY 6 HOURS AS NEEDED FOR COUGH WITH PHELGM.    nebulizer nebulization 1 nebulizer please as covered by insurance.  May also dispense supplies (tubing, inhalation device, etc) as needed.    oxyCODONE IR (ROXICODONE) 10 mg, Oral, 4 TIMES DAILY, For use from 9/12/24-10/12/24    triamcinolone (KENALOG) 0.1 % external cream Topical, 3 TIMES DAILY    vitamin C (ASCORBIC ACID) 500 mg, Oral, DAILY     Social History     Social History Narrative    , on disability.        Has family in the area.        Patient of Dr. Dunn since 2015.        Subjective:     Jenn Barclay is a 68  "year old female who comes in today for:    Chief Complaint   Patient presents with    Foot Pain     Heel pain          9/19/2024    10:58 AM   Additional Questions   Roomed by KATRIN Dobson   Accompanied by GILBERTO     Patient comes in today for a few issues.    Mainly wants to be seen related to her foot pain.  This is in the left foot at the heel.  This has been going on at a low level for a few months but more importantly became very severe over the last couple of days.  It hurts for her to put weight on the heel.  It does not bother her too much at night but does somewhat.  It does not really get relieved by rest or by activity.  She does not generally wear shoes in the house.  ThE foot wear she is using today is fairly flat.  She has no known injury.    She also has some corns or calluses in this area which are bothersome for her.  She is wondering whether these are causing the issues.    She has noticed bleeding easier lately when she scrapes or cuts herself on her hands.  She can have a fair amount of blood.  Platelet levels have been good.    She has been dealing with her thoracic back pain and is doing therapy for this.  Recent studies were reviewed.    We reviewed her other issues noted in the assessment but not specifically addressed in the HPI above.     Objective:     Wt Readings from Last 3 Encounters:   09/19/24 45.6 kg (100 lb 9.6 oz)   08/19/24 46.4 kg (102 lb 5 oz)   06/25/24 45.8 kg (101 lb)     BP Readings from Last 3 Encounters:   09/19/24 131/78   08/19/24 101/56   08/12/24 114/63     /78 (BP Location: Right arm, Patient Position: Sitting, Cuff Size: Adult Small)   Pulse 81   Temp 98.1  F (36.7  C) (Oral)   Resp 12   Ht 1.689 m (5' 6.5\")   Wt 45.6 kg (100 lb 9.6 oz)   LMP  (LMP Unknown)   SpO2 97%   BMI 15.99 kg/m     The patient is comfortable, no acute distress.  Mood good.  Insight good.  Heart regular rate and rhythm.  Lungs clear to auscultation bilaterally.  Respiratory effort " is good.  Extremities no edema.  She has significant tenderness over the plantar fascia.  She has corns of her heel noted 3 in total.  These do not seem to be the main crux of the issue.  She walks with a limp due to the pain in her heel.  She tries to put most of her weight on the ball of the foot.      Diagnostics:     Lab on 08/15/2024   Component Date Value Ref Range Status    Calcium 08/15/2024 9.6  8.8 - 10.4 mg/dL Final    Reference intervals for this test were updated on 7/16/2024 to reflect our healthy population more accurately. There may be differences in the flagging of prior results with similar values performed with this method. Those prior results can be interpreted in the context of the updated reference intervals.    Albumin 08/15/2024 4.2  3.5 - 5.2 g/dL Final    Creatinine 08/15/2024 0.82  0.51 - 0.95 mg/dL Final    GFR Estimate 08/15/2024 77  >60 mL/min/1.73m2 Final    eGFR calculated using 2021 CKD-EPI equation.    25 OH Vitamin D2 08/15/2024 <5  ug/L Final    25 OH Vitamin D3 08/15/2024 42  ug/L Final    25 OH Vit D Total 08/15/2024 <47  20 - 75 ug/L Final    Season, race, dietary intake, and treatment affect the concentration of 25-hydroxy-Vitamin D. Values may decrease during winter months and increase during summer months. Values 20-29 ug/L may indicate Vitamin D insufficiency and values <20 ug/L may indicate Vitamin D deficiency.       No results found for any visits on 09/19/24.    Assessment:     1. Plantar fasciitis of left foot    2. Corn or callus    3. Bleeds easily (H24)    4. Chronic obstructive pulmonary disease, unspecified COPD type (H)    5. Traumatic compression fracture of T8 thoracic vertebra, closed, initial encounter (H)        Plan:     See AVS for info on plantar fasciitis.  Trial of diclofenac to see if this would help.  Wear footwear when on feet that is properly cushioned.  Could refer to podiatry if not improving.  Continue physical therapy otherwise.  Consider  cortisone shot in the future if needed.  Continue current medications otherwise.  Follow up sooner if issues.    No orders of the defined types were placed in this encounter.          Jorge Dunn MD  General Internal Medicine  [facility]    The longitudinal plan of care for the diagnoses and conditions as documented were addressed during this visit. Due to the added complexity in care, I will continue to support Jenn in the subsequent management and with ongoing continuity of care.     Return in about 3 months (around 12/13/2024) for follow up visit.     Future Appointments   Date Time Provider Department Center   10/1/2024 10:40 AM Marcelino Grimaldo, PT [facility]   10/7/2024  1:00 PM Leonela Santiago APRN CNP SNSPCR [facility]   10/8/2024 11:20 AM Marcelino Grimaldo, PT [facility]   10/15/2024 11:20 AM Marcelino Grimaldo, PT [facility]   12/13/2024 12:30 PM Jorge Dunn MD MDINTM [facility]   8/18/2025  1:00 PM MPLW LAB MDLABR [facility]   8/18/2025  1:30 PM MICDX1 [facility]   9/2/2025  4:00 PM Ryan Alonso MD MDENDO [facility]

## 2024-10-10 ENCOUNTER — APPOINTMENT (OUTPATIENT)
Dept: RADIOLOGY | Facility: HOSPITAL | Age: 69
End: 2024-10-10
Attending: STUDENT IN AN ORGANIZED HEALTH CARE EDUCATION/TRAINING PROGRAM
Payer: COMMERCIAL

## 2024-10-10 ENCOUNTER — HOSPITAL ENCOUNTER (EMERGENCY)
Facility: HOSPITAL | Age: 69
Discharge: HOME | End: 2024-10-11
Attending: STUDENT IN AN ORGANIZED HEALTH CARE EDUCATION/TRAINING PROGRAM | Admitting: STUDENT IN AN ORGANIZED HEALTH CARE EDUCATION/TRAINING PROGRAM
Payer: COMMERCIAL

## 2024-10-10 DIAGNOSIS — J44.9 CHRONIC OBSTRUCTIVE PULMONARY DISEASE, UNSPECIFIED COPD TYPE (H): Primary | ICD-10-CM

## 2024-10-10 DIAGNOSIS — J44.1 COPD EXACERBATION (H): ICD-10-CM

## 2024-10-10 LAB
ANION GAP SERPL CALCULATED.3IONS-SCNC: 11 MMOL/L (ref 7–15)
BASE EXCESS BLDV CALC-SCNC: 1.6 MMOL/L (ref -3–3)
BASOPHILS # BLD AUTO: 0.1 10E3/UL (ref 0–0.2)
BASOPHILS NFR BLD AUTO: 1 %
BUN SERPL-MCNC: 8.6 MG/DL (ref 8–23)
CALCIUM SERPL-MCNC: 8.5 MG/DL (ref 8.8–10.4)
CHLORIDE SERPL-SCNC: 104 MMOL/L (ref 98–107)
CREAT SERPL-MCNC: 0.69 MG/DL (ref 0.51–0.95)
EGFRCR SERPLBLD CKD-EPI 2021: >90 ML/MIN/1.73M2
EOSINOPHIL # BLD AUTO: 0.4 10E3/UL (ref 0–0.7)
EOSINOPHIL NFR BLD AUTO: 3 %
ERYTHROCYTE [DISTWIDTH] IN BLOOD BY AUTOMATED COUNT: 13 % (ref 10–15)
GLUCOSE SERPL-MCNC: 107 MG/DL (ref 70–99)
HCO3 BLDV-SCNC: 29 MMOL/L (ref 21–28)
HCO3 SERPL-SCNC: 25 MMOL/L (ref 22–29)
HCT VFR BLD AUTO: 38.9 % (ref 35–47)
HGB BLD-MCNC: 12.7 G/DL (ref 11.7–15.7)
IMM GRANULOCYTES # BLD: 0 10E3/UL
IMM GRANULOCYTES NFR BLD: 0 %
LYMPHOCYTES # BLD AUTO: 4 10E3/UL (ref 0.8–5.3)
LYMPHOCYTES NFR BLD AUTO: 33 %
MCH RBC QN AUTO: 30 PG (ref 26.5–33)
MCHC RBC AUTO-ENTMCNC: 32.6 G/DL (ref 31.5–36.5)
MCV RBC AUTO: 92 FL (ref 78–100)
MONOCYTES # BLD AUTO: 1.2 10E3/UL (ref 0–1.3)
MONOCYTES NFR BLD AUTO: 10 %
NEUTROPHILS # BLD AUTO: 6.5 10E3/UL (ref 1.6–8.3)
NEUTROPHILS NFR BLD AUTO: 53 %
NRBC # BLD AUTO: 0 10E3/UL
NRBC BLD AUTO-RTO: 0 /100
O2/TOTAL GAS SETTING VFR VENT: 24 %
OXYHGB MFR BLDV: 81 % (ref 70–75)
PCO2 BLDV: 57 MM HG (ref 40–50)
PH BLDV: 7.32 [PH] (ref 7.32–7.43)
PLATELET # BLD AUTO: 267 10E3/UL (ref 150–450)
PO2 BLDV: 49 MM HG (ref 25–47)
POTASSIUM SERPL-SCNC: 3.7 MMOL/L (ref 3.4–5.3)
RBC # BLD AUTO: 4.23 10E6/UL (ref 3.8–5.2)
SAO2 % BLDV: 84 % (ref 70–75)
SODIUM SERPL-SCNC: 140 MMOL/L (ref 135–145)
WBC # BLD AUTO: 12.2 10E3/UL (ref 4–11)

## 2024-10-10 PROCEDURE — 36415 COLL VENOUS BLD VENIPUNCTURE: CPT | Performed by: STUDENT IN AN ORGANIZED HEALTH CARE EDUCATION/TRAINING PROGRAM

## 2024-10-10 PROCEDURE — 85025 COMPLETE CBC W/AUTO DIFF WBC: CPT | Performed by: STUDENT IN AN ORGANIZED HEALTH CARE EDUCATION/TRAINING PROGRAM

## 2024-10-10 PROCEDURE — 82805 BLOOD GASES W/O2 SATURATION: CPT | Performed by: STUDENT IN AN ORGANIZED HEALTH CARE EDUCATION/TRAINING PROGRAM

## 2024-10-10 PROCEDURE — 96375 TX/PRO/DX INJ NEW DRUG ADDON: CPT

## 2024-10-10 PROCEDURE — 94640 AIRWAY INHALATION TREATMENT: CPT

## 2024-10-10 PROCEDURE — 80048 BASIC METABOLIC PNL TOTAL CA: CPT | Performed by: STUDENT IN AN ORGANIZED HEALTH CARE EDUCATION/TRAINING PROGRAM

## 2024-10-10 PROCEDURE — 96365 THER/PROPH/DIAG IV INF INIT: CPT

## 2024-10-10 PROCEDURE — 250N000009 HC RX 250: Performed by: STUDENT IN AN ORGANIZED HEALTH CARE EDUCATION/TRAINING PROGRAM

## 2024-10-10 PROCEDURE — 99284 EMERGENCY DEPT VISIT MOD MDM: CPT | Mod: 25

## 2024-10-10 PROCEDURE — 87637 SARSCOV2&INF A&B&RSV AMP PRB: CPT | Performed by: STUDENT IN AN ORGANIZED HEALTH CARE EDUCATION/TRAINING PROGRAM

## 2024-10-10 PROCEDURE — 250N000011 HC RX IP 250 OP 636: Performed by: STUDENT IN AN ORGANIZED HEALTH CARE EDUCATION/TRAINING PROGRAM

## 2024-10-10 PROCEDURE — 71045 X-RAY EXAM CHEST 1 VIEW: CPT

## 2024-10-10 RX ORDER — ALBUTEROL SULFATE 0.83 MG/ML
SOLUTION RESPIRATORY (INHALATION)
Qty: 270 ML | Refills: 3 | Status: SHIPPED | OUTPATIENT
Start: 2024-10-10

## 2024-10-10 RX ORDER — MAGNESIUM SULFATE HEPTAHYDRATE 40 MG/ML
2 INJECTION, SOLUTION INTRAVENOUS ONCE
Status: COMPLETED | OUTPATIENT
Start: 2024-10-10 | End: 2024-10-10

## 2024-10-10 RX ORDER — IPRATROPIUM BROMIDE AND ALBUTEROL SULFATE 2.5; .5 MG/3ML; MG/3ML
3 SOLUTION RESPIRATORY (INHALATION) ONCE
Status: COMPLETED | OUTPATIENT
Start: 2024-10-10 | End: 2024-10-10

## 2024-10-10 RX ORDER — IPRATROPIUM BROMIDE AND ALBUTEROL SULFATE 2.5; .5 MG/3ML; MG/3ML
3 SOLUTION RESPIRATORY (INHALATION) ONCE
Status: COMPLETED | OUTPATIENT
Start: 2024-10-11 | End: 2024-10-10

## 2024-10-10 RX ORDER — METHYLPREDNISOLONE SODIUM SUCCINATE 125 MG/2ML
125 INJECTION INTRAMUSCULAR; INTRAVENOUS ONCE
Status: COMPLETED | OUTPATIENT
Start: 2024-10-10 | End: 2024-10-10

## 2024-10-10 RX ADMIN — METHYLPREDNISOLONE SODIUM SUCCINATE 125 MG: 125 INJECTION, POWDER, FOR SOLUTION INTRAMUSCULAR; INTRAVENOUS at 22:56

## 2024-10-10 RX ADMIN — MAGNESIUM SULFATE HEPTAHYDRATE 2 G: 40 INJECTION, SOLUTION INTRAVENOUS at 23:02

## 2024-10-10 RX ADMIN — IPRATROPIUM BROMIDE AND ALBUTEROL SULFATE 3 ML: .5; 3 SOLUTION RESPIRATORY (INHALATION) at 23:59

## 2024-10-10 RX ADMIN — IPRATROPIUM BROMIDE AND ALBUTEROL SULFATE 3 ML: .5; 3 SOLUTION RESPIRATORY (INHALATION) at 23:12

## 2024-10-10 ASSESSMENT — COLUMBIA-SUICIDE SEVERITY RATING SCALE - C-SSRS
6. HAVE YOU EVER DONE ANYTHING, STARTED TO DO ANYTHING, OR PREPARED TO DO ANYTHING TO END YOUR LIFE?: NO
2. HAVE YOU ACTUALLY HAD ANY THOUGHTS OF KILLING YOURSELF IN THE PAST MONTH?: NO
1. IN THE PAST MONTH, HAVE YOU WISHED YOU WERE DEAD OR WISHED YOU COULD GO TO SLEEP AND NOT WAKE UP?: NO

## 2024-10-10 ASSESSMENT — ACTIVITIES OF DAILY LIVING (ADL): ADLS_ACUITY_SCORE: 36

## 2024-10-11 ENCOUNTER — TELEPHONE (OUTPATIENT)
Dept: INTERNAL MEDICINE | Facility: CLINIC | Age: 69
End: 2024-10-11
Payer: COMMERCIAL

## 2024-10-11 VITALS
OXYGEN SATURATION: 93 % | HEART RATE: 93 BPM | SYSTOLIC BLOOD PRESSURE: 90 MMHG | WEIGHT: 100 LBS | RESPIRATION RATE: 16 BRPM | HEIGHT: 69 IN | BODY MASS INDEX: 14.81 KG/M2 | TEMPERATURE: 96.6 F | DIASTOLIC BLOOD PRESSURE: 51 MMHG

## 2024-10-11 LAB
FLUAV RNA SPEC QL NAA+PROBE: NEGATIVE
FLUBV RNA RESP QL NAA+PROBE: NEGATIVE
RSV RNA SPEC NAA+PROBE: NEGATIVE
SARS-COV-2 RNA RESP QL NAA+PROBE: NEGATIVE

## 2024-10-11 PROCEDURE — 250N000013 HC RX MED GY IP 250 OP 250 PS 637: Performed by: STUDENT IN AN ORGANIZED HEALTH CARE EDUCATION/TRAINING PROGRAM

## 2024-10-11 RX ORDER — ALBUTEROL SULFATE 90 UG/1
2 INHALANT RESPIRATORY (INHALATION) 4 TIMES DAILY
Status: DISCONTINUED | OUTPATIENT
Start: 2024-10-11 | End: 2024-10-11 | Stop reason: HOSPADM

## 2024-10-11 RX ORDER — PREDNISONE 20 MG/1
TABLET ORAL
Qty: 10 TABLET | Refills: 0 | Status: SHIPPED | OUTPATIENT
Start: 2024-10-11

## 2024-10-11 RX ORDER — ALBUTEROL SULFATE 90 UG/1
2 INHALANT RESPIRATORY (INHALATION) 4 TIMES DAILY PRN
Status: DISCONTINUED | OUTPATIENT
Start: 2024-10-11 | End: 2024-10-11 | Stop reason: HOSPADM

## 2024-10-11 RX ADMIN — ALBUTEROL SULFATE 2 PUFF: 90 AEROSOL, METERED RESPIRATORY (INHALATION) at 01:15

## 2024-10-11 ASSESSMENT — ACTIVITIES OF DAILY LIVING (ADL): ADLS_ACUITY_SCORE: 36

## 2024-10-11 NOTE — TELEPHONE ENCOUNTER
Reason for Call:  Appointment Request and Form    Patient requesting this type of appt:  Hospital/ED Follow-Up     Requested provider: Jorge Dunn    Reason patient unable to be scheduled: Not with their preferred provider    When does patient want to be seen/preferred time:  Needs to be seen in 5 days.    Declined appt for 10/21 feels that she should be seen sooner rather than later.     Comments: Patient was [facility] last night due to COPD, want patient to increase medications prednisone and patient doesn't really want to that.    Patient is going to stay the weekend with her daughter, needs form completed stating she uses/relies on nebulizer for her daughter's living facility/complex.  She will drop off form today but would appreciate if she could  form later today.     Okay to leave a detailed message?: Yes at Cell number on file:    Telephone Information:   Mobile [phone]       Call taken on 10/11/2024 at 11:11 AM by Arely Orta

## 2024-10-11 NOTE — ED PROVIDER NOTES
EMERGENCY DEPARTMENT ENCOUNTER      NAME: Jenn Barclay  AGE: 69 year old female  YOB: 1955  MRN: [id number]  EVALUATION DATE & TIME: 10/10/2024 10:13 PM    PCP: Jorge Dunn    ED PROVIDER: Jose Whitman MD      Chief Complaint   Patient presents with    Shortness of Breath         FINAL IMPRESSION:  1. COPD exacerbation (H)          ED COURSE & MEDICAL DECISION MAKING:    10:21 PM I introduced myself to the patient, obtained patient history, performed a physical exam, and discussed plan for ED workup including potential diagnostic laboratory/imaging studies and interventions.    Pertinent Labs & Imaging studies reviewed. (See chart for details)  69 year old female presents to the Emergency Department for evaluation of SOB    ED Course as of 10/11/24 0108   Thu Oct 10, 2024   2222 Patient is a 69-year-old female with a past medical history significant for COPD, currently smoking, and presents the emergency department with shortness of breath for the past 24 hours.  She has also had a worsening cough, runny nose, and has been around other people with influenza lately.  On examination she is tachypneic with prolonged expiratory phase, diffuse wheezing, and unable to speak complete sentences.  No evidence of leg swelling or prior history of heart failure.  Plan to treat with DuoNeb, magnesium, methylprednisolone and evaluate for potential etiologies that would trigger COPD exacerbation including bacterial pneumonia or viral illness.   2312 Patient has mild CO2 retention on VBG.  No acidosis.  Mild leukocytosis of 12.2.  No anemia.   2328 No electrolyte abnormalities or kidney injury.   2328 No focal opacities seen on chest x-ray.   2349 Patient was initially requiring oxygen prior to her neb, she is now on room air, 99%.  She is feeling better, but still somewhat short of breath.  Will provide another DuoNeb.  P.o. challenge at this time as well.   Fri Oct 11, 2024   0106 Patient ambulated to  the bathroom on room air and did not drop her oxygen saturation after a second neb.  She is feeling better.  During a coughing fit, her oxygen saturations did drop to the mid 80s, but returned without administration of oxygen.  While I was assessing her in the room, she had another coughing fit, and oxygen saturation was 90% or more.  I discussed with her the options of staying in the hospital for observation given the intermittent desaturations versus going home and being treated with albuterol and steroids and coming back if symptoms worsen.  Through shared decision making, the patient ultimately elected to go home.       Medical Decision Making  Obtained supplemental history:Supplemental history obtained?: No  Reviewed external records: External records reviewed?: No  Care impacted by chronic illness:Chronic Lung Disease and Smoking / Nicotine Use  Care significantly affected by social determinants of health:N/A  Did you consider but not order tests?: Work up considered but not performed and documented in chart, if applicable  Did you interpret images independently?: Independent interpretation of ECG and images noted in documentation, when applicable.  Consultation discussion with other provider:Did you involve another provider (consultant, , pharmacy, etc.)?: No  Discharge. I prescribed additional prescription strength medication(s) as charted. See documentation for any additional details.  Asthma or COPD Exacerbation:Smoking Cessation Counseling: Patient is a current smoker and received smoking cessation instructions/education at discharge.       At the conclusion of the encounter I discussed the results of all of the tests and the disposition. The questions were answered. The patient or family acknowledged understanding and was agreeable with the care plan.     0 minutes of critical care time     MEDICATIONS GIVEN IN THE EMERGENCY:  Medications   albuterol (PROVENTIL HFA/VENTOLIN HFA) inhaler (has no  administration in time range)   albuterol (PROVENTIL HFA/VENTOLIN HFA) inhaler (has no administration in time range)   ipratropium - albuterol 0.5 mg/2.5 mg/3 mL (DUONEB) neb solution 3 mL (3 mLs Nebulization $Given 10/10/24 0506)   methylPREDNISolone Na Suc (solu-MEDROL) injection 125 mg (125 mg Intravenous $Given 10/10/24 9425)   magnesium sulfate 2 g in 50 mL sterile water intermittent infusion (0 g Intravenous Stopped 10/10/24 1649)   ipratropium - albuterol 0.5 mg/2.5 mg/3 mL (DUONEB) neb solution 3 mL (3 mLs Nebulization $Given 10/10/24 0489)       NEW PRESCRIPTIONS STARTED AT TODAY'S ER VISIT  New Prescriptions    PREDNISONE (DELTASONE) 20 MG TABLET    Take two tablets (= 40mg) each day for 5 (five) days          =================================================================    HPI    Patient information was obtained from: Patient     Use of : N/A         Jenn Barclay is a 69 year old female with a pertinent history of COPD, tobacco use, who presents to this ED by walk in for evaluation of shortness of breath.    Patient reports shortness of breath that started last night. She tried her albuterol today with no relief. She also developed a worsening cough along with rhinorrhea. She reports that she has been in contact with people who have the flu. She denies fever. No Hx of heart failure.       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    History of PID      Metatarsal fracture 2017    2nd     Migraine without aura, without mention of intractable migraine without mention of status migrainosus     Osteoporosis 2020    DXA    Osteoporosis 5/3/2016    Papular rash, generalized     Post-menopausal 2/8/2020    Early surgical menopause    Pseudogout 11/30/2016    Serrated adenoma of colon 7/17/2020    Status post allogeneic bone marrow transplant (H)     Surgical menopause 1976    oophorectomy    Tobacco abuse     Traumatic compression fracture of T8 thoracic vertebra, closed, initial encounter (H) 4/26/2016    Tubular adenoma of colon 7/17/2020    Vertebral compression fracture (H) 2014    Zoster 2018       PAST SURGICAL HISTORY:  Past Surgical History:   Procedure Laterality Date    APPENDECTOMY      COLONOSCOPY N/A 07/28/2020    Procedure: COLONOSCOPY, WITH POLYPECTOMY AND BIOPSY;  Surgeon: Gianni Valerio MD;  Location: [facility] OR    HYSTERECTOMY TOTAL ABDOMINAL, BILATERAL SALPINGO-OOPHORECTOMY, COMBINED  1976    IR MISCELLANEOUS PROCEDURE  02/22/2010    TRANSPLANT  01/01/2010    VERTEBROPLASTY  01/01/2016    T5 and T8 vertebrae.           CURRENT MEDICATIONS:    predniSONE (DELTASONE) 20 MG tablet  albuterol (PROVENTIL) (2.5 MG/3ML) 0.083% neb solution  ascorbic acid (VITAMIN C) 500 MG tablet  augmented betamethasone dipropionate (DIPROLENE AF) 0.05 % external cream  colestipol (COLESTID) 1 g tablet  diclofenac (VOLTAREN) 50 MG EC tablet  hydrOXYzine (ATARAX) 25 MG tablet  ipratropium - albuterol 0.5 mg/2.5 mg/3 mL (DUONEB) 0.5-2.5 (3) MG/3ML neb solution  nebulizer nebulization  oxyCODONE IR (ROXICODONE) 10 MG tablet  triamcinolone (KENALOG) 0.1 % external cream        ALLERGIES:  Allergies   Allergen Reactions    Mirtazapine      Other reaction(s): Other (See Comments)  Hallucination     Tape [Adhesive Tape]      Allergy Hx  In addition to NO paper tape    Liquid Adhesive Rash     Other reaction(s): Other (See Comments)  Allergy Hx - Rash from paper tape       FAMILY  HISTORY:  Family History   Problem Relation Age of Onset    Diabetes Mother         borderline    Pancreatic Cancer Father     Diabetes Maternal Grandmother     Diabetes Maternal Uncle     Thyroid Disease No family hx of     Melanoma No family hx of     Skin Cancer No family hx of     Lung Cancer Mother        SOCIAL HISTORY:   Social History     Socioeconomic History    Marital status:    Tobacco Use    Smoking status: Every Day     Current packs/day: 0.50     Average packs/day: 0.5 packs/day for 49.0 years (24.5 ttl pk-yrs)     Types: Cigarettes    Smokeless tobacco: Former    Tobacco comments:     has used E-cigarettes in the past, info given   Vaping Use    Vaping status: Never Used   Substance and Sexual Activity    Alcohol use: No    Drug use: No    Sexual activity: Never   Social History Narrative    , on disability.        Has family in the area.        Patient of Dr. Dunn since 2015.      Social Determinants of Health     Financial Resource Strain: Low Risk  (10/31/2023)    Financial Resource Strain     Within the past 12 months, have you or your family members you live with been unable to get utilities (heat, electricity) when it was really needed?: No   Food Insecurity: High Risk (10/31/2023)    Food Insecurity     Within the past 12 months, did you worry that your food would run out before you got money to buy more?: Yes     Within the past 12 months, did the food you bought just not last and you didn t have money to get more?: No   Transportation Needs: Low Risk  (10/31/2023)    Transportation Needs     Within the past 12 months, has lack of transportation kept you from medical appointments, getting your medicines, non-medical meetings or appointments, work, or from getting things that you need?: No    [facility]    Social Connections   Interpersonal Safety: Low Risk  (9/19/2024)    Interpersonal Safety     Do you feel physically and  "emotionally safe where you currently live?: Yes     Within the past 12 months, have you been hit, slapped, kicked or otherwise physically hurt by someone?: No     Within the past 12 months, have you been humiliated or emotionally abused in other ways by your partner or ex-partner?: No   Housing Stability: Low Risk  (10/31/2023)    Housing Stability     Do you have housing? : Yes     Are you worried about losing your housing?: No       VITALS:  /57   Pulse 73   Temp (!) 96.6  F (35.9  C)   Resp 16   Ht 1.753 m (5' 9\")   Wt 45.4 kg (100 lb)   LMP  (LMP Unknown)   SpO2 99%   BMI 14.77 kg/m      PHYSICAL EXAM    Physical Exam  Vitals and nursing note reviewed.   Constitutional:       General: She is not in acute distress.     Appearance: Normal appearance. She is normal weight. She is not ill-appearing.   HENT:      Head: Normocephalic and atraumatic.      Nose: Nose normal.      Mouth/Throat:      Mouth: Mucous membranes are moist.      Pharynx: Oropharynx is clear.   Eyes:      Extraocular Movements: Extraocular movements intact.      Conjunctiva/sclera: Conjunctivae normal.      Pupils: Pupils are equal, round, and reactive to light.   Cardiovascular:      Rate and Rhythm: Normal rate and regular rhythm.      Pulses: Normal pulses.      Heart sounds: Normal heart sounds. No murmur heard.  Pulmonary:      Effort: Respiratory distress present.      Breath sounds: Wheezing present.      Comments: Tachypneic, prolonged exp phase, short sentences  Abdominal:      General: Abdomen is flat. There is no distension.      Palpations: Abdomen is soft.      Tenderness: There is no abdominal tenderness.   Musculoskeletal:         General: Normal range of motion.      Cervical back: Normal range of motion.      Right lower leg: No edema.      Left lower leg: No edema.   Skin:     General: Skin is warm and dry.      Capillary Refill: Capillary refill takes less than 2 seconds.   Neurological:      General: No focal " deficit present.      Mental Status: She is alert and oriented to person, place, and time. Mental status is at baseline.   Psychiatric:         Mood and Affect: Mood normal.         Behavior: Behavior normal.         Thought Content: Thought content normal.         Judgment: Judgment normal.            LAB:  All pertinent labs reviewed and interpreted.  Results for orders placed or performed during the hospital encounter of 10/10/24   XR Chest Port 1 View    Impression    IMPRESSION: Cardiac silhouette within normal limits. Hyperinflated, emphysematous lungs with coarsening of the interstitial lung markings. No acute airspace disease. No visible pneumothorax or pleural effusion. Osteopenia. Sclerotic focus in the right   humeral head/neck consistent with bone infarct or enchondroma.   Basic metabolic panel   Result Value Ref Range    Sodium 140 135 - 145 mmol/L    Potassium 3.7 3.4 - 5.3 mmol/L    Chloride 104 98 - 107 mmol/L    Carbon Dioxide (CO2) 25 22 - 29 mmol/L    Anion Gap 11 7 - 15 mmol/L    Urea Nitrogen 8.6 8.0 - 23.0 mg/dL    Creatinine 0.69 0.51 - 0.95 mg/dL    GFR Estimate >90 >60 mL/min/1.73m2    Calcium 8.5 (L) 8.8 - 10.4 mg/dL    Glucose 107 (H) 70 - 99 mg/dL   Blood gas venous   Result Value Ref Range    pH Venous 7.32 7.32 - 7.43    pCO2 Venous 57 (H) 40 - 50 mm Hg    pO2 Venous 49 (H) 25 - 47 mm Hg    Bicarbonate Venous 29 (H) 21 - 28 mmol/L    Base Excess/Deficit Venous 1.6 -3.0 - 3.0 mmol/L    FIO2 24     Oxyhemoglobin Venous 81 (H) 70 - 75 %    O2 Sat, Venous 84.0 (H) 70.0 - 75.0 %   Symptomatic Influenza A/B, RSV, & SARS-CoV2 PCR (COVID-19) Nasopharyngeal    Specimen: Nasopharyngeal; Swab   Result Value Ref Range    Influenza A PCR Negative Negative    Influenza B PCR Negative Negative    RSV PCR Negative Negative    SARS CoV2 PCR Negative Negative   CBC with platelets and differential   Result Value Ref Range    WBC Count 12.2 (H) 4.0 - 11.0 10e3/uL    RBC Count 4.23 3.80 - 5.20 10e6/uL     Hemoglobin 12.7 11.7 - 15.7 g/dL    Hematocrit 38.9 35.0 - 47.0 %    MCV 92 78 - 100 fL    MCH 30.0 26.5 - 33.0 pg    MCHC 32.6 31.5 - 36.5 g/dL    RDW 13.0 10.0 - 15.0 %    Platelet Count 267 150 - 450 10e3/uL    % Neutrophils 53 %    % Lymphocytes 33 %    % Monocytes 10 %    % Eosinophils 3 %    % Basophils 1 %    % Immature Granulocytes 0 %    NRBCs per 100 WBC 0 <1 /100    Absolute Neutrophils 6.5 1.6 - 8.3 10e3/uL    Absolute Lymphocytes 4.0 0.8 - 5.3 10e3/uL    Absolute Monocytes 1.2 0.0 - 1.3 10e3/uL    Absolute Eosinophils 0.4 0.0 - 0.7 10e3/uL    Absolute Basophils 0.1 0.0 - 0.2 10e3/uL    Absolute Immature Granulocytes 0.0 <=0.4 10e3/uL    Absolute NRBCs 0.0 10e3/uL       RADIOLOGY:  Reviewed all pertinent imaging. Please see official radiology report.  XR Chest Port 1 View   Final Result   IMPRESSION: Cardiac silhouette within normal limits. Hyperinflated, emphysematous lungs with coarsening of the interstitial lung markings. No acute airspace disease. No visible pneumothorax or pleural effusion. Osteopenia. Sclerotic focus in the right    humeral head/neck consistent with bone infarct or enchondroma.          PROCEDURES:   None      [facility] Documentation:   CMS Diagnoses:              I, Nemesio Pruett, am serving as a scribe to document services personally performed by Jose Whitman MD based on my observation and the provider's statements to me. I, Jose Whitman MD, attest that Nemesio Pruett is acting in a scribe capacity, has observed my performance of the services and has documented them in accordance with my direction.    Jose Whitman MD  [facility] EMERGENCY DEPARTMENT  [address] [place] [id number] [phone]       Jose Whitman MD  10/11/24 0104

## 2024-10-11 NOTE — DISCHARGE INSTRUCTIONS
Continue to treat your symptoms using albuterol inhaler as prescribed (2 puffs every 4 hours as needed) in addition to the prescribed steroids.    Please return to the ER if your symptoms worsen.

## 2024-10-11 NOTE — TELEPHONE ENCOUNTER
Form signed.    Can offer a follow up appointment next Tuesday October 15th at 11:30 am.    Thank you,    Jorge Dunn MD  General Internal Medicine  [facility]  10/11/2024, 2:20 PM

## 2024-10-11 NOTE — TELEPHONE ENCOUNTER
Pts daughter dropped off a form from [employer] for pts PCP to look over and fill out. Please call pt once form is completed and ready for .    Copy is made and original is placed in PCPs mailbox.

## 2024-10-11 NOTE — ED NOTES
Bed: JNED-09  Expected date:   Expected time:   Means of arrival:   Comments:  Crash - Clean Need Bed

## 2024-10-11 NOTE — ED NOTES
Pt feeling better after 2nd neb, ambulatory in [place] - O2 sats go down to 90%.  Pt returned to room and started coughing - sats went down to 83%.      Sats 92% on RA after 5 min rest.  Pt still appears to be tachipneac.

## 2024-10-11 NOTE — ED NOTES
PT AMBULATED TO BATHROOM AND BACK OFF OXYGEN.  PT SOB WHEN RETURNED TO ROOM, SOME DIZZINESS, O2 SATS UPON RETURN TO ROOM 85% ON RA, PT REMAINED 89% ON RA AFTER 1-2 MINUTES.  NEB STARTED AT THIS TIME, DR. CARRASCO AWARE.

## 2024-10-11 NOTE — ED TRIAGE NOTES
Patient reports that she has had a croupy cough for a couple days. Exposure to the flu. Insistent cough in triage. SPO2 90%     Patient does not use oxygen at home    Used inhalers at home with no relief     Unable to get a BP due to patient coughing too much      Triage Assessment (Adult)       Row Name 10/10/24 6684          Triage Assessment    Airway WDL WDL        Respiratory WDL    Respiratory WDL cough;X     Cough Frequency frequent;incessant     Cough Type croupy;congested        Skin Circulation/Temperature WDL    Skin Circulation/Temperature WDL WDL        Cardiac WDL    Cardiac WDL WDL        Peripheral/Neurovascular WDL    Peripheral Neurovascular WDL WDL        Cognitive/Neuro/Behavioral WDL    Cognitive/Neuro/Behavioral WDL WDL

## 2024-10-11 NOTE — TELEPHONE ENCOUNTER
Relayed Dr. Dunn's message and scheduled follow up appt    Informed pt that the form will be at the  for her and gave hours of clinic operation

## 2024-10-14 DIAGNOSIS — G89.29 CHRONIC BILATERAL LOW BACK PAIN, UNSPECIFIED WHETHER SCIATICA PRESENT: ICD-10-CM

## 2024-10-14 DIAGNOSIS — M54.50 CHRONIC BILATERAL LOW BACK PAIN, UNSPECIFIED WHETHER SCIATICA PRESENT: ICD-10-CM

## 2024-10-14 RX ORDER — OXYCODONE HYDROCHLORIDE 10 MG/1
10 TABLET ORAL 4 TIMES DAILY
Qty: 120 TABLET | Refills: 0 | Status: SHIPPED | OUTPATIENT
Start: 2024-10-14 | End: 2024-11-13

## 2024-10-14 NOTE — TELEPHONE ENCOUNTER
Medication Question or Refill    Contacts       Contact Date/Time Type Contact Phone/Fax    10/14/2024 11:25 AM CDT Phone (Incoming) Jenn Barclay (Self) [phone] (M)            What medication are you calling about (include dose and sig)?: oxyCODONE IR (ROXICODONE) 10 MG tablet     Preferred Pharmacy:    [id number] [address] & [address] [address]  Phone: [phone] Fax: [phone]      Controlled Substance Agreement on file:   CSA -- Patient Level:     [Media Unavailable] Controlled Substance Agreement - Opioid - Scan on 1/9/2024 10:15 AM   [Media Unavailable] Controlled Substance Agreement - Opioid - Scan on 1/9/2023  9:02 AM   [Media Unavailable] Controlled Substance Agreement - Opioid - Scan on 1/3/2022  1:31 PM   [Media Unavailable] Controlled Substance Agreement - Opioid - Scan on 12/18/2019   [Media Unavailable] Controlled Substance Agreement - Opioid - Scan on 12/28/2018   [Media Unavailable] Controlled Substance Agreement - Opioid - Scan on 8/9/2017: [facility]       Who prescribed the medication?: Dr. Jroge Dunn    Do you need a refill? Yes    When did you use the medication last? today    Patient offered an appointment? No scheduled tomorrow 10/15/24 ED Follow Up    Do you have any questions or concerns?  No

## 2024-10-15 ENCOUNTER — OFFICE VISIT (OUTPATIENT)
Dept: INTERNAL MEDICINE | Facility: CLINIC | Age: 69
End: 2024-10-15
Payer: COMMERCIAL

## 2024-10-15 VITALS
HEART RATE: 82 BPM | WEIGHT: 100.7 LBS | HEIGHT: 69 IN | DIASTOLIC BLOOD PRESSURE: 74 MMHG | RESPIRATION RATE: 18 BRPM | OXYGEN SATURATION: 97 % | SYSTOLIC BLOOD PRESSURE: 110 MMHG | TEMPERATURE: 98.2 F | BODY MASS INDEX: 14.92 KG/M2

## 2024-10-15 DIAGNOSIS — M79.672 LEFT FOOT PAIN: ICD-10-CM

## 2024-10-15 DIAGNOSIS — J44.9 CHRONIC OBSTRUCTIVE PULMONARY DISEASE, UNSPECIFIED COPD TYPE (H): Primary | ICD-10-CM

## 2024-10-15 DIAGNOSIS — R09.89 PHLEGM IN THROAT: ICD-10-CM

## 2024-10-15 PROCEDURE — G2211 COMPLEX E/M VISIT ADD ON: HCPCS | Performed by: INTERNAL MEDICINE

## 2024-10-15 PROCEDURE — 99214 OFFICE O/P EST MOD 30 MIN: CPT | Performed by: INTERNAL MEDICINE

## 2024-10-15 RX ORDER — SPIRONOLACTONE 50 MG/1
50 TABLET, FILM COATED ORAL AT BEDTIME
COMMUNITY
Start: 2024-08-31

## 2024-10-15 NOTE — PATIENT INSTRUCTIONS
I have recommended that you be seen by [facility].    Their phone number is [phone].    Future Appointments   Date Time Provider Department Center   12/13/2024 12:30 PM Jorge Dunn MD MDINTM [facility]   8/18/2025  1:00 PM MPLW LAB [facility]NGEL [facility]   8/18/2025  1:30 PM [email or web address] JNDXIC [facility]   9/2/2025  4:00 PM Ryan Alonso MD MDENDO [facility]
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
## 2024-11-05 DIAGNOSIS — J44.9 CHRONIC OBSTRUCTIVE PULMONARY DISEASE, UNSPECIFIED COPD TYPE (H): ICD-10-CM

## 2024-11-06 RX ORDER — IPRATROPIUM BROMIDE AND ALBUTEROL SULFATE 2.5; .5 MG/3ML; MG/3ML
SOLUTION RESPIRATORY (INHALATION)
Qty: 90 ML | Refills: 2 | Status: SHIPPED | OUTPATIENT
Start: 2024-11-06

## 2024-11-07 ASSESSMENT — ANXIETY QUESTIONNAIRES: GAD7 TOTAL SCORE: 0

## 2024-11-13 DIAGNOSIS — G89.29 CHRONIC BILATERAL LOW BACK PAIN, UNSPECIFIED WHETHER SCIATICA PRESENT: ICD-10-CM

## 2024-11-13 DIAGNOSIS — M54.50 CHRONIC BILATERAL LOW BACK PAIN, UNSPECIFIED WHETHER SCIATICA PRESENT: ICD-10-CM

## 2024-11-13 DIAGNOSIS — L30.9 DERMATITIS: ICD-10-CM

## 2024-11-13 RX ORDER — BETAMETHASONE DIPROPIONATE 0.5 MG/G
CREAM TOPICAL 2 TIMES DAILY
Qty: 50 G | Refills: 3 | Status: SHIPPED | OUTPATIENT
Start: 2024-11-13

## 2024-11-13 RX ORDER — OXYCODONE HYDROCHLORIDE 10 MG/1
10 TABLET ORAL 4 TIMES DAILY
Qty: 120 TABLET | Refills: 0 | Status: SHIPPED | OUTPATIENT
Start: 2024-11-14 | End: 2024-12-14

## 2024-12-23 ENCOUNTER — APPOINTMENT (OUTPATIENT)
Dept: CT IMAGING | Facility: HOSPITAL | Age: 69
DRG: 536 | End: 2024-12-23
Attending: EMERGENCY MEDICINE
Payer: COMMERCIAL

## 2024-12-23 ENCOUNTER — HOSPITAL ENCOUNTER (INPATIENT)
Facility: HOSPITAL | Age: 69
LOS: 1 days | Discharge: HOME | DRG: 536 | End: 2024-12-24
Attending: EMERGENCY MEDICINE
Payer: COMMERCIAL

## 2024-12-23 DIAGNOSIS — J43.9 PULMONARY EMPHYSEMA, UNSPECIFIED EMPHYSEMA TYPE (H): ICD-10-CM

## 2024-12-23 DIAGNOSIS — J44.9 CHRONIC OBSTRUCTIVE PULMONARY DISEASE, UNSPECIFIED COPD TYPE (H): Primary | ICD-10-CM

## 2024-12-23 DIAGNOSIS — S32.502A CLOSED NONDISPLACED FRACTURE OF LEFT PUBIS, INITIAL ENCOUNTER (H): ICD-10-CM

## 2024-12-23 LAB
ANION GAP SERPL CALCULATED.3IONS-SCNC: 10 MMOL/L (ref 7–15)
BUN SERPL-MCNC: 11 MG/DL (ref 8–23)
CALCIUM SERPL-MCNC: 10.1 MG/DL (ref 8.8–10.4)
CHLORIDE SERPL-SCNC: 97 MMOL/L (ref 98–107)
CREAT SERPL-MCNC: 0.79 MG/DL (ref 0.51–0.95)
EGFRCR SERPLBLD CKD-EPI 2021: 80 ML/MIN/1.73M2
GLUCOSE SERPL-MCNC: 130 MG/DL (ref 70–99)
HCO3 SERPL-SCNC: 29 MMOL/L (ref 22–29)
POTASSIUM SERPL-SCNC: 4.3 MMOL/L (ref 3.4–5.3)
SODIUM SERPL-SCNC: 136 MMOL/L (ref 135–145)

## 2024-12-23 PROCEDURE — 70450 CT HEAD/BRAIN W/O DYE: CPT

## 2024-12-23 PROCEDURE — 96374 THER/PROPH/DIAG INJ IV PUSH: CPT

## 2024-12-23 PROCEDURE — 250N000011 HC RX IP 250 OP 636: Performed by: INTERNAL MEDICINE

## 2024-12-23 PROCEDURE — 250N000013 HC RX MED GY IP 250 OP 250 PS 637: Performed by: INTERNAL MEDICINE

## 2024-12-23 PROCEDURE — 80048 BASIC METABOLIC PNL TOTAL CA: CPT | Performed by: EMERGENCY MEDICINE

## 2024-12-23 PROCEDURE — 120N000001 HC R&B MED SURG/OB

## 2024-12-23 PROCEDURE — 36415 COLL VENOUS BLD VENIPUNCTURE: CPT | Performed by: EMERGENCY MEDICINE

## 2024-12-23 PROCEDURE — 999N000104 CT LUMBAR SPINE RECONSTRUCTED

## 2024-12-23 PROCEDURE — 99223 1ST HOSP IP/OBS HIGH 75: CPT | Mod: AI | Performed by: INTERNAL MEDICINE

## 2024-12-23 PROCEDURE — 250N000011 HC RX IP 250 OP 636: Performed by: EMERGENCY MEDICINE

## 2024-12-23 PROCEDURE — 82310 ASSAY OF CALCIUM: CPT | Performed by: EMERGENCY MEDICINE

## 2024-12-23 PROCEDURE — 250N000013 HC RX MED GY IP 250 OP 250 PS 637: Performed by: EMERGENCY MEDICINE

## 2024-12-23 PROCEDURE — 74177 CT ABD & PELVIS W/CONTRAST: CPT

## 2024-12-23 PROCEDURE — 99285 EMERGENCY DEPT VISIT HI MDM: CPT | Mod: 25

## 2024-12-23 PROCEDURE — 72125 CT NECK SPINE W/O DYE: CPT

## 2024-12-23 PROCEDURE — 82565 ASSAY OF CREATININE: CPT | Performed by: EMERGENCY MEDICINE

## 2024-12-23 PROCEDURE — G0378 HOSPITAL OBSERVATION PER HR: HCPCS

## 2024-12-23 RX ORDER — NALOXONE HYDROCHLORIDE 0.4 MG/ML
0.4 INJECTION, SOLUTION INTRAMUSCULAR; INTRAVENOUS; SUBCUTANEOUS
Status: DISCONTINUED | OUTPATIENT
Start: 2024-12-23 | End: 2024-12-24 | Stop reason: HOSPADM

## 2024-12-23 RX ORDER — OXYCODONE HYDROCHLORIDE 5 MG/1
5 TABLET ORAL ONCE
Status: COMPLETED | OUTPATIENT
Start: 2024-12-23 | End: 2024-12-23

## 2024-12-23 RX ORDER — HYDROMORPHONE HCL IN WATER/PF 6 MG/30 ML
0.4 PATIENT CONTROLLED ANALGESIA SYRINGE INTRAVENOUS
Status: DISCONTINUED | OUTPATIENT
Start: 2024-12-23 | End: 2024-12-24

## 2024-12-23 RX ORDER — ONDANSETRON 2 MG/ML
4 INJECTION INTRAMUSCULAR; INTRAVENOUS EVERY 6 HOURS PRN
Status: DISCONTINUED | OUTPATIENT
Start: 2024-12-23 | End: 2024-12-24 | Stop reason: HOSPADM

## 2024-12-23 RX ORDER — ONDANSETRON 4 MG/1
4 TABLET, ORALLY DISINTEGRATING ORAL EVERY 6 HOURS PRN
Status: DISCONTINUED | OUTPATIENT
Start: 2024-12-23 | End: 2024-12-24 | Stop reason: HOSPADM

## 2024-12-23 RX ORDER — LIDOCAINE 40 MG/G
CREAM TOPICAL
Status: DISCONTINUED | OUTPATIENT
Start: 2024-12-23 | End: 2024-12-24 | Stop reason: HOSPADM

## 2024-12-23 RX ORDER — CALCIUM CARBONATE 500 MG/1
1000 TABLET, CHEWABLE ORAL 4 TIMES DAILY PRN
Status: DISCONTINUED | OUTPATIENT
Start: 2024-12-23 | End: 2024-12-24 | Stop reason: HOSPADM

## 2024-12-23 RX ORDER — MULTIPLE VITAMINS W/ MINERALS TAB 9MG-400MCG
1 TAB ORAL DAILY
COMMUNITY

## 2024-12-23 RX ORDER — NALOXONE HYDROCHLORIDE 0.4 MG/ML
0.2 INJECTION, SOLUTION INTRAMUSCULAR; INTRAVENOUS; SUBCUTANEOUS
Status: DISCONTINUED | OUTPATIENT
Start: 2024-12-23 | End: 2024-12-24 | Stop reason: HOSPADM

## 2024-12-23 RX ORDER — VITAMIN B COMPLEX
1 TABLET ORAL DAILY
COMMUNITY

## 2024-12-23 RX ORDER — HYDROMORPHONE HCL IN WATER/PF 6 MG/30 ML
0.2 PATIENT CONTROLLED ANALGESIA SYRINGE INTRAVENOUS
Status: DISCONTINUED | OUTPATIENT
Start: 2024-12-23 | End: 2024-12-24

## 2024-12-23 RX ORDER — PLANT STANOL ESTER 450 MG
2.5 TABLET ORAL DAILY
COMMUNITY

## 2024-12-23 RX ORDER — OXYCODONE HYDROCHLORIDE 5 MG/1
5 TABLET ORAL EVERY 4 HOURS PRN
Status: DISCONTINUED | OUTPATIENT
Start: 2024-12-23 | End: 2024-12-24

## 2024-12-23 RX ORDER — IOPAMIDOL 755 MG/ML
49 INJECTION, SOLUTION INTRAVASCULAR ONCE
Status: COMPLETED | OUTPATIENT
Start: 2024-12-23 | End: 2024-12-23

## 2024-12-23 RX ORDER — DOCUSATE SODIUM 100 MG/1
100 CAPSULE, LIQUID FILLED ORAL 2 TIMES DAILY
Status: DISCONTINUED | OUTPATIENT
Start: 2024-12-23 | End: 2024-12-24 | Stop reason: HOSPADM

## 2024-12-23 RX ADMIN — DOCUSATE SODIUM 100 MG: 100 CAPSULE, LIQUID FILLED ORAL at 23:00

## 2024-12-23 RX ADMIN — OXYCODONE HYDROCHLORIDE 5 MG: 5 TABLET ORAL at 20:34

## 2024-12-23 RX ADMIN — IOPAMIDOL 49 ML: 755 INJECTION, SOLUTION INTRAVENOUS at 19:52

## 2024-12-23 RX ADMIN — HYDROMORPHONE HYDROCHLORIDE 0.4 MG: 0.2 INJECTION, SOLUTION INTRAMUSCULAR; INTRAVENOUS; SUBCUTANEOUS at 23:01

## 2024-12-23 RX ADMIN — OXYCODONE HYDROCHLORIDE 5 MG: 5 TABLET ORAL at 17:14

## 2024-12-23 ASSESSMENT — ACTIVITIES OF DAILY LIVING (ADL)
ADLS_ACUITY_SCORE: 49

## 2024-12-23 ASSESSMENT — COLUMBIA-SUICIDE SEVERITY RATING SCALE - C-SSRS
1. IN THE PAST MONTH, HAVE YOU WISHED YOU WERE DEAD OR WISHED YOU COULD GO TO SLEEP AND NOT WAKE UP?: NO
6. HAVE YOU EVER DONE ANYTHING, STARTED TO DO ANYTHING, OR PREPARED TO DO ANYTHING TO END YOUR LIFE?: NO
2. HAVE YOU ACTUALLY HAD ANY THOUGHTS OF KILLING YOURSELF IN THE PAST MONTH?: NO
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
## 2024-12-24 ENCOUNTER — APPOINTMENT (OUTPATIENT)
Dept: RADIOLOGY | Facility: HOSPITAL | Age: 69
DRG: 536 | End: 2024-12-24
Attending: HOSPITALIST
Payer: COMMERCIAL

## 2024-12-24 ENCOUNTER — APPOINTMENT (OUTPATIENT)
Dept: OCCUPATIONAL THERAPY | Facility: HOSPITAL | Age: 69
DRG: 536 | End: 2024-12-24
Attending: HOSPITALIST
Payer: COMMERCIAL

## 2024-12-24 VITALS
TEMPERATURE: 98 F | OXYGEN SATURATION: 93 % | HEART RATE: 81 BPM | RESPIRATION RATE: 16 BRPM | WEIGHT: 98.33 LBS | DIASTOLIC BLOOD PRESSURE: 63 MMHG | SYSTOLIC BLOOD PRESSURE: 127 MMHG | BODY MASS INDEX: 14.52 KG/M2

## 2024-12-24 LAB
ALBUMIN SERPL BCG-MCNC: 3.9 G/DL (ref 3.5–5.2)
ALP SERPL-CCNC: 42 U/L (ref 40–150)
ALT SERPL W P-5'-P-CCNC: 22 U/L (ref 0–50)
ANION GAP SERPL CALCULATED.3IONS-SCNC: 9 MMOL/L (ref 7–15)
AST SERPL W P-5'-P-CCNC: 29 U/L (ref 0–45)
BASOPHILS # BLD AUTO: 0.1 10E3/UL (ref 0–0.2)
BASOPHILS NFR BLD AUTO: 1 %
BILIRUB SERPL-MCNC: 0.5 MG/DL
BUN SERPL-MCNC: 13.1 MG/DL (ref 8–23)
CALCIUM SERPL-MCNC: 8.8 MG/DL (ref 8.8–10.4)
CHLORIDE SERPL-SCNC: 97 MMOL/L (ref 98–107)
CREAT SERPL-MCNC: 0.76 MG/DL (ref 0.51–0.95)
EGFRCR SERPLBLD CKD-EPI 2021: 84 ML/MIN/1.73M2
EOSINOPHIL # BLD AUTO: 0.2 10E3/UL (ref 0–0.7)
EOSINOPHIL NFR BLD AUTO: 2 %
ERYTHROCYTE [DISTWIDTH] IN BLOOD BY AUTOMATED COUNT: 12.5 % (ref 10–15)
GLUCOSE SERPL-MCNC: 137 MG/DL (ref 70–99)
HCO3 SERPL-SCNC: 27 MMOL/L (ref 22–29)
HCT VFR BLD AUTO: 37.9 % (ref 35–47)
HGB BLD-MCNC: 12.7 G/DL (ref 11.7–15.7)
IMM GRANULOCYTES # BLD: 0 10E3/UL
IMM GRANULOCYTES NFR BLD: 0 %
LYMPHOCYTES # BLD AUTO: 2.5 10E3/UL (ref 0.8–5.3)
LYMPHOCYTES NFR BLD AUTO: 21 %
MCH RBC QN AUTO: 30 PG (ref 26.5–33)
MCHC RBC AUTO-ENTMCNC: 33.5 G/DL (ref 31.5–36.5)
MCV RBC AUTO: 90 FL (ref 78–100)
MONOCYTES # BLD AUTO: 1 10E3/UL (ref 0–1.3)
MONOCYTES NFR BLD AUTO: 9 %
NEUTROPHILS # BLD AUTO: 8 10E3/UL (ref 1.6–8.3)
NEUTROPHILS NFR BLD AUTO: 68 %
NRBC # BLD AUTO: 0 10E3/UL
NRBC BLD AUTO-RTO: 0 /100
PLATELET # BLD AUTO: 198 10E3/UL (ref 150–450)
POTASSIUM SERPL-SCNC: 4 MMOL/L (ref 3.4–5.3)
PROT SERPL-MCNC: 6.1 G/DL (ref 6.4–8.3)
RBC # BLD AUTO: 4.23 10E6/UL (ref 3.8–5.2)
SODIUM SERPL-SCNC: 133 MMOL/L (ref 135–145)
WBC # BLD AUTO: 11.8 10E3/UL (ref 4–11)

## 2024-12-24 PROCEDURE — 80053 COMPREHEN METABOLIC PANEL: CPT | Performed by: INTERNAL MEDICINE

## 2024-12-24 PROCEDURE — 73560 X-RAY EXAM OF KNEE 1 OR 2: CPT | Mod: LT

## 2024-12-24 PROCEDURE — 250N000013 HC RX MED GY IP 250 OP 250 PS 637: Performed by: HOSPITALIST

## 2024-12-24 PROCEDURE — 250N000011 HC RX IP 250 OP 636: Performed by: INTERNAL MEDICINE

## 2024-12-24 PROCEDURE — G0378 HOSPITAL OBSERVATION PER HR: HCPCS

## 2024-12-24 PROCEDURE — 97165 OT EVAL LOW COMPLEX 30 MIN: CPT | Mod: GO

## 2024-12-24 PROCEDURE — 97535 SELF CARE MNGMENT TRAINING: CPT | Mod: GO

## 2024-12-24 PROCEDURE — 85018 HEMOGLOBIN: CPT | Performed by: INTERNAL MEDICINE

## 2024-12-24 PROCEDURE — 250N000013 HC RX MED GY IP 250 OP 250 PS 637: Performed by: INTERNAL MEDICINE

## 2024-12-24 PROCEDURE — 36415 COLL VENOUS BLD VENIPUNCTURE: CPT | Performed by: INTERNAL MEDICINE

## 2024-12-24 PROCEDURE — 99239 HOSP IP/OBS DSCHRG MGMT >30: CPT | Performed by: HOSPITALIST

## 2024-12-24 PROCEDURE — 85004 AUTOMATED DIFF WBC COUNT: CPT | Performed by: INTERNAL MEDICINE

## 2024-12-24 PROCEDURE — 96376 TX/PRO/DX INJ SAME DRUG ADON: CPT

## 2024-12-24 PROCEDURE — 99221 1ST HOSP IP/OBS SF/LOW 40: CPT

## 2024-12-24 PROCEDURE — 71045 X-RAY EXAM CHEST 1 VIEW: CPT

## 2024-12-24 PROCEDURE — 250N000012 HC RX MED GY IP 250 OP 636 PS 637: Performed by: HOSPITALIST

## 2024-12-24 RX ORDER — MULTIVIT WITH MINERALS/LUTEIN
500 TABLET ORAL DAILY
Status: DISCONTINUED | OUTPATIENT
Start: 2024-12-24 | End: 2024-12-24 | Stop reason: HOSPADM

## 2024-12-24 RX ORDER — METHOCARBAMOL 500 MG/1
500 TABLET, FILM COATED ORAL EVERY 4 HOURS PRN
Status: DISCONTINUED | OUTPATIENT
Start: 2024-12-24 | End: 2024-12-24 | Stop reason: HOSPADM

## 2024-12-24 RX ORDER — PLANT STANOL ESTER 450 MG
2.5 TABLET ORAL DAILY
Status: DISCONTINUED | OUTPATIENT
Start: 2024-12-24 | End: 2024-12-24

## 2024-12-24 RX ORDER — AZITHROMYCIN 250 MG/1
250 TABLET, FILM COATED ORAL DAILY
Qty: 4 TABLET | Refills: 0 | Status: SHIPPED | OUTPATIENT
Start: 2024-12-24

## 2024-12-24 RX ORDER — PREDNISONE 20 MG/1
40 TABLET ORAL DAILY
Qty: 8 TABLET | Refills: 0 | Status: SHIPPED | OUTPATIENT
Start: 2024-12-24

## 2024-12-24 RX ORDER — OXYCODONE HYDROCHLORIDE 5 MG/1
10 TABLET ORAL 4 TIMES DAILY
Status: DISCONTINUED | OUTPATIENT
Start: 2024-12-24 | End: 2024-12-24

## 2024-12-24 RX ORDER — AZITHROMYCIN 250 MG/1
500 TABLET, FILM COATED ORAL ONCE
Status: COMPLETED | OUTPATIENT
Start: 2024-12-24 | End: 2024-12-24

## 2024-12-24 RX ORDER — PREDNISONE 20 MG/1
40 TABLET ORAL DAILY
Qty: 8 TABLET | Refills: 0 | Status: SHIPPED | OUTPATIENT
Start: 2024-12-24 | End: 2024-12-24

## 2024-12-24 RX ORDER — ALBUTEROL SULFATE 0.83 MG/ML
1.25 SOLUTION RESPIRATORY (INHALATION) EVERY 4 HOURS PRN
Status: DISCONTINUED | OUTPATIENT
Start: 2024-12-24 | End: 2024-12-24 | Stop reason: HOSPADM

## 2024-12-24 RX ORDER — IPRATROPIUM BROMIDE AND ALBUTEROL SULFATE 2.5; .5 MG/3ML; MG/3ML
3 SOLUTION RESPIRATORY (INHALATION) EVERY 4 HOURS PRN
Status: DISCONTINUED | OUTPATIENT
Start: 2024-12-24 | End: 2024-12-24 | Stop reason: HOSPADM

## 2024-12-24 RX ORDER — AZITHROMYCIN 250 MG/1
250 TABLET, FILM COATED ORAL DAILY
Qty: 4 TABLET | Refills: 0 | Status: SHIPPED | OUTPATIENT
Start: 2024-12-24 | End: 2024-12-24

## 2024-12-24 RX ORDER — PREDNISONE 20 MG/1
40 TABLET ORAL ONCE
Status: COMPLETED | OUTPATIENT
Start: 2024-12-24 | End: 2024-12-24

## 2024-12-24 RX ORDER — OXYCODONE HYDROCHLORIDE 5 MG/1
10 TABLET ORAL EVERY 4 HOURS PRN
Status: DISCONTINUED | OUTPATIENT
Start: 2024-12-24 | End: 2024-12-24 | Stop reason: HOSPADM

## 2024-12-24 RX ORDER — DIAZEPAM 2 MG/1
2 TABLET ORAL ONCE
Status: COMPLETED | OUTPATIENT
Start: 2024-12-24 | End: 2024-12-24

## 2024-12-24 RX ORDER — OXYCODONE HYDROCHLORIDE 5 MG/1
5 TABLET ORAL EVERY 4 HOURS PRN
Status: DISCONTINUED | OUTPATIENT
Start: 2024-12-24 | End: 2024-12-24 | Stop reason: HOSPADM

## 2024-12-24 RX ORDER — ACETAMINOPHEN 325 MG/1
975 TABLET ORAL EVERY 8 HOURS
Status: DISCONTINUED | OUTPATIENT
Start: 2024-12-24 | End: 2024-12-24 | Stop reason: HOSPADM

## 2024-12-24 RX ORDER — LIDOCAINE 4 G/G
1-2 PATCH TOPICAL
Status: DISCONTINUED | OUTPATIENT
Start: 2024-12-24 | End: 2024-12-24 | Stop reason: HOSPADM

## 2024-12-24 RX ORDER — SPIRONOLACTONE 25 MG/1
50 TABLET ORAL AT BEDTIME
Status: DISCONTINUED | OUTPATIENT
Start: 2024-12-24 | End: 2024-12-24 | Stop reason: HOSPADM

## 2024-12-24 RX ADMIN — PREDNISONE 40 MG: 20 TABLET ORAL at 17:10

## 2024-12-24 RX ADMIN — Medication 500 MG: at 10:42

## 2024-12-24 RX ADMIN — HYDROMORPHONE HYDROCHLORIDE 0.4 MG: 0.2 INJECTION, SOLUTION INTRAMUSCULAR; INTRAVENOUS; SUBCUTANEOUS at 01:51

## 2024-12-24 RX ADMIN — DIAZEPAM 2 MG: 2 TABLET ORAL at 06:24

## 2024-12-24 RX ADMIN — HYDROMORPHONE HYDROCHLORIDE 0.4 MG: 0.2 INJECTION, SOLUTION INTRAMUSCULAR; INTRAVENOUS; SUBCUTANEOUS at 07:50

## 2024-12-24 RX ADMIN — LIDOCAINE 1 PATCH: 4 PATCH TOPICAL at 14:08

## 2024-12-24 RX ADMIN — AZITHROMYCIN DIHYDRATE 500 MG: 250 TABLET ORAL at 17:10

## 2024-12-24 RX ADMIN — ACETAMINOPHEN 975 MG: 325 TABLET ORAL at 14:08

## 2024-12-24 RX ADMIN — OXYCODONE HYDROCHLORIDE 10 MG: 5 TABLET ORAL at 10:42

## 2024-12-24 ASSESSMENT — ACTIVITIES OF DAILY LIVING (ADL)
ADLS_ACUITY_SCORE: 49
ADLS_ACUITY_SCORE: 55
FALL_HISTORY_WITHIN_LAST_SIX_MONTHS: NO
DEPENDENT_IADLS:: INDEPENDENT
ADLS_ACUITY_SCORE: 55
ADLS_ACUITY_SCORE: 47
ADLS_ACUITY_SCORE: 49
ADLS_ACUITY_SCORE: 53
ADLS_ACUITY_SCORE: 53
ADLS_ACUITY_SCORE: 55
CHANGE_IN_FUNCTIONAL_STATUS_SINCE_ONSET_OF_CURRENT_ILLNESS/INJURY: YES
ADLS_ACUITY_SCORE: 49
ADLS_ACUITY_SCORE: 53
ADLS_ACUITY_SCORE: 55
ADLS_ACUITY_SCORE: 53
ADLS_ACUITY_SCORE: 49
ADLS_ACUITY_SCORE: 53
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
## 2024-12-24 NOTE — PHARMACY-ADMISSION MEDICATION HISTORY
Pharmacist Admission Medication History    Admission medication history is complete. The information provided in this note is only as accurate as the sources available at the time of the update.    Information Source(s): Patient, Clinic records, and CareEverywhere/SureScripts via in-person    Pertinent Information: none    Changes made to PTA medication list:  Added: Vitamin B, vitamin D, MVI, potassium  Deleted: diclofenac, hydroxyzine, prednisone, TCM cr  Changed: spironolactone     Allergies reviewed with patient and updates made in EHR: yes    Medication History Completed By: Brian Bae [facility] 12/23/2024 9:07 PM    PTA Med List   Medication Sig Note Last Dose/Taking    albuterol (PROVENTIL) (2.5 MG/3ML) 0.083% neb solution USE 1 VIAL IN NEBULIZER EVERY 6 HOURS AS NEEDED FOR WHEEZING  Taking    ascorbic acid (VITAMIN C) 500 MG tablet Take 500 mg by mouth daily.  12/23/2024 Morning    augmented betamethasone dipropionate (DIPROLENE AF) 0.05 % external cream Apply topically 2 times daily. Stronger cream for rash. 12/23/2024: Rash on buttocks  More than a month    ipratropium - albuterol 0.5 mg/2.5 mg/3 mL (DUONEB) 0.5-2.5 (3) MG/3ML neb solution TAKE 1 VIAL(3ML) BY NEBULIZATION EVERY 6 HOURS AS NEEDED FOR COUGH WITH PHELGM.  Taking    multivitamin w/minerals (THERA-VIT-M) tablet Take 1 tablet by mouth daily.  12/23/2024 Morning    oxyCODONE IR (ROXICODONE) 10 MG tablet Take 1 tablet (10 mg) by mouth 4 times daily. For use from 12/14/24-1/13/2025 12/23/2024 Morning    potassium gluconate 2.5 MEQ tablet Take 2.5 mEq by mouth daily.  12/23/2024 Morning    spironolactone (ALDACTONE) 50 MG tablet Take 100 mg by mouth at bedtime.  12/22/2024 Bedtime    vitamin B complex with vitamin C (VITAMIN  B COMPLEX) tablet Take 1 tablet by mouth daily.  12/23/2024 Morning    Vitamin D3 (VITAMIN D, CHOLECALCIFEROL,) 25 mcg (1000 units) tablet Take 1 tablet by mouth daily.  12/23/2024 Morning
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
## 2024-12-24 NOTE — PLAN OF CARE
Goal Outcome Evaluation:      Plan of Care Reviewed With: patient          Outcome Evaluation: Patient reports plan to return home at time of discharge.
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
## 2024-12-24 NOTE — ED NOTES
Pt appears unsteady on crutches, complains of dizziness and increased pain with movement. MD notified.

## 2024-12-24 NOTE — PLAN OF CARE
Pt is A&Ox4, calm, cooperative  - vitals stable  - requiring 2L O2 to stay at 94%  - pt on bedrest  - snack provided overnight  - TELE: NSR  - severe pain to pelvis, back, managed with IV dilaudid   - Voiding with purewick & bedpan          Goal Outcome Evaluation:    Overall Patient Progress: no changeOverall Patient Progress: no change    Problem: Orthopaedic Fracture  Goal: Optimal Functional Ability  12/24/2024 0500 by Kristine Lambert RN  Outcome: Not Progressing  12/24/2024 0459 by Kristine Lambert RN  Outcome: Progressing  Intervention: Optimize Functional Ability  Recent Flowsheet Documentation  Taken 12/24/2024 0025 by Kristine Lambert RN  Positioning/Transfer Devices:   pillows   in use  Goal: Effective Oxygenation and Ventilation  12/24/2024 0500 by Kristine Lambert RN  Outcome: Not Progressing  12/24/2024 0459 by Kristine Lambert RN  Outcome: Progressing  Intervention: Promote Airway Secretion Clearance  Recent Flowsheet Documentation  Taken 12/24/2024 0025 by Kristine Lambert RN  Cough And Deep Breathing: done independently per patient  Intervention: Optimize Oxygenation and Ventilation  Recent Flowsheet Documentation  Taken 12/24/2024 0025 by Kristine Lambert RN  Head of Bed (HOB) Positioning: HOB at 15 degrees     Problem: Gas Exchange Impaired  Goal: Optimal Gas Exchange  12/24/2024 0500 by Kristine Lambert RN  Outcome: Not Progressing  12/24/2024 0459 by Kristine Lambert RN  Outcome: Not Progressing  Intervention: Optimize Oxygenation and Ventilation  Recent Flowsheet Documentation  Taken 12/24/2024 0025 by Kristine Lambert RN  Head of Bed (HOB) Positioning: HOB at 15 degrees

## 2024-12-24 NOTE — PROGRESS NOTES
For trauma review:  I was not paged until current time nor was on patient list.  Will see patient this morning.

## 2024-12-24 NOTE — CARE PLAN
12/24/24 1637   Home Oxygen Assessment (RN/RT ONLY)   Does patient have oxygen at home? No   1. SpO2 on room air at rest while awake 84       Oxygen LPM at rest 3   3. SpO2 on room air during Activity/with exercise 82   4. SpO2 with oxygen during activity/with exercise 93       Oxygen LPM during activity/with exercise 3   Does patient qualify for Home O2? Yes

## 2024-12-24 NOTE — PROVIDER NOTIFICATION
Patient lethargic when checked on,  Arousable yet fell asleep quickly.  Vitals stable aside from oxygen at 78% on room air.  Oxygen applied and titrated to 4 L to maintain sats above 90%.  Afternoon oxycodone held.  MD notified.

## 2024-12-24 NOTE — PROGRESS NOTES
[facility] Hospitalist Progress Note  [facility]  Admission date: 12/23/2024      Full note to follow.      Pain control adequate with current regimen.  Conservative management for pubic ramus and sacral ala fractures.    Notified of hypoxia and sleepiness.  Currently requiring 2-4L O2.   Wide awake and interacting normally.  No SOB or cough.  Reports wheeze is chronic.      Has known COPD and certainly possible we are just recognizing a chronic process.    I've advised CXR and assessment for home O2 need which is ordered.    Very annoyed about not being home for Laura yet - feels like we are lying to her to keep her in the hospital.  Explained that change in status requires further work-up. Unclear if patient is willing to pursue this - as just fixated about going home.    Lucien Shelton MD, [facility]  Internal Medicine Hospitalist      Addendum:    CXR - I do not appreciate any infiltrate.  Is hyperinflated.  Noted emphysematous changes on prior CT chest.    COPD - possible mild exacerbation with likely chronic hypoxic and hypercarbic (noted chronic compensated hypercarbia on VBGs earlier this year).  Discharge with pred 40mg x 5 days, z-pack, O2 3L at rest and with activity.    Grandson here to  and quite helpful.    Oxygen Documentation  I certify that this patient, Jenn Barclay has been under my care (or a nurse practitioner or physican's assistant working with me). This is the face-to-face encounter for oxygen medical necessity.      At the time of this encounter, I have reviewed the qualifying testing and have determined that supplemental oxygen is reasonable and necessary and is expected to improve the patient's condition in a home setting.         Patient has continued oxygen desaturation due to COPD J44.9.    If portability is ordered, is the patient mobile within the home? yes    Was this visit performed as a telehealth visit: No        Lucien Shelton MD, [facility]  Internal Medicine  Hospitalist  12/24/2024
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
## 2024-12-24 NOTE — PLAN OF CARE
Goal Outcome Evaluation:      Problem: Adult Inpatient Plan of Care  Goal: Absence of Hospital-Acquired Illness or Injury  Intervention: Prevent Skin Injury  Recent Flowsheet Documentation  Taken 12/24/2024 1042 by Sharyn Dawn RN  Body Position: position changed independently  Taken 12/24/2024 0800 by Sharyn Dawn RN  Skin Protection:   adhesive use limited   silicone foam dressing in place     Problem: Adult Inpatient Plan of Care  Goal: Optimal Comfort and Wellbeing  Intervention: Monitor Pain and Promote Comfort  Recent Flowsheet Documentation  Taken 12/24/2024 1042 by Sharyn Dawn RN  Pain Management Interventions:   medication (see MAR)   distraction   diversional activity provided   emotional support   pillow support provided   repositioned   rest     Problem: Orthopaedic Fracture  Goal: Optimal Functional Ability  Intervention: Optimize Functional Ability  Recent Flowsheet Documentation  Taken 12/24/2024 1042 by Sharyn Dawn RN  Activity Management:   activity adjusted per tolerance   activity encouraged   up to bedside commode   back to bed  Positioning/Transfer Devices:   pillows   in use     Patient is pleasant, cooperative, alert and oriented x 4.  Pain to left hip and back relieved with scheduled 10 mg oxycodone. Declined ice packs.  Up with standby assist to the bedside commode to void.  Weaned off oxygen.  Xray of left knee negative for fracture. Cleared by ortho and surgery teams.  OT saw and awaiting PT consult to be completed as patient is anxious to be discharged.  Continue with plan of care.
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
## 2024-12-26 ENCOUNTER — PATIENT OUTREACH (OUTPATIENT)
Dept: CARE COORDINATION | Facility: CLINIC | Age: 69
End: 2024-12-26
Payer: COMMERCIAL
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
## 2024-12-26 NOTE — PROGRESS NOTES
[facility]/Voicemail    Clinical Data: Care Coordinator Outreach    Outreach Documentation Number of Outreach Attempt   12/26/2024  12:47 PM 1       Left message on patient's voicemail with call back information and requested return call.      Plan: Care Coordinator will try to reach patient again in 1-2 business days.    Sarah Harrison,   [facility]  [phone]

## 2025-01-07 ENCOUNTER — OFFICE VISIT (OUTPATIENT)
Dept: INTERNAL MEDICINE | Facility: CLINIC | Age: 70
End: 2025-01-07
Payer: COMMERCIAL

## 2025-01-07 VITALS
BODY MASS INDEX: 14.94 KG/M2 | OXYGEN SATURATION: 96 % | HEART RATE: 98 BPM | TEMPERATURE: 98.1 F | SYSTOLIC BLOOD PRESSURE: 126 MMHG | DIASTOLIC BLOOD PRESSURE: 78 MMHG | HEIGHT: 66 IN | RESPIRATION RATE: 16 BRPM | WEIGHT: 93 LBS

## 2025-01-07 DIAGNOSIS — Z09 HOSPITAL DISCHARGE FOLLOW-UP: ICD-10-CM

## 2025-01-07 DIAGNOSIS — M81.0 AGE-RELATED OSTEOPOROSIS WITHOUT CURRENT PATHOLOGICAL FRACTURE: ICD-10-CM

## 2025-01-07 DIAGNOSIS — G89.29 CHRONIC BILATERAL LOW BACK PAIN, UNSPECIFIED WHETHER SCIATICA PRESENT: ICD-10-CM

## 2025-01-07 DIAGNOSIS — Z94.81 STATUS POST ALLOGENEIC BONE MARROW TRANSPLANT (H): ICD-10-CM

## 2025-01-07 DIAGNOSIS — J44.9 CHRONIC OBSTRUCTIVE PULMONARY DISEASE, UNSPECIFIED COPD TYPE (H): ICD-10-CM

## 2025-01-07 DIAGNOSIS — S32.502A CLOSED NONDISPLACED FRACTURE OF LEFT PUBIS, INITIAL ENCOUNTER (H): Primary | ICD-10-CM

## 2025-01-07 DIAGNOSIS — M54.50 CHRONIC BILATERAL LOW BACK PAIN, UNSPECIFIED WHETHER SCIATICA PRESENT: ICD-10-CM

## 2025-01-07 PROCEDURE — G2211 COMPLEX E/M VISIT ADD ON: HCPCS | Performed by: INTERNAL MEDICINE

## 2025-01-07 PROCEDURE — 99214 OFFICE O/P EST MOD 30 MIN: CPT | Performed by: INTERNAL MEDICINE

## 2025-01-07 RX ORDER — OXYCODONE HYDROCHLORIDE 10 MG/1
10 TABLET ORAL
Qty: 150 TABLET | Refills: 0 | Status: SHIPPED | OUTPATIENT
Start: 2025-01-08 | End: 2025-02-07

## 2025-01-07 ASSESSMENT — PAIN SCALES - GENERAL: PAINLEVEL_OUTOF10: EXTREME PAIN (9)

## 2025-01-07 ASSESSMENT — PATIENT HEALTH QUESTIONNAIRE - PHQ9
SUM OF ALL RESPONSES TO PHQ QUESTIONS 1-9: 8
SUM OF ALL RESPONSES TO PHQ QUESTIONS 1-9: 8
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
## 2025-01-08 NOTE — PATIENT INSTRUCTIONS
Future Appointments   Date Time Provider Department Center   1/30/2025 12:00 PM [facility]

## 2025-02-10 ENCOUNTER — TELEPHONE (OUTPATIENT)
Dept: INTERNAL MEDICINE | Facility: CLINIC | Age: 70
End: 2025-02-10
Payer: COMMERCIAL

## 2025-02-10 DIAGNOSIS — G89.29 CHRONIC BILATERAL LOW BACK PAIN, UNSPECIFIED WHETHER SCIATICA PRESENT: ICD-10-CM

## 2025-02-10 DIAGNOSIS — M54.50 CHRONIC BILATERAL LOW BACK PAIN, UNSPECIFIED WHETHER SCIATICA PRESENT: ICD-10-CM

## 2025-02-10 RX ORDER — OXYCODONE HYDROCHLORIDE 10 MG/1
10 TABLET ORAL 4 TIMES DAILY
Qty: 120 TABLET | Refills: 0 | Status: SHIPPED | OUTPATIENT
Start: 2025-02-14 | End: 2025-03-16

## 2025-02-10 NOTE — TELEPHONE ENCOUNTER
Pt calling, she is stating that Bean only gave her 7 days worth of her pain medication oxyCODONE IR (ROXICODONE) 10 MG tablet     Pt stated that her PCP will need to call in the rest of her meds. 120 tablets were sent in to pharmacy on 02/07

## 2025-02-25 DIAGNOSIS — J44.9 CHRONIC OBSTRUCTIVE PULMONARY DISEASE, UNSPECIFIED COPD TYPE (H): ICD-10-CM

## 2025-02-26 RX ORDER — IPRATROPIUM BROMIDE AND ALBUTEROL SULFATE 2.5; .5 MG/3ML; MG/3ML
SOLUTION RESPIRATORY (INHALATION)
Qty: 90 ML | Refills: 2 | Status: SHIPPED | OUTPATIENT
Start: 2025-02-26

## 2025-03-05 ENCOUNTER — TELEPHONE (OUTPATIENT)
Dept: INTERNAL MEDICINE | Facility: CLINIC | Age: 70
End: 2025-03-05
Payer: COMMERCIAL

## 2025-03-05 DIAGNOSIS — M54.50 CHRONIC BILATERAL LOW BACK PAIN, UNSPECIFIED WHETHER SCIATICA PRESENT: ICD-10-CM

## 2025-03-05 DIAGNOSIS — G89.29 CHRONIC BILATERAL LOW BACK PAIN, UNSPECIFIED WHETHER SCIATICA PRESENT: ICD-10-CM

## 2025-03-05 RX ORDER — OXYCODONE HYDROCHLORIDE 10 MG/1
10 TABLET ORAL 4 TIMES DAILY
Qty: 120 TABLET | Refills: 0 | Status: SHIPPED | OUTPATIENT
Start: 2025-03-16 | End: 2025-04-15

## 2025-03-05 NOTE — TELEPHONE ENCOUNTER
Medication Question or Refill    Contacts       Contact Date/Time Type Contact Phone/Fax    03/05/2025 09:06 AM CST Phone (Incoming) Jenn Barclay (Self) [phone] (M)            What medication are you calling about (include dose and sig)?: OXYCODONE 125/10MG    Preferred Pharmacy:   [id number] - [place], [place] - [address]  [address]  [facility] [address]  Phone: [phone] Fax: [phone]    [facility] #[id number] [place], [place] - [address] AT [facility] [address]  [address]  [facility] [address]  Phone: [phone] Fax: [phone]      Controlled Substance Agreement on file:   CSA -- Patient Level:     [Media Unavailable] Controlled Substance Agreement - Opioid - Scan on 12/13/2024  1:38 PM   [Media Unavailable] Controlled Substance Agreement - Opioid - Scan on 1/9/2024 10:15 AM   [Media Unavailable] Controlled Substance Agreement - Opioid - Scan on 1/9/2023  9:02 AM   [Media Unavailable] Controlled Substance Agreement - Opioid - Scan on 1/3/2022  1:31 PM   [Media Unavailable] Controlled Substance Agreement - Opioid - Scan on 12/18/2019   [Media Unavailable] Controlled Substance Agreement - Opioid - Scan on 12/28/2018   [Media Unavailable] Controlled Substance Agreement - Opioid - Scan on 8/9/2017: [facility]       Who prescribed the medication?:     Do you need a refill? Yes    When did you use the medication last? 03/05/2025    Patient offered an appointment? No    Do you have any questions or concerns?  No      Okay to leave a detailed message?: Yes at Cell number on file:    Telephone Information:   Mobile [phone]

## 2025-03-13 ENCOUNTER — APPOINTMENT (OUTPATIENT)
Dept: RADIOLOGY | Facility: HOSPITAL | Age: 70
DRG: 480 | End: 2025-03-13
Attending: EMERGENCY MEDICINE
Payer: COMMERCIAL

## 2025-03-13 ENCOUNTER — ANESTHESIA EVENT (OUTPATIENT)
Dept: SURGERY | Facility: HOSPITAL | Age: 70
End: 2025-03-13
Payer: COMMERCIAL

## 2025-03-13 ENCOUNTER — APPOINTMENT (OUTPATIENT)
Dept: RADIOLOGY | Facility: HOSPITAL | Age: 70
DRG: 480 | End: 2025-03-13
Attending: STUDENT IN AN ORGANIZED HEALTH CARE EDUCATION/TRAINING PROGRAM
Payer: COMMERCIAL

## 2025-03-13 ENCOUNTER — APPOINTMENT (OUTPATIENT)
Dept: CT IMAGING | Facility: HOSPITAL | Age: 70
DRG: 480 | End: 2025-03-13
Attending: PHYSICIAN ASSISTANT
Payer: COMMERCIAL

## 2025-03-13 ENCOUNTER — HOSPITAL ENCOUNTER (INPATIENT)
Facility: HOSPITAL | Age: 70
DRG: 480 | End: 2025-03-13
Attending: EMERGENCY MEDICINE | Admitting: INTERNAL MEDICINE
Payer: COMMERCIAL

## 2025-03-13 ENCOUNTER — ANESTHESIA (OUTPATIENT)
Dept: SURGERY | Facility: HOSPITAL | Age: 70
End: 2025-03-13
Payer: COMMERCIAL
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
## 2025-03-13 DIAGNOSIS — Z72.0 TOBACCO ABUSE: Primary | Chronic | ICD-10-CM

## 2025-03-13 DIAGNOSIS — S72.141A CLOSED DISPLACED INTERTROCHANTERIC FRACTURE OF RIGHT FEMUR, INITIAL ENCOUNTER (H): ICD-10-CM

## 2025-03-13 LAB
ABO/RH TYPE: NORMAL
ANION GAP SERPL CALCULATED.3IONS-SCNC: 1 MMOL/L (ref 7–15)
BASOPHILS # BLD AUTO: 0.1 10E3/UL (ref 0–0.2)
BASOPHILS NFR BLD AUTO: 1 %
BLD GP AB SCN SERPL QL: NEGATIVE
BUN SERPL-MCNC: 6.1 MG/DL (ref 8–23)
CALCIUM SERPL-MCNC: 8.2 MG/DL (ref 8.8–10.4)
CHLORIDE SERPL-SCNC: 108 MMOL/L (ref 98–107)
CREAT SERPL-MCNC: 0.61 MG/DL (ref 0.51–0.95)
EGFRCR SERPLBLD CKD-EPI 2021: >90 ML/MIN/1.73M2
EOSINOPHIL # BLD AUTO: 0.1 10E3/UL (ref 0–0.7)
EOSINOPHIL NFR BLD AUTO: 2 %
ERYTHROCYTE [DISTWIDTH] IN BLOOD BY AUTOMATED COUNT: 13.2 % (ref 10–15)
GLUCOSE SERPL-MCNC: 94 MG/DL (ref 70–99)
HCO3 SERPL-SCNC: 34 MMOL/L (ref 22–29)
HCT VFR BLD AUTO: 32.1 % (ref 35–47)
HGB BLD-MCNC: 10.4 G/DL (ref 11.7–15.7)
IMM GRANULOCYTES # BLD: 0.1 10E3/UL
IMM GRANULOCYTES NFR BLD: 1 %
INR PPP: 1.05 (ref 0.85–1.15)
LYMPHOCYTES # BLD AUTO: 1.8 10E3/UL (ref 0.8–5.3)
LYMPHOCYTES NFR BLD AUTO: 22 %
MCH RBC QN AUTO: 29.1 PG (ref 26.5–33)
MCHC RBC AUTO-ENTMCNC: 32.4 G/DL (ref 31.5–36.5)
MCV RBC AUTO: 90 FL (ref 78–100)
MONOCYTES # BLD AUTO: 0.8 10E3/UL (ref 0–1.3)
MONOCYTES NFR BLD AUTO: 10 %
NEUTROPHILS # BLD AUTO: 5.4 10E3/UL (ref 1.6–8.3)
NEUTROPHILS NFR BLD AUTO: 66 %
NRBC # BLD AUTO: 0 10E3/UL
NRBC BLD AUTO-RTO: 0 /100
PLATELET # BLD AUTO: 218 10E3/UL (ref 150–450)
POTASSIUM SERPL-SCNC: 3.4 MMOL/L (ref 3.4–5.3)
RBC # BLD AUTO: 3.58 10E6/UL (ref 3.8–5.2)
SODIUM SERPL-SCNC: 143 MMOL/L (ref 135–145)
SPECIMEN EXP DATE BLD: NORMAL
SPECIMEN EXP DATE BLD: NORMAL
WBC # BLD AUTO: 8.3 10E3/UL (ref 4–11)

## 2025-03-13 PROCEDURE — 370N000017 HC ANESTHESIA TECHNICAL FEE, PER MIN: Performed by: STUDENT IN AN ORGANIZED HEALTH CARE EDUCATION/TRAINING PROGRAM

## 2025-03-13 PROCEDURE — C1769 GUIDE WIRE: HCPCS | Performed by: STUDENT IN AN ORGANIZED HEALTH CARE EDUCATION/TRAINING PROGRAM

## 2025-03-13 PROCEDURE — 73502 X-RAY EXAM HIP UNI 2-3 VIEWS: CPT

## 2025-03-13 PROCEDURE — 73030 X-RAY EXAM OF SHOULDER: CPT | Mod: LT

## 2025-03-13 PROCEDURE — P9045 ALBUMIN (HUMAN), 5%, 250 ML: HCPCS | Performed by: NURSE ANESTHETIST, CERTIFIED REGISTERED

## 2025-03-13 PROCEDURE — 250N000025 HC SEVOFLURANE, PER MIN: Performed by: STUDENT IN AN ORGANIZED HEALTH CARE EDUCATION/TRAINING PROGRAM

## 2025-03-13 PROCEDURE — 85004 AUTOMATED DIFF WBC COUNT: CPT | Performed by: EMERGENCY MEDICINE

## 2025-03-13 PROCEDURE — 99285 EMERGENCY DEPT VISIT HI MDM: CPT | Mod: 25

## 2025-03-13 PROCEDURE — 250N000011 HC RX IP 250 OP 636: Performed by: PAIN MEDICINE

## 2025-03-13 PROCEDURE — 250N000011 HC RX IP 250 OP 636: Mod: JZ | Performed by: INTERNAL MEDICINE

## 2025-03-13 PROCEDURE — 250N000013 HC RX MED GY IP 250 OP 250 PS 637: Performed by: INTERNAL MEDICINE

## 2025-03-13 PROCEDURE — 258N000003 HC RX IP 258 OP 636: Performed by: PAIN MEDICINE

## 2025-03-13 PROCEDURE — 80048 BASIC METABOLIC PNL TOTAL CA: CPT | Performed by: EMERGENCY MEDICINE

## 2025-03-13 PROCEDURE — 258N000003 HC RX IP 258 OP 636: Performed by: NURSE ANESTHETIST, CERTIFIED REGISTERED

## 2025-03-13 PROCEDURE — 36415 COLL VENOUS BLD VENIPUNCTURE: CPT | Performed by: EMERGENCY MEDICINE

## 2025-03-13 PROCEDURE — 85610 PROTHROMBIN TIME: CPT | Performed by: EMERGENCY MEDICINE

## 2025-03-13 PROCEDURE — 999N000065 XR PELVIS AND HIP RIGHT 1 VIEW

## 2025-03-13 PROCEDURE — 250N000011 HC RX IP 250 OP 636: Performed by: NURSE ANESTHETIST, CERTIFIED REGISTERED

## 2025-03-13 PROCEDURE — 360N000077 HC SURGERY LEVEL 4, PER MIN: Performed by: STUDENT IN AN ORGANIZED HEALTH CARE EDUCATION/TRAINING PROGRAM

## 2025-03-13 PROCEDURE — 86900 BLOOD TYPING SEROLOGIC ABO: CPT | Performed by: PHYSICIAN ASSISTANT

## 2025-03-13 PROCEDURE — 0QS606Z REPOSITION RIGHT UPPER FEMUR WITH INTRAMEDULLARY INTERNAL FIXATION DEVICE, OPEN APPROACH: ICD-10-PCS | Performed by: STUDENT IN AN ORGANIZED HEALTH CARE EDUCATION/TRAINING PROGRAM

## 2025-03-13 PROCEDURE — 0QS604Z REPOSITION RIGHT UPPER FEMUR WITH INTERNAL FIXATION DEVICE, OPEN APPROACH: ICD-10-PCS | Performed by: STUDENT IN AN ORGANIZED HEALTH CARE EDUCATION/TRAINING PROGRAM

## 2025-03-13 PROCEDURE — 250N000011 HC RX IP 250 OP 636: Mod: JZ | Performed by: EMERGENCY MEDICINE

## 2025-03-13 PROCEDURE — 999N000180 XR SURGERY CARM FLUORO LESS THAN 5 MIN

## 2025-03-13 PROCEDURE — 272N000001 HC OR GENERAL SUPPLY STERILE: Performed by: STUDENT IN AN ORGANIZED HEALTH CARE EDUCATION/TRAINING PROGRAM

## 2025-03-13 PROCEDURE — 86850 RBC ANTIBODY SCREEN: CPT | Performed by: PHYSICIAN ASSISTANT

## 2025-03-13 PROCEDURE — 73552 X-RAY EXAM OF FEMUR 2/>: CPT | Mod: RT

## 2025-03-13 PROCEDURE — 120N000001 HC R&B MED SURG/OB

## 2025-03-13 PROCEDURE — 82374 ASSAY BLOOD CARBON DIOXIDE: CPT | Performed by: EMERGENCY MEDICINE

## 2025-03-13 PROCEDURE — 96374 THER/PROPH/DIAG INJ IV PUSH: CPT

## 2025-03-13 PROCEDURE — 88311 DECALCIFY TISSUE: CPT | Mod: TC | Performed by: STUDENT IN AN ORGANIZED HEALTH CARE EDUCATION/TRAINING PROGRAM

## 2025-03-13 PROCEDURE — 710N000010 HC RECOVERY PHASE 1, LEVEL 2, PER MIN: Performed by: STUDENT IN AN ORGANIZED HEALTH CARE EDUCATION/TRAINING PROGRAM

## 2025-03-13 PROCEDURE — 999N000141 HC STATISTIC PRE-PROCEDURE NURSING ASSESSMENT: Performed by: STUDENT IN AN ORGANIZED HEALTH CARE EDUCATION/TRAINING PROGRAM

## 2025-03-13 PROCEDURE — 71045 X-RAY EXAM CHEST 1 VIEW: CPT

## 2025-03-13 PROCEDURE — 73700 CT LOWER EXTREMITY W/O DYE: CPT | Mod: RT

## 2025-03-13 PROCEDURE — C1713 ANCHOR/SCREW BN/BN,TIS/BN: HCPCS | Performed by: STUDENT IN AN ORGANIZED HEALTH CARE EDUCATION/TRAINING PROGRAM

## 2025-03-13 PROCEDURE — 250N000009 HC RX 250: Performed by: NURSE ANESTHETIST, CERTIFIED REGISTERED

## 2025-03-13 PROCEDURE — 99223 1ST HOSP IP/OBS HIGH 75: CPT | Performed by: INTERNAL MEDICINE

## 2025-03-13 PROCEDURE — 76942 ECHO GUIDE FOR BIOPSY: CPT

## 2025-03-13 DEVICE — IMP SCR SYN TFNA FENESTRATED LAG 95MM 04.038.195S: Type: IMPLANTABLE DEVICE | Site: HIP | Status: FUNCTIONAL

## 2025-03-13 DEVICE — IMP NAIL SYN CAN FEM PROX TFNA 11X170MM 125D 04.037.112S: Type: IMPLANTABLE DEVICE | Site: HIP | Status: FUNCTIONAL

## 2025-03-13 DEVICE — IMPLANTABLE DEVICE: Type: IMPLANTABLE DEVICE | Site: HIP | Status: FUNCTIONAL

## 2025-03-13 RX ORDER — NICOTINE 21 MG/24HR
1 PATCH, TRANSDERMAL 24 HOURS TRANSDERMAL DAILY PRN
Status: DISCONTINUED | OUTPATIENT
Start: 2025-03-13 | End: 2025-03-18 | Stop reason: HOSPADM

## 2025-03-13 RX ORDER — HYDROMORPHONE HCL IN WATER/PF 6 MG/30 ML
0.4 PATIENT CONTROLLED ANALGESIA SYRINGE INTRAVENOUS EVERY 5 MIN PRN
Status: DISCONTINUED | OUTPATIENT
Start: 2025-03-13 | End: 2025-03-13 | Stop reason: HOSPADM

## 2025-03-13 RX ORDER — POLYETHYLENE GLYCOL 3350 17 G/17G
17 POWDER, FOR SOLUTION ORAL 2 TIMES DAILY PRN
Status: DISCONTINUED | OUTPATIENT
Start: 2025-03-13 | End: 2025-03-18 | Stop reason: HOSPADM

## 2025-03-13 RX ORDER — ONDANSETRON 2 MG/ML
4 INJECTION INTRAMUSCULAR; INTRAVENOUS EVERY 30 MIN PRN
Status: DISCONTINUED | OUTPATIENT
Start: 2025-03-13 | End: 2025-03-13 | Stop reason: HOSPADM

## 2025-03-13 RX ORDER — DEXAMETHASONE SODIUM PHOSPHATE 4 MG/ML
4 INJECTION, SOLUTION INTRA-ARTICULAR; INTRALESIONAL; INTRAMUSCULAR; INTRAVENOUS; SOFT TISSUE
Status: DISCONTINUED | OUTPATIENT
Start: 2025-03-13 | End: 2025-03-13 | Stop reason: HOSPADM

## 2025-03-13 RX ORDER — SODIUM CHLORIDE, SODIUM LACTATE, POTASSIUM CHLORIDE, CALCIUM CHLORIDE 600; 310; 30; 20 MG/100ML; MG/100ML; MG/100ML; MG/100ML
INJECTION, SOLUTION INTRAVENOUS CONTINUOUS
Status: DISCONTINUED | OUTPATIENT
Start: 2025-03-13 | End: 2025-03-17

## 2025-03-13 RX ORDER — ACETAMINOPHEN 325 MG/1
650 TABLET ORAL EVERY 4 HOURS PRN
Status: DISCONTINUED | OUTPATIENT
Start: 2025-03-13 | End: 2025-03-18 | Stop reason: HOSPADM

## 2025-03-13 RX ORDER — TRANEXAMIC ACID 100 MG/ML
INJECTION, SOLUTION INTRAVENOUS
Status: DISCONTINUED
Start: 2025-03-13 | End: 2025-03-13 | Stop reason: HOSPADM

## 2025-03-13 RX ORDER — NALOXONE HYDROCHLORIDE 0.4 MG/ML
0.2 INJECTION, SOLUTION INTRAMUSCULAR; INTRAVENOUS; SUBCUTANEOUS
Status: DISCONTINUED | OUTPATIENT
Start: 2025-03-13 | End: 2025-03-18 | Stop reason: HOSPADM

## 2025-03-13 RX ORDER — ALBUTEROL SULFATE 0.83 MG/ML
2.5 SOLUTION RESPIRATORY (INHALATION)
Status: DISCONTINUED | OUTPATIENT
Start: 2025-03-13 | End: 2025-03-18 | Stop reason: HOSPADM

## 2025-03-13 RX ORDER — EPHEDRINE SULFATE 50 MG/ML
INJECTION, SOLUTION INTRAMUSCULAR; INTRAVENOUS; SUBCUTANEOUS PRN
Status: DISCONTINUED | OUTPATIENT
Start: 2025-03-13 | End: 2025-03-13

## 2025-03-13 RX ORDER — ONDANSETRON 4 MG/1
4 TABLET, ORALLY DISINTEGRATING ORAL EVERY 30 MIN PRN
Status: DISCONTINUED | OUTPATIENT
Start: 2025-03-13 | End: 2025-03-13 | Stop reason: HOSPADM

## 2025-03-13 RX ORDER — ACETAMINOPHEN 650 MG/1
650 SUPPOSITORY RECTAL EVERY 4 HOURS PRN
Status: DISCONTINUED | OUTPATIENT
Start: 2025-03-13 | End: 2025-03-18 | Stop reason: HOSPADM

## 2025-03-13 RX ORDER — ACETAMINOPHEN 325 MG/1
975 TABLET ORAL 3 TIMES DAILY
Status: DISCONTINUED | OUTPATIENT
Start: 2025-03-13 | End: 2025-03-18 | Stop reason: HOSPADM

## 2025-03-13 RX ORDER — HYDROMORPHONE HCL IN WATER/PF 6 MG/30 ML
0.2 PATIENT CONTROLLED ANALGESIA SYRINGE INTRAVENOUS EVERY 5 MIN PRN
Status: DISCONTINUED | OUTPATIENT
Start: 2025-03-13 | End: 2025-03-13 | Stop reason: HOSPADM

## 2025-03-13 RX ORDER — IPRATROPIUM BROMIDE AND ALBUTEROL SULFATE 2.5; .5 MG/3ML; MG/3ML
3 SOLUTION RESPIRATORY (INHALATION) EVERY 4 HOURS PRN
Status: DISCONTINUED | OUTPATIENT
Start: 2025-03-13 | End: 2025-03-18 | Stop reason: HOSPADM

## 2025-03-13 RX ORDER — SODIUM CHLORIDE 9 MG/ML
INJECTION, SOLUTION INTRAVENOUS CONTINUOUS PRN
Status: DISCONTINUED | OUTPATIENT
Start: 2025-03-13 | End: 2025-03-13

## 2025-03-13 RX ORDER — PROCHLORPERAZINE MALEATE 5 MG/1
5 TABLET ORAL EVERY 6 HOURS PRN
Status: DISCONTINUED | OUTPATIENT
Start: 2025-03-13 | End: 2025-03-18 | Stop reason: HOSPADM

## 2025-03-13 RX ORDER — OXYCODONE HYDROCHLORIDE 5 MG/1
5-10 TABLET ORAL EVERY 4 HOURS PRN
Status: DISCONTINUED | OUTPATIENT
Start: 2025-03-13 | End: 2025-03-18 | Stop reason: HOSPADM

## 2025-03-13 RX ORDER — NALOXONE HYDROCHLORIDE 0.4 MG/ML
0.4 INJECTION, SOLUTION INTRAMUSCULAR; INTRAVENOUS; SUBCUTANEOUS
Status: DISCONTINUED | OUTPATIENT
Start: 2025-03-13 | End: 2025-03-18 | Stop reason: HOSPADM

## 2025-03-13 RX ORDER — LIDOCAINE HYDROCHLORIDE 10 MG/ML
INJECTION, SOLUTION INFILTRATION; PERINEURAL PRN
Status: DISCONTINUED | OUTPATIENT
Start: 2025-03-13 | End: 2025-03-13

## 2025-03-13 RX ORDER — LIDOCAINE 40 MG/G
CREAM TOPICAL
Status: DISCONTINUED | OUTPATIENT
Start: 2025-03-13 | End: 2025-03-13 | Stop reason: HOSPADM

## 2025-03-13 RX ORDER — LIDOCAINE 40 MG/G
CREAM TOPICAL
Status: DISCONTINUED | OUTPATIENT
Start: 2025-03-13 | End: 2025-03-14

## 2025-03-13 RX ORDER — NALOXONE HYDROCHLORIDE 0.4 MG/ML
0.1 INJECTION, SOLUTION INTRAMUSCULAR; INTRAVENOUS; SUBCUTANEOUS
Status: DISCONTINUED | OUTPATIENT
Start: 2025-03-13 | End: 2025-03-13 | Stop reason: HOSPADM

## 2025-03-13 RX ORDER — FENTANYL CITRATE 50 UG/ML
25 INJECTION, SOLUTION INTRAMUSCULAR; INTRAVENOUS
Status: COMPLETED | OUTPATIENT
Start: 2025-03-13 | End: 2025-03-13

## 2025-03-13 RX ORDER — PROPOFOL 10 MG/ML
INJECTION, EMULSION INTRAVENOUS PRN
Status: DISCONTINUED | OUTPATIENT
Start: 2025-03-13 | End: 2025-03-13

## 2025-03-13 RX ORDER — OXYCODONE HYDROCHLORIDE 5 MG/1
10 TABLET ORAL
Status: DISCONTINUED | OUTPATIENT
Start: 2025-03-13 | End: 2025-03-13 | Stop reason: HOSPADM

## 2025-03-13 RX ORDER — HYDROMORPHONE HCL IN WATER/PF 6 MG/30 ML
.2-.4 PATIENT CONTROLLED ANALGESIA SYRINGE INTRAVENOUS
Status: DISCONTINUED | OUTPATIENT
Start: 2025-03-13 | End: 2025-03-14

## 2025-03-13 RX ORDER — BETAMETHASONE DIPROPIONATE 0.5 MG/G
CREAM TOPICAL 2 TIMES DAILY
Status: DISCONTINUED | OUTPATIENT
Start: 2025-03-13 | End: 2025-03-13

## 2025-03-13 RX ORDER — FENTANYL CITRATE 50 UG/ML
50 INJECTION, SOLUTION INTRAMUSCULAR; INTRAVENOUS EVERY 5 MIN PRN
Status: DISCONTINUED | OUTPATIENT
Start: 2025-03-13 | End: 2025-03-13 | Stop reason: HOSPADM

## 2025-03-13 RX ORDER — SODIUM CHLORIDE, SODIUM LACTATE, POTASSIUM CHLORIDE, CALCIUM CHLORIDE 600; 310; 30; 20 MG/100ML; MG/100ML; MG/100ML; MG/100ML
INJECTION, SOLUTION INTRAVENOUS CONTINUOUS
Status: DISCONTINUED | OUTPATIENT
Start: 2025-03-13 | End: 2025-03-13 | Stop reason: HOSPADM

## 2025-03-13 RX ORDER — ONDANSETRON 2 MG/ML
4 INJECTION INTRAMUSCULAR; INTRAVENOUS EVERY 6 HOURS PRN
Status: DISCONTINUED | OUTPATIENT
Start: 2025-03-13 | End: 2025-03-18 | Stop reason: HOSPADM

## 2025-03-13 RX ORDER — ONDANSETRON 2 MG/ML
INJECTION INTRAMUSCULAR; INTRAVENOUS PRN
Status: DISCONTINUED | OUTPATIENT
Start: 2025-03-13 | End: 2025-03-13

## 2025-03-13 RX ORDER — OXYCODONE HYDROCHLORIDE 5 MG/1
5 TABLET ORAL
Status: DISCONTINUED | OUTPATIENT
Start: 2025-03-13 | End: 2025-03-13 | Stop reason: HOSPADM

## 2025-03-13 RX ORDER — PLANT STANOL ESTER 450 MG
2.5 TABLET ORAL DAILY
Status: DISCONTINUED | OUTPATIENT
Start: 2025-03-14 | End: 2025-03-18 | Stop reason: HOSPADM

## 2025-03-13 RX ORDER — HYDROMORPHONE HYDROCHLORIDE 1 MG/ML
0.5 INJECTION, SOLUTION INTRAMUSCULAR; INTRAVENOUS; SUBCUTANEOUS
Status: DISCONTINUED | OUTPATIENT
Start: 2025-03-13 | End: 2025-03-13

## 2025-03-13 RX ORDER — HYDRALAZINE HYDROCHLORIDE 20 MG/ML
10 INJECTION INTRAMUSCULAR; INTRAVENOUS EVERY 4 HOURS PRN
Status: DISCONTINUED | OUTPATIENT
Start: 2025-03-13 | End: 2025-03-18 | Stop reason: HOSPADM

## 2025-03-13 RX ORDER — CEFAZOLIN SODIUM/WATER 2 G/20 ML
2 SYRINGE (ML) INTRAVENOUS SEE ADMIN INSTRUCTIONS
Status: DISCONTINUED | OUTPATIENT
Start: 2025-03-13 | End: 2025-03-13

## 2025-03-13 RX ORDER — ONDANSETRON 4 MG/1
4 TABLET, ORALLY DISINTEGRATING ORAL EVERY 6 HOURS PRN
Status: DISCONTINUED | OUTPATIENT
Start: 2025-03-13 | End: 2025-03-18 | Stop reason: HOSPADM

## 2025-03-13 RX ORDER — ROPIVACAINE HYDROCHLORIDE 5 MG/ML
INJECTION, SOLUTION EPIDURAL; INFILTRATION; PERINEURAL
Status: COMPLETED | OUTPATIENT
Start: 2025-03-13 | End: 2025-03-13

## 2025-03-13 RX ORDER — AMOXICILLIN 250 MG
2 CAPSULE ORAL 2 TIMES DAILY PRN
Status: DISCONTINUED | OUTPATIENT
Start: 2025-03-13 | End: 2025-03-18 | Stop reason: HOSPADM

## 2025-03-13 RX ORDER — CALCIUM CARBONATE 500 MG/1
1000 TABLET, CHEWABLE ORAL 4 TIMES DAILY PRN
Status: DISCONTINUED | OUTPATIENT
Start: 2025-03-13 | End: 2025-03-18 | Stop reason: HOSPADM

## 2025-03-13 RX ORDER — AMOXICILLIN 250 MG
1 CAPSULE ORAL 2 TIMES DAILY PRN
Status: DISCONTINUED | OUTPATIENT
Start: 2025-03-13 | End: 2025-03-18 | Stop reason: HOSPADM

## 2025-03-13 RX ORDER — FENTANYL CITRATE 50 UG/ML
25 INJECTION, SOLUTION INTRAMUSCULAR; INTRAVENOUS EVERY 5 MIN PRN
Status: DISCONTINUED | OUTPATIENT
Start: 2025-03-13 | End: 2025-03-13 | Stop reason: HOSPADM

## 2025-03-13 RX ORDER — CEFAZOLIN SODIUM 1 G/3ML
INJECTION, POWDER, FOR SOLUTION INTRAMUSCULAR; INTRAVENOUS PRN
Status: DISCONTINUED | OUTPATIENT
Start: 2025-03-13 | End: 2025-03-13

## 2025-03-13 RX ORDER — VITAMIN B COMPLEX
25 TABLET ORAL DAILY
Status: DISCONTINUED | OUTPATIENT
Start: 2025-03-14 | End: 2025-03-18 | Stop reason: HOSPADM

## 2025-03-13 RX ORDER — CEFAZOLIN SODIUM/WATER 2 G/20 ML
2 SYRINGE (ML) INTRAVENOUS
Status: DISCONTINUED | OUTPATIENT
Start: 2025-03-13 | End: 2025-03-13

## 2025-03-13 RX ORDER — TRANEXAMIC ACID 650 MG/1
1950 TABLET ORAL ONCE
Status: DISCONTINUED | OUTPATIENT
Start: 2025-03-13 | End: 2025-03-13

## 2025-03-13 RX ORDER — DEXAMETHASONE SODIUM PHOSPHATE 10 MG/ML
INJECTION, SOLUTION INTRAMUSCULAR; INTRAVENOUS PRN
Status: DISCONTINUED | OUTPATIENT
Start: 2025-03-13 | End: 2025-03-13

## 2025-03-13 RX ADMIN — Medication 10 MG: at 20:28

## 2025-03-13 RX ADMIN — HYDROMORPHONE HYDROCHLORIDE 0.4 MG: 0.2 INJECTION, SOLUTION INTRAMUSCULAR; INTRAVENOUS; SUBCUTANEOUS at 22:26

## 2025-03-13 RX ADMIN — MIDAZOLAM HYDROCHLORIDE 2 MG: 1 INJECTION, SOLUTION INTRAMUSCULAR; INTRAVENOUS at 18:50

## 2025-03-13 RX ADMIN — FENTANYL CITRATE 25 MCG: 50 INJECTION, SOLUTION INTRAMUSCULAR; INTRAVENOUS at 18:48

## 2025-03-13 RX ADMIN — PHENYLEPHRINE HYDROCHLORIDE 100 MCG: 10 INJECTION INTRAVENOUS at 19:37

## 2025-03-13 RX ADMIN — SODIUM CHLORIDE, POTASSIUM CHLORIDE, SODIUM LACTATE AND CALCIUM CHLORIDE: 600; 310; 30; 20 INJECTION, SOLUTION INTRAVENOUS at 18:56

## 2025-03-13 RX ADMIN — CEFAZOLIN 2 G: 1 INJECTION, POWDER, FOR SOLUTION INTRAMUSCULAR; INTRAVENOUS at 19:44

## 2025-03-13 RX ADMIN — FENTANYL CITRATE 50 MCG: 50 INJECTION, SOLUTION INTRAMUSCULAR; INTRAVENOUS at 21:59

## 2025-03-13 RX ADMIN — OXYCODONE HYDROCHLORIDE 10 MG: 5 TABLET ORAL at 16:51

## 2025-03-13 RX ADMIN — SODIUM CHLORIDE, POTASSIUM CHLORIDE, SODIUM LACTATE AND CALCIUM CHLORIDE: 600; 310; 30; 20 INJECTION, SOLUTION INTRAVENOUS at 19:24

## 2025-03-13 RX ADMIN — HYDROMORPHONE HYDROCHLORIDE 0.4 MG: 0.2 INJECTION, SOLUTION INTRAMUSCULAR; INTRAVENOUS; SUBCUTANEOUS at 22:16

## 2025-03-13 RX ADMIN — PHENYLEPHRINE HYDROCHLORIDE 200 MCG: 10 INJECTION INTRAVENOUS at 19:34

## 2025-03-13 RX ADMIN — TRANEXAMIC ACID 1 G: 1 INJECTION, SOLUTION INTRAVENOUS at 19:44

## 2025-03-13 RX ADMIN — HYDROMORPHONE HYDROCHLORIDE 0.5 MG: 1 INJECTION, SOLUTION INTRAMUSCULAR; INTRAVENOUS; SUBCUTANEOUS at 13:38

## 2025-03-13 RX ADMIN — PHENYLEPHRINE HYDROCHLORIDE 200 MCG: 10 INJECTION INTRAVENOUS at 19:57

## 2025-03-13 RX ADMIN — PHENYLEPHRINE HYDROCHLORIDE 100 MCG: 10 INJECTION INTRAVENOUS at 20:04

## 2025-03-13 RX ADMIN — DEXAMETHASONE SODIUM PHOSPHATE 10 MG: 10 INJECTION, SOLUTION INTRAMUSCULAR; INTRAVENOUS at 20:25

## 2025-03-13 RX ADMIN — ALBUMIN HUMAN: 0.05 INJECTION, SOLUTION INTRAVENOUS at 20:35

## 2025-03-13 RX ADMIN — SUGAMMADEX 90 MG: 100 INJECTION, SOLUTION INTRAVENOUS at 20:40

## 2025-03-13 RX ADMIN — PHENYLEPHRINE HYDROCHLORIDE 200 MCG: 10 INJECTION INTRAVENOUS at 20:00

## 2025-03-13 RX ADMIN — LIDOCAINE HYDROCHLORIDE 5 ML: 10 INJECTION, SOLUTION INFILTRATION; PERINEURAL at 19:30

## 2025-03-13 RX ADMIN — ACETAMINOPHEN 975 MG: 325 TABLET ORAL at 15:37

## 2025-03-13 RX ADMIN — SODIUM CHLORIDE: 9 INJECTION, SOLUTION INTRAVENOUS at 20:05

## 2025-03-13 RX ADMIN — Medication 10 MG: at 20:04

## 2025-03-13 RX ADMIN — ROPIVACAINE HYDROCHLORIDE 20 ML: 5 INJECTION, SOLUTION EPIDURAL; INFILTRATION; PERINEURAL at 18:53

## 2025-03-13 RX ADMIN — HYDROMORPHONE HYDROCHLORIDE 0.4 MG: 0.2 INJECTION, SOLUTION INTRAMUSCULAR; INTRAVENOUS; SUBCUTANEOUS at 22:36

## 2025-03-13 RX ADMIN — Medication 5 MG: at 20:22

## 2025-03-13 RX ADMIN — HYDROMORPHONE HYDROCHLORIDE 0.4 MG: 0.2 INJECTION, SOLUTION INTRAMUSCULAR; INTRAVENOUS; SUBCUTANEOUS at 15:36

## 2025-03-13 RX ADMIN — ROCURONIUM 50 MG: 50 INJECTION, SOLUTION INTRAVENOUS at 19:31

## 2025-03-13 RX ADMIN — FENTANYL CITRATE 50 MCG: 50 INJECTION, SOLUTION INTRAMUSCULAR; INTRAVENOUS at 21:51

## 2025-03-13 RX ADMIN — PHENYLEPHRINE HYDROCHLORIDE 200 MCG: 10 INJECTION INTRAVENOUS at 19:45

## 2025-03-13 RX ADMIN — ONDANSETRON 4 MG: 2 INJECTION INTRAMUSCULAR; INTRAVENOUS at 20:25

## 2025-03-13 RX ADMIN — ALBUMIN HUMAN: 0.05 INJECTION, SOLUTION INTRAVENOUS at 19:45

## 2025-03-13 RX ADMIN — PROPOFOL 100 MG: 10 INJECTION, EMULSION INTRAVENOUS at 19:30

## 2025-03-13 ASSESSMENT — ACTIVITIES OF DAILY LIVING (ADL)
ADLS_ACUITY_SCORE: 51
DEPENDENT_IADLS:: INDEPENDENT
ADLS_ACUITY_SCORE: 51
ADLS_ACUITY_SCORE: 33
ADLS_ACUITY_SCORE: 51
ADLS_ACUITY_SCORE: 33
ADLS_ACUITY_SCORE: 51
ADLS_ACUITY_SCORE: 33

## 2025-03-13 ASSESSMENT — COPD QUESTIONNAIRES: COPD: 1

## 2025-03-13 NOTE — TREATMENT PLAN
Brief orthopedic note:  69-year-old female past medical history of tobacco use, COPD, leukemia, prior bone marrow transplant who presented with EMS after ground-level fall with right hip pain    X-ray AP pelvis, right hip-displaced right intertrochanteric femur fracture    Assessment/plan:  -Medical preoperative mentation and clearance by hospitalist team  -N.p.o.  -Preoperative labs  -Hold anticoagulation  -CT right hip, XR full length femur to rule out pathologic fracture  -Plan for right proximal femur intertrochanteric fixation tonight if medically cleared and NPO    Sidney Catalan MD  [facility]

## 2025-03-13 NOTE — ED NOTES
Bed: JNED-19  Expected date: 3/13/25  Expected time: 12:42 PM  Means of arrival:   Comments:  [facility]- fall 68 yo F fall, hip injury, shortened/rotated

## 2025-03-13 NOTE — INTERVAL H&P NOTE
"I have reviewed the surgical (or preoperative) H&P that is linked to this encounter, and examined the patient. There are no significant changes    Clinical Conditions Present on Arrival:  Clinically Significant Risk Factors Present on Admission          # Hyperchloremia: Highest Cl = 108 mmol/L in last 2 days, will monitor as appropriate                  # Cachexia: Estimated body mass index is 15.66 kg/m  as calculated from the following:    Height as of this encounter: 1.727 m (5' 8\").    Weight as of this encounter: 46.7 kg (103 lb).       "

## 2025-03-13 NOTE — ED TRIAGE NOTES
Pt arrived via [facility] from home after falling while taking dog on a walk. Dog pulled pt, fell onto R hip. Did not hit head, not on blood thinners. No LOC. BG 89. EMS gave total of 75 mcg of Fentanyl. 85% O2 on RA. Placed on 4 L. Hx of Leukemia.      Triage Assessment (Adult)       Row Name 03/13/25 1254          Triage Assessment    Airway WDL WDL        Respiratory WDL    Respiratory WDL WDL        Skin Circulation/Temperature WDL    Skin Circulation/Temperature WDL WDL        Peripheral/Neurovascular WDL    Peripheral Neurovascular WDL WDL        Cognitive/Neuro/Behavioral WDL    Cognitive/Neuro/Behavioral WDL WDL
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
## 2025-03-13 NOTE — PHARMACY-ADMISSION MEDICATION HISTORY
Pharmacist Admission Medication History    Admission medication history is complete. The information provided in this note is only as accurate as the sources available at the time of the update.    Information Source(s): Patient and CareEverywhere/SureScripts via in-person    Pertinent Information: Oxycodone - patient took 1/2 tab this morning prior to fall, but hasn't had any additional today    Changes made to PTA medication list:  Added: None  Deleted: Azithromycin, prednisone, spironolactone  Changed: None    Allergies reviewed with patient and updates made in EHR: yes    Medication History Completed By: SELMA CONCEPCION [facility] 3/13/2025 2:56 PM    PTA Med List   Medication Sig Last Dose/Taking    albuterol (PROVENTIL) (2.5 MG/3ML) 0.083% neb solution USE 1 VIAL IN NEBULIZER EVERY 6 HOURS AS NEEDED FOR WHEEZING Past Month    ascorbic acid (VITAMIN C) 500 MG tablet Take 500 mg by mouth daily. 3/13/2025 Morning    ipratropium - albuterol 0.5 mg/2.5 mg/3 mL (DUONEB) 0.5-2.5 (3) MG/3ML neb solution TAKE 1 VIAL(3ML) BY NEBULIZATION EVERY 6 HOURS AS NEEDED FOR COUGH WITH PHELGM. Past Week    multivitamin w/minerals (THERA-VIT-M) tablet Take 1 tablet by mouth daily. 3/13/2025 Morning    [START ON 3/16/2025] oxyCODONE IR (ROXICODONE) 10 MG tablet Take 1 tablet (10 mg) by mouth 4 times daily. For chronic pain. 3/13/2025 Morning    potassium gluconate 2.5 MEQ tablet Take 2.5 mEq by mouth daily. 3/13/2025 Morning    vitamin B complex with vitamin C (VITAMIN  B COMPLEX) tablet Take 1 tablet by mouth daily. 3/13/2025 Morning    Vitamin D3 (VITAMIN D, CHOLECALCIFEROL,) 25 mcg (1000 units) tablet Take 1 tablet by mouth daily. 3/13/2025 Morning

## 2025-03-13 NOTE — ED NOTES
[facility] ED Handoff Report    ED Chief Complaint: Fall    ED Diagnosis:  (W19.XXXA) Fall, initial encounter      (S72.141A) Closed displaced intertrochanteric fracture of right femur, initial encounter (H)    Plan: Primary Care - Care Coordination Referral, Case        Request: OPEN REDUCTION INTERNAL FIXATION,         FRACTURE, FEMUR, PROXIMAL      PMH:    Past Medical History:   Diagnosis Date    Acute myeloid leukemia (AML), M2 (H)     AML (acute myeloid leukemia) (H) 12/2009    Baker's cyst     Chronic back pain     Chronic diarrhea     Compression fx, lumbar spine (H)     Controlled substance agreement signed 8/3/2017    COPD (chronic obstructive pulmonary disease) (H)     COPD (chronic obstructive pulmonary disease) (H)     Full code status 9/10/2017    Gastric ulcer     pt states she has not had any ulcers    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)     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    History of PID     Metatarsal fracture 2017 2nd     Migraine without aura, without mention of intractable migraine without mention of status migrainosus     Osteoporosis 2020    DXA    Osteoporosis 5/3/2016    Papular rash, generalized     Post-menopausal 2/8/2020    Early surgical menopause    Pseudogout 11/30/2016    Serrated adenoma of colon 7/17/2020    Status post allogeneic bone marrow transplant (H)     Surgical menopause 1976    oophorectomy    Tobacco abuse     Traumatic compression fracture of T8 thoracic vertebra, closed, initial encounter (H) 4/26/2016    Tubular adenoma of colon 7/17/2020    Vertebral compression fracture (H) 2014    Zoster 2018        Code Status:  Full Code     Falls Risk: Yes Band: Applied    Current Living Situation/Residence: lives with their son or daughter     Elimination Status: Continent: Yes     Activity Level: Independent at baseline, bedrest at this time    Patients Preferred Language:  English     Needed: No    Vital Signs:  BP 96/54   Pulse  "78   Temp 98.5  F (36.9  C) (Oral)   Resp 22   Ht 1.727 m (5' 8\")   Wt 46.7 kg (103 lb)   LMP  (LMP Unknown)   SpO2 92%   BMI 15.66 kg/m       Cardiac Rhythm:     Pain Score: 5/10    Is the Patient Confused:  No    Last Food or Drink: 03/13/25 at 6:30 am    Focused Assessment:  Triage note: Pt arrived via [facility] from home after falling while taking dog on a walk. Dog pulled pt, fell onto R hip. Did not hit head, not on blood thinners. No LOC. BG 89. EMS gave total of 75 mcg of Fentanyl. 85% O2 on RA. Placed on 4 L. Hx of Leukemia.     R hip pain rates 10/10 prior to pain rxs, pt unable to move leg. CMS intact.     Tests Performed: Done: Labs and Imaging    Treatments Provided: Morphine given upon arrival from EMS, Dilaudid 0.4 mg given after xrays along with scheduled Tylenol.     Family Dynamics/Concerns: Yes; please explain: daughter and granddaughter in room    Family Updated On Visitor Policy: Yes    Plan of Care Communicated to Family: Yes    Who Was Updated about Plan of Care: pt, daughter and granddaughter    Belongings Checklist Done and Signed by Patient: Yes    Medications sent with patient: N/A    Covid: asymptomatic , N/A    Additional Information:     RN: Alyse Medina RN   3/13/2025 4:07 PM      "

## 2025-03-13 NOTE — PLAN OF CARE
Goal Outcome Evaluation:      Plan of Care Reviewed With: patient, child          Outcome Evaluation: Per daughter Rhona, patient will not want TCU but would likely want to go home at discharge.

## 2025-03-13 NOTE — ANESTHESIA PREPROCEDURE EVALUATION
Anesthesia Pre-Procedure Evaluation    Patient: Jenn Barclay   MRN: [id number] : 1955        Procedure : Procedure(s):  OPEN REDUCTION INTERNAL FIXATION, FRACTURE, FEMUR, PROXIMAL          Past Medical History:   Diagnosis Date    Acute myeloid leukemia (AML), M2 (H)     AML (acute myeloid leukemia) (H) 2009    Baker's cyst     Chronic back pain     Chronic diarrhea     Compression fx, lumbar spine (H)     Controlled substance agreement signed 8/3/2017    COPD (chronic obstructive pulmonary disease) (H)     COPD (chronic obstructive pulmonary disease) (H)     Full code status 9/10/2017    Gastric ulcer     pt states she has not had any ulcers    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)     H/O allogeneic bone marrow transplant (H) 2010    History of PID     Metatarsal fracture 2017     Migraine without aura, without mention of intractable migraine without mention of status migrainosus     Osteoporosis     DXA    Osteoporosis 5/3/2016    Papular rash, generalized     Post-menopausal 2020    Early surgical menopause    Pseudogout 2016    Serrated adenoma of colon 2020    Status post allogeneic bone marrow transplant (H)     Surgical menopause 1976    oophorectomy    Tobacco abuse     Traumatic compression fracture of T8 thoracic vertebra, closed, initial encounter (H) 2016    Tubular adenoma of colon 2020    Vertebral compression fracture (H) 2014    Zoster 2018      Past Surgical History:   Procedure Laterality Date    APPENDECTOMY      COLONOSCOPY N/A 2020    Procedure: COLONOSCOPY, WITH POLYPECTOMY AND BIOPSY;  Surgeon: Gianni Valerio MD;  Location: [facility] OR    HYSTERECTOMY TOTAL ABDOMINAL, BILATERAL SALPINGO-OOPHORECTOMY, COMBINED      IR MISCELLANEOUS PROCEDURE  2010    TRANSPLANT  2010    VERTEBROPLASTY  2016    T5 and T8 vertebrae.      Allergies   Allergen Reactions    Mirtazapine      Other reaction(s): Other (See  Comments)  Hallucination     Tape [Adhesive Tape]      Allergy Hx  In addition to NO paper tape    Liquid Adhesive Rash     Other reaction(s): Other (See Comments)  Allergy Hx - Rash from paper tape      Social History     Tobacco Use    Smoking status: Every Day     Current packs/day: 0.50     Average packs/day: 0.5 packs/day for 49.0 years (24.5 ttl pk-yrs)     Types: Cigarettes    Smokeless tobacco: Former    Tobacco comments:     has used E-cigarettes in the past, info given   Substance Use Topics    Alcohol use: No      Wt Readings from Last 1 Encounters:   03/13/25 46.7 kg (103 lb)        Anesthesia Evaluation   Pt has had prior anesthetic.     No history of anesthetic complications       ROS/MED HX  ENT/Pulmonary:     (+)                          COPD,              Neurologic:  - neg neurologic ROS     Cardiovascular:  - neg cardiovascular ROS     METS/Exercise Tolerance: >4 METS    Hematologic:     (+)      anemia,          Musculoskeletal:   (+)     fracture, Fracture location: RLE,         GI/Hepatic:  - neg GI/hepatic ROS     Renal/Genitourinary:  - neg Renal ROS     Endo:  - neg endo ROS     Psychiatric/Substance Use:  - neg psychiatric ROS     Infectious Disease:  - neg infectious disease ROS     Malignancy:   (+) Malignancy, History of Lymphoma/Leukemia.    Other:  - neg other ROS    (+)  , H/O Chronic Pain,         Physical Exam    Airway        Mallampati: II   TM distance: > 3 FB   Neck ROM: full   Mouth opening: > 3 cm    Respiratory Devices and Support         Dental       (+) Edentulous      Cardiovascular   cardiovascular exam normal          Pulmonary   pulmonary exam normal                OUTSIDE LABS:  CBC:   Lab Results   Component Value Date    WBC 8.3 03/13/2025    WBC 11.8 (H) 12/24/2024    HGB 10.4 (L) 03/13/2025    HGB 12.7 12/24/2024    HCT 32.1 (L) 03/13/2025    HCT 37.9 12/24/2024     03/13/2025     12/24/2024     BMP:   Lab Results   Component Value Date      03/13/2025     (L) 12/24/2024    POTASSIUM 3.4 03/13/2025    POTASSIUM 4.0 12/24/2024    CHLORIDE 108 (H) 03/13/2025    CHLORIDE 97 (L) 12/24/2024    CO2 34 (H) 03/13/2025    CO2 27 12/24/2024    BUN 6.1 (L) 03/13/2025    BUN 13.1 12/24/2024    CR 0.61 03/13/2025    CR 0.76 12/24/2024    GLC 94 03/13/2025     (H) 12/24/2024     COAGS:   Lab Results   Component Value Date    PTT 33 06/30/2010    INR 1.05 03/13/2025     POC:   Lab Results   Component Value Date    HCGS Negative 05/11/2010     HEPATIC:   Lab Results   Component Value Date    ALBUMIN 3.9 12/24/2024    PROTTOTAL 6.1 (L) 12/24/2024    ALT 22 12/24/2024    AST 29 12/24/2024    ALKPHOS 42 12/24/2024    BILITOTAL 0.5 12/24/2024     OTHER:   Lab Results   Component Value Date    PH 7.41 05/19/2010    LACT 2.1 (H) 02/10/2024    A1C 5.6 03/02/2023    WILMER 8.2 (L) 03/13/2025    PHOS 2.8 02/10/2024    MAG 2.1 02/10/2024    LIPASE <9 07/26/2022    AMYLASE 38 07/12/2017    TSH 5.22 (H) 06/13/2024    T4 1.00 02/08/2020    CRP 0.4 07/26/2022    SED 20 02/03/2024       Anesthesia Plan    ASA Status:  3    NPO Status:  NPO Appropriate    Anesthesia Type: General.     - Airway: ETT   Induction: Intravenous, Propofol.   Maintenance: Balanced.        Consents    Anesthesia Plan(s) and associated risks, benefits, and realistic alternatives discussed. Questions answered and patient/representative(s) expressed understanding.     - Discussed: Risks, Benefits and Alternatives for BOTH SEDATION and the PROCEDURE were discussed     - Discussed with:  Patient            Postoperative Care    Pain management: IV analgesics, Oral pain medications, Peripheral nerve block (Single Shot).   PONV prophylaxis: Ondansetron (or other 5HT-3), Dexamethasone or Solumedrol     Comments:               Gio Rodriguez MD    I have reviewed the pertinent notes and labs in the chart from the past 30 days and (re)examined the patient.  Any updates or changes from those notes are  "reflected in this note.    Clinically Significant Risk Factors Present on Admission          # Hyperchloremia: Highest Cl = 108 mmol/L in last 2 days, will monitor as appropriate                   # Anemia: based on hgb <11       # Cachexia: Estimated body mass index is 15.66 kg/m  as calculated from the following:    Height as of this encounter: 1.727 m (5' 8\").    Weight as of this encounter: 46.7 kg (103 lb).       # Financial/Environmental Concerns: none           "

## 2025-03-13 NOTE — H&P
"[facility]    History and Physical - Hospitalist Service       Date of Admission:  3/13/2025    Assessment & Plan   Active Problems:    Fall, initial encounter    Closed displaced intertrochanteric fracture of right femur, initial encounter (H)       Jenn Barclay is a 69 year old female with history of tobacco use, COPD, leukemia, and prior bone marrow transplant admitted on 3/13/2025 with right hip fracture after mechanical fall.       Right hip fracture after mechanical fall  Plain film shows minimally displaced right intertrochanteric femur fracture   -- medically stable for surgical intervention  -- continue current supportive cares  -- keep npo pending further input from ortho      COPD:  -- continue home inhalers      Tobacco dependence: Counseled on cessation and replacement Rx is ordered.          Diet: NPO for Procedure/Surgery per Anesthesia Guidelines Except for: Meds; Clear liquids before procedure/surgery: ADULT (Age GREATER than or Equal to 18 years) - Clear liquids 2 hours before procedure/surgery    DVT Prophylaxis: Pneumatic Compression Devices  Panda Catheter: Not present  Lines: None     Cardiac Monitoring: None  Code Status: Full Code      Clinically Significant Risk Factors Present on Admission          # Hyperchloremia: Highest Cl = 108 mmol/L in last 2 days, will monitor as appropriate                   # Anemia: based on hgb <11       # Cachexia: Estimated body mass index is 15.66 kg/m  as calculated from the following:    Height as of this encounter: 1.727 m (5' 8\").    Weight as of this encounter: 46.7 kg (103 lb).       # Financial/Environmental Concerns: none         Disposition Plan      Expected Discharge Date: 03/15/2025             Medically Ready for Discharge: Anticipated in 2-4 Days      José Manuel Parker DO  Hospitalist Service  [facility]  Securely message with [employer] (more info)  Text page via [facility] Paging/Directory "     ______________________________________________________________________    Chief Complaint   Hip pain    History is obtained from the patient    History of Present Illness   Jenn Barclay is a 69 year old female who presents with right hip pain after a fall and is found to have minimally displaced right intertrochanteric femur fracture       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    History of PID     Metatarsal fracture 2017 2nd     Migraine without aura, without mention of intractable migraine without mention of status migrainosus     Osteoporosis 2020    DXA    Osteoporosis 5/3/2016    Papular rash, generalized     Post-menopausal 2/8/2020    Early surgical menopause    Pseudogout 11/30/2016    Serrated adenoma of colon 7/17/2020    Status post allogeneic bone marrow transplant (H)     Surgical menopause 1976    oophorectomy    Tobacco abuse     Traumatic compression fracture of T8 thoracic vertebra, closed, initial encounter (H) 4/26/2016    Tubular adenoma of colon 7/17/2020    Vertebral compression fracture (H) 2014    Zoster 2018       Past Surgical History   Past Surgical History:   Procedure Laterality Date    APPENDECTOMY      COLONOSCOPY N/A 07/28/2020    Procedure: COLONOSCOPY, WITH POLYPECTOMY AND BIOPSY;  Surgeon: Gianni Valerio MD;  Location: [facility] OR    HYSTERECTOMY TOTAL ABDOMINAL, BILATERAL SALPINGO-OOPHORECTOMY, COMBINED  1976    IR MISCELLANEOUS PROCEDURE   2010    TRANSPLANT  2010    VERTEBROPLASTY  2016    T5 and T8 vertebrae.       Prior to Admission Medications   Prior to Admission Medications   Prescriptions Last Dose Informant Patient Reported? Taking?   Vitamin D3 (VITAMIN D, CHOLECALCIFEROL,) 25 mcg (1000 units) tablet 3/13/2025 Morning  Yes Yes   Sig: Take 1 tablet by mouth daily.   albuterol (PROVENTIL) (2.5 MG/3ML) 0.083% neb solution Past Month  No Yes   Sig: USE 1 VIAL IN NEBULIZER EVERY 6 HOURS AS NEEDED FOR WHEEZING   ascorbic acid (VITAMIN C) 500 MG tablet 3/13/2025 Morning Self Yes Yes   Sig: Take 500 mg by mouth daily.   augmented betamethasone dipropionate (DIPROLENE AF) 0.05 % external cream Unknown  No No   Sig: Apply topically 2 times daily. Stronger cream for rash.   ipratropium - albuterol 0.5 mg/2.5 mg/3 mL (DUONEB) 0.5-2.5 (3) MG/3ML neb solution Past Week  No Yes   Sig: TAKE 1 VIAL(3ML) BY NEBULIZATION EVERY 6 HOURS AS NEEDED FOR COUGH WITH PHELGM.   multivitamin w/minerals (THERA-VIT-M) tablet 3/13/2025 Morning  Yes Yes   Sig: Take 1 tablet by mouth daily.   nebulizer nebulization  Self No No   Si nebulizer please as covered by insurance.  May also dispense supplies (tubing, inhalation device, etc) as needed.   oxyCODONE IR (ROXICODONE) 10 MG tablet 3/13/2025 Morning  No Yes   Sig: Take 1 tablet (10 mg) by mouth 4 times daily. For chronic pain.   potassium gluconate 2.5 MEQ tablet 3/13/2025 Morning  Yes Yes   Sig: Take 2.5 mEq by mouth daily.   vitamin B complex with vitamin C (VITAMIN  B COMPLEX) tablet 3/13/2025 Morning  Yes Yes   Sig: Take 1 tablet by mouth daily.      Facility-Administered Medications: None           Physical Exam   Vital Signs: Temp: 98.5  F (36.9  C) Temp src: Oral BP: 96/54 Pulse: 78   Resp: 22 SpO2: 92 % O2 Device: Nasal cannula Oxygen Delivery: 4 LPM  Weight: 103 lbs 0 oz    General Appearance: In no acute distress  RESPIRATORY: respirations nonlabored  CARDIOVASCULAR:No le edema  bilat.  ABDOMEN: soft and non-tender  NEUROLOGIC: No focal arm or leg  weakness, speech is clear, alert         Medical Decision Making       >75 MINUTES SPENT BY ME on the date of service doing chart review, history, exam, documentation & further activities per the note.      Data

## 2025-03-13 NOTE — ANESTHESIA PROCEDURE NOTES
"Fascia iliaca Procedure Note    Pre-Procedure   Staff -        Anesthesiologist:  Gio Rodriguez MD       Performed By: anesthesiologist       Location: pre-op       Procedure Start/Stop Times: 3/13/2025 6:50 PM and 3/13/2025 6:55 PM       Pre-Anesthestic Checklist: patient identified, IV checked, site marked, risks and benefits discussed, informed consent, monitors and equipment checked, pre-op evaluation, at physician/surgeon's request and post-op pain management  Timeout:       Correct Patient: Yes        Correct Procedure: Yes        Correct Site: Yes        Correct Position: Yes        Correct Laterality: Yes        Site Marked: Yes  Procedure Documentation  Procedure: Fascia iliaca         Laterality: right       Patient Position: supine       Patient Prep/Sterile Barriers: sterile gloves, mask       Skin prep: Chlorapreplower extremity plane block       Needle Type: insulated       Needle Gauge: 20.        Needle Length (Inches): 4        Ultrasound guided       1. Ultrasound was used to identify targeted nerve, plexus, vascular marker, or fascial plane and place a needle adjacent to it in real-time.       2. Ultrasound was used to visualize the spread of anesthetic in close proximity to the above referenced structure.       3. A permanent image is entered into the patient's record.    Assessment/Narrative         The placement was negative for: blood aspirated, painful injection and site bleeding       Paresthesias: No.       Bolus given via needle..        Secured via.        Insertion/Infusion Method: Single Shot       Complications: none    Medication(s) Administered   Ropivacaine 0.5% PF (Infiltration) - Infiltration   20 mL - 3/13/2025 6:53:00 PM  Medication Administration Time: 3/13/2025 6:50 PM      FOR [facility] ([facility]) ONLY:   Pain Team Contact information: please page the Pain Team Via Where Was it Filmed. Search \"Pain\". During daytime hours, please page the attending first. At night please page the "  first.
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
## 2025-03-13 NOTE — CONSULTS
ORTHOPEDIC CONSULTATION    Consultation  Jenn Barclay DOB 1955, MRN [id number]    No admission diagnoses are documented for this encounter.    PCP: Jorge Dunn, [phone]   Code status:  Prior       Extended Emergency Contact Information  Primary Emergency Contact: Walt Carvajal   [place]  Mobile Phone: [phone]  Relation: Grandchild  Secondary Emergency Contact: Rhona Carvajal  Mobile Phone: [phone]  Relation: Child         IMPRESSION:  Patient is a 69-year-old female with right acute closed displaced femoral neck fracture     PLAN:  This patient was discussed with Dr. Catalan, on-call surgeon for [facility] and they are in agreement with the following plan.   - Discussed treatment options including surgical intervention with Dr. Catalan. Patient likely would benefit from a hip nail. Discussed risks of procedure including but not limited to, injury to nerves, arteries or veins, malunion, nonunion, periprosthetic fracture, DVT or even death with the patient and they are in agreement with proceeding.  - Will take to the OR this evening  - NPO  - CT right hip  - Type & Screen   - Anesthesia to place block for improved pain control  - Medical optimization per Hospitalist. Hgb 10.4. INR 1.05.  - Pain control. Currently well controlled on IV dilaudid.   - NWB right lower extremity.  -Holding anticoagulation    Thank you for including [facility] in the care of Jenn Barclay. It has been a pleasure participating in their care.      CHIEF COMPLAINT: Right hip fracture      HISTORY OF PRESENT ILLNESS:  The patient is seen in orthopedic consultation at the request of Clover West DO.  The patient is a 69 year old female with c/o right hip pain.  They presented to the ED with right hip pain following a fall.  She was taking her dog on a walk earlier today when she was pulled on the leash and she fell directly onto her right hip and rolled onto her left shoulder.  She was unable  to weight-bear afterwards and was brought into the emergency department by ambulance.  During the time of visit she is very uncomfortable due to pain in both her right hip and left shoulder.  She is not on any anticoagulation.    Patient weightbears and ambulates independently at baseline.  Occasionally she uses walker but normally goes without.  Denies history of CAD, CVA, HF, CKD, DM.  She does have COPD, does not use oxygen at home.  She smokes 1 pack of cigarettes a day.  She does note history of surgery on the right hip due to a broken bone 20 years ago in which she had a plate put in and was then removed at a later date.  She is unsure of who the surgeon was but thinks it took place at [facility].  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)     AML (acute myeloid leukemia) (H) 12/2009    Baker's cyst     Chronic back pain     Chronic diarrhea     Compression fx, lumbar spine (H)     Controlled substance agreement signed 8/3/2017    COPD (chronic obstructive pulmonary disease) (H)     COPD (chronic obstructive pulmonary disease) (H)     Full code status 9/10/2017    Gastric ulcer     pt states she has not had any ulcers    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)     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    History of PID     Metatarsal fracture 2017 2nd     Migraine without aura, without mention of intractable migraine without mention of status migrainosus     Osteoporosis 2020    DXA    Osteoporosis 5/3/2016    Papular rash, generalized     Post-menopausal 2/8/2020    Early surgical menopause    Pseudogout 11/30/2016    Serrated adenoma of colon 7/17/2020    Status post allogeneic bone marrow transplant (H)     Surgical menopause 1976    oophorectomy    Tobacco abuse     Traumatic compression fracture of T8 thoracic vertebra, closed, initial encounter (H) 4/26/2016    Tubular adenoma  "of colon 7/17/2020    Vertebral compression fracture (H) 2014    Zoster 2018       ALLERGIES:   Mirtazapine, Tape [adhesive tape], and Liquid adhesive    MEDICATIONS ON ADMISSION:  Medications were reviewed.  They do include:   (Not in a hospital admission)      SOCIAL HISTORY:    reports that she has been smoking cigarettes. She has a 24.5 pack-year smoking history. She has quit using smokeless tobacco. She reports that she does not drink alcohol and does not use drugs.     FAMILY HISTORY:  Family History   Problem Relation Age of Onset    Diabetes Mother         borderline    Pancreatic Cancer Father     Diabetes Maternal Grandmother     Diabetes Maternal Uncle     Thyroid Disease No family hx of     Melanoma No family hx of     Skin Cancer No family hx of     Lung Cancer Mother        REVIEW OF SYSTEMS:   See Admission History and Physical     Clinically Significant Risk Factors Present on Admission         # Cachexia: Estimated body mass index is 15.66 kg/m  as calculated from the following:    Height as of this encounter: 1.727 m (5' 8\").    Weight as of this encounter: 46.7 kg (103 lb).  #Smoker: Noted on problem list      Risk Factors for Delirium:  -Pt has respiratory rate >20 within the past 24 hours.        PHYSICAL EXAMINATION:  General: On examination, the patient is resting comfortably, NAD and awake, lying supine in bed and oriented to person, place and time   SKIN: Ecchymosis noted over right hip, skin intact  Pulses:  Palpable bilateral dorsalis pedis pulses.  Sensation: intact and equal bilaterally to the distal lower extremities, feet and toes.  Tenderness: Right hip tenderness noted. No tenderness noted in calfs bilaterally  ROM: Equal ankle plantar and dorsiflexion, moves all toes bilaterally. Unable to lift leg off bed due to pain  Motor:  +5/5 ankle DF, PF, EHL bilaterally.  Right hip and knee not assessed due to fracture. Contralateral hip flexion and knee extension +5/5.   Extremity: Right " "lower extremity externally rotated & shortened, thigh & calf compartments soft    Temp:  [98.5  F (36.9  C)] 98.5  F (36.9  C)  Pulse:  [78] 78  Resp:  [22] 22  BP: (96)/(54) 96/54  SpO2:  [92 %] 92 %    RADIOGRAPHIC EVALUATION:  Radiographs personally reviewed.  EXAM: XR PELVIS AND HIP RIGHT 2 VIEWS  LOCATION: [facility]  DATE: 3/13/2025     INDICATION: Fall, right hip pain  COMPARISON: None.                                                                      IMPRESSION: Acute, minimally displaced right intertrochanteric femur fracture. Chronic fracture deformity of the left inferior pubic ramus.     Mild degenerative arthritis of both hips. Multilevel degenerative changes in the lower lumbar spine. Diffuse osseous demineralization.    LABORATORY DATA:     Lab Results   Component Value Date    INR 1.05 03/13/2025       Lab Results   Component Value Date    WBC 8.3 03/13/2025    WBC 8.1 06/23/2021     Lab Results   Component Value Date    RBC 3.58 03/13/2025    RBC 4.19 06/23/2021     Lab Results   Component Value Date    HGB 10.4 03/13/2025    HGB 12.5 06/23/2021     Lab Results   Component Value Date    HCT 32.1 03/13/2025    HCT 37.8 06/23/2021     No components found for: \"MCT\"  Lab Results   Component Value Date    MCV 90 03/13/2025    MCV 90 06/23/2021     Lab Results   Component Value Date    MCH 29.1 03/13/2025    MCH 29.8 06/23/2021     Lab Results   Component Value Date    MCHC 32.4 03/13/2025    MCHC 33.1 06/23/2021     Lab Results   Component Value Date    RDW 13.2 03/13/2025    RDW 13.0 06/23/2021     Lab Results   Component Value Date     03/13/2025     06/23/2021         Shoaib Manzanares PA-C/Dr. Catalan  [facility]        " ground-level fall.  She has prior history of osteoporosis complicated by insufficiency fractures, AML status post bone marrow transplant, prior right proximal femur fracture status post internal fixation and removal of hardware, cachexia.  We discussed treatment options in detail including both nonoperative and operative management.  With shared decision making, including a thorough discussion of potential risks and benefits, the patient elects to proceed with operative intervention for right proximal femur fracture.  We agree with this plan.    Sidney Catalan MD  [facility]

## 2025-03-14 ENCOUNTER — APPOINTMENT (OUTPATIENT)
Dept: OCCUPATIONAL THERAPY | Facility: HOSPITAL | Age: 70
DRG: 480 | End: 2025-03-14
Attending: STUDENT IN AN ORGANIZED HEALTH CARE EDUCATION/TRAINING PROGRAM
Payer: COMMERCIAL

## 2025-03-14 ENCOUNTER — APPOINTMENT (OUTPATIENT)
Dept: PHYSICAL THERAPY | Facility: HOSPITAL | Age: 70
DRG: 480 | End: 2025-03-14
Attending: INTERNAL MEDICINE
Payer: COMMERCIAL

## 2025-03-14 VITALS
RESPIRATION RATE: 22 BRPM | TEMPERATURE: 97.4 F | HEART RATE: 77 BPM | DIASTOLIC BLOOD PRESSURE: 64 MMHG | OXYGEN SATURATION: 97 % | SYSTOLIC BLOOD PRESSURE: 107 MMHG | HEIGHT: 68 IN | BODY MASS INDEX: 15.61 KG/M2 | WEIGHT: 103 LBS

## 2025-03-14 LAB
ANION GAP SERPL CALCULATED.3IONS-SCNC: 7 MMOL/L (ref 7–15)
BUN SERPL-MCNC: 11.7 MG/DL (ref 8–23)
CALCIUM SERPL-MCNC: 8.3 MG/DL (ref 8.8–10.4)
CHLORIDE SERPL-SCNC: 100 MMOL/L (ref 98–107)
CREAT SERPL-MCNC: 0.65 MG/DL (ref 0.51–0.95)
EGFRCR SERPLBLD CKD-EPI 2021: >90 ML/MIN/1.73M2
ERYTHROCYTE [DISTWIDTH] IN BLOOD BY AUTOMATED COUNT: 13.3 % (ref 10–15)
GLUCOSE SERPL-MCNC: 178 MG/DL (ref 70–99)
HCO3 SERPL-SCNC: 32 MMOL/L (ref 22–29)
HCT VFR BLD AUTO: 30.8 % (ref 35–47)
HGB BLD-MCNC: 10.1 G/DL (ref 11.7–15.7)
MCH RBC QN AUTO: 29.6 PG (ref 26.5–33)
MCHC RBC AUTO-ENTMCNC: 32.8 G/DL (ref 31.5–36.5)
MCV RBC AUTO: 90 FL (ref 78–100)
PLATELET # BLD AUTO: 178 10E3/UL (ref 150–450)
POTASSIUM SERPL-SCNC: 3.9 MMOL/L (ref 3.4–5.3)
RBC # BLD AUTO: 3.41 10E6/UL (ref 3.8–5.2)
SODIUM SERPL-SCNC: 139 MMOL/L (ref 135–145)
WBC # BLD AUTO: 10.4 10E3/UL (ref 4–11)

## 2025-03-14 PROCEDURE — 250N000013 HC RX MED GY IP 250 OP 250 PS 637: Performed by: INTERNAL MEDICINE

## 2025-03-14 PROCEDURE — 97162 PT EVAL MOD COMPLEX 30 MIN: CPT | Mod: GP

## 2025-03-14 PROCEDURE — 250N000013 HC RX MED GY IP 250 OP 250 PS 637: Performed by: STUDENT IN AN ORGANIZED HEALTH CARE EDUCATION/TRAINING PROGRAM

## 2025-03-14 PROCEDURE — 36415 COLL VENOUS BLD VENIPUNCTURE: CPT | Performed by: STUDENT IN AN ORGANIZED HEALTH CARE EDUCATION/TRAINING PROGRAM

## 2025-03-14 PROCEDURE — 250N000011 HC RX IP 250 OP 636: Performed by: STUDENT IN AN ORGANIZED HEALTH CARE EDUCATION/TRAINING PROGRAM

## 2025-03-14 PROCEDURE — 97165 OT EVAL LOW COMPLEX 30 MIN: CPT | Mod: GO

## 2025-03-14 PROCEDURE — 82374 ASSAY BLOOD CARBON DIOXIDE: CPT | Performed by: STUDENT IN AN ORGANIZED HEALTH CARE EDUCATION/TRAINING PROGRAM

## 2025-03-14 PROCEDURE — 120N000001 HC R&B MED SURG/OB

## 2025-03-14 PROCEDURE — 80048 BASIC METABOLIC PNL TOTAL CA: CPT | Performed by: STUDENT IN AN ORGANIZED HEALTH CARE EDUCATION/TRAINING PROGRAM

## 2025-03-14 PROCEDURE — 99233 SBSQ HOSP IP/OBS HIGH 50: CPT | Performed by: INTERNAL MEDICINE

## 2025-03-14 PROCEDURE — 97530 THERAPEUTIC ACTIVITIES: CPT | Mod: GO

## 2025-03-14 PROCEDURE — 85048 AUTOMATED LEUKOCYTE COUNT: CPT | Performed by: STUDENT IN AN ORGANIZED HEALTH CARE EDUCATION/TRAINING PROGRAM

## 2025-03-14 PROCEDURE — 258N000003 HC RX IP 258 OP 636: Performed by: STUDENT IN AN ORGANIZED HEALTH CARE EDUCATION/TRAINING PROGRAM

## 2025-03-14 PROCEDURE — 97530 THERAPEUTIC ACTIVITIES: CPT | Mod: GP

## 2025-03-14 PROCEDURE — 85014 HEMATOCRIT: CPT | Performed by: STUDENT IN AN ORGANIZED HEALTH CARE EDUCATION/TRAINING PROGRAM

## 2025-03-14 RX ORDER — HYDROMORPHONE HCL IN WATER/PF 6 MG/30 ML
0.4 PATIENT CONTROLLED ANALGESIA SYRINGE INTRAVENOUS
Status: DISCONTINUED | OUTPATIENT
Start: 2025-03-14 | End: 2025-03-18 | Stop reason: HOSPADM

## 2025-03-14 RX ORDER — CYCLOBENZAPRINE HCL 10 MG
10 TABLET ORAL EVERY 8 HOURS PRN
Qty: 10 TABLET | Refills: 0 | Status: SHIPPED | OUTPATIENT
Start: 2025-03-14

## 2025-03-14 RX ORDER — AMOXICILLIN 250 MG
2 CAPSULE ORAL 2 TIMES DAILY PRN
Qty: 30 TABLET | Refills: 0 | Status: SHIPPED | OUTPATIENT
Start: 2025-03-14

## 2025-03-14 RX ORDER — POLYETHYLENE GLYCOL 3350 17 G/17G
17 POWDER, FOR SOLUTION ORAL DAILY
Status: DISCONTINUED | OUTPATIENT
Start: 2025-03-14 | End: 2025-03-18 | Stop reason: HOSPADM

## 2025-03-14 RX ORDER — ASPIRIN 81 MG/1
81 TABLET ORAL 2 TIMES DAILY
Qty: 60 TABLET | Refills: 0 | Status: SHIPPED | OUTPATIENT
Start: 2025-03-14

## 2025-03-14 RX ORDER — CEPHALEXIN 500 MG/1
500 CAPSULE ORAL EVERY 6 HOURS SCHEDULED
Status: DISCONTINUED | OUTPATIENT
Start: 2025-03-14 | End: 2025-03-18 | Stop reason: HOSPADM

## 2025-03-14 RX ORDER — ASPIRIN 81 MG/1
81 TABLET ORAL 2 TIMES DAILY
Status: DISCONTINUED | OUTPATIENT
Start: 2025-03-14 | End: 2025-03-18 | Stop reason: HOSPADM

## 2025-03-14 RX ORDER — SODIUM CHLORIDE, SODIUM LACTATE, POTASSIUM CHLORIDE, CALCIUM CHLORIDE 600; 310; 30; 20 MG/100ML; MG/100ML; MG/100ML; MG/100ML
INJECTION, SOLUTION INTRAVENOUS CONTINUOUS
Status: DISCONTINUED | OUTPATIENT
Start: 2025-03-14 | End: 2025-03-17

## 2025-03-14 RX ORDER — HYDROMORPHONE HCL IN WATER/PF 6 MG/30 ML
0.2 PATIENT CONTROLLED ANALGESIA SYRINGE INTRAVENOUS
Status: DISCONTINUED | OUTPATIENT
Start: 2025-03-14 | End: 2025-03-18 | Stop reason: HOSPADM

## 2025-03-14 RX ORDER — ACETAMINOPHEN 325 MG/1
975 TABLET ORAL EVERY 8 HOURS
Status: DISCONTINUED | OUTPATIENT
Start: 2025-03-14 | End: 2025-03-14

## 2025-03-14 RX ORDER — BISACODYL 10 MG
10 SUPPOSITORY, RECTAL RECTAL DAILY PRN
Status: DISCONTINUED | OUTPATIENT
Start: 2025-03-16 | End: 2025-03-18 | Stop reason: HOSPADM

## 2025-03-14 RX ORDER — LIDOCAINE 40 MG/G
CREAM TOPICAL
Status: DISCONTINUED | OUTPATIENT
Start: 2025-03-14 | End: 2025-03-18 | Stop reason: HOSPADM

## 2025-03-14 RX ORDER — PROCHLORPERAZINE MALEATE 5 MG/1
5 TABLET ORAL EVERY 6 HOURS PRN
Status: DISCONTINUED | OUTPATIENT
Start: 2025-03-14 | End: 2025-03-14

## 2025-03-14 RX ORDER — ACETAMINOPHEN 325 MG/1
975 TABLET ORAL 3 TIMES DAILY
Qty: 100 TABLET | Refills: 0 | Status: SHIPPED | OUTPATIENT
Start: 2025-03-14

## 2025-03-14 RX ORDER — CYCLOBENZAPRINE HCL 10 MG
10 TABLET ORAL EVERY 8 HOURS PRN
Status: DISCONTINUED | OUTPATIENT
Start: 2025-03-14 | End: 2025-03-18 | Stop reason: HOSPADM

## 2025-03-14 RX ORDER — ONDANSETRON 2 MG/ML
4 INJECTION INTRAMUSCULAR; INTRAVENOUS EVERY 6 HOURS PRN
Status: DISCONTINUED | OUTPATIENT
Start: 2025-03-14 | End: 2025-03-14

## 2025-03-14 RX ORDER — AMOXICILLIN 250 MG
1 CAPSULE ORAL 2 TIMES DAILY
Status: DISCONTINUED | OUTPATIENT
Start: 2025-03-14 | End: 2025-03-18 | Stop reason: HOSPADM

## 2025-03-14 RX ORDER — CEFAZOLIN SODIUM 1 G/3ML
1 INJECTION, POWDER, FOR SOLUTION INTRAMUSCULAR; INTRAVENOUS EVERY 8 HOURS
Status: COMPLETED | OUTPATIENT
Start: 2025-03-14 | End: 2025-03-14

## 2025-03-14 RX ORDER — CEFADROXIL 500 MG/1
500 CAPSULE ORAL 2 TIMES DAILY
Qty: 14 CAPSULE | Refills: 0 | Status: SHIPPED | OUTPATIENT
Start: 2025-03-14 | End: 2025-03-18

## 2025-03-14 RX ORDER — CYCLOBENZAPRINE HCL 5 MG
5 TABLET ORAL
Status: DISCONTINUED | OUTPATIENT
Start: 2025-03-14 | End: 2025-03-14

## 2025-03-14 RX ORDER — ONDANSETRON 4 MG/1
4 TABLET, ORALLY DISINTEGRATING ORAL EVERY 6 HOURS PRN
Status: DISCONTINUED | OUTPATIENT
Start: 2025-03-14 | End: 2025-03-14

## 2025-03-14 RX ORDER — OXYCODONE HYDROCHLORIDE 5 MG/1
5 TABLET ORAL EVERY 4 HOURS PRN
Status: DISCONTINUED | OUTPATIENT
Start: 2025-03-14 | End: 2025-03-14

## 2025-03-14 RX ADMIN — CEFAZOLIN 1 G: 1 INJECTION, POWDER, FOR SOLUTION INTRAMUSCULAR; INTRAVENOUS at 04:03

## 2025-03-14 RX ADMIN — Medication 25 MCG: at 08:17

## 2025-03-14 RX ADMIN — CYCLOBENZAPRINE 10 MG: 10 TABLET, FILM COATED ORAL at 08:23

## 2025-03-14 RX ADMIN — B-COMPLEX W/ C & FOLIC ACID TAB 1 TABLET: TAB at 08:17

## 2025-03-14 RX ADMIN — SENNOSIDES AND DOCUSATE SODIUM 1 TABLET: 50; 8.6 TABLET ORAL at 08:17

## 2025-03-14 RX ADMIN — Medication 2.5 MEQ: at 08:17

## 2025-03-14 RX ADMIN — OXYCODONE HYDROCHLORIDE 10 MG: 5 TABLET ORAL at 21:27

## 2025-03-14 RX ADMIN — CEPHALEXIN 500 MG: 500 CAPSULE ORAL at 17:15

## 2025-03-14 RX ADMIN — POLYETHYLENE GLYCOL 3350 17 G: 17 POWDER, FOR SOLUTION ORAL at 08:16

## 2025-03-14 RX ADMIN — OXYCODONE HYDROCHLORIDE 10 MG: 5 TABLET ORAL at 08:58

## 2025-03-14 RX ADMIN — SENNOSIDES AND DOCUSATE SODIUM 1 TABLET: 50; 8.6 TABLET ORAL at 21:27

## 2025-03-14 RX ADMIN — ACETAMINOPHEN 975 MG: 325 TABLET ORAL at 08:17

## 2025-03-14 RX ADMIN — SODIUM CHLORIDE, SODIUM LACTATE, POTASSIUM CHLORIDE, AND CALCIUM CHLORIDE: .6; .31; .03; .02 INJECTION, SOLUTION INTRAVENOUS at 00:27

## 2025-03-14 RX ADMIN — SENNOSIDES AND DOCUSATE SODIUM 1 TABLET: 50; 8.6 TABLET ORAL at 04:05

## 2025-03-14 RX ADMIN — ASPIRIN 81 MG: 81 TABLET, COATED ORAL at 21:27

## 2025-03-14 RX ADMIN — ACETAMINOPHEN 975 MG: 325 TABLET ORAL at 21:26

## 2025-03-14 RX ADMIN — OXYCODONE HYDROCHLORIDE 10 MG: 5 TABLET ORAL at 00:19

## 2025-03-14 RX ADMIN — ACETAMINOPHEN 975 MG: 325 TABLET ORAL at 13:39

## 2025-03-14 RX ADMIN — OXYCODONE HYDROCHLORIDE 10 MG: 5 TABLET ORAL at 13:39

## 2025-03-14 RX ADMIN — HYDROMORPHONE HYDROCHLORIDE 0.2 MG: 0.2 INJECTION, SOLUTION INTRAMUSCULAR; INTRAVENOUS; SUBCUTANEOUS at 12:01

## 2025-03-14 RX ADMIN — CEFAZOLIN 1 G: 1 INJECTION, POWDER, FOR SOLUTION INTRAMUSCULAR; INTRAVENOUS at 11:28

## 2025-03-14 RX ADMIN — ASPIRIN 81 MG: 81 TABLET, COATED ORAL at 08:17

## 2025-03-14 RX ADMIN — OXYCODONE HYDROCHLORIDE 5 MG: 5 TABLET ORAL at 04:52

## 2025-03-14 ASSESSMENT — ACTIVITIES OF DAILY LIVING (ADL)
ADLS_ACUITY_SCORE: 33
ADLS_ACUITY_SCORE: 41
ADLS_ACUITY_SCORE: 39
ADLS_ACUITY_SCORE: 39
ADLS_ACUITY_SCORE: 41
ADLS_ACUITY_SCORE: 32
ADLS_ACUITY_SCORE: 39
ADLS_ACUITY_SCORE: 32
ADLS_ACUITY_SCORE: 39
ADLS_ACUITY_SCORE: 41
ADLS_ACUITY_SCORE: 39
ADLS_ACUITY_SCORE: 39
ADLS_ACUITY_SCORE: 33
ADLS_ACUITY_SCORE: 41
ADLS_ACUITY_SCORE: 32
ADLS_ACUITY_SCORE: 39
ADLS_ACUITY_SCORE: 39
ADLS_ACUITY_SCORE: 41
ADLS_ACUITY_SCORE: 39
ADLS_ACUITY_SCORE: 41

## 2025-03-14 NOTE — CONSULTS
Care Management Follow Up    Length of Stay (days): 1    Expected Discharge Date: 03/16/2025    Anticipated Discharge Plan:   anticipate home     Transportation: TBD    PT Recommendations: Transitional Care Facility  OT Recommendations:        Barriers to Discharge: medical stability    Prior Living Situation: mobile home with other relative(s), other (see comments) (Grandson and roommate)    Discussed  Partnership in Safe Discharge Planning  document with patient/family: No     Handoff Completed: No, handoff not indicated or clinically appropriate    Patient/Spokesperson Updated: Yes. Who? Updated patient at bedside     Additional Information:  Chart reviewed. SW met with patient at bedside to introduce self, Cm role and discuss discharge planning. TCU recommended. Pt states she does not want to go to TCU. Pt lives with her grandson and roommate - both over age 30 and are supportive. Pt reports she beat luekemia and fracture a pelvis recently and feels she can discharge home. Her grandson is supportive and has helped install hand rails and automatic motion lights at home.   Patient reports she has worked with PT here and has gotten up and walked to the chair.   Next Steps: Follow up and confirm discharge plan to home.   Rebecca Lopez, LICSW

## 2025-03-14 NOTE — PROGRESS NOTES
03/14/25 1120   Appointment Info   Signing Clinician's Name / Credentials (OT) Kanika Bland, OTR/L   Living Environment   People in Home grandchild(jocy);other (see comments)  (grandson and roommate)   Current Living Arrangements mobile home   Living Environment Comments tub shower - only takes baths, low toilet with GB in front of toilet   Self-Care   Equipment Currently Used at Home none   Activity/Exercise/Self-Care Comment IND for BADLs   Instrumental Activities of Daily Living (IADL)   IADL Comments IND for IADLs, enjoys gardening   General Information   Onset of Illness/Injury or Date of Surgery 03/13/25   Referring Physician Sidney Catalan MD   Patient/Family Therapy Goal Statement (OT) go home, reports she absolutely will not go to TCU   Additional Occupational Profile Info/Pertinent History of Current Problem presents following fall while walking dog. now s/p R hip internal fixation   Existing Precautions/Restrictions fall   Right Lower Extremity (Weight-bearing Status) weight-bearing as tolerated (WBAT)   Cognitive Status Examination   Orientation Status orientation to person, place and time   Affect/Mental Status (Cognitive) WFL   Follows Commands follows one-step commands   Cognitive Status Comments some impulsive behaviors during session, appears d/t pain   Pain Assessment   Patient Currently in Pain Yes, see Vital Sign flowsheet   Range of Motion Comprehensive   General Range of Motion bilateral upper extremity ROM WFL   Strength Comprehensive (MMT)   Comment, General Manual Muscle Testing (MMT) Assessment WFL - able to complete chair pushup with minimal LE involvement   Bed Mobility   Bed Mobility supine-sit   Supine-Sit [place] (Bed Mobility) moderate assist (50% patient effort)   Assistive Device (Bed Mobility) bed rails   Transfers   Transfers sit-stand transfer   Sit-Stand Transfer   Sit-Stand [place] (Transfers) moderate assist (50% patient effort)   Balance   Balance  Assessment sitting static balance   Sitting Balance: Static supervision   Position, Sitting Balance unsupported   Activities of Daily Living   BADL Assessment/Intervention lower body dressing;toileting   Lower Body Dressing Assessment/Training   [place] Level (Lower Body Dressing) moderate assist (50% patient effort)   Toileting   Comment, (Toileting) per clinical judgement   [place] Level (Toileting) moderate assist (50% patient effort)   Clinical Impression   Criteria for Skilled Therapeutic Interventions Met (OT) Yes, treatment indicated   OT Diagnosis dec BADL IND   OT Problem List-Impairments impacting ADL problems related to;activity tolerance impaired;balance;cognition;mobility;strength;pain;post-surgical precautions   Assessment of Occupational Performance 5 or more Performance Deficits   Identified Performance Deficits dressing, toileting, bathing, household mobility, functional transfers, household management, meal prep, community mobility   Planned Therapy Interventions (OT) ADL retraining;IADL retraining;bed mobility training;cognition;strengthening;transfer training;home program guidelines;progressive activity/exercise   Clinical Decision Making Complexity (OT) problem focused assessment/low complexity   Risk & Benefits of therapy have been explained evaluation/treatment results reviewed;participants included;patient   OT Total Evaluation Time   OT Eval, Low Complexity Minutes (95369) 10   OT Goals   Therapy Frequency (OT) Daily   OT Predicted Duration/Target Date for Goal Attainment 03/21/25   OT Goals Lower Body Dressing;Transfers;Toilet Transfer/Toileting   OT: Lower Body Dressing Modified independent   OT: Transfer Supervision/stand-by assist  (tub transfer)   OT: Toilet Transfer/Toileting Modified independent;toilet transfer;cleaning and garment management   Interventions   Interventions Quick Adds Therapeutic Activity   Therapeutic Activities   Therapeutic Activity Minutes (50178) 12    Symptoms noted during/after treatment increased pain   Treatment Detail/Skilled Intervention Educ on use of leg  vs belt to increase ease of bed mod. Following educ, progresses to min A with assist to manage RLE, heavily using bedrails, and using gait belt to support RLE. Cues for hand placement for transfer instead of pulling on FWW, progresses to min A sit > stand from elevated bed. Min A with FWW for ~3 ft from bed to recliner, min A to control stand > sit. High levels of pain, able to reposition self in chair. Chair alarm on and family present at end of session.   OT Discharge Planning   OT Plan Progress mob in room, transfers, LB dressing - intro AE as needed, bed mob, toileting - low toilet at home   OT Discharge Recommendation (DC Rec) home with assist;home with home care occupational therapy   OT Rationale for DC Rec Recommend TCU as safest option but pt reports she will refuse. Currently pt requires Ax1 for all BADLs and transfers, tolerating minimal activity d/t pain. Anticipate pt to make progress with improved pain control.   OT Brief overview of current status Min/mod A   OT Total Distance Amb During Session (feet) 3   OT Equipment Needed at Discharge tub bench;raised toilet seat   Total Session Time   Timed Code Treatment Minutes 12   Total Session Time (sum of timed and untimed services) 22

## 2025-03-14 NOTE — PLAN OF CARE
Problem: Hip Fracture Medical Management  Goal: Pain Control and Function  Outcome: Progressing   Goal Outcome Evaluation: Right hip pain controlled with prn oxycodone and ice pack    Problem: Gas Exchange Impaired  Goal: Optimal Gas Exchange  Outcome: Progressing  VSS on 3L oxygen, is on oxygen at home.     From PACU about midnight, A&Ox4, Sacral Mepilex in place for prevention, LR at 100, WBAT

## 2025-03-14 NOTE — ANESTHESIA PROCEDURE NOTES
Airway         Procedure Start/Stop Times: 3/13/2025 7:33 PM  Staff -        Anesthesiologist:  Gio Rodriguez MD       CRNA: Pam Mcneill APRN CRNA       Performed By: CRNAIndications and Patient Condition       Indications for airway management: daniel-procedural       Induction type:intravenous       Mask difficulty assessment: 1 - vent by mask    Final Airway Details       Final airway type: endotracheal airway       Successful airway: ETT - single and Oral  Endotracheal Airway Details        ETT size (mm): 7.0       Cuffed: yes       Successful intubation technique: direct laryngoscopy       DL Blade Type: Bland 2       Grade View of Cords: 1       Adjucts: stylet       Position: Right       Measured from: gums/teeth       Secured at (cm): 22       Bite block used: None    Post intubation assessment        Placement verified by: capnometry, equal breath sounds and chest rise        Number of attempts at approach: 1       Number of other approaches attempted: 0       Secured with: tape       Ease of procedure: easy       Dentition: Intact and Unchanged    Medication(s) Administered   Medication Administration Time: 3/13/2025 7:33 PM

## 2025-03-14 NOTE — PROGRESS NOTES
Pt presented to the unit from ED at approximately 1645 with a right hip fx after sustaining a fall outdoors while walking her dog. A&O x4. Planned repair this evening at 1920. Pt received prn Oxycodone 10 mg for severe pain. On 3 liters oxygen to maintain O2 sats >92%. Received call from ortho in regards to stat imaging and pt sent down for CT and x-ray. Returned to floor and settled once more prior to [facility] coming to transport pt for surgery.

## 2025-03-14 NOTE — PLAN OF CARE
"  Problem: Adult Inpatient Plan of Care  Goal: Plan of Care Review  Description: The Plan of Care Review/Shift note should be completed every shift.  The Outcome Evaluation is a brief statement about your assessment that the patient is improving, declining, or no change.  This information will be displayed automatically on your shift  note.  Outcome: Progressing     Problem: Adult Inpatient Plan of Care  Goal: Patient-Specific Goal (Individualized)  Description: You can add care plan individualizations to a care plan. Examples of Individualization might be:  \"Parent requests to be called daily at 9am for status\", \"I have a hard time hearing out of my right ear\", or \"Do not touch me to wake me up as it startles  me\".  Outcome: Progressing     Problem: Adult Inpatient Plan of Care  Goal: Absence of Hospital-Acquired Illness or Injury  Intervention: Identify and Manage Fall Risk  Recent Flowsheet Documentation  Taken 3/14/2025 0900 by Win Cross RN  Safety Promotion/Fall Prevention:   activity supervised   assistive device/personal items within reach     Problem: Delirium  Goal: Optimal Coping  Outcome: Progressing   Goal Outcome Evaluation:  Patient alert oriented x 4, able to verbalize needs, up to chair for meals, O2 sats 91% 3 L, surgical dressing right hip CDI, numbness in right quadricept. Oxycodone 10 mg given x2 this shift, IV dilaudid given.                      "

## 2025-03-14 NOTE — PROGRESS NOTES
Cross cover note:    Was notified the patient was having muscle spasms.  Ordered Flexeril 5 mg once as needed

## 2025-03-14 NOTE — PROGRESS NOTES
"CLINICAL NUTRITION SERVICES - ASSESSMENT NOTE    RECOMMENDATIONS FOR MDs/PROVIDERS TO ORDER:    Malnutrition Status:    Moderate malnutrition in the context of chronic illness  Malnutrition Present on Admission: Yes    Registered Dietitian Interventions:  Ensure enlive BID, Magic Cup daily, Yogurt daily     Future/Additional Recommendations:  Monitor po, weight, labs, I/Os.      REASON FOR ASSESSMENT  Positive admission nutrition risk screen    HPI: Pt with history of tobacco use, COPD, leukemia, and prior bone marrow transplant admitted on 3/13/2025 with right hip fracture after mechanical fall.     SUBJECTIVE INFORMATION  Assessed patient in room.    NUTRITION HISTORY  Pt reports good appetite and po intakes, eats meals TID + 2-3 snacks/day. Pt chooses high fat foods often. Pt reports difficulty maintaining weight at baseline, CBW is up now. Pt taking nutrition supplements ensure/boost occasionally but too expensive at times. Pt denies any nausea, vomiting or abd pain. Pt interested in nutrition supplements and scheduled evening snack.     CURRENT NUTRITION ORDERS  Diet: Regular    CURRENT INTAKE/TOLERANCE  Pt consuming 100% x2 meals this AM- total 1608 kcal and 66 g protein     LABS  Nutrition-relevant labs: Reviewed     MEDICATIONS  Nutrition-relevant medications: Continuous IVF LR 75 ml/hr   Scheduled miralax, senna, Vit B complex with Vit C, Vit D3    ANTHROPOMETRICS  Height: 172.7 cm (5' 8\")  Most Recent Weight: 47.9 kg (105 lb 11.2 oz)  IBW: 63.6 kg  BMI (kg/m ): Underweight BMI < 18.5  Weight History: No significant wt changes x1 year   Wt Readings from Last 10 Encounters:   03/14/25 47.9 kg (105 lb 11.2 oz)   02/06/25 42.3 kg (93 lb 3.2 oz)   01/07/25 42.2 kg (93 lb)   12/24/24 44.6 kg (98 lb 5.2 oz)   12/13/24 45.6 kg (100 lb 8 oz)   10/15/24 45.7 kg (100 lb 11.2 oz)   10/10/24 45.4 kg (100 lb)   09/19/24 45.6 kg (100 lb 9.6 oz)   08/19/24 46.4 kg (102 lb 5 oz)   06/25/24 45.8 kg (101 lb)   06/13/24  47.8 " kg (105 lb 4.8 oz)  03/22/24  48.1 kg (106 lb)   02/10/24  52.2 kg (115 lb 1.3 oz)     Dosing Weight: 63.6 kg, based on ideal wt    ASSESSED NUTRITION NEEDS  Estimated Energy Needs: 7117-6864+ kcals/day (30 - 35+ kcals/kg)  Justification: Increased needs and Underweight  Estimated Protein Needs: 63-76 grams protein/day (1 - 1.2+ grams of pro/kg)  Justification: Increased needs  Estimated Fluid Needs: 1597-1081 mL/day (25 - 30 mL/kg)  Justification: Maintenance    SYSTEM FINDINGS    BM: last 3/13   Skin/wounds: Incision/surgical sites     MALNUTRITION  % Intake: No decreased intake noted  % Weight Loss: None noted  Subcutaneous Fat Loss: Orbital: Moderate, Buccal: Moderate, and Triceps: Moderate  Muscle Loss: Temples (temporalis muscle): Severe, Clavicles (pectoralis and deltoids): Moderate, Shoulders (deltoids): Moderate, and Interosseous muscles: Moderate  Fluid Accumulation/Edema: None noted  Malnutrition Diagnosis: Moderate malnutrition in the context of chronic illness  Malnutrition Present on Admission: Yes    NUTRITION DIAGNOSIS  Malnutrition (undernutrition) related to chronic disease as evidenced by moderate/severe muscle and fat losses.     INTERVENTIONS  Medical food supplement therapy- Ensure enlive BID, Magic Cup daily, Yogurt daily   Discussed strategies for further weight gain     Goals  Meet nutrition needs      Monitoring/Evaluation  Progress toward goals will be monitored and evaluated per policy.

## 2025-03-14 NOTE — PROGRESS NOTES
"Orthopedic Progress Note      Assessment: 1 Day Post-Op  S/P Procedure(s):  INTERNAL FIXATION, FRACTURE, TROCHANTERIC, RIGHT HIP, USING RODS, USING HANA TABLE     Plan:   - Continue PT/OT  - Weightbearing status: WBAT RLE  - Activity: Up with assist and assistive device until independent.  - Anticoagulation: ASA 81 PO BID in addition to SCDs, em stockings and early ambulation.  - Antibiotics: 24 hours IV periop.  Keflex 500 QID IP, discharge on duricef for total 7 days postop  - Pain Management; continue current regimen  - Diet: progress diet as tolerated  - Labs: hgb 10.1, transfuse if <7.0. No indication today  - Dressing: Keep dry and intact  - Elevation: Elevate RLE on pillow to keep above the level of the heart as much as possible  - Follow-up: Outpatient follow up in 2 weeks  - Disposition: Anticipate discharge TCU pending medical stability, placement      Subjective:  Pain: moderate  Nausea, Vomiting:  No  Lightheadedness, Dizziness:  No  Neuro:  Patient denies new onset numbness or paresthesias  Fever, chills: No  Chest pain: No  SOB: No    Patient reports feeling well today. Patient reports pain is tolerable with current pain regimen. Patient eating and drinking well. Patient voiding and passing gas however no BM.  She got up to the chair this morning but was not able to stay in chair long. All questions/concerns answered.      Objective:  /65 (BP Location: Left arm)   Pulse 88   Temp 98.3  F (36.8  C) (Oral)   Resp 20   Ht 1.727 m (5' 8\")   Wt 47.9 kg (105 lb 11.2 oz)   LMP  (LMP Unknown)   SpO2 93%   BMI 16.07 kg/m      The patient is A&Ox3. Appears comfortable, sitting up in bed  Calves without tenderness, neg Dick's  Brisk capillary refill in the toes.   Palpable Right dorsalis pedis pulse. Right foot warm & well-perfused.  Sensation is intact to light touch & equal bilaterally in the femoral, DP, SP & tibial nerve distributions.  ROM: Appropriately flexes & extends all toes bilaterally. "   Motor: +5/5 dorsiflexion, plantar flexion & EHL bilaterally.   Leg lengths equal.  Dressing C/D/I without strikethrough, no surrounding erythema.      No drain     Pertinent Labs   Lab Results: personally reviewed.   Lab Results   Component Value Date    INR 1.05 03/13/2025    INR 0.97 09/15/2010    INR 1.02 06/30/2010     Lab Results   Component Value Date    WBC 10.4 03/14/2025    HGB 10.1 (L) 03/14/2025    HCT 30.8 (L) 03/14/2025    MCV 90 03/14/2025     03/14/2025     Lab Results   Component Value Date     03/14/2025    CO2 32 (H) 03/14/2025         Report completed by:  ANTONI SAUNDERS PA-C  Date: 03/14/2025  [facility]

## 2025-03-14 NOTE — OR NURSING
Pt in pain, anesthesia aware but unable to put in orders at this time due to them doing a procedure in OB. Pt aware and will continue to wait for pain orders. No other anesthesia provider available.

## 2025-03-14 NOTE — ANESTHESIA CARE TRANSFER NOTE
Patient: Jenn Barclay    Procedure: Procedure(s):  INTERNAL FIXATION, FRACTURE, TROCHANTERIC, HIP, USING PINS OR RODS, USING HANA TABLE       Diagnosis: Closed displaced intertrochanteric fracture of right femur, initial encounter (H) [S72.141A]  Diagnosis Additional Information: No value filed.    Anesthesia Type:   General     Note:    Oropharynx: oropharynx clear of all foreign objects and spontaneously breathing  Level of Consciousness: drowsy  Oxygen Supplementation: face mask  Level of Supplemental Oxygen (L/min / FiO2): 8  Independent Airway: airway patency satisfactory and stable  Dentition: dentition unchanged  Vital Signs Stable: post-procedure vital signs reviewed and stable  Report to RN Given: handoff report given  Patient transferred to: PACU    Handoff Report: Identifed the Patient, Identified the Reponsible Provider, Reviewed the pertinent medical history, Discussed the surgical course, Reviewed Intra-OP anesthesia mangement and issues during anesthesia, Set expectations for post-procedure period and Allowed opportunity for questions and acknowledgement of understanding      Vitals:  Vitals Value Taken Time   /59 03/13/25 2100   Temp     Pulse 80 03/13/25 2103   Resp 9 03/13/25 2103   SpO2 99 % 03/13/25 2103   Vitals shown include unfiled device data.    Electronically Signed By: LYSSA Estrada CRNA  March 13, 2025  9:04 PM

## 2025-03-14 NOTE — OP NOTE
PATIENT: Jenn Barclay  MR# :   [id number]  DATE OF OPERATION: 03/13/25    SURGEON:  Sidney Catalan MD     ASSISTANT:  Tremayne Tomlin PA-C     A skilled assistant was required due to the nature of the case for patient position, retraction, exposure, implant placement, closure and dressing application.      Preoperative diagnosis: Right proximal femur fracture, intertrochanteric  Prior right proximal femur fracture status post internal fixation and eventual hardware removal  Cachexia  AML status post bone marrow transplant  COPD  Tobacco use  Osteoporosis complicated by prior insufficiency fractures    Postoperative diagnosis: Right proximal femur fracture, intertrochanteric  Prior right proximal femur fracture status post internal fixation and eventual hardware removal  Cachexia  AML status post bone marrow transplant  COPD  Tobacco use  Osteoporosis complicated by prior insufficiency fractures    Surgical procedure: Right proximal femur internal fixation   placement of right proximal femur short cephalomedullary nail    Anesthesia: Regional and GETA    Drains: None    Estimated blood loss: 100 cc    IV fluids: Please see Anesthesia Report    Complications: None identified intraoperatively     Specimens:   ID Type Source Tests Collected by Time Destination   1 : RIGHT PROXIMAL FEMUR INTRAMEDULLARY BONE Tissue Femur, Right SURGICAL PATHOLOGY EXAM Sidney Catalan MD 3/13/2025  8:20 PM        Findings: Right proximal femur intertrochanteric fracture, displaced    COMPONENTS IMPLANTED:  Implant Name Type Inv. Item Serial No.  Lot No. LRB No. Used Action   IMP NAIL SYN CAN FEM PROX TFNA [id number] [id number] 04.037.112S - [id number] Metallic Hardware/[place] IMP NAIL SYN CAN FEM PROX TFNA [id number] 125D [id number]  [place]  Right 1 Implanted   IMP SCR SYN TFNA FENESTRATED LAG 95MM 04.038.195S - [id number] Metallic Hardware/[place] IMP SCR SYN TFNA FENESTRATED LAG 95MM [id number]  [employer]   Right 1 Implanted   SCREW BN 34MM 5MM LCK X25 STRL LF IM NL SYS 04.045.034TS - [id number] Metallic Hardware/[place] SCREW BN 34MM 5MM LCK X25 STRL LF IM NL SYS [id number]  [employer]  Right 1 Implanted         INDICATIONS:    Jenn Barclay is a 69 year old female who was admitted for right proximal femur fracture after ground-level fall.    Preoperatively, the nature of the procedure, risks and benefits, as well as alternatives including nonsurgical management were discussed in detail with the patient. I reviewed and discussed the patient's condition and relevant images with the patient. We discussed options for further evaluation and treatment, including conservative non-operative management versus surgical intervention.      From a conservative standpoint, the patient can pursue non-operative management, which would include protected weight bearing to the lower extremity, which carries a high risk of morbidity and mortality due to prolonged and diminished mobilization/ambulation and it's associated complications, which include but are not limited to blood clots (DVT/PE), skin ulcerations and pressure sores, and pneumonia. In the long term, there is also the risk of chronic pain, fracture nonunion, fracture malunion, and avascular necrosis of the femoral head.    From a surgical standpoint, we discussed closed reduction vs open reduction and internal fixation. Given, the patient's fracture morphology and degree of displacement, internal fixation would also carry the risks of chronic pain, fracture nonunion, fracture malunion, and avascular necrosis of the femoral head. However, this procedure would likely be quicker, requiring less time under anesthesia and blood loss when compared to hip arthroplasty.    Given the above findings and with shared decision making, we elected to proceed with internal fixation of the proximal femur.    We also discussed at length the risks and benefits of implant surgery. Our  discussion included but was not limited to the risk of pain, bleeding, infection, blood clots (DVT, PE), wound issues, continued chronic pain in the hip, post-operative joint stiffness, painful arc of motion, difficulty with ambulation, iatrogenic fracture, damage to nearby neurovascular structures, implant loosening and/or failure requiring revision, and possible post-operative leg length discrepancy (apparent or actual). The possibility of intra-operative and/or post-operative medical complications such as anesthesia complications or reactions, respiratory and cardiovascular events, stroke, heart attack and/or death were also discussed. In the case of infection of the joint, the patient understands that this will require prolonged IV antibiotic therapy and possible multiple operative procedures in the future.     The patient demonstrated an understanding of these risks as well as the potential benefits of surgery which would include possible improvement in pain, range of motion, and early ambulation without the long term concerns of fracture nonunion/malunion or avascular necrosis that would be associated with non-operative management or internal fixation. The patient demonstrated an understanding of the indications of surgery and all possible complications, and together we have decided to proceed with surgery. Specific details of the surgical procedure, hospitalization, recovery, rehabilitation, and long-term precautions were also presented. The final choices will be made at the time of procedure to match the anatomy and condition of the bone, ligaments, tendons, and muscles. All of patients questions were answered preoperatively at which time informed consent was taken.      PROCEDURE:    After proper identification of the patient including verification and marking of the surgical site, the patient was brought to the operating room. General anesthesia was given without complications and prophylactic IV Ancef was  confirmed to have been administered within the appropriate timeframe(s). The patient was placed in the supine position on the fracture table.  Perineal post was in place.  Chest drop and ipsilateral arm padded wall placed over the torso and secured. All bony prominences were well padded. IV tranexamic acid was also given.      We utilized fluoroscopy with orthogonal views to confirm appropriate alignment of the fracture site prior to prepping.    The surgical site which was properly marked, was then prepped and draped in the usual sterile fashion. A timeout was done utilizing protocol to again identify the correct patient and operative site, which was marked appropriately.    A sharp incision was made with a 10 blade through the skin in line with the femur just proximal to the palpable tip of the greater trochanter.  This was performed through the skin, subcutaneous tissue and abductor fascia down to the tip the greater trochanter.  We then placed the threaded guide pin to the appropriate starting position of the proximal femur.  This was passed on power into the proximal intramedullary canal, confirmed with orthogonal fluoroscopic views.  We then open the proximal femur with a trephine reamer.  The trephine reamer and threaded guidepin were removed.  Based on preoperative measurements, we elected to select and place an 11 x 170 mm right proximal femur cephalomedullary nail with a 125 degree femoral neck angle.  The nail component was placed by hand to the appropriate depth.  By utilizing the lateral aiming arm, we then placed a threaded guidepin just inferior and slightly posterior to the center center position of the femoral head and neck in order to avoid prior surgical corridor with poor bone quality as seen on the CT scan.  This was measured and the lateral cortical bone was reamed.  The length of the cannulated screw was reamed.  A 95 mm cannulated partially-threaded screw was then passed by hand into the  femoral head and neck with good bite.  The setscrew was placed, allowing for a quarter turn off to assist with compression of the fracture site with weightbearing.  We utilized the lateral aiming arm to drill and placed the distal interlocking bicortical screw with excellent bite as well.  Once all components were in position, we removed the lateral aiming arm and took final fluoroscopic images confirming maintenance of the fracture reduction, and appropriate placement of the hardware.  The incisions were thoroughly irrigated with sterile saline.  The deep fascial closure was performed with interrupted 0 Vicryl suture, followed by subcutaneous closure with interrupted 2-0 Vicryl suture.  The skin was closed with staples, covered with Xeroform and Mepilex dressing.  There were no known complications.  The patient was allowed to transferred to the hospital bed in stable condition.  She was allowed to wake in the OR suite, and then transferred to the PACU for ongoing postoperative hemodynamic monitoring.    Sponge, needle, and instrument counts were correct at the conclusion of the procedure. The patient has palpable distal pulses in both lower extremity.     Postoperative Plan:  Pain Control: Continue per pain protocol.  Nutrition: Encourage high-calorie/protein shakes to assist with healing soft tissue  Weight Bearing: Weight bearing as tolerated on affected lower extremity.   DVT Prophylaxis: ASA 81 mg twice daily starting POD 1  Antibiotics: 24 hours of perioperative antibiotics + Keflex 500mg QID PO while inpatient, and discharge with Duricef 500mg BID PO (total of 7 days postop)  GI: Plan for aggressive bowel regimen to prevent constipation from narcotic medications  Lines: HLIV once tolerating PO  PT/OT: Eval and treatment. Will follow up on recommendations.  Follow up:  in 2 weeks in clinic for wound check  Discharge plan: to home    Dispo: stable to pacu    Sidney Catalan MD  Orthopedic Surgery  [facility]

## 2025-03-14 NOTE — PROGRESS NOTES
"[facility]    Medicine Progress Note - Hospitalist Service    Date of Admission:  3/13/2025    Assessment & Plan   69 year old female with history of tobacco use, COPD, leukemia, and prior bone marrow transplant admitted on 3/13/2025 with right hip fracture after mechanical fall.         Right hip fracture after mechanical fall  Plain film showed minimally displaced right intertrochanteric femur fracture   CT showed mildly impacted fracture of the base of the right femoral neck with intertrochanteric extension as well as possible hematoma posterior to the right hip   S/P surgical fixation 3/13/25  -- post op cares per ortho      Post op acute hypoxemic respiratory failure:  Likely related to COPD, active smoking and atelectasis  -- push IS  -- Wean oxygen to keep SPO2>92%        COPD:  -- continue home inhalers        Tobacco dependence: continue replacement Rx              Diet: Advance Diet as Tolerated: Regular Diet Adult  Discharge Instruction - Regular Diet Adult    DVT Prophylaxis: ASA 81 mg PO BID    Panda Catheter: Not present  Lines: None     Cardiac Monitoring: None  Code Status: Full Code      Clinically Significant Risk Factors Present on Admission          # Hyperchloremia: Highest Cl = 108 mmol/L in last 2 days, will monitor as appropriate                   # Anemia: based on hgb <11       # Cachexia: Estimated body mass index is 16.07 kg/m  as calculated from the following:    Height as of this encounter: 1.727 m (5' 8\").    Weight as of this encounter: 47.9 kg (105 lb 11.2 oz).       # Financial/Environmental Concerns: none         Disposition Plan   Medically Ready for Discharge: Anticipated Tomorrow      José Manuel Parker DO  Hospitalist Service  [facility]  Securely message with [employer] (more info)  Text page via [email or web address] Paging/Directory   ______________________________________________________________________    Interval History   NAD. Denies any " complaints    Physical Exam   Vital Signs: Temp: 98.3  F (36.8  C) Temp src: Oral BP: 117/65 Pulse: 88   Resp: 20 SpO2: 93 % O2 Device: Nasal cannula Oxygen Delivery: 3 LPM  Weight: 105 lbs 11.2 oz  General: NAD  RESPIRATORY: Breathing nonlabored  CARDIOVASCULAR: No le edema bilat.   NEUROLOGIC: Motor and sensory intact, speech clear      >50 MINUTES SPENT BY ME on the date of service doing chart review, history, exam, documentation & further activities per the note.  Data

## 2025-03-14 NOTE — ANESTHESIA POSTPROCEDURE EVALUATION
Patient: Jenn Barclay    Procedure: Procedure(s):  INTERNAL FIXATION, FRACTURE, TROCHANTERIC, HIP, USING PINS OR RODS, USING HANA TABLE       Anesthesia Type:  General    Note:  Disposition: Inpatient   Postop Pain Control: Uneventful            Sign Out: Well controlled pain   PONV: No   Neuro/Psych: Uneventful            Sign Out: Acceptable/Baseline neuro status   Airway/Respiratory: Uneventful            Sign Out: Acceptable/Baseline resp. status   CV/Hemodynamics: Uneventful            Sign Out: Acceptable CV status; No obvious hypovolemia; No obvious fluid overload   Other NRE: NONE   DID A NON-ROUTINE EVENT OCCUR? No           Last vitals:  Vitals Value Taken Time   /64 03/13/25 2145   Temp 36.6  C (97.8  F) 03/13/25 2130   Pulse 74 03/13/25 2145   Resp 24 03/13/25 2145   SpO2 100 % 03/13/25 2145   Vitals shown include unfiled device data.    Electronically Signed By: Gio Rodriguez MD  March 13, 2025  9:47 PM

## 2025-03-14 NOTE — PROGRESS NOTES
03/14/25 0830   Appointment Info   Signing Clinician's Name / Credentials (PT) Dania Sinclair DPT   Living Environment   People in Home grandchild(jocy);friend(s)  (Grandson and Roommate)   Current Living Arrangements mobile home   Home Accessibility stairs to enter home   Number of Stairs, Main Entrance 4;5   Stair Railings, Main Entrance railings safe and in good condition   Transportation Anticipated health plan transportation;family or friend will provide   Self-Care   Usual Activity Tolerance good   Current Activity Tolerance poor   Equipment Currently Used at Home none  (owns SEC, FWW, 4WW and power scooter)   General Information   Onset of Illness/Injury or Date of Surgery 03/13/25   Referring Physician Sidney Catalan MD   Patient/Family Therapy Goals Statement (PT) return home   Pertinent History of Current Problem (include personal factors and/or comorbidities that impact the POC) S/P Procedure(s):  INTERNAL FIXATION, FRACTURE, TROCHANTERIC, RIGHT HIP, USING RODS, USING HANA TABLE   Existing Precautions/Restrictions fall   Strength (Manual Muscle Testing)   Strength (Manual Muscle Testing) Deficits observed during functional mobility   Bed Mobility   Bed Mobility supine-sit   Supine-Sit [place] (Bed Mobility) moderate assist (50% patient effort);1 person assist   Bed Mobility Limitations decreased ability to use legs for bridging/pushing;impaired ability to control trunk for mobility   Impairments Contributing to Impaired Bed Mobility pain;decreased strength   Transfers   Transfers sit-stand transfer   Sit-Stand Transfer   Sit-Stand [place] (Transfers) maximum assist (25% patient effort);1 person assist   Assistive Device (Sit-Stand Transfers) walker, front-wheeled   Gait/Stairs (Locomotion)   Comment, (Gait/Stairs) n/a- pt unable to ambulate functional distance during eval.   Clinical Impression   Criteria for Skilled Therapeutic Intervention Yes, treatment indicated   PT Diagnosis (PT)  Impaired functional mobility   Influenced by the following impairments Pain, weakness, post-op   Functional limitations due to impairments Impaired strength, transfers, gait   Clinical Presentation (PT Evaluation Complexity) stable   Clinical Presentation Rationale Presents as diagnosed   Clinical Decision Making (Complexity) moderate complexity   Planned Therapy Interventions (PT) balance training;bed mobility training;gait training;home exercise program;stair training;strengthening;transfer training   Risk & Benefits of therapy have been explained patient   PT Total Evaluation Time   PT Eval, Moderate Complexity Minutes (23300) 10   Physical Therapy Goals   PT Frequency 2x/day   PT Predicted Duration/Target Date for Goal Attainment 03/21/25   PT Goals Bed Mobility;Transfers;Gait;Stairs   PT: Bed Mobility Independent;Supine to/from sit   PT: Transfers Modified independent;Sit to/from stand;Bed to/from chair;Assistive device   PT: Gait Supervision/stand-by assist;Rolling walker;50 feet   PT: Stairs Supervision/stand-by assist;4 stairs;5 stairs   PT Discharge Planning   PT Plan progress transfers, amb with chair follow, hip protocol ex   PT Discharge Recommendation (DC Rec) Transitional Care Facility   PT Rationale for DC Rec Unable to ambulate household distance. 4-5 stairs to enter home. TCU safest d/c plan at this time.   PT Brief overview of current status Bed > recliner, mod Ax1 with FWW.   PT Total Distance Amb During Session (feet) 5   PT Equipment Needed at Discharge walker, rolling;wheelchair   Physical Therapy Time and Intention   Total Session Time (sum of timed and untimed services) 10       Dania Sinclair, PT, DPT  3/14/2025

## 2025-03-15 ENCOUNTER — APPOINTMENT (OUTPATIENT)
Dept: OCCUPATIONAL THERAPY | Facility: HOSPITAL | Age: 70
DRG: 480 | End: 2025-03-15
Attending: INTERNAL MEDICINE
Payer: COMMERCIAL

## 2025-03-15 ENCOUNTER — APPOINTMENT (OUTPATIENT)
Dept: PHYSICAL THERAPY | Facility: HOSPITAL | Age: 70
DRG: 480 | End: 2025-03-15
Attending: INTERNAL MEDICINE
Payer: COMMERCIAL

## 2025-03-15 ENCOUNTER — APPOINTMENT (OUTPATIENT)
Dept: ULTRASOUND IMAGING | Facility: HOSPITAL | Age: 70
DRG: 480 | End: 2025-03-15
Attending: INTERNAL MEDICINE
Payer: COMMERCIAL

## 2025-03-15 LAB
ANION GAP SERPL CALCULATED.3IONS-SCNC: <1 MMOL/L (ref 7–15)
BUN SERPL-MCNC: 15.8 MG/DL (ref 8–23)
CALCIUM SERPL-MCNC: 8.7 MG/DL (ref 8.8–10.4)
CHLORIDE SERPL-SCNC: 101 MMOL/L (ref 98–107)
CREAT SERPL-MCNC: 0.68 MG/DL (ref 0.51–0.95)
EGFRCR SERPLBLD CKD-EPI 2021: >90 ML/MIN/1.73M2
ERYTHROCYTE [DISTWIDTH] IN BLOOD BY AUTOMATED COUNT: 13.2 % (ref 10–15)
GLUCOSE SERPL-MCNC: 110 MG/DL (ref 70–99)
HCO3 SERPL-SCNC: 36 MMOL/L (ref 22–29)
HCT VFR BLD AUTO: 28.6 % (ref 35–47)
HGB BLD-MCNC: 9.2 G/DL (ref 11.7–15.7)
MCH RBC QN AUTO: 29.4 PG (ref 26.5–33)
MCHC RBC AUTO-ENTMCNC: 32.2 G/DL (ref 31.5–36.5)
MCV RBC AUTO: 91 FL (ref 78–100)
PLATELET # BLD AUTO: 184 10E3/UL (ref 150–450)
POTASSIUM SERPL-SCNC: 4.3 MMOL/L (ref 3.4–5.3)
RBC # BLD AUTO: 3.13 10E6/UL (ref 3.8–5.2)
SODIUM SERPL-SCNC: 137 MMOL/L (ref 135–145)
WBC # BLD AUTO: 13.5 10E3/UL (ref 4–11)

## 2025-03-15 PROCEDURE — 80048 BASIC METABOLIC PNL TOTAL CA: CPT | Performed by: INTERNAL MEDICINE

## 2025-03-15 PROCEDURE — 120N000001 HC R&B MED SURG/OB

## 2025-03-15 PROCEDURE — 97116 GAIT TRAINING THERAPY: CPT | Mod: GP

## 2025-03-15 PROCEDURE — 250N000013 HC RX MED GY IP 250 OP 250 PS 637: Performed by: STUDENT IN AN ORGANIZED HEALTH CARE EDUCATION/TRAINING PROGRAM

## 2025-03-15 PROCEDURE — 97535 SELF CARE MNGMENT TRAINING: CPT | Mod: GO

## 2025-03-15 PROCEDURE — 97530 THERAPEUTIC ACTIVITIES: CPT | Mod: GP

## 2025-03-15 PROCEDURE — 82374 ASSAY BLOOD CARBON DIOXIDE: CPT | Performed by: INTERNAL MEDICINE

## 2025-03-15 PROCEDURE — 93971 EXTREMITY STUDY: CPT | Mod: RT

## 2025-03-15 PROCEDURE — 97110 THERAPEUTIC EXERCISES: CPT | Mod: GP

## 2025-03-15 PROCEDURE — 85027 COMPLETE CBC AUTOMATED: CPT | Performed by: INTERNAL MEDICINE

## 2025-03-15 PROCEDURE — 99232 SBSQ HOSP IP/OBS MODERATE 35: CPT | Performed by: INTERNAL MEDICINE

## 2025-03-15 PROCEDURE — 36415 COLL VENOUS BLD VENIPUNCTURE: CPT | Performed by: INTERNAL MEDICINE

## 2025-03-15 RX ADMIN — ASPIRIN 81 MG: 81 TABLET, COATED ORAL at 09:15

## 2025-03-15 RX ADMIN — SENNOSIDES AND DOCUSATE SODIUM 1 TABLET: 50; 8.6 TABLET ORAL at 20:14

## 2025-03-15 RX ADMIN — Medication 3 MG: at 00:58

## 2025-03-15 RX ADMIN — CEPHALEXIN 500 MG: 500 CAPSULE ORAL at 06:52

## 2025-03-15 RX ADMIN — SENNOSIDES AND DOCUSATE SODIUM 1 TABLET: 50; 8.6 TABLET ORAL at 09:14

## 2025-03-15 RX ADMIN — OXYCODONE HYDROCHLORIDE 10 MG: 5 TABLET ORAL at 00:58

## 2025-03-15 RX ADMIN — Medication 2.5 MEQ: at 09:15

## 2025-03-15 RX ADMIN — ACETAMINOPHEN 975 MG: 325 TABLET ORAL at 13:06

## 2025-03-15 RX ADMIN — B-COMPLEX W/ C & FOLIC ACID TAB 1 TABLET: TAB at 09:15

## 2025-03-15 RX ADMIN — OXYCODONE HYDROCHLORIDE 10 MG: 5 TABLET ORAL at 20:14

## 2025-03-15 RX ADMIN — ACETAMINOPHEN 975 MG: 325 TABLET ORAL at 09:14

## 2025-03-15 RX ADMIN — OXYCODONE HYDROCHLORIDE 10 MG: 5 TABLET ORAL at 10:52

## 2025-03-15 RX ADMIN — CEPHALEXIN 500 MG: 500 CAPSULE ORAL at 17:08

## 2025-03-15 RX ADMIN — CEPHALEXIN 500 MG: 500 CAPSULE ORAL at 11:40

## 2025-03-15 RX ADMIN — POLYETHYLENE GLYCOL 3350 17 G: 17 POWDER, FOR SOLUTION ORAL at 09:14

## 2025-03-15 RX ADMIN — OXYCODONE HYDROCHLORIDE 10 MG: 5 TABLET ORAL at 06:52

## 2025-03-15 RX ADMIN — ASPIRIN 81 MG: 81 TABLET, COATED ORAL at 20:14

## 2025-03-15 RX ADMIN — Medication 25 MCG: at 09:15

## 2025-03-15 RX ADMIN — ACETAMINOPHEN 975 MG: 325 TABLET ORAL at 20:14

## 2025-03-15 RX ADMIN — CEPHALEXIN 500 MG: 500 CAPSULE ORAL at 00:58

## 2025-03-15 ASSESSMENT — ACTIVITIES OF DAILY LIVING (ADL)
ADLS_ACUITY_SCORE: 39
ADLS_ACUITY_SCORE: 39
ADLS_ACUITY_SCORE: 42
ADLS_ACUITY_SCORE: 39
ADLS_ACUITY_SCORE: 42
ADLS_ACUITY_SCORE: 39
ADLS_ACUITY_SCORE: 37
ADLS_ACUITY_SCORE: 39
ADLS_ACUITY_SCORE: 42
ADLS_ACUITY_SCORE: 39
ADLS_ACUITY_SCORE: 37

## 2025-03-15 NOTE — PROGRESS NOTES
Care Management Follow Up    Length of Stay (days): 2    Expected Discharge Date: 03/16/2025    Anticipated Discharge Plan:   home     Transportation: Anticipate Family/friend    PT Recommendations: Transitional Care Facility  OT Recommendations:  home with assist, home with home care occupational therapy     Barriers to Discharge: medical stability,      Prior Living Situation: mobile home with grandchild(jocy)    Discussed  Partnership in Safe Discharge Planning  document with patient/family: No     Handoff Completed: No, handoff not indicated or clinically appropriate    Patient/Spokesperson Updated: Yes. Who? Patient     Additional Information:    Met with patient to discuss discharge recommendations.  She repeats that she does not want to go to TCU, feels she will be fine going home, reports she will have help from her grandchildren at home.  Inquired on home care; reviewed potential support available for this.  Patient is reluctant on this, not sure how her family will accept this. Reviewed home care is for her needs; also discussed that it's not daily and visit usually @an hour in length.  Patient continues to waiver on this but did say she will think about this.      Next Steps:     CM will follow up with patient later today or in AM to discuss recommendation for home care; will make referral based on her acceptance or not.      MILLY MarinW

## 2025-03-15 NOTE — PROGRESS NOTES
"[facility] [place]    Medicine Progress Note - Hospitalist Service    Date of Admission:  3/13/2025    Assessment & Plan   69 year old female with history of tobacco use, COPD, leukemia, and prior bone marrow transplant admitted on 3/13/2025 with right hip fracture after mechanical fall.         Right hip fracture after mechanical fall  Plain film showed minimally displaced right intertrochanteric femur fracture   CT showed mildly impacted fracture of the base of the right femoral neck with intertrochanteric extension as well as possible hematoma posterior to the right hip   S/P surgical fixation 3/13/25  -- post op cares per ortho      Mild leukocytosis:  -- likely reactive, trend for now      RLE swelling:  Likely post-op changes however due to ongoing pain and patient concern, will obtain RLE US to exclude DVT vs hematoma      Post op acute hypoxemic respiratory failure:  Likely related to COPD, active smoking and atelectasis  -- push IS  -- Wean oxygen to keep SPO2>92%        COPD:  -- continue home inhalers        Tobacco dependence: continue replacement Rx              Diet: Advance Diet as Tolerated: Regular Diet Adult  Discharge Instruction - Regular Diet Adult  Snacks/Supplements Adult: Ensure Enlive; With Meals  Snacks/Supplements Adult: Magic Cup; With Meals  Snacks/Supplements Adult: Other; yogurt; Between Meals    DVT Prophylaxis: ASA 81 mg PO BID    Panda Catheter: Not present  Lines: None     Cardiac Monitoring: None  Code Status: Full Code      Clinically Significant Risk Factors          # Hyperchloremia: Highest Cl = 108 mmol/L in last 2 days, will monitor as appropriate                           # Cachexia: Estimated body mass index is 16.07 kg/m  as calculated from the following:    Height as of this encounter: 1.727 m (5' 8\").    Weight as of this encounter: 47.9 kg (105 lb 11.2 oz)., PRESENT ON ADMISSION  # Moderate Malnutrition: based on nutrition assessment and treatment " provided per dietitian's recommendations., PRESENT ON ADMISSION     # Financial/Environmental Concerns: none         Disposition Plan   Medically Ready for Discharge: Anticipated Tomorrow      José Manuel Parker, DO  Hospitalist Service  [facility]  Securely message with [employer] (more info)  Text page via [employer] Paging/Directory   ______________________________________________________________________    Interval History   NAD. Denies any complaints besides increased RLE pain and swelling    Physical Exam   Vital Signs: Temp: 97.7  F (36.5  C) Temp src: Oral BP: 128/56 Pulse: 70   Resp: 18 SpO2: 99 % O2 Device: Nasal cannula Oxygen Delivery: 3 LPM  Weight: 105 lbs 11.2 oz  General: NAD  RESPIRATORY: Breathing nonlabored  CARDIOVASCULAR: 1+ rle edema   NEUROLOGIC: Motor and sensory intact, speech clear      >50 MINUTES SPENT BY ME on the date of service doing chart review, history, exam, documentation & further activities per the note.  Data

## 2025-03-15 NOTE — PLAN OF CARE
Problem: Adult Inpatient Plan of Care  Goal: Plan of Care Review  Description: The Plan of Care Review/Shift note should be completed every shift.  The Outcome Evaluation is a brief statement about your assessment that the patient is improving, declining, or no change.  This information will be displayed automatically on your shift  note.  Outcome: Progressing     Problem: Adult Inpatient Plan of Care  Goal: Absence of Hospital-Acquired Illness or Injury  Intervention: Identify and Manage Fall Risk  Recent Flowsheet Documentation  Taken 3/15/2025 1045 by Win Cross, RN  Safety Promotion/Fall Prevention:   activity supervised   assistive device/personal items within reach     Problem: Hip Fracture Medical Management  Goal: Optimal Coping with Change in Health Status  Outcome: Progressing   Goal Outcome Evaluation:       Patient alert oriented x 4, received PRN Oxycodone 10 mg, A-1 transfer to commode, O2 sats 97% 2 L NC, surgical dressing CDI. US ordered for right leg.

## 2025-03-15 NOTE — PLAN OF CARE
Problem: Adult Inpatient Plan of Care  Goal: Plan of Care Review  Description: The Plan of Care Review/Shift note should be completed every shift.  The Outcome Evaluation is a brief statement about your assessment that the patient is improving, declining, or no change.  This information will be displayed automatically on your shift  note.  Outcome: Progressing     Problem: Hip Fracture Medical Management  Goal: Optimal Coping with Change in Health Status  Outcome: Progressing     Problem: Adult Inpatient Plan of Care  Goal: Absence of Hospital-Acquired Illness or Injury  Intervention: Identify and Manage Fall Risk  Recent Flowsheet Documentation  Taken 3/14/2025 1611 by Jelena Reich RN  Safety Promotion/Fall Prevention:   activity supervised   assistive device/personal items within reach  Goal: Optimal Comfort and Wellbeing  Intervention: Provide Person-Centered Care  Recent Flowsheet Documentation  Taken 3/14/2025 1611 by Jelena Reich RN  Trust Relationship/Rapport: care explained     Problem: Delirium  Goal: Improved Behavioral Control  Intervention: Minimize Safety Risk  Recent Flowsheet Documentation  Taken 3/14/2025 1611 by Jelena Reich RN  Trust Relationship/Rapport: care explained     Problem: Comorbidity Management  Goal: Maintenance of COPD Symptom Control  Intervention: Maintain COPD Symptom Control  Recent Flowsheet Documentation  Taken 3/14/2025 1611 by Jelena Reich RN  Medication Review/Management: medications reviewed     Problem: Fall Injury Risk  Goal: Absence of Fall and Fall-Related Injury  Intervention: Identify and Manage Contributors  Recent Flowsheet Documentation  Taken 3/14/2025 1611 by Jelena Reich RN  Medication Review/Management: medications reviewed  Intervention: Promote Injury-Free Environment  Recent Flowsheet Documentation  Taken 3/14/2025 1611 by Jelena Reich RN  Safety Promotion/Fall Prevention:   activity supervised   assistive device/personal items within  reach     Problem: Stem Cell/Bone Marrow Transplant  Goal: Blood Counts Within Acceptable Range  Intervention: Monitor and Manage Hematologic Symptoms  Recent Flowsheet Documentation  Taken 3/14/2025 1611 by Jelena Reich, RN  Medication Review/Management: medications reviewed  Goal: Absence of Infection  Intervention: Prevent and Manage Infection  Recent Flowsheet Documentation  Taken 3/14/2025 1611 by Jelena Reich, RN  Isolation Precautions: other (comment)   Goal Outcome Evaluation:                  Pt. Alert but forgetful. Oxy given for pain this shift. 3L O2. K+ protocol recheck in am. Continue with POC.

## 2025-03-15 NOTE — PLAN OF CARE
Pt is A&Ox4, calm, cooperative  - vitals stable, on 3L O2 nasal  - complained of 8/10 pain in the right hip, radiating to low back       -> 10 mg oxy given @0100 & 0700  - PRN melatonin given for sleep  - Right hip dressing: scant drainage, outlined  - potassium recheck this AM  - voiding well with bedpan  - POD1 glucose: 110    Problem: Adult Inpatient Plan of Care  Goal: Absence of Hospital-Acquired Illness or Injury  Intervention: Identify and Manage Fall Risk  Recent Flowsheet Documentation  Taken 3/15/2025 0040 by Kristine Lambert RN  Safety Promotion/Fall Prevention:   activity supervised   assistive device/personal items within reach   nonskid shoes/slippers when out of bed   patient and family education   lighting adjusted   clutter free environment maintained   safety round/check completed     Problem: Adult Inpatient Plan of Care  Goal: Absence of Hospital-Acquired Illness or Injury  Intervention: Prevent and Manage VTE (Venous Thromboembolism) Risk  Recent Flowsheet Documentation  Taken 3/15/2025 0040 by Kristine Lambert RN  VTE Prevention/Management: SCDs on (sequential compression devices)     Problem: Adult Inpatient Plan of Care  Goal: Optimal Comfort and Wellbeing  Intervention: Monitor Pain and Promote Comfort  Recent Flowsheet Documentation  Taken 3/15/2025 0058 by Kristine Lambert RN  Pain Management Interventions:   medication (see MAR)   pillow support provided   quiet environment facilitated   rest   repositioned     Problem: Hip Fracture Medical Management  Goal: Optimal Coping with Change in Health Status  Intervention: Support Psychosocial Response to Injury  Recent Flowsheet Documentation  Taken 3/15/2025 0040 by Kristine Lambert RN  Supportive Measures:   active listening utilized   verbalization of feelings encouraged   positive reinforcement provided     Problem: Comorbidity Management  Goal: Maintenance of COPD Symptom Control  3/15/2025 0433 by Kristine Lambert RN  Outcome:  Progressing

## 2025-03-15 NOTE — PROGRESS NOTES
"Ortho Progress Note      Subjective:  The patient has pain in her right hip this morning.  She is having some difficulty with mobilization.  She denies chest pain, shortness of breath, and has been voiding.    Objective:  Vital signs in last 24 hours  Temp:  [97.7  F (36.5  C)-98  F (36.7  C)] 97.7  F (36.5  C)  Pulse:  [70-72] 70  Resp:  [18] 18  BP: (107-128)/(56-65) 128/56  SpO2:  [98 %-99 %] 99 %  The patient is A&Ox3. Appears comfortable, sitting up in bed  Calves without tenderness, neg Dick's  Brisk capillary refill in the toes.   Palpable Right dorsalis pedis pulse. Right foot warm & well-perfused.  Sensation is intact to light touch & equal bilaterally in the femoral, DP, SP & tibial nerve distributions.  ROM: Appropriately flexes & extends all toes bilaterally.   Motor: +5/5 dorsiflexion, plantar flexion & EHL bilaterally.   Dressing C/D/I without strikethrough, no surrounding erythema.      Pertinent Labs   Lab Results: personally reviewed.   Recent Labs   Lab 03/13/25  1334   INR 1.05     Recent Labs   Lab 03/15/25  0540   HGB 9.2*     No results for input(s): \"CREATININE\" in the last 168 hours.    Assessment: 2 Days Post-Op   Procedure(s):  INTERNAL FIXATION, FRACTURE, TROCHANTERIC, RIGHT HIP, USING RODS, USING HANA TABLE     Plan:   - Continue PT/OT  - Weightbearing status: WBAT RLE  - Activity: Up with assist and assistive device until independent.  - Anticoagulation: ASA 81 PO BID in addition to SCDs, em stockings and early ambulation.  - Antibiotics: 24 hours IV periop.  Keflex 500 QID IP, discharge on duricef for total 7 days postop  - Pain Management; continue current regimen  - Diet: progress diet as tolerated  - Labs: hgb 9.2, transfuse if <7.0. No indication today  - Dressing: Keep dry and intact  - Elevation: Elevate RLE on pillow to keep above the level of the heart as much as possible  - Follow-up: Outpatient follow up in 2 weeks  - Disposition: Anticipate discharge TCU pending medical " stability, placement    Rocael Morgan MD  [facility]

## 2025-03-16 ENCOUNTER — APPOINTMENT (OUTPATIENT)
Dept: OCCUPATIONAL THERAPY | Facility: HOSPITAL | Age: 70
DRG: 480 | End: 2025-03-16
Attending: INTERNAL MEDICINE
Payer: COMMERCIAL

## 2025-03-16 ENCOUNTER — APPOINTMENT (OUTPATIENT)
Dept: PHYSICAL THERAPY | Facility: HOSPITAL | Age: 70
DRG: 480 | End: 2025-03-16
Attending: INTERNAL MEDICINE
Payer: COMMERCIAL

## 2025-03-16 LAB
ERYTHROCYTE [DISTWIDTH] IN BLOOD BY AUTOMATED COUNT: 13.6 % (ref 10–15)
HCT VFR BLD AUTO: 29.8 % (ref 35–47)
HGB BLD-MCNC: 9.8 G/DL (ref 11.7–15.7)
MCH RBC QN AUTO: 30.2 PG (ref 26.5–33)
MCHC RBC AUTO-ENTMCNC: 32.9 G/DL (ref 31.5–36.5)
MCV RBC AUTO: 92 FL (ref 78–100)
PLATELET # BLD AUTO: 206 10E3/UL (ref 150–450)
POTASSIUM SERPL-SCNC: 4.4 MMOL/L (ref 3.4–5.3)
RBC # BLD AUTO: 3.24 10E6/UL (ref 3.8–5.2)
WBC # BLD AUTO: 10.6 10E3/UL (ref 4–11)

## 2025-03-16 PROCEDURE — 120N000001 HC R&B MED SURG/OB

## 2025-03-16 PROCEDURE — 97150 GROUP THERAPEUTIC PROCEDURES: CPT | Mod: GP

## 2025-03-16 PROCEDURE — 97535 SELF CARE MNGMENT TRAINING: CPT | Mod: GO

## 2025-03-16 PROCEDURE — 84132 ASSAY OF SERUM POTASSIUM: CPT | Performed by: INTERNAL MEDICINE

## 2025-03-16 PROCEDURE — 36415 COLL VENOUS BLD VENIPUNCTURE: CPT | Performed by: INTERNAL MEDICINE

## 2025-03-16 PROCEDURE — 250N000013 HC RX MED GY IP 250 OP 250 PS 637: Performed by: STUDENT IN AN ORGANIZED HEALTH CARE EDUCATION/TRAINING PROGRAM

## 2025-03-16 PROCEDURE — 99232 SBSQ HOSP IP/OBS MODERATE 35: CPT | Performed by: INTERNAL MEDICINE

## 2025-03-16 PROCEDURE — 85014 HEMATOCRIT: CPT | Performed by: INTERNAL MEDICINE

## 2025-03-16 PROCEDURE — 97110 THERAPEUTIC EXERCISES: CPT | Mod: GP

## 2025-03-16 PROCEDURE — 97530 THERAPEUTIC ACTIVITIES: CPT | Mod: GP

## 2025-03-16 RX ADMIN — OXYCODONE HYDROCHLORIDE 10 MG: 5 TABLET ORAL at 20:21

## 2025-03-16 RX ADMIN — POLYETHYLENE GLYCOL 3350 17 G: 17 POWDER, FOR SOLUTION ORAL at 09:39

## 2025-03-16 RX ADMIN — SENNOSIDES AND DOCUSATE SODIUM 1 TABLET: 50; 8.6 TABLET ORAL at 20:22

## 2025-03-16 RX ADMIN — ACETAMINOPHEN 975 MG: 325 TABLET ORAL at 13:00

## 2025-03-16 RX ADMIN — OXYCODONE HYDROCHLORIDE 5 MG: 5 TABLET ORAL at 06:26

## 2025-03-16 RX ADMIN — Medication 25 MCG: at 09:41

## 2025-03-16 RX ADMIN — ACETAMINOPHEN 975 MG: 325 TABLET ORAL at 09:40

## 2025-03-16 RX ADMIN — Medication 2.5 MEQ: at 09:41

## 2025-03-16 RX ADMIN — CEPHALEXIN 500 MG: 500 CAPSULE ORAL at 18:54

## 2025-03-16 RX ADMIN — SENNOSIDES AND DOCUSATE SODIUM 1 TABLET: 50; 8.6 TABLET ORAL at 09:41

## 2025-03-16 RX ADMIN — ACETAMINOPHEN 975 MG: 325 TABLET ORAL at 20:21

## 2025-03-16 RX ADMIN — ASPIRIN 81 MG: 81 TABLET, COATED ORAL at 09:41

## 2025-03-16 RX ADMIN — OXYCODONE HYDROCHLORIDE 10 MG: 5 TABLET ORAL at 00:56

## 2025-03-16 RX ADMIN — OXYCODONE HYDROCHLORIDE 10 MG: 5 TABLET ORAL at 10:29

## 2025-03-16 RX ADMIN — ASPIRIN 81 MG: 81 TABLET, COATED ORAL at 20:22

## 2025-03-16 RX ADMIN — Medication 3 MG: at 00:56

## 2025-03-16 RX ADMIN — CEPHALEXIN 500 MG: 500 CAPSULE ORAL at 00:56

## 2025-03-16 RX ADMIN — CEPHALEXIN 500 MG: 500 CAPSULE ORAL at 13:00

## 2025-03-16 RX ADMIN — CEPHALEXIN 500 MG: 500 CAPSULE ORAL at 06:26

## 2025-03-16 RX ADMIN — B-COMPLEX W/ C & FOLIC ACID TAB 1 TABLET: TAB at 09:40

## 2025-03-16 RX ADMIN — OXYCODONE HYDROCHLORIDE 10 MG: 5 TABLET ORAL at 14:46

## 2025-03-16 ASSESSMENT — ACTIVITIES OF DAILY LIVING (ADL)
ADLS_ACUITY_SCORE: 41
ADLS_ACUITY_SCORE: 46
ADLS_ACUITY_SCORE: 42
ADLS_ACUITY_SCORE: 42
ADLS_ACUITY_SCORE: 38
ADLS_ACUITY_SCORE: 46
ADLS_ACUITY_SCORE: 38
ADLS_ACUITY_SCORE: 42
ADLS_ACUITY_SCORE: 37
ADLS_ACUITY_SCORE: 42
ADLS_ACUITY_SCORE: 38
ADLS_ACUITY_SCORE: 46
ADLS_ACUITY_SCORE: 41
ADLS_ACUITY_SCORE: 46
ADLS_ACUITY_SCORE: 38
ADLS_ACUITY_SCORE: 41
ADLS_ACUITY_SCORE: 38
ADLS_ACUITY_SCORE: 42
ADLS_ACUITY_SCORE: 38

## 2025-03-16 NOTE — PROGRESS NOTES
"Orthopedic Progress Note    Subjective:  No acute events.     Objective:  /55 (BP Location: Left arm)   Pulse 81   Temp 98.5  F (36.9  C) (Oral)   Resp 16   Ht 1.727 m (5' 8\")   Wt 47.8 kg (105 lb 6.1 oz)   LMP  (LMP Unknown)   SpO2 (!) 90%   BMI 16.02 kg/m    The patient is A&Ox3. Appears comfortable.   RLE dressing with serosanguinous drainage, stable.  Wiggles toes, SILT dorsum/plantar  Foot WWP    Assessment: 3 Days Post-Op  S/P Procedure(s):  INTERNAL FIXATION, FRACTURE, TROCHANTERIC, RIGHT HIP, USING RODS, USING HANA TABLE    Plan:   - Continue PT/OT  - Weightbearing status: WBAT RLE  - Anticoagulation: ASA in addition to SCDs, em stockings and early ambulation.  - Discharge planning: Ongoing.     Report completed by:  Tremayne Gr MD  Date: 3/16/2025  Time: 7:35 AM        "

## 2025-03-16 NOTE — PLAN OF CARE
"Pt is A&Ox4, calm, cooperative  - vitals stable on 1L O2 nasal cannula  - pt complained of 9/10 pain to the right hip, radiating to her back       -> oxy 10 mg given 0100, 5 mg given 0630  - pt also complained of excessive flatulence and stated:       \"It sounds like a miniature nimisha gun going off\"    - noted a scant amount of new drainage to the lower portion of pts dressing  - voided well with bedpan (pt preferred) & 1 BM this shift  - potassium recheck this AM    -----------------------------------------------------------------------------    Goal Outcome Evaluation: No BM yet      Plan of Care Reviewed With: patient    Overall Patient Progress: no change    Outcome Evaluation: Pain well controlled, awating bowel movement      Problem: Adult Inpatient Plan of Care  Goal: Absence of Hospital-Acquired Illness or Injury  Intervention: Prevent and Manage VTE (Venous Thromboembolism) Risk  Recent Flowsheet Documentation  Taken 3/16/2025 0045 by Kristine Lambert, RN  VTE Prevention/Management: SCDs on (sequential compression devices)     Problem: Adult Inpatient Plan of Care  Goal: Optimal Comfort and Wellbeing  Intervention: Monitor Pain and Promote Comfort  Recent Flowsheet Documentation  Taken 3/16/2025 0056 by Kristine Lambert, RN  Pain Management Interventions: medication (see MAR)     Problem: Hip Fracture Medical Management  Goal: Absence of Bleeding  Outcome: Not Progressing     Problem: Hip Fracture Medical Management  Goal: Effective Bowel Elimination  Intervention: Promote Effective Bowel Elimination  Recent Flowsheet Documentation  Taken 3/16/2025 0045 by Kristine Lambert, RN  Bowel Elimination Promotion:   adequate fluid intake promoted   commode/bedpan at bedside     Problem: Comorbidity Management  Goal: Maintenance of COPD Symptom Control  Outcome: Progressing     Problem: Mobility Impairment  Goal: Optimal Mobility  Outcome: Progressing     "

## 2025-03-16 NOTE — PLAN OF CARE
Problem: Adult Inpatient Plan of Care  Goal: Plan of Care Review  Description: The Plan of Care Review/Shift note should be completed every shift.  The Outcome Evaluation is a brief statement about your assessment that the patient is improving, declining, or no change.  This information will be displayed automatically on your shift  note.  Outcome: Progressing     Problem: Hip Fracture Medical Management  Goal: Optimal Coping with Change in Health Status  Outcome: Progressing     Problem: Comorbidity Management  Goal: Maintenance of COPD Symptom Control  Outcome: Progressing  Intervention: Maintain COPD Symptom Control  Recent Flowsheet Documentation  Taken 3/15/2025 1625 by Jelena Reich RN  Medication Review/Management: medications reviewed     Problem: Fall Injury Risk  Goal: Absence of Fall and Fall-Related Injury  Outcome: Progressing  Intervention: Identify and Manage Contributors  Recent Flowsheet Documentation  Taken 3/15/2025 1625 by Jelena Reich RN  Medication Review/Management: medications reviewed  Intervention: Promote Injury-Free Environment  Recent Flowsheet Documentation  Taken 3/15/2025 1625 by Jelena Reich RN  Safety Promotion/Fall Prevention:   activity supervised   assistive device/personal items within reach     Problem: Adult Inpatient Plan of Care  Goal: Absence of Hospital-Acquired Illness or Injury  Intervention: Identify and Manage Fall Risk  Recent Flowsheet Documentation  Taken 3/15/2025 1625 by Jelena Reich RN  Safety Promotion/Fall Prevention:   activity supervised   assistive device/personal items within reach  Intervention: Prevent Infection  Recent Flowsheet Documentation  Taken 3/15/2025 1625 by Jelena Reich RN  Infection Prevention: hand hygiene promoted  Goal: Optimal Comfort and Wellbeing  Intervention: Provide Person-Centered Care  Recent Flowsheet Documentation  Taken 3/15/2025 1625 by Jelena Reich RN  Trust Relationship/Rapport: care explained      Problem: Delirium  Goal: Improved Behavioral Control  Intervention: Minimize Safety Risk  Recent Flowsheet Documentation  Taken 3/15/2025 1625 by Jelena Reich, RN  Trust Relationship/Rapport: care explained     Problem: Stem Cell/Bone Marrow Transplant  Goal: Blood Counts Within Acceptable Range  Intervention: Monitor and Manage Hematologic Symptoms  Recent Flowsheet Documentation  Taken 3/15/2025 1625 by Jelena Reich, RN  Medication Review/Management: medications reviewed  Goal: Absence of Infection  Intervention: Prevent and Manage Infection  Recent Flowsheet Documentation  Taken 3/15/2025 1625 by Jelena Reich, RN  Infection Prevention: hand hygiene promoted  Isolation Precautions: other (comment)   Goal Outcome Evaluation:                    Pt. Alert. Oxy and scheduled pain meds given. 1L NC. Continue with POC.

## 2025-03-16 NOTE — PLAN OF CARE
Problem: Adult Inpatient Plan of Care  Goal: Plan of Care Review  Description: The Plan of Care Review/Shift note should be completed every shift.  The Outcome Evaluation is a brief statement about your assessment that the patient is improving, declining, or no change.  This information will be displayed automatically on your shift  note.  Outcome: Progressing     Problem: Adult Inpatient Plan of Care  Goal: Absence of Hospital-Acquired Illness or Injury  Outcome: Progressing     Problem: Adult Inpatient Plan of Care  Goal: Absence of Hospital-Acquired Illness or Injury  Intervention: Prevent Skin Injury  Recent Flowsheet Documentation  Taken 3/16/2025 0849 by Win Cross, RN  Body Position: sitting up in bed   Goal Outcome Evaluation:       Patient alert oriented x 4, O2 sats 91% 1/2 L, a-1 transfer, surgical dressing CDI, K+ 4.4.

## 2025-03-16 NOTE — PROGRESS NOTES
"[facility]    Medicine Progress Note - Hospitalist Service    Date of Admission:  3/13/2025    Assessment & Plan   69 year old female with history of tobacco use, COPD, leukemia, and prior bone marrow transplant admitted on 3/13/2025 with right hip fracture after mechanical fall.         Right hip fracture after mechanical fall  Plain film showed minimally displaced right intertrochanteric femur fracture   CT showed mildly impacted fracture of the base of the right femoral neck with intertrochanteric extension as well as possible hematoma posterior to the right hip   S/P surgical fixation 3/13/25  -- post op cares per ortho  -- patient refusing TCU, anticipate home with home care tomorrow      Mild leukocytosis:  -- likely reactive, resolved      RLE swelling:  RLE US negative for DVT and shows stable, chronic popliteal baker's cyst      Post op acute hypoxemic respiratory failure:  Likely related to COPD, active smoking and atelectasis  -- push IS  -- Wean oxygen to keep SPO2>92%        COPD:  -- continue home inhalers        Tobacco dependence: continue replacement Rx              Diet: Advance Diet as Tolerated: Regular Diet Adult  Discharge Instruction - Regular Diet Adult  Snacks/Supplements Adult: Ensure Enlive; With Meals  Snacks/Supplements Adult: Magic Cup; With Meals  Snacks/Supplements Adult: Other; yogurt; Between Meals    DVT Prophylaxis: ASA 81 mg PO BID    Panda Catheter: Not present  Lines: None     Cardiac Monitoring: None  Code Status: Full Code      Clinically Significant Risk Factors                                # Cachexia: Estimated body mass index is 16.02 kg/m  as calculated from the following:    Height as of this encounter: 1.727 m (5' 8\").    Weight as of this encounter: 47.8 kg (105 lb 6.1 oz)., PRESENT ON ADMISSION  # Moderate Malnutrition: based on nutrition assessment and treatment provided per dietitian's recommendations., PRESENT ON ADMISSION     # " Financial/Environmental Concerns: none         Disposition Plan   Medically Ready for Discharge: Anticipated Tomorrow      José Manuel Parker,   Hospitalist Service  [facility]  Securely message with [email or web address] (more info)  Text page via [employer] Paging/Directory   ______________________________________________________________________    Interval History   NAD. Denies any complaints     Physical Exam   Vital Signs: Temp: 98.5  F (36.9  C) Temp src: Oral BP: 108/55 Pulse: 81   Resp: 16 SpO2: (!) 90 % O2 Device: None (Room air) Oxygen Delivery: 1 LPM  Weight: 105 lbs 6.08 oz  General: NAD  RESPIRATORY: Breathing nonlabored  CARDIOVASCULAR: 1+ rle edema   NEUROLOGIC: Motor and sensory intact, speech clear      >50 MINUTES SPENT BY ME on the date of service doing chart review, history, exam, documentation & further activities per the note.  Data

## 2025-03-16 NOTE — PROGRESS NOTES
Care Management Follow Up    Length of Stay (days): 3    Expected Discharge Date: 03/17/2025    Anticipated Discharge Plan:   TCU vs home     Transportation: TBD    PT Recommendations: (S) Transitional Care Facility  OT Recommendations:  (S) Transitional Care Facility, other (see comments) (if refuses home with homecare/24 hour supervision)     Barriers to Discharge: medical stability, placement,      Prior Living Situation: mobile home with grandchild(jocy)    Discussed  Partnership in Safe Discharge Planning  document with patient/family: No     Handoff Completed: No, handoff not indicated or clinically appropriate    Patient/Spokesperson Updated: Yes. Who? Patient     Additional Information:    Call received from PT, stressed their continued recommendation for TCU for patient, reporting patient could not walk at all today due to weakness.  PT states they talked with patient earlier today about this rec and patient now possibly in agreement with TCU.    CM/SW met with patient to discuss TCU.  Patient is quiet and less talkative today than she was yesterday.  She does ask why does she have to go.  Reviewed recs for continued therapy and care, as well as safety concerns that if she returns home without care, she could be re-hospitalized.  Attempted to discuss options and locations, she declines to discuss; SW left TCU list with her, stating will follow up with her later today or in AM.      Note:  Patient continues to deny need for continued therapy and rehab.  She talked yesterday and again today about previous hospitalizations and medical conditions that she cured herself on her own without therapy and that she will do this again this time.      Next Steps:     CM will continue to follow for discharge plans and needs; will attempt to get TCU choices from patient to place referrals.      MILLY MarinW

## 2025-03-17 ENCOUNTER — APPOINTMENT (OUTPATIENT)
Dept: PHYSICAL THERAPY | Facility: HOSPITAL | Age: 70
DRG: 480 | End: 2025-03-17
Attending: STUDENT IN AN ORGANIZED HEALTH CARE EDUCATION/TRAINING PROGRAM
Payer: COMMERCIAL

## 2025-03-17 ENCOUNTER — APPOINTMENT (OUTPATIENT)
Dept: RADIOLOGY | Facility: HOSPITAL | Age: 70
DRG: 480 | End: 2025-03-17
Attending: STUDENT IN AN ORGANIZED HEALTH CARE EDUCATION/TRAINING PROGRAM
Payer: COMMERCIAL

## 2025-03-17 ENCOUNTER — APPOINTMENT (OUTPATIENT)
Dept: OCCUPATIONAL THERAPY | Facility: HOSPITAL | Age: 70
DRG: 480 | End: 2025-03-17
Attending: STUDENT IN AN ORGANIZED HEALTH CARE EDUCATION/TRAINING PROGRAM
Payer: COMMERCIAL

## 2025-03-17 LAB — POTASSIUM SERPL-SCNC: 3.8 MMOL/L (ref 3.4–5.3)

## 2025-03-17 PROCEDURE — 84132 ASSAY OF SERUM POTASSIUM: CPT | Performed by: INTERNAL MEDICINE

## 2025-03-17 PROCEDURE — 99233 SBSQ HOSP IP/OBS HIGH 50: CPT | Performed by: STUDENT IN AN ORGANIZED HEALTH CARE EDUCATION/TRAINING PROGRAM

## 2025-03-17 PROCEDURE — 97110 THERAPEUTIC EXERCISES: CPT | Mod: GP

## 2025-03-17 PROCEDURE — 250N000013 HC RX MED GY IP 250 OP 250 PS 637: Performed by: STUDENT IN AN ORGANIZED HEALTH CARE EDUCATION/TRAINING PROGRAM

## 2025-03-17 PROCEDURE — 97535 SELF CARE MNGMENT TRAINING: CPT | Mod: GO

## 2025-03-17 PROCEDURE — 73501 X-RAY EXAM HIP UNI 1 VIEW: CPT | Mod: RT

## 2025-03-17 PROCEDURE — 36415 COLL VENOUS BLD VENIPUNCTURE: CPT | Performed by: INTERNAL MEDICINE

## 2025-03-17 PROCEDURE — 120N000001 HC R&B MED SURG/OB

## 2025-03-17 PROCEDURE — 97116 GAIT TRAINING THERAPY: CPT | Mod: GP

## 2025-03-17 PROCEDURE — 97530 THERAPEUTIC ACTIVITIES: CPT | Mod: GP

## 2025-03-17 RX ADMIN — POLYETHYLENE GLYCOL 3350 17 G: 17 POWDER, FOR SOLUTION ORAL at 08:17

## 2025-03-17 RX ADMIN — ACETAMINOPHEN 975 MG: 325 TABLET ORAL at 08:17

## 2025-03-17 RX ADMIN — CEPHALEXIN 500 MG: 500 CAPSULE ORAL at 00:33

## 2025-03-17 RX ADMIN — ASPIRIN 81 MG: 81 TABLET, COATED ORAL at 08:17

## 2025-03-17 RX ADMIN — Medication 3 MG: at 00:33

## 2025-03-17 RX ADMIN — OXYCODONE HYDROCHLORIDE 10 MG: 5 TABLET ORAL at 20:30

## 2025-03-17 RX ADMIN — ACETAMINOPHEN 975 MG: 325 TABLET ORAL at 20:30

## 2025-03-17 RX ADMIN — CEPHALEXIN 500 MG: 500 CAPSULE ORAL at 17:27

## 2025-03-17 RX ADMIN — B-COMPLEX W/ C & FOLIC ACID TAB 1 TABLET: TAB at 08:18

## 2025-03-17 RX ADMIN — Medication 2.5 MEQ: at 08:18

## 2025-03-17 RX ADMIN — SENNOSIDES AND DOCUSATE SODIUM 1 TABLET: 50; 8.6 TABLET ORAL at 20:30

## 2025-03-17 RX ADMIN — CEPHALEXIN 500 MG: 500 CAPSULE ORAL at 11:29

## 2025-03-17 RX ADMIN — OXYCODONE HYDROCHLORIDE 10 MG: 5 TABLET ORAL at 11:28

## 2025-03-17 RX ADMIN — CEPHALEXIN 500 MG: 500 CAPSULE ORAL at 06:47

## 2025-03-17 RX ADMIN — SENNOSIDES AND DOCUSATE SODIUM 1 TABLET: 50; 8.6 TABLET ORAL at 08:18

## 2025-03-17 RX ADMIN — OXYCODONE HYDROCHLORIDE 10 MG: 5 TABLET ORAL at 06:47

## 2025-03-17 RX ADMIN — Medication 25 MCG: at 08:18

## 2025-03-17 RX ADMIN — OXYCODONE HYDROCHLORIDE 10 MG: 5 TABLET ORAL at 00:33

## 2025-03-17 RX ADMIN — ACETAMINOPHEN 975 MG: 325 TABLET ORAL at 13:51

## 2025-03-17 RX ADMIN — ASPIRIN 81 MG: 81 TABLET, COATED ORAL at 20:30

## 2025-03-17 ASSESSMENT — ACTIVITIES OF DAILY LIVING (ADL)
ADLS_ACUITY_SCORE: 43
ADLS_ACUITY_SCORE: 42
ADLS_ACUITY_SCORE: 43
ADLS_ACUITY_SCORE: 42
ADLS_ACUITY_SCORE: 42
ADLS_ACUITY_SCORE: 43
ADLS_ACUITY_SCORE: 42
ADLS_ACUITY_SCORE: 45
ADLS_ACUITY_SCORE: 43
ADLS_ACUITY_SCORE: 45
ADLS_ACUITY_SCORE: 45
ADLS_ACUITY_SCORE: 42

## 2025-03-17 NOTE — PROGRESS NOTES
CLINICAL NUTRITION SERVICES - REASSESSMENT NOTE     RECOMMENDATIONS FOR MDs/PROVIDERS TO ORDER:    Malnutrition Status:    Moderate malnutrition in the context of chronic illness  Malnutrition Present on Admission: Yes    Registered Dietitian Interventions:  Continue Ensure enlive BID, Magic Cup daily, Yogurt daily to maximize nutrition for weight gain     Future/Additional Recommendations:  Monitor po, weight, labs, I/Os.      SUBJECTIVE INFORMATION  Assessed patient in room.  Pt reports good po intakes and appetite, consuming 100% of meals and supplements. Pt with no nutrition concerns or questions at this time.     CURRENT NUTRITION ORDERS  Diet: Regular  Nutrition Supplement/Snack: Ensure enlive BID, Magic Cup daily, Yogurt daily     CURRENT INTAKE/TOLERANCE  3/14-17: Pt consuming 100% of meals documented   Pt ordering 9981-7071 kcal and  g protein/day, meets >100% estimated nutrition needs      NEW FINDINGS  Weight: No significant wt changes x1 year, wt remains 46-47 kg this admit   Date/Time Weight Weight Method   03/17/25 0545 47.2 kg (104 lb 1.6 oz) Standing scale   03/16/25 0501 47.8 kg (105 lb 6.1 oz) --   03/14/25 0906 47.9 kg (105 lb 11.2 oz) Standing scale   03/13/25 1302 46.7 kg (103 lb) --     02/06/25 42.3 kg (93 lb 3.2 oz)   01/07/25 42.2 kg (93 lb)   12/24/24 44.6 kg (98 lb 5.2 oz)   12/13/24 45.6 kg (100 lb 8 oz)   10/15/24 45.7 kg (100 lb 11.2 oz)   10/10/24 45.4 kg (100 lb)   09/19/24 45.6 kg (100 lb 9.6 oz)   08/19/24 46.4 kg (102 lb 5 oz)   06/25/24 45.8 kg (101 lb)   06/13/24          47.8 kg (105 lb 4.8 oz)  03/22/24          48.1 kg (106 lb)   02/10/24          52.2 kg (115 lb 1.3 oz)   BM: x2 3/16, x1 this AM  Skin/wounds: Incision/surgical sites   Nutrition-relevant labs: Reviewed   Nutrition-relevant medications: Scheduled miralax, K gluconate, senna, Vit B complex with Vit C, Vit D3    Dosing Weight: 63.6 kg, based on ideal wt     ASSESSED NUTRITION NEEDS  Estimated Energy Needs:  1029-3280+ kcals/day (30 - 35+ kcals/kg)  Justification: Increased needs and Underweight  Estimated Protein Needs: 63-76 grams protein/day (1 - 1.2+ grams of pro/kg)  Justification: Increased needs  Estimated Fluid Needs: 0261-1909 mL/day (25 - 30 mL/kg)  Justification: Maintenance    MALNUTRITION  Malnutrition Diagnosis: Moderate malnutrition in the context of chronic illness  Malnutrition Present on Admission: Yes    EVALUATION OF THE PROGRESS TOWARD GOALS     NUTRITION DIAGNOSIS  Malnutrition (undernutrition) related to chronic disease as evidenced by moderate/severe muscle and fat losses.   Evaluation: Ongoing     INTERVENTIONS  Medical food supplement therapy    Goals  Meet nutrition needs - Met   Gain weight - Not Met      Monitoring/Evaluation      Progress toward goals will be monitored and evaluated per policy.

## 2025-03-17 NOTE — PROGRESS NOTES
"[facility]    Medicine Progress Note - Hospitalist Service    Date of Admission:  3/13/2025    Assessment & Plan   69 year old female with history of tobacco use, COPD, leukemia, and prior bone marrow transplant admitted on 3/13/2025 with right hip fracture after mechanical fall.      Right hip fracture after mechanical fall  Plain film showed minimally displaced right intertrochanteric femur fracture   CT showed mildly impacted fracture of the base of the right femoral neck with intertrochanteric extension as well as possible hematoma posterior to the right hip   S/P surgical fixation 3/13/25  - post op cares per ortho  - PT/OT recommending TCU placement, patient refusing TCU    Mild leukocytosis:  likely reactive, resolved    RLE swelling:  RLE US negative for DVT and shows stable, chronic popliteal baker's cyst     COPD, hypoxic respiratory failure:  Has O2 available at home, but hasn't used it  Has been on O2 here  - continue home inhalers  - push IS  - Wean oxygen to keep SPO2>92%     Tobacco dependence:   - continue replacement Rx      Diet: Advance Diet as Tolerated: Regular Diet Adult  Discharge Instruction - Regular Diet Adult  Snacks/Supplements Adult: Ensure Enlive; With Meals  Snacks/Supplements Adult: Magic Cup; With Meals  Snacks/Supplements Adult: Other; yogurt; Between Meals    DVT Prophylaxis: ASA 81 mg PO BID    Panda Catheter: Not present  Lines: None     Cardiac Monitoring: None  Code Status: Full Code      Clinically Significant Risk Factors                                # Cachexia: Estimated body mass index is 15.83 kg/m  as calculated from the following:    Height as of this encounter: 1.727 m (5' 8\").    Weight as of this encounter: 47.2 kg (104 lb 1.6 oz)., PRESENT ON ADMISSION  # Moderate Malnutrition: based on nutrition assessment and treatment provided per dietitian's recommendations., PRESENT ON ADMISSION     # Financial/Environmental Concerns: none     "     Disposition Plan   Medically Ready for Discharge: Anticipated Tomorrow, but will need safe discharge plan, PT recommending TCU, but patient declining TCU.     Gordon Reeves DO  Hospitalist Service  [facility]  Securely message with PushPage (more info)  Text page via [employer] Paging/Directory   ______________________________________________________________________    Interval History   NAD. Denies any complaints     Physical Exam   Vital Signs: Temp: 98.6  F (37  C) Temp src: Oral BP: 99/57 Pulse: 76   Resp: 17 SpO2: 95 % O2 Device: Nasal cannula Oxygen Delivery: 2 LPM  Weight: 104 lbs 1.6 oz  GEN: in no acute distress, alert and answers questions appropriately  HEENT: Moist mucus membranes, anicteric sclerae  NECK: symmetric without tracheal deviation  CV: reg rhythm, normal rate, audible s1 and s2, no murmurs / rubs / gallops  RESP: prolonged expiration, no rhonchi / rales / wheezes, on O2 by nasal canula  ABD: non-distended  EXT / SKIN: bandage with clear blood tinged drainage, not warm, slightly swollen  NEURO: moves all four extremities, no focal deficits    >50 MINUTES SPENT BY ME on the date of service doing chart review, history, exam, documentation & further activities per the note.  Data

## 2025-03-17 NOTE — PROGRESS NOTES
"Orthopedic Progress Note      Assessment: 4 Days Post-Op  S/P Procedure(s):  INTERNAL FIXATION, FRACTURE, TROCHANTERIC, RIGHT HIP, USING RODS, USING HANA TABLE     Plan:   - Continue PT/OT  - Weightbearing status: WBAT RLE  - Activity: Up with assist and assistive device until independent.  - Anticoagulation: ASA 81 PO BID in addition to SCDs, em stockings and early ambulation.  - Antibiotics: Keflex 500 QID IP, discharge on duricef for total 7 days postop  - Pain Management; continue current regimen  - Diet: progress diet as tolerated  - Labs: hgb 9.8, transfuse if <7.0. No indication today  - Dressing: Keep dry and intact.  Change dressing as needed for saturation  - Elevation: Elevate RLE on pillow to keep above the level of the heart as much as possible  - Follow-up: Outpatient follow up in 2 weeks  - Disposition: Anticipate discharge TCU vs home pending medical stability, progress with therapy      Subjective:  Patient is doing well today.  She notes active drainage from her right hip that started yesterday evening that got on her bed sheets.  Denies fever/chills.  She still has quite a bit of pain regarding the right hip but she states that she has been able to weight-bear and ambulate, ambulating with a walker with standby assist.  She is refusing TCU.      Objective:  BP 99/57 (BP Location: Right arm)   Pulse 76   Temp 98.6  F (37  C) (Oral)   Resp 17   Ht 1.727 m (5' 8\")   Wt 47.2 kg (104 lb 1.6 oz)   LMP  (LMP Unknown)   SpO2 95%   BMI 15.83 kg/m      The patient is A&Ox3. Appears comfortable, sitting up in bed  Calves without tenderness, neg Dick's  Brisk capillary refill in the toes.   Palpable Right dorsalis pedis pulse. Right foot warm & well-perfused.  Sensation is intact to light touch & equal bilaterally in the femoral, DP, SP & tibial nerve distributions.  ROM: Appropriately flexes & extends all toes bilaterally.   Motor: +5/5 dorsiflexion, plantar flexion & EHL bilaterally.   Leg lengths " equal.  Proximal dressing is clean, dry, and intact.  Distal dressing is saturated with serous fluid.  No obvious purulence.  Distal dressing removed and incision inspected, well-approximated with staples.  No surrounding erythema.  No active drainage.  No purulence.      No drain     Pertinent Labs   Lab Results: personally reviewed.   Lab Results   Component Value Date    INR 1.05 03/13/2025    INR 0.97 09/15/2010    INR 1.02 06/30/2010     Lab Results   Component Value Date    WBC 10.6 03/16/2025    HGB 9.8 (L) 03/16/2025    HCT 29.8 (L) 03/16/2025    MCV 92 03/16/2025     03/16/2025     Lab Results   Component Value Date     03/15/2025    CO2 36 (H) 03/15/2025         Report completed by:  ANTONI SAUNDERS PA-C  Date: 03/17/2025  [facility]

## 2025-03-17 NOTE — PLAN OF CARE
Problem: Adult Inpatient Plan of Care  Goal: Plan of Care Review  Description: The Plan of Care Review/Shift note should be completed every shift.  The Outcome Evaluation is a brief statement about your assessment that the patient is improving, declining, or no change.  This information will be displayed automatically on your shift  note.  Outcome: Progressing     Problem: Delirium  Goal: Optimal Coping  Outcome: Progressing     Problem: Hip Fracture Medical Management  Goal: Optimal Coping with Change in Health Status  Outcome: Progressing     Problem: Gas Exchange Impaired  Goal: Optimal Gas Exchange  Outcome: Progressing  Intervention: Optimize Oxygenation and Ventilation  Recent Flowsheet Documentation  Taken 3/16/2025 1700 by Jelena Reich RN  Head of Bed (HOB) Positioning: HOB at 20-30 degrees     Problem: Adult Inpatient Plan of Care  Goal: Absence of Hospital-Acquired Illness or Injury  Intervention: Identify and Manage Fall Risk  Recent Flowsheet Documentation  Taken 3/16/2025 1700 by Jelena Reich RN  Safety Promotion/Fall Prevention:   activity supervised   assistive device/personal items within reach  Intervention: Prevent Skin Injury  Recent Flowsheet Documentation  Taken 3/16/2025 1700 by Jelena Reich RN  Body Position: position changed independently  Intervention: Prevent Infection  Recent Flowsheet Documentation  Taken 3/16/2025 1700 by Jelena Reich RN  Infection Prevention: hand hygiene promoted  Goal: Optimal Comfort and Wellbeing  Intervention: Provide Person-Centered Care  Recent Flowsheet Documentation  Taken 3/16/2025 1700 by Jelena Reich RN  Trust Relationship/Rapport: care explained     Problem: Delirium  Goal: Improved Behavioral Control  Intervention: Minimize Safety Risk  Recent Flowsheet Documentation  Taken 3/16/2025 1700 by Jelena Reich RN  Trust Relationship/Rapport: care explained     Problem: Hip Fracture Medical Management  Goal: Optimal Functional  Performance  Intervention: Promote Optimal Functional Status  Recent Flowsheet Documentation  Taken 3/16/2025 1700 by Jelena Reich RN  Activity Management: activity adjusted per tolerance     Problem: Comorbidity Management  Goal: Maintenance of COPD Symptom Control  Intervention: Maintain COPD Symptom Control  Recent Flowsheet Documentation  Taken 3/16/2025 1700 by Jelena Reich RN  Medication Review/Management: medications reviewed     Problem: Fall Injury Risk  Goal: Absence of Fall and Fall-Related Injury  Intervention: Identify and Manage Contributors  Recent Flowsheet Documentation  Taken 3/16/2025 1700 by Jelena Reich RN  Medication Review/Management: medications reviewed  Intervention: Promote Injury-Free Environment  Recent Flowsheet Documentation  Taken 3/16/2025 1700 by Jelena Reich RN  Safety Promotion/Fall Prevention:   activity supervised   assistive device/personal items within reach     Problem: Mobility Impairment  Goal: Optimal Mobility  Intervention: Optimize Mobility  Recent Flowsheet Documentation  Taken 3/16/2025 1700 by Jelena Reich RN  Assistive Device Utilized:   gait belt   walker  Activity Management: activity adjusted per tolerance  Positioning/Transfer Devices:   pillows   in use     Problem: Stem Cell/Bone Marrow Transplant  Goal: Improved Activity Tolerance  Intervention: Promote Improved Energy  Recent Flowsheet Documentation  Taken 3/16/2025 1700 by Jelena Reich RN  Activity Management: activity adjusted per tolerance  Goal: Optimal Functional Ability  Intervention: Optimize Functional Ability  Recent Flowsheet Documentation  Taken 3/16/2025 1700 by Jelena Reich RN  Activity Management: activity adjusted per tolerance  Goal: Blood Counts Within Acceptable Range  Intervention: Monitor and Manage Hematologic Symptoms  Recent Flowsheet Documentation  Taken 3/16/2025 1700 by Jelena Reich RN  Medication Review/Management: medications reviewed  Goal: Absence  of Infection  Intervention: Prevent and Manage Infection  Recent Flowsheet Documentation  Taken 3/16/2025 1700 by Jelena Reich, RN  Infection Prevention: hand hygiene promoted  Isolation Precautions: other (comment)   Goal Outcome Evaluation:                  Pt. Given oxy this shift. 1L O2. Up to BR with 1 assist. Dressing with old serosanguineous drainage. Continue with POC.

## 2025-03-17 NOTE — PROGRESS NOTES
Care Management Follow Up    Length of Stay (days): 4    Expected Discharge Date: 03/17/2025    Anticipated Discharge Plan:  Home Care    Transportation: Anticipate Family/friend    PT Recommendations: (S) Transitional Care Facility  OT Recommendations:  Transitional Care Facility     Barriers to Discharge: medical stability    Prior Living Situation: mobile home with grandchild(jocy)    Discussed  Partnership in Safe Discharge Planning  document with patient/family: No     Handoff Completed: No, handoff not indicated or clinically appropriate    Patient/Spokesperson Updated: Yes. Who? Patient     Additional Information:  See below     Next Steps: continue to follow     Naty Sparks RN        Met with patient. She reiterates that she does NOT want to go to TCU and is irritated that people keep asking her. She requests that she not be asked about TCU again. She is agreeable to home care . Referral sent to [facility] . Accepted by [facility]

## 2025-03-17 NOTE — PLAN OF CARE
Pt is A&Ox4, calm, cooperative  - vitals stable on 1L O2 nasal cannula, gave PRN melatonin for sleep  - Right hip dressing: dry, intact, old drainage  - potassium recheck this AM  - complained of 8/10 pain to hip       -> PRN oxy 10 mg given @0030  - up to bathroom with gait belt and walker, +1 assistance   - repeat x-ray taken this AM    Anticipating discharge today 3/17  ------------------------------------------------    Problem: Adult Inpatient Plan of Care  Goal: Optimal Comfort and Wellbeing  Intervention: Monitor Pain and Promote Comfort  Recent Flowsheet Documentation  Taken 3/17/2025 0033 by Kristine Lambert RN  Pain Management Interventions:   medication (see MAR)   pillow support provided   repositioned   rest    Problem: Delirium  Goal: Improved Sleep  3/17/2025 0203 by Kristine Lambert RN  Outcome: Met     Problem: Hip Fracture Medical Management  Goal: Effective Bowel Elimination  3/17/2025 0203 by Kristine Lambert RN  Outcome: Progressing     Problem: Hip Fracture Medical Management  Goal: Effective Urinary Elimination  3/17/2025 0203 by Kristine Lambert RN  Outcome: Met  3/17/2025 0203 by Kristine Lambert RN  Outcome: Progressing     Problem: Gas Exchange Impaired  Goal: Optimal Gas Exchange  3/17/2025 0203 by Kristine Lambert RN  Outcome: Progressing     Problem: Comorbidity Management  Goal: Maintenance of COPD Symptom Control  3/17/2025 0203 by Kristine Lambert RN  Outcome: Met     Problem: Stem Cell/Bone Marrow Transplant  Goal: Symptom-Free Urinary Elimination  3/17/2025 0203 by Kristine Lambert RN  Outcome: Progressing     Problem: Stem Cell/Bone Marrow Transplant  Goal: Improved Oral Mucous Membrane Health and Integrity  3/17/2025 0203 by Kristine Lambert RN  Outcome: Met     Problem: Stem Cell/Bone Marrow Transplant  Goal: Nausea and Vomiting Symptom Relief  3/17/2025 0203 by Kristine Lambert RN  Outcome: Met  3/17/2025 0203 by Kristine Lambert RN  Outcome: Progressing      Problem: Stem Cell/Bone Marrow Transplant  Goal: Optimal Nutrition Intake  3/17/2025 0203 by Kristine Lambert, RN  Outcome: Met  3/17/2025 0203 by Kristine Lambert, RN  Outcome: Progressing        Goal Outcome Evaluation: met

## 2025-03-18 ENCOUNTER — APPOINTMENT (OUTPATIENT)
Dept: PHYSICAL THERAPY | Facility: HOSPITAL | Age: 70
DRG: 480 | End: 2025-03-18
Attending: STUDENT IN AN ORGANIZED HEALTH CARE EDUCATION/TRAINING PROGRAM
Payer: COMMERCIAL

## 2025-03-18 ENCOUNTER — APPOINTMENT (OUTPATIENT)
Dept: OCCUPATIONAL THERAPY | Facility: HOSPITAL | Age: 70
DRG: 480 | End: 2025-03-18
Attending: STUDENT IN AN ORGANIZED HEALTH CARE EDUCATION/TRAINING PROGRAM
Payer: COMMERCIAL

## 2025-03-18 VITALS
OXYGEN SATURATION: 96 % | BODY MASS INDEX: 15.78 KG/M2 | SYSTOLIC BLOOD PRESSURE: 119 MMHG | TEMPERATURE: 97.9 F | WEIGHT: 104.1 LBS | HEART RATE: 75 BPM | DIASTOLIC BLOOD PRESSURE: 68 MMHG | HEIGHT: 68 IN | RESPIRATION RATE: 18 BRPM

## 2025-03-18 LAB
ALBUMIN SERPL BCG-MCNC: 3.5 G/DL (ref 3.5–5.2)
ALP SERPL-CCNC: 76 U/L (ref 40–150)
ALT SERPL W P-5'-P-CCNC: 26 U/L (ref 0–50)
ANION GAP SERPL CALCULATED.3IONS-SCNC: 2 MMOL/L (ref 7–15)
AST SERPL W P-5'-P-CCNC: 31 U/L (ref 0–45)
BILIRUB SERPL-MCNC: 0.4 MG/DL
BUN SERPL-MCNC: 12.2 MG/DL (ref 8–23)
CALCIUM SERPL-MCNC: 9.3 MG/DL (ref 8.8–10.4)
CHLORIDE SERPL-SCNC: 100 MMOL/L (ref 98–107)
CREAT SERPL-MCNC: 0.65 MG/DL (ref 0.51–0.95)
EGFRCR SERPLBLD CKD-EPI 2021: >90 ML/MIN/1.73M2
ERYTHROCYTE [DISTWIDTH] IN BLOOD BY AUTOMATED COUNT: 13.5 % (ref 10–15)
GLUCOSE SERPL-MCNC: 108 MG/DL (ref 70–99)
HCO3 SERPL-SCNC: 38 MMOL/L (ref 22–29)
HCT VFR BLD AUTO: 31 % (ref 35–47)
HGB BLD-MCNC: 10 G/DL (ref 11.7–15.7)
MCH RBC QN AUTO: 28.9 PG (ref 26.5–33)
MCHC RBC AUTO-ENTMCNC: 32.3 G/DL (ref 31.5–36.5)
MCV RBC AUTO: 90 FL (ref 78–100)
PLATELET # BLD AUTO: 270 10E3/UL (ref 150–450)
POTASSIUM SERPL-SCNC: 4.1 MMOL/L (ref 3.4–5.3)
PROT SERPL-MCNC: 5.7 G/DL (ref 6.4–8.3)
RBC # BLD AUTO: 3.46 10E6/UL (ref 3.8–5.2)
SODIUM SERPL-SCNC: 140 MMOL/L (ref 135–145)
WBC # BLD AUTO: 7.3 10E3/UL (ref 4–11)

## 2025-03-18 PROCEDURE — 97535 SELF CARE MNGMENT TRAINING: CPT | Mod: GO

## 2025-03-18 PROCEDURE — 97116 GAIT TRAINING THERAPY: CPT | Mod: GP

## 2025-03-18 PROCEDURE — 85041 AUTOMATED RBC COUNT: CPT | Performed by: STUDENT IN AN ORGANIZED HEALTH CARE EDUCATION/TRAINING PROGRAM

## 2025-03-18 PROCEDURE — 99239 HOSP IP/OBS DSCHRG MGMT >30: CPT | Performed by: STUDENT IN AN ORGANIZED HEALTH CARE EDUCATION/TRAINING PROGRAM

## 2025-03-18 PROCEDURE — 250N000013 HC RX MED GY IP 250 OP 250 PS 637: Performed by: STUDENT IN AN ORGANIZED HEALTH CARE EDUCATION/TRAINING PROGRAM

## 2025-03-18 PROCEDURE — 85014 HEMATOCRIT: CPT | Performed by: STUDENT IN AN ORGANIZED HEALTH CARE EDUCATION/TRAINING PROGRAM

## 2025-03-18 PROCEDURE — 36415 COLL VENOUS BLD VENIPUNCTURE: CPT | Performed by: STUDENT IN AN ORGANIZED HEALTH CARE EDUCATION/TRAINING PROGRAM

## 2025-03-18 PROCEDURE — 82947 ASSAY GLUCOSE BLOOD QUANT: CPT | Performed by: STUDENT IN AN ORGANIZED HEALTH CARE EDUCATION/TRAINING PROGRAM

## 2025-03-18 PROCEDURE — 97530 THERAPEUTIC ACTIVITIES: CPT | Mod: GP

## 2025-03-18 PROCEDURE — 85048 AUTOMATED LEUKOCYTE COUNT: CPT | Performed by: STUDENT IN AN ORGANIZED HEALTH CARE EDUCATION/TRAINING PROGRAM

## 2025-03-18 PROCEDURE — 84450 TRANSFERASE (AST) (SGOT): CPT | Performed by: STUDENT IN AN ORGANIZED HEALTH CARE EDUCATION/TRAINING PROGRAM

## 2025-03-18 PROCEDURE — 82565 ASSAY OF CREATININE: CPT | Performed by: STUDENT IN AN ORGANIZED HEALTH CARE EDUCATION/TRAINING PROGRAM

## 2025-03-18 RX ORDER — CEFADROXIL 500 MG/1
500 CAPSULE ORAL 2 TIMES DAILY
Qty: 4 CAPSULE | Refills: 0 | Status: SHIPPED | OUTPATIENT
Start: 2025-03-18 | End: 2025-03-20

## 2025-03-18 RX ORDER — NICOTINE 21 MG/24HR
1 PATCH, TRANSDERMAL 24 HOURS TRANSDERMAL DAILY PRN
Qty: 21 PATCH | Refills: 0 | Status: SHIPPED | OUTPATIENT
Start: 2025-03-18

## 2025-03-18 RX ADMIN — Medication 2.5 MEQ: at 08:28

## 2025-03-18 RX ADMIN — OXYCODONE HYDROCHLORIDE 10 MG: 5 TABLET ORAL at 06:46

## 2025-03-18 RX ADMIN — Medication 25 MCG: at 08:23

## 2025-03-18 RX ADMIN — ACETAMINOPHEN 975 MG: 325 TABLET ORAL at 13:00

## 2025-03-18 RX ADMIN — POLYETHYLENE GLYCOL 3350 17 G: 17 POWDER, FOR SOLUTION ORAL at 08:22

## 2025-03-18 RX ADMIN — CEPHALEXIN 500 MG: 500 CAPSULE ORAL at 06:46

## 2025-03-18 RX ADMIN — B-COMPLEX W/ C & FOLIC ACID TAB 1 TABLET: TAB at 08:29

## 2025-03-18 RX ADMIN — ASPIRIN 81 MG: 81 TABLET, COATED ORAL at 08:23

## 2025-03-18 RX ADMIN — CEPHALEXIN 500 MG: 500 CAPSULE ORAL at 01:11

## 2025-03-18 RX ADMIN — ACETAMINOPHEN 975 MG: 325 TABLET ORAL at 08:22

## 2025-03-18 RX ADMIN — SENNOSIDES AND DOCUSATE SODIUM 1 TABLET: 50; 8.6 TABLET ORAL at 08:23

## 2025-03-18 RX ADMIN — CEPHALEXIN 500 MG: 500 CAPSULE ORAL at 13:00

## 2025-03-18 ASSESSMENT — ACTIVITIES OF DAILY LIVING (ADL)
ADLS_ACUITY_SCORE: 43
ADLS_ACUITY_SCORE: 42
ADLS_ACUITY_SCORE: 43
ADLS_ACUITY_SCORE: 43
ADLS_ACUITY_SCORE: 42
ADLS_ACUITY_SCORE: 43

## 2025-03-18 NOTE — PROGRESS NOTES
"Orthopedic Progress Note      Assessment: 5 Days Post-Op  S/P Procedure(s):  INTERNAL FIXATION, FRACTURE, TROCHANTERIC, RIGHT HIP, USING RODS, USING HANA TABLE     Plan:   - Continue PT/OT  - Weightbearing status: WBAT RLE  - Activity: Up with assist and assistive device until independent.  - Anticoagulation: ASA 81 PO BID in addition to SCDs, em stockings and early ambulation.  - Antibiotics: Keflex 500 QID IP, discharge on duricef for total 7 days postop  - Pain Management; continue current regimen  - Diet: progress diet as tolerated  - Labs: hgb 10.0, transfuse if <7.0. No indication today  - Dressing: Changed to prevena wound vac today.  To remain clean, dry, and intact until follow up.  Keep plugged into wall  when in bed  - Elevation: Elevate RLE on pillow to keep above the level of the heart as much as possible  - Follow-up: Outpatient follow up in 2 weeks  - Disposition: Anticipate discharge TCU vs home pending medical stability, progress with therapy.  Okay to discharge from an orthopedic standpoint.        Subjective:  Patient is doing well today.  Ongoing drainage saturating distal dressing.  Changed to prevena wound vac today to resolve drainage.  No other concerns today.  Denies fevers/chills.      Objective:  /68 (BP Location: Right arm)   Pulse 75   Temp 97.9  F (36.6  C) (Oral)   Resp 18   Ht 1.727 m (5' 8\")   Wt 47.2 kg (104 lb 1.6 oz)   LMP  (LMP Unknown)   SpO2 96%   BMI 15.83 kg/m      The patient is A&Ox3. Appears comfortable, sitting up in bed  Calves without tenderness, neg Dick's  Brisk capillary refill in the toes.   Palpable Right dorsalis pedis pulse. Right foot warm & well-perfused.  Sensation is intact to light touch & equal bilaterally in the femoral, DP, SP & tibial nerve distributions.  ROM: Appropriately flexes & extends all toes bilaterally.   Motor: +5/5 dorsiflexion, plantar flexion & EHL bilaterally.   Leg lengths equal.  Proximal dressing is clean, dry, " and intact.  Distal dressing is saturated with serous fluid.  No obvious purulence.  Distal dressing removed and incision inspected, well-approximated with staples.  No surrounding erythema.  No active drainage.  No purulence.      No drain     Pertinent Labs   Lab Results: personally reviewed.   Lab Results   Component Value Date    INR 1.05 03/13/2025    INR 0.97 09/15/2010    INR 1.02 06/30/2010     Lab Results   Component Value Date    WBC 7.3 03/18/2025    HGB 10.0 (L) 03/18/2025    HCT 31.0 (L) 03/18/2025    MCV 90 03/18/2025     03/18/2025     Lab Results   Component Value Date     03/18/2025    CO2 38 (H) 03/18/2025         Report completed by:  ANTONI SAUNDERS PA-C  Date: 03/18/2025  [facility]

## 2025-03-18 NOTE — PROGRESS NOTES
Physical Therapy Discharge Summary    Reason for therapy discharge:    Discharged to home with home PT.    Progress towards therapy goal(s). See goals on Care Plan in [facility] electronic health record for goal details.  Goals partially met.  Barriers to achieving goals:   discharge from facility.    Therapy recommendation(s):    Further recommended therapy is related to documented deficits, and is necessary to maximize functional independence in order for patient to return to prior level of function.      Evelyn Hernandez, ALTAGRACIA 3/18/2025

## 2025-03-18 NOTE — PLAN OF CARE
Problem: Adult Inpatient Plan of Care  Goal: Plan of Care Review  Description: The Plan of Care Review/Shift note should be completed every shift.  The Outcome Evaluation is a brief statement about your assessment that the patient is improving, declining, or no change.  This information will be displayed automatically on your shift  note.  Outcome: Progressing     Problem: Hip Fracture Medical Management  Goal: Optimal Coping with Change in Health Status  Outcome: Progressing     Problem: Gas Exchange Impaired  Goal: Optimal Gas Exchange  Outcome: Progressing   Goal Outcome Evaluation:                    Pt. Alert. Oxy given for pain. 0.5 L NC. Continue with POC.

## 2025-03-18 NOTE — PLAN OF CARE
Patient discharged via wheelchair with daughter. All discharge information provided and questions encouraged and answered. All belongings sent with patient.

## 2025-03-18 NOTE — PLAN OF CARE
Problem: Adult Inpatient Plan of Care  Goal: Absence of Hospital-Acquired Illness or Injury  Outcome: Progressing  Intervention: Identify and Manage Fall Risk  Recent Flowsheet Documentation  Taken 3/17/2025 0810 by Katlin Vincent RN  Safety Promotion/Fall Prevention:   activity supervised   nonskid shoes/slippers when out of bed   patient and family education   room near nurse's station   room organization consistent   safety round/check completed   supervised activity  Intervention: Prevent Infection  Recent Flowsheet Documentation  Taken 3/17/2025 0810 by Katlin Vincent RN  Infection Prevention: equipment surfaces disinfected  Goal: Optimal Comfort and Wellbeing  Outcome: Progressing     Problem: Hip Fracture Medical Management  Goal: Absence of Bleeding  Outcome: Progressing  Goal: Optimal Functional Performance  Intervention: Promote Optimal Functional Status  Recent Flowsheet Documentation  Taken 3/17/2025 1351 by Katlin Vincent RN  Activity Management:   ambulated to bathroom   back to bed  Taken 3/17/2025 1135 by Katlin Vincent RN  Activity Management: ambulated to bathroom  Taken 3/17/2025 0810 by Katlin Vincent RN  Activity Management:   ambulated to bathroom   up in chair  Goal: Pain Control and Function  Outcome: Progressing     Problem: Comorbidity Management  Goal: Maintenance of COPD Symptom Control  Intervention: Maintain COPD Symptom Control  Recent Flowsheet Documentation  Taken 3/17/2025 0810 by Katlin Vincent RN  Medication Review/Management: medications reviewed     Problem: Fall Injury Risk  Goal: Absence of Fall and Fall-Related Injury  Intervention: Identify and Manage Contributors  Recent Flowsheet Documentation  Taken 3/17/2025 0810 by Katlin Vincent RN  Medication Review/Management: medications reviewed  Intervention: Promote Injury-Free Environment  Recent Flowsheet Documentation  Taken 3/17/2025 0810 by Katlin Vincent RN  Safety Promotion/Fall  Prevention:   activity supervised   nonskid shoes/slippers when out of bed   patient and family education   room near nurse's station   room organization consistent   safety round/check completed   supervised activity     Problem: Mobility Impairment  Goal: Optimal Mobility  Intervention: Optimize Mobility  Recent Flowsheet Documentation  Taken 3/17/2025 1351 by Katlin Vincent, RN  Assistive Device Utilized:   gait belt   walker  Activity Management:   ambulated to bathroom   back to bed  Taken 3/17/2025 1135 by Katlin Vincent, RN  Assistive Device Utilized:   gait belt   walker  Activity Management: ambulated to bathroom  Taken 3/17/2025 0810 by Katlin Vincent, RN  Assistive Device Utilized:   gait belt   walker  Activity Management:   ambulated to bathroom   up in chair   Goal Outcome Evaluation: Patient alert and oriented. Reports that pain is tolerable with oxycodone. Up to bathroom with SBA. Dressing changed by surgery team this am. Some shadowing on new dressing, but not saturated. Continuing to monitor.

## 2025-03-18 NOTE — PLAN OF CARE
"  Problem: Adult Inpatient Plan of Care  Goal: Plan of Care Review  Description: The Plan of Care Review/Shift note should be completed every shift.  The Outcome Evaluation is a brief statement about your assessment that the patient is improving, declining, or no change.  This information will be displayed automatically on your shift  note.  Outcome: Progressing  Goal: Patient-Specific Goal (Individualized)  Description: You can add care plan individualizations to a care plan. Examples of Individualization might be:  \"Parent requests to be called daily at 9am for status\", \"I have a hard time hearing out of my right ear\", or \"Do not touch me to wake me up as it startles  me\".  Outcome: Progressing  Goal: Absence of Hospital-Acquired Illness or Injury  Outcome: Progressing  Intervention: Identify and Manage Fall Risk  Recent Flowsheet Documentation  Taken 3/18/2025 0115 by Tequila Moreno RN  Safety Promotion/Fall Prevention:   activity supervised   assistive device/personal items within reach   nonskid shoes/slippers when out of bed   patient and family education   lighting adjusted   clutter free environment maintained   safety round/check completed  Intervention: Prevent Skin Injury  Recent Flowsheet Documentation  Taken 3/18/2025 0115 by Tequila Moreno RN  Body Position: position changed independently  Skin Protection: adhesive use limited  Intervention: Prevent and Manage VTE (Venous Thromboembolism) Risk  Recent Flowsheet Documentation  Taken 3/18/2025 0115 by Tequila Moreno RN  VTE Prevention/Management: SCDs off (sequential compression devices)  Intervention: Prevent Infection  Recent Flowsheet Documentation  Taken 3/18/2025 0115 by Tequila Moreno RN  Infection Prevention:   hand hygiene promoted   rest/sleep promoted   single patient room provided  Goal: Optimal Comfort and Wellbeing  Outcome: Progressing  Intervention: Monitor Pain and Promote Comfort  Recent Flowsheet Documentation  Taken " 3/18/2025 0115 by Tequila Moreno RN  Pain Management Interventions: medication (see MAR)  Intervention: Provide Person-Centered Care  Recent Flowsheet Documentation  Taken 3/18/2025 0115 by Tequila Moreno RN  Trust Relationship/Rapport: care explained  Goal: Readiness for Transition of Care  Outcome: Progressing     Problem: Gas Exchange Impaired  Goal: Optimal Gas Exchange  Outcome: Progressing  Intervention: Optimize Oxygenation and Ventilation  Recent Flowsheet Documentation  Taken 3/18/2025 0115 by Tequila Moreno RN  Head of Bed (HOB) Positioning: HOB at 20 degrees     Problem: Fall Injury Risk  Goal: Absence of Fall and Fall-Related Injury  Outcome: Progressing  Intervention: Identify and Manage Contributors  Recent Flowsheet Documentation  Taken 3/18/2025 0115 by Tequila Moreno RN  Medication Review/Management: medications reviewed  Intervention: Promote Injury-Free Environment  Recent Flowsheet Documentation  Taken 3/18/2025 0115 by Tequila Moreno RN  Safety Promotion/Fall Prevention:   activity supervised   assistive device/personal items within reach   nonskid shoes/slippers when out of bed   patient and family education   lighting adjusted   clutter free environment maintained   safety round/check completed   Goal Outcome Evaluation:       Pt A/O x 4. Rates hip pain 3/10. Declines pain meds. Up to brm with walker and one assist. Pt walked to brm without 0.5 L of O2. Oxygen saturation 77% on return from brm. Oxygen increased to 2L. Thigh drsg with moderate drng at start of shift, saturated by end of shift.

## 2025-03-18 NOTE — PROGRESS NOTES
Care Management Follow Up    Length of Stay (days): 5    Expected Discharge Date: 03/18/2025    Anticipated Discharge Plan:  Home Care    Transportation: Anticipate Family/friend    PT Recommendations: Transitional Care Facility  OT Recommendations:  Transitional Care Facility     Barriers to Discharge:       Prior Living Situation: mobile home with grandchild(jocy)    Discussed  Partnership in Safe Discharge Planning  document with patient/family: No     Handoff Completed: No, handoff not indicated or clinically appropriate    Patient/Spokesperson Updated: No    Additional Information:  Chart reviewed. Patient is refusing TCU. Anticipate home with home care when discharged.     Next Steps: continue to follow     Naty Sparks RN

## 2025-03-18 NOTE — PROGRESS NOTES
Patient has been assessed for Home Oxygen needs. Oxygen readings:    *Pulse oximetry (SpO2) = 88% on room air at rest while awake.    *SpO2 improved to 90% on 1 liters/minute at rest.  *SpO2 improved to 94% on 2 liters/minute at rest.    *SpO2 = 79% on room air during activity/with exercise.    *SpO2 improved to 91% on 2 liters/minute during activity/with exercise.    Pt reports that she has oxygen at home already, and grandson has made sure it's set up for her today.     Evelyn Hernandez, DPT 3/18/2025

## 2025-03-18 NOTE — PLAN OF CARE
Problem: Hip Fracture Medical Management  Goal: Optimal Coping with Change in Health Status  Intervention: Support Psychosocial Response to Injury  Recent Flowsheet Documentation  Taken 3/18/2025 0820 by Myrtle Hoyos RN  Supportive Measures: active listening utilized     Problem: Gas Exchange Impaired  Goal: Optimal Gas Exchange  Intervention: Optimize Oxygenation and Ventilation  Recent Flowsheet Documentation  Taken 3/18/2025 0820 by Myrtle Hoyos RN  Head of Bed (HOB) Positioning: HOB at 30-45 degrees     Problem: Mobility Impairment  Goal: Optimal Mobility  Intervention: Optimize Mobility  Recent Flowsheet Documentation  Taken 3/18/2025 0820 by Myrtle Hoyos, RN  Activity Management: activity adjusted per tolerance  Positioning/Transfer Devices:   pillows   in use   Goal Outcome Evaluation:       Alert and oriented. Reported pain mostly with palpation and mobility. She ambulated int  [place] with therapy staff. Wound vac in place functional.       Patient now have discharge orders, she is dressed and waiting to be discharge.

## 2025-03-18 NOTE — DISCHARGE SUMMARY
"[facility]  Hospitalist Discharge Summary      Date of Admission:  3/13/2025  Date of Discharge:  3/18/2025  Discharging Provider: Gordon Reeves DO  Discharge Service: Hospitalist Service    Discharge Diagnoses   Right hip fracture after mechanical fall  Mild leukocytosis:  RLE swelling:  COPD, hypoxic respiratory failure:  Tobacco dependence:     Clinically Significant Risk Factors     # Cachexia: Estimated body mass index is 15.83 kg/m  as calculated from the following:    Height as of this encounter: 1.727 m (5' 8\").    Weight as of this encounter: 47.2 kg (104 lb 1.6 oz).  # Moderate Malnutrition: based on nutrition assessment and treatment provided per dietitian's recommendations.      Follow-ups Needed After Discharge   Follow-up Appointments       Follow Up Care      Please follow-up with Dr. Catalan's team in 2 weeks at [facility]. Call our scheduling line at [phone] to make an appointment, if you do not already have one scheduled.              Unresulted Labs Ordered in the Past 30 Days of this Admission       Date and Time Order Name Status Description    3/13/2025  8:21 PM Surgical Pathology Exam In process         These results will be followed up by orthopedics    Discharge Disposition   Discharged to home with services  Condition at discharge: Stable    Hospital Course   69 year old female with history of tobacco use, COPD, leukemia, and prior bone marrow transplant admitted on 3/13/2025 with right hip fracture after mechanical fall.      Right hip fracture after mechanical fall  Plain film showed minimally displaced right intertrochanteric femur fracture. CT showed mildly impacted fracture of the base of the right femoral neck with intertrochanteric extension as well as possible hematoma posterior to the right hip. S/P surgical fixation 3/13/25. Treated with post op cares per orthopedics:  Weightbearing status: WBAT RLE  - Activity: Up with assist and assistive " device until independent.  - Anticoagulation: ASA 81 PO BID in addition to SCDs, em stockings and early ambulation.  - Antibiotics: Keflex 500 QID IP, discharge on duricef for total 7 days postop  - Pain Management with tylenol and oxycodone prn.   Evaluated and treated by PT / OT, discharged with home PT/OT.     Mild leukocytosis:  likely reactive, resolved     RLE swelling:  RLE US negative for DVT and shows stable, chronic popliteal baker's cyst     COPD, hypoxic respiratory failure:  Has O2 available at home, but hasn't used it. Has been on O2 here, will use home O2 after discharge. Treated with home inhalers, IS.      Tobacco dependence:   Treated with replacement Rx.     Consultations This Hospital Stay   PHYSICAL THERAPY ADULT IP CONSULT  OCCUPATIONAL THERAPY ADULT IP CONSULT  CARE MANAGEMENT / SOCIAL WORK IP CONSULT  CARE MANAGEMENT / SOCIAL WORK IP CONSULT  PHYSICAL THERAPY ADULT IP CONSULT  OCCUPATIONAL THERAPY ADULT IP CONSULT  CARE MANAGEMENT / SOCIAL WORK IP CONSULT    Code Status   Full Code    Time Spent on this Encounter   IGordon DO, personally saw the patient today and spent greater than 30 minutes discharging this patient.       Gordon Reeves DO  [address] [address]  Phone: [phone]  Fax: [phone]  ______________________________________________________________________    Physical Exam   Vital Signs: Temp: 97.9  F (36.6  C) Temp src: Oral BP: 119/68 Pulse: 75   Resp: 18 SpO2: 96 % O2 Device: Nasal cannula Oxygen Delivery: 2 LPM  Weight: 104 lbs 1.6 oz  GEN: in no acute distress, alert and answers questions appropriately  HEENT: Moist mucus membranes, anicteric sclerae  NECK: symmetric without tracheal deviation  CV: reg rhythm, normal rate, audible s1 and s2, no murmurs / rubs / gallops  RESP: prolonged expiration, no rhonchi / rales / wheezes, on O2 by nasal canula  ABD: non-distended  EXT / SKIN: wound VAC  "in place over right leg wound, without any drainage.  NEURO: moves all four extremities, no focal deficits       Primary Care Physician   Jorge Dunn    Discharge Orders      Primary Care - Care Coordination Referral      Home Care Referral      Reason for your hospital stay    S/p hip repair     When to call - Contact Surgeon Team    You may experience symptoms that require follow-up before your scheduled appointment. Refer to the \"Stoplight Tool\" for instructions on when to contact your Surgeon Team if you are concerned about pain control, blood clots, constipation, or if you are unable to urinate.     When to call - Reach out to Urgent Care    If you are not able to reach your Surgeon Team and you need immediate care, go to the Orthopedic Walk-in Clinic or Urgent Care at your Surgeon's office.  Do NOT go to the Emergency Room unless you have shortness of breath, chest pain, or other signs of a medical emergency.     When to call - Reasons to Call 911    Call 911 immediately if you experience sudden-onset chest pain, arm weakness/numbness, slurred speech, or shortness of breath     Discharge Instruction - Breathing exercises    Perform breathing exercises using your Incentive Spirometer 10 times per hour while awake for 2 weeks.     Symptoms - Fever Management    A low grade fever can be expected after surgery.  Use acetaminophen (TYLENOL) as needed for fever management.  Contact your Surgeon Team if you have a fever greater than 101.5 F, chills, and/or night sweats.     Symptoms - Constipation management    Constipation (hard, dry bowel movements) is expected after surgery due to the combination of being less active, the anesthetic, and the opioid pain medication.  You can do the following to help reduce constipation:  ~  FLUIDS:  Drink clear liquids (water or Gatorade), or juice (apple/prune).  ~  DIET:  Eat a fiber rich diet.    ~  ACTIVITY:  Get up and move around several times a day.  Increase your activity " as you are able.  MEDICATIONS:  Reduce the risk of constipation by starting medications before you are constipated.  You can take Miralax   (1 packet as directed) and/or a stool softener (Senokot 1-2 tablets 1-2 times a day).  If you already have constipation and these medications are not working, you can get magnesium citrate and use as directed.  If you continue to have constipation you can try an over the counter suppository or enema.  Call your Surgeon Team if it has been greater than 3 days since your last bowel movement.     Symptoms - Reduced Urine Output    Changes in the amount of fluids you drank before and after surgery may result in problems urinating.  It is important to stay well-hydrated after surgery and drink plenty of water. If it has been greater than 8 hours since you have urinated despite drinking plenty of water, call your Surgeon Team.     Activity - Exercises to prevent blood clots    Unless otherwise directed by your Surgeon team, perform the following exercises at least three times per day for the first four weeks after surgery to prevent blood clots in your legs: 1) Point and flex your feet (Ankle Pumps), 2) Move your ankle around in big circles, 3) Wiggle your toes, 4) Walk, even for short distances, several times a day, will help decrease the risk of blood clots.     Comfort and Pain Management - Pain after Surgery    Pain after surgery is normal and expected.  You will have some amount of pain for several weeks after surgery.  Your pain will improve with time.  There are several things you can do to help reduce your pain including: rest, ice, elevation, and using pain medications as needed. Contact your Surgeon Team if you have pain that persists or worsens after surgery despite rest, ice, elevation, and taking your medication(s) as prescribed. Contact your Surgeon Team if you have new numbness, tingling, or weakness in your operative extremity.     Comfort and Pain Management -  "Swelling after Surgery    Swelling and/or bruising of the surgical extremity is common and may persist for several months after surgery. In addition to frequent icing and elevation, gentle compressive support with an ACE wrap or tubigrip may help with swelling. Apply compression regularly, removing at least twice daily to perform skin checks. Contact your Surgeon Team if your swelling increases and is NOT associated with an increase in your activity level, or if your swelling increases and is associated with redness and pain.     Comfort and Pain Management - LOWER Extremity Elevation    Swelling is expected for several months after surgery. This type of swelling is usually associated with gravity and activity, and can be improved with elevation.   The best way to do this is to get your \"toes above your nose\" by laying down and placing several pillows lengthwise under your calf and heel. When elevating your leg keep your knee completely straight. Perform this elevation as often as possible especially for the first two weeks after surgery.     Comfort and Pain Management - Cold therapy    Ice can be used to control swelling and discomfort after surgery. Place a thin towel over your operative site and apply the ice pack overtop. Leave ice pack in place for 20 minutes, then remove for 20 minutes. Repeat this 20 minutes on/20 minutes off routine as often as tolerated.     Medication Instructions - Acetaminophen (TYLENOL) Instructions    You were discharged with acetaminophen (TYLENOL) for pain management after surgery. Acetaminophen most effectively manages pain symptoms when it is taken on a schedule without missing doses (every four, six, or eight hours). Your Provider will prescribe a safe daily dose between 3000 - 4000 mg.  Do NOT exceed this daily dose. Most patients use acetaminophen for pain control for the first four weeks after surgery.  You can wean from this medication as your pain decreases.     Follow Up " Care    Please follow-up with Dr. Catalan's team in 2 weeks at [facility]. Call our scheduling line at [phone] to make an appointment, if you do not already have one scheduled.     Activity    Activity as tolerated     Return to Driving    Return to driving - Driving is NOT permitted until directed by your provider. Under no circumstance are you permitted to drive while using narcotic pain medications.     Weight bearing as tolerated    Weight bearing as tolerated on your operative extremity.     Dressing / Wound care - Shower with wound/dressing covered    You must COVER your dressing or incision with saran wrap (or any other non-permeable covering) to allow the incision to remain dry while showering.  You may shower 2 days after surgery as long as the surgical wound stays dry. Continue to cover your dressing or incision for showering until your first office visit.  You are strictly prohibited from soaking or submerging the surgical wound underwater.     Dressing / Wound Care - NO Tub Bathing    Tub bathing, swimming, or any other activities that will cause your incision to be submerged in water should be avoided until allowed by your Surgeon.     Medication instructions -  Anticoagulation - aspirin    Take the aspirin as prescribed for a total of four weeks after surgery.  This is given to help minimize your risk of blood clot.     Opioid Instructions (Greater than or equal to 65 years)    You were discharged with an opioid medication (hydromorphone, oxycodone, hydrocodone, or tramadol). This medication should only be taken for breakthrough pain that is not controlled with acetaminophen (TYLENOL). If you rate your pain less than 3 you do not need this medication. Pain rating 0-3: You do not need this medication Pain rating 4-6: Take 1/2 tablet every 4-6 hours as needed Pain rating 7-10: Take 1 tablet every 4-6 hours as needed Do not exceed 4 tablets per day     Medication Instructions - Opioids -  Tapering Instructions    In the first three days following surgery, your symptoms may warrant use of the narcotic pain medication every four to six hours as prescribed. This is normal. As your pain symptoms improve, focus your efforts on decreasing (tapering) use of narcotic medications. The most successful tapering strategy is to first, decrease the number of tablets you take every 4-6 hours to the minimum prescribed. Then, increase the amount of time between doses. For example: First, taper to   or 1 tablet every 4-6 hours. Then, taper to   or 1 tablet every 6-8 hours. Then, taper to   or 1 tablet every 8-10 hours. Then, taper to   or 1 tablet every 10-12 hours. Then, taper to   or 1 tablet at bedtime. The bedtime dose can help with comfort during sleep and is typically the last dose to be discontinued after surgery.     No precautions    No precautions directed by your Provider.  You may perform range of motion activities as tolerated.     Dressing / Wound Care - Wound    You have a prevena wound vac on incision.  Keep in place until follow up appointment 2 weeks post op.  Keep plugged into wall  when seated or in bed     Discharge Instruction - Regular Diet Adult    Return to your pre-surgery diet unless instructed otherwise     Significant Results and Procedures   Most Recent 3 CBC's:  Recent Labs   Lab Test 03/18/25  0441 03/16/25  0451 03/15/25  0540   WBC 7.3 10.6 13.5*   HGB 10.0* 9.8* 9.2*   MCV 90 92 91    206 184     Most Recent 3 BMP's:  Recent Labs   Lab Test 03/18/25  0441 03/17/25  0434 03/16/25  0451 03/15/25  0540 03/14/25  0504     --   --  137 139   POTASSIUM 4.1 3.8 4.4 4.3 3.9   CHLORIDE 100  --   --  101 100   CO2 38*  --   --  36* 32*   BUN 12.2  --   --  15.8 11.7   CR 0.65  --   --  0.68 0.65   ANIONGAP 2*  --   --  <1* 7   WILMER 9.3  --   --  8.7* 8.3*   *  --   --  110* 178*     Discharge Medications   Current Discharge Medication List        START taking these  medications    Details   acetaminophen (TYLENOL) 325 MG tablet Take 3 tablets (975 mg) by mouth 3 times daily.  Qty: 100 tablet, Refills: 0    Associated Diagnoses: Closed displaced intertrochanteric fracture of right femur, initial encounter (H)      aspirin 81 MG EC tablet Take 1 tablet (81 mg) by mouth 2 times daily.  Qty: 60 tablet, Refills: 0    Associated Diagnoses: Closed displaced intertrochanteric fracture of right femur, initial encounter (H)      cefadroxil (DURICEF) 500 MG capsule Take 1 capsule (500 mg) by mouth 2 times daily for 7 days.  Qty: 14 capsule, Refills: 0    Associated Diagnoses: Closed displaced intertrochanteric fracture of right femur, initial encounter (H)      cyclobenzaprine (FLEXERIL) 10 MG tablet Take 1 tablet (10 mg) by mouth every 8 hours as needed for muscle spasms.  Qty: 10 tablet, Refills: 0    Associated Diagnoses: Closed displaced intertrochanteric fracture of right femur, initial encounter (H)      nicotine (NICODERM CQ) 21 MG/24HR 24 hr patch Place 1 patch over 24 hours onto the skin daily as needed for nicotine withdrawal symptoms.  Qty: 21 patch, Refills: 0    Associated Diagnoses: Tobacco abuse      senna-docusate (SENOKOT-S/PERICOLACE) 8.6-50 MG tablet Take 2 tablets by mouth 2 times daily as needed for constipation.  Qty: 30 tablet, Refills: 0    Associated Diagnoses: Closed displaced intertrochanteric fracture of right femur, initial encounter (H)           CONTINUE these medications which have NOT CHANGED    Details   albuterol (PROVENTIL) (2.5 MG/3ML) 0.083% neb solution USE 1 VIAL IN NEBULIZER EVERY 6 HOURS AS NEEDED FOR WHEEZING  Qty: 270 mL, Refills: 3    Associated Diagnoses: Chronic obstructive pulmonary disease, unspecified COPD type (H)      ascorbic acid (VITAMIN C) 500 MG tablet Take 500 mg by mouth daily.      ipratropium - albuterol 0.5 mg/2.5 mg/3 mL (DUONEB) 0.5-2.5 (3) MG/3ML neb solution TAKE 1 VIAL(3ML) BY NEBULIZATION EVERY 6 HOURS AS NEEDED FOR COUGH  WITH PHELGM.  Qty: 90 mL, Refills: 2    Associated Diagnoses: Chronic obstructive pulmonary disease, unspecified COPD type (H)      multivitamin w/minerals (THERA-VIT-M) tablet Take 1 tablet by mouth daily.      oxyCODONE IR (ROXICODONE) 10 MG tablet Take 1 tablet (10 mg) by mouth 4 times daily. For chronic pain.  Qty: 120 tablet, Refills: 0    Associated Diagnoses: Chronic bilateral low back pain, unspecified whether sciatica present      potassium gluconate 2.5 MEQ tablet Take 2.5 mEq by mouth daily.      vitamin B complex with vitamin C (VITAMIN  B COMPLEX) tablet Take 1 tablet by mouth daily.      Vitamin D3 (VITAMIN D, CHOLECALCIFEROL,) 25 mcg (1000 units) tablet Take 1 tablet by mouth daily.      augmented betamethasone dipropionate (DIPROLENE AF) 0.05 % external cream Apply topically 2 times daily. Stronger cream for rash.  Qty: 50 g, Refills: 3    Associated Diagnoses: Dermatitis      nebulizer nebulization 1 nebulizer please as covered by insurance.  May also dispense supplies (tubing, inhalation device, etc) as needed.  Qty: 1 each, Refills: 0    Associated Diagnoses: Chronic obstructive pulmonary disease, unspecified COPD type (H)           Allergies   Allergies   Allergen Reactions    Mirtazapine      Other reaction(s): Other (See Comments)  Hallucination     Tape [Adhesive Tape]      Allergy Hx  In addition to NO paper tape    Liquid Adhesive Rash     Other reaction(s): Other (See Comments)  Allergy Hx - Rash from paper tape

## 2025-03-19 ENCOUNTER — PATIENT OUTREACH (OUTPATIENT)
Dept: CARE COORDINATION | Facility: CLINIC | Age: 70
End: 2025-03-19
Payer: COMMERCIAL

## 2025-03-19 NOTE — PROGRESS NOTES
CC:  Chief Complaint   Patient presents with    Cough     Sx since last fri    Fever     Pt had low grade temp yesterday. Pt was very hot last night mom states    eye     L eye drainage today       HPI: Juwan Beltrán is a 2 y.o. 7 m.o. here today with mother and father for evaluation of fever.     Juwan developed nasal congestion and cough 5-6 days ago. He then developed fever. Evaluated in clinic 4 days ago - neg influenza and strep.   He continues to have fever.   No vomiting or diarrhea  Drinking well. Eating less.   Left eye drainage today.     HPI    History reviewed. No pertinent past medical history.      Current Outpatient Medications:     amoxicillin (AMOXIL) 400 mg/5 mL suspension, Take 8.4 mLs (672 mg total) by mouth 2 (two) times daily. for 10 days, Disp: 170 mL, Rfl: 0    Review of Systems   Constitutional:  Positive for appetite change and fever. Negative for activity change.   HENT:  Positive for congestion and rhinorrhea. Negative for ear pain and trouble swallowing.    Eyes:  Positive for discharge.   Respiratory:  Positive for cough. Negative for wheezing.    Gastrointestinal:  Negative for abdominal pain, diarrhea and vomiting.   Genitourinary:  Negative for decreased urine volume.   Skin:  Negative for rash.       PE:   Vitals:    12/10/24 1317   Pulse: (!) 120   Resp: 24   Temp: 100.1 °F (37.8 °C)       Physical Exam  Vitals reviewed.   Constitutional:       General: He is active. He is not in acute distress.     Appearance: He is well-developed.   HENT:      Right Ear: A middle ear effusion is present. A PE tube (extruded and in canal) is present. Tympanic membrane is erythematous.      Left Ear: Tympanic membrane normal. No drainage.  No middle ear effusion. A PE tube is present.      Nose: Congestion and rhinorrhea (clear) present.      Mouth/Throat:      Mouth: Mucous membranes are moist.      Pharynx: Oropharynx is clear.      Tonsils: No tonsillar exudate.   Eyes:       Clinic Care Coordination Contact  Transitions of Care Outreach  Chief Complaint   Patient presents with    Clinic Care Coordination - [facility]       Most Recent Admission Date: 3/13/2025   Most Recent Admission Diagnosis: Fall, initial encounter - W19.XXXA  Closed displaced intertrochanteric fracture of right femur, initial encounter (H) - S72.141A     Most Recent Discharge Date: 3/18/2025   Most Recent Discharge Diagnosis: Fall, initial encounter - W19.XXXA  Closed displaced intertrochanteric fracture of right femur, initial encounter (H) - S72.141A  Tobacco abuse - Z72.0     Transitions of Care Assessment    Discharge Assessment  How are you doing now that you are home?: ok  How are your symptoms? (Red Flag symptoms escalate to triage hotline per guidelines): Improved  Do you know how to contact your clinic care team if you have future questions or changes to your health status? : Yes  Does the patient have their discharge instructions? : Yes  Does the patient have questions regarding their discharge instructions? : No  Were you started on any new medications or were there changes to any of your previous medications? : Yes  Does the patient have all of their medications?: No (see comment) (Daughter will  today.)  Do you have questions regarding any of your medications? : No  Do you have all of your needed medical supplies or equipment (DME)?  (i.e. oxygen tank, CPAP, cane, etc.): Yes         Post-op (Clinicians Only)  Did the patient have surgery or a procedure: Yes  Incision: open  Drainage: Yes  Incision Drainage Amount: scant  Bleeding: none  Fever: No  Chills: No  Redness: No  Warmth: No  Swelling: No  Incision site pain: No  Eating & Drinking: eating and drinking without complaints/concerns  PO Intake: regular diet  Bowel Function: normal  Urinary Status: voiding without complaint/concerns    Care Management       Care Mgmt General Assessment      [facility] RN spoke with patient today to follow up on  "her recent hospitalization. Patient reported she was doing \"ok\" at home. She has been contacted by home care and was told an RN would be coming out to see her tomorrow. Patient stated her drain came out last night. She has reported this to home care. Writer also suggested she contact her surgeon's office regarding this concern. Assisted patient with scheduling a follow up with her PCP. Writer also provided her the number for [facility] to schedule follow up appointment with Dr. Catalan. Patient reported having good support at home from her grandson. Denied any additional needs or concerns.                    Follow up Plan     Discharge Follow-Up  Discharge follow up appointment scheduled in alignment with recommended follow up timeframe or Transitions of Risk Category? (Low = within 30 days; Moderate= within 14 days; High= within 7 days): Yes  Discharge Follow Up Appointment Date: 03/25/25  Discharge Follow Up Appointment Scheduled with?: Primary Care Provider    Future Appointments   Date Time Provider Department Center   3/25/2025  1:30 PM Jorge Dunn MD [facility]   6/9/2025  1:30 PM Jorge Dunn MD [facility]       Outpatient Plan as outlined on AVS reviewed with patient.    For any urgent concerns, please contact our 24 hour nurse triage line: [phone] ([phone])       David J. Myhre, RN                " Conjunctiva/sclera: Conjunctivae normal.   Cardiovascular:      Rate and Rhythm: Normal rate and regular rhythm.      Heart sounds: No murmur heard.  Pulmonary:      Effort: Pulmonary effort is normal. No respiratory distress, nasal flaring or retractions.      Breath sounds: Normal breath sounds. No stridor. No wheezing, rhonchi or rales.   Abdominal:      General: Bowel sounds are normal. There is no distension.      Palpations: Abdomen is soft.      Tenderness: There is no abdominal tenderness. There is no guarding.   Musculoskeletal:         General: Normal range of motion.   Lymphadenopathy:      Cervical: No cervical adenopathy.   Skin:     General: Skin is warm.      Findings: No rash.   Neurological:      Mental Status: He is alert.           ASSESSMENT:  PLAN:  Juwan was seen today for cough, fever and eye.    Diagnoses and all orders for this visit:    Acute suppurative otitis media of right ear without spontaneous rupture of tympanic membrane, recurrence not specified  -     amoxicillin (AMOXIL) 400 mg/5 mL suspension; Take 8.4 mLs (672 mg total) by mouth 2 (two) times daily. for 10 days    Cough, unspecified type    Fever, unspecified fever cause        Clear fluids helps hydrate and keep secretions thin.  Tylenol/Motrin as needed for any pain or fever.  Explained usual course for this illness, including how long symptoms may last.    If Juwan Beltrán isnt better after 3 days, call with update or schedule appointment.

## 2025-03-19 NOTE — PLAN OF CARE
Occupational Therapy Discharge Summary    Reason for therapy discharge:    Pt discharge home 3/18/25    Progress towards therapy goal(s). See goals on Care Plan in [facility] electronic health record for goal details.  Working towards goals/not met.    Therapy recommendation(s):    Recommendation was for TCU; pt refused & went home.

## 2025-03-20 ENCOUNTER — TRANSFERRED RECORDS (OUTPATIENT)
Dept: HEALTH INFORMATION MANAGEMENT | Facility: CLINIC | Age: 70
End: 2025-03-20
Payer: COMMERCIAL

## 2025-03-20 ENCOUNTER — MEDICAL CORRESPONDENCE (OUTPATIENT)
Dept: HEALTH INFORMATION MANAGEMENT | Facility: CLINIC | Age: 70
End: 2025-03-20

## 2025-03-20 ENCOUNTER — TELEPHONE (OUTPATIENT)
Dept: INTERNAL MEDICINE | Facility: CLINIC | Age: 70
End: 2025-03-20
Payer: COMMERCIAL

## 2025-03-20 LAB
PATH REPORT.COMMENTS IMP SPEC: NORMAL
PATH REPORT.COMMENTS IMP SPEC: NORMAL
PATH REPORT.FINAL DX SPEC: NORMAL
PATH REPORT.GROSS SPEC: NORMAL
PATH REPORT.MICROSCOPIC SPEC OTHER STN: NORMAL
PATH REPORT.RELEVANT HX SPEC: NORMAL
PHOTO IMAGE: NORMAL

## 2025-03-20 PROCEDURE — 88311 DECALCIFY TISSUE: CPT | Mod: 26 | Performed by: PATHOLOGY

## 2025-03-20 PROCEDURE — 88305 TISSUE EXAM BY PATHOLOGIST: CPT | Mod: 26 | Performed by: PATHOLOGY

## 2025-03-20 NOTE — TELEPHONE ENCOUNTER
Nesha for orders.    Jorge Dunn MD  General Internal Medicine  [facility]  3/20/2025, 10:20 AM

## 2025-03-20 NOTE — TELEPHONE ENCOUNTER
Order/Referral Request    Who is requesting: [facility]     Orders being requested: PT 1 time a wk for 1 wk   2 times a wk for 3 wks   1 time a wk for 1 wk   Every other wk for 4 wks     Reason service is needed/diagnosis:     When are orders needed by: asap     Has this been discussed with Provider: No    Does patient have a preference on a Group/Provider/Facility? [facility]     Does patient have an appointment scheduled?: No    Where to send orders:  verbal orders    Okay to leave a detailed message?: Yes at Other phone number:  [phone]

## 2025-03-20 NOTE — TELEPHONE ENCOUNTER
Called and left message for Seven SMITH on a confidential voicemail to relay ok for verbal orders below.

## 2025-03-22 ENCOUNTER — APPOINTMENT (OUTPATIENT)
Dept: ULTRASOUND IMAGING | Facility: HOSPITAL | Age: 70
End: 2025-03-22
Payer: COMMERCIAL

## 2025-03-22 ENCOUNTER — HOSPITAL ENCOUNTER (EMERGENCY)
Facility: HOSPITAL | Age: 70
Discharge: HOME | End: 2025-03-22
Payer: COMMERCIAL

## 2025-03-22 VITALS
HEART RATE: 64 BPM | HEIGHT: 68 IN | WEIGHT: 98 LBS | TEMPERATURE: 98.2 F | DIASTOLIC BLOOD PRESSURE: 60 MMHG | SYSTOLIC BLOOD PRESSURE: 134 MMHG | OXYGEN SATURATION: 97 % | BODY MASS INDEX: 14.85 KG/M2 | RESPIRATION RATE: 20 BRPM

## 2025-03-22 DIAGNOSIS — Z46.89 ENCOUNTER FOR MANAGEMENT OF WOUND VAC: ICD-10-CM

## 2025-03-22 DIAGNOSIS — R60.0 BILATERAL LEG EDEMA: ICD-10-CM

## 2025-03-22 PROCEDURE — 93970 EXTREMITY STUDY: CPT

## 2025-03-22 PROCEDURE — 99284 EMERGENCY DEPT VISIT MOD MDM: CPT | Mod: 25

## 2025-03-22 ASSESSMENT — COLUMBIA-SUICIDE SEVERITY RATING SCALE - C-SSRS
6. HAVE YOU EVER DONE ANYTHING, STARTED TO DO ANYTHING, OR PREPARED TO DO ANYTHING TO END YOUR LIFE?: NO
1. IN THE PAST MONTH, HAVE YOU WISHED YOU WERE DEAD OR WISHED YOU COULD GO TO SLEEP AND NOT WAKE UP?: NO
2. HAVE YOU ACTUALLY HAD ANY THOUGHTS OF KILLING YOURSELF IN THE PAST MONTH?: NO

## 2025-03-22 ASSESSMENT — ACTIVITIES OF DAILY LIVING (ADL)
ADLS_ACUITY_SCORE: 57

## 2025-03-22 NOTE — ED TRIAGE NOTES
Patient has wound vac on right hip that is not functioning, will not charge or turn on.  Was replaced here by Dr. Catalan on Thursday. Patient taking muscle relaxant and oxycodone for pain at home     Triage Assessment (Adult)       Row Name 03/22/25 1313          Triage Assessment    Airway WDL WDL        Respiratory WDL    Respiratory WDL WDL        Skin Circulation/Temperature WDL    Skin Circulation/Temperature WDL WDL        Cardiac WDL    Cardiac WDL WDL        Peripheral/Neurovascular WDL    Peripheral Neurovascular WDL WDL        Cognitive/Neuro/Behavioral WDL    Cognitive/Neuro/Behavioral WDL WDL

## 2025-03-22 NOTE — DISCHARGE INSTRUCTIONS
You are seen in the emergency department for wound VAC not working properly.  I was able to get this to turn on here today.  You also describe that both of your feet were swollen.  On my examination, there is no redness to suggest some sort of infection.  We did an ultrasound to rule out that this is not a blood clot.  You have no documented history of kidney disease or heart failure that would be causing swelling in your legs.  I do think that this is swelling that can occur after a surgical procedure.  It can be pretty common.  You can want a wear compression stockings to help with the swelling keep your legs elevated.  Go to your follow-up appointment on 3/25/2025.  If you develop worsening swelling, skin changes including redness, shortness of breath, chest pain, or any other concerning symptoms return to the emergency department

## 2025-03-22 NOTE — ED PROVIDER NOTES
EMERGENCY DEPARTMENT ENCOUNTER      NAME: Jenn Barclay  AGE: 69 year old female  YOB: 1955  MRN: [id number]  EVALUATION DATE & TIME: No admission date for patient encounter.    PCP: Jorge Dunn    ED PROVIDER: Monique Ware PA-C    CHIEF COMPLAINT  Leg Pain and Post-op Problem      FINAL IMPRESSION:      ICD-10-CM    1. Encounter for management of wound VAC  Z46.89       2. Bilateral leg edema  R60.0           MEDICAL DECISION MAKING AND ED COURSE:  Pertinent Labs & Imaging studies reviewed (See chart for details)  ED Course as of 03/27/25 1533   Sat Mar 22, 2025   8287 Patient is a 69-year-old female with a pertinent history of a recent right hip fracture s/p repair with wound VAC in place just discharged from the hospital a few days ago who presents to the ER today for concerns with a faulty wound VAC. Also describes over the past couple days her bilateral feet have become swollen and painful. Vitals unremarkable.  Afebrile.  No tachycardia, hypoxia, tachypnea.  On exam, wound VAC in place to the right thigh.  There is no surrounding erythema.  Tenderness to palpation however.  Does not appear to be to suction.  Bilateral 1+ pitting edema to the ankle and below.  DP pulses 2+ bilaterally.  Sensation is intact. Cardiopulmonary exam unremarkable.    I held the power button of the wound VAC and it turned on and adequately obtained suction without beeping or issues.  Given new lower extremity edema in the setting of recent surgery, will obtain bilateral lower extremity ultrasound to assess for DVT.  Although I did consider other causes of lower extremity edema including renal failure, heart failure, etc.  Patient does not describe a history of renal failure or heart failure.  She denies chest pain or shortness of breath.  Discussed at length how the important thing is to rule out DVT, that we could consider other causes etc.  She opted to just obtain ultrasounds at this time instead of lab workup  which I think is reasonable.    1637 Extremity ultrasound without DVT.   patient has been up moving around in her home and feels like pain is fairly well-controlled with oxycodone and muscle relaxer.  She did feel like pain yesterday and today in the right hip were worse.  I did offer labs and hip x-ray here and patient declined.   She has a follow-up appointment with a provider in 3 days.  She has had no falls since surgery to suggest malplacement of the hardware.  She is able to range the hip although it does cause some discomfort so less suspicious for an infection including septic hip or postoperative infection.  The wound VAC is on, in place, and is suctioning well with good output.  I have a very low suspicion for any other cause of lower extremity edema including heart failure, renal failure, etc. As she has no documented history of this and no other symptoms including JVD, shortness of breath, etc..  Patient is overall well-appearing.  And the reason she came in today was because her wound VAC was not working which I was able to get functioning.. Strict return precautions were discussed including if she develops shortness of breath, comfortable she signs of swelling or infection around the wound VAC site, worsening swelling of her legs, or any other concerning symptoms.  She was agreeable to this and discharged in stable condition.  All questions answered       MEDICATIONS GIVEN IN THE EMERGENCY:  Medications - No data to display    NEW PRESCRIPTIONS STARTED AT TODAY'S ER VISIT  Discharge Medication List as of 3/22/2025  4:25 PM        Discharge Medication List as of 3/22/2025  4:25 PM          =================================================================    HPI    Patient information was obtained from: patient   Use of : N/A     Jenn ESCALANTE Deny is a 69 year old female with a pertinent history of asthma and previous right hip fracture x 2 who presents to this ED by walk in for evaluation of  "faulty wound vac. Reports she was discharged from the hospital on Wednesday with a wound vac which stopped working. She was then seen in the clinic on Thursday and a new wound vac was placed. She reports Thursday night the battery light was so she charged it. Tried to turn it on yesterday and couldn't. Her family encouraged her to come in yesterday but she presents today for concerns about the wound VAC not turning on.  She also reports that over the past couple days her bilateral feet have swelled up.  She reports that she is \"90 pounds\" and that her feet \"never swell\".  She reports that they are painful.  Initially was just her feet and now it is up to the ankle.  Denies chest pain, shortness of breath, lightheadedness.  No history of similar symptoms.  No history of DVT.  Is not on thinners. Reports right hip pain is well-controlled with muscle relaxers and oxycodone.  Reports that she can range the hip in all directions although does endorse that it causes pain.  She has a wound VAC in place and does not describe any rashes or redness around the sponge of where the wound VAC is.    Chart review:   Patient was admitted from 3/13-3/18 after a fall that resulted in a right hip fracture s/p surgical fixation on 3/13/25. Discharged home with wound vac in place.       PHYSICAL EXAM    /60   Pulse 64   Temp 98.2  F (36.8  C) (Tympanic)   Resp 20   Ht 1.727 m (5' 8\")   Wt 44.5 kg (98 lb)   LMP  (LMP Unknown)   SpO2 97%   BMI 14.90 kg/m    Physical Exam  Vitals and nursing note reviewed.   Constitutional:       General: She is not in acute distress.     Appearance: Normal appearance. She is not ill-appearing.   HENT:      Head: Normocephalic and atraumatic.      Nose: Nose normal.      Mouth/Throat:      Mouth: Mucous membranes are moist.   Cardiovascular:      Rate and Rhythm: Normal rate.      Pulses: Normal pulses.   Pulmonary:      Effort: Pulmonary effort is normal.   Musculoskeletal:      Cervical " back: Normal range of motion and neck supple.      Right lower leg: Edema present.      Left lower leg: Edema present.      Comments: 1+ edema to the bilateral feet, does not extend past the ankles   Skin:     General: Skin is warm.      Capillary Refill: Capillary refill takes less than 2 seconds.      Findings: No bruising, erythema, lesion or rash.   Neurological:      General: No focal deficit present.      Mental Status: She is alert and oriented to person, place, and time. Mental status is at baseline.   Psychiatric:         Mood and Affect: Mood is anxious.           RADIOLOGY:  Reviewed all pertinent imaging. Please see official radiology report.  US Lower Extremity Venous Duplex Bilateral   Final Result   IMPRESSION:   1.  No deep venous thrombosis in the bilateral lower extremities.        Medical Decision Making  I obtained history from Family Member/Significant Other  Discharge. No recommendations on prescription strength medication(s). See documentation for any additional details.    MIPS (CTPE, Dental pain, Panda, Sinusitis, Asthma/COPD, Head Trauma): Not Applicable    SEPSIS: None      Monique Ware PA-C  [facility] EMERGENCY DEPARTMENT  [facility] [facility] [id number] [phone]        Monique Ware PA-C  03/27/25 6890

## 2025-03-27 ENCOUNTER — TELEPHONE (OUTPATIENT)
Dept: INTERNAL MEDICINE | Facility: CLINIC | Age: 70
End: 2025-03-27
Payer: COMMERCIAL

## 2025-03-31 ENCOUNTER — TELEPHONE (OUTPATIENT)
Dept: INTERNAL MEDICINE | Facility: CLINIC | Age: 70
End: 2025-03-31
Payer: COMMERCIAL

## 2025-04-03 ENCOUNTER — OFFICE VISIT (OUTPATIENT)
Dept: INTERNAL MEDICINE | Facility: CLINIC | Age: 70
End: 2025-04-03
Payer: COMMERCIAL

## 2025-04-03 VITALS
SYSTOLIC BLOOD PRESSURE: 114 MMHG | HEIGHT: 68 IN | HEART RATE: 85 BPM | WEIGHT: 90 LBS | BODY MASS INDEX: 13.64 KG/M2 | RESPIRATION RATE: 18 BRPM | OXYGEN SATURATION: 98 % | TEMPERATURE: 98.1 F | DIASTOLIC BLOOD PRESSURE: 60 MMHG

## 2025-04-03 DIAGNOSIS — Z72.0 TOBACCO ABUSE: ICD-10-CM

## 2025-04-03 DIAGNOSIS — R20.0 FINGER NUMBNESS: ICD-10-CM
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
## 2025-04-03 DIAGNOSIS — S32.502A CLOSED NONDISPLACED FRACTURE OF LEFT PUBIS, INITIAL ENCOUNTER (H): ICD-10-CM

## 2025-04-03 DIAGNOSIS — J44.9 CHRONIC OBSTRUCTIVE PULMONARY DISEASE, UNSPECIFIED COPD TYPE (H): ICD-10-CM

## 2025-04-03 DIAGNOSIS — M79.89 SWELLING OF RIGHT FOOT: ICD-10-CM

## 2025-04-03 DIAGNOSIS — M81.0 AGE-RELATED OSTEOPOROSIS WITHOUT CURRENT PATHOLOGICAL FRACTURE: ICD-10-CM

## 2025-04-03 DIAGNOSIS — S72.141A CLOSED DISPLACED INTERTROCHANTERIC FRACTURE OF RIGHT FEMUR, INITIAL ENCOUNTER (H): Primary | ICD-10-CM

## 2025-04-03 DIAGNOSIS — Z09 HOSPITAL DISCHARGE FOLLOW-UP: ICD-10-CM

## 2025-04-03 DIAGNOSIS — Z87.891 HISTORY OF TOBACCO USE, PRESENTING HAZARDS TO HEALTH: ICD-10-CM

## 2025-04-03 DIAGNOSIS — M17.11 PRIMARY OSTEOARTHRITIS OF RIGHT KNEE: ICD-10-CM

## 2025-04-03 DIAGNOSIS — R68.2 DRY MOUTH: ICD-10-CM

## 2025-04-03 DIAGNOSIS — Z79.899 CONTROLLED SUBSTANCE AGREEMENT SIGNED: ICD-10-CM

## 2025-04-03 RX ORDER — OXYCODONE HYDROCHLORIDE 10 MG/1
10 TABLET ORAL
Qty: 120 TABLET | Refills: 0 | Status: SHIPPED | OUTPATIENT
Start: 2025-04-03 | End: 2025-04-18

## 2025-04-03 RX ORDER — OXYCODONE HYDROCHLORIDE 10 MG/1
10 TABLET ORAL
Qty: 150 TABLET | Refills: 0 | Status: SHIPPED | OUTPATIENT
Start: 2025-04-18 | End: 2025-05-18

## 2025-04-03 NOTE — PATIENT INSTRUCTIONS
Future Appointments   Date Time Provider Department Center   5/6/2025  8:30 AM Lou Monterroso [facility]   6/9/2025  1:30 PM [facility]

## 2025-04-03 NOTE — PROGRESS NOTES
[address] [address]  Phone: [phone]  Fax: [phone]    The secret of the care of the patient is in caring for the patient.  - Dr. Francis Peabody  ______________________________________________________________________     Date of Service: 4/3/2025  Primary Provider: Jorge Dunn    Patient Care Team:  Jorge Dunn MD as PCP - General (Internal Medicine)  Jorge Dunn MD as Assigned PCP  Joellen Fontenot MD as Resident (Dermatology)  Ryan Alonso MD as MD (Endocrinology, Diabetes, and Metabolism)  Manju Asher RN as BMT Nurse Coordinator  Umesh Brandt MD as BMT Physician (Transplant)  Jorge Dunn MD as Assigned Pain Medication Provider  Ryan Alonso MD as Assigned Endocrinology Provider  Claribel Young CNP as Assigned Neuroscience Provider     ______________________________________________________________________     Chief Complaint   Patient presents with    ER F/U     3/22/25 - Encounter for management of wound VAC / Bilateral leg edema. Pt states right now the pain is 10/10.       Mouth Problem     Pt states my mouth dry out really quick           4/3/2025    10:51 AM   Additional Questions   Roomed by Adriana Cardona   Accompanied by N/A     History of Present Illness    Jenn Barclay, 69 years    Pelvic Fracture and Knee Pain  - Recently experienced a fracture in the pelvis.  - Reports knee pain from the base of the knee extending upwards, with pain on the inside of the leg and knee.  - Pain onset occurred after being discharged from the hospital, approximately 6 to 7 days post-discharge.  - Pain is exacerbated by movement and is described as persistent.  - Received an X-ray and a cortisone shot on Monday, which was expected to cause soreness for a few days.  - Reports difficulty putting knees together and experiences a popping sound in the knee and hip.  - Has been using crutches  and undergoing physical therapy twice a week.  - Engages in exercises such as sidekicks and front kicks, but with limited range.  - Reports muscle tightness extending into the groin area.  - Experiences swelling in the right foot and ankle post-hospitalization, which has since subsided.  Right hip fracture and recovery reviewed in relationship to this.    Dry Mouth  - Reports a dry mouth sensation since hospitalization.  - Drinks a lot of water and fluids, including two cups of coffee daily.  - Currently taking cyclobenzaprine, which may contribute to dry mouth.    Numbness  - Reports numbness in the left ulnar area, specifically the pinky finger.  - Unsure if numbness is due to the fall or positioning during surgery.    General Well-being  - Describes herself as a hyperactive person who finds it challenging to remain inactive.  - Engages in daily activities such as cooking and household chores despite physical limitations.  - Expresses a desire to return to normalcy and resume gardening activities in the summer.       We reviewed her other issues noted in the assessment but not specifically addressed in the HPI above.   ______________________________________________________________________     Active Problem List:  Problem List as of 4/3/2025 Reviewed: 4/3/2025 11:18 AM by Jorge Dunn MD         High    Full code status    Tobacco abuse    COPD (chronic obstructive pulmonary disease) (H)    History of acute myeloid leukemia    Status post allogeneic bone marrow transplant (H) - 2010 - [facility]       Medium    Controlled substance agreement signed - 12/24 - oxycodone - UDS 12/24    Chronic, continuous use of opioids    Exocrine pancreatic insufficiency    Chronic back pain    Financial difficulties    Pseudogout    Traumatic compression fracture of T8 thoracic vertebra, closed, initial encounter (H)    Chronic diarrhea    Hypokalemia    Closed compression fracture of L4 lumbar vertebra, sequela    Kyphosis  (acquired) (postural)    Closed wedge compression fracture of T5 vertebra, sequela    Closed nondisplaced fracture of left pubis, initial encounter (H)    Fall, initial encounter    Closed displaced intertrochanteric fracture of right femur, initial encounter (H)       Low    Nephrolithiasis    Anxiety    Gastric ulcer    Migraine headache    Papular rash, generalized    Senile purpura    Tubular adenoma of colon - advanced adenoma in 2017 - single small adenoma in 2020    OP (osteoporosis)    Frailty     Current Outpatient Medications   Medication Instructions    acetaminophen (TYLENOL) 975 mg, Oral, 3 TIMES DAILY    albuterol (PROVENTIL) (2.5 MG/3ML) 0.083% neb solution USE 1 VIAL IN NEBULIZER EVERY 6 HOURS AS NEEDED FOR WHEEZING    aspirin 81 mg, Oral, 2 TIMES DAILY    augmented betamethasone dipropionate (DIPROLENE AF) 0.05 % external cream Topical, 2 TIMES DAILY, Stronger cream for rash.    cyclobenzaprine (FLEXERIL) 10 mg, Oral, EVERY 8 HOURS PRN    ipratropium - albuterol 0.5 mg/2.5 mg/3 mL (DUONEB) 0.5-2.5 (3) MG/3ML neb solution TAKE 1 VIAL(3ML) BY NEBULIZATION EVERY 6 HOURS AS NEEDED FOR COUGH WITH PHELGM.    multivitamin w/minerals (THERA-VIT-M) tablet 1 tablet, DAILY    nebulizer nebulization 1 nebulizer please as covered by insurance.  May also dispense supplies (tubing, inhalation device, etc) as needed.    nicotine (NICODERM CQ) 21 MG/24HR 24 hr patch 1 patch, Transdermal, DAILY PRN    oxyCODONE IR (ROXICODONE) 10 mg, Oral, 4 TIMES DAILY, For chronic pain.    oxyCODONE IR (ROXICODONE) 10 mg, Oral, EVERY 3 HOURS    [START ON 4/18/2025] oxyCODONE IR (ROXICODONE) 10 mg, Oral, 5 TIMES DAILY    potassium gluconate 2.5 MEQ tablet 2.5 mEq, DAILY    senna-docusate (SENOKOT-S/PERICOLACE) 8.6-50 MG tablet 2 tablets, Oral, 2 TIMES DAILY PRN    vitamin B complex with vitamin C (VITAMIN  B COMPLEX) tablet 1 tablet, DAILY    vitamin C (ASCORBIC ACID) 500 mg, DAILY    Vitamin D3 (VITAMIN D, CHOLECALCIFEROL,) 25 mcg  "(1000 units) tablet 1 tablet, DAILY     Social History     Social History Narrative    , on disability.        Has family in the area.        Patient of Dr. Dunn since 2015.       ______________________________________________________________________     Wt Readings from Last 3 Encounters:   04/03/25 40.8 kg (90 lb)   03/22/25 44.5 kg (98 lb)   03/17/25 47.2 kg (104 lb 1.6 oz)     BP Readings from Last 3 Encounters:   04/03/25 114/60   03/22/25 134/60   03/18/25 119/68     /60   Pulse 85   Temp 98.1  F (36.7  C) (Oral)   Resp 18   Ht 1.727 m (5' 8\")   Wt 40.8 kg (90 lb)   LMP  (LMP Unknown)   SpO2 98%   BMI 13.68 kg/m     The patient is comfortable, no acute distress.  Mood good.  Insight good.  Eyes are nonicteric.  Neck is supple without mass.  No cervical adenopathy.  No thyromegaly. Heart regular rate and rhythm.  Lungs clear to auscultation bilaterally.  Respiratory effort is good.  Extremities no edema.  No weakness of the left hand.  ______________________________________________________________________     ______________________________________________________________________     Pertinent radiology for this visit includes the following:    US Lower Extremity Venous Duplex Bilateral  Narrative: EXAM: US LOWER EXTREMITY VENOUS DUPLEX BILATERAL  LOCATION: [facility]  DATE: 3/22/2025    INDICATION: recent surgery, leg swelling  COMPARISON: 3/15/2025  TECHNIQUE: Venous Duplex ultrasound of bilateral lower extremities with and without compression, augmentation and duplex. Color flow and spectral Doppler with waveform analysis performed.    FINDINGS: Exam includes the common femoral, femoral, popliteal veins as well as segmentally visualized deep calf veins and greater saphenous vein.     RIGHT: No deep vein thrombosis. No superficial thrombophlebitis. A 3.5 x 2.9 x 1.1 cm popliteal fossa cyst.    LEFT: No deep vein thrombosis. No superficial thrombophlebitis. No " popliteal cyst.  Impression: IMPRESSION:  1.  No deep venous thrombosis in the bilateral lower extremities.       ______________________________________________________________________    ______________________________________________________________________     Diagnoses managed today:    1. Closed displaced intertrochanteric fracture of right femur, initial encounter (H)    2. History of tobacco use, presenting hazards to health    3. Chronic obstructive pulmonary disease, unspecified COPD type (H)    4. Closed nondisplaced fracture of left pubis, initial encounter (H)    5. Age-related osteoporosis without current pathological fracture    6. Tobacco abuse    7. Controlled substance agreement signed - 12/24 - oxycodone - UDS 12/24    8. Hospital discharge follow-up    9. Dry mouth    10. Fall, initial encounter    11. Primary osteoarthritis of right knee    12. Finger numbness, left pinky finger    13. Swelling of right foot       ______________________________________________________________________   Assessment & Plan     Hospital discharge follow-up  - Follow-up appointment scheduled for early June.    Primary osteoarthritis of right knee  - Osteoarthritis flare-up noted, with joint space narrowing observed in X-ray.  - Cortisone injection administered on Monday. Continue physical therapy twice a week.    Finger numbness  - Possible ulnar nerve involvement due to fall or positioning during surgery.  - Monitor for improvement; no immediate specialist referral.    Chronic obstructive pulmonary disease (COPD)  - Continue current management and monitor respiratory status.    Age-related osteoporosis  - Ensure adequate calcium and vitamin D intake. Consider bone density evaluation if not recently performed.    Swelling of right foot  - Monitor for recurrence. Consider compression therapy if swelling persists.    Dry mouth  - Likely due to cyclobenzaprine. Discuss potential alternatives or adjustments in medication  with the patient.    Controlled substance agreement and medication management  - Adjust oxycodone dosage as per current needs and insurance guidelines. Monitor for effectiveness and side effects.        Continue current medications otherwise.  Follow up sooner if issues.    Orders Placed This Encounter   Procedures    Adult Endocrinology  Referral      Issues to follow up on:  Reduce pain medication gradually over time.  Check on status of endocrinology referral as has new insurance which should cover this.    40 minutes or greater was spent today on the patient's care on the day of service.      This includes time for chart preparation, reviewing medical tests done before or during the visit, talking with the patient, review of quality indicators, required documentation, and other elements of care.     Jorge Dunn MD  General Internal Medicine  [facility]    The longitudinal plan of care for the diagnoses and conditions as documented were addressed during this visit. Due to the added complexity in care, I will continue to support Jenn in the subsequent management and with ongoing continuity of care.     Return in about 2 months (around 6/9/2025) for follow up visit, annual wellness visit.     Future Appointments   Date Time Provider Department Center   5/6/2025  8:30 AM Lou Monterroso, [facility]   6/9/2025  1:30 PM [facility]

## 2025-04-07 ENCOUNTER — TELEPHONE (OUTPATIENT)
Dept: INTERNAL MEDICINE | Facility: CLINIC | Age: 70
End: 2025-04-07
Payer: COMMERCIAL

## 2025-04-07 ENCOUNTER — MEDICAL CORRESPONDENCE (OUTPATIENT)
Dept: HEALTH INFORMATION MANAGEMENT | Facility: CLINIC | Age: 70
End: 2025-04-07
Payer: COMMERCIAL

## 2025-04-07 NOTE — TELEPHONE ENCOUNTER
[facility]     Order number(s):  [id number]    Fax received and placed on PCP's desk

## 2025-04-07 NOTE — PROGRESS NOTES
"Subjective:    Established patient, saw Dr. Ross once 2/8/2020 for abnormal TFTs (subclinical hypothyroidism with undetectable Tg Ab, TPO Ab normal, with subsequently normal TFTs)    Jenn Barclay is a 69 year old female who presents for osteoporosis. The following is a comprehensive summary of her Endocrine care to date.     We previously reviewed the osteoporosis dictation template.    DEXA 8/16/2023:  -L1 - L4 T-score -2.5 and stable compared to 2020  -Lowest overall T-score at the hips is -3.5 at the left total hip; BMD at the left total hip is stable compared to 2020   -Lumbar Spine L1-L4: TBS: 0.957. TBS T-score: -5.4.    T5 and T8 vertebroplasty procedures done in 2016    MRI thoracic and lumbar spine 2/3/2024: Moderate chronic compression fractures at T5 and T8, status post vertebral augmentation. Chronic mild compression fractures at T6, T7, and T10. Moderate chronic compression fractures at L4-L5.     CT A/P 12/23/2024: left pubic ramus fracture, left sacral ala fracture - traumatic [place] [place] [email or web address]     CT hip 3/13/2025: right femoral neck fracture (fragility fracture, fall from standing height while walking her dog)     Regarding a cause of her spinal compression fractures, there was no known trauma.     Note that she actually fractured her right hip when she had a fall and \"bounced\" a few times after her foot got stuck in a pothole, this happened around 2005, she recalls there was a \"plate\" placed that was subsequently removed.      4/8/2025: She is currently working with PT.     No recent significant GC exposure, and she doesn't recall the details but given her AML and transplant history and COPD I assume she has had significant GC exposure over the years.     BMI ~13.5 kg/m2 and she is losing weight. She hasn't been on Creon for years because of cost.     Active tobacco use.     Surgical menopause in her 20s for a benign indication and she recalls being on estrogen therapy " "afterwards.    As of 6/2023 Jenn has completed a complete evaluation for secondary causes of increased fracture risk with the following notable findings:  -No prior hypercalcemia, PTH checked once in 2020 and normal; she's had intermittent mild hypocalcemia (not correlated with timing of Reclast)      -History of vitamin D deficiency, 8/2024: 25-OH vitamin D mid normal    -Bone specific alkaline phosphatase 6.3 (Postmenopausal Female ref range: 7.0 - 22.4 ug/L) drawn 3/2023   -C-Telopeptide, Beta-Cross-Linked, Serum 159 (ref range postmenopausal Females: 177-1015 pg/mL) drawn 3/2023   -24 hour urine supersaturation profile 3/2023 (877 mL): elevated calcium oxalate crystals, elevated uric acid crystals, calcium 88 mg    Alendronate 70 mg once weekly: first prescribed 9/8/2017 and taken weekly until ~2021 (per her recall, I could not verify the timeline via chart review), and she was off of it for about 1 year before it was restarted ~2022 and taken until she started Reclast 8/2023 and she notes taking it \"religiously\". She was taking Alendronate after eating breakfast at the time of our initial consult but now takes it appropriately to maximize absorption. Alendronate has been well tolerated.    6/2023: We reviewed treatment options in detail and we both have a preference for anabolic therapy if affordable.  Because of her history of radiation the only anabolic agent we can use is Evenity.  I did reach out to our pharmacy team to get a cost estimate.  If Evenity is cost prohibitive we will transition to Reclast.     8/11/2023: Evenity was cost prohibitive.    Reclast 5 mg IV given: 8/24/2023, 8/19/2024 4/8/2025: She takes a MVI, vitamin D, and calcium supplement. She drinks milk.     She has exocrine pancreatic insufficiency (pancreatic atrophy on imaging) and saw GI 4/5/2023, she has not restarted Creon, elevated random glucose values but not >200 mg/dL (no prior diagnosis of DM, she has never been on a " medication to lower glucose) and HbA1c 5.6% 3/2023, gastric ulcer, chronic diarrhea, AML s/p BMT in 2010.    No GERD. She had all teeth extracted in 2010. She has not had any dental issues since. She did receive radiation prior to transplant. No prior ASCVD event (reviewed again 4/8/2025).     Objective:    BMI 13.7 kg/m2, appears frail, has a walker.     6/2024: BMI 16.7 kg/m2 today. Pleasant and conversational.     1/2023: BMI 16.5 kg/m2, /72. Sclera white. Edentulous. Kyphotic. Radial pulse with a regular rate and rhythm.     Assessment/Plan:    # Severe osteoporosis with many prior fragility fractures  -[facility]    With her recent hip fracture, we reviewed it would be ideal to use Evenity. We reviewed the association of Evenity/ASCVD events and are in agreement to proceed if covered. Given her radiation history I wouldn't use Forteo/Tymlos.    Referred to [facility] pharmacy to see if Evenity is affordable - her insurance has changed since we last got a cost estimate (previously not affordable).    Also reviewed aiming for 1200 mg of calcium intake daily - dietary sources of calcium handout provided.    Standard bone labs and DEXA were previously ordered to be done 7/2025 in anticipation of another Reclast dose 8/2025, which will likely be or plan if Evenity is not affordable. Return to see me in the summer, but we'll start Evenity ASAP if covered.     # Malnutrition, weight loss    Will have our MTM see if we can get her restarted on Creon - I sent Dr. Dunn a message as well. Unclear if there is a secondary cause of her weight loss in addition to her pancreatic exocrine insufficiency. This is playing a major role in her severe osteoporosis.     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     24 hour urine supersaturation  profile 3/2023 (877 mL): elevated calcium oxalate crystals, elevated uric acid crystals, calcium 88 mg    6/2024: Her stones are not related to hypercalcemia/hypercalciuria.  She also was found to have low IgA.  I did offer to refer her to nephrology for medical stone management but she previously preferred that I let Dr. Dunn know, which I did. Further management per Dr. Dunn's expertise.     30 minutes spent on the date of the encounter doing chart review, history and exam, documentation and further activities as noted above.     The longitudinal plan of care for the diagnosis(es)/condition(s) as documented were addressed during this visit. Due to the added complexity in care, I will continue to support Jenn in the subsequent management and with ongoing continuity of care.

## 2025-04-08 ENCOUNTER — OFFICE VISIT (OUTPATIENT)
Dept: ENDOCRINOLOGY | Facility: CLINIC | Age: 70
End: 2025-04-08
Attending: INTERNAL MEDICINE
Payer: COMMERCIAL

## 2025-04-08 ENCOUNTER — TELEPHONE (OUTPATIENT)
Dept: INTERNAL MEDICINE | Facility: CLINIC | Age: 70
End: 2025-04-08

## 2025-04-08 VITALS
SYSTOLIC BLOOD PRESSURE: 120 MMHG | DIASTOLIC BLOOD PRESSURE: 62 MMHG | WEIGHT: 92.9 LBS | HEART RATE: 92 BPM | BODY MASS INDEX: 14.13 KG/M2

## 2025-04-08 DIAGNOSIS — S22.060S CLOSED WEDGE COMPRESSION FRACTURE OF T8 VERTEBRA, SEQUELA: ICD-10-CM

## 2025-04-08 DIAGNOSIS — S32.050S CLOSED COMPRESSION FRACTURE OF L5 VERTEBRA, SEQUELA: ICD-10-CM

## 2025-04-08 DIAGNOSIS — S22.060S CLOSED WEDGE COMPRESSION FRACTURE OF T7 VERTEBRA, SEQUELA: ICD-10-CM

## 2025-04-08 DIAGNOSIS — M40.00 KYPHOSIS (ACQUIRED) (POSTURAL): ICD-10-CM

## 2025-04-08 DIAGNOSIS — M80.00XD AGE-RELATED OSTEOPOROSIS WITH CURRENT PATHOLOGICAL FRACTURE WITH ROUTINE HEALING, SUBSEQUENT ENCOUNTER: Primary | ICD-10-CM

## 2025-04-08 DIAGNOSIS — N20.0 NEPHROLITHIASIS: ICD-10-CM

## 2025-04-08 DIAGNOSIS — Z51.81 ENCOUNTER FOR MONITORING ZOLEDRONIC ACID THERAPY: ICD-10-CM

## 2025-04-08 DIAGNOSIS — K86.81 EXOCRINE PANCREATIC INSUFFICIENCY: ICD-10-CM

## 2025-04-08 DIAGNOSIS — S22.070S CLOSED WEDGE COMPRESSION FRACTURE OF T10 VERTEBRA, SEQUELA: ICD-10-CM

## 2025-04-08 DIAGNOSIS — S32.040S CLOSED COMPRESSION FRACTURE OF L4 LUMBAR VERTEBRA, SEQUELA: ICD-10-CM

## 2025-04-08 DIAGNOSIS — E28.319 PREMATURE MENOPAUSE: ICD-10-CM

## 2025-04-08 DIAGNOSIS — S22.050S CLOSED WEDGE COMPRESSION FRACTURE OF T5 VERTEBRA, SEQUELA: ICD-10-CM

## 2025-04-08 DIAGNOSIS — Z94.81 STATUS POST ALLOGENEIC BONE MARROW TRANSPLANT (H): ICD-10-CM

## 2025-04-08 DIAGNOSIS — Z79.83 ENCOUNTER FOR MONITORING ZOLEDRONIC ACID THERAPY: ICD-10-CM

## 2025-04-08 DIAGNOSIS — S72.90XD CLOSED FRACTURE OF FEMUR WITH ROUTINE HEALING, UNSPECIFIED FRACTURE MORPHOLOGY, UNSPECIFIED LATERALITY, UNSPECIFIED PORTION OF FEMUR, SUBSEQUENT ENCOUNTER: ICD-10-CM

## 2025-04-08 DIAGNOSIS — S22.050S CLOSED WEDGE COMPRESSION FRACTURE OF T6 VERTEBRA, SEQUELA: ICD-10-CM

## 2025-04-08 DIAGNOSIS — K52.9 CHRONIC DIARRHEA: ICD-10-CM

## 2025-04-08 PROCEDURE — 3078F DIAST BP <80 MM HG: CPT | Performed by: INTERNAL MEDICINE

## 2025-04-08 PROCEDURE — 3074F SYST BP LT 130 MM HG: CPT | Performed by: INTERNAL MEDICINE

## 2025-04-08 PROCEDURE — 99214 OFFICE O/P EST MOD 30 MIN: CPT | Mod: 24 | Performed by: INTERNAL MEDICINE

## 2025-04-08 PROCEDURE — G2211 COMPLEX E/M VISIT ADD ON: HCPCS | Performed by: INTERNAL MEDICINE

## 2025-04-08 RX ORDER — SENNOSIDES 8.6 MG
325 CAPSULE ORAL DAILY
COMMUNITY

## 2025-04-08 NOTE — TELEPHONE ENCOUNTER
Home Care is calling regarding an established patient with [facility].  Requesting orders from: Jorge Dunn  OTHER ACTION: see note below.  Is this a request for a temporary pause in the home care episode?  No    Orders Requested  UPDATE ON PATIENT CONDITION      Incoming call from [facility] sathya physical therapist reporting that when she met with patient today the patient reported an unusual situation from yesterday. Patient went to sit on her couch but accidentally sat on the edge of her end table in doing so she felt a pop sensation in her right hip but otherwise was able to catch herself and sit on the couch.  Patient reportedly denies any new symptoms of concern.    Above is an FYI unless further action is needed.  No need to call physical therapy back.      Contacts       Contact Date/Time Type Contact Phone/Fax    04/08/2025 09:39 AM CDT Phone (Incoming) Erica Bowles PT (Home Care) [phone]          Randal Hawkins RN
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
## 2025-04-08 NOTE — TELEPHONE ENCOUNTER
Reviewed and noted.    Jorge Dunn MD  General Internal Medicine  [facility]  4/8/2025, 4:38 PM

## 2025-04-10 ENCOUNTER — OFFICE VISIT (OUTPATIENT)
Dept: URGENT CARE | Facility: URGENT CARE | Age: 70
End: 2025-04-10
Payer: COMMERCIAL

## 2025-04-10 VITALS
DIASTOLIC BLOOD PRESSURE: 74 MMHG | OXYGEN SATURATION: 97 % | TEMPERATURE: 98 F | HEART RATE: 80 BPM | SYSTOLIC BLOOD PRESSURE: 126 MMHG | BODY MASS INDEX: 14.14 KG/M2 | RESPIRATION RATE: 20 BRPM | WEIGHT: 93 LBS

## 2025-04-10 DIAGNOSIS — L03.211 CELLULITIS OF FACE: Primary | ICD-10-CM

## 2025-04-10 RX ORDER — CEFDINIR 300 MG/1
300 CAPSULE ORAL 2 TIMES DAILY
Qty: 20 CAPSULE | Refills: 0 | Status: SHIPPED | OUTPATIENT
Start: 2025-04-10 | End: 2025-04-20

## 2025-04-10 NOTE — PROGRESS NOTES
ASSESSMENT & PLAN:   Diagnoses and all orders for this visit:  Cellulitis of face  -     cefdinir (OMNICEF) 300 MG capsule; Take 1 capsule (300 mg) by mouth 2 times daily for 10 days.    Right cheek redness, swelling, pain for past few days. Cefdinir x 10 days for cellulitis.     Patient Instructions   Take cefdinir as directed. Take with food.    Monitor for signs of worsening infection and return to clinic if any of these occur. Symptoms include:  Spreading redness.  Increased swelling.  Pain/increased tenderness.  Discharge that looks thick or discolored (pus).  Fevers, chills, nausea, vomiting.     No follow-ups on file.    At the end of the encounter, I discussed results, diagnosis, medications. Discussed red flags for immediate return to clinic/ER, as well as indications for follow up if no improvement. Patient and/or caregiver understood and agreed to plan. Patient was stable for discharge.    ------------------------------------------------------------------------  SUBJECTIVE  History was obtained from patient.    Patient presents with:  Derm Problem: R side face rash pain    HPI  Jenn Barclay is a(n) 69 year old female presenting to urgent care for right sided facial swelling. Started with a small bump on her cheek that has become more swelling in the past few days. Painful, even with glasses touching it. No fever or chills. No vision changes or eye pain. Reports some sinus congestion.    Current Outpatient Medications   Medication Sig Dispense Refill    acetaminophen (TYLENOL) 325 MG tablet Take 3 tablets (975 mg) by mouth 3 times daily. 100 tablet 0    albuterol (PROVENTIL) (2.5 MG/3ML) 0.083% neb solution USE 1 VIAL IN NEBULIZER EVERY 6 HOURS AS NEEDED FOR WHEEZING 270 mL 3    ascorbic acid (VITAMIN C) 500 MG tablet Take 500 mg by mouth daily.      aspirin (ASA) 325 MG EC tablet Take 325 mg by mouth daily.      augmented betamethasone dipropionate (DIPROLENE AF) 0.05 % external cream Apply topically 2  times daily. Stronger cream for rash. 50 g 3    cefdinir (OMNICEF) 300 MG capsule Take 1 capsule (300 mg) by mouth 2 times daily for 10 days. 20 capsule 0    cyclobenzaprine (FLEXERIL) 10 MG tablet Take 1 tablet (10 mg) by mouth every 8 hours as needed for muscle spasms. 10 tablet 0    ipratropium - albuterol 0.5 mg/2.5 mg/3 mL (DUONEB) 0.5-2.5 (3) MG/3ML neb solution TAKE 1 VIAL(3ML) BY NEBULIZATION EVERY 6 HOURS AS NEEDED FOR COUGH WITH PHELGM. 90 mL 2    multivitamin w/minerals (THERA-VIT-M) tablet Take 1 tablet by mouth daily.      nebulizer nebulization 1 nebulizer please as covered by insurance.  May also dispense supplies (tubing, inhalation device, etc) as needed. 1 each 0    oxyCODONE IR (ROXICODONE) 10 MG tablet Take 1 tablet (10 mg) by mouth every 3 hours for 15 days. 120 tablet 0    [START ON 4/18/2025] oxyCODONE IR (ROXICODONE) 10 MG tablet Take 1 tablet (10 mg) by mouth 5 times daily. 150 tablet 0    oxyCODONE IR (ROXICODONE) 10 MG tablet Take 1 tablet (10 mg) by mouth 4 times daily. For chronic pain. 120 tablet 0    potassium gluconate 2.5 MEQ tablet Take 2.5 mEq by mouth daily.      senna-docusate (SENOKOT-S/PERICOLACE) 8.6-50 MG tablet Take 2 tablets by mouth 2 times daily as needed for constipation. 30 tablet 0    vitamin B complex with vitamin C (VITAMIN  B COMPLEX) tablet Take 1 tablet by mouth daily.      Vitamin D3 (VITAMIN D, CHOLECALCIFEROL,) 25 mcg (1000 units) tablet Take 1 tablet by mouth daily.       Problem List:  2025-03: Fall, initial encounter  2025-03: Closed displaced intertrochanteric fracture of right femur,   initial encounter (H)  2024-12: Closed nondisplaced fracture of left pubis, initial   encounter (H)  2024-07: Closed compression fracture of L4 lumbar vertebra, sequela  2024-07: Kyphosis (acquired) (postural)  2024-07: Closed wedge compression fracture of T5 vertebra, sequela  2024-02: Hypokalemia  2024-01: Frailty  2022-07: Chronic, continuous use of opioids  2021-07:  Chronic back pain  2021-07: COPD (chronic obstructive pulmonary disease) (H)  2021-07: Migraine headache  2020-07: Tubular adenoma of colon - advanced adenoma in 2017 - single   small adenoma in 2020 2020-02: Nephrolithiasis  2019-12: Senile purpura  2018-05: Exocrine pancreatic insufficiency  2017-09: Full code status  2017-08: Controlled substance agreement signed - 12/24 - oxycodone -   UDS 12/24 2016-11: Pseudogout  2016-10: Financial difficulties  2016-05: OP (osteoporosis)  2016-04: Traumatic compression fracture of T8 thoracic vertebra,   closed, initial encounter (H)  2016-03: Anxiety  2016-02: Papular rash, generalized  2016-02: Tobacco abuse  2016-02: History of acute myeloid leukemia  2016-02: Status post allogeneic bone marrow transplant (H) - 2010 - [facility]  2016-02: Chronic diarrhea  2014-06: Gastric ulcer    Allergies   Allergen Reactions    Mirtazapine      Other reaction(s): Other (See Comments)  Hallucination     Tape [Adhesive Tape]      Allergy Hx  In addition to NO paper tape    Liquid Adhesive Rash     Other reaction(s): Other (See Comments)  Allergy Hx - Rash from paper tape         OBJECTIVE  Vitals:    04/10/25 1318   BP: 126/74   Pulse: 80   Resp: 20   Temp: 98  F (36.7  C)   SpO2: 97%   Weight: 42.2 kg (93 lb)     Physical Exam   GENERAL: healthy, alert, no acute distress.   PSYCH: mentation appears normal. Normal affect  HEAD: normocephalic, atraumatic. Medial aspect of right cheek erythematous and tender, approx 3 cm diameter. No mass or area of fluctuance. There are no vesicles, open wounds, drainage, or crusting.  EYE: PERRL. EOMs intact without pain. No scleral injection bilaterally. No eyelid edema or erythema bilaterally.  NOSE: external nose atraumatic without lesions.  OROPHARYNX: moist mucous membranes.   LUNGS: no increased work of breathing.     No results found for any visits on 04/10/25.

## 2025-04-10 NOTE — PATIENT INSTRUCTIONS
Take cefdinir as directed. Take with food.    Monitor for signs of worsening infection and return to clinic if any of these occur. Symptoms include:  Spreading redness.  Increased swelling.  Pain/increased tenderness.  Discharge that looks thick or discolored (pus).  Fevers, chills, nausea, vomiting.

## 2025-04-26 ENCOUNTER — HOSPITAL ENCOUNTER (OUTPATIENT)
Dept: GENERAL RADIOLOGY | Facility: HOSPITAL | Age: 70
Discharge: HOME | End: 2025-04-26
Attending: FAMILY MEDICINE
Payer: COMMERCIAL

## 2025-04-26 ENCOUNTER — OFFICE VISIT (OUTPATIENT)
Dept: URGENT CARE | Facility: URGENT CARE | Age: 70
End: 2025-04-26
Payer: COMMERCIAL

## 2025-04-26 VITALS
RESPIRATION RATE: 18 BRPM | BODY MASS INDEX: 14.6 KG/M2 | WEIGHT: 93 LBS | HEIGHT: 67 IN | OXYGEN SATURATION: 94 % | SYSTOLIC BLOOD PRESSURE: 117 MMHG | TEMPERATURE: 98.1 F | HEART RATE: 79 BPM | DIASTOLIC BLOOD PRESSURE: 71 MMHG

## 2025-04-26 DIAGNOSIS — M25.561 ACUTE PAIN OF RIGHT KNEE: Primary | ICD-10-CM

## 2025-04-26 DIAGNOSIS — M25.461 PAIN AND SWELLING OF RIGHT KNEE: ICD-10-CM

## 2025-04-26 DIAGNOSIS — M25.561 PAIN AND SWELLING OF RIGHT KNEE: ICD-10-CM

## 2025-04-26 DIAGNOSIS — M11.89 PSEUDOGOUT INVOLVING MULTIPLE JOINTS: ICD-10-CM

## 2025-04-26 DIAGNOSIS — M79.671 RIGHT FOOT PAIN: ICD-10-CM

## 2025-04-26 LAB
ALBUMIN SERPL-MCNC: 3.4 G/DL (ref 3.4–5)
ALP SERPL-CCNC: 93 U/L (ref 40–150)
ALT SERPL W P-5'-P-CCNC: 18 U/L (ref 0–50)
ANION GAP SERPL CALCULATED.3IONS-SCNC: 7 MMOL/L (ref 3–14)
AST SERPL W P-5'-P-CCNC: 27 U/L (ref 0–45)
BASOPHILS # BLD AUTO: 0.1 10E3/UL (ref 0–0.2)
BASOPHILS NFR BLD AUTO: 1 %
BILIRUB SERPL-MCNC: 0.6 MG/DL (ref 0.2–1.3)
BUN SERPL-MCNC: 8 MG/DL (ref 7–30)
CALCIUM SERPL-MCNC: 8.9 MG/DL (ref 8.5–10.1)
CHLORIDE BLD-SCNC: 104 MMOL/L (ref 94–109)
CO2 SERPL-SCNC: 30 MMOL/L (ref 20–32)
CREAT SERPL-MCNC: 0.6 MG/DL (ref 0.52–1.04)
CRP SERPL-MCNC: 24.3 MG/L
EGFRCR SERPLBLD CKD-EPI 2021: >90 ML/MIN/1.73M2
EOSINOPHIL # BLD AUTO: 0.2 10E3/UL (ref 0–0.7)
EOSINOPHIL NFR BLD AUTO: 2 %
ERYTHROCYTE [DISTWIDTH] IN BLOOD BY AUTOMATED COUNT: 14 % (ref 10–15)
ERYTHROCYTE [SEDIMENTATION RATE] IN BLOOD BY WESTERGREN METHOD: 8 MM/HR (ref 0–30)
GLUCOSE BLD-MCNC: 115 MG/DL (ref 70–99)
HCT VFR BLD AUTO: 37.6 % (ref 35–47)
HGB BLD-MCNC: 12 G/DL (ref 11.7–15.7)
IMM GRANULOCYTES # BLD: 0 10E3/UL
IMM GRANULOCYTES NFR BLD: 0 %
LYMPHOCYTES # BLD AUTO: 4.7 10E3/UL (ref 0.8–5.3)
LYMPHOCYTES NFR BLD AUTO: 44 %
MCH RBC QN AUTO: 28 PG (ref 26.5–33)
MCHC RBC AUTO-ENTMCNC: 31.9 G/DL (ref 31.5–36.5)
MCV RBC AUTO: 88 FL (ref 78–100)
MONOCYTES # BLD AUTO: 1.1 10E3/UL (ref 0–1.3)
MONOCYTES NFR BLD AUTO: 10 %
NEUTROPHILS # BLD AUTO: 4.5 10E3/UL (ref 1.6–8.3)
NEUTROPHILS NFR BLD AUTO: 42 %
PLATELET # BLD AUTO: 304 10E3/UL (ref 150–450)
POTASSIUM BLD-SCNC: 4.1 MMOL/L (ref 3.4–5.3)
PROT SERPL-MCNC: 6.1 G/DL (ref 6.8–8.8)
RBC # BLD AUTO: 4.28 10E6/UL (ref 3.8–5.2)
SODIUM SERPL-SCNC: 141 MMOL/L (ref 135–145)
URATE SERPL-MCNC: 2.6 MG/DL (ref 2.4–5.7)
WBC # BLD AUTO: 10.7 10E3/UL (ref 4–11)

## 2025-04-26 PROCEDURE — 96372 THER/PROPH/DIAG INJ SC/IM: CPT | Performed by: FAMILY MEDICINE

## 2025-04-26 PROCEDURE — 3078F DIAST BP <80 MM HG: CPT | Performed by: FAMILY MEDICINE

## 2025-04-26 PROCEDURE — 85652 RBC SED RATE AUTOMATED: CPT | Performed by: FAMILY MEDICINE

## 2025-04-26 PROCEDURE — 86140 C-REACTIVE PROTEIN: CPT | Performed by: FAMILY MEDICINE

## 2025-04-26 PROCEDURE — 3074F SYST BP LT 130 MM HG: CPT | Performed by: FAMILY MEDICINE

## 2025-04-26 PROCEDURE — 84550 ASSAY OF BLOOD/URIC ACID: CPT | Performed by: FAMILY MEDICINE

## 2025-04-26 PROCEDURE — 99214 OFFICE O/P EST MOD 30 MIN: CPT | Mod: 25 | Performed by: FAMILY MEDICINE

## 2025-04-26 PROCEDURE — 85025 COMPLETE CBC W/AUTO DIFF WBC: CPT | Performed by: FAMILY MEDICINE

## 2025-04-26 PROCEDURE — 73562 X-RAY EXAM OF KNEE 3: CPT | Mod: RT

## 2025-04-26 PROCEDURE — 73610 X-RAY EXAM OF ANKLE: CPT | Mod: RT

## 2025-04-26 PROCEDURE — 80053 COMPREHEN METABOLIC PANEL: CPT | Performed by: FAMILY MEDICINE

## 2025-04-26 PROCEDURE — 36415 COLL VENOUS BLD VENIPUNCTURE: CPT | Performed by: FAMILY MEDICINE

## 2025-04-26 PROCEDURE — 73630 X-RAY EXAM OF FOOT: CPT | Mod: RT

## 2025-04-26 RX ORDER — COLCHICINE 0.6 MG/1
TABLET ORAL
Qty: 9 TABLET | Refills: 0 | Status: SHIPPED | OUTPATIENT
Start: 2025-04-26

## 2025-04-26 RX ORDER — KETOROLAC TROMETHAMINE 30 MG/ML
15 INJECTION, SOLUTION INTRAMUSCULAR; INTRAVENOUS ONCE
Status: COMPLETED | OUTPATIENT
Start: 2025-04-26 | End: 2025-04-26

## 2025-04-26 RX ORDER — NAPROXEN 250 MG/1
250 TABLET ORAL
Qty: 30 TABLET | Refills: 0 | Status: SHIPPED | OUTPATIENT
Start: 2025-04-26 | End: 2025-05-06

## 2025-04-26 RX ADMIN — KETOROLAC TROMETHAMINE 15 MG: 30 INJECTION, SOLUTION INTRAMUSCULAR; INTRAVENOUS at 15:13

## 2025-04-26 NOTE — PROGRESS NOTES
"Urgent Care Clinic Visit    Chief Complaint   Patient presents with    Joint Swelling     Right knee is swollen - woke up this morning with a swollen right knee               4/26/2025     1:59 PM   Additional Questions   Roomed by Missy     SUBJECTIVE:  Jenn Barclay, a 69 year old female scheduled an appointment to discuss the following issues:     Acute pain of right knee  Pain and swelling of right knee  Right foot pain  Pseudogout involving multiple joints    Medical, social, surgical, and family histories reviewed.    69-year-old female who has a history of bone marrow transplant in 2010 for myeloid leukemia currently in remission, presented to the urgent care complaining of acute onset of pain, swelling and redness in the right knee especially the medial and posterior aspect, as well as right foot and ankle swelling for the last 24 hours.  She denies any recent trauma.  She did have a right hip arthroplasty for fracture on 3/22/2025.  She has lost a lot of muscles (in particular gluteus and thigh muscles) and is quite thin.  Patient denies any fall/trauma, denies taking diuretics, denies any history of gout but on her chart records there is a history of pseudogout.  Patient denies drinking alcohol for the last 24 years, does not eat much red meat or seafood, did have meatloaf yesterday.  She smokes half a pack per day of cigarettes, denies any street drugs or cannabis use.  She is not on diuretics or anticoagulants.  She states that prednisone caused her to be mentally unstable and she cannot tolerate any steroids.  She is however able to tolerate anti-inflammatory; no history of kidney problems.  No open wounds or paresthesia.      ROS:  See HPI.  No nausea/vomiting.  No fever/chills.  No chest pain/SOB.  No BM/urine problems.  No dizziness, headache, vertigo or syncope.      OBJECTIVE:  /71   Pulse 79   Temp 98.1  F (36.7  C) (Oral)   Resp 18   Ht 1.702 m (5' 7\")   Wt 42.2 kg (93 lb)   LMP  " (LMP Unknown)   SpO2 94%   BMI 14.57 kg/m    EXAM:  GENERAL APPEARANCE: alert, moderate distress, appears older than her age, afebrile, not septic, no cyanosis or accessory muscle use, moist mucous membrane  HENT: ear canals and TM's normal and nose and mouth without ulcers or lesions  NECK: no adenopathy, no asymmetry, masses, or scars, and thyroid normal to palpation  RESP: lungs clear to auscultation - no rales, rhonchi or wheezes  CV: regular rates and rhythm, normal S1 S2, no S3 or S4, and no murmur, click or rub, no peripheral edema  LYMPHATICS: no cervical adenopathy  ABDOMEN: soft, nontender, without hepatosplenomegaly or masses, no ascites, no CVA tenderness  MSK: Right knee--- mild to moderate effusion noted with redness/swelling and tenderness with increased temp at the medial aspect of the right knee and also at the popliteal fossa; right ankle--- some soft tissue swelling but no tenderness of the right medial or lateral malleolus; right foot--some nonspecific swelling and redness as well as increased temperature at the dorsum of the right foot; no bony tenderness of the metatarsals or deformity; difficulty walking due to pain but was able to ambulate here to the clinic with her rollator; normal DP/PT pulses; decreased range of motion of the right knee and right foot/ankle due to pain; bilateral hips examination within normal limits  SKIN: See MSK exam, no other suspicious lesions or rashes  NEURO: Normal strength and tone, mentation intact, and speech normal    ASSESSMENT/PLAN:  (M25.561) Acute pain of right knee  (primary encounter diagnosis)  Comment: CBC and differential normal suggest that it is unlikely to be an infection or septic joint, uric acid and ESR normal suggest that its less likely to be gout, CMP normal except for mild hyperglycemia and mildly decreased protein, with normal kidney and liver function  Plan: CBC with platelets and differential,         Comprehensive metabolic panel (BMP  + Alb, Alk         Phos, ALT, AST, Total. Bili, TP), CRP,         inflammation, ESR: Erythrocyte sedimentation         rate, Uric acid, ketorolac (TORADOL) injection         15 mg            (M25.561,  M25.461) Pain and swelling of right knee  Comment: See above, likely pseudogout; xrays of right knee, foot & ankle showed: Right knee shows a large nonspecific joint effusion with chondrocalcinosis. No evidence of an acute fracture. Moderate patellofemoral compartment arthritic change.  Ankle mortise is intact. Joint spaces of the right foot are maintained.  There is no evidence of an acute displaced right foot or ankle fracture.     Plan: CBC with platelets and differential,         Comprehensive metabolic panel (BMP + Alb, Alk         Phos, ALT, AST, Total. Bili, TP), CRP,         inflammation, ESR: Erythrocyte sedimentation         rate, Uric acid, ketorolac (TORADOL) injection         15 mg, XR Knee Right 3 Views, XR Foot Right G/E        3 Views, XR Ankle Right G/E 3 Views            (M79.671) Right foot pain  Comment: See above.  Plan: CBC with platelets and differential,         Comprehensive metabolic panel (BMP + Alb, Alk         Phos, ALT, AST, Total. Bili, TP), CRP,         inflammation, ESR: Erythrocyte sedimentation         rate, Uric acid, ketorolac (TORADOL) injection         15 mg, XR Knee Right 3 Views, XR Foot Right G/E        3 Views, XR Ankle Right G/E 3 Views            (M11.89) Pseudogout involving multiple joints  Plan: naproxen (NAPROSYN) 250 MG tablet, colchicine         (COLCRYS) 0.6 MG tablet     Care instructions given.       Pt to f/up PCP within 1 week if no improvement or new problems arise.  Warning signs and symptoms explained, be seen ASAP if worsening.

## 2025-04-28 ENCOUNTER — PATIENT OUTREACH (OUTPATIENT)
Dept: GASTROENTEROLOGY | Facility: CLINIC | Age: 70
End: 2025-04-28
Payer: COMMERCIAL

## 2025-04-28 DIAGNOSIS — Z12.11 SPECIAL SCREENING FOR MALIGNANT NEOPLASMS, COLON: Primary | ICD-10-CM

## 2025-04-28 NOTE — PROGRESS NOTES
"[facility] Screening Colonoscopy Referral Review    Patient meets the inclusion criteria for screening colonoscopy standing order.    Ordering/Referring Provider:  Jorge Dunn      BMI: Estimated body mass index is 14.57 kg/m  as calculated from the following:    Height as of 4/26/25: 1.702 m (5' 7\").    Weight as of 4/26/25: 42.2 kg (93 lb).     Sedation:  Does patient have any of the following conditions affecting sedation?  Chronic or scheduled pain/narcotic medication use: MAC sedation recommended    Previous Scopes:  Any previous recommendations or follow up needs based on previous scope?  na / No recommendations.    Medical Concerns to Postpone Order:  Does patient have any of the following medical concerns that should postpone/delay colonoscopy referral?  No medical conditions affecting colonoscopy referral.    Final Referral Details:  Based on patient's medical history patient is appropriate for referral order with MAC/deep sedation.   BMI<= 45 45 < BMI <= 48 48 < BMI < = 50  BMI > 50   No Restrictions No [facility]  No [facility]  [facility] with exceptions Hospital Only OR Only     "

## 2025-04-29 NOTE — PROGRESS NOTES
Medication Therapy Management (MTM) Encounter    ASSESSMENT:                            Medication Adherence/Access: See below for considerations.    Osteoporosis: Patient was referred to discuss anabolic therapy. She would not be a candidate for PTH analogs as she has a history of radiation therapy. We discussed Evenity in detail today, including administration, side effects, storage, and cost. Patient would like me to check with [facility] portal to look into coverage with her insurance. She had concerns about risk of MI/stroke - had extensive discussion about risks and benefits of medication with patient. She would like to move forward with this if it is cost effective.   Alternatively, if Evenity is not affordable, will do Reclast following appointment with Dr. Alonso in the summer.    Pancreatic insufficiency/weight loss: Patient would benefit from using Creon again as this was helpful for her in the past. Discussed this with PCP and he will be taking over management of this. Will order rx that was previously used by GI team. Will follow-up with PAP team to start application process.    Due to time constraints, I was only able to assess the above with the patient today. Will follow-up on other disease states in future encounters  PLAN:                            MTM to check on coverage of Evenity  MTM to help start patient assistance application for Creon  This will be managed by Dr. Dunn    Follow-up: as needed w/ Debi (dependent on Evenity cost/approval)    SUBJECTIVE/OBJECTIVE:                          Jenn Barclay is a 69 year old female called for an initial visit. She was referred to me from Ryan Alonso      Reason for visit: cost of Evenity/Creon.    Allergies/ADRs: Reviewed in chart  Past Medical History: Reviewed in chart  Tobacco: She reports that she has been smoking cigarettes. She has a 24.5 pack-year smoking history. She has quit using smokeless tobacco.Nicotine/Tobacco Cessation  Plan  Information offered: Patient not interested at this time    Alcohol: not currently using    Medication Adherence/Access: Cost of Evenity has historically been unaffordable for her    Bone Health   Osteoporosis   calcium 600 mg - takes 2 tabs daily now (since  Vitamin D3 1000 international unit(s) daily   Patient is not experiencing side effects.  DEXA History: 8/2023  -L1 - L4 T-score -2.5 and stable compared to 2020  -Lowest overall T-score at the hips is -3.5 at the left total hip; BMD at the left total hip is stable compared to 2020   -Lumbar Spine L1-L4: TBS: 0.957. TBS T-score: -5.4.    Patient is getting  3 serving(s)/day of calcium in their diet (milk throughout the day)  Risk factors: post-menopausal  recent fracture (hip,          Pancreatic insufficiency/weight loss  Michele Bergeron assist PAP -   -Has been off of it for about 2-3 years (was being prescribed  - reports was getting this thru  PAP program  -provided concerned with malnutrition and weight loss       Today's Vitals: LMP  (LMP Unknown)   ----------------      I spent 60 minutes with this patient today. All changes were made via collaborative practice agreement with Ryan Alonso     A summary of these recommendations was sent via [facility].    Elenita MayoD  Diabetes & Endocrine MTM Pharmacist    Contact information:   To schedule a MTM appointment: [phone]  For questions or concerns, please send a [employer] [facility] message or call the clinic at [phone].    For more urgent concerns that do not require 454, please call [phone] after hours/weekends and ask to speak with the Endocrinologist on call.       Telemedicine Visit Details  The patient's medications can be safely assessed via a telemedicine encounter.  Type of service:  Telephone visit  Originating Location (pt. Location): Home    Distant Location (provider location):  Off-site  Start Time:  9:30 AM  End Time:  10:30 AM     Medication Therapy  Recommendations  No medication therapy recommendations to display

## 2025-04-30 ENCOUNTER — VIRTUAL VISIT (OUTPATIENT)
Dept: PHARMACY | Facility: CLINIC | Age: 70
End: 2025-04-30
Attending: INTERNAL MEDICINE
Payer: COMMERCIAL

## 2025-04-30 DIAGNOSIS — K86.81 EXOCRINE PANCREATIC INSUFFICIENCY: Primary | ICD-10-CM

## 2025-05-01 ENCOUNTER — APPOINTMENT (OUTPATIENT)
Dept: ULTRASOUND IMAGING | Facility: HOSPITAL | Age: 70
End: 2025-05-01
Attending: STUDENT IN AN ORGANIZED HEALTH CARE EDUCATION/TRAINING PROGRAM
Payer: COMMERCIAL

## 2025-05-01 ENCOUNTER — TELEPHONE (OUTPATIENT)
Dept: INTERNAL MEDICINE | Facility: CLINIC | Age: 70
End: 2025-05-01
Payer: COMMERCIAL

## 2025-05-01 ENCOUNTER — HOSPITAL ENCOUNTER (EMERGENCY)
Facility: HOSPITAL | Age: 70
Discharge: HOME | End: 2025-05-02
Attending: STUDENT IN AN ORGANIZED HEALTH CARE EDUCATION/TRAINING PROGRAM
Payer: COMMERCIAL

## 2025-05-01 DIAGNOSIS — M79.89 RIGHT LEG SWELLING: ICD-10-CM

## 2025-05-01 LAB
ALBUMIN SERPL BCG-MCNC: 3.9 G/DL (ref 3.5–5.2)
ALP SERPL-CCNC: 95 U/L (ref 40–150)
ALT SERPL W P-5'-P-CCNC: 19 U/L (ref 0–50)
ANION GAP SERPL CALCULATED.3IONS-SCNC: 7 MMOL/L (ref 7–15)
AST SERPL W P-5'-P-CCNC: 24 U/L (ref 0–45)
BASOPHILS # BLD AUTO: 0.1 10E3/UL (ref 0–0.2)
BASOPHILS NFR BLD AUTO: 1 %
BILIRUB DIRECT SERPL-MCNC: <0.08 MG/DL (ref 0–0.3)
BILIRUB SERPL-MCNC: 0.2 MG/DL
BUN SERPL-MCNC: 10 MG/DL (ref 8–23)
CALCIUM SERPL-MCNC: 8.7 MG/DL (ref 8.8–10.4)
CHLORIDE SERPL-SCNC: 104 MMOL/L (ref 98–107)
CREAT SERPL-MCNC: 0.55 MG/DL (ref 0.51–0.95)
EGFRCR SERPLBLD CKD-EPI 2021: >90 ML/MIN/1.73M2
EOSINOPHIL # BLD AUTO: 0.2 10E3/UL (ref 0–0.7)
EOSINOPHIL NFR BLD AUTO: 2 %
ERYTHROCYTE [DISTWIDTH] IN BLOOD BY AUTOMATED COUNT: 13.9 % (ref 10–15)
GLUCOSE SERPL-MCNC: 107 MG/DL (ref 70–99)
HCO3 SERPL-SCNC: 33 MMOL/L (ref 22–29)
HCT VFR BLD AUTO: 37.1 % (ref 35–47)
HGB BLD-MCNC: 11.8 G/DL (ref 11.7–15.7)
IMM GRANULOCYTES # BLD: 0 10E3/UL
IMM GRANULOCYTES NFR BLD: 0 %
LYMPHOCYTES # BLD AUTO: 3.4 10E3/UL (ref 0.8–5.3)
LYMPHOCYTES NFR BLD AUTO: 41 %
MCH RBC QN AUTO: 27.4 PG (ref 26.5–33)
MCHC RBC AUTO-ENTMCNC: 31.8 G/DL (ref 31.5–36.5)
MCV RBC AUTO: 86 FL (ref 78–100)
MONOCYTES # BLD AUTO: 0.8 10E3/UL (ref 0–1.3)
MONOCYTES NFR BLD AUTO: 9 %
NEUTROPHILS # BLD AUTO: 3.7 10E3/UL (ref 1.6–8.3)
NEUTROPHILS NFR BLD AUTO: 46 %
NRBC # BLD AUTO: 0 10E3/UL
NRBC BLD AUTO-RTO: 0 /100
NT-PROBNP SERPL-MCNC: 1018 PG/ML (ref 0–900)
PLATELET # BLD AUTO: 297 10E3/UL (ref 150–450)
POTASSIUM SERPL-SCNC: 3.6 MMOL/L (ref 3.4–5.3)
PROT SERPL-MCNC: 6.1 G/DL (ref 6.4–8.3)
RBC # BLD AUTO: 4.3 10E6/UL (ref 3.8–5.2)
SODIUM SERPL-SCNC: 144 MMOL/L (ref 135–145)
WBC # BLD AUTO: 8.2 10E3/UL (ref 4–11)

## 2025-05-01 PROCEDURE — 83880 ASSAY OF NATRIURETIC PEPTIDE: CPT | Performed by: STUDENT IN AN ORGANIZED HEALTH CARE EDUCATION/TRAINING PROGRAM

## 2025-05-01 PROCEDURE — 96374 THER/PROPH/DIAG INJ IV PUSH: CPT

## 2025-05-01 PROCEDURE — 99285 EMERGENCY DEPT VISIT HI MDM: CPT | Mod: 25

## 2025-05-01 PROCEDURE — 36415 COLL VENOUS BLD VENIPUNCTURE: CPT | Performed by: STUDENT IN AN ORGANIZED HEALTH CARE EDUCATION/TRAINING PROGRAM

## 2025-05-01 PROCEDURE — 82248 BILIRUBIN DIRECT: CPT | Performed by: STUDENT IN AN ORGANIZED HEALTH CARE EDUCATION/TRAINING PROGRAM

## 2025-05-01 PROCEDURE — 85004 AUTOMATED DIFF WBC COUNT: CPT | Performed by: STUDENT IN AN ORGANIZED HEALTH CARE EDUCATION/TRAINING PROGRAM

## 2025-05-01 PROCEDURE — 250N000013 HC RX MED GY IP 250 OP 250 PS 637: Performed by: STUDENT IN AN ORGANIZED HEALTH CARE EDUCATION/TRAINING PROGRAM

## 2025-05-01 PROCEDURE — 250N000011 HC RX IP 250 OP 636: Mod: JZ | Performed by: STUDENT IN AN ORGANIZED HEALTH CARE EDUCATION/TRAINING PROGRAM

## 2025-05-01 PROCEDURE — 93971 EXTREMITY STUDY: CPT | Mod: RT

## 2025-05-01 RX ORDER — KETOROLAC TROMETHAMINE 15 MG/ML
15 INJECTION, SOLUTION INTRAMUSCULAR; INTRAVENOUS ONCE
Status: COMPLETED | OUTPATIENT
Start: 2025-05-01 | End: 2025-05-01

## 2025-05-01 RX ORDER — ACETAMINOPHEN 325 MG/1
975 TABLET ORAL ONCE
Status: COMPLETED | OUTPATIENT
Start: 2025-05-01 | End: 2025-05-01

## 2025-05-01 RX ADMIN — ACETAMINOPHEN 975 MG: 325 TABLET ORAL at 22:40

## 2025-05-01 RX ADMIN — KETOROLAC TROMETHAMINE 15 MG: 15 INJECTION, SOLUTION INTRAMUSCULAR; INTRAVENOUS at 22:39

## 2025-05-01 ASSESSMENT — ACTIVITIES OF DAILY LIVING (ADL): ADLS_ACUITY_SCORE: 57

## 2025-05-01 NOTE — TELEPHONE ENCOUNTER
Home Care is calling regarding an established patient with [facility].  Requesting orders from: Jorge Dunn RN APPROVED: RN able to provide verbal orders.  Home Care will send orders for signature.  RN will close encounter.  Is this a request for a temporary pause in the home care episode?  No    Orders Requested    Physical Therapy  Request for discontinuation of care   Goals have been met/progressing.  Frequency: Will discharge week of 5/14  RN gave verbal order: Yes        Contacts       Contact Date/Time Type Contact Phone/Fax    05/01/2025 03:56 PM CDT Phone (Incoming) [facility] [phone]          Lia Hester RN

## 2025-05-01 NOTE — TELEPHONE ENCOUNTER
Home care reporting patient has an increase in her right foot and ankle swelling. She is going to get evaluated tonight in [facility] or ED     Lia Hester RN

## 2025-05-02 VITALS
WEIGHT: 93 LBS | HEIGHT: 67 IN | RESPIRATION RATE: 22 BRPM | SYSTOLIC BLOOD PRESSURE: 132 MMHG | OXYGEN SATURATION: 96 % | TEMPERATURE: 98.1 F | BODY MASS INDEX: 14.6 KG/M2 | DIASTOLIC BLOOD PRESSURE: 67 MMHG | HEART RATE: 69 BPM
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
## 2025-05-02 NOTE — ED TRIAGE NOTES
Pt arrives for eval of right knee pain and swelling, spreading down to her right ankle and foot. Swelling started about 5 days ago, was seen at urgent care on 4/26 and thought to be gout, testing came back negative. Was put on abx and finished course. Had R hip surgery on 4/26. Reports warmth but no redness to skin. No meds PTA.      Triage Assessment (Adult)       Row Name 05/01/25 2017          Triage Assessment    Airway WDL WDL        Respiratory WDL    Respiratory WDL WDL        Skin Circulation/Temperature WDL    Skin Circulation/Temperature WDL WDL        Cardiac WDL    Cardiac WDL WDL        Peripheral/Neurovascular WDL    Peripheral Neurovascular WDL X  swelling to right knee and foot and ankle        Cognitive/Neuro/Behavioral WDL    Cognitive/Neuro/Behavioral WDL WDL

## 2025-05-02 NOTE — DISCHARGE INSTRUCTIONS
Fortunately we did not find evidence of blood clot, heart failure, liver failure, kidney failure.  These are the problems we look for and leg swelling that would cause emergency issues.  Therefore, this may be due to swelling from your operation.  We encourage keeping the foot elevated as often as you can, and using over-the-counter medications to treat any discomfort.  Follow-up with your regular doctor and orthopedist.    Please return to the ER if your symptoms worsen.

## 2025-05-06 ENCOUNTER — VIRTUAL VISIT (OUTPATIENT)
Dept: PHARMACY | Facility: CLINIC | Age: 70
End: 2025-05-06
Payer: COMMERCIAL

## 2025-05-06 ENCOUNTER — TELEPHONE (OUTPATIENT)
Dept: INTERNAL MEDICINE | Facility: CLINIC | Age: 70
End: 2025-05-06
Payer: COMMERCIAL

## 2025-05-06 DIAGNOSIS — R21 SKIN RASH: ICD-10-CM

## 2025-05-06 DIAGNOSIS — G89.29 CHRONIC MIDLINE THORACIC BACK PAIN: ICD-10-CM

## 2025-05-06 DIAGNOSIS — M81.0 AGE-RELATED OSTEOPOROSIS WITHOUT CURRENT PATHOLOGICAL FRACTURE: ICD-10-CM

## 2025-05-06 DIAGNOSIS — K86.81 EXOCRINE PANCREATIC INSUFFICIENCY: Primary | ICD-10-CM

## 2025-05-06 DIAGNOSIS — M79.89 LEG SWELLING: ICD-10-CM

## 2025-05-06 DIAGNOSIS — Z78.9 TAKES DIETARY SUPPLEMENTS: ICD-10-CM

## 2025-05-06 DIAGNOSIS — J44.9 CHRONIC OBSTRUCTIVE PULMONARY DISEASE, UNSPECIFIED COPD TYPE (H): ICD-10-CM

## 2025-05-06 DIAGNOSIS — M54.6 CHRONIC MIDLINE THORACIC BACK PAIN: ICD-10-CM

## 2025-05-06 PROCEDURE — 99605 MTMS BY PHARM NP 15 MIN: CPT | Mod: 93 | Performed by: PHARMACIST

## 2025-05-06 PROCEDURE — 99607 MTMS BY PHARM ADDL 15 MIN: CPT | Mod: 93 | Performed by: PHARMACIST

## 2025-05-06 RX ORDER — GINGER ROOT/GINGER ROOT EXT 262.5 MG
1 CAPSULE ORAL 2 TIMES DAILY
COMMUNITY

## 2025-05-06 RX ORDER — ACETAMINOPHEN 325 MG/1
325 TABLET ORAL DAILY PRN
COMMUNITY

## 2025-05-06 NOTE — LETTER
May 13, 2025  Jenn Barclay  [address] [place] [address]    Dear Ms. Barclay, [facility]     Thank you for talking with me on May 6, 2025 about your health and medications. As a follow-up to our conversation, I have included two documents:      Your Recommended To-Do List has steps you should take to get the best results from your medications.  Your Medication List will help you keep track of your medications and how to take them.    If you want to talk about these documents, please call Lou Monterroso RPH at phone: [phone], Monday-Friday 8-4:30pm.    I look forward to working with you and your doctors to make sure your medications work well for you.    Sincerely,  Lou Monterroso RPH  [facility] Pharmacist, [facility]

## 2025-05-06 NOTE — TELEPHONE ENCOUNTER
We will contact Lisa Roman  Prescription Assistance Supervisor  Pharmacy Assistance   Pool: [facility]

## 2025-05-06 NOTE — LETTER
_  Medication List        Prepared on: May 6, 2025     Bring your Medication List when you go to the doctor, hospital, or   emergency room. And, share it with your family or caregivers.     Note any changes to how you take your medications.  Cross out medications when you no longer use them.    Medication How I take it Why I use it Prescriber   acetaminophen (TYLENOL) 325 MG tablet Take 325 mg by mouth daily as needed for mild pain or pain. Pain Patient Reported   albuterol (PROVENTIL) (2.5 MG/3ML) 0.083% neb solution USE 1 VIAL IN NEBULIZER and nebulize contents by mouth EVERY 6 HOURS AS NEEDED FOR WHEEZING Chronic obstructive pulmonary disease, unspecified COPD type (H) Jorge Dunn MD   ascorbic acid (VITAMIN C) 500 MG tablet Take 500 mg by mouth daily. general health Patient Reported   aspirin (ASA) 325 MG EC tablet Take 325 mg by mouth daily as needed for pain. pain Patient Reported   augmented betamethasone dipropionate (DIPROLENE AF) 0.05 % external cream Apply topically 2 times daily. Stronger cream for rash. Dermatitis Jorge Dunn MD   Calcium Carb-Cholecalciferol (CALCIUM 600+D) 600-20 MG-MCG TABS Take 1 tablet by mouth 2 times daily. bone health Patient Reported   ipratropium - albuterol 0.5 mg/2.5 mg/3 mL (DUONEB) 0.5-2.5 (3) MG/3ML neb solution TAKE 1 VIAL(3ML) BY NEBULIZATION by mouth EVERY 6 HOURS AS NEEDED FOR COUGH WITH PHELGM. Chronic obstructive pulmonary disease, unspecified COPD type (H) Jorge Dunn MD   lipase-protease-amylase (CREON) 88645-77317-776984 units CPEP per EC capsule Take 2 capsules by mouth 3 times daily (with meals). With meals or snacks Exocrine pancreatic insufficiency Jorge Dunn MD   multivitamin w/minerals (THERA-VIT-M) tablet Take 1 tablet by mouth daily. general health Patient Reported   oxyCODONE IR (ROXICODONE) 10 MG tablet Take 1 tablet (10 mg) by mouth 5 times daily. Closed displaced intertrochanteric fracture of right femur, initial encounter (H) Jorge  MD Mona   POTASSIUM CITRATE PO Take 200 mg by mouth daily. general health Patient Reported   Vitamin D3 (VITAMIN D, CHOLECALCIFEROL,) 25 mcg (1000 units) tablet Take 1 tablet by mouth daily. bone health Patient Reported         Add new medications, over-the-counter drugs, herbals, vitamins, or  minerals in the blank rows below.    Medication How I take it Why I use it Prescriber                                      Allergies:      - Mirtazapine  - Tape [adhesive Tape]  - Liquid Adhesive - Rash        Side effects I have had:      Not on File        Other Information:              My notes and questions:
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
## 2025-05-06 NOTE — LETTER
May 13, 2025      Jenn Barclay  [address] [place] [facility] [place] [address]                  See attached AVS and COPD assessment test.    Essence Monterroso, PharmD  Medication Therapy Management (MTM) Pharmacist

## 2025-05-06 NOTE — PATIENT INSTRUCTIONS
"Recommendations from today's MTM visit:                                                      MTM to check on coverage of Evenity  MTM to help start patient assistance application for Creon  This will be managed by Dr. Dunn    Follow-up: as needed w/ Debi (dependent on Evenity cost/approval)    It was great speaking with you today.  I value your experience and would be very thankful for your time in providing feedback in our clinic survey. In the next few days, you may receive an email or text message from [employer] with a link to a survey related to your  clinical pharmacist.\"     To schedule another MTM appointment, please call the clinic directly or you may call the MTM scheduling line at [phone].    My Clinical Pharmacist's contact information:                                                      Please feel free to contact me with any questions or concerns you have.      Debi Sanchez, PharmD  Diabetes & Endocrine MTM Pharmacist    Contact information:   To schedule a MTM appointment: [phone]  For questions or concerns, please send a [employer] message or call the clinic at [phone].    For more urgent concerns that do not require 805, please call [phone] after hours/weekends and ask to speak with the Endocrinologist on call.      "

## 2025-05-06 NOTE — TELEPHONE ENCOUNTER
Is this a new referral or dose change: new patient    Patient preferred phone number (please verify with pt): [phone]    Additional helpful info for PAP team:  patient specifically needing Creon    Notes for MTM team:   Route TE to p [facility]

## 2025-05-06 NOTE — LETTER
"Recommended To-Do List      Prepared on: May 6, 2025       You can get the best results from your medications by completing the items on this \"To-Do List.\"      Bring your To-Do List when you go to your doctor. And, share it with your family or caregivers.    My To-Do List:  What we talked about: What I should do:   A medication that you may no longer need    Make an appointment with your primary care provider.          What we talked about: What I should do:   A new medication that may be of benefit to you    Consider Starting taking a new inhaler and applying for Patient Assistance Program to get medication through assistance program. Discuss with your primary care provider.          What we talked about: What I should do:                     "

## 2025-05-08 ENCOUNTER — TELEPHONE (OUTPATIENT)
Dept: INTERNAL MEDICINE | Facility: CLINIC | Age: 70
End: 2025-05-08
Payer: COMMERCIAL

## 2025-05-08 NOTE — TELEPHONE ENCOUNTER
Hello,    On 05/08, our office staff spoke with Jenn regarding her  assistance application for Creon. During the conversation, we informed her that a copy of her 2025 Social Security income statement is required as proof of income to complete the application process.    Jenn expressed confusion about the necessity of this documentation and stated that she had not been asked to provide it in the past. Our staff explained that the 2025 Social Security Income letter would have been mailed to her in December 2024, and the formal statement would have followed in January 2025. We advised her that she could contact the Social Security Administration to request a duplicate if needed.    Unfortunately, Jenn was argumentative during the discussion and disconnected the call on two separate occasions.    Despite these challenges, our office will proceed with mailing her the Creon application along with a cover letter clearly outlining the need for her 2025 Social Security income statement.    Thank you,  Lala Diaz  Prescription   Please send responses to [facility]

## 2025-05-08 NOTE — TELEPHONE ENCOUNTER
Routing to MTM pharmacist who saw Patient and requested this Patient Assistance Program paperwork.    Essence Monterroso, Marlena  Medication Therapy Management (MTM) Pharmacist

## 2025-05-13 NOTE — PATIENT INSTRUCTIONS
"Recommendations from today's MTM visit:                                                    MTM (medication therapy management) is a service provided by a clinical pharmacist designed to help you get the most of out of your medicines.      Medication list was updated today to reflect the current medication regimen you reported.    1. Discuss your inhaler with Dr. Dunn. There are options for getting your COPD inhalers through a Patient Assistance Program if you qualify based on your income.  Please complete the attached COPD assessment form and bring back to clinic. Keep one copy on hand to review at a follow-up visit.    2. Recommend discussing with Dr. Dunn whether you should be taking aspirin 325 mg.    3. Continue working with the endocrinology team to get access to Creon and the Evenity for your bone health.    4. Due for an ER visit follow-up appointment with Dr. Dunn.    Follow-up: if interested in starting an inhaler for COPD (after discussing wit Dr. Nava), set up a follow-up MTM visit; make an appointment sooner if needed.    It was great speaking with you today.  I value your experience and would be very thankful for your time in providing feedback in our clinic survey. In the next few days, you may receive an email or text message from [employer] with a link to a survey related to your  clinical pharmacist.\"     To schedule another MTM appointment, please call the clinic directly or you may call the MTM scheduling line at [phone].    My Clinical Pharmacist's contact information:                                                      Please feel free to contact me with any questions or concerns you have.      Essence Monterroso, Marlena  Medication Therapy Management (MTM) Pharmacist   "

## 2025-05-14 ENCOUNTER — TELEPHONE (OUTPATIENT)
Dept: INTERNAL MEDICINE | Facility: CLINIC | Age: 70
End: 2025-05-14
Payer: COMMERCIAL

## 2025-05-14 NOTE — TELEPHONE ENCOUNTER
Spoke with patient.  Relayed Dr. Dunn's message.  Accepted offer to schedule appointment 05/19/2025 3PM - 2:45 PM Arrival time.

## 2025-05-14 NOTE — TELEPHONE ENCOUNTER
Please call patient -    ______________________________________________________________________     Home phone:  [phone] (home)     Cell phone:   Telephone Information:   Mobile [phone]       Other contacts:  Name Home Phone Work Phone Mobile Phone Relationship Lgl GrJEAN Kuo   [phone] Grandolga JUARES   [phone] Child      ______________________________________________________________________     Please help the patient to schedule a follow up related to her leg swelling an emergency room (ER) visit.    Can offer an appointment Monday May 19th at 3 pm.  May use a reserved slot otherwise if this does not work out.     Jorge Dunn MD  [facility]  5/14/2025, 11:49 AM

## 2025-05-19 ENCOUNTER — OFFICE VISIT (OUTPATIENT)
Dept: INTERNAL MEDICINE | Facility: CLINIC | Age: 70
End: 2025-05-19
Payer: COMMERCIAL

## 2025-05-19 VITALS
DIASTOLIC BLOOD PRESSURE: 82 MMHG | HEIGHT: 67 IN | WEIGHT: 96.1 LBS | SYSTOLIC BLOOD PRESSURE: 134 MMHG | OXYGEN SATURATION: 97 % | BODY MASS INDEX: 15.08 KG/M2 | TEMPERATURE: 98.1 F | RESPIRATION RATE: 18 BRPM | HEART RATE: 69 BPM

## 2025-05-19 DIAGNOSIS — M25.461 EFFUSION OF RIGHT KNEE: ICD-10-CM

## 2025-05-19 DIAGNOSIS — M79.89 SWELLING OF RIGHT FOOT: ICD-10-CM

## 2025-05-19 DIAGNOSIS — S72.141A CLOSED DISPLACED INTERTROCHANTERIC FRACTURE OF RIGHT FEMUR, INITIAL ENCOUNTER (H): ICD-10-CM

## 2025-05-19 DIAGNOSIS — M79.661 PAIN OF RIGHT LOWER LEG: ICD-10-CM

## 2025-05-19 DIAGNOSIS — M48.061 SPINAL STENOSIS OF LUMBAR REGION, UNSPECIFIED WHETHER NEUROGENIC CLAUDICATION PRESENT: ICD-10-CM

## 2025-05-19 DIAGNOSIS — M71.21 BAKER'S CYST OF KNEE, RIGHT: ICD-10-CM

## 2025-05-19 DIAGNOSIS — J44.9 CHRONIC OBSTRUCTIVE PULMONARY DISEASE, UNSPECIFIED COPD TYPE (H): Primary | ICD-10-CM

## 2025-05-19 DIAGNOSIS — M48.061 SPINAL STENOSIS OF LUMBAR REGION, UNSPECIFIED WHETHER NEUROGENIC CLAUDICATION PRESENT: Primary | ICD-10-CM

## 2025-05-19 DIAGNOSIS — M79.604 PAIN OF RIGHT LOWER EXTREMITY: ICD-10-CM

## 2025-05-19 DIAGNOSIS — G89.29 CHRONIC BILATERAL LOW BACK PAIN, UNSPECIFIED WHETHER SCIATICA PRESENT: ICD-10-CM

## 2025-05-19 DIAGNOSIS — M11.20 PSEUDOGOUT: ICD-10-CM

## 2025-05-19 DIAGNOSIS — M54.50 CHRONIC BILATERAL LOW BACK PAIN, UNSPECIFIED WHETHER SCIATICA PRESENT: ICD-10-CM

## 2025-05-19 PROCEDURE — 99215 OFFICE O/P EST HI 40 MIN: CPT | Mod: 24 | Performed by: INTERNAL MEDICINE

## 2025-05-19 PROCEDURE — 1125F AMNT PAIN NOTED PAIN PRSNT: CPT | Performed by: INTERNAL MEDICINE

## 2025-05-19 PROCEDURE — G2211 COMPLEX E/M VISIT ADD ON: HCPCS | Performed by: INTERNAL MEDICINE

## 2025-05-19 PROCEDURE — 3079F DIAST BP 80-89 MM HG: CPT | Performed by: INTERNAL MEDICINE

## 2025-05-19 PROCEDURE — 3075F SYST BP GE 130 - 139MM HG: CPT | Performed by: INTERNAL MEDICINE

## 2025-05-19 RX ORDER — OXYCODONE HYDROCHLORIDE 10 MG/1
10 TABLET ORAL
Qty: 150 TABLET | Refills: 0 | Status: SHIPPED | OUTPATIENT
Start: 2025-05-19

## 2025-05-19 ASSESSMENT — PAIN SCALES - GENERAL: PAINLEVEL_OUTOF10: SEVERE PAIN (7)

## 2025-05-19 NOTE — TELEPHONE ENCOUNTER
Medication Question or Refill    Contacts       Contact Date/Time Type Contact Phone/Fax    05/19/2025 11:13 AM CDT Phone (Incoming) Jenn Barclay AVA (Self) [phone] (M)            What medication are you calling about (include dose and sig)?:    Disp Refills Start End DANISH   oxyCODONE IR (ROXICODONE) 10 MG tablet 150 tablet 0 4/18/2025 5/18/2025 No   Sig - Route: Take 1 tablet (10 mg) by mouth 5 times daily. - Oral       Preferred Pharmacy:    [id number] - [place], [place] - [address]  [address]  [facility] [address]  Phone: [phone] Fax: [phone]      Controlled Substance Agreement on file:   CSA -- Patient Level:     [Media Unavailable] Controlled Substance Agreement - Opioid - Scan on 12/13/2024  1:38 PM   [Media Unavailable] Controlled Substance Agreement - Opioid - Scan on 1/9/2024 10:15 AM   [Media Unavailable] Controlled Substance Agreement - Opioid - Scan on 1/9/2023  9:02 AM   [Media Unavailable] Controlled Substance Agreement - Opioid - Scan on 1/3/2022  1:31 PM   [Media Unavailable] Controlled Substance Agreement - Opioid - Scan on 12/18/2019   [Media Unavailable] Controlled Substance Agreement - Opioid - Scan on 12/28/2018   [Media Unavailable] Controlled Substance Agreement - Opioid - Scan on 8/9/2017: [facility]       Who prescribed the medication?: Dr. Jorge Dunn    Do you need a refill? Yes    When did you use the medication last? today    Patient offered an appointment? No Reminded patient of appointment today with Dr. Dunn    Do you have any questions or concerns?  No
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
## 2025-05-19 NOTE — Clinical Note
If you could get her okayed through patient assistance for Stiolto, that would be greatly appreciated.  I do not want to alter her pain regimen at this time.  Thank you,  Jorge Dunn MD General Internal Medicine [facility] 5/19/2025, 10:34 PM

## 2025-05-20 NOTE — PATIENT INSTRUCTIONS
Future Appointments   Date Time Provider Department Center   6/9/2025  1:30 PM [facility]   7/21/2025 11:30 AM [facility]   7/29/2025  3:30 PM [facility] Alonso [facility]

## 2025-05-22 ENCOUNTER — NURSE TRIAGE (OUTPATIENT)
Dept: INTERNAL MEDICINE | Facility: CLINIC | Age: 70
End: 2025-05-22
Payer: COMMERCIAL

## 2025-05-22 DIAGNOSIS — J44.9 CHRONIC OBSTRUCTIVE PULMONARY DISEASE, UNSPECIFIED COPD TYPE (H): Primary | ICD-10-CM

## 2025-05-22 RX ORDER — TIOTROPIUM BROMIDE AND OLODATEROL 3.124; 2.736 UG/1; UG/1
2 SPRAY, METERED RESPIRATORY (INHALATION) DAILY
Status: SHIPPED
Start: 2025-05-22

## 2025-05-22 NOTE — TELEPHONE ENCOUNTER
Is this a new referral or dose change: new patient    Patient preferred phone number (please verify with pt): [phone]    Medications requested: stiolto inhaler  (Note: these are the only medications that PAP applications will be sent. Meds should be active on the med list)    Additional helpful info for PAP team: see MTM visit note

## 2025-05-23 NOTE — TELEPHONE ENCOUNTER
"Nurse Triage SBAR    Is this a 2nd Level Triage? YES, LICENSED PRACTITIONER REVIEW IS REQUIRED    Situation: Right ankle foot/ankle swelling returned since 5/19/2025 office visit    Background: Onset since 4/26/2025. Patient treated during 5/19/2025 office visit. Swelling returned to previous size.     Assessment: Patient reports moderate-severe amounts of swelling in her right foot and ankle. Right food and ankle appear pink and do not feel excessively warm to the touch. Patient states \"I am no longer able to put my tennis shoes on\". No pain reported from foot and ankle while stationary or walking. Patient states she can still walk around and perform activities if needed, but this swelling does impact her activities. Denies fever, SOB, chest pain, redness/rash.     Protocol Recommended Disposition:   See in Office Today    Recommendation:  Disposition states see in office today because the patient would like to be seen sooner than office visit scheduled 6/9/2025 with PCP. Routing to PCP to see if patient should be seen sooner and if PCP can accommodate her.     Routed to provider    Does the patient meet one of the following criteria for ADS visit consideration? 16+ years old, with an [facility] PCP     TIP  Providers, please consider if this condition is appropriate for management at one of our Acute and Diagnostic Services sites.     If patient is a good candidate, please use dotphrase <dot>triageresponse and select Refer to ADS to document.    Reason for Disposition   Patient wants to be seen    Additional Information   Negative: Chest pain   Negative: Followed an insect bite and has localized swelling (e.g., small area of puffy or swollen skin)   Negative: Followed a knee injury   Negative: Ankle injury   Negative: Pregnant with leg swelling or edema   Negative: Difficulty breathing at rest   Negative: Entire foot is cool or blue in comparison to other side   Negative: Cast on leg or ankle and has increasing pain   " "Negative: Can't walk or can barely stand (new-onset)   Negative: Fever and red area (or area very tender to touch)   Negative: Patient sounds very sick or weak to the triager   Negative: SEVERE swelling (e.g., swelling extends above knee, entire leg is swollen, weeping fluid)   Negative: Pregnant 20 or more weeks and sudden weight gain (i.e., > 2 lbs, 1 kg in one week)   Negative: Thigh or calf pain and only 1 side and present > 1 hour   Negative: Thigh, calf, or ankle swelling in only one leg   Negative: Thigh, calf, or ankle swelling in both legs, but one side is definitely more swollen (Exception: Longstanding difference between legs.)   Negative: Difficulty breathing with exertion AND new-onset or getting worse    Answer Assessment - Initial Assessment Questions  1. ONSET: \"When did the swelling start?\" (e.g., minutes, hours, days)      4/26/2025 ED visit  2. LOCATION: \"What part of the leg is swollen?\"  \"Are both legs swollen or just one leg?\"      Right foot and ankle  3. SEVERITY: \"How bad is the swelling?\" (e.g., localized; mild, moderate, severe)      Severe  4. REDNESS: \"Does the swelling look red or infected?\"      Pink color w/o excessive warmth  5. PAIN: \"Is the swelling painful to touch?\" If Yes, ask: \"How painful is it?\"   (Scale 1-10; mild, moderate or severe)      No  6. FEVER: \"Do you have a fever?\" If Yes, ask: \"What is it, how was it measured, and when did it start?\"       No  7. CAUSE: \"What do you think is causing the leg swelling?\"      unknown  8. MEDICAL HISTORY: \"Do you have a history of blood clots (e.g., DVT), cancer, heart failure, kidney disease, or liver failure?\"      No  9. RECURRENT SYMPTOM: \"Have you had leg swelling before?\" If Yes, ask: \"When was the last time?\" \"What happened that time?\"      Yes, 5/19/2025  10. OTHER SYMPTOMS: \"Do you have any other symptoms?\" (e.g., chest pain, difficulty breathing)        No  11. PREGNANCY: \"Is there any chance you are pregnant?\" \"When was " "your last menstrual period?\"        N/A    Protocols used: Leg Swelling and Edema-A-OH    "

## 2025-05-23 NOTE — TELEPHONE ENCOUNTER
We can try prednisone 20 mg for 10 days.    Appointment can be moved up to a closer reserved slot if desired.    May need to come into WALK IN CARE or ADS if needed for this now or in the future.    Set up medication if the patient agrees.    Jorge Dunn MD  General Internal Medicine  [facility]  5/23/2025, 9:32 AM

## 2025-05-27 ENCOUNTER — TELEPHONE (OUTPATIENT)
Dept: INTERNAL MEDICINE | Facility: CLINIC | Age: 70
End: 2025-05-27
Payer: COMMERCIAL

## 2025-05-27 NOTE — TELEPHONE ENCOUNTER
Stiolto Respimat application has been interofficed to Dr Dunn and mailed to Jenn to review, sign, and send back to me.     We will note EPIC when sent to the .     Thank you!  Lauren Melgoza   Prescription

## 2025-05-28 ENCOUNTER — TELEPHONE (OUTPATIENT)
Dept: INTERNAL MEDICINE | Facility: CLINIC | Age: 70
End: 2025-05-28
Payer: COMMERCIAL

## 2025-05-28 DIAGNOSIS — M79.89 SWELLING OF RIGHT FOOT: Primary | ICD-10-CM

## 2025-05-28 NOTE — TELEPHONE ENCOUNTER
"  Patient Returning Call    Reason for call:  Missed call     Information relayed to patient:      Jhon Nolan, RN to Jorge Dunn MD  DS      5/23/25  9:03 AM  Patient triaged with right foot/ankle swelling returning since 5/19/2025 office visit. Patient would like to be seen sooner than 6/9/2025 office visit scheduled with PCP. Please advise if patient should be seen sooner than 6/9/2025 and if PCP is able to accommodate. See nurse triage 5/23/2025 for details, thank you  Jhon Nolan, RN  DS    5/23/25  8:18 AM  Note  Nurse Triage SBAR     Is this a 2nd Level Triage? YES, LICENSED PRACTITIONER REVIEW IS REQUIRED     Situation: Right ankle foot/ankle swelling returned since 5/19/2025 office visit     Background: Onset since 4/26/2025. Patient treated during 5/19/2025 office visit. Swelling returned to previous size.      Assessment: Patient reports moderate-severe amounts of swelling in her right foot and ankle. Right food and ankle appear pink and do not feel excessively warm to the touch. Patient states \"I am no longer able to put my tennis shoes on\". No pain reported from foot and ankle while stationary or walking. Patient states she can still walk around and perform activities if needed, but this swelling does impact her activities. Denies fever, SOB, chest pain, redness/rash.      Protocol Recommended Disposition:   See in Office Today     Recommendation:  Disposition states see in office today because the patient would like to be seen sooner than office visit scheduled 6/9/2025 with PCP. Routing to PCP to see if patient should be seen sooner and if PCP can accommodate her.      Routed to provider     Does the patient meet one of the following criteria for ADS visit consideration? 16+ years old, with an [facility] PCP      TIP  Providers, please consider if this condition is appropriate for management at one of our Acute and Diagnostic Services sites.      If patient is a good candidate, please use " "dotphrase <dot>triageresponse and select Refer to ADS to document.     Reason for Disposition   Patient wants to be seen    Additional Information   Negative: Chest pain   Negative: Followed an insect bite and has localized swelling (e.g., small area of puffy or swollen skin)   Negative: Followed a knee injury   Negative: Ankle injury   Negative: Pregnant with leg swelling or edema   Negative: Difficulty breathing at rest   Negative: Entire foot is cool or blue in comparison to other side   Negative: Cast on leg or ankle and has increasing pain   Negative: Can't walk or can barely stand (new-onset)   Negative: Fever and red area (or area very tender to touch)   Negative: Patient sounds very sick or weak to the triager   Negative: SEVERE swelling (e.g., swelling extends above knee, entire leg is swollen, weeping fluid)   Negative: Pregnant 20 or more weeks and sudden weight gain (i.e., > 2 lbs, 1 kg in one week)   Negative: Thigh or calf pain and only 1 side and present > 1 hour   Negative: Thigh, calf, or ankle swelling in only one leg   Negative: Thigh, calf, or ankle swelling in both legs, but one side is definitely more swollen (Exception: Longstanding difference between legs.)   Negative: Difficulty breathing with exertion AND new-onset or getting worse    Answer Assessment - Initial Assessment Questions  1. ONSET: \"When did the swelling start?\" (e.g., minutes, hours, days)      4/26/2025 ED visit  2. LOCATION: \"What part of the leg is swollen?\"  \"Are both legs swollen or just one leg?\"      Right foot and ankle  3. SEVERITY: \"How bad is the swelling?\" (e.g., localized; mild, moderate, severe)      Severe  4. REDNESS: \"Does the swelling look red or infected?\"      Pink color w/o excessive warmth  5. PAIN: \"Is the swelling painful to touch?\" If Yes, ask: \"How painful is it?\"   (Scale 1-10; mild, moderate or severe)      No  6. FEVER: \"Do you have a fever?\" If Yes, ask: \"What is it, how was it measured, and when " "did it start?\"       No  7. CAUSE: \"What do you think is causing the leg swelling?\"      unknown  8. MEDICAL HISTORY: \"Do you have a history of blood clots (e.g., DVT), cancer, heart failure, kidney disease, or liver failure?\"      No  9. RECURRENT SYMPTOM: \"Have you had leg swelling before?\" If Yes, ask: \"When was the last time?\" \"What happened that time?\"      Yes, 5/19/2025  10. OTHER SYMPTOMS: \"Do you have any other symptoms?\" (e.g., chest pain, difficulty breathing)        No  11. PREGNANCY: \"Is there any chance you are pregnant?\" \"When was your last menstrual period?\"        N/A    Protocols used: Leg Swelling and Edema-A-OH        Jorge Dunn MD      5/23/25  9:32 AM  Note  We can try prednisone 20 mg for 10 days.     Appointment can be moved up to a closer reserved slot if desired.     May need to come into WALK IN CARE or ADS if needed for this now or in the future.     Set up medication if the patient agrees.     Jorge Dunn MD  General Internal Medicine  [facility]  5/23/2025, 9:32 AM             -Patient would like to go forward with the prednisone     -Patient stated she doesn't want a high dose or high (2-3 is fine) amount to take due to the hospital was having her take 6 and it made her heart feel like it was going to jump out     [facility] [address] [phone]      Okay to leave a detailed message?: Yes at Cell number on file:    Telephone Information:   Mobile [phone]       "

## 2025-05-29 RX ORDER — PREDNISONE 20 MG/1
20 TABLET ORAL DAILY
Qty: 10 TABLET | Refills: 0 | Status: SHIPPED | OUTPATIENT
Start: 2025-05-29 | End: 2025-06-08

## 2025-05-29 NOTE — TELEPHONE ENCOUNTER
[facility] [place] [facility] [facility] on 5/29/2025 at 2:09 PM\

## 2025-06-03 ENCOUNTER — TELEPHONE (OUTPATIENT)
Dept: INTERNAL MEDICINE | Facility: CLINIC | Age: 70
End: 2025-06-03
Payer: COMMERCIAL

## 2025-06-03 NOTE — TELEPHONE ENCOUNTER
KIERSTEN-    We mailed to Jenn applications for Creon and Stiolto Respimat on May 27, 2025.    Jenn has called us numerous times, yelling at us, refusing to send us the required income documents from Social Security.    She claims she has never had to do that before-  We had seen notes in EPIC from previous years for applications, she did provide her Social Security document at that time.    We informed her all she needs to do is get a copy from [employer], call her local office or go online and print it.    She refuses yelling and then hangs up.. We cannot accept a bank statement.    Rafaela Roman  Prescription Assistance Supervisor  Pharmacy Assistance   Pool: [facility]

## 2025-06-05 NOTE — TELEPHONE ENCOUNTER
Dr. Dunn,     Per your request, I have asked the  Patient Assistance Program team to assist with getting Jenn access to the respimat inhaler through the  Patient Assistance Program.    The [place] team has tried contacting the Patient. See the [place] below.    When you see patient on 6/9, can you please assure her that the  Patient Assistance Program team is correct in that they need her proof of income paperwork to assist her in applying for the Patient Assistance Program to get her free medication through the  program. The eligibility criteria is based on income level.    If she is no longer interested in applying for the Patient Assistance Program, please let the team know.    Thank you,   Essence Monterroso, PharmD  Medication Therapy Management (MTM) Pharmacist

## 2025-06-06 ENCOUNTER — RESULTS FOLLOW-UP (OUTPATIENT)
Dept: INTERNAL MEDICINE | Facility: CLINIC | Age: 70
End: 2025-06-06

## 2025-06-09 ENCOUNTER — OFFICE VISIT (OUTPATIENT)
Dept: INTERNAL MEDICINE | Facility: CLINIC | Age: 70
End: 2025-06-09
Payer: COMMERCIAL

## 2025-06-09 VITALS
DIASTOLIC BLOOD PRESSURE: 82 MMHG | RESPIRATION RATE: 20 BRPM | OXYGEN SATURATION: 95 % | WEIGHT: 94.1 LBS | SYSTOLIC BLOOD PRESSURE: 145 MMHG | BODY MASS INDEX: 15.12 KG/M2 | TEMPERATURE: 98.2 F | HEART RATE: 77 BPM | HEIGHT: 66 IN

## 2025-06-09 DIAGNOSIS — M71.21 BAKER'S CYST OF KNEE, RIGHT: ICD-10-CM

## 2025-06-09 DIAGNOSIS — F11.90 CHRONIC, CONTINUOUS USE OF OPIOIDS: Chronic | ICD-10-CM

## 2025-06-09 DIAGNOSIS — M54.6 CHRONIC MIDLINE THORACIC BACK PAIN: ICD-10-CM

## 2025-06-09 DIAGNOSIS — R60.0 PEDAL EDEMA: ICD-10-CM

## 2025-06-09 DIAGNOSIS — G89.29 CHRONIC MIDLINE THORACIC BACK PAIN: ICD-10-CM

## 2025-06-09 DIAGNOSIS — Z53.20 COLONOSCOPY REFUSED: ICD-10-CM

## 2025-06-09 DIAGNOSIS — Z87.891 HISTORY OF TOBACCO USE, PRESENTING HAZARDS TO HEALTH: ICD-10-CM

## 2025-06-09 DIAGNOSIS — M81.0 AGE-RELATED OSTEOPOROSIS WITHOUT CURRENT PATHOLOGICAL FRACTURE: ICD-10-CM

## 2025-06-09 DIAGNOSIS — M11.20 PSEUDOGOUT: ICD-10-CM

## 2025-06-09 DIAGNOSIS — Z53.20 MAMMOGRAM DECLINED: ICD-10-CM

## 2025-06-09 DIAGNOSIS — Z00.00 MEDICARE ANNUAL WELLNESS VISIT, SUBSEQUENT: Primary | ICD-10-CM

## 2025-06-09 DIAGNOSIS — M48.061 SPINAL STENOSIS OF LUMBAR REGION, UNSPECIFIED WHETHER NEUROGENIC CLAUDICATION PRESENT: ICD-10-CM

## 2025-06-09 DIAGNOSIS — M79.89 SWELLING OF RIGHT FOOT: ICD-10-CM

## 2025-06-09 DIAGNOSIS — R06.09 OTHER FORMS OF DYSPNEA: ICD-10-CM

## 2025-06-09 PROCEDURE — 3079F DIAST BP 80-89 MM HG: CPT | Performed by: INTERNAL MEDICINE

## 2025-06-09 PROCEDURE — G2211 COMPLEX E/M VISIT ADD ON: HCPCS | Performed by: INTERNAL MEDICINE

## 2025-06-09 PROCEDURE — 3077F SYST BP >= 140 MM HG: CPT | Performed by: INTERNAL MEDICINE

## 2025-06-09 PROCEDURE — G0439 PPPS, SUBSEQ VISIT: HCPCS | Performed by: INTERNAL MEDICINE

## 2025-06-09 PROCEDURE — 99214 OFFICE O/P EST MOD 30 MIN: CPT | Mod: 25 | Performed by: INTERNAL MEDICINE

## 2025-06-09 NOTE — PROGRESS NOTES
"       [facility]    Comprehensive and complex medical care - Chronic disease management - Shared decision making - Care coordination - Compassionate care    Patient advocacy - Rational deprescribing - Minimally disruptive medicine - Ethical focus - Customized care         Date of Service: 6/9/2025  Primary Provider: Jorge Dunn    Patient Care Team:  Jorge Dunn MD as PCP - General (Internal Medicine)  Jorge Dunn MD as Assigned PCP  Joellen Fontenot MD as Resident (Dermatology)  Ryan Alonso MD as MD (Endocrinology, Diabetes, and Metabolism)  Manju Asher, RN as BMT Nurse Coordinator  Umesh Brandt MD as BMT Physician (Transplant)  Jorge Dunn MD as Assigned Pain Medication Provider  Ryan Alonso MD as Assigned Endocrinology Provider  Claribel Young CNP as Assigned Neuroscience Provider  Lou Monterroso [facility] as Assigned MT Pharmacist         Chief Complaint   Patient presents with    Annual Visit           6/9/2025     1:16 PM   Additional Questions   Roomed by KATRIN Peña     History of Present Illness-  Jenn AVA Barclay, 69 years    Hip and Knee Pain  - Reports persistent pain from the kneecaps up to the hip, with throbbing sensation  - Pain is no longer on the inside of leg but localized in a specific area  - Experiences sudden pain while walking, causing instability  - Pain sometimes wakes her up at night  - Has had previous surgery, but no recent follow-up from the surgical team  - Has had X-rays and MRIs of the back last summer    Height Loss  - Previously measured at 5'10 \", now measures 5'6\"  - Attributes height loss to back fractures    Family Concerns  - Sister may have lung cancer and wants to move closer  - Concerned about supporting her sister financially again    Smoking  - Actively working on reducing smoking  - Uses crocheting and painting as methods to decrease smoking frequency    Swelling and " Pseudogout  - Reports improvement in foot swelling  - Pseudogout symptoms have improved with prednisone.  Recent C-reactive protein (CRP) normal.    COPD and Heart Concerns  - Has COPD, which may be causing fluid retention  - No chest pain reported  BNP (BRAIN NATRIURETIC PEPTIDE) elevated.    Medication and Treatment Concerns  - Taking medication that causes increased urination  - Has been on prednisone and water pills  - Awaiting Reclast treatment for bone health, previously done at [facility]    Social and Lifestyle  - Engages in gardening with help from grandsons  - Enjoys crocheting and has been making [employer]ghans  - Reports a decrease in smoking from a full pack to half to three-quarters of a pack per day    Immunizations  - Received pneumonia shots 3-4 years ago  - Interested in staying up-to-date with immunizations    Miscellaneous  - Reports difficulty with blood draws due to veins being hard to access  - Has experienced issues with previous colonoscopy procedure due to difficulty finding a vein  DECLINED CANCER SCREENING TODAY IN MULTIPLE FORMS DUE AT THIS TIME.          Active Problem List:  Problem List as of 6/9/2025 Reviewed: 5/19/2025 10:23 PM by Jorge Dunn MD         High    Full code status    Tobacco abuse    COPD (chronic obstructive pulmonary disease) (H)    History of acute myeloid leukemia    Status post allogeneic bone marrow transplant (H) - 2010 - [facility]       Medium    Controlled substance agreement signed - 12/24 - oxycodone - UDS 12/24    Chronic, continuous use of opioids    Exocrine pancreatic insufficiency    Chronic back pain    Financial difficulties    Pseudogout    Traumatic compression fracture of T8 thoracic vertebra, closed, initial encounter (H)    Chronic diarrhea    Hypokalemia    Closed compression fracture of L4 lumbar vertebra, sequela    Kyphosis (acquired) (postural)    Closed wedge compression fracture of T5 vertebra, sequela    Closed nondisplaced fracture  of left pubis, initial encounter (H)    Fall, initial encounter    Closed displaced intertrochanteric fracture of right femur, initial encounter (H)       Low    Nephrolithiasis    Anxiety    Gastric ulcer    Migraine headache    Papular rash, generalized    Senile purpura    Tubular adenoma of colon - advanced adenoma in 2017 - single small adenoma in 2020    OP (osteoporosis)    Frailty     Current Outpatient Medications   Medication Instructions    acetaminophen (TYLENOL) 325 mg, DAILY PRN    albuterol (PROVENTIL) (2.5 MG/3ML) 0.083% neb solution USE 1 VIAL IN NEBULIZER EVERY 6 HOURS AS NEEDED FOR WHEEZING    aspirin (ASA) 325 mg, DAILY PRN    augmented betamethasone dipropionate (DIPROLENE AF) 0.05 % external cream Topical, 2 TIMES DAILY, Stronger cream for rash.    Calcium Carb-Cholecalciferol (CALCIUM 600+D) 600-20 MG-MCG TABS 1 tablet, 2 TIMES DAILY    furosemide (LASIX) 40 mg, Oral, DAILY    ipratropium - albuterol 0.5 mg/2.5 mg/3 mL (DUONEB) 0.5-2.5 (3) MG/3ML neb solution TAKE 1 VIAL(3ML) BY NEBULIZATION EVERY 6 HOURS AS NEEDED FOR COUGH WITH PHELGM.    lipase-protease-amylase (CREON) 63895-69108-396007 units CPEP per EC capsule 2 capsules, Oral, 3 TIMES DAILY WITH MEALS, With meals or snacks    multivitamin w/minerals (THERA-VIT-M) tablet 1 tablet, DAILY    nebulizer nebulization 1 nebulizer please as covered by insurance.  May also dispense supplies (tubing, inhalation device, etc) as needed.    oxyCODONE IR (ROXICODONE) 10 mg, Oral, 5 TIMES DAILY    POTASSIUM CITRATE  mg, DAILY    tiotropium-olodaterol (STIOLTO RESPIMAT) 2.5-2.5 MCG/ACT AERS 2 puffs, Inhalation, DAILY    vitamin C (ASCORBIC ACID) 500 mg, DAILY    Vitamin D3 (VITAMIN D, CHOLECALCIFEROL,) 25 mcg (1000 units) tablet 1 tablet, DAILY      Social History     Occupational History    Not on file   Tobacco Use    Smoking status: Every Day     Current packs/day: 0.50     Average packs/day: 0.5 packs/day for 49.0 years (24.5 ttl pk-yrs)      "Types: Cigarettes    Smokeless tobacco: Former    Tobacco comments:     has used E-cigarettes in the past, info given   Vaping Use    Vaping status: Never Used   Substance and Sexual Activity    Alcohol use: No    Drug use: No    Sexual activity: Never             Wt Readings from Last 3 Encounters:   06/09/25 42.7 kg (94 lb 1.6 oz)   06/06/25 43.9 kg (96 lb 12.8 oz)   05/19/25 43.6 kg (96 lb 1.6 oz)     BP Readings from Last 3 Encounters:   06/09/25 (!) 145/82   06/06/25 132/74   05/19/25 134/82     PHYSICAL EXAM  BP (!) 145/82   Pulse 77   Temp 98.2  F (36.8  C) (Oral)   Resp 20   Ht 1.683 m (5' 6.25\")   Wt 42.7 kg (94 lb 1.6 oz)   LMP  (LMP Unknown)   SpO2 95%   BMI 15.07 kg/m     The patient is comfortable, no acute distress.  Mood good.  Insight is good.  No skin lesions or nodules of concern.  Ears clear.  Eyes are nonicteric.   Neck is supple without mass, no thyromegaly. No cervical or epitrochlear adenopathy. Heart regular rate and rhythm.  Lungs clear to auscultation bilaterally.  Respiratory effort good.  Abdomen soft and nontender.  Extremities show NO edema. NO SIGNS OF INFLAMMATION IN THE JOINTS OF THE RIGHT LOWER EXTREMITY AT THIS TIME.  GAIT IS SLOW DUE TO HIP AND BACK PAIN.          No results found for any visits on 06/09/25.         Diagnoses managed today:    1. Medicare annual wellness visit, subsequent    2. History of tobacco use, presenting hazards to health    3. Mammogram declined    4. Colonoscopy refused    5. Other forms of dyspnea    6. Pedal edema    7. Baker's cyst of knee, right    8. Swelling of right foot    9. Pseudogout    10. Spinal stenosis of lumbar region, unspecified whether neurogenic claudication present    11. Chronic, continuous use of opioids    12. Chronic midline thoracic back pain    13. Age-related osteoporosis without current pathological fracture            Assessment & Plan  History of tobacco use, presenting hazards to health:  - Smoking has decreased " from a full pack to half to three-quarters of a pack.  - Continue efforts to reduce smoking, including engaging in activities like crocheting and painting to distract from smoking.  - could consider lung cancer screening but patient does not want at this time.    Pedal edema:  - Swelling in the foot has improved.  - Monitor condition; consider adjusting water pill dosage if symptoms return.    Age-related osteoporosis without current pathological fracture:  - Reclast infusion planned to help with bone health.  - Arrange Reclast infusion at [facility]; follow-up with blood tests a week prior to infusion.    Other forms of dyspnea:  - Monitor COPD symptoms and manage fluid retention to prevent exacerbation.    Chronic midline thoracic back pain:  - Continue current pain management strategies and monitor for any changes.    Chronic, continuous use of opioids:  - Evaluate pain management plan and consider alternatives to minimize opioid use.    Spinal stenosis of lumbar region, unspecified whether neurogenic claudication present:  - Monitor symptoms and consider further imaging if pain persists or worsens.    Baker's cyst of knee, right:  - Monitor knee pain and consider further evaluation if symptoms persist.    Swelling of right foot:  - Continue to monitor for any recurrence of swelling and adjust treatment as necessary.    Pseudogout:  - Continue prednisone as prescribed and monitor for symptom improvement.    Mammogram declined and Colonoscopy refused:  - Discuss the importance of these screenings and respect patient's decision while providing information on potential risks and benefits.    Continue current medications otherwise.  Follow up sooner if issues.    No orders of the defined types were placed in this encounter.     Issues to follow up on:  Follow up lasix and pain next visit.  Consider scans of hip and back as needed.  Possibly the right knee.    The longitudinal plan of care for the diagnoses  and conditions as documented were addressed during this visit. Due to the added complexity in care, I will continue to support Jenn in the subsequent management and with ongoing continuity of care.     Jorge Dunn MD  General Internal Medicine  [facility]    Return in about 5 weeks (around 7/17/2025) for follow up visit.     Future Appointments   Date Time Provider Department Center   6/16/2025  1:40 PM [facility]   [facility]17/2025 12:30 PM [facility]   [facility]20[facility]   7/29/2025  3:30 PM [facility]       ______________________________________________________________________     Additional required elements:  I have reviewed opioid use disorder and substance use disorder risk factors and made any needed referrals.  This may not apply to this individual patient, but this documentation is required by Medicare.    Counseling   Appropriate preventive services were addressed with this patient via screening, questionnaire, or discussion as appropriate for fall prevention, nutrition, physical activity, Tobacco-use cessation, social engagement, weight loss and cognition.  Checklist reviewing preventive services available has been given to the patient.    Memory screen:      1/7/2025     1:12 PM 6/9/2025     1:19 PM   MINI COG   Clock Draw Score 2 Normal 2 Normal   3 Item Recall 3 objects recalled 3 objects recalled   Mini Cog Total Score 5 5        PHQ-2 Score:       1/7/2025    12:49 PM 1/8/2024    11:44 AM   PHQ-2 ( 1999 [employer])   Q1: Little interest or pleasure in doing things 2 0   Q2: Feeling down, depressed or hopeless 2 0   PHQ-2 Score 4  0   Q1: Little interest or pleasure in doing things More than half the days Not at all   Q2: Feeling down, depressed or hopeless More than half the days Not at all   PHQ-2 Score 4 0       Patient-reported      Advanced care planning reviewed.      Health Maintenance   Topic Date Due    ANNUAL REVIEW OF HM ORDERS  Never done    MAMMO SCREENING  10/28/2013    LUNG CANCER SCREENING  02/10/2025    COVID-19 VACCINE (8 - Moderna risk 2024-25 season) 05/23/2025    DANY ASSESSMENT  12/13/2025    URINE DRUG SCREEN  12/13/2025    FALL RISK ASSESSMENT  12/13/2025    CONTROLLED SUBSTANCE AGREEMENT FOR CHRONIC PAIN MANAGEMENT  12/13/2025    PHQ-9  01/07/2026    NICOTINE/TOBACCO CESSATION COUNSELING Q 1 YR  05/06/2026    MEDICARE ANNUAL WELLNESS VISIT  06/09/2026    LIPID  07/05/2027    DIABETES SCREENING  06/06/2028    DEXA  08/16/2028    ADVANCE CARE PLANNING  01/08/2029    COLORECTAL CANCER SCREENING  07/28/2030    DTAP/TDAP/TD VACCINE (9 - Td or Tdap) 01/06/2033    SPIROMETRY  Completed    HEPATITIS C SCREENING  Completed    PHQ-2 (once per calendar year)  Completed    INFLUENZA VACCINE  Completed    PNEUMOCOCCAL VACCINE 50+ YEARS  Completed    RSV VACCINE  Completed    COPD ACTION PLAN  Addressed    ZOSTER VACCINE  Addressed    HPV VACCINE  Aged Out    MENINGITIS VACCINE  Aged Out    RSV MONOCLONAL ANTIBODY  Discontinued

## 2025-06-10 RX ORDER — HEPARIN SODIUM,PORCINE 10 UNIT/ML
5-20 VIAL (ML) INTRAVENOUS DAILY PRN
OUTPATIENT
Start: 2025-06-24

## 2025-06-10 RX ORDER — ALBUTEROL SULFATE 0.83 MG/ML
2.5 SOLUTION RESPIRATORY (INHALATION)
OUTPATIENT
Start: 2025-06-24

## 2025-06-10 RX ORDER — ZOLEDRONIC ACID 0.05 MG/ML
5 INJECTION, SOLUTION INTRAVENOUS ONCE
Start: 2025-06-24

## 2025-06-10 RX ORDER — DIPHENHYDRAMINE HYDROCHLORIDE 50 MG/ML
25 INJECTION, SOLUTION INTRAMUSCULAR; INTRAVENOUS
Start: 2025-06-24

## 2025-06-10 RX ORDER — MEPERIDINE HYDROCHLORIDE 25 MG/ML
25 INJECTION INTRAMUSCULAR; INTRAVENOUS; SUBCUTANEOUS
OUTPATIENT
Start: 2025-06-24

## 2025-06-10 RX ORDER — ALBUTEROL SULFATE 90 UG/1
1-2 INHALANT RESPIRATORY (INHALATION)
Start: 2025-06-24

## 2025-06-10 RX ORDER — EPINEPHRINE 1 MG/ML
0.3 INJECTION, SOLUTION, CONCENTRATE INTRAVENOUS EVERY 5 MIN PRN
OUTPATIENT
Start: 2025-06-24

## 2025-06-10 RX ORDER — HEPARIN SODIUM (PORCINE) LOCK FLUSH IV SOLN 100 UNIT/ML 100 UNIT/ML
5 SOLUTION INTRAVENOUS
OUTPATIENT
Start: 2025-06-24

## 2025-06-10 RX ORDER — DIPHENHYDRAMINE HYDROCHLORIDE 50 MG/ML
50 INJECTION, SOLUTION INTRAMUSCULAR; INTRAVENOUS
Start: 2025-06-24

## 2025-06-10 RX ORDER — METHYLPREDNISOLONE SODIUM SUCCINATE 40 MG/ML
40 INJECTION INTRAMUSCULAR; INTRAVENOUS
Start: 2025-06-24

## 2025-06-11 NOTE — PATIENT INSTRUCTIONS
I strongly recommend that you work on stopping smoking.  It is one of the most important things to do for your health.    By stopping smoking, you take a huge step in reducing your risk of heart attack and stroke.  You also significantly reduce your risk of lung cancer, esophageal cancer, bladder cancer, and other cancers over time.    The benefits of stopping smoking are significant even as soon as 1 year after smoking.    For more information, you may go to:    [employer] is [place]'s free way to quite nicotine - - including smoking, vaping, or chewing.    Double your chances of quitting!    Ready to quit? We're ready to help.   1-800-QUIT-NOW  [employer]     Future Appointments   Date Time Provider Department Center   6/16/2025  1:40 PM Will Pavon MD [facility]   7/17/2025 12:30 PM [facility]   7/21/2025 11:30 AM [facility]   7/29/2025  3:30 PM Ryan Alonso MD MDEND[facility]

## 2025-06-16 ENCOUNTER — TELEPHONE (OUTPATIENT)
Dept: PHYSICAL MEDICINE AND REHAB | Facility: CLINIC | Age: 70
End: 2025-06-16

## 2025-06-16 ENCOUNTER — OFFICE VISIT (OUTPATIENT)
Dept: PHYSICAL MEDICINE AND REHAB | Facility: CLINIC | Age: 70
End: 2025-06-16
Attending: INTERNAL MEDICINE
Payer: COMMERCIAL

## 2025-06-16 VITALS
HEART RATE: 78 BPM | SYSTOLIC BLOOD PRESSURE: 130 MMHG | DIASTOLIC BLOOD PRESSURE: 67 MMHG | HEIGHT: 66 IN | OXYGEN SATURATION: 94 % | BODY MASS INDEX: 14.94 KG/M2 | WEIGHT: 93 LBS

## 2025-06-16 DIAGNOSIS — M48.061 SPINAL STENOSIS OF LUMBAR REGION, UNSPECIFIED WHETHER NEUROGENIC CLAUDICATION PRESENT: ICD-10-CM

## 2025-06-16 DIAGNOSIS — M17.11 PRIMARY OSTEOARTHRITIS OF RIGHT KNEE: Primary | ICD-10-CM

## 2025-06-16 DIAGNOSIS — M54.16 LUMBAR RADICULOPATHY: ICD-10-CM

## 2025-06-16 DIAGNOSIS — M79.661 PAIN OF RIGHT LOWER LEG: ICD-10-CM

## 2025-06-16 DIAGNOSIS — S72.141A CLOSED DISPLACED INTERTROCHANTERIC FRACTURE OF RIGHT FEMUR, INITIAL ENCOUNTER (H): ICD-10-CM

## 2025-06-16 PROCEDURE — 1125F AMNT PAIN NOTED PAIN PRSNT: CPT | Performed by: STUDENT IN AN ORGANIZED HEALTH CARE EDUCATION/TRAINING PROGRAM

## 2025-06-16 PROCEDURE — 3075F SYST BP GE 130 - 139MM HG: CPT | Performed by: STUDENT IN AN ORGANIZED HEALTH CARE EDUCATION/TRAINING PROGRAM

## 2025-06-16 PROCEDURE — 3078F DIAST BP <80 MM HG: CPT | Performed by: STUDENT IN AN ORGANIZED HEALTH CARE EDUCATION/TRAINING PROGRAM

## 2025-06-16 PROCEDURE — 99204 OFFICE O/P NEW MOD 45 MIN: CPT | Performed by: STUDENT IN AN ORGANIZED HEALTH CARE EDUCATION/TRAINING PROGRAM

## 2025-06-16 RX ORDER — OXYCODONE HYDROCHLORIDE 10 MG/1
10 TABLET ORAL
Qty: 150 TABLET | Refills: 0 | Status: SHIPPED | OUTPATIENT
Start: 2025-06-18 | End: 2025-07-18

## 2025-06-16 ASSESSMENT — PAIN SCALES - GENERAL: PAINLEVEL_OUTOF10: SEVERE PAIN (7)

## 2025-06-16 NOTE — LETTER
6/16/2025      Jenn Barclay  [address] [facility] [place] [address]      Dear Colleague,    Thank you for referring your patient, Jenn Barclay, to the [facility]. Please see a copy of my visit note below.    ASSESSMENT:  Jenn Barclay is a 69 year old female presents for consultation at the request of Jorge Dunn who presents today for new patient evaluation of :         Diagnoses and all orders for this visit:  Primary osteoarthritis of right knee  -     PAIN Joint Injection Major Joint Right; Future  Spinal stenosis of lumbar region, unspecified whether neurogenic claudication present  -     Spine  Referral  -     MR Lumbar Spine w/o Contrast; Future  -     XR Lumbar Spine G/E 4 Views; Future  Pain of right lower leg  -     Spine  Referral  -     MR Lumbar Spine w/o Contrast; Future  -     PAIN Joint Injection Major Joint Right; Future  Lumbar radiculopathy  -     MR Lumbar Spine w/o Contrast; Future  -     XR Lumbar Spine G/E 4 Views; Future       Patient is neurologically intact on exam. No myelopathic or red flag symptoms.    Patient is a 69-year-old female presenting for evaluation of chronic low back pain with right-sided lumbar radiculopathy as well as right knee pain and swelling.  On exam she has positive neural tension testing on the right lower extremity as well as a palpable Baker's cyst and swelling of the right knee.  We reviewed her available imaging including CT lumbar spine and knee x-ray, with the lumbar spine CT showing mild to moderate stenosis at L4-L5 and these knee x-ray showing chondrocalcinosis, patellofemoral arthritis, and joint effusion.  We discussed moving forward with an MRI lumbar spine for better evaluation of neural elements and a steroid injection for the right knee to help treat the right knee pain.    PLAN:  Reviewed spine anatomy and disease process. Discussed diagnosis and treatment options with the patient today. A shared  decision making model was used. The patient's values and choices were respected. The following represents what was discussed and decided upon by the provider and the patient.    1. DIAGNOSTIC TESTS  MRI of lumbar spine was ordered for further characterization of soft tissues and/or neural compression    2. PHYSICAL THERAPY  Patient is already performing a home program, and was encouraged to continue doing so.  Patient reports recently completing PT without significant benefit  Discussed the importance of core strengthening, ROM, stretching exercises with the patient and how each of these entities is important in decreasing pain.  Explained to the patient that the purpose of physical therapy is to teach the patient a home exercise program.  These exercises need to be performed every day in order to decrease pain and prevent future occurrences of pain.  Likened it to brushing one's teeth.      3. MEDICATIONS:  Discussed multiple medication options today with patient. Discussed risks, side effects, and proper use of medications. Patient verbalized understanding.  No new medications ordered at this visit.    4. INTERVENTIONS:  Right knee (intra-articular) steroid injection with fluoroscopic guidance.  Will attempt to aspirate/drain joint as well    5. OTHER REFERRALS:  No other referrals at this time.    6. FOLLOW-UP  In approximately 2-4 weeks to assess response to injection.  Patient advised to contact our office for earlier appointment if symptoms worsening or not improving, or any side effects are noticed.    Advised patient to call the Spine Center if symptoms worsen or you have problems controlling bladder and bowel function.   ______________________________________________________________________    SUBJECTIVE:   Jenn Barclay  is a 69 year old female who presents today for new patient evaluation.    Patient reports she is having chronic low back pain for 15 years but in the past year the pain is started to  radiate down the back of the right leg.  Typically goes down to the buttock and posterior hamstring area, and she has not noticed any significant pain traveling below that.  Pain worsens with any prolonged sitting or standing, and bending forward is worse than bending backward.  Changing position is helpful as is the pain medication that she takes.  No prior back surgeries.    Separately of this, patient notes that she has been noticing intermittent swelling of the right knee and the right ankle particularly with more walking or towards the end of the day.  Swelling of the knee is located circumferentially around the knee, with a palpable bump or mass in the posterior knee area at times.  She denies any associated warmth, redness, or loss of movement in either the knee or the ankle.  Patient reports she has been told at various times that this is venous edema, gout, or arthritis, and she is not sure what is actually causing her symptoms.  No history of knee or ankle surgery, but did have a right hip fracture in March which is now status post hip replacement.    -Treatment to Date: PT, medication      Oswestry (SHANTE) Questionnaire        7/26/2024     9:42 AM   OSWESTRY DISABILITY INDEX   Count 5   Sum 4   Oswestry Score (%) 16 %        Data saved with a previous flowsheet row definition       Neck Disability Index:      7/26/2024     9:42 AM   Neck Disability Index (  Wei H. and Nir C. 1991. All rights reserved.; used with permission)   SECTION 1 - PAIN INTENSITY 1    SECTION 3 - LIFTING 0    SECTION 5 - HEADACHES 1    SECTION 7 - WORK 1    SECTION 9 - SLEEPING 0    Count 5    Sum 3        Data saved with a previous flowsheet row definition              -Medications:    Current Outpatient Medications   Medication Sig Dispense Refill     acetaminophen (TYLENOL) 325 MG tablet Take 325 mg by mouth daily as needed for mild pain or pain.       albuterol (PROVENTIL) (2.5 MG/3ML) 0.083% neb solution USE 1 VIAL IN  NEBULIZER EVERY 6 HOURS AS NEEDED FOR WHEEZING 270 mL 3     ascorbic acid (VITAMIN C) 500 MG tablet Take 500 mg by mouth daily.       aspirin (ASA) 325 MG EC tablet Take 325 mg by mouth daily as needed (headache).       augmented betamethasone dipropionate (DIPROLENE AF) 0.05 % external cream Apply topically 2 times daily. Stronger cream for rash. 50 g 3     Calcium Carb-Cholecalciferol (CALCIUM 600+D) 600-20 MG-MCG TABS Take 1 tablet by mouth 2 times daily.       furosemide (LASIX) 40 MG tablet Take 1 tablet (40 mg) by mouth daily for 10 days. 10 tablet 0     ipratropium - albuterol 0.5 mg/2.5 mg/3 mL (DUONEB) 0.5-2.5 (3) MG/3ML neb solution TAKE 1 VIAL(3ML) BY NEBULIZATION EVERY 6 HOURS AS NEEDED FOR COUGH WITH PHELGM. 90 mL 2     lipase-protease-amylase (CREON) 53494-36742-858093 units CPEP per EC capsule Take 2 capsules by mouth 3 times daily (with meals). With meals or snacks       multivitamin w/minerals (THERA-VIT-M) tablet Take 1 tablet by mouth daily.       nebulizer nebulization 1 nebulizer please as covered by insurance.  May also dispense supplies (tubing, inhalation device, etc) as needed. 1 each 0     oxyCODONE IR (ROXICODONE) 10 MG tablet Take 1 tablet (10 mg) by mouth 5 times daily. 150 tablet 0     POTASSIUM CITRATE PO Take 200 mg by mouth daily.       tiotropium-olodaterol (STIOLTO RESPIMAT) 2.5-2.5 MCG/ACT AERS Inhale 2 puffs into the lungs daily.       Vitamin D3 (VITAMIN D, CHOLECALCIFEROL,) 25 mcg (1000 units) tablet Take 1 tablet by mouth daily.       No current facility-administered medications for this visit.       Allergies   Allergen Reactions     Mirtazapine      Other reaction(s): Other (See Comments)  Hallucination      Tape [Adhesive Tape]      Allergy Hx  In addition to NO paper tape     Liquid Adhesive Rash     Other reaction(s): Other (See Comments)  Allergy Hx - Rash from paper tape       Past Medical History:   Diagnosis Date     Acute myeloid leukemia (AML), M2 (H)      AML  (acute myeloid leukemia) (H) 12/2009     Baker's cyst      Chronic back pain      Chronic diarrhea      Compression fx, lumbar spine (H)      Controlled substance agreement signed 08/03/2017     COPD (chronic obstructive pulmonary disease) (H)      COPD (chronic obstructive pulmonary disease) (H)      Full code status 09/10/2017     Gastric ulcer     pt states she has not had any ulcers     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)      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     History of PID      Kyphosis (acquired) (postural) 07/26/2024     Metatarsal fracture 2017 2nd      Migraine without aura, without mention of intractable migraine without mention of status migrainosus      Osteoporosis 2020    DXA     Osteoporosis 05/03/2016     Papular rash, generalized      Post-menopausal 02/08/2020    Early surgical menopause     Pseudogout 11/30/2016     Serrated adenoma of colon 07/17/2020     Status post allogeneic bone marrow transplant (H)      Surgical menopause 1976    oophorectomy     Tobacco abuse      Traumatic compression fracture of T8 thoracic vertebra, closed, initial encounter (H) 04/26/2016     Tubular adenoma of colon 07/17/2020     Uncomplicated asthma      Vertebral compression fracture (H) 2014     Zoster 2018        Patient Active Problem List   Diagnosis     Exocrine pancreatic insufficiency     Nephrolithiasis     Anxiety     Chronic back pain     Controlled substance agreement signed - 12/24 - oxycodone - UDS 12/24     COPD (chronic obstructive pulmonary disease) (H)     Financial difficulties     Full code status     Gastric ulcer     Migraine headache     Papular rash, generalized     Pseudogout     Senile purpura     Tobacco abuse     Traumatic compression fracture of T8 thoracic vertebra, closed, initial encounter (H)     Tubular adenoma of colon - advanced adenoma in 2017 - single small adenoma in 2020     OP (osteoporosis)     History of acute myeloid leukemia     Status post  allogeneic bone marrow transplant (H) - 2010 - [facility]     Chronic diarrhea     Chronic, continuous use of opioids     Frailty     Hypokalemia     Closed compression fracture of L4 lumbar vertebra, sequela     Closed wedge compression fracture of T5 vertebra, sequela     Closed nondisplaced fracture of left pubis, initial encounter (H)     Closed displaced intertrochanteric fracture of right femur, initial encounter (H)       Past Surgical History:   Procedure Laterality Date     ABDOMEN SURGERY       APPENDECTOMY       BIOPSY       COLONOSCOPY N/A 07/28/2020    Procedure: COLONOSCOPY, WITH POLYPECTOMY AND BIOPSY;  Surgeon: Gianni Valerio MD;  Location: [facility] OR     HERNIA REPAIR       HIP PINNING Right 03/13/2025    Procedure: INTERNAL FIXATION, FRACTURE, TROCHANTERIC, RIGHT HIP, USING RODS, USING HANA TABLE;  Surgeon: Sidney Catalan MD;  Location: [facility] OR     HYSTERECTOMY TOTAL ABDOMINAL, BILATERAL SALPINGO-OOPHORECTOMY, COMBINED  1976     IR MISCELLANEOUS PROCEDURE  02/22/2010     TRANSPLANT  01/01/2010     VERTEBROPLASTY  01/01/2016    T5 and T8 vertebrae.       Family History   Problem Relation Age of Onset     Diabetes Mother         borderline     Lung Cancer Mother      Pancreatic Cancer Father      Other Cancer Father      Diabetes Maternal Grandmother      Diabetes Maternal Uncle      Anesthesia Reaction Other      Anesthesia Reaction Sister      Thyroid Disease No family hx of      Melanoma No family hx of      Skin Cancer No family hx of        Reviewed past medical, surgical, and family history with patient found on new patient intake packet located in EMR Media tab.     SOCIAL HX: Reviewed    ROS: Specifically negative for bowel/bladder dysfunction, balance changes, headache, dizziness, foot drop, fevers, chills, appetite changes, nausea/vomiting, unexplained weight loss. Otherwise 13 systems reviewed are negative. Please see the patient's intake questionnaire from today for  "details.    OBJECTIVE:  /67 (BP Location: Left arm, Patient Position: Sitting, Cuff Size: Adult Small)   Pulse 78   Ht 5' 6\" (1.676 m)   Wt 93 lb (42.2 kg)   LMP  (LMP Unknown)   SpO2 94%   BMI 15.01 kg/m      PHYSICAL EXAMINATION:  --CONSTITUTIONAL: Vital signs as above. No acute distress. The patient is well nourished and well groomed.  --PSYCHIATRIC: The patient is awake, alert, oriented to person, place, time and answering questions appropriately with clear speech. Appropriate mood and affect   --RESPIRATORY: Normal rhythm and effort. No abnormal accessory muscle breathing patterns noted.   --GROSS MOTOR: Easily arises from a seated position.  --LUMBAR SPINE: Inspection reveals no evidence of deformity. Range of motion is not limited in flexion, extension, lateral rotation. Mild tenderness to palpation of lumbar paraspinals, no tenderness in spinous processes.  Facet loading (Tate test) negative, seated SLR positive on the right side  --HIPS: Full range of motion bilaterally.   --KNEES: Right knee is visibly and palpably swollen compared to the left.  No warmth, redness, or tenderness to palpation.  Good range of motion except for very slight flexion lag of 5 to 10 degrees.  Palpable Baker's cyst in the right knee posteriorly.  --LOWER EXTREMITY MOTOR TESTING:  Hip flexion left 5/5, right 5/5  Knee extension left 5/5, right 5/5  Ankle dorsiflexors left 5/5, right 5/5  Ankle plantarflexors left 5/5, right 5/5   Great toe extension left 5/5, right 5/5   Knee flexion left 5/5, right 5/5  --NEUROLOGIC: Sensation to lower extremities is intact bilaterally in L2-S1 dermatomes.  Negative Cordoba's bilaterally. Reflexes intact in BLE, no clonus.  --VASCULAR: Warm upper limbs bilaterally. Capillary refill in the upper extremities is less than 1 second.    RESULTS: Available medical records from [facility] and any other outside records were reviewed today.     Imaging:  Available relevant imaging was " personally reviewed and interpreted today. The images were shown to the patient and the findings were explained using a spine model.    X-ray right knee, foot, ankle 4/26/25:  IMPRESSION: Bones are demineralized.     Right knee shows a large nonspecific joint effusion with chondrocalcinosis. No evidence of an acute fracture. Moderate patellofemoral compartment arthritic change.     Ankle mortise is intact. Joint spaces of the right foot are maintained.     There is no evidence of an acute displaced right foot or ankle fracture.    CT lumbar spine 12/23/2024:    IMPRESSION:  1.  No CT evidence for acute fracture or posttraumatic subluxation.  2.  Chronic superior endplate compression fractures at L4 and L5.  3.  Multilevel lumbar spondylosis as above.    Again, thank you for allowing me to participate in the care of your patient.        Sincerely,        Will Pavon MD    Electronically signed

## 2025-06-16 NOTE — TELEPHONE ENCOUNTER
Medication Question or Refill    Controlled substance     What medication are you calling about (include dose and sig)?:    Disp Refills Start End DANISH   oxyCODONE IR (ROXICODONE) 10 MG tablet 150 tablet 0 5/19/2025 -- No   Sig - Route: Take 1 tablet (10 mg) by mouth 5 times daily. - Oral   Sent to pharmacy as: oxyCODONE HCl 10 MG Oral Tablet (ROXICODONE)   Class: E-Prescribe   Earliest Fill Date: 5/19/2025   Order: [id number]   E-Prescribing Status: Receipt confirmed by pharmacy (5/19/2025  4:12 PM CDT)   No prior authorization was found for this prescription.   Found prior authorization for another prescription for the same medication: Closed - Prior Authorization not required for patient/medication       Preferred Pharmacy    [id number] [address]  [address] [place] [id number]  Phone: [phone] Fax: [phone]      Controlled Substance Agreement on file:   CSA -- Patient Level:     [Media Unavailable] Controlled Substance Agreement - Opioid - Scan on 12/13/2024  1:38 PM   [Media Unavailable] Controlled Substance Agreement - Opioid - Scan on 1/9/2024 10:15 AM   [Media Unavailable] Controlled Substance Agreement - Opioid - Scan on 1/9/2023  9:02 AM   [Media Unavailable] Controlled Substance Agreement - Opioid - Scan on 1/3/2022  1:31 PM   [Media Unavailable] Controlled Substance Agreement - Opioid - Scan on 12/18/2019   [Media Unavailable] Controlled Substance Agreement - Opioid - Scan on 12/28/2018   [Media Unavailable] Controlled Substance Agreement - Opioid - Scan on 8/9/2017: [facility]       Who prescribed the medication?: PCP    Do you need a refill? Yes    Okay to leave a detailed message?: Yes at Cell number on file:    Telephone Information:   Mobile [phone]

## 2025-06-16 NOTE — TELEPHONE ENCOUNTER
Procedure ordered? Yes    What insurance are we billing for this procedure?  [employer]  IF SCHEDULING AT [facility] PAIN OR SPINE PLEASE SCHEDULE AT LEAST 7-10 BUSINESS DAYS OUT SO A PA CAN BE OBTAINED    Is a  PAIN  order available to link to injection appointment or does one need to be transcribed?  YES:   Fluoroscopy guided right knee aspiration and injection      If needed, route to CN to assist in doing so.    Is  needed?: No   If YES, please initiate in-person  request.    Will patient have a ?  Yes    All fluoro procedures require a .  These US procedures also require a : piriformis, pudendal, scalene, & carpal tunnel.    Is patient taking any blood thinners (i.e. Plavix, coumadin, jantoven, warfarin, heparin, Xarelto, Pradaxa, Eliquis, Brilinta, or Effient, etc)? No   If YES, schedule out 2 weeks, and route to RN pool to determine if medication hold is needed.    Is patient taking aspirin? No   For CERVICAL or INTERLAMINAR procedures, route message to Care Navigation so they can seek approval from managing provider to hold aspirin for 6 days prior to the procedure.    Does patient have an allergy to contrast dye or iodine?  No  If YES, OK to schedule. Route to RN pool AND add allergy information to appointment notes    Is patient diabetic? No If YES, blood glucose level will be checked on day of procedure and will need to be 300mg/dL or below (for steroid injections).    Does patient have an active infection or treated for one within the past week? No   If YES, do NOT schedule and route to Care Navigation.     Is patient currently taking any antibiotics or have an active infection?  No  For patients on chronic, preventative, or prophylactic antibiotics, procedures may be scheduled.   For patients on antibiotics for active or recent infection: antibiotic course must have been completed and symptom-free of infection to safely proceed with injection.  Send to Care  Navigation if unsure.    Is patient actively being treated for cancer or immunocompromised? No  If YES, do NOT schedule and route to RN pool.     Any chance of pregnancy? Not Applicable   If YES, do NOT schedule and route to RN pool.    Have you had any vaccines in the last 2 weeks? NO  If YES, please route to Care Navigation to discuss before scheduling.     Reminders:  -  If you are started on any steroids or antibiotics between now and your appointment, you must contact us because it may affect our ability to perform your procedure.   reviewed    -  Informed patient that it is OK to take normal medications before the procedure and must hold blood thinners as instructed.  reviewed    -  Patients scheduled for MBBs should not take pain meds prior to injection and pain rating needs to be 5/10 or greater the day of the procedure.    -  Informed cervical injection patients that an IV will be placed as a precautionary measure.  reviewed and not discussed    -  Patients should eat a light meal prior to their procedure appointment.  reviewed    -  All radiofrequency ablations are in a 40 minute time slot and not to be scheduled until in-basket message has been sent by the procedure team that the PA has been  received.  reviewed and not discussed     -  Spinal cord stimulator trial scheduling is coordinated by the procedure team.    -  Care Navigation (#[phone]) is available to assist patients with additional questions.  reviewed    -  Cervical TFESIs with Dr. Jalloh only.    *PLEASE FORWARD TO CARE NAVIGATION IF INDICATED.  PATIENT SHOULD BE INFORMED THAT THEY WILL BE CONTACTED BY CARE NAVIGATION ONLY IF CHANGES TO MEDICATION/CARE PLAN RECOMMENDATIONS ARE NEEDED.  IF THEY DO NOT HEAR BACK FROM CARE NAVIGATION, THEY ARE FINE TO CONTINUE WITH THEIR CURRENT MEDICATIONS/CARE PLAN.*

## 2025-06-16 NOTE — PATIENT INSTRUCTIONS
~Please call our [facility] Nurse Navigation #[phone] with any questions or concerns about your treatment plan, if symptoms worsen and you would like to be seen urgently, or if you have problems controlling bladder and bowel function.  ~For any future flareups or new symptoms, recommend follow-up in clinic or contact the nurse navigator line.  ~Please note that any My Chart messages may take multiple days for a response due to the high volume of patients seen in clinic.  Anything sent Friday night or after will be answered the following week when able.     An injection has been ordered today to potentially help with your pain symptoms. These injections do not fix what is going on in your back, therefore they typically do not take away the pain completely, however they can many times help improve symptoms. Injections should always be completed along with other modalities such as physical therapy for the best long term outcomes. If injections alone are done, then pain will likely return.     [facility] Injection Requirements    A  is required for all fluoroscopically-guided injections.  Injection appointments may be cancelled if there are signs/symptoms of an active infection or if the patient is being actively treated with antibiotics for a diagnosed infection.  Patients may have their steroid injection cancelled if they have had another steroid injection within 2 weeks.  Diabetic patients will have their blood glucose levels checked the day of their injection and the appointment will be rescheduled if the blood glucose level is 300 or higher.  Patients with allergies to cortisone, local anesthetics, iodine, or contrast dye should contact the Spine Center to further discuss these considerations.  Patients scheduled for medial branch block diagnostic injections should refrain from taking pain medication the day of the procedure.  The medial branch block injection  appointment will be rescheduled if the patient's pain rating is not 5/10 or greater at the time of the procedure.  Patients taking warfarin/Coumadin will have their INR checked the day of the procedure and the procedure may be rescheduled if the INR is greater than 3.0.  Please contact the Spine Center (#[phone]) if you are taking any prescription blood-thinning medications (warfarin, Plavix, Lovenox, Eliquis, Brilinta, Effient, etc.) as special dosing adjustments may need to be made depending on the type of injection you are scheduled to receive.  It is recommended that you delay having your steroid injection if you have received a flu shot or shingles vaccine within 2 weeks.     Imaging has been ordered. Radiology will call you to schedule. Please call below if you do not hear from them in the next couple of days.   [facility] Radiology Scheduling  Please call [phone] to schedule your image(s) (select option #1). There are 3 different locations near, see below.   There are imaging options for other locations as well, the imaging schedulers can help with other locations within [place].     [facility]  [address] [id number]    [facility] [place]  [address], [address]   [place] [phone]    [place] [id number] [place] [phone]

## 2025-06-18 DIAGNOSIS — J44.9 CHRONIC OBSTRUCTIVE PULMONARY DISEASE, UNSPECIFIED COPD TYPE (H): ICD-10-CM

## 2025-06-18 RX ORDER — IPRATROPIUM BROMIDE AND ALBUTEROL SULFATE 2.5; .5 MG/3ML; MG/3ML
SOLUTION RESPIRATORY (INHALATION)
Qty: 90 ML | Refills: 2 | Status: SHIPPED | OUTPATIENT
Start: 2025-06-18

## 2025-07-16 ENCOUNTER — RADIOLOGY INJECTION OFFICE VISIT (OUTPATIENT)
Dept: PHYSICAL MEDICINE AND REHAB | Facility: CLINIC | Age: 70
End: 2025-07-16
Attending: STUDENT IN AN ORGANIZED HEALTH CARE EDUCATION/TRAINING PROGRAM
Payer: COMMERCIAL

## 2025-07-16 VITALS
HEART RATE: 83 BPM | DIASTOLIC BLOOD PRESSURE: 74 MMHG | TEMPERATURE: 97.8 F | SYSTOLIC BLOOD PRESSURE: 118 MMHG | OXYGEN SATURATION: 93 % | RESPIRATION RATE: 16 BRPM

## 2025-07-16 DIAGNOSIS — M17.11 PRIMARY OSTEOARTHRITIS OF RIGHT KNEE: ICD-10-CM

## 2025-07-16 DIAGNOSIS — M79.661 PAIN OF RIGHT LOWER LEG: ICD-10-CM

## 2025-07-16 RX ORDER — ROPIVACAINE HYDROCHLORIDE 5 MG/ML
INJECTION, SOLUTION EPIDURAL; INFILTRATION; PERINEURAL
Status: COMPLETED | OUTPATIENT
Start: 2025-07-16 | End: 2025-07-16

## 2025-07-16 RX ORDER — TRIAMCINOLONE ACETONIDE 40 MG/ML
INJECTION, SUSPENSION INTRA-ARTICULAR; INTRAMUSCULAR
Status: COMPLETED | OUTPATIENT
Start: 2025-07-16 | End: 2025-07-16

## 2025-07-16 RX ADMIN — TRIAMCINOLONE ACETONIDE 40 MG: 40 INJECTION, SUSPENSION INTRA-ARTICULAR; INTRAMUSCULAR at 09:50

## 2025-07-16 RX ADMIN — ROPIVACAINE HYDROCHLORIDE 3 ML: 5 INJECTION, SOLUTION EPIDURAL; INFILTRATION; PERINEURAL at 09:50

## 2025-07-16 ASSESSMENT — PAIN SCALES - GENERAL
PAINLEVEL_OUTOF10: MODERATE PAIN (4)
PAINLEVEL_OUTOF10: SEVERE PAIN (7)

## 2025-07-16 NOTE — PATIENT INSTRUCTIONS
Follow-up visit with Dr. Pavon in 2-4 weeks to discuss injection outcome and determine care plan going forward.       DISCHARGE INSTRUCTIONS    During office hours (8:00 a.m.- 4:00 p.m.) questions or concerns may be answered  by calling [facility] Navigation Nurses at  [phone].  Messages received after hours will be returned the following business day.      In the case of an emergency, please dial 911 or seek assistance at the nearest Emergency Room/Urgent Care facility.     All Patients:    You may experience an increase in your symptoms for the first 2 days (It may take anywhere between 2 days - 2 weeks for the steroid to have maximum effect).    You may use ice on the injection site, as frequently as 20 minutes each hour if needed.    You may take your pain medicine.    You may continue taking your regular medication after your injection. If you have had a medial branch block you may resume pain medication once your pain diary is completed.    You may shower. No swimming, tub bath, or hot tub for 48 hours.  You may remove your bandaid/bandage as soon as you are home.    You may resume light activities, as tolerated.    Resume your usual diet as tolerated.    If you were told to hold any blood thinning medications you may resume taking them 24 hours after your procedure as prescribed.    It is strongly advised that you do not drive for 1-3 hours post injection.    If you have had oral sedation:  Do not drive for 8 hours post injection.        POSSIBLE STEROID SIDE EFFECTS (If steroid/cortisone was used for your procedure    Swelling of the legs              Skin redness (flushing)     Mouth (oral) irritation   Increased blood sugar (glucose) levels            Sweats                    Mood changes  Headache  Sleeplessness  Weakened immune system for up to 14 days, which could increase the risk of eze the COVID-19 virus and/or experiencing more severe symptoms of the disease, if exposed.  Decreased  effectiveness of vaccines if given within 2 weeks of the steroid.    -If you experience these symptoms, it should only last for a short period         POSSIBLE PROCEDURE SIDE EFFECTS    Increased Pain           Increased numbness/tingling      Nausea/Vomiting          Bruising/bleeding at site      Redness or swelling                                              Difficulty walking      Weakness           Fever greater than 100.5    -Call the [facility] Center if you are concerned    *In the event of a severe headache after an epidural steroid injection that is relieved by lying down, please call the [facility] to speak with a clinical staff member*

## 2025-07-17 ENCOUNTER — OFFICE VISIT (OUTPATIENT)
Dept: INTERNAL MEDICINE | Facility: CLINIC | Age: 70
End: 2025-07-17
Payer: COMMERCIAL

## 2025-07-17 VITALS
SYSTOLIC BLOOD PRESSURE: 121 MMHG | HEART RATE: 102 BPM | RESPIRATION RATE: 20 BRPM | WEIGHT: 96 LBS | BODY MASS INDEX: 15.43 KG/M2 | TEMPERATURE: 98 F | DIASTOLIC BLOOD PRESSURE: 76 MMHG | HEIGHT: 66 IN | OXYGEN SATURATION: 95 %

## 2025-07-17 DIAGNOSIS — M25.551 LATERAL PAIN OF RIGHT HIP: ICD-10-CM

## 2025-07-17 DIAGNOSIS — R60.0 PEDAL EDEMA: ICD-10-CM

## 2025-07-17 DIAGNOSIS — G89.29 CHRONIC MIDLINE THORACIC BACK PAIN: ICD-10-CM

## 2025-07-17 DIAGNOSIS — F11.90 CHRONIC, CONTINUOUS USE OF OPIOIDS: Chronic | ICD-10-CM

## 2025-07-17 DIAGNOSIS — M54.6 CHRONIC MIDLINE THORACIC BACK PAIN: ICD-10-CM

## 2025-07-17 DIAGNOSIS — S72.141A CLOSED DISPLACED INTERTROCHANTERIC FRACTURE OF RIGHT FEMUR, INITIAL ENCOUNTER (H): Primary | ICD-10-CM

## 2025-07-17 DIAGNOSIS — J44.9 CHRONIC OBSTRUCTIVE PULMONARY DISEASE, UNSPECIFIED COPD TYPE (H): ICD-10-CM

## 2025-07-17 DIAGNOSIS — M24.851 SNAPPING HIP SYNDROME, RIGHT: ICD-10-CM

## 2025-07-17 RX ORDER — OXYCODONE HYDROCHLORIDE 10 MG/1
10 TABLET ORAL
Qty: 150 TABLET | Refills: 0 | Status: SHIPPED | OUTPATIENT
Start: 2025-07-18 | End: 2025-08-17

## 2025-07-17 ASSESSMENT — PAIN SCALES - GENERAL: PAINLEVEL_OUTOF10: MODERATE PAIN (6)

## 2025-07-17 NOTE — PATIENT INSTRUCTIONS
Future Appointments   Date Time Provider Department Center   7/21/2025 11:30 AM [facility]   1/19/2026  1:30 PM Jorge Dunn MD [facility]

## 2025-07-17 NOTE — PROGRESS NOTES
"[address] [address]  Phone: [phone]  Fax: [phone]    The practice of medicine is an art, not a trade; a calling, not a business; a calling in which your heart will be exercised equally with your head.  - Dr. William Osler  ______________________________________________________________________     Date of Service: 7/17/2025  Primary Provider: Jorge Dunn    Patient Care Team:  Jorge Dunn MD as PCP - General (Internal Medicine)  Jorge Dunn MD as Assigned PCP  Joellen Fontenot MD as Resident (Dermatology)  Ryan Alonso MD as MD (Endocrinology, Diabetes, and Metabolism)  Manju Asher RN as BMT Nurse Coordinator  Umesh Brandt MD as BMT Physician (Transplant)  Jorge Dunn MD as Assigned Pain Medication Provider  Ryan Alonso MD as Assigned Endocrinology Provider  Lou Monterroso [facility] as Assigned MTM Pharmacist  Will Pavon MD as Assigned Neuroscience Provider     ______________________________________________________________________     Chief Complaint   Patient presents with    Musculoskeletal Problem     Right leg pain          7/17/2025    12:19 PM   Additional Questions   Roomed by KATRIN Peña     History of Present Illness-  Jenn Barclay, 69 years    Right Hip Pain and Snapping  - Reports right hip pain ongoing for approximately two weeks prior to the encounter  - Pain described as present when sitting, standing, walking, and lying down  - Pain severe enough to wake from sleep at 2-3 AM  - Localized pain, does not radiate to buttocks or other areas  - Reports a snapping sensation in the right hip, can feel it but not always palpable externally  - Describes the sensation as a tendon or structure snapping over the bone  - Reports concern that the screws from prior surgery may be causing issues  - States history of body \"rejecting\" metal implants in the past, including a plate " that required removal the day after placement  - Reports that titanium screws are currently in place, but feels her body does not tolerate them well  - Reports restricting activity to avoid exacerbating pain  - Can only lie on the affected side for 7-8 minutes before needing to change position due to discomfort  - Reports concern about the possibility of needing a hip replacement in the future  - No mention of pain radiating beyond the right hip    Right Knee Pain and Swelling  - Reports improvement in right knee pain compared to previous symptoms  - Underwent aspiration of the knee and received a cortisone injection prior to the encounter  - Reports minimal discomfort during the procedure  - Advised to avoid bathing for a few days post-procedure; has one day remaining before resuming baths  - Reports swelling in the knee has improved but is not completely resolved  - Notes that swelling is still present but less than before    Back Issues  - Reports history of back issues  - States that nothing was said about the back during recent care  - Reports having had an MRI at [facility] in July of the previous year (July 2024)  - Uncertain about the details of the MRI and which provider ordered it    Mobility and Assistive Devices  - Reports using a walker for ambulation, but attempts to walk without it at times  - Describes difficulty with walking long distances, especially during outings with family  - Plans to use an all-terrain walker with a seat and storage for longer outings due to fatigue    Social Security and Medication Coverage  - Awaiting approval for coverage of inhalers; did not receive the required form for inhaler coverage    Misc  - Reports gardening activities despite pain  - No mention of other symptoms such as fever, chills, or systemic complaints  - No mention of new trauma or injury prior to onset of current symptoms  ______________________________________________________________________      Active Problem List:  Problem List as of 7/17/2025 Reviewed: 6/10/2025  8:55 PM by Jorge Dunn MD         High    Full code status    Tobacco abuse    COPD (chronic obstructive pulmonary disease) (H)    History of acute myeloid leukemia    Status post allogeneic bone marrow transplant (H) - 2010 - [facility]       Medium    Controlled substance agreement signed - 12/24 - oxycodone - UDS 12/24    Chronic, continuous use of opioids    Exocrine pancreatic insufficiency    Chronic back pain    Financial difficulties    Pseudogout    Traumatic compression fracture of T8 thoracic vertebra, closed, initial encounter (H)    Chronic diarrhea    Hypokalemia    Closed compression fracture of L4 lumbar vertebra, sequela    Closed wedge compression fracture of T5 vertebra, sequela    Closed nondisplaced fracture of left pubis, initial encounter (H)    Closed displaced intertrochanteric fracture of right femur, initial encounter (H)       Low    Nephrolithiasis    Anxiety    Gastric ulcer    Migraine headache    Papular rash, generalized    Senile purpura    Tubular adenoma of colon - advanced adenoma in 2017 - single small adenoma in 2020    OP (osteoporosis)    Frailty     Current Outpatient Medications   Medication Instructions    acetaminophen (TYLENOL) 325 mg, DAILY PRN    albuterol (PROVENTIL) (2.5 MG/3ML) 0.083% neb solution USE 1 VIAL IN NEBULIZER EVERY 6 HOURS AS NEEDED FOR WHEEZING    aspirin (ASA) 325 mg, DAILY PRN    augmented betamethasone dipropionate (DIPROLENE AF) 0.05 % external cream Topical, 2 TIMES DAILY, Stronger cream for rash.    Calcium Carb-Cholecalciferol (CALCIUM 600+D) 600-20 MG-MCG TABS 1 tablet, 2 TIMES DAILY    furosemide (LASIX) 40 mg, Oral, DAILY    ipratropium - albuterol 0.5 mg/2.5 mg, 3mg,/3 mL (DUONEB) 0.5-2.5 (3) MG/3ML neb solution TAKE 1 VIAL(3ML) BY NEBULIZATION EVERY 6 HOURS AS NEEDED FOR COUGH WITH PHELGM.    lipase-protease-amylase (CREON) 65382-63773-751589 units CPEP per EC  "capsule 2 capsules, Oral, 3 TIMES DAILY WITH MEALS, With meals or snacks    multivitamin w/minerals (THERA-VIT-M) tablet 1 tablet, DAILY    nebulizer nebulization 1 nebulizer please as covered by insurance.  May also dispense supplies (tubing, inhalation device, etc) as needed.    [START ON 7/18/2025] oxyCODONE IR (ROXICODONE) 10 mg, Oral, 5 TIMES DAILY    POTASSIUM CITRATE  mg, DAILY    tiotropium-olodaterol (STIOLTO RESPIMAT) 2.5-2.5 MCG/ACT AERS 2 puffs, Inhalation, DAILY    vitamin C (ASCORBIC ACID) 500 mg, DAILY    Vitamin D3 (VITAMIN D, CHOLECALCIFEROL,) 25 mcg (1000 units) tablet 1 tablet, DAILY     Social History     Social History Narrative    , on disability.        Has family in the area.        Patient of Dr. Dunn since 2015.       ______________________________________________________________________     Wt Readings from Last 3 Encounters:   07/17/25 43.5 kg (96 lb)   06/16/25 42.2 kg (93 lb)   06/09/25 42.7 kg (94 lb 1.6 oz)     BP Readings from Last 3 Encounters:   07/17/25 121/76   07/16/25 118/74   06/16/25 130/67     /76   Pulse 102   Temp 98  F (36.7  C) (Oral)   Resp 20   Ht 1.676 m (5' 6\")   Wt 43.5 kg (96 lb)   LMP  (LMP Unknown)   SpO2 95%   BMI 15.49 kg/m     The patient is comfortable, no acute distress.  Mood good.  Insight good.  Eyes are nonicteric.  There is left lateral right hip pain.  This is not specifically over the trochanteric bursa but is near the buttock muscles and otherwise in the area.  There is snapping that the hip with walking noted on exam today.  No edema is appreciated today.  ______________________________________________________________________     No results found for any visits on 07/17/25.   ______________________________________________________________________     Diagnoses managed today:    1. Closed displaced intertrochanteric fracture of right femur, initial encounter (H)    2. Lateral pain of right hip    3. Pedal edema    4. " Chronic obstructive pulmonary disease, unspecified COPD type (H)    5. Chronic, continuous use of opioids    6. Chronic midline thoracic back pain    7. Snapping hip syndrome, right       ______________________________________________________________________     Assessment & Plan  Lateral pain of right hip:  - Likely related to tendon issues, possibly a tendon clicking over the bone.  - Options discussed include an injection, MRI, or consultation at [facility]. Patient expressed concern about hip replacement and was advised on different orthopedic options.  - Patient is restricting activity and using assistive devices as needed. Will monitor symptoms and consider further intervention if pain persists or worsens.    Closed displaced intertrochanteric fracture of right femur, initial encounter:  - Patient has a history of right femur fracture with prior surgical repair and hardware placement (titanium screws).  - Reports ongoing localized pain and snapping sensation at the surgical site, but no evidence of acute complication or new trauma.  - Continue to monitor for signs of hardware complications or nonunion. Consider imaging (MRI or other) if symptoms persist or worsen.    Chronic obstructive pulmonary disease, unspecified COPD type:  - Patient awaiting approval for inhaler coverage through Social Security/disability.  - No acute respiratory complaints reported during this visit.  - Continue current management and follow up as needed for respiratory symptoms. Patient to contact office if any issues arise with inhaler access.    Chronic, continuous use of opioids:  - Pain medication refill available for pickup on July 18, 2025.  - Continue to monitor pain control and assess for side effects or need for adjustment at future visits.    Pedal edema:  - Edema noted on exam, but improved compared to previous assessments.  - Continue to monitor. No acute intervention required at this time.    Chronic midline thoracic back  pain:  - History of chronic back pain, with recent MRI referral and prior imaging at [facility].  - No new back symptoms reported at this visit. Will follow up as needed based on MRI results and ongoing symptoms.     Continue current medications otherwise.  Follow up sooner if issues.     Issues to follow up on:  Follow up on hip if needed.  Controlled substance agreement (CSA) and urine drug screen (UDS) as able.  Dose reduction of pain medications as needed.    Jorge Dunn MD  General Internal Medicine  [facility]    The longitudinal plan of care for the diagnoses and conditions as documented were addressed during this visit. Due to the added complexity in care, I will continue to support Jenn in the subsequent management and with ongoing continuity of care.     Return in about 6 months (around 1/19/2026) for follow up visit.     Future Appointments   Date Time Provider Department Center   7/21/2025 11:30 AM [facility]   1/19/2026  1:30 PM [facility]

## 2025-07-29 ENCOUNTER — TELEPHONE (OUTPATIENT)
Dept: INTERNAL MEDICINE | Facility: CLINIC | Age: 70
End: 2025-07-29

## 2025-07-29 NOTE — TELEPHONE ENCOUNTER
PharmD Outbound Call:    Reason for call: MTM visit reminder, check in re: inhaler access through Patient Assistance Program.    Patient states that she received the form for Creon, but she still needs the form for the inhaler.    Recommendation: Patient to Contact the [place] [facility] Assistance [facility] at [phone] if you have concerns about medicine cost.    Also scheduled MTM follow-up visit to check in next week.    Essence Monterroso PharmD  Medication Therapy Management (MTM) Pharmacist

## 2025-07-29 NOTE — TELEPHONE ENCOUNTER
We just spoke with Jenn a few moments ago. We will resend the application to her in the mail.     Thank you,     Odalis Brenner   PAP    Pool:[facility]

## 2025-08-04 ENCOUNTER — HOSPITAL ENCOUNTER (EMERGENCY)
Facility: HOSPITAL | Age: 70
Discharge: HOME | End: 2025-08-04
Attending: EMERGENCY MEDICINE | Admitting: EMERGENCY MEDICINE
Payer: COMMERCIAL

## 2025-08-04 ENCOUNTER — APPOINTMENT (OUTPATIENT)
Dept: RADIOLOGY | Facility: HOSPITAL | Age: 70
End: 2025-08-04
Attending: EMERGENCY MEDICINE
Payer: COMMERCIAL

## 2025-08-04 ENCOUNTER — TELEPHONE (OUTPATIENT)
Dept: INTERNAL MEDICINE | Facility: CLINIC | Age: 70
End: 2025-08-04
Payer: COMMERCIAL

## 2025-08-04 ENCOUNTER — NURSE TRIAGE (OUTPATIENT)
Dept: INTERNAL MEDICINE | Facility: CLINIC | Age: 70
End: 2025-08-04
Payer: COMMERCIAL

## 2025-08-04 VITALS
DIASTOLIC BLOOD PRESSURE: 62 MMHG | RESPIRATION RATE: 16 BRPM | BODY MASS INDEX: 14.91 KG/M2 | TEMPERATURE: 98.9 F | SYSTOLIC BLOOD PRESSURE: 113 MMHG | HEART RATE: 81 BPM | OXYGEN SATURATION: 95 % | WEIGHT: 95 LBS | HEIGHT: 67 IN

## 2025-08-04 DIAGNOSIS — M25.551 RIGHT HIP PAIN: Primary | ICD-10-CM

## 2025-08-04 PROCEDURE — 99284 EMERGENCY DEPT VISIT MOD MDM: CPT | Performed by: EMERGENCY MEDICINE

## 2025-08-04 PROCEDURE — 96372 THER/PROPH/DIAG INJ SC/IM: CPT | Performed by: EMERGENCY MEDICINE

## 2025-08-04 PROCEDURE — 250N000013 HC RX MED GY IP 250 OP 250 PS 637: Performed by: EMERGENCY MEDICINE

## 2025-08-04 PROCEDURE — 250N000011 HC RX IP 250 OP 636: Performed by: EMERGENCY MEDICINE

## 2025-08-04 PROCEDURE — 73502 X-RAY EXAM HIP UNI 2-3 VIEWS: CPT

## 2025-08-04 RX ORDER — OXYCODONE AND ACETAMINOPHEN 5; 325 MG/1; MG/1
2 TABLET ORAL ONCE
Refills: 0 | Status: COMPLETED | OUTPATIENT
Start: 2025-08-04 | End: 2025-08-04

## 2025-08-04 RX ORDER — OXYCODONE AND ACETAMINOPHEN 5; 325 MG/1; MG/1
1 TABLET ORAL EVERY 6 HOURS PRN
Qty: 12 TABLET | Refills: 0 | Status: SHIPPED | OUTPATIENT
Start: 2025-08-04 | End: 2025-08-07

## 2025-08-04 RX ORDER — KETOROLAC TROMETHAMINE 30 MG/ML
30 INJECTION, SOLUTION INTRAMUSCULAR; INTRAVENOUS ONCE
Status: COMPLETED | OUTPATIENT
Start: 2025-08-04 | End: 2025-08-04

## 2025-08-04 RX ADMIN — OXYCODONE HYDROCHLORIDE AND ACETAMINOPHEN 2 TABLET: 5; 325 TABLET ORAL at 18:11

## 2025-08-04 RX ADMIN — KETOROLAC TROMETHAMINE 30 MG: 30 INJECTION, SOLUTION INTRAMUSCULAR at 18:11

## 2025-08-04 ASSESSMENT — ACTIVITIES OF DAILY LIVING (ADL): ADLS_ACUITY_SCORE: 57

## 2025-08-05 ENCOUNTER — VIRTUAL VISIT (OUTPATIENT)
Dept: PHARMACY | Facility: CLINIC | Age: 70
End: 2025-08-05
Payer: COMMERCIAL

## 2025-08-05 DIAGNOSIS — R52 GENERALIZED PAIN: ICD-10-CM

## 2025-08-05 DIAGNOSIS — Z87.81 HISTORY OF HIP FRACTURE: ICD-10-CM

## 2025-08-05 DIAGNOSIS — J44.9 CHRONIC OBSTRUCTIVE PULMONARY DISEASE, UNSPECIFIED COPD TYPE (H): Primary | ICD-10-CM

## 2025-08-05 PROCEDURE — 99606 MTMS BY PHARM EST 15 MIN: CPT | Mod: 93 | Performed by: PHARMACIST

## 2025-08-06 ENCOUNTER — DOCUMENTATION ONLY (OUTPATIENT)
Dept: OTHER | Facility: CLINIC | Age: 70
End: 2025-08-06
Payer: COMMERCIAL

## 2025-08-07 ENCOUNTER — TELEPHONE (OUTPATIENT)
Dept: INTERNAL MEDICINE | Facility: CLINIC | Age: 70
End: 2025-08-07
Payer: COMMERCIAL

## 2025-08-12 ENCOUNTER — TELEPHONE (OUTPATIENT)
Dept: INTERNAL MEDICINE | Facility: CLINIC | Age: 70
End: 2025-08-12
Payer: COMMERCIAL

## 2025-08-12 DIAGNOSIS — J44.9 CHRONIC OBSTRUCTIVE PULMONARY DISEASE, UNSPECIFIED COPD TYPE (H): ICD-10-CM

## 2025-08-13 DIAGNOSIS — S72.141A CLOSED DISPLACED INTERTROCHANTERIC FRACTURE OF RIGHT FEMUR, INITIAL ENCOUNTER (H): ICD-10-CM

## 2025-08-13 RX ORDER — OXYCODONE HYDROCHLORIDE 10 MG/1
10 TABLET ORAL
Qty: 150 TABLET | Refills: 0 | Status: SHIPPED | OUTPATIENT
Start: 2025-08-13

## 2025-08-13 RX ORDER — IPRATROPIUM BROMIDE AND ALBUTEROL SULFATE 2.5; .5 MG/3ML; MG/3ML
1 SOLUTION RESPIRATORY (INHALATION) EVERY 6 HOURS PRN
Qty: 90 ML | Refills: 5 | Status: SHIPPED | OUTPATIENT
Start: 2025-08-13

## 2025-08-13 RX ORDER — IPRATROPIUM BROMIDE AND ALBUTEROL SULFATE 2.5; .5 MG/3ML; MG/3ML
SOLUTION RESPIRATORY (INHALATION)
Qty: 90 ML | Refills: 2 | Status: CANCELLED | OUTPATIENT
Start: 2025-08-13

## 2025-08-21 ENCOUNTER — TELEPHONE (OUTPATIENT)
Dept: INTERNAL MEDICINE | Facility: CLINIC | Age: 70
End: 2025-08-21
Payer: COMMERCIAL

## 2025-08-28 ENCOUNTER — VIRTUAL VISIT (OUTPATIENT)
Dept: PHARMACY | Facility: CLINIC | Age: 70
End: 2025-08-28
Payer: COMMERCIAL

## 2025-08-28 DIAGNOSIS — J44.9 CHRONIC OBSTRUCTIVE PULMONARY DISEASE, UNSPECIFIED COPD TYPE (H): Primary | ICD-10-CM

## 2025-08-28 RX ORDER — PNV NO.95/FERROUS FUM/FOLIC AC 28MG-0.8MG
1 TABLET ORAL DAILY
COMMUNITY

## 2025-08-28 RX ORDER — ALBUTEROL SULFATE 90 UG/1
1-2 INHALANT RESPIRATORY (INHALATION) EVERY 6 HOURS PRN
Qty: 18 G | Refills: 2 | Status: SHIPPED | OUTPATIENT
Start: 2025-08-28

## (undated) DEVICE — SUCTION MANIFOLD NEPTUNE 2 SYS 1 PORT 702-025-000

## (undated) DEVICE — TUBING SUCTION 12"X1/4" N612

## (undated) DEVICE — GOWN IMPERVIOUS 2XL BLUE

## (undated) DEVICE — DRSG PRIMAPORE 03 1/8X6" 66000318

## (undated) DEVICE — KIT ENDO TURNOVER/PROCEDURE CARRY-ON 101822

## (undated) DEVICE — DRESSING XEROFORM PETROLATUM 5X9 33605

## (undated) DEVICE — DRILL BIT CANNULATED 10MM TAPERED

## (undated) DEVICE — DRILL BIT CANNULATED 16MM HOLLOW STER 03.037.004S

## (undated) DEVICE — ENDO FORCEP BX CAPTURA PRO SPIKE G50696

## (undated) DEVICE — SOL WATER IRRIG 1000ML BOTTLE 2F7114

## (undated) DEVICE — DRILL BIT QUICK COUPLING 3 FLUTE 4.2MMX330/100MM CALIBRATE

## (undated) DEVICE — STPL SKIN 35W 6.9MM  PXW35

## (undated) DEVICE — SPECIMEN CONTAINER 3OZ W/FORMALIN 59901

## (undated) DEVICE — DRILL BIL CANNULATED 6MM/9MM 03.037.022

## (undated) DEVICE — WIRE GUIDE 3.2X400MM  357.399

## (undated) RX ORDER — CEFAZOLIN SODIUM 1 G/3ML
INJECTION, POWDER, FOR SOLUTION INTRAMUSCULAR; INTRAVENOUS
Status: DISPENSED
Start: 2025-03-13

## (undated) RX ORDER — FENTANYL CITRATE 50 UG/ML
INJECTION, SOLUTION INTRAMUSCULAR; INTRAVENOUS
Status: DISPENSED
Start: 2025-03-13

## (undated) RX ORDER — PROPOFOL 10 MG/ML
INJECTION, EMULSION INTRAVENOUS
Status: DISPENSED
Start: 2025-03-13

## (undated) RX ORDER — FENTANYL CITRATE-0.9 % NACL/PF 10 MCG/ML
PLASTIC BAG, INJECTION (ML) INTRAVENOUS
Status: DISPENSED
Start: 2025-03-13

## (undated) RX ORDER — EPHEDRINE SULFATE 50 MG/ML
INJECTION, SOLUTION INTRAMUSCULAR; INTRAVENOUS; SUBCUTANEOUS
Status: DISPENSED
Start: 2025-03-13

## (undated) RX ORDER — DEXAMETHASONE SODIUM PHOSPHATE 10 MG/ML
INJECTION, SOLUTION INTRAMUSCULAR; INTRAVENOUS
Status: DISPENSED
Start: 2025-03-13